# Patient Record
Sex: FEMALE | Race: WHITE | NOT HISPANIC OR LATINO | Employment: OTHER | URBAN - METROPOLITAN AREA
[De-identification: names, ages, dates, MRNs, and addresses within clinical notes are randomized per-mention and may not be internally consistent; named-entity substitution may affect disease eponyms.]

---

## 2017-03-31 ENCOUNTER — OFFICE VISIT (OUTPATIENT)
Dept: FAMILY MEDICINE | Facility: CLINIC | Age: 62
End: 2017-03-31
Payer: COMMERCIAL

## 2017-03-31 VITALS
SYSTOLIC BLOOD PRESSURE: 136 MMHG | BODY MASS INDEX: 16.91 KG/M2 | WEIGHT: 91.9 LBS | DIASTOLIC BLOOD PRESSURE: 72 MMHG | HEIGHT: 62 IN | HEART RATE: 73 BPM | OXYGEN SATURATION: 100 % | TEMPERATURE: 97.8 F

## 2017-03-31 DIAGNOSIS — B02.29 NEURALGIA, POST-HERPETIC: ICD-10-CM

## 2017-03-31 DIAGNOSIS — Z12.31 VISIT FOR SCREENING MAMMOGRAM: Primary | ICD-10-CM

## 2017-03-31 PROCEDURE — 99213 OFFICE O/P EST LOW 20 MIN: CPT | Performed by: INTERNAL MEDICINE

## 2017-03-31 RX ORDER — GABAPENTIN 300 MG/1
CAPSULE ORAL
Qty: 180 CAPSULE | Refills: 3 | Status: SHIPPED | OUTPATIENT
Start: 2017-03-31 | End: 2017-08-13

## 2017-03-31 RX ORDER — OMEPRAZOLE 40 MG/1
40 CAPSULE, DELAYED RELEASE ORAL DAILY
COMMUNITY
End: 2017-11-09

## 2017-03-31 NOTE — NURSING NOTE
"Chief Complaint   Patient presents with     Musculoskeletal Problem       Initial /72 (BP Location: Right arm, Patient Position: Chair, Cuff Size: Adult Regular)  Pulse 73  Temp 97.8  F (36.6  C) (Oral)  Ht 5' 1.5\" (1.562 m)  Wt 91 lb 14.4 oz (41.7 kg)  SpO2 100%  BMI 17.08 kg/m2 Estimated body mass index is 17.08 kg/(m^2) as calculated from the following:    Height as of this encounter: 5' 1.5\" (1.562 m).    Weight as of this encounter: 91 lb 14.4 oz (41.7 kg).  Medication Reconciliation: complete   Melissa Cook MA  "

## 2017-03-31 NOTE — MR AVS SNAPSHOT
After Visit Summary   3/31/2017    Kezia [place]    MRN: [id number]           Patient Information     Date Of Birth          1955        Visit Information        Provider Department      3/31/2017 4:00 PM Mustapha Velásquez MD [facility]        Today's Diagnoses     Visit for screening mammogram    -  1    Neuralgia, post-herpetic           Follow-ups after your visit        Follow-up notes from your care team     Return in about 4 weeks (around 4/28/2017) for Routine Visit.      Your next 10 appointments already scheduled     May 04, 2017 11:30 AM CDT   Office Visit with Mustapha Velásquez MD   [facility] ([facility])    [address] [facility] [address] [phone]           Bring a current list of meds and any records pertaining to this visit.  For Physicals, please bring immunization records and any forms needing to be filled out.  Please arrive 10 minutes early to complete paperwork.              Future tests that were ordered for you today     Open Future Orders        Priority Expected Expires Ordered    MA SCREENING DIGITAL BILAT - Future  (s+30) Routine  3/31/2018 3/31/2017            Who to contact     If you have questions or need follow up information about today's clinic visit or your schedule please contact [facility] directly at [phone].  Normal or non-critical lab and imaging results will be communicated to you by MyChart, letter or phone within 4 business days after the clinic has received the results. If you do not hear from us within 7 days, please contact the clinic through MyChart or phone. If you have a critical or abnormal lab result, we will notify you by phone as soon as possible.  Submit refill requests through [employer] or call your pharmacy and they will forward the refill request to us. Please allow 3 business days for your refill to be completed.          Additional Information About Your Visit       "  [employer] Information     [employer] lets you send messages to your doctor, view your test results, renew your prescriptions, schedule appointments and more. To sign up, go to [email or web address] . Click on \"Log in\" on the left side of the screen, which will take you to the Welcome page. Then click on \"Sign up Now\" on the right side of the page.     You will be asked to enter the access code listed below, as well as some personal information. Please follow the directions to create your username and password.     Your access code is: [email or web address]  Expires: 2017  6:54 PM     Your access code will  in 90 days. If you need help or a new code, please call your [place] clinic or [phone].        Care EveryWhere ID     This is your Care EveryWhere ID. This could be used by other organizations to access your [place] medical records  [id number]        Your Vitals Were     Pulse Temperature Height Pulse Oximetry BMI (Body Mass Index)       73 97.8  F (36.6  C) (Oral) 5' 1.5\" (1.562 m) 100% 17.08 kg/m2        Blood Pressure from Last 3 Encounters:   17 136/72   16 126/64   10/14/16 133/60    Weight from Last 3 Encounters:   17 91 lb 14.4 oz (41.7 kg)   10/14/16 92 lb 1.6 oz (41.8 kg)   16 95 lb (43.1 kg)                 Today's Medication Changes          These changes are accurate as of: 3/31/17  6:54 PM.  If you have any questions, ask your nurse or doctor.               These medicines have changed or have updated prescriptions.        Dose/Directions    gabapentin 300 MG capsule   Commonly known as:  NEURONTIN   This may have changed:  See the new instructions.   Used for:  Neuralgia, post-herpetic   Changed by:  Mustapha Velásquez MD        1-3 capsule(s) up to three times per day as needed for pain related to post herpetic neuralgia   Quantity:  180 capsule   Refills:  3            Where to get your medicines      These medications were sent to [place] [address] [place] [facility] " [place], [place] - [address] [address]  [address] [address]     Phone:  [phone]     gabapentin 300 MG capsule                Primary Care Provider Office Phone # Fax #    Mustapha Velásquez [phone]       [facility] [address] [facility] [address]        Thank you!     Thank you for choosing [facility]  for your care. Our goal is always to provide you with excellent care. Hearing back from our patients is one way we can continue to improve our services. Please take a few minutes to complete the written survey that you may receive in the mail after your visit with us. Thank you!             Your Updated Medication List - Protect others around you: Learn how to safely use, store and throw away your medicines at www.disposemymeds.org.          This list is accurate as of: 3/31/17  6:54 PM.  Always use your most recent med list.                   Brand Name Dispense Instructions for use    amitriptyline 25 MG tablet    ELAVIL    90 tablet    TAKE 1 TABLET (25 MG) BY MOUTH AT BEDTIME       fluticasone 50 MCG/ACT spray    FLONASE    1 Bottle    Spray 2 sprays into both nostrils daily       gabapentin 300 MG capsule    NEURONTIN    180 capsule    1-3 capsule(s) up to three times per day as needed for pain related to post herpetic neuralgia       metoprolol 25 MG tablet    LOPRESSOR    180 tablet    Take 1 tablet (25 mg) by mouth 2 times daily       omeprazole 40 MG capsule    priLOSEC     Take 40 mg by mouth daily

## 2017-05-04 ENCOUNTER — OFFICE VISIT (OUTPATIENT)
Dept: FAMILY MEDICINE | Facility: CLINIC | Age: 62
End: 2017-05-04
Payer: COMMERCIAL

## 2017-05-04 VITALS
HEIGHT: 62 IN | WEIGHT: 93 LBS | OXYGEN SATURATION: 100 % | BODY MASS INDEX: 17.11 KG/M2 | HEART RATE: 77 BPM | SYSTOLIC BLOOD PRESSURE: 146 MMHG | TEMPERATURE: 95.6 F | DIASTOLIC BLOOD PRESSURE: 63 MMHG

## 2017-05-04 DIAGNOSIS — B02.29 NEURALGIA, POST-HERPETIC: Primary | ICD-10-CM

## 2017-05-04 DIAGNOSIS — F10.20 ALCOHOLISM (H): ICD-10-CM

## 2017-05-04 DIAGNOSIS — C15.9 MALIGNANT NEOPLASM OF ESOPHAGUS, UNSPECIFIED LOCATION (H): ICD-10-CM

## 2017-05-04 PROCEDURE — 99213 OFFICE O/P EST LOW 20 MIN: CPT | Performed by: INTERNAL MEDICINE

## 2017-05-04 RX ORDER — AMITRIPTYLINE HYDROCHLORIDE 50 MG/1
50 TABLET ORAL AT BEDTIME
Qty: 90 TABLET | Refills: 3 | Status: SHIPPED | OUTPATIENT
Start: 2017-05-04 | End: 2017-11-09

## 2017-05-04 NOTE — MR AVS SNAPSHOT
After Visit Summary   5/4/2017    Kezia Dixon    MRN: [id number]           Patient Information     Date Of Birth          1955        Visit Information        Provider Department      5/4/2017 11:30 AM Mustapha Velásquez MD [facility]        Today's Diagnoses     Neuralgia, post-herpetic    -  1    Malignant neoplasm of esophagus, unspecified location (H)        Alcoholism (H)           Follow-ups after your visit        Your next 10 appointments already scheduled     May 08, 2017  1:30 PM CDT   MA SCREENING DIGITAL BILATERAL with [id number]   [facility]    [address] [place] [address], [address]  [facility] [address] [phone]           Do not use any powder, lotion or deodorant under your arms or on your breast. If you do, we will ask you to remove it before your exam.  Wear comfortable, two-piece clothing.  If you have any allergies, tell your care team.  Bring any previous mammograms from other facilities or have them mailed to the breast center. This mammogram location, [facility], now offers 3D mammography. It doesn't replace a screening mammogram and can be done with a regular screening mammogram. It is optional and not all insurances will pay for it. 3D mammography is a special kind of mammogram that produces a three-dimensional image of the breast by using low dose-xrays. 3D allows the radiologist to see the breast tissue differently from 2D, which reduces the chance of repeat testing due to overlapping breast tissue. If you are interested in have a 3D mammogram, please check with your insurance before you arrive for your exam. On the day of your exam you will be asked if you would like 3D imaging.              Who to contact     If you have questions or need follow up information about today's clinic visit or your schedule please contact [facility] directly at [phone].  Normal or  "non-critical lab and imaging results will be communicated to you by MyChart, letter or phone within 4 business days after the clinic has received the results. If you do not hear from us within 7 days, please contact the clinic through [employer] or phone. If you have a critical or abnormal lab result, we will notify you by phone as soon as possible.  Submit refill requests through [employer] or call your pharmacy and they will forward the refill request to us. Please allow 3 business days for your refill to be completed.          Additional Information About Your Visit        [employer] Information     [employer] lets you send messages to your doctor, view your test results, renew your prescriptions, schedule appointments and more. To sign up, go to [email or web address] . Click on \"Log in\" on the left side of the screen, which will take you to the Welcome page. Then click on \"Sign up Now\" on the right side of the page.     You will be asked to enter the access code listed below, as well as some personal information. Please follow the directions to create your username and password.     Your access code is: [email or web address]  Expires: 2017  6:54 PM     Your access code will  in 90 days. If you need help or a new code, please call your [place] clinic or [phone].        Care EveryWhere ID     This is your Care EveryWhere ID. This could be used by other organizations to access your [place] medical records  [id number]        Your Vitals Were     Pulse Temperature Height Pulse Oximetry Breastfeeding? BMI (Body Mass Index)    77 95.6  F (35.3  C) (Tympanic) 5' 1.5\" (1.562 m) 100% No 17.29 kg/m2       Blood Pressure from Last 3 Encounters:   17 146/63   17 136/72   16 126/64    Weight from Last 3 Encounters:   17 93 lb (42.2 kg)   17 91 lb 14.4 oz (41.7 kg)   10/14/16 92 lb 1.6 oz (41.8 kg)              Today, you had the following     No orders found for display         Today's " Medication Changes          These changes are accurate as of: 5/4/17 12:28 PM.  If you have any questions, ask your nurse or doctor.               These medicines have changed or have updated prescriptions.        Dose/Directions    amitriptyline 50 MG tablet   Commonly known as:  ELAVIL   This may have changed:  See the new instructions.   Used for:  Neuralgia, post-herpetic   Changed by:  Mustapha Velásquez MD        Dose:  50 mg   Take 1 tablet (50 mg) by mouth At Bedtime   Quantity:  90 tablet   Refills:  3            Where to get your medicines      These medications were sent to [place] [address] [place] [facility]  [address] [address]     Phone:  [phone]     amitriptyline 50 MG tablet                Primary Care Provider Office Phone # Fax #    Mustapha Velásquez [phone]       [facility] [address] [facility] [address]        Thank you!     Thank you for choosing [facility]  for your care. Our goal is always to provide you with excellent care. Hearing back from our patients is one way we can continue to improve our services. Please take a few minutes to complete the written survey that you may receive in the mail after your visit with us. Thank you!             Your Updated Medication List - Protect others around you: Learn how to safely use, store and throw away your medicines at www.disposemymeds.org.          This list is accurate as of: 5/4/17 12:28 PM.  Always use your most recent med list.                   Brand Name Dispense Instructions for use    amitriptyline 50 MG tablet    ELAVIL    90 tablet    Take 1 tablet (50 mg) by mouth At Bedtime       fluticasone 50 MCG/ACT spray    FLONASE    1 Bottle    Spray 2 sprays into both nostrils daily       gabapentin 300 MG capsule    NEURONTIN    180 capsule    1-3 capsule(s) up to three times per day as needed for pain related to post herpetic neuralgia       metoprolol 25  MG tablet    LOPRESSOR    180 tablet    Take 1 tablet (25 mg) by mouth 2 times daily       omeprazole 40 MG capsule    priLOSEC     Take 40 mg by mouth daily

## 2017-05-04 NOTE — NURSING NOTE
"Chief Complaint   Patient presents with     Hypertension     follow up     Also wants to discuss chronic back pain    Initial /63 (BP Location: Right arm, Cuff Size: Adult Regular)  Pulse 77  Temp 95.6  F (35.3  C) (Tympanic)  Ht 5' 1.5\" (1.562 m)  Wt 93 lb (42.2 kg)  SpO2 100%  Breastfeeding? No  BMI 17.29 kg/m2 Estimated body mass index is 17.29 kg/(m^2) as calculated from the following:    Height as of this encounter: 5' 1.5\" (1.562 m).    Weight as of this encounter: 93 lb (42.2 kg).  Medication Reconciliation: complete     Nickie Herring MA      "

## 2017-05-08 ENCOUNTER — HOSPITAL ENCOUNTER (OUTPATIENT)
Dept: MAMMOGRAPHY | Facility: CLINIC | Age: 62
Discharge: HOME | End: 2017-05-08
Attending: INTERNAL MEDICINE | Admitting: INTERNAL MEDICINE
Payer: COMMERCIAL

## 2017-05-08 DIAGNOSIS — Z12.31 VISIT FOR SCREENING MAMMOGRAM: ICD-10-CM

## 2017-05-08 PROCEDURE — G0202 SCR MAMMO BI INCL CAD: HCPCS

## 2017-05-08 PROCEDURE — 77063 BREAST TOMOSYNTHESIS BI: CPT

## 2017-07-20 DIAGNOSIS — J31.0 CHRONIC RHINITIS: ICD-10-CM

## 2017-07-20 RX ORDER — FLUTICASONE PROPIONATE 50 MCG
SPRAY, SUSPENSION (ML) NASAL
Start: 2017-07-20

## 2017-07-20 NOTE — TELEPHONE ENCOUNTER
fluticasone (FLONASE) 50 MCG/ACT spray        Last Written Prescription Date: 10/14/2016  Last Fill Quantity: 1,  # refills: 11   Last Office Visit with [facility], [facility] or [facility] prescribing provider: 5/4/2017

## 2017-08-13 DIAGNOSIS — B02.29 NEURALGIA, POST-HERPETIC: ICD-10-CM

## 2017-08-14 RX ORDER — GABAPENTIN 300 MG/1
CAPSULE ORAL
Qty: 180 CAPSULE | Refills: 3 | Status: SHIPPED | OUTPATIENT
Start: 2017-08-14 | End: 2017-11-09

## 2017-08-14 NOTE — TELEPHONE ENCOUNTER
gabapentin (NEURONTIN) 300 MG capsule        Last Written Prescription Date: 3/31/2017  Last Quantity: 180, # refills: 3  Last Office Visit with [facility], [facility] or [facility] prescribing provider: 5/4/2017       Creatinine   Date Value Ref Range Status   03/31/2016 0.47 (L) 0.52 - 1.04 mg/dL Final     Lab Results   Component Value Date     03/25/2016     Lab Results   Component Value Date     03/25/2016     BP Readings from Last 3 Encounters:   05/04/17 146/63   03/31/17 136/72   11/03/16 126/64

## 2017-08-21 ENCOUNTER — DOCUMENTATION ONLY (OUTPATIENT)
Dept: OTHER | Facility: CLINIC | Age: 62
End: 2017-08-21

## 2017-08-21 PROBLEM — Z71.89 ACP (ADVANCE CARE PLANNING): Chronic | Status: ACTIVE | Noted: 2017-08-21

## 2017-11-09 ENCOUNTER — OFFICE VISIT (OUTPATIENT)
Dept: FAMILY MEDICINE | Facility: CLINIC | Age: 62
End: 2017-11-09
Payer: COMMERCIAL

## 2017-11-09 VITALS
DIASTOLIC BLOOD PRESSURE: 60 MMHG | WEIGHT: 95.9 LBS | OXYGEN SATURATION: 100 % | HEART RATE: 75 BPM | BODY MASS INDEX: 17.65 KG/M2 | HEIGHT: 62 IN | TEMPERATURE: 97.6 F | SYSTOLIC BLOOD PRESSURE: 153 MMHG

## 2017-11-09 DIAGNOSIS — I10 BENIGN ESSENTIAL HYPERTENSION: ICD-10-CM

## 2017-11-09 DIAGNOSIS — B02.29 NEURALGIA, POST-HERPETIC: ICD-10-CM

## 2017-11-09 DIAGNOSIS — L98.9 SKIN LESION: ICD-10-CM

## 2017-11-09 DIAGNOSIS — E78.5 HYPERLIPIDEMIA LDL GOAL <130: ICD-10-CM

## 2017-11-09 DIAGNOSIS — L57.0 AK (ACTINIC KERATOSIS): ICD-10-CM

## 2017-11-09 DIAGNOSIS — Z12.4 SCREENING FOR MALIGNANT NEOPLASM OF CERVIX: ICD-10-CM

## 2017-11-09 DIAGNOSIS — Z00.00 ROUTINE GENERAL MEDICAL EXAMINATION AT A HEALTH CARE FACILITY: Primary | ICD-10-CM

## 2017-11-09 DIAGNOSIS — J31.0 CHRONIC RHINITIS, UNSPECIFIED TYPE: ICD-10-CM

## 2017-11-09 LAB
ALBUMIN SERPL-MCNC: 3.6 G/DL (ref 3.4–5)
ALP SERPL-CCNC: 73 U/L (ref 40–150)
ALT SERPL W P-5'-P-CCNC: 16 U/L (ref 0–50)
ANION GAP SERPL CALCULATED.3IONS-SCNC: 6 MMOL/L (ref 3–14)
AST SERPL W P-5'-P-CCNC: 16 U/L (ref 0–45)
BILIRUB SERPL-MCNC: 0.4 MG/DL (ref 0.2–1.3)
BUN SERPL-MCNC: 19 MG/DL (ref 7–30)
CALCIUM SERPL-MCNC: 9.4 MG/DL (ref 8.5–10.1)
CHLORIDE SERPL-SCNC: 107 MMOL/L (ref 94–109)
CHOLEST SERPL-MCNC: 148 MG/DL
CO2 SERPL-SCNC: 27 MMOL/L (ref 20–32)
CREAT SERPL-MCNC: 0.58 MG/DL (ref 0.52–1.04)
ERYTHROCYTE [DISTWIDTH] IN BLOOD BY AUTOMATED COUNT: 13.5 % (ref 10–15)
GFR SERPL CREATININE-BSD FRML MDRD: >90 ML/MIN/1.7M2
GLUCOSE SERPL-MCNC: 100 MG/DL (ref 70–99)
HCT VFR BLD AUTO: 40.6 % (ref 35–47)
HDLC SERPL-MCNC: 89 MG/DL
HGB BLD-MCNC: 13.2 G/DL (ref 11.7–15.7)
LDLC SERPL CALC-MCNC: 41 MG/DL
MCH RBC QN AUTO: 32.2 PG (ref 26.5–33)
MCHC RBC AUTO-ENTMCNC: 32.5 G/DL (ref 31.5–36.5)
MCV RBC AUTO: 99 FL (ref 78–100)
NONHDLC SERPL-MCNC: 59 MG/DL
PLATELET # BLD AUTO: 169 10E9/L (ref 150–450)
POTASSIUM SERPL-SCNC: 4.5 MMOL/L (ref 3.4–5.3)
PROT SERPL-MCNC: 6.9 G/DL (ref 6.8–8.8)
RBC # BLD AUTO: 4.1 10E12/L (ref 3.8–5.2)
SODIUM SERPL-SCNC: 140 MMOL/L (ref 133–144)
TRIGL SERPL-MCNC: 91 MG/DL
WBC # BLD AUTO: 6.1 10E9/L (ref 4–11)

## 2017-11-09 PROCEDURE — 80053 COMPREHEN METABOLIC PANEL: CPT | Performed by: INTERNAL MEDICINE

## 2017-11-09 PROCEDURE — 99213 OFFICE O/P EST LOW 20 MIN: CPT | Mod: 25 | Performed by: INTERNAL MEDICINE

## 2017-11-09 PROCEDURE — 80061 LIPID PANEL: CPT | Performed by: INTERNAL MEDICINE

## 2017-11-09 PROCEDURE — 85027 COMPLETE CBC AUTOMATED: CPT | Performed by: INTERNAL MEDICINE

## 2017-11-09 PROCEDURE — 36415 COLL VENOUS BLD VENIPUNCTURE: CPT | Performed by: INTERNAL MEDICINE

## 2017-11-09 PROCEDURE — 99396 PREV VISIT EST AGE 40-64: CPT | Performed by: INTERNAL MEDICINE

## 2017-11-09 PROCEDURE — 17000 DESTRUCT PREMALG LESION: CPT | Performed by: INTERNAL MEDICINE

## 2017-11-09 RX ORDER — GABAPENTIN 300 MG/1
CAPSULE ORAL
Qty: 810 CAPSULE | Refills: 3 | Status: SHIPPED | OUTPATIENT
Start: 2017-11-09 | End: 2018-07-06

## 2017-11-09 RX ORDER — AMITRIPTYLINE HYDROCHLORIDE 50 MG/1
50 TABLET ORAL AT BEDTIME
Qty: 90 TABLET | Refills: 3 | Status: SHIPPED | OUTPATIENT
Start: 2017-11-09 | End: 2018-12-24

## 2017-11-09 RX ORDER — OMEPRAZOLE 40 MG/1
40 CAPSULE, DELAYED RELEASE ORAL DAILY
Qty: 90 CAPSULE | Refills: 3 | Status: SHIPPED | OUTPATIENT
Start: 2017-11-09 | End: 2018-11-21

## 2017-11-09 RX ORDER — METOPROLOL TARTRATE 25 MG/1
25 TABLET, FILM COATED ORAL 2 TIMES DAILY
Qty: 180 TABLET | Refills: 3 | Status: ON HOLD | OUTPATIENT
Start: 2017-11-09 | End: 2018-10-25

## 2017-11-09 RX ORDER — FLUTICASONE PROPIONATE 50 MCG
2 SPRAY, SUSPENSION (ML) NASAL DAILY
Qty: 1 BOTTLE | Refills: 11 | Status: SHIPPED | OUTPATIENT
Start: 2017-11-09 | End: 2018-11-09

## 2017-11-09 RX ORDER — DULOXETIN HYDROCHLORIDE 30 MG/1
CAPSULE, DELAYED RELEASE ORAL
Qty: 60 CAPSULE | Refills: 1 | Status: SHIPPED | OUTPATIENT
Start: 2017-11-09 | End: 2017-12-07

## 2017-11-09 RX ORDER — HYDROCHLOROTHIAZIDE 12.5 MG/1
12.5 TABLET ORAL DAILY
Qty: 90 TABLET | Refills: 3 | Status: ON HOLD | OUTPATIENT
Start: 2017-11-09 | End: 2018-10-25

## 2017-11-09 NOTE — PROGRESS NOTES
SUBJECTIVE:   CC: Kezia Dixon is an 62 year old woman who presents for preventive health visit.     Physical   Annual:     Getting at least 3 servings of Calcium per day::  Yes    Bi-annual eye exam::  Yes    Dental care twice a year::  Yes    Sleep apnea or symptoms of sleep apnea::  None    Diet::  Regular (no restrictions)    Frequency of exercise::  6-7 days/week    Duration of exercise::  30-45 minutes    Taking medications regularly::  Yes    Medication side effects::  None    Additional concerns today::  YES      Continued pain in thoracic back that is severe, but improved with gabapentin and elavil  She is going to try acupuncture  Pain has been present for over one year onset after shingles eruption  She would like more help with pain control    She has a non-healing skin ulcer on her left lower leg present for almost one year  Scaly skin lesion on left face present for 8 months          Today's PHQ-2 Score:   PHQ-2 ( 1999 [employer]) 11/6/2017   Q1: Little interest or pleasure in doing things 0   Q2: Feeling down, depressed or hopeless 0   PHQ-2 Score 0   Q1: Little interest or pleasure in doing things Not at all   Q2: Feeling down, depressed or hopeless Not at all   PHQ-2 Score 0       Abuse: Current or Past(Physical, Sexual or Emotional)- No  Do you feel safe in your environment - Yes    Social History   Substance Use Topics     Smoking status: Former Smoker     Packs/day: 0.25     Quit date: 12/11/2015     Smokeless tobacco: Never Used     Alcohol use 0.0 oz/week      Comment: occas wine     The patient does not drink >3 drinks per day nor >7 drinks per week.    Reviewed orders with patient.  Reviewed health maintenance and updated orders accordingly - Yes  Patient Active Problem List   Diagnosis     Esophageal cancer (H)     Benign essential hypertension     Alcoholism (H)     Pancreatic pseudocyst     Impaired fasting glucose     Pap smear with atypical squamous cells, cannot exclude high grade  squamous intraepithelial lesion (ASC-H)     ACP (advance care planning)     Past Surgical History:   Procedure Laterality Date     AAA REPAIR      splenic artery aneurysm embolization     ABDOMEN SURGERY  2/2/2016     COLONOSCOPY  11/26/2013    Procedure: COMBINED COLONOSCOPY, SINGLE BIOPSY/POLYPECTOMY BY BIOPSY;  COLONOSCOPY (MAC);  Surgeon: Montana Rosa MD;  Location:  GI     COLONOSCOPY  11/26/2013     ENT SURGERY       ESOPHAGOGASTRECTOMY N/A 3/22/2016    Procedure: ESOPHAGOGASTRECTOMY;  Surgeon: Alvin Riojas MD;  Location:  OR     ESOPHAGOSCOPY, GASTROSCOPY, DUODENOSCOPY (EGD), COMBINED N/A 12/22/2015    Procedure: COMBINED ENDOSCOPIC ULTRASOUND, ESOPHAGOSCOPY, GASTROSCOPY, DUODENOSCOPY (EGD), FINE NEEDLE ASPIRATE/BIOPSY;  Surgeon: Danelle Michael MD;  Location:  GI     GASTROSTOMY, INSERT TUBE, COMBINED N/A 2/2/2016    Procedure: COMBINED GASTROSTOMY, INSERT TUBE (OPEN);  Surgeon: Alvin Riojas MD;  Location:  OR     GI SURGERY  12/22/2015     HAND SURGERY      right     INSERT PORT VASCULAR ACCESS N/A 12/28/2015    Procedure: INSERT PORT VASCULAR ACCESS;  Surgeon: Alvin Riojas MD;  Location:  OR     REMOVE PORT VASCULAR ACCESS Left 7/22/2016    Procedure: REMOVE PORT VASCULAR ACCESS;  Surgeon: Alvin Riojas MD;  Location:  OR     TONSILLECTOMY       VASCULAR SURGERY         Social History   Substance Use Topics     Smoking status: Former Smoker     Packs/day: 0.25     Quit date: 12/11/2015     Smokeless tobacco: Never Used     Alcohol use 0.0 oz/week      Comment: occas wine     Family History   Problem Relation Age of Onset     Other Cancer Father      melanoma     Prostate Cancer Father      DIABETES Father      Other Cancer Brother      melanoma     Other Cancer Brother      melanoma     Other Cancer Brother          Current Outpatient Prescriptions   Medication Sig Dispense Refill     gabapentin (NEURONTIN) 300 MG capsule TAKE  1-3 CAPS BY MOUTH UP TO THREE TIMES DAILY AS NEEDED FOR PAIN RELATED TO POST HERPETIC NEURALGIA 180 capsule 3     amitriptyline (ELAVIL) 50 MG tablet Take 1 tablet (50 mg) by mouth At Bedtime 90 tablet 3     omeprazole (PRILOSEC) 40 MG capsule Take 40 mg by mouth daily       metoprolol (LOPRESSOR) 25 MG tablet Take 1 tablet (25 mg) by mouth 2 times daily 180 tablet 3     fluticasone (FLONASE) 50 MCG/ACT nasal spray Spray 2 sprays into both nostrils daily 1 Bottle 11     Allergies   Allergen Reactions     Codeine Sulfate Nausea     Simvastatin Cramps     Leg cramps     Penicillins Rash             Pertinent mammograms are reviewed under the imaging tab.  History of abnormal Pap smear: Had abnormal Pap last year, needs to follow up with GYN re HPV testing and repeat PAP, she was reminded    Reviewed and updated as needed this visit by clinical staff  Tobacco  Allergies  Meds         Reviewed and updated as needed this visit by Provider        Past Medical History:   Diagnosis Date     Alcoholism (H) 10/14/2016     Benign essential hypertension 10/14/2016     Carotid artery disease (H)     mile plaque 5/7     Chemical dependency (H)     alcohol     Depression with anxiety      Esophageal cancer (H)      Esophageal cancer (H) 3/22/2016     Esophageal mass      GERD (gastroesophageal reflux disease)      Hx of pancreatitis 2003     Hypercholesteremia      Hyperglycemia      Hyperlipemia      Impaired fasting glucose 10/14/2016     Impaired fasting glucose 10/14/2016     Nocturnal leg cramps      Pancreatic pseudocyst 10/14/2016     Pancreatic pseudocyst 10/14/2016     Pancreatitis     with pseudocyst     Pap smear with atypical squamous cells, cannot exclude high grade squamous intraepithelial lesion (ASC-H) 10/14/16    Colpo impression benign, ECC benign. Cotestin in 1 yr.     Shingles      Vasomotor rhinitis       Past Surgical History:   Procedure Laterality Date     AAA REPAIR      splenic artery aneurysm  "embolization     ABDOMEN SURGERY  2/2/2016     COLONOSCOPY  11/26/2013    Procedure: COMBINED COLONOSCOPY, SINGLE BIOPSY/POLYPECTOMY BY BIOPSY;  COLONOSCOPY (MAC);  Surgeon: Montana Rosa MD;  Location:  GI     COLONOSCOPY  11/26/2013     ENT SURGERY       ESOPHAGOGASTRECTOMY N/A 3/22/2016    Procedure: ESOPHAGOGASTRECTOMY;  Surgeon: Alvin Riojas MD;  Location:  OR     ESOPHAGOSCOPY, GASTROSCOPY, DUODENOSCOPY (EGD), COMBINED N/A 12/22/2015    Procedure: COMBINED ENDOSCOPIC ULTRASOUND, ESOPHAGOSCOPY, GASTROSCOPY, DUODENOSCOPY (EGD), FINE NEEDLE ASPIRATE/BIOPSY;  Surgeon: Danelle Michael MD;  Location:  GI     GASTROSTOMY, INSERT TUBE, COMBINED N/A 2/2/2016    Procedure: COMBINED GASTROSTOMY, INSERT TUBE (OPEN);  Surgeon: Alvin Riojas MD;  Location:  OR     GI SURGERY  12/22/2015     HAND SURGERY      right     INSERT PORT VASCULAR ACCESS N/A 12/28/2015    Procedure: INSERT PORT VASCULAR ACCESS;  Surgeon: Alvin Riojas MD;  Location:  OR     REMOVE PORT VASCULAR ACCESS Left 7/22/2016    Procedure: REMOVE PORT VASCULAR ACCESS;  Surgeon: Alvin Riojas MD;  Location:  OR     TONSILLECTOMY       VASCULAR SURGERY       Review of Systems    A 12 organ systems ROS is negative     OBJECTIVE:   /60 (BP Location: Left arm, Patient Position: Chair, Cuff Size: Adult Regular)  Pulse 75  Temp 97.6  F (36.4  C) (Oral)  Ht 5' 1.5\" (1.562 m)  Wt 95 lb 14.4 oz (43.5 kg)  SpO2 100%  BMI 17.83 kg/m2  Physical Exam  GENERAL APPEARANCE: healthy, alert and no distress; thin   EYES: Eyes grossly normal to inspection, PERRL and conjunctivae and sclerae normal  HENT: ear canals and TM's normal, nose and mouth without ulcers or lesions, oropharynx clear and oral mucous membranes moist  NECK: no adenopathy, no asymmetry, masses, or scars and thyroid normal to palpation  RESP: lungs clear to auscultation - no rales, rhonchi or wheezes  BREAST: She prefers to " ask Dr. Capone to do this when she sees her  CV: regular rate and rhythm, normal S1 S2, no S3 or S4, no murmur, click or rub, no peripheral edema and peripheral pulses strong  ABDOMEN: soft, nontender, no hepatosplenomegaly, no masses and bowel sounds normal  MS: no musculoskeletal defects are noted and gait is age appropriate without ataxia  SKIN: scaly papule on left cheek suspicious for AK; left leg skin ulcerated lesion concerning for SCC, needs to see derm for this; referred to Dr. Ferguson  NEURO: Normal strength and tone, sensory exam grossly normal, mentation intact and speech normal  PSYCH: mentation appears normal and affect normal/bright    ASSESSMENT/PLAN:   1. Routine general medical examination at a health care facility      2. Benign essential hypertension  Not well controlled  Add HYDROCHLOROTHIAZIDE to metoprolol  Return in 3-4 weeks to recheck and check bpm  Side effects and risks of hydrochlorothiazide discussed  - omeprazole (PRILOSEC) 40 MG capsule; Take 1 capsule (40 mg) by mouth daily  Dispense: 90 capsule; Refill: 3  - metoprolol (LOPRESSOR) 25 MG tablet; Take 1 tablet (25 mg) by mouth 2 times daily  Dispense: 180 tablet; Refill: 3  - CBC with platelets  - hydrochlorothiazide 12.5 MG TABS tablet; Take 1 tablet (12.5 mg) by mouth daily  Dispense: 90 tablet; Refill: 3    3. Neuralgia, post-herpetic  Add duloxetine to existing drugs  Side effects and risks dicussed  Return in 3-4 weeks to assess therapy and titrate dose if needed   - amitriptyline (ELAVIL) 50 MG tablet; Take 1 tablet (50 mg) by mouth At Bedtime  Dispense: 90 tablet; Refill: 3  - gabapentin (NEURONTIN) 300 MG capsule; TAKE 1-3 CAPS BY MOUTH UP TO THREE TIMES DAILY AS NEEDED FOR PAIN RELATED TO POST HERPETIC NEURALGIA  Dispense: 810 capsule; Refill: 3  - DULoxetine (CYMBALTA) 30 MG EC capsule; One capsule once daily for one week, then increase to two capsules once daily  Dispense: 60 capsule; Refill: 1    4. AK (actinic  "keratosis)    DESTRUCTION OF SKIN LESION   Liquid nitrogen was applied to suspicious lesion on left cheek  for 10 seconds for 3 applications.  Return in 14 days if lesion(s) persist.  After care discussed.        5. Screening for malignant neoplasm of cervix  She was reminded to see Dr. Capone for follow up Pap and HPV testing; breast exam then too  - HPV High Risk Types DNA Cervical  - OB/GYN REFERRAL    6. Hyperlipidemia LDL goal <130    - Lipid panel reflex to direct LDL Fasting  - Comprehensive metabolic panel    7. Chronic rhinitis, unspecified type    - fluticasone (FLONASE) 50 MCG/ACT spray; Spray 2 sprays into both nostrils daily  Dispense: 1 Bottle; Refill: 11    COUNSELING:  Reviewed preventive health counseling, as reflected in patient instructions  Special attention given to:        Regular exercise       Healthy diet/nutrition      She had flu shot at [employer]  Suspicious skin lesion on left leg referred to Dr. Ferguson for further evaluation   reports that she quit smoking about 22 months ago. She smoked 0.25 packs per day. She has never used smokeless tobacco.    Estimated body mass index is 17.83 kg/(m^2) as calculated from the following:    Height as of this encounter: 5' 1.5\" (1.562 m).    Weight as of this encounter: 95 lb 14.4 oz (43.5 kg).         Counseling Resources:  ATP IV Guidelines  Pooled Cohorts Equation Calculator  Breast Cancer Risk Calculator  FRAX Risk Assessment  ICSI Preventive Guidelines  Dietary Guidelines for Americans, 2010  USDA's MyPlate  ASA Prophylaxis  Lung CA Screening    Mustapha Velásquez MD  [facility] for HPI/ROS submitted by the patient on 11/6/2017   PHQ-2 Score: 0    "

## 2017-11-09 NOTE — NURSING NOTE
"Chief Complaint   Patient presents with     Physical        Initial /60 (BP Location: Left arm, Patient Position: Chair, Cuff Size: Adult Regular)  Pulse 75  Temp 97.6  F (36.4  C) (Oral)  Ht 5' 1.5\" (1.562 m)  Wt 95 lb 14.4 oz (43.5 kg)  SpO2 100%  BMI 17.83 kg/m2 Estimated body mass index is 17.83 kg/(m^2) as calculated from the following:    Height as of this encounter: 5' 1.5\" (1.562 m).    Weight as of this encounter: 95 lb 14.4 oz (43.5 kg)..    BP completed using cuff size: regular  MEDICATIONS REVIEWED  SOCIAL AND FAMILY HX REVIEWED  Rosina Justin CMA  "

## 2017-11-09 NOTE — LETTER
[facility]  [address]  [address]  [place]  [address]  Tel: [phone]    November 13, 2017    Kezia Dixon  [address] [address]        Dear Ms. Dixon,    The following letter pertains to your most recent diagnostic tests:    -Your cholesterol panel looks healthy.     -Liver and gallbladder tests are normal for you. (ALT,AST, Alk phos, bilirubin), kidney function is normal for you (Creatinine, GFR), Sodium is normal, Potassium is normal for you, Calcium is normal for you, Glucose (blood sugar) is normal for you.      -Your complete blood counts including your hemoglobin returned normal for you.             Bottom line:  Remember to schedule an appointment with OB/GYN for your follow up Pap smear.  Your labs look healthy and at goal.        Sincerely,    Dr. Velásquez / seb      Enclosure: Lab Results      Resulted Orders   Lipid panel reflex to direct LDL Fasting   Result Value Ref Range    Cholesterol 148 <200 mg/dL    Triglycerides 91 <150 mg/dL    HDL Cholesterol 89 >49 mg/dL    LDL Cholesterol Calculated 41 <100 mg/dL      Comment:      Desirable:       <100 mg/dl    Non HDL Cholesterol 59 <130 mg/dL   Comprehensive metabolic panel   Result Value Ref Range    Sodium 140 133 - 144 mmol/L    Potassium 4.5 3.4 - 5.3 mmol/L    Chloride 107 94 - 109 mmol/L    Carbon Dioxide 27 20 - 32 mmol/L    Anion Gap 6 3 - 14 mmol/L    Glucose 100 (H) 70 - 99 mg/dL    Urea Nitrogen 19 7 - 30 mg/dL    Creatinine 0.58 0.52 - 1.04 mg/dL    GFR Estimate >90 >60 mL/min/1.7m2      Comment:      Non  GFR Calc    GFR Estimate If Black >90 >60 mL/min/1.7m2      Comment:       GFR Calc    Calcium 9.4 8.5 - 10.1 mg/dL    Bilirubin Total 0.4 0.2 - 1.3 mg/dL    Albumin 3.6 3.4 - 5.0 g/dL    Protein Total 6.9 6.8 - 8.8 g/dL    Alkaline Phosphatase 73 40 - 150 U/L    ALT 16 0 - 50 U/L    AST 16 0 - 45 U/L   CBC with platelets   Result Value Ref Range    WBC 6.1 4.0 -  11.0 10e9/L    RBC Count 4.10 3.8 - 5.2 10e12/L    Hemoglobin 13.2 11.7 - 15.7 g/dL    Hematocrit 40.6 35.0 - 47.0 %    MCV 99 78 - 100 fl    MCH 32.2 26.5 - 33.0 pg    MCHC 32.5 31.5 - 36.5 g/dL    RDW 13.5 10.0 - 15.0 %    Platelet Count 169 150 - 450 10e9/L

## 2017-11-09 NOTE — MR AVS SNAPSHOT
After Visit Summary   11/9/2017    Kezia Dixon    MRN: [id number]           Patient Information     Date Of Birth          1955        Visit Information        Provider Department      11/9/2017 9:30 AM Mustapha Velásquez MD [facility]        Today's Diagnoses     Routine general medical examination at a health care facility    -  1    Benign essential hypertension        Neuralgia, post-herpetic        AK (actinic keratosis)        Screening for malignant neoplasm of cervix        Hyperlipidemia LDL goal <130        Chronic rhinitis, unspecified type        Skin lesion          Care Instructions      Preventive Health Recommendations  Female Ages 50 - 64    Yearly exam: See your health care provider every year in order to  o Review health changes.   o Discuss preventive care.    o Review your medicines if your doctor has prescribed any.      Get a Pap test every three years (unless you have an abnormal result and your provider advises testing more often).    If you get Pap tests with HPV test, you only need to test every 5 years, unless you have an abnormal result.     You do not need a Pap test if your uterus was removed (hysterectomy) and you have not had cancer.    You should be tested each year for STDs (sexually transmitted diseases) if you're at risk.     Have a mammogram every 1 to 2 years.    Have a colonoscopy at age 50, or have a yearly FIT test (stool test). These exams screen for colon cancer.      Have a cholesterol test every 5 years, or more often if advised.    Have a diabetes test (fasting glucose) every three years. If you are at risk for diabetes, you should have this test more often.     If you are at risk for osteoporosis (brittle bone disease), think about having a bone density scan (DEXA).    Shots: Get a flu shot each year. Get a tetanus shot every 10 years.    Nutrition:     Eat at least 5 servings of fruits and vegetables each day.    Eat whole-grain  bread, whole-wheat pasta and brown rice instead of white grains and rice.    Talk to your provider about Calcium and Vitamin D.     Lifestyle    Exercise at least 150 minutes a week (30 minutes a day, 5 days a week). This will help you control your weight and prevent disease.    Limit alcohol to one drink per day.    No smoking.     Wear sunscreen to prevent skin cancer.     See your dentist every six months for an exam and cleaning.    See your eye doctor every 1 to 2 years.            Follow-ups after your visit        Additional Services     DERMATOLOGY REFERRAL       Your provider has referred you to: [facility] ER: [facility] Dermatology [facility] [phone]   [email or web address]    Tanya Ferguson MD  Dermatologist    Please be aware that coverage of these services is subject to the terms and limitations of your health insurance plan.  Call member services at your health plan with any benefit or coverage questions.      Please bring the following with you to your appointment:    (1) Any X-Rays, CTs or MRIs which have been performed.  Contact the facility where they were done to arrange for  prior to your scheduled appointment.    (2) List of current medications  (3) This referral request   (4) Any documents/labs given to you for this referral            OB/GYN REFERRAL       [place] provider [place] you [place]  [facility] [facility] [phone]  [email or web address]    Dr. Capone    Please be aware that coverage of these services is subject to the terms and limitations of your health insurance plan.  Call member services at your health plan with any benefit or coverage questions.      Please bring the following with you to your appointment:    (1) Any X-Rays, CTs or MRIs which have been performed.  Contact the facility where they were done to arrange for  prior to your scheduled appointment.   (2) List of current medications   (3) This referral  "request   (4) Any documents/labs given to you for this referral                  Follow-up notes from your care team     Return in about 4 weeks (around 12/7/2017) for Routine Visit.      Who to contact     If you have questions or need follow up information about today's clinic visit or your schedule please contact [facility] directly at [phone].  Normal or non-critical lab and imaging results will be communicated to you by MyChart, letter or phone within 4 business days after the clinic has received the results. If you do not hear from us within 7 days, please contact the clinic through [employer] or phone. If you have a critical or abnormal lab result, we will notify you by phone as soon as possible.  Submit refill requests through [email or web address] or call your pharmacy and they will forward the refill request to us. Please allow 3 business days for your refill to be completed.          Additional Information About Your Visit        [employer] Information     [email or web address] gives you secure access to your electronic health record. If you see a primary care provider, you can also send messages to your care team and make appointments. If you have questions, please call your primary care clinic.  If you do not have a primary care provider, please call [phone] and they will assist you.        Care EveryWhere ID     This is your Care EveryWhere ID. This could be used by other organizations to access your [place] medical records  [id number]        Your Vitals Were     Pulse Temperature Height Pulse Oximetry BMI (Body Mass Index)       75 97.6  F (36.4  C) (Oral) 5' 1.5\" (1.562 m) 100% 17.83 kg/m2        Blood Pressure from Last 3 Encounters:   11/09/17 153/60   05/04/17 146/63   03/31/17 136/72    Weight from Last 3 Encounters:   11/09/17 95 lb 14.4 oz (43.5 kg)   05/04/17 93 lb (42.2 kg)   03/31/17 91 lb 14.4 oz (41.7 kg)              We Performed the Following     CBC with platelets     Comprehensive metabolic " panel     DERMATOLOGY REFERRAL     HPV High Risk Types DNA Cervical     Lipid panel reflex to direct LDL Fasting     OB/GYN REFERRAL          Today's Medication Changes          These changes are accurate as of: 11/9/17 10:10 AM.  If you have any questions, ask your nurse or doctor.               Start taking these medicines.        Dose/Directions    DULoxetine 30 MG EC capsule   Commonly known as:  CYMBALTA   Used for:  Neuralgia, post-herpetic   Started by:  Mustapha Velásquez MD        One capsule once daily for one week, then increase to two capsules once daily   Quantity:  60 capsule   Refills:  1       hydrochlorothiazide 12.5 MG Tabs tablet   Used for:  Benign essential hypertension   Started by:  Mustapha Velásquez MD        Dose:  12.5 mg   Take 1 tablet (12.5 mg) by mouth daily   Quantity:  90 tablet   Refills:  3         These medicines have changed or have updated prescriptions.        Dose/Directions    gabapentin 300 MG capsule   Commonly known as:  NEURONTIN   This may have changed:  See the new instructions.   Used for:  Neuralgia, post-herpetic   Changed by:  Mustapha Velásquez MD        TAKE 1-3 CAPS BY MOUTH UP TO THREE TIMES DAILY AS NEEDED FOR PAIN RELATED TO POST HERPETIC NEURALGIA   Quantity:  810 capsule   Refills:  3            Where to get your medicines      These medications were sent to [place] [address] [place] [address] [address]     Phone:  [phone]     amitriptyline 50 MG tablet    DULoxetine 30 MG EC capsule    fluticasone 50 MCG/ACT spray    gabapentin 300 MG capsule    hydrochlorothiazide 12.5 MG Tabs tablet    metoprolol 25 MG tablet    omeprazole 40 MG capsule                Primary Care Provider Office Phone # Fax #    Mustapha Velásquez [phone]       [facility] [address] [facility] [address]  [facility] [address]        Equal Access to Services     TANO WILKINSON AH: hong Barrera  janeth goldsmithclement gallegos ah. So [facility] [phone].    ATENCIÓN: Si malissa cruz, tiene a thomas disposición servicios gratuitos de asistencia lingüística. Grisel al [phone].    We comply with applicable federal civil rights laws and [place] laws. We do not discriminate on the basis of race, color, national origin, age, disability, sex, sexual orientation, or gender identity.            Thank you!     Thank you for choosing [facility]  for your care. Our goal is always to provide you with excellent care. Hearing back from our patients is one way we can continue to improve our services. Please take a few minutes to complete the written survey that you may receive in the mail after your visit with us. Thank you!             Your Updated Medication List - Protect others around you: Learn how to safely use, store and throw away your medicines at www.disposemymeds.org.          This list is accurate as of: 11/9/17 10:10 AM.  Always use your most recent med list.                   Brand Name Dispense Instructions for use Diagnosis    amitriptyline 50 MG tablet    ELAVIL    90 tablet    Take 1 tablet (50 mg) by mouth At Bedtime    Neuralgia, post-herpetic       DULoxetine 30 MG EC capsule    CYMBALTA    60 capsule    One capsule once daily for one week, then increase to two capsules once daily    Neuralgia, post-herpetic       fluticasone 50 MCG/ACT spray    FLONASE    1 Bottle    Spray 2 sprays into both nostrils daily    Chronic rhinitis, unspecified type       gabapentin 300 MG capsule    NEURONTIN    810 capsule    TAKE 1-3 CAPS BY MOUTH UP TO THREE TIMES DAILY AS NEEDED FOR PAIN RELATED TO POST HERPETIC NEURALGIA    Neuralgia, post-herpetic       hydrochlorothiazide 12.5 MG Tabs tablet     90 tablet    Take 1 tablet (12.5 mg) by mouth daily    Benign essential hypertension       metoprolol 25 MG tablet    LOPRESSOR    180 tablet    Take 1 tablet (25 mg)  by mouth 2 times daily    Benign essential hypertension       omeprazole 40 MG capsule    priLOSEC    90 capsule    Take 1 capsule (40 mg) by mouth daily    Benign essential hypertension

## 2017-11-11 NOTE — PROGRESS NOTES
The following letter pertains to your most recent diagnostic tests:    -Your cholesterol panel looks healthy.     -Liver and gallbladder tests are normal for you. (ALT,AST, Alk phos, bilirubin), kidney function is normal for you (Creatinine, GFR), Sodium is normal, Potassium is normal for you, Calcium is normal for you, Glucose (blood sugar) is normal for you.      -Your complete blood counts including your hemoglobin returned normal for you.             Bottom line:  Remember to schedule an appointment with OB/GYN for your follow up Pap smear.  Your labs look healthy and at goal.        Sincerely,    Dr. Velásquez

## 2017-12-07 ENCOUNTER — OFFICE VISIT (OUTPATIENT)
Dept: FAMILY MEDICINE | Facility: CLINIC | Age: 62
End: 2017-12-07
Payer: COMMERCIAL

## 2017-12-07 ENCOUNTER — OFFICE VISIT (OUTPATIENT)
Dept: OBGYN | Facility: CLINIC | Age: 62
End: 2017-12-07
Payer: COMMERCIAL

## 2017-12-07 VITALS
BODY MASS INDEX: 17.48 KG/M2 | DIASTOLIC BLOOD PRESSURE: 61 MMHG | HEIGHT: 62 IN | OXYGEN SATURATION: 100 % | WEIGHT: 95 LBS | SYSTOLIC BLOOD PRESSURE: 133 MMHG | TEMPERATURE: 95.9 F | HEART RATE: 69 BPM

## 2017-12-07 VITALS
SYSTOLIC BLOOD PRESSURE: 118 MMHG | WEIGHT: 96.4 LBS | DIASTOLIC BLOOD PRESSURE: 62 MMHG | BODY MASS INDEX: 17.74 KG/M2 | HEIGHT: 62 IN

## 2017-12-07 DIAGNOSIS — R63.6 UNDERWEIGHT: ICD-10-CM

## 2017-12-07 DIAGNOSIS — B02.29 NEURALGIA, POST-HERPETIC: Primary | ICD-10-CM

## 2017-12-07 DIAGNOSIS — Z01.419 ENCOUNTER FOR GYNECOLOGICAL EXAMINATION WITHOUT ABNORMAL FINDING: Primary | ICD-10-CM

## 2017-12-07 DIAGNOSIS — Z12.4 CERVICAL CANCER SCREENING: ICD-10-CM

## 2017-12-07 DIAGNOSIS — M85.80 OSTEOPENIA, SENILE: ICD-10-CM

## 2017-12-07 DIAGNOSIS — I10 BENIGN ESSENTIAL HYPERTENSION: ICD-10-CM

## 2017-12-07 PROCEDURE — 36415 COLL VENOUS BLD VENIPUNCTURE: CPT | Performed by: INTERNAL MEDICINE

## 2017-12-07 PROCEDURE — G0145 SCR C/V CYTO,THINLAYER,RESCR: HCPCS | Performed by: OBSTETRICS & GYNECOLOGY

## 2017-12-07 PROCEDURE — 99396 PREV VISIT EST AGE 40-64: CPT | Performed by: OBSTETRICS & GYNECOLOGY

## 2017-12-07 PROCEDURE — 99213 OFFICE O/P EST LOW 20 MIN: CPT | Performed by: INTERNAL MEDICINE

## 2017-12-07 PROCEDURE — 87624 HPV HI-RISK TYP POOLED RSLT: CPT | Performed by: OBSTETRICS & GYNECOLOGY

## 2017-12-07 PROCEDURE — 80048 BASIC METABOLIC PNL TOTAL CA: CPT | Performed by: INTERNAL MEDICINE

## 2017-12-07 RX ORDER — DULOXETIN HYDROCHLORIDE 60 MG/1
60 CAPSULE, DELAYED RELEASE ORAL DAILY
Qty: 90 CAPSULE | Refills: 3 | Status: SHIPPED | OUTPATIENT
Start: 2017-12-07 | End: 2018-05-22

## 2017-12-07 NOTE — PROGRESS NOTES
Kezia is a 62 year old No obstetric history on file. female who presents for annual exam.     Besides routine health maintenance, she has no other health concerns today .    HPI:  The patient's PCP is Mustapha Velásquez MD.      No VB/Discharge.    Exercise 5-6x/wk. Curves. Ca++ once per day.     Dexa 2008 hip/sp -1.4 Hx smoking. Low wt.     GYNECOLOGIC HISTORY:    No LMP recorded. Patient is postmenopausal.  Her current contraception method is: menopause.  She  reports that she quit smoking about 2 years ago. She smoked 0.25 packs per day. She has never used smokeless tobacco.      Patient is sexually active.  STD testing offered?  Declined  Last PHQ-9 score on record =   PHQ-9 SCORE 11/3/2016   Total Score 0     Last GAD7 score on record = No flowsheet data found.  Alcohol Score = 2    HEALTH MAINTENANCE:  Cholesterol: 11/9/17   Total= 148, Triglycerides=91, HDL=89, LDL=41, XUP=829 -Patient had labs done with pcp  Last Mammo:5/8/17, Result: normal, Next Mammo: May 2018  Pap: 10/14/16 ASC-H  Colonoscopy:  11/26/13, Result: normal, Next Colonoscopy: due next year  Dexa: 9/22/08     Health maintenance updated:  yes    HISTORY:  Obstetric History     No data available          Patient Active Problem List   Diagnosis     Esophageal cancer (H)     Benign essential hypertension     Alcoholism (H)     Pancreatic pseudocyst     Impaired fasting glucose     Pap smear with atypical squamous cells, cannot exclude high grade squamous intraepithelial lesion (ASC-H)     ACP (advance care planning)     Past Surgical History:   Procedure Laterality Date     AAA REPAIR      splenic artery aneurysm embolization     ABDOMEN SURGERY  2/2/2016     COLONOSCOPY  11/26/2013    Procedure: COMBINED COLONOSCOPY, SINGLE BIOPSY/POLYPECTOMY BY BIOPSY;  COLONOSCOPY (MAC);  Surgeon: Montana Rosa MD;  Location:  GI     COLONOSCOPY  11/26/2013     ENT SURGERY       ESOPHAGOGASTRECTOMY N/A 3/22/2016    Procedure: ESOPHAGOGASTRECTOMY;   Surgeon: Alvin Riojas MD;  Location:  OR     ESOPHAGOSCOPY, GASTROSCOPY, DUODENOSCOPY (EGD), COMBINED N/A 12/22/2015    Procedure: COMBINED ENDOSCOPIC ULTRASOUND, ESOPHAGOSCOPY, GASTROSCOPY, DUODENOSCOPY (EGD), FINE NEEDLE ASPIRATE/BIOPSY;  Surgeon: Danelle Michael MD;  Location:  GI     GASTROSTOMY, INSERT TUBE, COMBINED N/A 2/2/2016    Procedure: COMBINED GASTROSTOMY, INSERT TUBE (OPEN);  Surgeon: Alvin Riojas MD;  Location:  OR     GI SURGERY  12/22/2015     HAND SURGERY      right     INSERT PORT VASCULAR ACCESS N/A 12/28/2015    Procedure: INSERT PORT VASCULAR ACCESS;  Surgeon: Alvin Riojas MD;  Location:  OR     REMOVE PORT VASCULAR ACCESS Left 7/22/2016    Procedure: REMOVE PORT VASCULAR ACCESS;  Surgeon: Alvin Riojas MD;  Location:  OR     TONSILLECTOMY       VASCULAR SURGERY        Social History   Substance Use Topics     Smoking status: Former Smoker     Packs/day: 0.25     Quit date: 12/11/2015     Smokeless tobacco: Never Used     Alcohol use 0.0 oz/week      Comment: occas wine      Problem (# of Occurrences) Relation (Name,Age of Onset)    DIABETES (1) Father (Jason Tobar)    Other Cancer (4) Father (Jason Tobar): melanoma, Brother: melanoma, Brother: melanoma, Brother (Lincoln Tobar)    Prostate Cancer (1) Father (Jason Tobar)            Current Outpatient Prescriptions   Medication Sig     amitriptyline (ELAVIL) 50 MG tablet Take 1 tablet (50 mg) by mouth At Bedtime     omeprazole (PRILOSEC) 40 MG capsule Take 1 capsule (40 mg) by mouth daily     gabapentin (NEURONTIN) 300 MG capsule TAKE 1-3 CAPS BY MOUTH UP TO THREE TIMES DAILY AS NEEDED FOR PAIN RELATED TO POST HERPETIC NEURALGIA     metoprolol (LOPRESSOR) 25 MG tablet Take 1 tablet (25 mg) by mouth 2 times daily     fluticasone (FLONASE) 50 MCG/ACT spray Spray 2 sprays into both nostrils daily     hydrochlorothiazide 12.5 MG TABS tablet Take 1 tablet (12.5 mg) by mouth daily  "    DULoxetine (CYMBALTA) 60 MG EC capsule Take 1 capsule (60 mg) by mouth daily One capsule once daily for one week, then increase to two capsules once daily     No current facility-administered medications for this visit.      Allergies   Allergen Reactions     Codeine Sulfate Nausea     Simvastatin Cramps     Leg cramps     Penicillins Rash       Past medical, surgical, social and family histories were reviewed and updated in EPIC.    ROS:   12 point review of systems negative other than symptoms noted below.    EXAM:  /62  Ht 5' 2\" (1.575 m)  Wt 96 lb 6.4 oz (43.7 kg)  BMI 17.63 kg/m2   BMI: Body mass index is 17.63 kg/(m^2).    PHYSICAL EXAM:  Constitutional:  Appearance: Well nourished, well developed, alert, in no acute distress  Neck:  Lymph Nodes:  No lymphadenopathy present    Thyroid:  Gland size normal, nontender, no nodules or masses present  on palpation  Chest:  Respiratory Effort:  Breathing unlabored  Cardiovascular:    Heart: Auscultation:  Regular rate, normal rhythm, no murmurs present  Breasts: Inspection of Breasts:  No lymphadenopathy present., Palpation of Breasts and Axillae:  No masses present on palpation, no breast tenderness., Axillary Lymph Nodes:  No lymphadenopathy present. and No nodularity, asymmetry or nipple discharge bilaterally.  Gastrointestinal:   Abdominal Examination:  Abdomen nontender to palpation, tone normal without rigidity or guarding, no masses present, umbilicus without lesions   Liver and Spleen:  No hepatomegaly present, liver nontender to palpation    Hernias:  No hernias present  Lymphatic: Lymph Nodes:  No other lymphadenopathy present  Skin:  General Inspection:  No rashes present, no lesions present, no areas of  discoloration    Genitalia and Groin:  No rashes present, no lesions present, no areas of  discoloration, no masses present  Neurologic/Psychiatric:    Mental Status:  Oriented X3     Pelvic Exam:  External Genitalia:     Normal appearance " for age, no discharge present, no tenderness present, no inflammatory lesions present, color normal  Vagina:     Normal vaginal vault without central or paravaginal defects, ATROPHIC  Bladder:     Nontender to palpation  Urethra:   Urethral Body:  Urethra palpation normal, urethra structural support normal   Urethral Meatus:  No erythema or lesions present  Cervix:     Appearance healthy, no lesions present, nontender to palpation, no bleeding present  Uterus:     Nontender to palpation, no masses present, position anteflexed, mobility: normal  Adnexa:     No adnexal tenderness present, no adnexal masses present  Perineum:     Perineum within normal limits, no evidence of trauma, no rashes or skin lesions present  Inguinal Lymph Nodes:     No lymphadenopathy present        COUNSELING:   Reviewed preventive health counseling, as reflected in patient instructions       Osteoporosis Prevention/Bone Health      BMI: Body mass index is 17.63 kg/(m^2). Underweight        ASSESSMENT:  62 year old female with satisfactory annual exam.  ICD-10-CM    1. Encounter for gynecological examination without abnormal finding Z01.419 Pap imaged thin layer screen with HPV - recommended age 30 - 65     HPV High Risk Types DNA Cervical   2. Osteopenia, senile M85.80 DX Hip/Pelvis/Spine   3. Underweight R63.6 DX Hip/Pelvis/Spine   4. Cervical cancer screening Z12.4 Pap imaged thin layer screen with HPV - recommended age 30 - 65     HPV High Risk Types DNA Cervical       PLAN:  -Pap/hpv obtained for cervical cancer screening. F/U from abnormal 2016.  -Breast self awareness discussed. UTD for mammogram.  -Colonoscopy due next year  -Osteoporosis prevention discussed. Due for dexa, risk factors present  -PCP manages labs  -PMB precuations  -Return one year for next annual exam      Payal Cooks, DO

## 2017-12-07 NOTE — NURSING NOTE
"Chief Complaint   Patient presents with     Hypertension     1 month recheck       Initial /61 (BP Location: Right arm, Cuff Size: Adult Regular)  Pulse 69  Temp 95.9  F (35.5  C) (Tympanic)  Ht 5' 2\" (1.575 m)  Wt 95 lb (43.1 kg)  SpO2 100%  Breastfeeding? No  BMI 17.38 kg/m2 Estimated body mass index is 17.38 kg/(m^2) as calculated from the following:    Height as of this encounter: 5' 2\" (1.575 m).    Weight as of this encounter: 95 lb (43.1 kg).  Medication Reconciliation: complete   Nickei Herring MA      "

## 2017-12-07 NOTE — MR AVS SNAPSHOT
After Visit Summary   12/7/2017    Kezia Dixon    MRN: [id number]           Patient Information     Date Of Birth          1955        Visit Information        Provider Department      12/7/2017 11:00 AM Mustapha Velásquez MD [facility]        Today's Diagnoses     Neuralgia, post-herpetic    -  1    Benign essential hypertension           Follow-ups after your visit        Future tests that were ordered for you today     Open Future Orders        Priority Expected Expires Ordered    DX Hip/Pelvis/Spine Routine  12/7/2018 12/7/2017            Who to contact     If you have questions or need follow up information about today's clinic visit or your schedule please contact [facility] directly at [phone].  Normal or non-critical lab and imaging results will be communicated to you by Epidemic Soundhart, letter or phone within 4 business days after the clinic has received the results. If you do not hear from us within 7 days, please contact the clinic through Epidemic Soundhart or phone. If you have a critical or abnormal lab result, we will notify you by phone as soon as possible.  Submit refill requests through HAUL or call your pharmacy and they will forward the refill request to us. Please allow 3 business days for your refill to be completed.          Additional Information About Your Visit        MyChart Information     HAUL gives you secure access to your electronic health record. If you see a primary care provider, you can also send messages to your care team and make appointments. If you have questions, please call your primary care clinic.  If you do not have a primary care provider, please call [phone] and they will assist you.        Care EveryWhere ID     This is your Care EveryWhere ID. This could be used by other organizations to access your [facility] medical records  [id number]        Your Vitals Were     Pulse Temperature Height Pulse Oximetry Breastfeeding? BMI  "(Body Mass Index)    69 95.9  F (35.5  C) (Tympanic) 5' 2\" (1.575 m) 100% No 17.38 kg/m2       Blood Pressure from Last 3 Encounters:   12/07/17 133/61   12/07/17 118/62   11/09/17 153/60    Weight from Last 3 Encounters:   12/07/17 95 lb (43.1 kg)   12/07/17 96 lb 6.4 oz (43.7 kg)   11/09/17 95 lb 14.4 oz (43.5 kg)              We Performed the Following     Basic metabolic panel          Today's Medication Changes          These changes are accurate as of: 12/7/17 11:44 AM.  If you have any questions, ask your nurse or doctor.               These medicines have changed or have updated prescriptions.        Dose/Directions    DULoxetine 60 MG EC capsule   Commonly known as:  CYMBALTA   This may have changed:    - medication strength  - how much to take  - how to take this  - when to take this   Used for:  Neuralgia, post-herpetic   Changed by:  Mustapha Velásquez MD        Dose:  60 mg   Take 1 capsule (60 mg) by mouth daily One capsule once daily for one week, then increase to two capsules once daily   Quantity:  90 capsule   Refills:  3            Where to get your medicines      These medications were sent to [place] [address] [place] [id number]     Phone:  [phone]     DULoxetine 60 MG EC capsule                Primary Care Provider Office Phone # Fax #    Mustapha Velásquez [phone]       [address] [address]        Equal Access to Services     [facility]: Hadii shala han Soroyer, waaxda luqadaha, qaybta kaalclement malone. So [facility] [phone].    ATENCIÓN: Si habla español, tiene a thomas disposición servicios gratuitos de asistencia lingüística. Llame al [phone].    We comply with applicable federal civil rights laws and [place] laws. We do not discriminate on the basis of race, color, national origin, age, disability, sex, sexual " orientation, or gender identity.            Thank you!     Thank you for choosing [facility]  for your care. Our goal is always to provide you with excellent care. Hearing back from our patients is one way we can continue to improve our services. Please take a few minutes to complete the written survey that you may receive in the mail after your visit with us. Thank you!             Your Updated Medication List - Protect others around you: Learn how to safely use, store and throw away your medicines at www.disposemymeds.org.          This list is accurate as of: 12/7/17 11:44 AM.  Always use your most recent med list.                   Brand Name Dispense Instructions for use Diagnosis    amitriptyline 50 MG tablet    ELAVIL    90 tablet    Take 1 tablet (50 mg) by mouth At Bedtime    Neuralgia, post-herpetic       DULoxetine 60 MG EC capsule    CYMBALTA    90 capsule    Take 1 capsule (60 mg) by mouth daily One capsule once daily for one week, then increase to two capsules once daily    Neuralgia, post-herpetic       fluticasone 50 MCG/ACT spray    FLONASE    1 Bottle    Spray 2 sprays into both nostrils daily    Chronic rhinitis, unspecified type       gabapentin 300 MG capsule    NEURONTIN    810 capsule    TAKE 1-3 CAPS BY MOUTH UP TO THREE TIMES DAILY AS NEEDED FOR PAIN RELATED TO POST HERPETIC NEURALGIA    Neuralgia, post-herpetic       hydrochlorothiazide 12.5 MG Tabs tablet     90 tablet    Take 1 tablet (12.5 mg) by mouth daily    Benign essential hypertension       metoprolol 25 MG tablet    LOPRESSOR    180 tablet    Take 1 tablet (25 mg) by mouth 2 times daily    Benign essential hypertension       omeprazole 40 MG capsule    priLOSEC    90 capsule    Take 1 capsule (40 mg) by mouth daily    Benign essential hypertension

## 2017-12-07 NOTE — PROGRESS NOTES
SUBJECTIVE:   Kezia Dixon is a 62 year old female who presents to clinic today for the following health issues:    Hypertension Follow-up      Outpatient blood pressures are not being checked.    Low Salt Diet: low salt        Amount of exercise or physical activity: 6-7 days/week for an average of 30-45 minutes    Problems taking medications regularly: No    Medication side effects: none    Diet: low salt      Pleasant 62-year-old female with a history of chronic pain related to postherpetic neuralgia was started on duloxetine about 1 month ago.  She reports improved symptoms related to her postherpetic neuralgia since starting that medication and no side effects.  She was also started on hydrochlorothiazide for uncontrolled hypertension.  She is tolerating that medication without side effects.  Finally, she received liquid nitrogen therapy to her left cheek for a scaly skin papule that was thought to represent an actinic keratosis.  Her skin lesion has resolved completely.  She feels well and is without other complaints.  She did see her gynecologist this morning for a Pap smear.  She also received a clinical breast examination.    Problem list and histories reviewed & adjusted, as indicated.  Additional history: as documented    Patient Active Problem List   Diagnosis     Esophageal cancer (H)     Benign essential hypertension     Alcoholism (H)     Pancreatic pseudocyst     Impaired fasting glucose     Pap smear with atypical squamous cells, cannot exclude high grade squamous intraepithelial lesion (ASC-H)     ACP (advance care planning)     Past Surgical History:   Procedure Laterality Date     AAA REPAIR      splenic artery aneurysm embolization     ABDOMEN SURGERY  2/2/2016     COLONOSCOPY  11/26/2013    Procedure: COMBINED COLONOSCOPY, SINGLE BIOPSY/POLYPECTOMY BY BIOPSY;  COLONOSCOPY (MAC);  Surgeon: Montana Rosa MD;  Location:  GI     COLONOSCOPY  11/26/2013     ENT SURGERY        ESOPHAGOGASTRECTOMY N/A 3/22/2016    Procedure: ESOPHAGOGASTRECTOMY;  Surgeon: Alvin Riojas MD;  Location:  OR     ESOPHAGOSCOPY, GASTROSCOPY, DUODENOSCOPY (EGD), COMBINED N/A 12/22/2015    Procedure: COMBINED ENDOSCOPIC ULTRASOUND, ESOPHAGOSCOPY, GASTROSCOPY, DUODENOSCOPY (EGD), FINE NEEDLE ASPIRATE/BIOPSY;  Surgeon: Danelle Michael MD;  Location:  GI     GASTROSTOMY, INSERT TUBE, COMBINED N/A 2/2/2016    Procedure: COMBINED GASTROSTOMY, INSERT TUBE (OPEN);  Surgeon: Alvin Riojas MD;  Location:  OR     GI SURGERY  12/22/2015     HAND SURGERY      right     INSERT PORT VASCULAR ACCESS N/A 12/28/2015    Procedure: INSERT PORT VASCULAR ACCESS;  Surgeon: Alvin Riojas MD;  Location:  OR     REMOVE PORT VASCULAR ACCESS Left 7/22/2016    Procedure: REMOVE PORT VASCULAR ACCESS;  Surgeon: Alvin Riojas MD;  Location:  OR     TONSILLECTOMY       VASCULAR SURGERY         Social History   Substance Use Topics     Smoking status: Former Smoker     Packs/day: 0.25     Quit date: 12/11/2015     Smokeless tobacco: Never Used     Alcohol use 0.0 oz/week      Comment: occas wine     Family History   Problem Relation Age of Onset     Other Cancer Father      melanoma     Prostate Cancer Father      DIABETES Father      Other Cancer Brother      melanoma     Other Cancer Brother      melanoma     Other Cancer Brother          Current Outpatient Prescriptions   Medication Sig Dispense Refill     DULoxetine (CYMBALTA) 60 MG EC capsule Take 1 capsule (60 mg) by mouth daily One capsule once daily for one week, then increase to two capsules once daily 90 capsule 3     amitriptyline (ELAVIL) 50 MG tablet Take 1 tablet (50 mg) by mouth At Bedtime 90 tablet 3     omeprazole (PRILOSEC) 40 MG capsule Take 1 capsule (40 mg) by mouth daily 90 capsule 3     gabapentin (NEURONTIN) 300 MG capsule TAKE 1-3 CAPS BY MOUTH UP TO THREE TIMES DAILY AS NEEDED FOR PAIN RELATED TO POST  "HERPETIC NEURALGIA 810 capsule 3     metoprolol (LOPRESSOR) 25 MG tablet Take 1 tablet (25 mg) by mouth 2 times daily 180 tablet 3     fluticasone (FLONASE) 50 MCG/ACT spray Spray 2 sprays into both nostrils daily 1 Bottle 11     hydrochlorothiazide 12.5 MG TABS tablet Take 1 tablet (12.5 mg) by mouth daily 90 tablet 3     [DISCONTINUED] DULoxetine (CYMBALTA) 30 MG EC capsule One capsule once daily for one week, then increase to two capsules once daily 60 capsule 1     Allergies   Allergen Reactions     Codeine Sulfate Nausea     Simvastatin Cramps     Leg cramps     Penicillins Rash         Reviewed and updated as needed this visit by clinical staff     Reviewed and updated as needed this visit by Provider         ROS:  Constitutional, HEENT, cardiovascular, pulmonary, gi and gu systems are negative, except as otherwise noted.      OBJECTIVE:   /61 (BP Location: Right arm, Cuff Size: Adult Regular)  Pulse 69  Temp 95.9  F (35.5  C) (Tympanic)  Ht 5' 2\" (1.575 m)  Wt 95 lb (43.1 kg)  SpO2 100%  Breastfeeding? No  BMI 17.38 kg/m2  Body mass index is 17.38 kg/(m^2).  GENERAL: healthy, alert and no distress  SKIN:  Scaly papule on left cheek has resolved       Diagnostic Test Results:  Labs pending    ASSESSMENT/PLAN:       1. Neuralgia, post-herpetic  Continue duloxetine at current dose in addition to gabapentin and amitriptyline to manage postherpetic neuralgia pain.  Return to clinic in 6 months to reevaluate pain and consider tapering 1 of the 3 medications.  - DULoxetine (CYMBALTA) 60 MG EC capsule; Take 1 capsule (60 mg) by mouth daily One capsule once daily for one week, then increase to two capsules once daily  Dispense: 90 capsule; Refill: 3    2. Benign essential hypertension  Her blood pressure is now under very good control, but she requires a basic metabolic panel to exclude electrolyte derangements from diuretic therapy  - Basic metabolic panel    FUTURE APPOINTMENTS:       -Pending labs and " symptoms, 6 months for reevaluation of therapy for postherpetic neuralgia pain    Mustapha Velásquez MD  [facility]

## 2017-12-07 NOTE — LETTER
[facility]  [address] [facility]  [address]  [place]  [address]  Tel: [phone]    December 11, 2017    Kezia Dixon  [address]  [place] [address]        Dear Ms. Dixon,    The following letter pertains to your most recent diagnostic tests:    Your potassium level is a little high which is curious because hydrochlorothiazide usually causes potassium to go down.  I am suspicious for your potassium being elevated due to a common lab error known as a hemolyzed sample.  I recommend that you return to the lab at your convenience to have your basic metabolic panel rechecked.  Apologies for the inconvenience.        Follow up:  Lab appointment as soon as convenient to recheck basic metabolic panel.      Sincerely,    Mustapha Velásquez MD/[place],[facility]    Enclosure: Lab Results    Results for orders placed or performed in visit on 12/07/17   Basic metabolic panel   Result Value Ref Range    Sodium 141 133 - 144 mmol/L    Potassium 5.4 (H) 3.4 - 5.3 mmol/L    Chloride 105 94 - 109 mmol/L    Carbon Dioxide 27 20 - 32 mmol/L    Anion Gap 9 3 - 14 mmol/L    Glucose 86 70 - 99 mg/dL    Urea Nitrogen 17 7 - 30 mg/dL    Creatinine 0.68 0.52 - 1.04 mg/dL    GFR Estimate 87 >60 mL/min/1.7m2    GFR Estimate If Black >90 >60 mL/min/1.7m2    Calcium 9.6 8.5 - 10.1 mg/dL

## 2017-12-07 NOTE — MR AVS SNAPSHOT
After Visit Summary   12/7/2017    Kezia Dixon    MRN: [id number]           Patient Information     Date Of Birth          1955        Visit Information        Provider Department      12/7/2017 9:40 AM Payal Capone,  [facility]        Today's Diagnoses     Encounter for gynecological examination without abnormal finding    -  1    Osteopenia, senile        Underweight        Cervical cancer screening           Follow-ups after your visit        Follow-up notes from your care team     Return in about 1 year (around 12/7/2018).      Who to contact     If you have questions or need follow up information about today's clinic visit or your schedule please contact [facility] directly at [phone].  Normal or non-critical lab and imaging results will be communicated to you by Farmanhart, letter or phone within 4 business days after the clinic has received the results. If you do not hear from us within 7 days, please contact the clinic through Farmanhart or phone. If you have a critical or abnormal lab result, we will notify you by phone as soon as possible.  Submit refill requests through Temnos or call your pharmacy and they will forward the refill request to us. Please allow 3 business days for your refill to be completed.          Additional Information About Your Visit        MyChart Information     Temnos gives you secure access to your electronic health record. If you see a primary care provider, you can also send messages to your care team and make appointments. If you have questions, please call your primary care clinic.  If you do not have a primary care provider, please call [phone] and they will assist you.        Care EveryWhere ID     This is your Care EveryWhere ID. This could be used by other organizations to access your [place] medical records  [id number]        Your Vitals Were     Height BMI (Body Mass Index)           "      5' 2\" (1.575 m) 17.63 kg/m2           Blood Pressure from Last 3 Encounters:   12/07/17 133/61   12/07/17 118/62   11/09/17 153/60    Weight from Last 3 Encounters:   12/07/17 95 lb (43.1 kg)   12/07/17 96 lb 6.4 oz (43.7 kg)   11/09/17 95 lb 14.4 oz (43.5 kg)              We Performed the Following     HPV High Risk Types DNA Cervical     Pap imaged thin layer screen with HPV - recommended age 30 - 65          Today's Medication Changes          These changes are accurate as of: 12/7/17 11:59 PM.  If you have any questions, ask your nurse or doctor.               These medicines have changed or have updated prescriptions.        Dose/Directions    DULoxetine 60 MG EC capsule   Commonly known as:  CYMBALTA   This may have changed:    - medication strength  - how much to take  - how to take this  - when to take this   Used for:  Neuralgia, post-herpetic   Changed by:  Mustapha Velásquez MD        Dose:  60 mg   Take 1 capsule (60 mg) by mouth daily One capsule once daily for one week, then increase to two capsules once daily   Quantity:  90 capsule   Refills:  3            Where to get your medicines      These medications were sent to [place] [address] [place] [address] [place] [id number]     Phone:  [phone]     DULoxetine 60 MG EC capsule                Primary Care Provider Office Phone # Fax #    Mustapha Velásquez [phone]       [place] [address] [address] [address]        Equal Access to Services     [facility] : Hadii aad ku hadashmars Soroyer, waaxda luqadaha, qaybta kaalmada david, clement josue. So [facility] [place] [facility] [phone].    ATENCIÓN: Si habla español, tiene a thomas disposición servicios gratuitos de asistencia lingüística. Llame al [phone].    We comply with applicable federal civil rights laws and [place] laws. We do not discriminate on the basis of race, color, national origin, " age, disability, sex, sexual orientation, or gender identity.            Thank you!     Thank you for choosing [facility]  for your care. Our goal is always to provide you with excellent care. Hearing back from our patients is one way we can continue to improve our services. Please take a few minutes to complete the written survey that you may receive in the mail after your visit with us. Thank you!             Your Updated Medication List - Protect others around you: Learn how to safely use, store and throw away your medicines at www.disposemymeds.org.          This list is accurate as of: 12/7/17 11:59 PM.  Always use your most recent med list.                   Brand Name Dispense Instructions for use Diagnosis    amitriptyline 50 MG tablet    ELAVIL    90 tablet    Take 1 tablet (50 mg) by mouth At Bedtime    Neuralgia, post-herpetic       DULoxetine 60 MG EC capsule    CYMBALTA    90 capsule    Take 1 capsule (60 mg) by mouth daily One capsule once daily for one week, then increase to two capsules once daily    Neuralgia, post-herpetic       fluticasone 50 MCG/ACT spray    FLONASE    1 Bottle    Spray 2 sprays into both nostrils daily    Chronic rhinitis, unspecified type       gabapentin 300 MG capsule    NEURONTIN    810 capsule    TAKE 1-3 CAPS BY MOUTH UP TO THREE TIMES DAILY AS NEEDED FOR PAIN RELATED TO POST HERPETIC NEURALGIA    Neuralgia, post-herpetic       hydrochlorothiazide 12.5 MG Tabs tablet     90 tablet    Take 1 tablet (12.5 mg) by mouth daily    Benign essential hypertension       metoprolol 25 MG tablet    LOPRESSOR    180 tablet    Take 1 tablet (25 mg) by mouth 2 times daily    Benign essential hypertension       omeprazole 40 MG capsule    priLOSEC    90 capsule    Take 1 capsule (40 mg) by mouth daily    Benign essential hypertension

## 2017-12-08 LAB
ANION GAP SERPL CALCULATED.3IONS-SCNC: 9 MMOL/L (ref 3–14)
BUN SERPL-MCNC: 17 MG/DL (ref 7–30)
CALCIUM SERPL-MCNC: 9.6 MG/DL (ref 8.5–10.1)
CHLORIDE SERPL-SCNC: 105 MMOL/L (ref 94–109)
CO2 SERPL-SCNC: 27 MMOL/L (ref 20–32)
CREAT SERPL-MCNC: 0.68 MG/DL (ref 0.52–1.04)
GFR SERPL CREATININE-BSD FRML MDRD: 87 ML/MIN/1.7M2
GLUCOSE SERPL-MCNC: 86 MG/DL (ref 70–99)
POTASSIUM SERPL-SCNC: 5.4 MMOL/L (ref 3.4–5.3)
SODIUM SERPL-SCNC: 141 MMOL/L (ref 133–144)

## 2017-12-09 NOTE — PROGRESS NOTES
The following letter pertains to your most recent diagnostic tests:    Your potassium level is a little high which is curious because hydrochlorothiazide usually causes potassium to go down.  I am suspicious for your potassium being elevated due to a common lab error known as a hemolyzed sample.  I recommend that you return to the lab at your convenience to have your basic metabolic panel rechecked.  Apologies for the inconvenience.            Follow up:  Lab appointment as soon as convenient to recheck basic metabolic panel.      Sincerely,    Dr. Velásquez

## 2017-12-11 LAB
COPATH REPORT: NORMAL
PAP: NORMAL

## 2017-12-13 LAB
FINAL DIAGNOSIS: ABNORMAL
HPV HR 12 DNA CVX QL NAA+PROBE: POSITIVE
HPV16 DNA SPEC QL NAA+PROBE: NEGATIVE
HPV18 DNA SPEC QL NAA+PROBE: NEGATIVE
SPECIMEN DESCRIPTION: ABNORMAL

## 2017-12-19 ENCOUNTER — OFFICE VISIT (OUTPATIENT)
Dept: OBGYN | Facility: CLINIC | Age: 62
End: 2017-12-19
Payer: COMMERCIAL

## 2017-12-19 VITALS
SYSTOLIC BLOOD PRESSURE: 142 MMHG | DIASTOLIC BLOOD PRESSURE: 60 MMHG | WEIGHT: 95 LBS | BODY MASS INDEX: 17.48 KG/M2 | HEIGHT: 62 IN

## 2017-12-19 DIAGNOSIS — R87.810 CERVICAL HIGH RISK HPV (HUMAN PAPILLOMAVIRUS) TEST POSITIVE: Primary | ICD-10-CM

## 2017-12-19 PROCEDURE — 88305 TISSUE EXAM BY PATHOLOGIST: CPT | Performed by: OBSTETRICS & GYNECOLOGY

## 2017-12-19 PROCEDURE — 57454 BX/CURETT OF CERVIX W/SCOPE: CPT | Performed by: OBSTETRICS & GYNECOLOGY

## 2017-12-19 NOTE — PROGRESS NOTES
INDICATIONS:                                                        Kezia Dixon, is a 62 year old female, who had a recent Negative pap.  HPV positive.  Yes to prior history of abnormal pap 10/14/16 ASC-H. Colposcopy 11/3/16 benign .     Here today for colposcopy. Discussed indication, risks of infection and bleeding.    Her last pap was   Lab Results   Component Value Date    PAP NIL, Positive Other HR-HPV 12/07/2017     Is a pregnancy test required: No.  Was a consent obtained?  Yes      PROCEDURE:                                                      Cervix is stained with acetic acid and viewed colposcopically. Squamocolumnar junction is not visualized in it's entirity. acetowhite lesion(s) noted at 12 o'clock . Biopsy done Yes. Endocervical curretage Done         POST PROCEDURE:                                                      IMPRESSION: HPV    PLAN : Await the results of the biopsies.  Repeat pap in 12 months.  She  tolerated the procedure well. There were no complications. Patient was discharged in stable condition.    Patient advised to call the clinic if excessive bleeding, pelvic pain, or fever.     Follow-up plan based on pathology results.    Katt Toussaint MD

## 2017-12-19 NOTE — MR AVS SNAPSHOT
"              After Visit Summary   12/19/2017    Kezia Dixon    MRN: [id number]           Patient Information     Date Of Birth          1955        Visit Information        Provider Department      12/19/2017 3:00 PM Katt Toussaint MD; [facility]        Today's Diagnoses     Cervical high risk HPV (human papillomavirus) test positive    -  1       Follow-ups after your visit        Who to contact     If you have questions or need follow up information about today's clinic visit or your schedule please contact [facility] directly at [phone].  Normal or non-critical lab and imaging results will be communicated to you by MyChart, letter or phone within 4 business days after the clinic has received the results. If you do not hear from us within 7 days, please contact the clinic through Motobuykershart or phone. If you have a critical or abnormal lab result, we will notify you by phone as soon as possible.  Submit refill requests through Hubbub or call your pharmacy and they will forward the refill request to us. Please allow 3 business days for your refill to be completed.          Additional Information About Your Visit        MyChart Information     Hubbub gives you secure access to your electronic health record. If you see a primary care provider, you can also send messages to your care team and make appointments. If you have questions, please call your primary care clinic.  If you do not have a primary care provider, please call [phone] and they will assist you.        Care EveryWhere ID     This is your Care EveryWhere ID. This could be used by other organizations to access your [place] medical records  [id number]        Your Vitals Were     Height BMI (Body Mass Index)                5' 2\" (1.575 m) 17.38 kg/m2           Blood Pressure from Last 3 Encounters:   12/19/17 142/60   12/07/17 133/61   12/07/17 118/62    Weight " from Last 3 Encounters:   12/19/17 95 lb (43.1 kg)   12/07/17 95 lb (43.1 kg)   12/07/17 96 lb 6.4 oz (43.7 kg)              We Performed the Following     COLPOSCOPY CERVIX/UPPER VAGINA W BX CERVIX     Surgical pathology exam        Primary Care Provider Office Phone # Fax #    Mustapha MIKHAIL Velásquez [phone]       [address]  [facility] [address]        Equal Access to Services     JUAN WILKINSON : Hadii aad ku hadasho Soomaali, waaxda luqadaha, qaybta kaalmada adeegyada, waxay idiin hayaan adeeg kharash lahari . So [facility] [phone].    ATENCIÓN: Si habla español, tiene a thomas disposición servicios gratuitos de asistencia lingüística. [facility] [phone].    We comply with applicable federal civil rights laws and [place] laws. We do not discriminate on the basis of race, color, national origin, age, disability, sex, sexual orientation, or gender identity.            Thank you!     Thank you for choosing [facility]  for your care. Our goal is always to provide you with excellent care. Hearing back from our patients is one way we can continue to improve our services. Please take a few minutes to complete the written survey that you may receive in the mail after your visit with us. Thank you!             Your Updated Medication List - Protect others around you: Learn how to safely use, store and throw away your medicines at www.disposemymeds.org.          This list is accurate as of: 12/19/17  4:58 PM.  Always use your most recent med list.                   Brand Name Dispense Instructions for use Diagnosis    amitriptyline 50 MG tablet    ELAVIL    90 tablet    Take 1 tablet (50 mg) by mouth At Bedtime    Neuralgia, post-herpetic       DULoxetine 60 MG EC capsule    CYMBALTA    90 capsule    Take 1 capsule (60 mg) by mouth daily One capsule once daily for one week, then increase to two capsules once daily    Neuralgia, post-herpetic        fluticasone 50 MCG/ACT spray    FLONASE    1 Bottle    Spray 2 sprays into both nostrils daily    Chronic rhinitis, unspecified type       gabapentin 300 MG capsule    NEURONTIN    810 capsule    TAKE 1-3 CAPS BY MOUTH UP TO THREE TIMES DAILY AS NEEDED FOR PAIN RELATED TO POST HERPETIC NEURALGIA    Neuralgia, post-herpetic       hydrochlorothiazide 12.5 MG Tabs tablet     90 tablet    Take 1 tablet (12.5 mg) by mouth daily    Benign essential hypertension       metoprolol 25 MG tablet    LOPRESSOR    180 tablet    Take 1 tablet (25 mg) by mouth 2 times daily    Benign essential hypertension       omeprazole 40 MG capsule    priLOSEC    90 capsule    Take 1 capsule (40 mg) by mouth daily    Benign essential hypertension

## 2017-12-21 LAB — COPATH REPORT: NORMAL

## 2018-05-22 DIAGNOSIS — B02.29 NEURALGIA, POST-HERPETIC: ICD-10-CM

## 2018-05-22 RX ORDER — DULOXETIN HYDROCHLORIDE 60 MG/1
CAPSULE, DELAYED RELEASE ORAL
Qty: 60 CAPSULE | Refills: 0 | Status: SHIPPED | OUTPATIENT
Start: 2018-05-22 | End: 2018-06-18

## 2018-05-22 NOTE — TELEPHONE ENCOUNTER
"DULoxetine (CYMBALTA) 60 MG EC capsule 90 capsule 3 12/7/2017     Last Written Prescription Date:  12/7/17  Last Fill Quantity: 90,  # refills: 3   Last office visit: 12/7/2017 with prescribing provider:  Christen   Future Office Visit:  none    Requested Prescriptions   Pending Prescriptions Disp Refills     DULoxetine (CYMBALTA) 60 MG EC capsule [Pharmacy Med Name: DULOXETINE HCL DR 60 MG CAP] 90 capsule 3     Sig: TAKE 1 CAP BY MOUTH FOR 1 WEEK, THEN INCREASE TO 2 CAPS DAILY AFTER    Serotonin-Norepinephrine Reuptake Inhibitors  Failed    5/22/2018  1:20 AM       Failed - Blood pressure under 140/90 in past 12 months    BP Readings from Last 3 Encounters:   12/19/17 142/60   12/07/17 133/61   12/07/17 118/62                Passed - Recent (12 mo) or future (30 days) visit within the authorizing provider's specialty    Patient had office visit in the last 12 months or has a visit in the next 30 days with authorizing provider or within the authorizing provider's specialty.  See \"Patient Info\" tab in inbasket, or \"Choose Columns\" in Meds & Orders section of the refill encounter.           Passed - Patient is age 18 or older       Passed - No active pregnancy on record       Passed - No positive pregnancy test in past 12 months        PHQ-9 SCORE 11/3/2016   Total Score 0     "

## 2018-05-22 NOTE — TELEPHONE ENCOUNTER
Medication is being filled for 1 time refill only due to:  Patient needs to be seen because due for 6 month follow up

## 2018-06-18 ENCOUNTER — TELEPHONE (OUTPATIENT)
Dept: FAMILY MEDICINE | Facility: CLINIC | Age: 63
End: 2018-06-18

## 2018-06-18 DIAGNOSIS — B02.29 NEURALGIA, POST-HERPETIC: ICD-10-CM

## 2018-06-18 NOTE — TELEPHONE ENCOUNTER
"DULoxetine (CYMBALTA) 60 MG EC capsule 60 capsule 0 5/22/2018     Last Written Prescription Date:  5/22/18  Last Fill Quantity: 60,  # refills: 0   Last office visit: 12/7/2017 with prescribing provider:  Christen   Future Office Visit:  none    Requested Prescriptions   Pending Prescriptions Disp Refills     DULoxetine (CYMBALTA) 60 MG EC capsule [Pharmacy Med Name: DULOXETINE HCL DR 60 MG CAP] 60 capsule 0     Sig: TAKE 1 CAP BY MOUTH FOR 1 WEEK, THEN INCREASE TO 2 CAPS DAILY AFTER    Serotonin-Norepinephrine Reuptake Inhibitors  Failed    6/18/2018  1:24 AM       Failed - Blood pressure under 140/90 in past 12 months    BP Readings from Last 3 Encounters:   12/19/17 142/60   12/07/17 133/61   12/07/17 118/62                Passed - Recent (12 mo) or future (30 days) visit within the authorizing provider's specialty    Patient had office visit in the last 12 months or has a visit in the next 30 days with authorizing provider or within the authorizing provider's specialty.  See \"Patient Info\" tab in inbasket, or \"Choose Columns\" in Meds & Orders section of the refill encounter.           Passed - Patient is age 18 or older       Passed - No active pregnancy on record       Passed - No positive pregnancy test in past 12 months        .  PHQ-9 SCORE 11/3/2016   Total Score 0     "

## 2018-06-20 NOTE — TELEPHONE ENCOUNTER
Routing to TCs to contact patient to inform due for appointment. Please route back to refill pool once scheduled.   Dawn BENITES RN

## 2018-06-21 RX ORDER — DULOXETIN HYDROCHLORIDE 60 MG/1
120 CAPSULE, DELAYED RELEASE ORAL DAILY
Qty: 60 CAPSULE | Refills: 0 | Status: SHIPPED | OUTPATIENT
Start: 2018-06-21 | End: 2018-07-18

## 2018-07-06 ENCOUNTER — OFFICE VISIT (OUTPATIENT)
Dept: FAMILY MEDICINE | Facility: CLINIC | Age: 63
End: 2018-07-06
Payer: COMMERCIAL

## 2018-07-06 VITALS
SYSTOLIC BLOOD PRESSURE: 127 MMHG | OXYGEN SATURATION: 97 % | HEART RATE: 80 BPM | TEMPERATURE: 98.2 F | HEIGHT: 62 IN | BODY MASS INDEX: 17.55 KG/M2 | WEIGHT: 95.4 LBS | DIASTOLIC BLOOD PRESSURE: 58 MMHG

## 2018-07-06 DIAGNOSIS — E44.1 MILD PROTEIN-CALORIE MALNUTRITION (H): ICD-10-CM

## 2018-07-06 DIAGNOSIS — Z23 NEED FOR PROPHYLACTIC VACCINATION WITH TETANUS-DIPHTHERIA (TD): ICD-10-CM

## 2018-07-06 DIAGNOSIS — C15.9 MALIGNANT NEOPLASM OF ESOPHAGUS, UNSPECIFIED LOCATION (H): ICD-10-CM

## 2018-07-06 DIAGNOSIS — F10.20 ALCOHOLISM (H): ICD-10-CM

## 2018-07-06 DIAGNOSIS — Z12.31 VISIT FOR SCREENING MAMMOGRAM: ICD-10-CM

## 2018-07-06 DIAGNOSIS — I10 BENIGN ESSENTIAL HYPERTENSION: ICD-10-CM

## 2018-07-06 DIAGNOSIS — B02.29 POST HERPETIC NEURALGIA: Primary | ICD-10-CM

## 2018-07-06 PROBLEM — E46 PROTEIN-CALORIE MALNUTRITION (H): Status: ACTIVE | Noted: 2018-07-06

## 2018-07-06 PROCEDURE — 99214 OFFICE O/P EST MOD 30 MIN: CPT | Mod: 25 | Performed by: INTERNAL MEDICINE

## 2018-07-06 PROCEDURE — 90471 IMMUNIZATION ADMIN: CPT | Performed by: INTERNAL MEDICINE

## 2018-07-06 PROCEDURE — 90750 HZV VACC RECOMBINANT IM: CPT | Performed by: INTERNAL MEDICINE

## 2018-07-06 NOTE — NURSING NOTE
Screening Questionnaire for Adult Immunization    Are you sick today?   No   Do you have allergies to medications, food, a vaccine component or latex?   Yes   Have you ever had a serious reaction after receiving a vaccination?   No   Do you have a long-term health problem with heart disease, lung disease, asthma, kidney disease, metabolic disease (e.g. diabetes), anemia, or other blood disorder?   No   Do you have cancer, leukemia, HIV/AIDS, or any other immune system problem?   No   In the past 3 months, have you taken medications that affect  your immune system, such as prednisone, other steroids, or anticancer drugs; drugs for the treatment of rheumatoid arthritis, Crohn s disease, or psoriasis; or have you had radiation treatments?   No   Have you had a seizure, or a brain or other nervous system problem?   No   During the past year, have you received a transfusion of blood or blood     products, or been given immune (gamma) globulin or antiviral drug?   No   For women: Are you pregnant or is there a chance you could become        pregnant during the next month?   No   Have you received any vaccinations in the past 4 weeks?   No     Immunization questionnaire was positive for at least one answer.  Notified Dr. Velásquez.        Per orders of Dr. Velásquez, injection of Shingrix #1 given by Melissa Cook. Patient instructed to remain in clinic for 15 minutes afterwards, and to report any adverse reaction to me immediately.  Prior to injection verified patient identity using patient's name and date of birth.  Due to injection administration, patient instructed to remain in clinic for 15 minutes  afterwards, and to report any adverse reaction to me immediately.       Screening performed by Melissa Cook on 7/6/2018 at 3:50 PM.

## 2018-07-06 NOTE — MR AVS SNAPSHOT
After Visit Summary   7/6/2018    Kezia Dixon    MRN: [id number]           Patient Information     Date Of Birth          1955        Visit Information        Provider Department      7/6/2018 3:00 PM Mustapha Velásquez MD [facility]        Today's Diagnoses     Post herpetic neuralgia    -  1    Mild protein-calorie malnutrition (H)        Malignant neoplasm of esophagus, unspecified location (H)        Alcoholism (H)        Need for prophylactic vaccination with tetanus-diphtheria (TD)        Visit for screening mammogram           Follow-ups after your visit        Future tests that were ordered for you today     Open Future Orders        Priority Expected Expires Ordered    MA SCREENING DIGITAL BILAT - Future  (s+30) Routine  7/6/2019 7/6/2018            Who to contact     If you have questions or need follow up information about today's clinic visit or your schedule please contact [facility] directly at [phone].  Normal or non-critical lab and imaging results will be communicated to you by Lenovohart, letter or phone within 4 business days after the clinic has received the results. If you do not hear from us within 7 days, please contact the clinic through Lenovohart or phone. If you have a critical or abnormal lab result, we will notify you by phone as soon as possible.  Submit refill requests through Adtrade or call your pharmacy and they will forward the refill request to us. Please allow 3 business days for your refill to be completed.          Additional Information About Your Visit        MyChart Information     Adtrade gives you secure access to your electronic health record. If you see a primary care provider, you can also send messages to your care team and make appointments. If you have questions, please call your primary care clinic.  If you do not have a primary care provider, please call [phone] and they will assist you.        Care EveryWhere ID   "   This is your Care EveryWhere ID. This could be used by other organizations to access your [place] medical records  [id number]        Your Vitals Were     Pulse Temperature Height Pulse Oximetry BMI (Body Mass Index)       80 98.2  F (36.8  C) (Oral) 5' 1.5\" (1.562 m) 97% 17.73 kg/m2        Blood Pressure from Last 3 Encounters:   07/06/18 127/58   12/19/17 142/60   12/07/17 133/61    Weight from Last 3 Encounters:   07/06/18 95 lb 6.4 oz (43.3 kg)   12/19/17 95 lb (43.1 kg)   12/07/17 95 lb (43.1 kg)              We Performed the Following     1st  Administration  [20140]     SHINGRIX [12697]          Today's Medication Changes          These changes are accurate as of 7/6/18  4:03 PM.  If you have any questions, ask your nurse or doctor.               These medicines have changed or have updated prescriptions.        Dose/Directions    DULoxetine 60 MG EC capsule   Commonly known as:  CYMBALTA   This may have changed:  additional instructions   Used for:  Neuralgia, post-herpetic        Dose:  120 mg   Take 2 capsules (120 mg) by mouth daily   Quantity:  60 capsule   Refills:  0         Stop taking these medicines if you haven't already. Please contact your care team if you have questions.     gabapentin 300 MG capsule   Commonly known as:  NEURONTIN   Stopped by:  Mustapha Velásquez MD                    Primary Care Provider Office Phone # Fax #    Mustapha Velásquez [phone] [address]  [facility] [address]        Equal Access to Services     [facility]: Hadii shala rider hadasho Soomaali, waaxda luqadaha, qaybta kaalmada adeegyada, waxlatrice idiin hayaan adeeg kharash la'aan . So [facility] [phone].    ATENCIÓN: Si habla español, tiene a tohmas disposición servicios gratuitos de asistencia lingüística. Llame al [phone].    We comply with applicable federal civil rights laws and [place] laws. We do not discriminate on the basis of race, color, national origin, age, disability, " sex, sexual orientation, or gender identity.            Thank you!     Thank you for choosing [facility]  for your care. Our goal is always to provide you with excellent care. Hearing back from our patients is one way we can continue to improve our services. Please take a few minutes to complete the written survey that you may receive in the mail after your visit with us. Thank you!             Your Updated Medication List - Protect others around you: Learn how to safely use, store and throw away your medicines at www.disposemymeds.org.          This list is accurate as of 7/6/18  4:03 PM.  Always use your most recent med list.                   Brand Name Dispense Instructions for use Diagnosis    amitriptyline 50 MG tablet    ELAVIL    90 tablet    Take 1 tablet (50 mg) by mouth At Bedtime    Neuralgia, post-herpetic       DULoxetine 60 MG EC capsule    CYMBALTA    60 capsule    Take 2 capsules (120 mg) by mouth daily    Neuralgia, post-herpetic       fluticasone 50 MCG/ACT spray    FLONASE    1 Bottle    Spray 2 sprays into both nostrils daily    Chronic rhinitis, unspecified type       hydrochlorothiazide 12.5 MG Tabs tablet     90 tablet    Take 1 tablet (12.5 mg) by mouth daily    Benign essential hypertension       metoprolol tartrate 25 MG tablet    LOPRESSOR    180 tablet    Take 1 tablet (25 mg) by mouth 2 times daily    Benign essential hypertension       omeprazole 40 MG capsule    priLOSEC    90 capsule    Take 1 capsule (40 mg) by mouth daily    Benign essential hypertension

## 2018-07-14 ENCOUNTER — HEALTH MAINTENANCE LETTER (OUTPATIENT)
Age: 63
End: 2018-07-14

## 2018-07-18 DIAGNOSIS — B02.29 NEURALGIA, POST-HERPETIC: ICD-10-CM

## 2018-07-18 NOTE — TELEPHONE ENCOUNTER
"Duloxetine 60 mg    Last Written Prescription Date:  06/21/18  Last Fill Quantity: 60 capsules,  # refills: 0   Last office visit: 7/6/2018 with prescribing provider:  Christen   Future Office Visit:      Requested Prescriptions   Pending Prescriptions Disp Refills     DULoxetine (CYMBALTA) 60 MG EC capsule 60 capsule 0     Sig: Take 2 capsules (120 mg) by mouth daily    Serotonin-Norepinephrine Reuptake Inhibitors  Passed    7/18/2018  3:12 PM       Passed - Blood pressure under 140/90 in past 12 months    BP Readings from Last 3 Encounters:   07/06/18 127/58   12/19/17 142/60   12/07/17 133/61                Passed - Recent (12 mo) or future (30 days) visit within the authorizing provider's specialty    Patient had office visit in the last 12 months or has a visit in the next 30 days with authorizing provider or within the authorizing provider's specialty.  See \"Patient Info\" tab in inbasket, or \"Choose Columns\" in Meds & Orders section of the refill encounter.           Passed - Patient is age 18 or older       Passed - No active pregnancy on record       Passed - No positive pregnancy test in past 12 months        "

## 2018-07-19 DIAGNOSIS — B02.29 NEURALGIA, POST-HERPETIC: ICD-10-CM

## 2018-07-19 RX ORDER — DULOXETIN HYDROCHLORIDE 60 MG/1
60 CAPSULE, DELAYED RELEASE ORAL DAILY
Qty: 90 CAPSULE | Refills: 0 | Status: ON HOLD | OUTPATIENT
Start: 2018-07-19 | End: 2018-10-15

## 2018-07-19 NOTE — TELEPHONE ENCOUNTER
Routing refill request to provider for review/approval because:  Conflicting directions on med list    Called patient - states she takes 1 capsule (60mg) daily and states the confusion started when she was taking 2 of the 30 mg capsules. Has never taken 120mg  Rx pended as requested by patient       Sig - Route: Take 2 capsules (120 mg) by mouth daily - Oral     Patient taking differently: Take 120 mg by mouth daily Taking 60 mg daily

## 2018-07-19 NOTE — TELEPHONE ENCOUNTER
"DULoxetine (CYMBALTA) 60 MG EC capsule 60 capsule 0 6/21/2018     Last Written Prescription Date:  6/21/18  Last Fill Quantity: 60,  # refills: 0   Last office visit: 7/6/2018 with prescribing provider:  Christen   Future Office Visit:  none    Requested Prescriptions   Pending Prescriptions Disp Refills     DULoxetine (CYMBALTA) 60 MG EC capsule [Pharmacy Med Name: DULOXETINE HCL DR 60 MG CAP] 60 capsule 0     Sig: TAKE 2 CAPSULES (120 MG) BY MOUTH DAILY    Serotonin-Norepinephrine Reuptake Inhibitors  Passed    7/19/2018 11:00 AM       Passed - Blood pressure under 140/90 in past 12 months    BP Readings from Last 3 Encounters:   07/06/18 127/58   12/19/17 142/60   12/07/17 133/61                Passed - Recent (12 mo) or future (30 days) visit within the authorizing provider's specialty    Patient had office visit in the last 12 months or has a visit in the next 30 days with authorizing provider or within the authorizing provider's specialty.  See \"Patient Info\" tab in inbasket, or \"Choose Columns\" in Meds & Orders section of the refill encounter.           Passed - Patient is age 18 or older       Passed - No active pregnancy on record       Passed - No positive pregnancy test in past 12 months        PHQ-9 SCORE 11/3/2016   Total Score 0     "

## 2018-07-20 RX ORDER — DULOXETIN HYDROCHLORIDE 60 MG/1
CAPSULE, DELAYED RELEASE ORAL
Qty: 60 CAPSULE | Refills: 0 | COMMUNITY
Start: 2018-07-20

## 2018-07-20 NOTE — TELEPHONE ENCOUNTER
This refill encounter was sent to me because a PA was indicated prior to signing this refill request. I see that this medication was discussed with the patient in the 7/18/18 refill encounter. Patient confirmed that she is only taking duloxetine 60 mg once daily. So, this request from the pharmacy for 60 mg x2/day can be disregarded.    No PA is needed for 60 mg once daily.    Evelio Canas, [facility]

## 2018-09-14 ENCOUNTER — OFFICE VISIT (OUTPATIENT)
Dept: FAMILY MEDICINE | Facility: CLINIC | Age: 63
End: 2018-09-14
Payer: COMMERCIAL

## 2018-09-14 VITALS
SYSTOLIC BLOOD PRESSURE: 128 MMHG | HEART RATE: 82 BPM | DIASTOLIC BLOOD PRESSURE: 64 MMHG | OXYGEN SATURATION: 99 % | TEMPERATURE: 97.6 F | WEIGHT: 94 LBS | HEIGHT: 62 IN | BODY MASS INDEX: 17.3 KG/M2

## 2018-09-14 DIAGNOSIS — Z01.818 PREOP GENERAL PHYSICAL EXAM: ICD-10-CM

## 2018-09-14 DIAGNOSIS — Z12.31 VISIT FOR SCREENING MAMMOGRAM: ICD-10-CM

## 2018-09-14 DIAGNOSIS — I10 BENIGN ESSENTIAL HYPERTENSION: ICD-10-CM

## 2018-09-14 DIAGNOSIS — Z12.11 COLON CANCER SCREENING: ICD-10-CM

## 2018-09-14 DIAGNOSIS — R91.8 PULMONARY NODULES: ICD-10-CM

## 2018-09-14 DIAGNOSIS — Z23 NEED FOR VACCINATION: Primary | ICD-10-CM

## 2018-09-14 PROCEDURE — 90750 HZV VACC RECOMBINANT IM: CPT | Performed by: INTERNAL MEDICINE

## 2018-09-14 PROCEDURE — 99214 OFFICE O/P EST MOD 30 MIN: CPT | Mod: 25 | Performed by: INTERNAL MEDICINE

## 2018-09-14 PROCEDURE — 90471 IMMUNIZATION ADMIN: CPT | Performed by: INTERNAL MEDICINE

## 2018-09-14 NOTE — PROGRESS NOTES
[place] [address] [address] [facility] [address]  [phone]  Dept: [phone]    PRE-OP EVALUATION:  Today's date: 2018    Kezia Dixon (: 1955) presents for pre-operative evaluation assessment as requested by Dr. Apodaca, Gio Ken MD.  She requires evaluation and anesthesia risk assessment prior to undergoing surgery/procedure for treatment of screening colonoscopy.    Proposed Surgery/ Procedure: COLONOSCOPY  Date of Surgery/ Procedure: 10/4/2018  Time of Surgery/ Procedure: 8:30  Hospital/Surgical Facility: AM  Fax number for surgical facility:  [phone]  Primary Physician: Mustapha Velásquez  Type of Anesthesia Anticipated: MAC    Patient has a Health Care Directive or Living Will:  YES Scanned on 2017    1. NO - Do you have a history of heart attack, stroke, stent, bypass or surgery on an artery in the head, neck, heart or legs?  2. NO - Do you ever have any pain or discomfort in your chest?  3. NO - Do you have a history of  Heart Failure?  4. NO - Are you troubled by shortness of breath when: walking on the level, up a slight hill or at night?  5. NO - Do you currently have a cold, bronchitis or other respiratory infection?  6. NO - Do you have a cough, shortness of breath or wheezing?  7. NO - Do you sometimes get pains in the calves of your legs when you walk?  8. NO - Do you or anyone in your family have previous history of blood clots?  9. NO - Do you or does anyone in your family have a serious bleeding problem such as prolonged bleeding following surgeries or cuts?  10. NO - Have you ever had problems with anemia or been told to take iron pills?  11. NO - Have you had any abnormal blood loss such as black, tarry or bloody stools, or abnormal vaginal bleeding?  12. NO - Have you ever had a blood transfusion?  13. NO - Have you or any of your relatives ever had problems with anesthesia?  14. NO - Do you have sleep apnea, excessive snoring or daytime  drowsiness?  15. NO - Do you have any prosthetic heart valves?  16. NO - Do you have prosthetic joints?  17. NO - Is there any chance that you may be pregnant?      HPI:     HPI related to upcoming procedure: 62-year-old female with a history of esophageal cancer status post esophagectomy along with chronic pain related to postherpetic neuralgia, alcoholism in remission, hypertension.  She is a former cigarette smoker.  She had complications with a previous colonoscopy and requires general anesthesia to tolerate colonoscopy for screening purposes.  She can perform 4 mets of physical activity without chest pain or shortness of breath.  She did have a new pulmonary nodule noted on a surveillance chest CT scan recently that has enlarged in size.  She is scheduled for PET scan and consultation with thoracic surgery to further evaluate.          MEDICAL HISTORY:     Patient Active Problem List    Diagnosis Date Noted     Protein-calorie malnutrition (H) 07/06/2018     Priority: Medium     Post herpetic neuralgia 07/06/2018     Priority: Medium     ACP (advance care planning) 08/21/2017     Priority: Medium     Benign essential hypertension 10/14/2016     Priority: Medium     Alcoholism (H) 10/14/2016     Priority: Medium     Pancreatic pseudocyst 10/14/2016     Priority: Medium     Impaired fasting glucose 10/14/2016     Priority: Medium     Pap smear with atypical squamous cells, cannot exclude high grade squamous intraepithelial lesion (ASC-H) 10/14/2016     Priority: Medium     10/14/16 ASC-H pap.   11/03/16 [place]= ECC, Benign. 1 yr co-test   12/7/17 NIL/+ HR HPV (not 16/18). Plan: colposcopy by 3/7/18  12/19/17 [place]- VIRY 1. Plan: Cotest in 1 yr due by 12/19/18         Esophageal cancer (H) 03/22/2016     Priority: Medium      Past Medical History:   Diagnosis Date     Alcoholism (H) 10/14/2016     Benign essential hypertension 10/14/2016     Carotid artery disease (H)     mile plaque 5/7     Chemical dependency (H)      alcohol     Depression with anxiety      Esophageal cancer (H)      Esophageal cancer (H) 3/22/2016     Esophageal mass      GERD (gastroesophageal reflux disease)      Hx of pancreatitis 2003     Hypercholesteremia      Hyperglycemia      Hyperlipemia      Impaired fasting glucose 10/14/2016     Impaired fasting glucose 10/14/2016     Nocturnal leg cramps      Pancreatic pseudocyst 10/14/2016     Pancreatic pseudocyst 10/14/2016     Pancreatitis     with pseudocyst     Pap smear with atypical squamous cells, cannot exclude high grade squamous intraepithelial lesion (ASC-H) 10/14/16    Colpo impression benign, ECC benign. Cotestin in 1 yr.     Shingles      Vasomotor rhinitis      Past Surgical History:   Procedure Laterality Date     AAA REPAIR      splenic artery aneurysm embolization     ABDOMEN SURGERY  2/2/2016     COLONOSCOPY  11/26/2013    Procedure: COMBINED COLONOSCOPY, SINGLE BIOPSY/POLYPECTOMY BY BIOPSY;  COLONOSCOPY (MAC);  Surgeon: Montana Rosa MD;  Location:  GI     COLONOSCOPY  11/26/2013     ENT SURGERY       ESOPHAGOGASTRECTOMY N/A 3/22/2016    Procedure: ESOPHAGOGASTRECTOMY;  Surgeon: Alvin Riojas MD;  Location:  OR     ESOPHAGOSCOPY, GASTROSCOPY, DUODENOSCOPY (EGD), COMBINED N/A 12/22/2015    Procedure: COMBINED ENDOSCOPIC ULTRASOUND, ESOPHAGOSCOPY, GASTROSCOPY, DUODENOSCOPY (EGD), FINE NEEDLE ASPIRATE/BIOPSY;  Surgeon: Danelle Michael MD;  Location:  GI     GASTROSTOMY, INSERT TUBE, COMBINED N/A 2/2/2016    Procedure: COMBINED GASTROSTOMY, INSERT TUBE (OPEN);  Surgeon: Alvin Riojas MD;  Location: [facility]     GI SURGERY  12/22/2015     HAND SURGERY      right     INSERT PORT VASCULAR ACCESS N/A 12/28/2015    Procedure: INSERT PORT VASCULAR ACCESS;  Surgeon: Alvin Riojas MD;  Location:  OR     REMOVE PORT VASCULAR ACCESS Left 7/22/2016    Procedure: REMOVE PORT VASCULAR ACCESS;  Surgeon: Alvin Riojas MD;  Location:  OR  "    TONSILLECTOMY       VASCULAR SURGERY       Current Outpatient Prescriptions   Medication Sig Dispense Refill     amitriptyline (ELAVIL) 50 MG tablet Take 1 tablet (50 mg) by mouth At Bedtime 90 tablet 3     DULoxetine (CYMBALTA) 60 MG EC capsule Take 1 capsule (60 mg) by mouth daily 90 capsule 0     fluticasone (FLONASE) 50 MCG/ACT spray Spray 2 sprays into both nostrils daily 1 Bottle 11     hydrochlorothiazide 12.5 MG TABS tablet Take 1 tablet (12.5 mg) by mouth daily 90 tablet 3     metoprolol (LOPRESSOR) 25 MG tablet Take 1 tablet (25 mg) by mouth 2 times daily 180 tablet 3     omeprazole (PRILOSEC) 40 MG capsule Take 1 capsule (40 mg) by mouth daily 90 capsule 3         Allergies   Allergen Reactions     Codeine Sulfate Nausea     Simvastatin Cramps     Leg cramps     Penicillins Rash          Social History   Substance Use Topics     Smoking status: Former Smoker     Packs/day: 0.25     Quit date: 12/11/2015     Smokeless tobacco: Never Used     Alcohol use 0.0 oz/week      Comment: occas wine     History   Drug Use No       REVIEW OF SYSTEMS:   Constitutional, neuro, ENT, endocrine, pulmonary, cardiac, gastrointestinal, genitourinary, musculoskeletal, integument and psychiatric systems are negative, except as otherwise noted.    EXAM:   /64  Pulse 82  Temp 97.6  F (36.4  C) (Tympanic)  Ht 5' 1.5\" (1.562 m)  Wt 94 lb (42.6 kg)  SpO2 99%  Breastfeeding? No  BMI 17.47 kg/m2  GENERAL APPEARANCE: healthy, alert and no distress  HENT: ear canals and TM's normal and nose and mouth without ulcers or lesions  RESP: lungs clear to auscultation - no rales, rhonchi or wheezes  CV: regular rate and rhythm, normal S1 S2, no S3 or S4 and no murmur, click or rub   ABDOMEN: soft, nontender, no HSM or masses and bowel sounds normal  NEURO: Normal strength and tone, sensory exam grossly normal, mentation intact and speech normal    DIAGNOSTICS:   No labs or EKG required for low risk surgery (cataract, skin " procedure, breast biopsy, etc)    Recent Labs   Lab Test  12/07/17   1148  11/09/17   1026  03/31/16   0530   03/25/16   0645   03/22/16   0727 11/04/15 11/20/13   HGB   --   13.2   --    --   9.1*   < >  11.7   --    --    PLT   --   169  289   < >  174   < >  224   --    --    INR   --    --    --    --   1.12   --   1.02   --    --    NA  141  140   --    < >  138   < >  138   --    --    POTASSIUM  5.4*  4.5   --    < >  3.5   < >  4.4   --    --    CR  0.68  0.58  0.47*   < >  0.39*   < >  0.54   --    --    A1C   --    --    --    --    --    --    --   5.9  6.0    < > = values in this interval not displayed.        IMPRESSION:   Reason for surgery/procedure: Colon cancer screening  Diagnosis/reason for consult: Preoperative evaluation    The proposed surgical procedure is considered LOW risk.    REVISED CARDIAC RISK INDEX  The patient has the following serious cardiovascular risks for perioperative complications such as (MI, PE, VFib and 3  AV Block):  No serious cardiac risks  INTERPRETATION: 0 risks: Class I (very low risk - 0.4% complication rate)    The patient has the following additional risks for perioperative complications:  No identified additional risks      ICD-10-CM    1. Preop general physical exam Z01.818    2. Colon cancer screening Z12.11    3. Pulmonary nodules R91.8    4. Benign essential hypertension I10    5. Visit for screening mammogram Z12.31 MA SCREENING DIGITAL BILAT - Future  (s+30)     Okay to proceed with screening colonoscopy with sedation.  Follow-up with thoracic surgery as directed following PET scan for enlarging pulmonary nodule.  On third check in clinic today, blood pressure well controlled.  RECOMMENDATIONS:         --Patient is to take all scheduled medications on the day of surgery    APPROVAL GIVEN to proceed with proposed procedure, without further diagnostic evaluation       Signed Electronically by: Mustapha Velásquez MD    Copy of this evaluation report is provided to  requesting physician.    [place] Preop Guidelines    Revised Cardiac Risk Index

## 2018-09-14 NOTE — MR AVS SNAPSHOT
After Visit Summary   9/14/2018    Kezia Dixon    MRN: [id number]           Patient Information     Date Of Birth          1955        Visit Information        Provider Department      9/14/2018 2:30 PM Mustapha Velásquez MD [facility]        Today's Diagnoses     Preop general physical exam    -  1    Colon cancer screening        Pulmonary nodules        Benign essential hypertension        Visit for screening mammogram          Care Instructions      Before Your Surgery      Call your surgeon if there is any change in your health. This includes signs of a cold or flu (such as a sore throat, runny nose, cough, rash or fever).    Do not smoke, drink alcohol or take over the counter medicine (unless your surgeon or primary care doctor tells you to) for the 24 hours before and after surgery.    If you take prescribed drugs: Follow your doctor s orders about which medicines to take and which to stop until after surgery.    Eating and drinking prior to surgery: follow the instructions from your surgeon    Take a shower or bath the night before surgery. Use the soap your surgeon gave you to gently clean your skin. If you do not have soap from your surgeon, use your regular soap. Do not shave or scrub the surgery site.  Wear clean pajamas and have clean sheets on your bed.           Follow-ups after your visit        Your next 10 appointments already scheduled     Oct 04, 2018   Procedure with Gio Apodaca MD   [facility] ([facility])    [address]  [place] [address]   [phone]           [facility] is located at [address] [facility] [place]              Future tests that were ordered for you today     Open Future Orders        Priority Expected Expires Ordered    MA SCREENING DIGITAL BILAT - Future  (s+30) Routine  9/14/2019 9/14/2018            Who to contact     If you have questions or need follow up information  "about today's clinic visit or your schedule please contact [facility] directly at [phone].  Normal or non-critical lab and imaging results will be communicated to you by MyChart, letter or phone within 4 business days after the clinic has received the results. If you do not hear from us within 7 days, please contact the clinic through [facility] or phone. If you have a critical or abnormal lab result, we will notify you by phone as soon as possible.  Submit refill requests through [employer] or call your pharmacy and they will forward the refill request to us. Please allow 3 business days for your refill to be completed.          Additional Information About Your Visit        [facility] Information     [employer] gives you secure access to your electronic health record. If you see a primary care provider, you can also send messages to your care team and make appointments. If you have questions, please call your primary care clinic.  If you do not have a primary care provider, please call [phone] and they will assist you.        Care EveryWhere ID     This is your Care EveryWhere ID. This could be used by other organizations to access your [facility] medical records  [id number]        Your Vitals Were     Pulse Temperature Height Pulse Oximetry Breastfeeding? BMI (Body Mass Index)    82 97.6  F (36.4  C) (Tympanic) 5' 1.5\" (1.562 m) 99% No 17.47 kg/m2       Blood Pressure from Last 3 Encounters:   09/14/18 128/64   07/06/18 127/58   12/19/17 142/60    Weight from Last 3 Encounters:   09/14/18 94 lb (42.6 kg)   07/06/18 95 lb 6.4 oz (43.3 kg)   12/19/17 95 lb (43.1 kg)               Primary Care Provider Office Phone # Fax #    Mustapha Velásquez [phone] [address]  [facility] [address]        Equal Access to Services     TANO WILKINSON AH: Hadii shala ku hadasho Soomaali, waaxda luqadaha, qaybta kaalmada adeegyada, clement cook . So [facility] " [phone].    ATENCIÓN: Si malissa cruz, tiene a thomas disposición servicios gratuitos de asistencia lingüística. Grisel graham [phone].    We comply with applicable federal civil rights laws and [place] laws. We do not discriminate on the basis of race, color, national origin, age, disability, sex, sexual orientation, or gender identity.            Thank you!     Thank you for choosing [facility]  for your care. Our goal is always to provide you with excellent care. Hearing back from our patients is one way we can continue to improve our services. Please take a few minutes to complete the written survey that you may receive in the mail after your visit with us. Thank you!             Your Updated Medication List - Protect others around you: Learn how to safely use, store and throw away your medicines at www.disposemymeds.org.          This list is accurate as of 9/14/18  3:21 PM.  Always use your most recent med list.                   Brand Name Dispense Instructions for use Diagnosis    amitriptyline 50 MG tablet    ELAVIL    90 tablet    Take 1 tablet (50 mg) by mouth At Bedtime    Neuralgia, post-herpetic       DULoxetine 60 MG EC capsule    CYMBALTA    90 capsule    Take 1 capsule (60 mg) by mouth daily    Neuralgia, post-herpetic       fluticasone 50 MCG/ACT spray    FLONASE    1 Bottle    Spray 2 sprays into both nostrils daily    Chronic rhinitis, unspecified type       hydrochlorothiazide 12.5 MG Tabs tablet     90 tablet    Take 1 tablet (12.5 mg) by mouth daily    Benign essential hypertension       metoprolol tartrate 25 MG tablet    LOPRESSOR    180 tablet    Take 1 tablet (25 mg) by mouth 2 times daily    Benign essential hypertension       omeprazole 40 MG capsule    priLOSEC    90 capsule    Take 1 capsule (40 mg) by mouth daily    Benign essential hypertension

## 2018-09-14 NOTE — PROGRESS NOTES
Screening Questionnaire for Adult Immunization    Are you sick today?   No   Do you have allergies to medications, food, a vaccine component or latex?   No   Have you ever had a serious reaction after receiving a vaccination?   No   Do you have a long-term health problem with heart disease, lung disease, asthma, kidney disease, metabolic disease (e.g. diabetes), anemia, or other blood disorder?   No   Do you have cancer, leukemia, HIV/AIDS, or any other immune system problem?   Yes   In the past 3 months, have you taken medications that affect  your immune system, such as prednisone, other steroids, or anticancer drugs; drugs for the treatment of rheumatoid arthritis, Crohn s disease, or psoriasis; or have you had radiation treatments?   No   Have you had a seizure, or a brain or other nervous system problem?   No   During the past year, have you received a transfusion of blood or blood     products, or been given immune (gamma) globulin or antiviral drug?   No   For women: Are you pregnant or is there a chance you could become        pregnant during the next month?   No   Have you received any vaccinations in the past 4 weeks?   No     Immunization questionnaire was positive for at least one answer.  Notified Dr. Velásquez.        Per orders of Dr. Velásquez, injection of Shingrix given by Nickie Herring. Patient instructed to remain in clinic for 15 minutes afterwards, and to report any adverse reaction to me immediately.       Screening performed by Nickie Herring on 9/14/2018 at 3:24 PM.

## 2018-09-24 DIAGNOSIS — R91.8 PULMONARY NODULES: Primary | ICD-10-CM

## 2018-09-25 ENCOUNTER — HOSPITAL ENCOUNTER (OUTPATIENT)
Dept: CARDIAC REHAB | Facility: CLINIC | Age: 63
End: 2018-09-25
Attending: THORACIC SURGERY (CARDIOTHORACIC VASCULAR SURGERY)
Payer: COMMERCIAL

## 2018-09-25 DIAGNOSIS — R91.8 PULMONARY NODULES: ICD-10-CM

## 2018-09-25 PROCEDURE — 94729 DIFFUSING CAPACITY: CPT

## 2018-09-25 PROCEDURE — 40001038 ZZH STATISTIC RESPIRATORY TESTING VISIT

## 2018-09-25 PROCEDURE — 94060 EVALUATION OF WHEEZING: CPT

## 2018-09-25 PROCEDURE — 94726 PLETHYSMOGRAPHY LUNG VOLUMES: CPT

## 2018-10-03 ENCOUNTER — ANESTHESIA EVENT (OUTPATIENT)
Dept: GASTROENTEROLOGY | Facility: CLINIC | Age: 63
End: 2018-10-03
Payer: COMMERCIAL

## 2018-10-04 ENCOUNTER — HOSPITAL ENCOUNTER (OUTPATIENT)
Facility: CLINIC | Age: 63
Discharge: HOME | End: 2018-10-04
Attending: INTERNAL MEDICINE | Admitting: INTERNAL MEDICINE
Payer: COMMERCIAL

## 2018-10-04 ENCOUNTER — ANESTHESIA (OUTPATIENT)
Dept: GASTROENTEROLOGY | Facility: CLINIC | Age: 63
End: 2018-10-04
Payer: COMMERCIAL

## 2018-10-04 ENCOUNTER — SURGERY (OUTPATIENT)
Age: 63
End: 2018-10-04

## 2018-10-04 VITALS
SYSTOLIC BLOOD PRESSURE: 140 MMHG | BODY MASS INDEX: 17.3 KG/M2 | OXYGEN SATURATION: 96 % | RESPIRATION RATE: 17 BRPM | WEIGHT: 94 LBS | HEIGHT: 62 IN | HEART RATE: 82 BPM | DIASTOLIC BLOOD PRESSURE: 89 MMHG

## 2018-10-04 LAB
COLONOSCOPY: NORMAL
DLCOUNC-%PRED-PRE: 32 %
DLCOUNC-PRE: 6.51 ML/MIN/MMHG
DLCOUNC-PRED: 19.86 ML/MIN/MMHG
ERV-%PRED-PRE: 90 %
ERV-PRE: 0.97 L
ERV-PRED: 1.07 L
EXPTIME-PRE: 7.02 SEC
FEF2575-%PRED-POST: 28 %
FEF2575-%PRED-PRE: 27 %
FEF2575-POST: 0.55 L/SEC
FEF2575-PRE: 0.52 L/SEC
FEF2575-PRED: 1.93 L/SEC
FEFMAX-%PRED-PRE: 54 %
FEFMAX-PRE: 3.02 L/SEC
FEFMAX-PRED: 5.55 L/SEC
FEV1-%PRED-PRE: 55 %
FEV1-PRE: 1.19 L
FEV1FEV6-PRE: 50 %
FEV1FEV6-PRED: 80 %
FEV1FVC-PRE: 49 %
FEV1FVC-PRED: 77 %
FEV1SVC-PRE: 49 %
FEV1SVC-PRED: 76 %
FIFMAX-PRE: 2.63 L/SEC
FRCPLETH-%PRED-PRE: 142 %
FRCPLETH-PRE: 3.61 L
FRCPLETH-PRED: 2.54 L
FVC-%PRED-PRE: 89 %
FVC-PRE: 2.4 L
FVC-PRED: 2.7 L
IC-%PRED-PRE: 82 %
IC-PRE: 1.41 L
IC-PRED: 1.72 L
RVPLETH-%PRED-PRE: 140 %
RVPLETH-PRE: 2.59 L
RVPLETH-PRED: 1.84 L
TLCPLETH-%PRED-PRE: 113 %
TLCPLETH-PRE: 5.02 L
TLCPLETH-PRED: 4.44 L
VA-%PRED-PRE: 86 %
VA-PRE: 3.94 L
VC-%PRED-PRE: 87 %
VC-PRE: 2.43 L
VC-PRED: 2.79 L

## 2018-10-04 PROCEDURE — 88305 TISSUE EXAM BY PATHOLOGIST: CPT | Mod: 26 | Performed by: INTERNAL MEDICINE

## 2018-10-04 PROCEDURE — 25000125 ZZHC RX 250: Performed by: NURSE ANESTHETIST, CERTIFIED REGISTERED

## 2018-10-04 PROCEDURE — 40000010 ZZH STATISTIC ANES STAT CODE-CRNA PER MINUTE: Performed by: INTERNAL MEDICINE

## 2018-10-04 PROCEDURE — 45380 COLONOSCOPY AND BIOPSY: CPT | Performed by: INTERNAL MEDICINE

## 2018-10-04 PROCEDURE — 88305 TISSUE EXAM BY PATHOLOGIST: CPT | Performed by: INTERNAL MEDICINE

## 2018-10-04 PROCEDURE — 25000128 H RX IP 250 OP 636: Performed by: NURSE ANESTHETIST, CERTIFIED REGISTERED

## 2018-10-04 PROCEDURE — 37000008 ZZH ANESTHESIA TECHNICAL FEE, 1ST 30 MIN: Performed by: INTERNAL MEDICINE

## 2018-10-04 RX ORDER — PROPOFOL 10 MG/ML
INJECTION, EMULSION INTRAVENOUS CONTINUOUS PRN
Status: DISCONTINUED | OUTPATIENT
Start: 2018-10-04 | End: 2018-10-04

## 2018-10-04 RX ORDER — ONDANSETRON 2 MG/ML
INJECTION INTRAMUSCULAR; INTRAVENOUS PRN
Status: DISCONTINUED | OUTPATIENT
Start: 2018-10-04 | End: 2018-10-04

## 2018-10-04 RX ORDER — PROPOFOL 10 MG/ML
INJECTION, EMULSION INTRAVENOUS PRN
Status: DISCONTINUED | OUTPATIENT
Start: 2018-10-04 | End: 2018-10-04

## 2018-10-04 RX ORDER — LIDOCAINE HYDROCHLORIDE 20 MG/ML
INJECTION, SOLUTION INFILTRATION; PERINEURAL PRN
Status: DISCONTINUED | OUTPATIENT
Start: 2018-10-04 | End: 2018-10-04

## 2018-10-04 RX ORDER — ONDANSETRON 2 MG/ML
4 INJECTION INTRAMUSCULAR; INTRAVENOUS
Status: DISCONTINUED | OUTPATIENT
Start: 2018-10-04 | End: 2018-10-04 | Stop reason: HOSPADM

## 2018-10-04 RX ORDER — LIDOCAINE 40 MG/G
CREAM TOPICAL
Status: DISCONTINUED | OUTPATIENT
Start: 2018-10-04 | End: 2018-10-04 | Stop reason: HOSPADM

## 2018-10-04 RX ORDER — SODIUM CHLORIDE, SODIUM LACTATE, POTASSIUM CHLORIDE, CALCIUM CHLORIDE 600; 310; 30; 20 MG/100ML; MG/100ML; MG/100ML; MG/100ML
INJECTION, SOLUTION INTRAVENOUS CONTINUOUS PRN
Status: DISCONTINUED | OUTPATIENT
Start: 2018-10-04 | End: 2018-10-04

## 2018-10-04 RX ADMIN — LIDOCAINE HYDROCHLORIDE 40 MG: 20 INJECTION, SOLUTION INFILTRATION; PERINEURAL at 08:14

## 2018-10-04 RX ADMIN — PROPOFOL 100 MCG/KG/MIN: 10 INJECTION, EMULSION INTRAVENOUS at 08:15

## 2018-10-04 RX ADMIN — PROPOFOL 20 MG: 10 INJECTION, EMULSION INTRAVENOUS at 08:20

## 2018-10-04 RX ADMIN — PROPOFOL 10 MG: 10 INJECTION, EMULSION INTRAVENOUS at 08:35

## 2018-10-04 RX ADMIN — SODIUM CHLORIDE, POTASSIUM CHLORIDE, SODIUM LACTATE AND CALCIUM CHLORIDE: 600; 310; 30; 20 INJECTION, SOLUTION INTRAVENOUS at 08:14

## 2018-10-04 RX ADMIN — ONDANSETRON 4 MG: 2 INJECTION INTRAMUSCULAR; INTRAVENOUS at 08:15

## 2018-10-04 ASSESSMENT — LIFESTYLE VARIABLES: TOBACCO_USE: 1

## 2018-10-04 NOTE — ANESTHESIA POSTPROCEDURE EVALUATION
Patient: Kezia Dixon    Procedure(s):  colonoscopy - Wound Class: II-Clean Contaminated    Diagnosis:screening   Diagnosis Additional Information: No value filed.    Anesthesia Type:  MAC    Note:  Anesthesia Post Evaluation    Patient location during evaluation: Endoscopy Recovery  Patient participation: Able to fully participate in evaluation  Level of consciousness: awake and alert  Pain management: adequate  Airway patency: patent  Cardiovascular status: hemodynamically stable  Respiratory status: acceptable and room air  Hydration status: euvolemic  PONV: none             Last vitals:  Vitals:    10/04/18 0850 10/04/18 0900 10/04/18 0910   BP: 138/60 130/53 140/89   Pulse:      Resp: 21 23 17   SpO2: 97% 95% 96%         Electronically Signed By: Tian To MD  October 4, 2018  9:32 AM

## 2018-10-04 NOTE — ANESTHESIA PREPROCEDURE EVALUATION
Procedure: Procedure(s):  REMOVE PORT VASCULAR ACCESS  Preop diagnosis: ESOPHOGEAL CANCER     Allergies   Allergen Reactions     Codeine Sulfate Nausea     Simvastatin Cramps     Leg cramps     Penicillins Rash     Past Medical History:   Diagnosis Date     Alcoholism (H) 10/14/2016     Benign essential hypertension 10/14/2016     Carotid artery disease (H)     mile plaque 5/7     Chemical dependency (H)     alcohol     Depression with anxiety      Esophageal cancer (H)      Esophageal cancer (H) 3/22/2016     Esophageal mass      GERD (gastroesophageal reflux disease)      Hx of pancreatitis 2003     Hypercholesteremia      Hyperglycemia      Hyperlipemia      Impaired fasting glucose 10/14/2016     Impaired fasting glucose 10/14/2016     Nocturnal leg cramps      Pancreatic pseudocyst 10/14/2016     Pancreatic pseudocyst 10/14/2016     Pancreatitis     with pseudocyst     Pap smear with atypical squamous cells, cannot exclude high grade squamous intraepithelial lesion (ASC-H) 10/14/16    Colpo impression benign, ECC benign. Cotestin in 1 yr.     Shingles      Vasomotor rhinitis      Past Surgical History:   Procedure Laterality Date     AAA REPAIR      splenic artery aneurysm embolization     ABDOMEN SURGERY  2/2/2016     COLONOSCOPY  11/26/2013    Procedure: COMBINED COLONOSCOPY, SINGLE BIOPSY/POLYPECTOMY BY BIOPSY;  COLONOSCOPY (MAC);  Surgeon: Montana Rosa MD;  Location:  GI     COLONOSCOPY  11/26/2013     ENT SURGERY       ESOPHAGOGASTRECTOMY N/A 3/22/2016    Procedure: ESOPHAGOGASTRECTOMY;  Surgeon: Alvin Riojas MD;  Location:  OR     ESOPHAGOSCOPY, GASTROSCOPY, DUODENOSCOPY (EGD), COMBINED N/A 12/22/2015    Procedure: COMBINED ENDOSCOPIC ULTRASOUND, ESOPHAGOSCOPY, GASTROSCOPY, DUODENOSCOPY (EGD), FINE NEEDLE ASPIRATE/BIOPSY;  Surgeon: Danelle Michael MD;  Location:  GI     GASTROSTOMY, INSERT TUBE, COMBINED N/A 2/2/2016    Procedure: COMBINED GASTROSTOMY, INSERT TUBE  (OPEN);  Surgeon: Alvin Riojas MD;  Location: [facility] OR     GI SURGERY  12/22/2015     HAND SURGERY      right     INSERT PORT VASCULAR ACCESS N/A 12/28/2015    Procedure: INSERT PORT VASCULAR ACCESS;  Surgeon: Alvin Riojas MD;  Location: [facility] OR     REMOVE PORT VASCULAR ACCESS Left 7/22/2016    Procedure: REMOVE PORT VASCULAR ACCESS;  Surgeon: Alvin Riojas MD;  Location: [facility] OR     TONSILLECTOMY       VASCULAR SURGERY       Prior to Admission medications    Medication Sig Start Date End Date Taking? Authorizing Provider   GABAPENTIN PO Take 300 mg by mouth 3 times daily   Yes Reported, Patient   Amitriptyline HCl (ELAVIL PO) Take 25 mg by mouth At Bedtime   Yes Reported, Patient   metoprolol (LOPRESSOR) 25 MG tablet Take 1 tablet (25 mg) by mouth 2 times daily 4/1/16  Yes Den Johnson MD   fluticasone (FLONASE) 50 MCG/ACT nasal spray Spray 2 sprays into both nostrils daily   Yes Reported, Patient   oxyCODONE (ROXICODONE) 5 MG immediate release tablet Take 1-2 tablets (5-10 mg) by mouth every 3 hours as needed for moderate to severe pain 4/1/16   Alvin Riojas MD     No current Epic-ordered facility-administered medications on file.      No current [facility]-ordered outpatient prescriptions on file.     Wt Readings from Last 1 Encounters:   09/14/18 42.6 kg (94 lb)     Temp Readings from Last 1 Encounters:   09/14/18 36.4  C (97.6  F) (Tympanic)     BP Readings from Last 6 Encounters:   09/14/18 128/64   07/06/18 127/58   12/19/17 142/60   12/07/17 133/61   12/07/17 118/62   11/09/17 153/60     Pulse Readings from Last 4 Encounters:   09/14/18 82   07/06/18 80   12/07/17 69   11/09/17 75     Resp Readings from Last 1 Encounters:   07/22/16 16     SpO2 Readings from Last 1 Encounters:   09/14/18 99%     Recent Labs   Lab Test  03/31/16   0530  03/28/16   0445  03/25/16   0645   NA   --   137  138   POTASSIUM   --   4.0  3.5   CHLORIDE   --   104  103   CO2   --   25  31   ANIONGAP    --   8  4   GLC   --   154*  153*   BUN   --   14  13   CR  0.47*  0.36*  0.39*   VICENTE   --   8.3*  7.6*     Recent Labs   Lab Test  03/31/16   0530  03/28/16   0445  03/25/16   0645  03/24/16   0639  03/22/16   0727   WBC   --    --    --   8.0  7.3   HGB   --    --   9.1*  9.4*  11.7   PLT  289  255  174  197  224     Recent Labs   Lab Test  03/25/16   0645  03/22/16   0727   INR  1.12  1.02        Anesthesia Evaluation     . Pt has had prior anesthetic.     No history of anesthetic complications          ROS/MED HX    ENT/Pulmonary:     (+)allergic rhinitis, tobacco use, Past use , . .   (-) sleep apnea   Neurologic:     (+)neuropathy     Cardiovascular:     (+) Dyslipidemia, hypertension----. : . . . :. .       METS/Exercise Tolerance:  >4 METS   Hematologic:  - neg hematologic  ROS       Musculoskeletal:  - neg musculoskeletal ROS       GI/Hepatic: Comment: H/o pancreatitis, SCCA eso., dysphagia     (+) GERD Asymptomatic on medication, bowel prep,       Renal/Genitourinary:  - ROS Renal section negative       Endo: Comment: Pre-DM - neg endo ROS       Psychiatric:     (+) psychiatric history anxiety and depression      Infectious Disease:         Malignancy:   (+) Malignancy History of GI  GI CA status post Surgery, Esophageal cancer, s/p surgery, lung cancer with plan for resection with Dr. Riojas, able to perform 4 METs        Other:                     Physical Exam      Airway   Mallampati: I  TM distance: >3 FB  Neck ROM: full    Dental   (+) caps    Cardiovascular   Rhythm and rate: regular      Pulmonary    breath sounds clear to auscultation                        Anesthesia Plan      History & Physical Review  History and physical reviewed and following examination; no interval change.    ASA Status:  3 .    NPO Status:  > 8 hours    Plan for MAC with Intravenous and Propofol induction. Maintenance will be TIVA.  Reason for MAC:  Procedure to face, neck, head or breast  PONV prophylaxis:  Ondansetron  and Ondansetron (or other 5HT-3)       Postoperative Care  Postoperative pain management:  IV analgesics and Oral pain medications.      Consents  Anesthetic plan, risks, benefits and alternatives discussed with:  Patient or representative and Patient..

## 2018-10-04 NOTE — ANESTHESIA CARE TRANSFER NOTE
Patient: Kezia Dixon    Procedure(s):  colonoscopy - Wound Class: II-Clean Contaminated    Diagnosis: screening   Diagnosis Additional Information: No value filed.    Anesthesia Type:   MAC     Note:  Airway :Room Air  Patient transferred to:PACU  Comments: Pt. Breathing spont. 100% Fi02 on Room air. Awake and following commands, denies pain. VSS, report to RNHandoff Report: Identifed the Patient, Identified the Reponsible Provider, Reviewed the pertinent medical history, Discussed the surgical course, Reviewed Intra-OP anesthesia mangement and issues during anesthesia, Set expectations for post-procedure period and Allowed opportunity for questions and acknowledgement of understanding      Vitals: (Last set prior to Anesthesia Care Transfer)    CRNA VITALS  10/4/2018 0808 - 10/4/2018 0842      10/4/2018             Resp Rate (set): 10                Electronically Signed By: Milly Zazueta  October 4, 2018  8:42 AM

## 2018-10-05 LAB — COPATH REPORT: NORMAL

## 2018-10-11 ENCOUNTER — OFFICE VISIT (OUTPATIENT)
Dept: FAMILY MEDICINE | Facility: CLINIC | Age: 63
End: 2018-10-11
Payer: COMMERCIAL

## 2018-10-11 VITALS
TEMPERATURE: 96.7 F | HEIGHT: 62 IN | DIASTOLIC BLOOD PRESSURE: 61 MMHG | SYSTOLIC BLOOD PRESSURE: 140 MMHG | WEIGHT: 93 LBS | BODY MASS INDEX: 17.11 KG/M2 | HEART RATE: 87 BPM | OXYGEN SATURATION: 99 %

## 2018-10-11 DIAGNOSIS — R91.8 MASS OF RIGHT LUNG: ICD-10-CM

## 2018-10-11 DIAGNOSIS — Z01.818 PREOP GENERAL PHYSICAL EXAM: Primary | ICD-10-CM

## 2018-10-11 LAB
BASOPHILS # BLD AUTO: 0 10E9/L (ref 0–0.2)
BASOPHILS NFR BLD AUTO: 0.6 %
DIFFERENTIAL METHOD BLD: NORMAL
EOSINOPHIL # BLD AUTO: 0.2 10E9/L (ref 0–0.7)
EOSINOPHIL NFR BLD AUTO: 3 %
ERYTHROCYTE [DISTWIDTH] IN BLOOD BY AUTOMATED COUNT: 12.2 % (ref 10–15)
HBA1C MFR BLD: 5.8 % (ref 0–5.6)
HCT VFR BLD AUTO: 40.1 % (ref 35–47)
HGB BLD-MCNC: 13.7 G/DL (ref 11.7–15.7)
LYMPHOCYTES # BLD AUTO: 1.5 10E9/L (ref 0.8–5.3)
LYMPHOCYTES NFR BLD AUTO: 22.6 %
MCH RBC QN AUTO: 32.7 PG (ref 26.5–33)
MCHC RBC AUTO-ENTMCNC: 34.2 G/DL (ref 31.5–36.5)
MCV RBC AUTO: 96 FL (ref 78–100)
MONOCYTES # BLD AUTO: 0.6 10E9/L (ref 0–1.3)
MONOCYTES NFR BLD AUTO: 8.9 %
NEUTROPHILS # BLD AUTO: 4.3 10E9/L (ref 1.6–8.3)
NEUTROPHILS NFR BLD AUTO: 64.9 %
PLATELET # BLD AUTO: 239 10E9/L (ref 150–450)
RBC # BLD AUTO: 4.19 10E12/L (ref 3.8–5.2)
WBC # BLD AUTO: 6.7 10E9/L (ref 4–11)

## 2018-10-11 PROCEDURE — 93000 ELECTROCARDIOGRAM COMPLETE: CPT | Performed by: NURSE PRACTITIONER

## 2018-10-11 PROCEDURE — 83036 HEMOGLOBIN GLYCOSYLATED A1C: CPT | Performed by: NURSE PRACTITIONER

## 2018-10-11 PROCEDURE — 85025 COMPLETE CBC W/AUTO DIFF WBC: CPT | Performed by: NURSE PRACTITIONER

## 2018-10-11 PROCEDURE — 99214 OFFICE O/P EST MOD 30 MIN: CPT | Performed by: NURSE PRACTITIONER

## 2018-10-11 PROCEDURE — 36415 COLL VENOUS BLD VENIPUNCTURE: CPT | Performed by: NURSE PRACTITIONER

## 2018-10-11 PROCEDURE — 80048 BASIC METABOLIC PNL TOTAL CA: CPT | Performed by: NURSE PRACTITIONER

## 2018-10-11 NOTE — NURSING NOTE
"Chief Complaint   Patient presents with     Pre-Op Exam        Initial /61 (BP Location: Left arm, Patient Position: Chair, Cuff Size: Adult Regular)  Pulse 87  Temp 96.7  F (35.9  C) (Oral)  Ht 5' 1.5\" (1.562 m)  Wt 93 lb (42.2 kg)  SpO2 99%  BMI 17.29 kg/m2 Estimated body mass index is 17.29 kg/(m^2) as calculated from the following:    Height as of this encounter: 5' 1.5\" (1.562 m).    Weight as of this encounter: 93 lb (42.2 kg)..    BP completed using cuff size: regular  MEDICATIONS REVIEWED  SOCIAL AND FAMILY HX REVIEWED  Rosina Justin CMA  "

## 2018-10-11 NOTE — MR AVS SNAPSHOT
After Visit Summary   10/11/2018    Kezia Dixon    MRN: [id number]           Patient Information     Date Of Birth          1955        Visit Information        Provider Department      10/11/2018 1:30 PM Maggy Mcwilliams APRN CNP [facility]        Today's Diagnoses     Preop general physical exam    -  1    Mass of right lung          Care Instructions      Before Your Surgery      Call your surgeon if there is any change in your health. This includes signs of a cold or flu (such as a sore throat, runny nose, cough, rash or fever).    Do not smoke, drink alcohol or take over the counter medicine (unless your surgeon or primary care doctor tells you to) for the 24 hours before and after surgery.    If you take prescribed drugs: Follow your doctor s orders about which medicines to take and which to stop until after surgery.    Eating and drinking prior to surgery: follow the instructions from your surgeon    Take a shower or bath the night before surgery. Use the soap your surgeon gave you to gently clean your skin. If you do not have soap from your surgeon, use your regular soap. Do not shave or scrub the surgery site.  Wear clean pajamas and have clean sheets on your bed.           Follow-ups after your visit        Follow-up notes from your care team     Return for follow up with PCP after surgery .      Your next 10 appointments already scheduled     Oct 16, 2018   Procedure with Alvin Riojas MD   [facility] (--)    [address]  [facility] [id number] [phone]              Who to contact     If you have questions or need follow up information about today's clinic visit or your schedule please contact [facility] directly at [phone].  Normal or non-critical lab and imaging results will be communicated to you by MyChart, letter or phone within 4 business days after the clinic has received the results. If you  "do not hear from us within 7 days, please contact the clinic through [employer] or phone. If you have a critical or abnormal lab result, we will notify you by phone as soon as possible.  Submit refill requests through [employer] or call your pharmacy and they will forward the refill request to us. Please allow 3 business days for your refill to be completed.          Additional Information About Your Visit        Tier 1 PerformanceharPostRank Information     [employer] gives you secure access to your electronic health record. If you see a primary care provider, you can also send messages to your care team and make appointments. If you have questions, please call your primary care clinic.  If you do not have a primary care provider, please call [phone] and they will assist you.        Care EveryWhere ID     This is your Care EveryWhere ID. This could be used by other organizations to access your [facility] medical records  [id number]        Your Vitals Were     Pulse Temperature Height Pulse Oximetry BMI (Body Mass Index)       87 96.7  F (35.9  C) (Oral) 5' 1.5\" (1.562 m) 99% 17.29 kg/m2        Blood Pressure from Last 3 Encounters:   10/11/18 140/61   10/04/18 140/89   09/14/18 128/64    Weight from Last 3 Encounters:   10/11/18 93 lb (42.2 kg)   10/04/18 94 lb (42.6 kg)   09/14/18 94 lb (42.6 kg)              We Performed the Following     Basic metabolic panel  (Ca, Cl, CO2, Creat, Gluc, K, Na, BUN)     CBC with platelets and differential     EKG 12-lead complete w/read - Clinics     Hemoglobin A1c        Primary Care Provider Office Phone # Fax #    Mustapha Velásquez [phone] [address]  [place] [address]        Equal Access to Services     TANO WILKINSON : [place] Boss, hong goldsmith, janeth hernandez, clement josue. So [facility] [phone].    ATENCIÓN: Si habla español, tiene a thomas disposición servicios gratuitos de asistencia lingüística. Llame al " [phone].    We comply with applicable federal civil rights laws and [place] laws. We do not discriminate on the basis of race, color, national origin, age, disability, sex, sexual orientation, or gender identity.            Thank you!     Thank you for choosing [facility]  for your care. Our goal is always to provide you with excellent care. Hearing back from our patients is one way we can continue to improve our services. Please take a few minutes to complete the written survey that you may receive in the mail after your visit with us. Thank you!             Your Updated Medication List - Protect others around you: Learn how to safely use, store and throw away your medicines at www.disposemymeds.org.          This list is accurate as of 10/11/18 11:59 PM.  Always use your most recent med list.                   Brand Name Dispense Instructions for use Diagnosis    amitriptyline 50 MG tablet    ELAVIL    90 tablet    Take 1 tablet (50 mg) by mouth At Bedtime    Neuralgia, post-herpetic       DULoxetine 60 MG EC capsule    CYMBALTA    90 capsule    Take 1 capsule (60 mg) by mouth daily    Neuralgia, post-herpetic       fluticasone 50 MCG/ACT spray    FLONASE    1 Bottle    Spray 2 sprays into both nostrils daily    Chronic rhinitis, unspecified type       hydrochlorothiazide 12.5 MG Tabs tablet     90 tablet    Take 1 tablet (12.5 mg) by mouth daily    Benign essential hypertension       metoprolol tartrate 25 MG tablet    LOPRESSOR    180 tablet    Take 1 tablet (25 mg) by mouth 2 times daily    Benign essential hypertension       omeprazole 40 MG capsule    priLOSEC    90 capsule    Take 1 capsule (40 mg) by mouth daily    Benign essential hypertension

## 2018-10-11 NOTE — PROGRESS NOTES
[facility]  [address] [facility] [address]  [phone]  Dept: [phone]    PRE-OP EVALUATION:  Today's date: 10/11/2018    Kezia Dixon (: 1955) presents for pre-operative evaluation assessment as requested by Dr. Riojas.  She requires evaluation and anesthesia risk assessment prior to undergoing surgery/procedure for treatment of Pulmonary Nodules .    Fax number for surgical facility: [facility]  Primary Physician: Mustapha Velásquez  Type of Anesthesia Anticipated: General    Patient has a Health Care Directive or Living Will:  YES     Preop Questions 10/11/2018   Who is doing your surgery? denise   What are you having done? removal of lung lobe   Date of Surgery/Procedure: 10/16/2018   Facility or Hospital where procedure/surgery will be performed: [facility]   1.  Do you have a history of Heart attack, stroke, stent, coronary bypass surgery, or other heart surgery? No   2.  Do you ever have any pain or discomfort in your chest? No   3.  Do you have a history of  Heart Failure? No   4.   Are you troubled by shortness of breath when:  walking on a level surface, or up a slight hill, or at night? No   5.  Do you currently have a cold, bronchitis or other respiratory infection? No   6.  Do you have a cough, shortness of breath, or wheezing? No   7.  Do you sometimes get pains in the calves of your legs when you walk? No   8. Do you or anyone in your family have previous history of blood clots? No   9.  Do you or does anyone in your family have a serious bleeding problem such as prolonged bleeding following surgeries or cuts? No   10. Have you ever had problems with anemia or been told to take iron pills? No   11. Have you had any abnormal blood loss such as black, tarry or bloody stools, or abnormal vaginal bleeding? No   12. Have you ever had a blood transfusion? No   13. Have you or any of your relatives ever had problems with anesthesia? No   14. Do you have sleep  apnea, excessive snoring or daytime drowsiness? No   15. Do you have any prosthetic heart valves? No   16. Do you have prosthetic joints? No   17. Is there any chance that you may be pregnant? No         HPI:     HPI related to upcoming procedure: esophageal cancer treated 12/2015 with regular surveillance ; pet scan 9/2018 showed lesion RLL     See problem list for active medical problems.  Problems all longstanding and stable, except as noted/documented.  See ROS for pertinent symptoms related to these conditions.                                                                                                                                                          .    MEDICAL HISTORY:     Patient Active Problem List    Diagnosis Date Noted     Protein-calorie malnutrition (H) 07/06/2018     Priority: Medium     Post herpetic neuralgia 07/06/2018     Priority: Medium     ACP (advance care planning) 08/21/2017     Priority: Medium     Benign essential hypertension 10/14/2016     Priority: Medium     Alcoholism (H) 10/14/2016     Priority: Medium  Sober since 2002     Pancreatic pseudocyst 10/14/2016     Priority: Medium     Impaired fasting glucose 10/14/2016     Priority: Medium     Pap smear with atypical squamous cells, cannot exclude high grade squamous intraepithelial lesion (ASC-H) 10/14/2016     Priority: Medium     10/14/16 ASC-H pap.   11/03/16 [place]= ECC, Benign. 1 yr co-test   12/7/17 NIL/+ HR HPV (not 16/18). Plan: colposcopy by 3/7/18  12/19/17 [place]- VIRY 1. Plan: Cotest in 1 yr due by 12/19/18         Esophageal cancer (H) 03/22/2016     Priority: Medium      Past Medical History:   Diagnosis Date     Alcoholism (H) 10/14/2016     Benign essential hypertension 10/14/2016     Carotid artery disease (H)     mile plaque 5/7     Chemical dependency (H)     alcohol     Depression with anxiety      Esophageal cancer (H)      Esophageal cancer (H) 3/22/2016     Esophageal mass      GERD (gastroesophageal  reflux disease)      Hx of pancreatitis 2003     Hypercholesteremia      Hyperglycemia      Hyperlipemia      Impaired fasting glucose 10/14/2016     Impaired fasting glucose 10/14/2016     Nocturnal leg cramps      Pancreatic pseudocyst 10/14/2016     Pancreatic pseudocyst 10/14/2016     Pancreatitis     with pseudocyst     Pap smear with atypical squamous cells, cannot exclude high grade squamous intraepithelial lesion (ASC-H) 10/14/16    Colpo impression benign, ECC benign. Cotestin in 1 yr.     Shingles      Vasomotor rhinitis      Past Surgical History:   Procedure Laterality Date     AAA REPAIR      splenic artery aneurysm embolization     ABDOMEN SURGERY  2/2/2016     COLONOSCOPY  11/26/2013    Procedure: COMBINED COLONOSCOPY, SINGLE BIOPSY/POLYPECTOMY BY BIOPSY;  COLONOSCOPY (MAC);  Surgeon: Montana Rosa MD;  Location:  GI     COLONOSCOPY  11/26/2013     COLONOSCOPY N/A 10/4/2018    Procedure: COMBINED COLONOSCOPY, SINGLE OR MULTIPLE BIOPSY/POLYPECTOMY BY BIOPSY;  colonoscopy;  Surgeon: Gio Apodaca MD;  Location:  GI     ENT SURGERY       ESOPHAGOGASTRECTOMY N/A 3/22/2016    Procedure: ESOPHAGOGASTRECTOMY;  Surgeon: Alvin Riojas MD;  Location: [facility]     ESOPHAGOSCOPY, GASTROSCOPY, DUODENOSCOPY (EGD), COMBINED N/A 12/22/2015    Procedure: COMBINED ENDOSCOPIC ULTRASOUND, ESOPHAGOSCOPY, GASTROSCOPY, DUODENOSCOPY (EGD), FINE NEEDLE ASPIRATE/BIOPSY;  Surgeon: Danelle Michael MD;  Location:  GI     GASTROSTOMY, INSERT TUBE, COMBINED N/A 2/2/2016    Procedure: COMBINED GASTROSTOMY, INSERT TUBE (OPEN);  Surgeon: Alvin Riojas MD;  Location: [facility]     GI SURGERY  12/22/2015     HAND SURGERY      right     INSERT PORT VASCULAR ACCESS N/A 12/28/2015    Procedure: INSERT PORT VASCULAR ACCESS;  Surgeon: Alvin Riojas MD;  Location:  OR     REMOVE PORT VASCULAR ACCESS Left 7/22/2016    Procedure: REMOVE PORT VASCULAR ACCESS;  Surgeon: Alvin Riojas  MD Angel;  Location:  OR     TONSILLECTOMY       VASCULAR SURGERY       Current Outpatient Prescriptions   Medication Sig Dispense Refill     amitriptyline (ELAVIL) 50 MG tablet Take 1 tablet (50 mg) by mouth At Bedtime 90 tablet 3     DULoxetine (CYMBALTA) 60 MG EC capsule Take 1 capsule (60 mg) by mouth daily 90 capsule 0     fluticasone (FLONASE) 50 MCG/ACT spray Spray 2 sprays into both nostrils daily 1 Bottle 11     hydrochlorothiazide 12.5 MG TABS tablet Take 1 tablet (12.5 mg) by mouth daily 90 tablet 3     metoprolol (LOPRESSOR) 25 MG tablet Take 1 tablet (25 mg) by mouth 2 times daily 180 tablet 3     omeprazole (PRILOSEC) 40 MG capsule Take 1 capsule (40 mg) by mouth daily 90 capsule 3     OTC products: None, except as noted above    Allergies   Allergen Reactions     Codeine Sulfate Nausea     Simvastatin Cramps     Leg cramps     Penicillins Rash      Latex Allergy: NO    Social History   Substance Use Topics     Smoking status: Former Smoker     Packs/day: 0.25     Quit date: 12/11/2015     Smokeless tobacco: Never Used     Alcohol use 0.0 oz/week      Comment: occas wine     History   Drug Use No       REVIEW OF SYSTEMS:   CONSTITUTIONAL: NEGATIVE for fever, chills, change in weight  INTEGUMENTARY/SKIN: NEGATIVE for worrisome rashes, moles or lesions  EYES: NEGATIVE for vision changes or irritation  ENT/MOUTH: NEGATIVE for ear, mouth and throat problems  RESP: NEGATIVE for significant cough or SOB  BREAST: NEGATIVE for masses, tenderness or discharge  CV: NEGATIVE for chest pain, palpitations or peripheral edema  GI: NEGATIVE for nausea, abdominal pain, heartburn, or change in bowel habits, no problems with regurgitation  : NEGATIVE for frequency, dysuria, or hematuria  MUSCULOSKELETAL: NEGATIVE for significant arthralgias or myalgia  NEURO: NEGATIVE for weakness, dizziness or paresthesias  ENDOCRINE: NEGATIVE for temperature intolerance, skin/hair changes  HEME: NEGATIVE for bleeding  "problems  PSYCHIATRIC: NEGATIVE for changes in mood or affect    EXAM:   /61 (BP Location: Left arm, Patient Position: Chair, Cuff Size: Adult Regular)  Pulse 87  Temp 96.7  F (35.9  C) (Oral)  Ht 5' 1.5\" (1.562 m)  Wt 93 lb (42.2 kg)  SpO2 99%  BMI 17.29 kg/m2      GENERAL APPEARANCE: frail, alert and no distress     EYES: EOMI, PERRL     HENT: ear canals and TM's normal and nose and mouth without ulcers or lesions     NECK: no adenopathy, no asymmetry, masses, or scars and thyroid normal to palpation     RESP: lungs clear to auscultation - no rales, rhonchi or wheezes     CV: regular rates and rhythm, normal S1 S2, no S3 or S4 and no murmur, click or rub     ABDOMEN:  soft, nontender, no HSM or masses and bowel sounds normal     MS: extremities normal- no gross deformities noted, no evidence of inflammation in joints, FROM in all extremities.     SKIN: no suspicious lesions or rashes     NEURO: Normal strength and tone, sensory exam grossly normal, mentation intact and speech normal     PSYCH: mentation appears normal. and affect normal/bright     LYMPHATICS: No cervical adenopathy    DIAGNOSTICS:   EKG: sinus rhythm, WNL    Recent Labs   Lab Test  12/07/17   1148  11/09/17   1026  03/31/16   0530   03/25/16   0645   03/22/16   0727 11/04/15 11/20/13   HGB   --   13.2   --    --   9.1*   < >  11.7   --    --    PLT   --   169  289   < >  174   < >  224   --    --    INR   --    --    --    --   1.12   --   1.02   --    --    NA  141  140   --    < >  138   < >  138   --    --    POTASSIUM  5.4*  4.5   --    < >  3.5   < >  4.4   --    --    CR  0.68  0.58  0.47*   < >  0.39*   < >  0.54   --    --    A1C   --    --    --    --    --    --    --   5.9  6.0    < > = values in this interval not displayed.      Results for orders placed or performed in visit on 10/11/18   Basic metabolic panel  (Ca, Cl, CO2, Creat, Gluc, K, Na, BUN)   Result Value Ref Range    Sodium 133 133 - 144 mmol/L    Potassium 4.8 " 3.4 - 5.3 mmol/L    Chloride 98 94 - 109 mmol/L    Carbon Dioxide 26 20 - 32 mmol/L    Anion Gap 9 3 - 14 mmol/L    Glucose 135 (H) 70 - 99 mg/dL    Urea Nitrogen 15 7 - 30 mg/dL    Creatinine 0.68 0.52 - 1.04 mg/dL    GFR Estimate 86 >60 mL/min/1.7m2    GFR Estimate If Black >90 >60 mL/min/1.7m2    Calcium 9.2 8.5 - 10.1 mg/dL   CBC with platelets and differential   Result Value Ref Range    WBC 6.7 4.0 - 11.0 10e9/L    RBC Count 4.19 3.8 - 5.2 10e12/L    Hemoglobin 13.7 11.7 - 15.7 g/dL    Hematocrit 40.1 35.0 - 47.0 %    MCV 96 78 - 100 fl    MCH 32.7 26.5 - 33.0 pg    MCHC 34.2 31.5 - 36.5 g/dL    RDW 12.2 10.0 - 15.0 %    Platelet Count 239 150 - 450 10e9/L    % Neutrophils 64.9 %    % Lymphocytes 22.6 %    % Monocytes 8.9 %    % Eosinophils 3.0 %    % Basophils 0.6 %    Absolute Neutrophil 4.3 1.6 - 8.3 10e9/L    Absolute Lymphocytes 1.5 0.8 - 5.3 10e9/L    Absolute Monocytes 0.6 0.0 - 1.3 10e9/L    Absolute Eosinophils 0.2 0.0 - 0.7 10e9/L    Absolute Basophils 0.0 0.0 - 0.2 10e9/L    Diff Method Automated Method    Hemoglobin A1c   Result Value Ref Range    Hemoglobin A1C 5.8 (H) 0 - 5.6 %       IMPRESSION:   Reason for surgery/procedure: thoracotomy with right lower lobe lobectomy  Diagnosis/reason for consult: pre op consult    The proposed surgical procedure is considered INTERMEDIATE risk.    REVISED CARDIAC RISK INDEX  The patient has the following serious cardiovascular risks for perioperative complications such as (MI, PE, VFib and 3  AV Block):  No serious cardiac risks  INTERPRETATION: 0 risks: Class I (very low risk - 0.4% complication rate)    The patient has the following additional risks for perioperative complications:  No identified additional risks        ICD-10-CM    1. Preop general physical exam Z01.818 EKG 12-lead complete w/read - Clinics     Basic metabolic panel  (Ca, Cl, CO2, Creat, Gluc, K, Na, BUN)     CBC with platelets and differential     Hemoglobin A1c   2. Mass of right lung  R91.8        RECOMMENDATIONS:       --Patient is to take all scheduled medications on the day of surgery    APPROVAL GIVEN to proceed with proposed procedure, without further diagnostic evaluation       Signed Electronically by: STEVEN Cabrera CNP    Copy of this evaluation report is provided to requesting physician.    [place] Preop Guidelines    Revised Cardiac Risk Index

## 2018-10-12 LAB
ANION GAP SERPL CALCULATED.3IONS-SCNC: 9 MMOL/L (ref 3–14)
BUN SERPL-MCNC: 15 MG/DL (ref 7–30)
CALCIUM SERPL-MCNC: 9.2 MG/DL (ref 8.5–10.1)
CHLORIDE SERPL-SCNC: 98 MMOL/L (ref 94–109)
CO2 SERPL-SCNC: 26 MMOL/L (ref 20–32)
CREAT SERPL-MCNC: 0.68 MG/DL (ref 0.52–1.04)
GFR SERPL CREATININE-BSD FRML MDRD: 86 ML/MIN/1.7M2
GLUCOSE SERPL-MCNC: 135 MG/DL (ref 70–99)
POTASSIUM SERPL-SCNC: 4.8 MMOL/L (ref 3.4–5.3)
SODIUM SERPL-SCNC: 133 MMOL/L (ref 133–144)

## 2018-10-15 DIAGNOSIS — B02.29 NEURALGIA, POST-HERPETIC: ICD-10-CM

## 2018-10-15 NOTE — H&P (VIEW-ONLY)
[facility]  [address] [facility] [address]  [phone]  Dept: [phone]    PRE-OP EVALUATION:  Today's date: 10/11/2018    Kezia Dixon (: 1955) presents for pre-operative evaluation assessment as requested by Dr. Riojas.  She requires evaluation and anesthesia risk assessment prior to undergoing surgery/procedure for treatment of Pulmonary Nodules .    Fax number for surgical facility: [facility]  Primary Physician: Mustapha Velásquez  Type of Anesthesia Anticipated: General    Patient has a Health Care Directive or Living Will:  YES     Preop Questions 10/11/2018   Who is doing your surgery? denise   What are you having done? removal of lung lobe   Date of Surgery/Procedure: 10/16/2018   Facility or Hospital where procedure/surgery will be performed: [facility]   1.  Do you have a history of Heart attack, stroke, stent, coronary bypass surgery, or other heart surgery? No   2.  Do you ever have any pain or discomfort in your chest? No   3.  Do you have a history of  Heart Failure? No   4.   Are you troubled by shortness of breath when:  walking on a level surface, or up a slight hill, or at night? No   5.  Do you currently have a cold, bronchitis or other respiratory infection? No   6.  Do you have a cough, shortness of breath, or wheezing? No   7.  Do you sometimes get pains in the calves of your legs when you walk? No   8. Do you or anyone in your family have previous history of blood clots? No   9.  Do you or does anyone in your family have a serious bleeding problem such as prolonged bleeding following surgeries or cuts? No   10. Have you ever had problems with anemia or been told to take iron pills? No   11. Have you had any abnormal blood loss such as black, tarry or bloody stools, or abnormal vaginal bleeding? No   12. Have you ever had a blood transfusion? No   13. Have you or any of your relatives ever had problems with anesthesia? No   14. Do you have sleep  apnea, excessive snoring or daytime drowsiness? No   15. Do you have any prosthetic heart valves? No   16. Do you have prosthetic joints? No   17. Is there any chance that you may be pregnant? No         HPI:     HPI related to upcoming procedure: esophageal cancer treated 12/2015 with regular surveillance ; pet scan 9/2018 showed lesion RLL     See problem list for active medical problems.  Problems all longstanding and stable, except as noted/documented.  See ROS for pertinent symptoms related to these conditions.                                                                                                                                                          .    MEDICAL HISTORY:     Patient Active Problem List    Diagnosis Date Noted     Protein-calorie malnutrition (H) 07/06/2018     Priority: Medium     Post herpetic neuralgia 07/06/2018     Priority: Medium     ACP (advance care planning) 08/21/2017     Priority: Medium     Benign essential hypertension 10/14/2016     Priority: Medium     Alcoholism (H) 10/14/2016     Priority: Medium  Sober since 2002     Pancreatic pseudocyst 10/14/2016     Priority: Medium     Impaired fasting glucose 10/14/2016     Priority: Medium     Pap smear with atypical squamous cells, cannot exclude high grade squamous intraepithelial lesion (ASC-H) 10/14/2016     Priority: Medium     10/14/16 ASC-H pap.   11/03/16 Bigelow= ECC, Benign. 1 yr co-test   12/7/17 NIL/+ HR HPV (not 16/18). Plan: colposcopy by 3/7/18  12/19/17 Bigelow- VIRY 1. Plan: Cotest in 1 yr due by 12/19/18         Esophageal cancer (H) 03/22/2016     Priority: Medium      Past Medical History:   Diagnosis Date     Alcoholism (H) 10/14/2016     Benign essential hypertension 10/14/2016     Carotid artery disease (H)     mile plaque 5/7     Chemical dependency (H)     alcohol     Depression with anxiety      Esophageal cancer (H)      Esophageal cancer (H) 3/22/2016     Esophageal mass      GERD (gastroesophageal  reflux disease)      Hx of pancreatitis 2003     Hypercholesteremia      Hyperglycemia      Hyperlipemia      Impaired fasting glucose 10/14/2016     Impaired fasting glucose 10/14/2016     Nocturnal leg cramps      Pancreatic pseudocyst 10/14/2016     Pancreatic pseudocyst 10/14/2016     Pancreatitis     with pseudocyst     Pap smear with atypical squamous cells, cannot exclude high grade squamous intraepithelial lesion (ASC-H) 10/14/16    Colpo impression benign, ECC benign. Cotestin in 1 yr.     Shingles      Vasomotor rhinitis      Past Surgical History:   Procedure Laterality Date     AAA REPAIR      splenic artery aneurysm embolization     ABDOMEN SURGERY  2/2/2016     COLONOSCOPY  11/26/2013    Procedure: COMBINED COLONOSCOPY, SINGLE BIOPSY/POLYPECTOMY BY BIOPSY;  COLONOSCOPY (MAC);  Surgeon: Montana Rosa MD;  Location:  GI     COLONOSCOPY  11/26/2013     COLONOSCOPY N/A 10/4/2018    Procedure: COMBINED COLONOSCOPY, SINGLE OR MULTIPLE BIOPSY/POLYPECTOMY BY BIOPSY;  colonoscopy;  Surgeon: Gio Apodaca MD;  Location:  GI     ENT SURGERY       ESOPHAGOGASTRECTOMY N/A 3/22/2016    Procedure: ESOPHAGOGASTRECTOMY;  Surgeon: Alvin Riojas MD;  Location: [facility]     ESOPHAGOSCOPY, GASTROSCOPY, DUODENOSCOPY (EGD), COMBINED N/A 12/22/2015    Procedure: COMBINED ENDOSCOPIC ULTRASOUND, ESOPHAGOSCOPY, GASTROSCOPY, DUODENOSCOPY (EGD), FINE NEEDLE ASPIRATE/BIOPSY;  Surgeon: Danelle Michael MD;  Location:  GI     GASTROSTOMY, INSERT TUBE, COMBINED N/A 2/2/2016    Procedure: COMBINED GASTROSTOMY, INSERT TUBE (OPEN);  Surgeon: Alvin Riojas MD;  Location: [facility]     GI SURGERY  12/22/2015     HAND SURGERY      right     INSERT PORT VASCULAR ACCESS N/A 12/28/2015    Procedure: INSERT PORT VASCULAR ACCESS;  Surgeon: Alvin Riojas MD;  Location:  OR     REMOVE PORT VASCULAR ACCESS Left 7/22/2016    Procedure: REMOVE PORT VASCULAR ACCESS;  Surgeon: Alvin Riojas  MD Angel;  Location:  OR     TONSILLECTOMY       VASCULAR SURGERY       Current Outpatient Prescriptions   Medication Sig Dispense Refill     amitriptyline (ELAVIL) 50 MG tablet Take 1 tablet (50 mg) by mouth At Bedtime 90 tablet 3     DULoxetine (CYMBALTA) 60 MG EC capsule Take 1 capsule (60 mg) by mouth daily 90 capsule 0     fluticasone (FLONASE) 50 MCG/ACT spray Spray 2 sprays into both nostrils daily 1 Bottle 11     hydrochlorothiazide 12.5 MG TABS tablet Take 1 tablet (12.5 mg) by mouth daily 90 tablet 3     metoprolol (LOPRESSOR) 25 MG tablet Take 1 tablet (25 mg) by mouth 2 times daily 180 tablet 3     omeprazole (PRILOSEC) 40 MG capsule Take 1 capsule (40 mg) by mouth daily 90 capsule 3     OTC products: None, except as noted above    Allergies   Allergen Reactions     Codeine Sulfate Nausea     Simvastatin Cramps     Leg cramps     Penicillins Rash      Latex Allergy: NO    Social History   Substance Use Topics     Smoking status: Former Smoker     Packs/day: 0.25     Quit date: 12/11/2015     Smokeless tobacco: Never Used     Alcohol use 0.0 oz/week      Comment: occas wine     History   Drug Use No       REVIEW OF SYSTEMS:   CONSTITUTIONAL: NEGATIVE for fever, chills, change in weight  INTEGUMENTARY/SKIN: NEGATIVE for worrisome rashes, moles or lesions  EYES: NEGATIVE for vision changes or irritation  ENT/MOUTH: NEGATIVE for ear, mouth and throat problems  RESP: NEGATIVE for significant cough or SOB  BREAST: NEGATIVE for masses, tenderness or discharge  CV: NEGATIVE for chest pain, palpitations or peripheral edema  GI: NEGATIVE for nausea, abdominal pain, heartburn, or change in bowel habits, no problems with regurgitation  : NEGATIVE for frequency, dysuria, or hematuria  MUSCULOSKELETAL: NEGATIVE for significant arthralgias or myalgia  NEURO: NEGATIVE for weakness, dizziness or paresthesias  ENDOCRINE: NEGATIVE for temperature intolerance, skin/hair changes  HEME: NEGATIVE for bleeding  "problems  PSYCHIATRIC: NEGATIVE for changes in mood or affect    EXAM:   /61 (BP Location: Left arm, Patient Position: Chair, Cuff Size: Adult Regular)  Pulse 87  Temp 96.7  F (35.9  C) (Oral)  Ht 5' 1.5\" (1.562 m)  Wt 93 lb (42.2 kg)  SpO2 99%  BMI 17.29 kg/m2      GENERAL APPEARANCE: frail, alert and no distress     EYES: EOMI, PERRL     HENT: ear canals and TM's normal and nose and mouth without ulcers or lesions     NECK: no adenopathy, no asymmetry, masses, or scars and thyroid normal to palpation     RESP: lungs clear to auscultation - no rales, rhonchi or wheezes     CV: regular rates and rhythm, normal S1 S2, no S3 or S4 and no murmur, click or rub     ABDOMEN:  soft, nontender, no HSM or masses and bowel sounds normal     MS: extremities normal- no gross deformities noted, no evidence of inflammation in joints, FROM in all extremities.     SKIN: no suspicious lesions or rashes     NEURO: Normal strength and tone, sensory exam grossly normal, mentation intact and speech normal     PSYCH: mentation appears normal. and affect normal/bright     LYMPHATICS: No cervical adenopathy    DIAGNOSTICS:   EKG: sinus rhythm, WNL    Recent Labs   Lab Test  12/07/17   1148  11/09/17   1026  03/31/16   0530   03/25/16   0645   03/22/16   0727 11/04/15 11/20/13   HGB   --   13.2   --    --   9.1*   < >  11.7   --    --    PLT   --   169  289   < >  174   < >  224   --    --    INR   --    --    --    --   1.12   --   1.02   --    --    NA  141  140   --    < >  138   < >  138   --    --    POTASSIUM  5.4*  4.5   --    < >  3.5   < >  4.4   --    --    CR  0.68  0.58  0.47*   < >  0.39*   < >  0.54   --    --    A1C   --    --    --    --    --    --    --   5.9  6.0    < > = values in this interval not displayed.      Results for orders placed or performed in visit on 10/11/18   Basic metabolic panel  (Ca, Cl, CO2, Creat, Gluc, K, Na, BUN)   Result Value Ref Range    Sodium 133 133 - 144 mmol/L    Potassium 4.8 " 3.4 - 5.3 mmol/L    Chloride 98 94 - 109 mmol/L    Carbon Dioxide 26 20 - 32 mmol/L    Anion Gap 9 3 - 14 mmol/L    Glucose 135 (H) 70 - 99 mg/dL    Urea Nitrogen 15 7 - 30 mg/dL    Creatinine 0.68 0.52 - 1.04 mg/dL    GFR Estimate 86 >60 mL/min/1.7m2    GFR Estimate If Black >90 >60 mL/min/1.7m2    Calcium 9.2 8.5 - 10.1 mg/dL   CBC with platelets and differential   Result Value Ref Range    WBC 6.7 4.0 - 11.0 10e9/L    RBC Count 4.19 3.8 - 5.2 10e12/L    Hemoglobin 13.7 11.7 - 15.7 g/dL    Hematocrit 40.1 35.0 - 47.0 %    MCV 96 78 - 100 fl    MCH 32.7 26.5 - 33.0 pg    MCHC 34.2 31.5 - 36.5 g/dL    RDW 12.2 10.0 - 15.0 %    Platelet Count 239 150 - 450 10e9/L    % Neutrophils 64.9 %    % Lymphocytes 22.6 %    % Monocytes 8.9 %    % Eosinophils 3.0 %    % Basophils 0.6 %    Absolute Neutrophil 4.3 1.6 - 8.3 10e9/L    Absolute Lymphocytes 1.5 0.8 - 5.3 10e9/L    Absolute Monocytes 0.6 0.0 - 1.3 10e9/L    Absolute Eosinophils 0.2 0.0 - 0.7 10e9/L    Absolute Basophils 0.0 0.0 - 0.2 10e9/L    Diff Method Automated Method    Hemoglobin A1c   Result Value Ref Range    Hemoglobin A1C 5.8 (H) 0 - 5.6 %       IMPRESSION:   Reason for surgery/procedure: thoracotomy with right lower lobe lobectomy  Diagnosis/reason for consult: pre op consult    The proposed surgical procedure is considered INTERMEDIATE risk.    REVISED CARDIAC RISK INDEX  The patient has the following serious cardiovascular risks for perioperative complications such as (MI, PE, VFib and 3  AV Block):  No serious cardiac risks  INTERPRETATION: 0 risks: Class I (very low risk - 0.4% complication rate)    The patient has the following additional risks for perioperative complications:  No identified additional risks        ICD-10-CM    1. Preop general physical exam Z01.818 EKG 12-lead complete w/read - Clinics     Basic metabolic panel  (Ca, Cl, CO2, Creat, Gluc, K, Na, BUN)     CBC with platelets and differential     Hemoglobin A1c   2. Mass of right lung  R91.8        RECOMMENDATIONS:       --Patient is to take all scheduled medications on the day of surgery    APPROVAL GIVEN to proceed with proposed procedure, without further diagnostic evaluation       Signed Electronically by: STEVEN Cabrera CNP    Copy of this evaluation report is provided to requesting physician.    [place] Preop Guidelines    Revised Cardiac Risk Index

## 2018-10-16 ENCOUNTER — APPOINTMENT (OUTPATIENT)
Dept: GENERAL RADIOLOGY | Facility: CLINIC | Age: 63
End: 2018-10-16
Attending: THORACIC SURGERY (CARDIOTHORACIC VASCULAR SURGERY)
Payer: COMMERCIAL

## 2018-10-16 ENCOUNTER — ANESTHESIA EVENT (OUTPATIENT)
Dept: SURGERY | Facility: CLINIC | Age: 63
End: 2018-10-16
Payer: COMMERCIAL

## 2018-10-16 ENCOUNTER — ANESTHESIA (OUTPATIENT)
Dept: SURGERY | Facility: CLINIC | Age: 63
End: 2018-10-16
Payer: COMMERCIAL

## 2018-10-16 ENCOUNTER — HOSPITAL ENCOUNTER (INPATIENT)
Facility: CLINIC | Age: 63
LOS: 10 days | Discharge: HOME | End: 2018-10-26
Attending: THORACIC SURGERY (CARDIOTHORACIC VASCULAR SURGERY) | Admitting: THORACIC SURGERY (CARDIOTHORACIC VASCULAR SURGERY)
Payer: COMMERCIAL

## 2018-10-16 DIAGNOSIS — R91.1 RIGHT LOWER LOBE PULMONARY NODULE: Primary | ICD-10-CM

## 2018-10-16 DIAGNOSIS — M62.838 MUSCLE SPASM: ICD-10-CM

## 2018-10-16 DIAGNOSIS — G89.18 ACUTE POST-OPERATIVE PAIN: ICD-10-CM

## 2018-10-16 LAB
ABO + RH BLD: NORMAL
ABO + RH BLD: NORMAL
ANION GAP SERPL CALCULATED.3IONS-SCNC: 6 MMOL/L (ref 3–14)
BLD GP AB SCN SERPL QL: NORMAL
BLOOD BANK CMNT PATIENT-IMP: NORMAL
BUN SERPL-MCNC: 20 MG/DL (ref 7–30)
CALCIUM SERPL-MCNC: 9.1 MG/DL (ref 8.5–10.1)
CHLORIDE SERPL-SCNC: 101 MMOL/L (ref 94–109)
CO2 SERPL-SCNC: 28 MMOL/L (ref 20–32)
CREAT SERPL-MCNC: 0.61 MG/DL (ref 0.52–1.04)
ERYTHROCYTE [DISTWIDTH] IN BLOOD BY AUTOMATED COUNT: 12.2 % (ref 10–15)
GFR SERPL CREATININE-BSD FRML MDRD: >90 ML/MIN/1.7M2
GLUCOSE SERPL-MCNC: 105 MG/DL (ref 70–99)
HCT VFR BLD AUTO: 37.2 % (ref 35–47)
HGB BLD-MCNC: 13.1 G/DL (ref 11.7–15.7)
INR PPP: 1.01 (ref 0.86–1.14)
MCH RBC QN AUTO: 32.7 PG (ref 26.5–33)
MCHC RBC AUTO-ENTMCNC: 35.2 G/DL (ref 31.5–36.5)
MCV RBC AUTO: 93 FL (ref 78–100)
PLATELET # BLD AUTO: 257 10E9/L (ref 150–450)
POTASSIUM SERPL-SCNC: 4.2 MMOL/L (ref 3.4–5.3)
RBC # BLD AUTO: 4.01 10E12/L (ref 3.8–5.2)
SODIUM SERPL-SCNC: 135 MMOL/L (ref 133–144)
SPECIMEN EXP DATE BLD: NORMAL
WBC # BLD AUTO: 6 10E9/L (ref 4–11)

## 2018-10-16 PROCEDURE — 25000128 H RX IP 250 OP 636: Performed by: PHYSICIAN ASSISTANT

## 2018-10-16 PROCEDURE — 25000128 H RX IP 250 OP 636: Performed by: NURSE ANESTHETIST, CERTIFIED REGISTERED

## 2018-10-16 PROCEDURE — 25000128 H RX IP 250 OP 636: Performed by: ANESTHESIOLOGY

## 2018-10-16 PROCEDURE — 71000012 ZZH RECOVERY PHASE 1 LEVEL 1 FIRST HR: Performed by: THORACIC SURGERY (CARDIOTHORACIC VASCULAR SURGERY)

## 2018-10-16 PROCEDURE — 88309 TISSUE EXAM BY PATHOLOGIST: CPT | Performed by: THORACIC SURGERY (CARDIOTHORACIC VASCULAR SURGERY)

## 2018-10-16 PROCEDURE — 25000125 ZZHC RX 250: Performed by: ANESTHESIOLOGY

## 2018-10-16 PROCEDURE — 99207 ZZC CONSULT E&M CHANGED TO INITIAL LEVEL: CPT | Performed by: NURSE PRACTITIONER

## 2018-10-16 PROCEDURE — 25000132 ZZH RX MED GY IP 250 OP 250 PS 637: Performed by: PHYSICIAN ASSISTANT

## 2018-10-16 PROCEDURE — 37000009 ZZH ANESTHESIA TECHNICAL FEE, EACH ADDTL 15 MIN: Performed by: THORACIC SURGERY (CARDIOTHORACIC VASCULAR SURGERY)

## 2018-10-16 PROCEDURE — 25000125 ZZHC RX 250: Performed by: THORACIC SURGERY (CARDIOTHORACIC VASCULAR SURGERY)

## 2018-10-16 PROCEDURE — 88309 TISSUE EXAM BY PATHOLOGIST: CPT | Mod: 26 | Performed by: THORACIC SURGERY (CARDIOTHORACIC VASCULAR SURGERY)

## 2018-10-16 PROCEDURE — 85610 PROTHROMBIN TIME: CPT | Performed by: THORACIC SURGERY (CARDIOTHORACIC VASCULAR SURGERY)

## 2018-10-16 PROCEDURE — 0BTF0ZZ RESECTION OF RIGHT LOWER LUNG LOBE, OPEN APPROACH: ICD-10-PCS | Performed by: THORACIC SURGERY (CARDIOTHORACIC VASCULAR SURGERY)

## 2018-10-16 PROCEDURE — 88331 PATH CONSLTJ SURG 1 BLK 1SPC: CPT | Performed by: THORACIC SURGERY (CARDIOTHORACIC VASCULAR SURGERY)

## 2018-10-16 PROCEDURE — 99221 1ST HOSP IP/OBS SF/LOW 40: CPT | Performed by: NURSE PRACTITIONER

## 2018-10-16 PROCEDURE — 27110038 ZZH RX 271: Performed by: THORACIC SURGERY (CARDIOTHORACIC VASCULAR SURGERY)

## 2018-10-16 PROCEDURE — 86850 RBC ANTIBODY SCREEN: CPT | Performed by: THORACIC SURGERY (CARDIOTHORACIC VASCULAR SURGERY)

## 2018-10-16 PROCEDURE — 88312 SPECIAL STAINS GROUP 1: CPT | Mod: 26 | Performed by: THORACIC SURGERY (CARDIOTHORACIC VASCULAR SURGERY)

## 2018-10-16 PROCEDURE — 40000170 ZZH STATISTIC PRE-PROCEDURE ASSESSMENT II: Performed by: THORACIC SURGERY (CARDIOTHORACIC VASCULAR SURGERY)

## 2018-10-16 PROCEDURE — 0BNF0ZZ RELEASE RIGHT LOWER LUNG LOBE, OPEN APPROACH: ICD-10-PCS | Performed by: THORACIC SURGERY (CARDIOTHORACIC VASCULAR SURGERY)

## 2018-10-16 PROCEDURE — 37000008 ZZH ANESTHESIA TECHNICAL FEE, 1ST 30 MIN: Performed by: THORACIC SURGERY (CARDIOTHORACIC VASCULAR SURGERY)

## 2018-10-16 PROCEDURE — 85027 COMPLETE CBC AUTOMATED: CPT | Performed by: THORACIC SURGERY (CARDIOTHORACIC VASCULAR SURGERY)

## 2018-10-16 PROCEDURE — 88312 SPECIAL STAINS GROUP 1: CPT | Performed by: THORACIC SURGERY (CARDIOTHORACIC VASCULAR SURGERY)

## 2018-10-16 PROCEDURE — 40000986 XR CHEST PORT 1 VW

## 2018-10-16 PROCEDURE — 88342 IMHCHEM/IMCYTCHM 1ST ANTB: CPT | Performed by: THORACIC SURGERY (CARDIOTHORACIC VASCULAR SURGERY)

## 2018-10-16 PROCEDURE — 27210794 ZZH OR GENERAL SUPPLY STERILE: Performed by: THORACIC SURGERY (CARDIOTHORACIC VASCULAR SURGERY)

## 2018-10-16 PROCEDURE — 25000566 ZZH SEVOFLURANE, EA 15 MIN: Performed by: THORACIC SURGERY (CARDIOTHORACIC VASCULAR SURGERY)

## 2018-10-16 PROCEDURE — 86900 BLOOD TYPING SEROLOGIC ABO: CPT | Performed by: THORACIC SURGERY (CARDIOTHORACIC VASCULAR SURGERY)

## 2018-10-16 PROCEDURE — 88331 PATH CONSLTJ SURG 1 BLK 1SPC: CPT | Mod: 26 | Performed by: THORACIC SURGERY (CARDIOTHORACIC VASCULAR SURGERY)

## 2018-10-16 PROCEDURE — 88341 IMHCHEM/IMCYTCHM EA ADD ANTB: CPT | Mod: 26,59 | Performed by: THORACIC SURGERY (CARDIOTHORACIC VASCULAR SURGERY)

## 2018-10-16 PROCEDURE — 88341 IMHCHEM/IMCYTCHM EA ADD ANTB: CPT | Performed by: THORACIC SURGERY (CARDIOTHORACIC VASCULAR SURGERY)

## 2018-10-16 PROCEDURE — 88342 IMHCHEM/IMCYTCHM 1ST ANTB: CPT | Mod: 26,59 | Performed by: THORACIC SURGERY (CARDIOTHORACIC VASCULAR SURGERY)

## 2018-10-16 PROCEDURE — 36415 COLL VENOUS BLD VENIPUNCTURE: CPT | Performed by: THORACIC SURGERY (CARDIOTHORACIC VASCULAR SURGERY)

## 2018-10-16 PROCEDURE — 36000093 ZZH SURGERY LEVEL 4 1ST 30 MIN: Performed by: THORACIC SURGERY (CARDIOTHORACIC VASCULAR SURGERY)

## 2018-10-16 PROCEDURE — 25000128 H RX IP 250 OP 636: Performed by: THORACIC SURGERY (CARDIOTHORACIC VASCULAR SURGERY)

## 2018-10-16 PROCEDURE — 25000125 ZZHC RX 250: Performed by: NURSE ANESTHETIST, CERTIFIED REGISTERED

## 2018-10-16 PROCEDURE — 12000007 ZZH R&B INTERMEDIATE

## 2018-10-16 PROCEDURE — 40000886 ZZH STATISTIC STEP DOWN HRS DAY

## 2018-10-16 PROCEDURE — 86901 BLOOD TYPING SEROLOGIC RH(D): CPT | Performed by: THORACIC SURGERY (CARDIOTHORACIC VASCULAR SURGERY)

## 2018-10-16 PROCEDURE — 80048 BASIC METABOLIC PNL TOTAL CA: CPT | Performed by: THORACIC SURGERY (CARDIOTHORACIC VASCULAR SURGERY)

## 2018-10-16 PROCEDURE — 25000128 H RX IP 250 OP 636: Performed by: NURSE PRACTITIONER

## 2018-10-16 PROCEDURE — 36000063 ZZH SURGERY LEVEL 4 EA 15 ADDTL MIN: Performed by: THORACIC SURGERY (CARDIOTHORACIC VASCULAR SURGERY)

## 2018-10-16 RX ORDER — METOPROLOL TARTRATE 25 MG/1
25 TABLET, FILM COATED ORAL 2 TIMES DAILY
Status: DISCONTINUED | OUTPATIENT
Start: 2018-10-16 | End: 2018-10-26 | Stop reason: HOSPADM

## 2018-10-16 RX ORDER — BUPIVACAINE HYDROCHLORIDE 5 MG/ML
INJECTION, SOLUTION PERINEURAL PRN
Status: DISCONTINUED | OUTPATIENT
Start: 2018-10-16 | End: 2018-10-16 | Stop reason: HOSPADM

## 2018-10-16 RX ORDER — SODIUM CHLORIDE, SODIUM LACTATE, POTASSIUM CHLORIDE, CALCIUM CHLORIDE 600; 310; 30; 20 MG/100ML; MG/100ML; MG/100ML; MG/100ML
INJECTION, SOLUTION INTRAVENOUS CONTINUOUS
Status: DISCONTINUED | OUTPATIENT
Start: 2018-10-16 | End: 2018-10-16 | Stop reason: HOSPADM

## 2018-10-16 RX ORDER — ONDANSETRON 2 MG/ML
4 INJECTION INTRAMUSCULAR; INTRAVENOUS EVERY 6 HOURS PRN
Status: DISCONTINUED | OUTPATIENT
Start: 2018-10-16 | End: 2018-10-26 | Stop reason: HOSPADM

## 2018-10-16 RX ORDER — AMOXICILLIN 250 MG
1 CAPSULE ORAL 2 TIMES DAILY PRN
Status: DISCONTINUED | OUTPATIENT
Start: 2018-10-16 | End: 2018-10-26 | Stop reason: HOSPADM

## 2018-10-16 RX ORDER — MAGNESIUM HYDROXIDE 1200 MG/15ML
LIQUID ORAL PRN
Status: DISCONTINUED | OUTPATIENT
Start: 2018-10-16 | End: 2018-10-16 | Stop reason: HOSPADM

## 2018-10-16 RX ORDER — ACETAMINOPHEN 325 MG/1
650 TABLET ORAL EVERY 4 HOURS PRN
Status: DISCONTINUED | OUTPATIENT
Start: 2018-10-19 | End: 2018-10-26 | Stop reason: HOSPADM

## 2018-10-16 RX ORDER — HYDROMORPHONE HYDROCHLORIDE 1 MG/ML
.1-.2 INJECTION, SOLUTION INTRAMUSCULAR; INTRAVENOUS; SUBCUTANEOUS
Status: DISCONTINUED | OUTPATIENT
Start: 2018-10-16 | End: 2018-10-26 | Stop reason: HOSPADM

## 2018-10-16 RX ORDER — PROCHLORPERAZINE MALEATE 10 MG
10 TABLET ORAL EVERY 6 HOURS PRN
Status: DISCONTINUED | OUTPATIENT
Start: 2018-10-16 | End: 2018-10-26 | Stop reason: HOSPADM

## 2018-10-16 RX ORDER — PROPOFOL 10 MG/ML
INJECTION, EMULSION INTRAVENOUS PRN
Status: DISCONTINUED | OUTPATIENT
Start: 2018-10-16 | End: 2018-10-16

## 2018-10-16 RX ORDER — BISACODYL 10 MG
10 SUPPOSITORY, RECTAL RECTAL DAILY PRN
Status: DISCONTINUED | OUTPATIENT
Start: 2018-10-16 | End: 2018-10-26 | Stop reason: HOSPADM

## 2018-10-16 RX ORDER — CLINDAMYCIN PHOSPHATE 900 MG/50ML
900 INJECTION, SOLUTION INTRAVENOUS
Status: DISCONTINUED | OUTPATIENT
Start: 2018-10-16 | End: 2018-10-16 | Stop reason: HOSPADM

## 2018-10-16 RX ORDER — NALOXONE HYDROCHLORIDE 0.4 MG/ML
.1-.4 INJECTION, SOLUTION INTRAMUSCULAR; INTRAVENOUS; SUBCUTANEOUS
Status: ACTIVE | OUTPATIENT
Start: 2018-10-16 | End: 2018-10-17

## 2018-10-16 RX ORDER — FLUTICASONE PROPIONATE 50 MCG
2 SPRAY, SUSPENSION (ML) NASAL DAILY
Status: DISCONTINUED | OUTPATIENT
Start: 2018-10-16 | End: 2018-10-16

## 2018-10-16 RX ORDER — FLUTICASONE PROPIONATE 50 MCG
2 SPRAY, SUSPENSION (ML) NASAL DAILY
Status: DISCONTINUED | OUTPATIENT
Start: 2018-10-16 | End: 2018-10-26 | Stop reason: HOSPADM

## 2018-10-16 RX ORDER — LABETALOL HYDROCHLORIDE 5 MG/ML
10 INJECTION, SOLUTION INTRAVENOUS
Status: DISCONTINUED | OUTPATIENT
Start: 2018-10-16 | End: 2018-10-16 | Stop reason: HOSPADM

## 2018-10-16 RX ORDER — LIDOCAINE 40 MG/G
CREAM TOPICAL
Status: DISCONTINUED | OUTPATIENT
Start: 2018-10-16 | End: 2018-10-17

## 2018-10-16 RX ORDER — CALCIUM CARBONATE 500 MG/1
1000 TABLET, CHEWABLE ORAL 4 TIMES DAILY PRN
Status: DISCONTINUED | OUTPATIENT
Start: 2018-10-16 | End: 2018-10-26 | Stop reason: HOSPADM

## 2018-10-16 RX ORDER — HYDROCHLOROTHIAZIDE 12.5 MG/1
12.5 TABLET ORAL DAILY
Status: DISCONTINUED | OUTPATIENT
Start: 2018-10-17 | End: 2018-10-16

## 2018-10-16 RX ORDER — SODIUM CHLORIDE 9 MG/ML
INJECTION, SOLUTION INTRAVENOUS CONTINUOUS
Status: DISCONTINUED | OUTPATIENT
Start: 2018-10-16 | End: 2018-10-17

## 2018-10-16 RX ORDER — HYDROMORPHONE HYDROCHLORIDE 1 MG/ML
.3-.5 INJECTION, SOLUTION INTRAMUSCULAR; INTRAVENOUS; SUBCUTANEOUS EVERY 5 MIN PRN
Status: DISCONTINUED | OUTPATIENT
Start: 2018-10-16 | End: 2018-10-16 | Stop reason: HOSPADM

## 2018-10-16 RX ORDER — FENTANYL CITRATE 50 UG/ML
INJECTION, SOLUTION INTRAMUSCULAR; INTRAVENOUS PRN
Status: DISCONTINUED | OUTPATIENT
Start: 2018-10-16 | End: 2018-10-16

## 2018-10-16 RX ORDER — AMOXICILLIN 250 MG
2 CAPSULE ORAL 2 TIMES DAILY PRN
Status: DISCONTINUED | OUTPATIENT
Start: 2018-10-16 | End: 2018-10-26 | Stop reason: HOSPADM

## 2018-10-16 RX ORDER — LIDOCAINE HYDROCHLORIDE 20 MG/ML
INJECTION, SOLUTION INFILTRATION; PERINEURAL PRN
Status: DISCONTINUED | OUTPATIENT
Start: 2018-10-16 | End: 2018-10-16

## 2018-10-16 RX ORDER — ONDANSETRON 4 MG/1
4 TABLET, ORALLY DISINTEGRATING ORAL EVERY 6 HOURS PRN
Status: DISCONTINUED | OUTPATIENT
Start: 2018-10-16 | End: 2018-10-26 | Stop reason: HOSPADM

## 2018-10-16 RX ORDER — DULOXETIN HYDROCHLORIDE 30 MG/1
60 CAPSULE, DELAYED RELEASE ORAL DAILY
Status: DISCONTINUED | OUTPATIENT
Start: 2018-10-16 | End: 2018-10-26 | Stop reason: HOSPADM

## 2018-10-16 RX ORDER — DIPHENHYDRAMINE HYDROCHLORIDE 50 MG/ML
25 INJECTION INTRAMUSCULAR; INTRAVENOUS EVERY 6 HOURS PRN
Status: DISCONTINUED | OUTPATIENT
Start: 2018-10-16 | End: 2018-10-26 | Stop reason: HOSPADM

## 2018-10-16 RX ORDER — NEOSTIGMINE METHYLSULFATE 1 MG/ML
VIAL (ML) INJECTION PRN
Status: DISCONTINUED | OUTPATIENT
Start: 2018-10-16 | End: 2018-10-16

## 2018-10-16 RX ORDER — FENTANYL CITRATE 50 UG/ML
25-50 INJECTION, SOLUTION INTRAMUSCULAR; INTRAVENOUS EVERY 5 MIN PRN
Status: DISCONTINUED | OUTPATIENT
Start: 2018-10-16 | End: 2018-10-16 | Stop reason: HOSPADM

## 2018-10-16 RX ORDER — CLINDAMYCIN PHOSPHATE 900 MG/50ML
900 INJECTION, SOLUTION INTRAVENOUS SEE ADMIN INSTRUCTIONS
Status: DISCONTINUED | OUTPATIENT
Start: 2018-10-16 | End: 2018-10-16 | Stop reason: HOSPADM

## 2018-10-16 RX ORDER — AMOXICILLIN 250 MG
2 CAPSULE ORAL 2 TIMES DAILY
Status: DISCONTINUED | OUTPATIENT
Start: 2018-10-16 | End: 2018-10-26 | Stop reason: HOSPADM

## 2018-10-16 RX ORDER — ONDANSETRON 2 MG/ML
4 INJECTION INTRAMUSCULAR; INTRAVENOUS EVERY 30 MIN PRN
Status: DISCONTINUED | OUTPATIENT
Start: 2018-10-16 | End: 2018-10-16 | Stop reason: HOSPADM

## 2018-10-16 RX ORDER — GINSENG 100 MG
CAPSULE ORAL 3 TIMES DAILY
Status: DISCONTINUED | OUTPATIENT
Start: 2018-10-16 | End: 2018-10-26 | Stop reason: HOSPADM

## 2018-10-16 RX ORDER — ACETAMINOPHEN 325 MG/1
975 TABLET ORAL EVERY 8 HOURS
Status: COMPLETED | OUTPATIENT
Start: 2018-10-16 | End: 2018-10-19

## 2018-10-16 RX ORDER — ONDANSETRON 4 MG/1
4 TABLET, ORALLY DISINTEGRATING ORAL EVERY 30 MIN PRN
Status: DISCONTINUED | OUTPATIENT
Start: 2018-10-16 | End: 2018-10-16 | Stop reason: HOSPADM

## 2018-10-16 RX ORDER — LIDOCAINE 40 MG/G
CREAM TOPICAL
Status: DISCONTINUED | OUTPATIENT
Start: 2018-10-16 | End: 2018-10-16

## 2018-10-16 RX ORDER — DULOXETIN HYDROCHLORIDE 60 MG/1
CAPSULE, DELAYED RELEASE ORAL
Qty: 90 CAPSULE | Refills: 0 | Status: SHIPPED | OUTPATIENT
Start: 2018-10-16 | End: 2018-12-24

## 2018-10-16 RX ORDER — POLYETHYLENE GLYCOL 3350 17 G/17G
17 POWDER, FOR SOLUTION ORAL DAILY PRN
Status: DISCONTINUED | OUTPATIENT
Start: 2018-10-16 | End: 2018-10-19

## 2018-10-16 RX ORDER — KETOROLAC TROMETHAMINE 15 MG/ML
15 INJECTION, SOLUTION INTRAMUSCULAR; INTRAVENOUS EVERY 6 HOURS
Status: DISCONTINUED | OUTPATIENT
Start: 2018-10-16 | End: 2018-10-17

## 2018-10-16 RX ORDER — DEXAMETHASONE SODIUM PHOSPHATE 4 MG/ML
INJECTION, SOLUTION INTRA-ARTICULAR; INTRALESIONAL; INTRAMUSCULAR; INTRAVENOUS; SOFT TISSUE PRN
Status: DISCONTINUED | OUTPATIENT
Start: 2018-10-16 | End: 2018-10-16

## 2018-10-16 RX ORDER — DIPHENHYDRAMINE HCL 25 MG
25 CAPSULE ORAL EVERY 6 HOURS PRN
Status: DISCONTINUED | OUTPATIENT
Start: 2018-10-16 | End: 2018-10-26 | Stop reason: HOSPADM

## 2018-10-16 RX ORDER — AMOXICILLIN 250 MG
1 CAPSULE ORAL 2 TIMES DAILY
Status: DISCONTINUED | OUTPATIENT
Start: 2018-10-16 | End: 2018-10-26 | Stop reason: HOSPADM

## 2018-10-16 RX ORDER — ONDANSETRON 2 MG/ML
INJECTION INTRAMUSCULAR; INTRAVENOUS PRN
Status: DISCONTINUED | OUTPATIENT
Start: 2018-10-16 | End: 2018-10-16

## 2018-10-16 RX ORDER — NITROGLYCERIN 0.4 MG/1
0.4 TABLET SUBLINGUAL EVERY 5 MIN PRN
Status: DISCONTINUED | OUTPATIENT
Start: 2018-10-16 | End: 2018-10-17

## 2018-10-16 RX ORDER — NALOXONE HYDROCHLORIDE 0.4 MG/ML
.1-.4 INJECTION, SOLUTION INTRAMUSCULAR; INTRAVENOUS; SUBCUTANEOUS
Status: DISCONTINUED | OUTPATIENT
Start: 2018-10-16 | End: 2018-10-26 | Stop reason: HOSPADM

## 2018-10-16 RX ORDER — GLYCOPYRROLATE 0.2 MG/ML
INJECTION, SOLUTION INTRAMUSCULAR; INTRAVENOUS PRN
Status: DISCONTINUED | OUTPATIENT
Start: 2018-10-16 | End: 2018-10-16

## 2018-10-16 RX ORDER — HYDROCHLOROTHIAZIDE 12.5 MG/1
12.5 CAPSULE ORAL DAILY
Status: DISCONTINUED | OUTPATIENT
Start: 2018-10-17 | End: 2018-10-16

## 2018-10-16 RX ADMIN — Medication 1 MG: at 14:26

## 2018-10-16 RX ADMIN — GLYCOPYRROLATE 0.5 MG: 0.2 INJECTION, SOLUTION INTRAMUSCULAR; INTRAVENOUS at 10:04

## 2018-10-16 RX ADMIN — PHENYLEPHRINE HYDROCHLORIDE 100 MCG: 10 INJECTION, SOLUTION INTRAMUSCULAR; INTRAVENOUS; SUBCUTANEOUS at 08:17

## 2018-10-16 RX ADMIN — LIDOCAINE HYDROCHLORIDE 40 MG: 20 INJECTION, SOLUTION INFILTRATION; PERINEURAL at 07:33

## 2018-10-16 RX ADMIN — Medication 2 MG: at 17:56

## 2018-10-16 RX ADMIN — PHENYLEPHRINE HYDROCHLORIDE 100 MCG: 10 INJECTION, SOLUTION INTRAMUSCULAR; INTRAVENOUS; SUBCUTANEOUS at 09:55

## 2018-10-16 RX ADMIN — FENTANYL CITRATE 25 MCG: 50 INJECTION, SOLUTION INTRAMUSCULAR; INTRAVENOUS at 08:53

## 2018-10-16 RX ADMIN — ROCURONIUM BROMIDE 25 MG: 10 INJECTION INTRAVENOUS at 07:33

## 2018-10-16 RX ADMIN — PHENYLEPHRINE HYDROCHLORIDE 100 MCG: 10 INJECTION, SOLUTION INTRAMUSCULAR; INTRAVENOUS; SUBCUTANEOUS at 09:11

## 2018-10-16 RX ADMIN — SODIUM CHLORIDE 500 ML: 9 INJECTION, SOLUTION INTRAVENOUS at 14:26

## 2018-10-16 RX ADMIN — DULOXETINE HYDROCHLORIDE 60 MG: 30 CAPSULE, DELAYED RELEASE ORAL at 20:55

## 2018-10-16 RX ADMIN — DEXAMETHASONE SODIUM PHOSPHATE 4 MG: 4 INJECTION, SOLUTION INTRA-ARTICULAR; INTRALESIONAL; INTRAMUSCULAR; INTRAVENOUS; SOFT TISSUE at 07:43

## 2018-10-16 RX ADMIN — FENTANYL CITRATE 50 MCG: 50 INJECTION, SOLUTION INTRAMUSCULAR; INTRAVENOUS at 07:43

## 2018-10-16 RX ADMIN — PHENYLEPHRINE HYDROCHLORIDE 100 MCG: 10 INJECTION, SOLUTION INTRAMUSCULAR; INTRAVENOUS; SUBCUTANEOUS at 09:35

## 2018-10-16 RX ADMIN — NEOSTIGMINE METHYLSULFATE 2.5 MG: 1 INJECTION, SOLUTION INTRAVENOUS at 10:04

## 2018-10-16 RX ADMIN — Medication 0.3 MG: at 11:05

## 2018-10-16 RX ADMIN — ROCURONIUM BROMIDE 10 MG: 10 INJECTION INTRAVENOUS at 08:52

## 2018-10-16 RX ADMIN — PHENYLEPHRINE HYDROCHLORIDE 100 MCG: 10 INJECTION, SOLUTION INTRAMUSCULAR; INTRAVENOUS; SUBCUTANEOUS at 09:18

## 2018-10-16 RX ADMIN — ROCURONIUM BROMIDE 10 MG: 10 INJECTION INTRAVENOUS at 07:43

## 2018-10-16 RX ADMIN — PHENYLEPHRINE HYDROCHLORIDE 100 MCG: 10 INJECTION, SOLUTION INTRAMUSCULAR; INTRAVENOUS; SUBCUTANEOUS at 07:35

## 2018-10-16 RX ADMIN — ACETAMINOPHEN 975 MG: 325 TABLET, FILM COATED ORAL at 20:47

## 2018-10-16 RX ADMIN — PHENYLEPHRINE HYDROCHLORIDE 100 MCG: 10 INJECTION, SOLUTION INTRAMUSCULAR; INTRAVENOUS; SUBCUTANEOUS at 10:04

## 2018-10-16 RX ADMIN — PROPOFOL 120 MG: 10 INJECTION, EMULSION INTRAVENOUS at 07:33

## 2018-10-16 RX ADMIN — Medication 1 MG: at 15:07

## 2018-10-16 RX ADMIN — PHENYLEPHRINE HYDROCHLORIDE 100 MCG: 10 INJECTION, SOLUTION INTRAMUSCULAR; INTRAVENOUS; SUBCUTANEOUS at 07:34

## 2018-10-16 RX ADMIN — HYDROMORPHONE HYDROCHLORIDE 0.25 MG: 1 INJECTION, SOLUTION INTRAMUSCULAR; INTRAVENOUS; SUBCUTANEOUS at 09:45

## 2018-10-16 RX ADMIN — PHENYLEPHRINE HYDROCHLORIDE 100 MCG: 10 INJECTION, SOLUTION INTRAMUSCULAR; INTRAVENOUS; SUBCUTANEOUS at 09:59

## 2018-10-16 RX ADMIN — FENTANYL CITRATE 50 MCG: 50 INJECTION, SOLUTION INTRAMUSCULAR; INTRAVENOUS at 07:33

## 2018-10-16 RX ADMIN — MIDAZOLAM 2 MG: 1 INJECTION INTRAMUSCULAR; INTRAVENOUS at 07:26

## 2018-10-16 RX ADMIN — ONDANSETRON 4 MG: 2 INJECTION INTRAMUSCULAR; INTRAVENOUS at 09:53

## 2018-10-16 RX ADMIN — SODIUM CHLORIDE, POTASSIUM CHLORIDE, SODIUM LACTATE AND CALCIUM CHLORIDE: 600; 310; 30; 20 INJECTION, SOLUTION INTRAVENOUS at 07:12

## 2018-10-16 RX ADMIN — PHENYLEPHRINE HYDROCHLORIDE 100 MCG: 10 INJECTION, SOLUTION INTRAMUSCULAR; INTRAVENOUS; SUBCUTANEOUS at 09:01

## 2018-10-16 RX ADMIN — SODIUM CHLORIDE: 9 INJECTION, SOLUTION INTRAVENOUS at 14:26

## 2018-10-16 RX ADMIN — ROCURONIUM BROMIDE 15 MG: 10 INJECTION INTRAVENOUS at 08:13

## 2018-10-16 RX ADMIN — PHENYLEPHRINE HYDROCHLORIDE 100 MCG: 10 INJECTION, SOLUTION INTRAMUSCULAR; INTRAVENOUS; SUBCUTANEOUS at 08:38

## 2018-10-16 RX ADMIN — SENNOSIDES AND DOCUSATE SODIUM 2 TABLET: 8.6; 5 TABLET ORAL at 20:47

## 2018-10-16 RX ADMIN — PHENYLEPHRINE HYDROCHLORIDE 100 MCG: 10 INJECTION, SOLUTION INTRAMUSCULAR; INTRAVENOUS; SUBCUTANEOUS at 08:19

## 2018-10-16 RX ADMIN — ACETAMINOPHEN 975 MG: 325 TABLET, FILM COATED ORAL at 13:31

## 2018-10-16 RX ADMIN — KETOROLAC TROMETHAMINE 15 MG: 15 INJECTION, SOLUTION INTRAMUSCULAR; INTRAVENOUS at 20:47

## 2018-10-16 RX ADMIN — PHENYLEPHRINE HYDROCHLORIDE 100 MCG: 10 INJECTION, SOLUTION INTRAMUSCULAR; INTRAVENOUS; SUBCUTANEOUS at 08:59

## 2018-10-16 RX ADMIN — LIDOCAINE HYDROCHLORIDE 1 ML: 10 INJECTION, SOLUTION EPIDURAL; INFILTRATION; INTRACAUDAL; PERINEURAL at 07:12

## 2018-10-16 RX ADMIN — PHENYLEPHRINE HYDROCHLORIDE 100 MCG: 10 INJECTION, SOLUTION INTRAMUSCULAR; INTRAVENOUS; SUBCUTANEOUS at 08:46

## 2018-10-16 RX ADMIN — ROCURONIUM BROMIDE 5 MG: 10 INJECTION INTRAVENOUS at 09:22

## 2018-10-16 RX ADMIN — PHENYLEPHRINE HYDROCHLORIDE 100 MCG: 10 INJECTION, SOLUTION INTRAMUSCULAR; INTRAVENOUS; SUBCUTANEOUS at 09:53

## 2018-10-16 RX ADMIN — PHENYLEPHRINE HYDROCHLORIDE 100 MCG: 10 INJECTION, SOLUTION INTRAMUSCULAR; INTRAVENOUS; SUBCUTANEOUS at 08:11

## 2018-10-16 RX ADMIN — SODIUM CHLORIDE, POTASSIUM CHLORIDE, SODIUM LACTATE AND CALCIUM CHLORIDE: 600; 310; 30; 20 INJECTION, SOLUTION INTRAVENOUS at 10:23

## 2018-10-16 RX ADMIN — CLINDAMYCIN PHOSPHATE 900 MG: 18 INJECTION, SOLUTION INTRAVENOUS at 07:28

## 2018-10-16 RX ADMIN — KETOROLAC TROMETHAMINE 15 MG: 15 INJECTION, SOLUTION INTRAMUSCULAR; INTRAVENOUS at 13:31

## 2018-10-16 RX ADMIN — FENTANYL CITRATE 25 MCG: 50 INJECTION, SOLUTION INTRAMUSCULAR; INTRAVENOUS at 09:40

## 2018-10-16 RX ADMIN — FENTANYL CITRATE 50 MCG: 50 INJECTION, SOLUTION INTRAMUSCULAR; INTRAVENOUS at 10:30

## 2018-10-16 RX ADMIN — PHENYLEPHRINE HYDROCHLORIDE 100 MCG: 10 INJECTION, SOLUTION INTRAMUSCULAR; INTRAVENOUS; SUBCUTANEOUS at 08:25

## 2018-10-16 RX ADMIN — Medication 2 MG: at 21:25

## 2018-10-16 RX ADMIN — SODIUM CHLORIDE, POTASSIUM CHLORIDE, SODIUM LACTATE AND CALCIUM CHLORIDE: 600; 310; 30; 20 INJECTION, SOLUTION INTRAVENOUS at 10:49

## 2018-10-16 ASSESSMENT — ACTIVITIES OF DAILY LIVING (ADL)
ADLS_ACUITY_SCORE: 11
ADLS_ACUITY_SCORE: 9
ADLS_ACUITY_SCORE: 11

## 2018-10-16 NOTE — ANESTHESIA POSTPROCEDURE EVALUATION
Patient: Kezia Dixon    Procedure(s):  REDO RIGHT THORACTOMY AND RIGHT LOWER LOBECTOMY, PLEURAL LYSIS - Wound Class: II-Clean Contaminated   - Wound Class: II-Clean Contaminated    Diagnosis:RIGHT LOWER LOBE LUNG NODULE   Diagnosis Additional Information: No value filed.    Anesthesia Type:  General, ETT    Note:  Anesthesia Post Evaluation    Patient location during evaluation: PACU  Patient participation: Able to fully participate in evaluation  Level of consciousness: awake  Pain management: adequate  Airway patency: patent  Cardiovascular status: acceptable  Respiratory status: acceptable  Hydration status: acceptable  PONV: none     Anesthetic complications: None          Last vitals:  Vitals:    10/16/18 1120 10/16/18 1130 10/16/18 1137   BP: 94/42 94/44    Resp: 14 20    Temp:      SpO2: 95% 94% 93%         Electronically Signed By: JOE ABDUL MD  October 16, 2018  2:43 PM

## 2018-10-16 NOTE — OR NURSING
Patient Kezia Dixon is in stable condition and has met criteria for PACU discharge.  MAT Kemp at bedside and a sign out was given for Unit/Station transfer.

## 2018-10-16 NOTE — IP AVS SNAPSHOT
[facility]    [address] [place] [address] [place] [address]    Phone:  [phone]                                       After Visit Summary   10/16/2018    Kezia Dixon    MRN: [id number]           After Visit Summary Signature Page     I have received my discharge instructions, and my questions have been answered. I have discussed any challenges I see with this plan with the nurse or doctor.    ..........................................................................................................................................  Patient/Patient Representative Signature      ..........................................................................................................................................  Patient Representative Print Name and Relationship to Patient    ..................................................               ................................................  Date                                   Time    ..........................................................................................................................................  Reviewed by Signature/Title    ...................................................              ..............................................  Date                                               Time          22EPIC Rev 08/18

## 2018-10-16 NOTE — OP NOTE
Procedure Date: 10/16/2018      SURGEON:  Alvin Riojas MD      FIRST ASSISTANT:  Destiny BANERJEE      PREOPERATIVE DIAGNOSES:  Right lower lobe lung nodule and history of esophagectomy for esophageal cancer.      POSTOPERATIVE DIAGNOSES:  Right lower lobe lung nodule and history of esophagectomy for esophageal cancer.      PROCEDURE:  Redo right thoracotomy, extensive intrapleural pneumolysis and right lower lobectomy.      ANESTHESIA:  General with double endotracheal tube.      INDICATIONS:  A 62-year-old woman with a history of esophageal cancer, treated with chemotherapy and radiation therapy followed by esophagectomy.  She has been showing a small nodule on the CT scan of the chest deep in the right lower lobe lung.  This nodule is hypermetabolic and suspicious for metastatic disease or primary lung cancer, but unfortunately is too deep and too small for CT-guided biopsy and not amenable to wedge resection.  The pulmonary function tests are adequate.  Based on the findings, a redo right thoracotomy, right lobectomy is indicated for diagnosis and treatment.      DESCRIPTION OF PROCEDURE:  The patient was brought and placed in supine position.  Under general anesthesia with double endotracheal tube, the patient was placed in a left lateral decubitus position.  Her right chest was prepared and draped in sterile fashion using ChloraPrep.  The ventilation of the right lung was discontinued.  The prior thoracotomy site was opened.  The pleural space was entered through the 5th intercostal space.  This was divided posteriorly.  There were extensive adhesions to the chest wall and surface of diaphragm as well as the gastric tube.  An extensive pneumolysis was done freeing up the lung.  Some of these were left at the apex and the superior aspect of the gastric tube.  This part of the procedure took an hour and 15 minutes.      Then, the fissure was entered, exposing the pulmonary artery.  Branches of the  pulmonary artery of the lower lobe were dissected circumferentially and divided with applications of Yeguada 35 mm vascular stapling device.  Then, the right lobe bronchus was dissected at its origin and saponification of Yeguada 45 mm gold stapling device.  Then the inferior pulmonary vein was dissected circumferentially and divided with an application of echelon 35 mm vascular stapling device.  Then the lung was dissected away from the gastric tube which was intact.  This completed the right lower lobectomy.  The bronchial stump was airtight, burst of 20 cm of water.  There was an excellent expansion of the upper and middle lobe.  Two 24 chest tubes were placed and sutured to the skin with #2 silk suture.  On-Q catheter was placed and 3 mL of Marcaine 0.5% without epinephrine was injected as costal blocks.  The incision was closed with pericostal suture of Vicryl #1, running #1 Vicryl to the muscular layer, one 2-0 Vicryl subcutaneous tissue and skin closed with Insorb staples.  Estimated blood loss was 50 mL.  Needle and sponge counts correct.      Destiny BANERJEE was the first assistant during the procedure.  Her role as first assistant was essential and necessary to accomplish the steps of the procedure as covered above, providing exposure and retraction.         KATIE SHELTON MD             D: 10/16/2018   T: 10/16/2018   MT: ALLYSON      Name:     MATTEO CASTAÑEDA   MRN:      [id number]        Account:        [id number]   :      1955           Procedure Date: 10/16/2018      Document: [id number]

## 2018-10-16 NOTE — CONSULTS
Consult Date:  10/16/2018      REQUESTING PHYSICIAN:  Destiny Kendall PA-C      REASON FOR CONSULTATION:  Co-medical management.      HISTORY OF PRESENT ILLNESS:  Ms. Dixon is a 62-year-old woman with past medical history of esophageal cancer diagnosed in 2015, status post esophagectomy in 03/2016 with a G-tube placement that was ultimately removed in 07/2017, a history of alcohol abuse, sober for the last 16 years as well as pancreatic pseudocyst, hyperlipidemia, but not on any medications, protein calorie malnutrition, hypertension, postherpetic neuralgia following her esophagectomy, abnormal Pap smear, carotid disease, depression, anxiety and GERD.  The patient was having a routine followup after her esophageal cancer when a PET scan earlier this year showed a right lower lobe lesion.  The patient takes small amounts of foods after esophagectomy, but generally has had adequate appetite.  She denies any night sweats or significant weight loss.   Today, 10/16/2018 with Dr. Alvin Riojas she underwent a right lower lobe lobectomy via redo thoracotomy with extensive pneumolysis.  Duration of surgery was 3 hours.  She received general endotracheal anesthesia and with systolic blood pressures intraoperatively ranging from  mmHg.  She received perioperative clindamycin because of PENICILLIN ALLERGY, Decadron, phenylephrine, hydromorphone and 1 liter of LR.  EBL was 50 mL.  She was extubated at the end of her surgery and admitted to station 33 for further postoperative management.   Postoperative blood pressures ranged from /40s-50s.  She did take her hydrochlorothiazide and metoprolol this morning.  She is saturating 93-98% on currently 2 liters.  Preoperative labs were reviewed and all were within normal limits.  The hospitalist service was asked to see her in consultation to assist with comanagement of her medical conditions in the postoperative period.      PAST MEDICAL HISTORY:   1.  Esophageal cancer in  2015.   2.  Protein calorie malnutrition.   3.  Postherpetic neuralgia.   4.  Hypertension.   5.  Abnormal Pap smear.   6.  Carotid artery disease.   7.  Depression and anxiety.   8.  GERD.   9.  History of alcohol abuse, sober for 16 years.   10.  Pancreatic pseudocyst, not active.   11.  Hyperlipidemia without medications.      PAST SURGICAL HISTORY:   1.  Endovascular AAA repair.   2.  G-tube.   3.  Tonsillectomy.   4.  Esophagectomy.      ALLERGIES:       Allergies   Allergen Reactions     Codeine Sulfate Nausea     Simvastatin Cramps     Leg cramps     Penicillins Rash        SOCIAL HISTORY:  She is a former smoker as above, sober from alcohol use for the last 16 years.  She is  with 1 biologic child, 1 stepchild.  A nondrug user.  Works seasonally at [employer].      FAMILY MEDICAL HISTORY:    Family History   Problem Relation Age of Onset     Other Cancer Father      melanoma     Prostate Cancer Father      Diabetes Father      Other Cancer Brother      melanoma     Other Cancer Brother      melanoma     Other Cancer Brother         OUTPATIENT MEDICATIONS:   Prior to Admission Medications   Prescriptions Last Dose Informant Patient Reported? Taking?   Acetaminophen (TYLENOL PO) 10/14/2018 Self Yes Yes   Sig: Take 325-650 mg by mouth daily as needed for mild pain or fever   DULoxetine (CYMBALTA) 60 MG EC capsule   No No   Sig: TAKE 1 CAPSULE BY MOUTH EVERY DAY   amitriptyline (ELAVIL) 50 MG tablet 10/15/2018 at pm Self No Yes   Sig: Take 1 tablet (50 mg) by mouth At Bedtime   fluticasone (FLONASE) 50 MCG/ACT spray 10/15/2018 at am Self No Yes   Sig: Spray 2 sprays into both nostrils daily   hydrochlorothiazide 12.5 MG TABS tablet 10/16/2018 at 0430 Self No Yes   Sig: Take 1 tablet (12.5 mg) by mouth daily   metoprolol (LOPRESSOR) 25 MG tablet 10/16/2018 at 0430 Self No Yes   Sig: Take 1 tablet (25 mg) by mouth 2 times daily   omeprazole (PRILOSEC) 40 MG capsule 10/16/2018 at 0430 Self No Yes   Sig:  Take 1 capsule (40 mg) by mouth daily      Facility-Administered Medications: None        REVIEW OF SYSTEMS:  A 10-point review of systems negative aside from what is described in the HPI.        PHYSICAL EXAMINATION:     GENERAL:  Petite woman lying in bed without acute distress.   HEENT:  Head is normocephalic, atraumatic.  Pupils are 3 mm, briskly reactive bilaterally.  Sclerae nonicteric, noninjected.  Conjunctivae are pink.  Mouth has dry oral mucosa.   NECK:  Supple.  There is no anterior cervical or supraclavicular lymphadenopathy.   CARDIOVASCULAR:  Heart S1, S2, no murmurs.   LUNGS:  Artifact noise from 2 chest tubes on the right.  Tube #1 has a continuous air leak on 20 cm of wall suction.  There is no appreciable subcutaneous emphysema.  Lung sounds on the left are clear.   ABDOMEN:  Hypoactive to absent bowel sounds.  Soft, nontender, nondistended.  Supraumbilical incision is well-healed.     EXTREMITIES:  Without edema.  She has PCDs on.   CENTRAL NERVOUS SYSTEM:  Face symmetric.  Tongue is midline.  Speech is fluent, although she is soft spoken.  Follows commands to show 2 fingers and wiggle feet bilaterally.      IMPRESSION:  Ms. Dixon is a 62-year-old woman with past medical history esophageal cancer status post esophagectomy in 03/2016 with a G-tube placement that was ultimately removed, history of alcohol abuse, sober for the last 16 years, pancreatic pseudocyst, hyperlipidemia, but not on any medications, protein calorie malnutrition, hypertension, postherpetic neuralgia following her esophagectomy, abnormal Pap smear, carotid disease, depression, anxiety and GERD who is status post a redo right thoracotomy, extensive pneumolysis and a right lower lobectomy for a right lower lobe lesion found on surveillance PET scan for history of esophageal cancer.  The hospitalist service was asked to see her in consultation to assist with management of her comorbid conditions in the postoperative phase.       PROBLEM LIST AND PLAN:      History of hypertension, on 2 agents, both of which she took preoperatively.   acute hypotension with blood pressures of 93/45 with MAP of 63.  She is asymptomatic at present.  Preoperative blood pressure is 140/61 which patient says is more like her usual.   -  We will hold her hydrochlorothiazide today and reevaluate the appropriateness of resuming in the morning.   -  We have added hold parameters to her metoprolol.   -  We will give a small fluid bolus should her blood pressures persist in this range.       Pancreatic pseudocyst, remote/resolved.   Esophageal cancer, status post esophagectomy in 2016.   Protein calorie malnutrition.   GERD.   -  Continue PTA PPI.     status post right redo thoracotomy, extensive pneumolysis and right lower lobe lobectomy for right lower lobe lesion found on a surveillance PET scan with history of esophageal cancer.   -  Defer to the primary surgical service analgesia, DVT prophylaxis, chest tube management, antimicrobials, mobility and pulm hygiene maneuvers.   -  Also deferred to that service any further post-surgical management of the lesion, particularly if there is any malignancy     History of alcohol abuse, now sober for the last 16 years.   Anxiety, depression.   Postherpetic neuralgia.   -  The patient was congratulated on her sobriety and encouraged her to continue.   -  Continue PTA Cymbalta.       prophylaxis,   --continue PCDs.    --Enoxaparin is ordered due to start 10/17/2018.    --Also, already on her PTA PPI.   - -agree with a scheduled bowel regimen.       Code status:   We recommend addressing code status with the patient as none is on file.        We thank you for this interesting consult.  The hospitalist service will round on the patient again on 10/17/2018.      The patient will be independently evaluated by Dr. Cornelio Hurd.        Total time is 25 minutes of which 15 minutes was face-to-face time remainder spent in  counseling correction of care     As dictated by RADHA HUDSON, APRN, CNP      DIONE CARUSO MD                D: 10/16/2018   T: 10/16/2018   MT: FLEX      Name:     MATTEO CASTAÑEDA   MRN:      [id number]        Account:       [id number]   :      1955           Consult Date:  10/16/2018      Document: [id number]       cc: KENDRICK BANERJEE

## 2018-10-16 NOTE — ANESTHESIA CARE TRANSFER NOTE
Patient: Kezia Dixon    Procedure(s):  REDO RIGHT THORACTOMY AND RIGHT LOWER LOBECTOMY, PLEURAL LYSIS - Wound Class: II-Clean Contaminated   - Wound Class: II-Clean Contaminated    Diagnosis: RIGHT LOWER LOBE LUNG NODULE   Diagnosis Additional Information: No value filed.    Anesthesia Type:   General, ETT     Note:  Airway :Face Mask  Patient transferred to:PACU  Comments: Neuromuscular blockade reversed after TOF 4/4, spontaneous respirations, adequate tidal volumes, followed commands to voice, oropharynx suctioned with soft flexible catheter, extubated atraumatically, extubated with suction, airway patent after extubation.  Oxygen via facemask at 10 liters per minute to PACU. Oxygen tubing connected to wall O2 in PACU, SpO2, NiBP, and EKG monitors and alarms on and functioning, Ginna Hugger warmer connected to patient gown, report on patient's clinical status given to PACU RN, RN questions answered. Handoff Report: Identifed the Patient, Identified the Reponsible Provider, Reviewed the pertinent medical history, Discussed the surgical course, Reviewed Intra-OP anesthesia mangement and issues during anesthesia, Set expectations for post-procedure period and Allowed opportunity for questions and acknowledgement of understanding      Vitals: (Last set prior to Anesthesia Care Transfer)    CRNA VITALS  10/16/2018 0953 - 10/16/2018 1031      10/16/2018             Resp Rate (observed): 12                Electronically Signed By: STEVEN Torres CRNA  October 16, 2018  10:31 AM

## 2018-10-16 NOTE — IP AVS SNAPSHOT
MRN:[id number]                      After Visit Summary   10/16/2018    Kezia Dixon    MRN: [id number]           Thank you!     Thank you for choosing [facility] for your care. Our goal is always to provide you with excellent care. Hearing back from our patients is one way we can continue to improve our services. Please take a few minutes to complete the written survey that you may receive in the mail after you visit with us. Thank you!        Patient Information     Date Of Birth          1955        Designated Caregiver       Most Recent Value    Caregiver    Will someone help with your care after discharge? yes    Name of designated caregiver anjum-spouse    Phone number of caregiver [phone]    Caregiver address [address]      About your hospital stay     You were admitted on:  October 16, 2018 You last received care in the:  [facility] [address] [facility]    You were discharged on:  October 26, 2018        Reason for your hospital stay       Lung surgery with Dr. Riojas                  Who to Call     For medical emergencies, please call 911.  For non-urgent questions about your medical care, please call your primary care provider or clinic, [phone]  For questions related to your surgery, please call your surgery clinic        Attending Provider     Provider Specialty    Alvin Riojas MD Thoracic Diseases       Primary Care Provider Office Phone # Fax #    Mustapha Velásquez [phone]      After Care Instructions     Activity       Your activity upon discharge: activity as tolerated            Diet       Follow this diet upon discharge: Orders Placed This Encounter      Snacks/Supplements Adult: Gelatein Plus; Between Meals      Advance Diet as Tolerated: Regular Diet Adult                  Follow-up Appointments     Follow-up and recommended labs and tests        Follow up with primary care provider, Mustapha Velásquez,  "within 7 days for hospital follow- up.  No follow up labs or test are needed.    Follow up Chest Xray on November 5th with cardiothoracic surgery                  Further instructions from your care team       [facility]  Discharge Orders & Follow-up Care  Video-Assisted Thoracoscopy or Thoracotomy    Call Sabrina at Dr. Riojas  office at [phone] to make a return appointment with a chest x-ray on_Monday, November 5_.  Your appointment will be with MIKE Lockett Patient Care:  Call Dr Riojas  office @ [phone] if you experience:  *Severe chills or a fever or 101 F or higher on two occasions  *Increased incisional pain that cannot be relieved with rest or pain medications  *Presence of unusual incisional or chest tube site drainage that is odorous, green or yellow in color, or if your incision is warm, red or swollen  *Coughing up bright red blood or greenish-yellow secretions  *Chest pain that gets worse with deep breathing or a significant increase in shortness of breath  *Inability to urinate or have a bowel movement  *New pain or swelling in your legs    In an emergency, call [phone] or have someone drive you to the nearest Emergency Department    Pain Relief:  You may have been given a prescription for narcotic pain medicine.  You may also take ibuprofen and acetaminophen either as a new prescription  or over the counter.  Recommended dosages are:  600 mg Ibuprofen every 6 hours as needed and 650 mg Acetaminophen every 4 hours as needed.  Many patients get good pain relief by \"staggering\" these medications.     Constipation:  Narcotic pain medication, general anesthesia, and time in the hospital with less activity than normal can all cause constipation. Please take a stool softener (what you have at home or one that was prescribed during hospital discharge, such as Senokot-S, docusate sodium, Miralax, Milk of Magnesia) while you are taking narcotics to prevent constipation. " Stop taking the stool softener once you are done taking narcotics or if you begin having loose stools/diarrhea. Please call our clinic nurse, Dawn, at [phone] if you are not having success (not having BMs) with your current stool softener.     No driving while on narcotics.     Activity:  _XXX__ No heavy lifting greater than 10 pounds on the operative side for 4-6 weeks for a thoracotomy    Wound Care:  *You should look at your incision each day and keep it clean while it heals  *Do not apply any creams, salves such as Bacitracin, or ointments on the incision while it is healing  * Steri strips (thin white paper strips) will be present on the incision(s) and they will peel off as your incision heals-- otherwise, they will be removed at your post-op appointment.    *Remove the dressings covering your chest tube site 48 hours after your discharge from the hospital. You may then shower.  Wash the incision and chest tube site(s) daily with soap and water. No bathing or immersing incision underwater for approximately 2 weeks or until the chest tube sites are completely healed.     Place a dry gauze dressing (and tape) over the chest tube site because it is normal to have some drainage for a few days after the chest tube is removed.  Do not be alarmed if a large amount of fluid drains (should be pink or yellow) either spontaneously or with coughing or exertion. If this happens, just place a larger dry gauze dressing over the chest tube site-- it will stop and scab over in about a week or so. Once the drainage stops, you can stop covering the chest tube site with a dressing. Call our office if the drainage is milky or green in color or foul-smelling.    B. Respiratory:  ___ Utilize Incentive spirometer and flutter valve/acapella (if you received one) 10 times in a row every few hours while awake for a few weeks after discharging home from the hospital    C. Activity:  It may take a few months to regain your normal  "energy level/stamina. It is important during your recovery to get regular physical activity:  *walk each day at a comfortable pace  *climb stairs as tolerated  *take some rest periods each day but try not to take too many long naps, as this can affect your sleep at night    D. Returning to Work:  Time away from work will depend on your situation. In general, you will need between 1-6 weeks to recover from surgery. Specific dates for returning to work can be discussed at your post-op appointments.       Revised May 2018\      Pending Results     No orders found from 10/14/2018 to 10/17/2018.            Statement of Approval     Ordered          10/26/18 1054  I have reviewed and agree with all the recommendations and orders detailed in this document.  EFFECTIVE NOW     Approved and electronically signed by:  Destiny Kendall PA-C             Admission Information     Date & Time Provider Department Dept. Phone    10/16/2018 Alvin Riojas MD [place] [address] [facility] [phone]      Your Vitals Were     Blood Pressure Pulse Temperature Respirations Height Pulse Oximetry    136/63 (BP Location: Right arm) 61 97.6  F (36.4  C) (Oral) 16 1.575 m (5' 2\") 100%    BMI (Body Mass Index)                   17.01 kg/m2           MyChart Information     [employer] gives you secure access to your electronic health record. If you see a primary care provider, you can also send messages to your care team and make appointments. If you have questions, please call your primary care clinic.  If you do not have a primary care provider, please call [phone] and they will assist you.        Care EveryWhere ID     This is your Care EveryWhere ID. This could be used by other organizations to access your [facility] medical records  [id number]        Equal Access to Services     TANO WILKINSON AH: Brown Boss, hong goldsmith, clement locktet. " So [facility] [phone].    ATENCIÓN: Si malissa cruz, tiene a thomas disposición servicios gratuitos de asistencia lingüística. Grisel al [phone].    We comply with applicable federal civil rights laws and [place] laws. We do not discriminate on the basis of race, color, national origin, age, disability, sex, sexual orientation, or gender identity.               Review of your medicines      START taking        Dose / Directions    cyclobenzaprine 5 MG tablet   Commonly known as:  FLEXERIL   Used for:  Muscle spasm        Dose:  5 mg   Take 1 tablet (5 mg) by mouth 3 times daily as needed for muscle spasms   Quantity:  21 tablet   Refills:  0       HYDROmorphone 2 MG tablet   Commonly known as:  DILAUDID   Used for:  Acute post-operative pain        Dose:  2-4 mg   Take 1-2 tablets (2-4 mg) by mouth every 3 hours as needed for moderate to severe pain   Quantity:  80 tablet   Refills:  0       ibuprofen 600 MG tablet   Commonly known as:  ADVIL/MOTRIN   Used for:  Acute post-operative pain        Dose:  600 mg   Take 1 tablet (600 mg) by mouth every 6 hours   Quantity:  120 tablet   Refills:  0         CONTINUE these medicines which may have CHANGED, or have new prescriptions. If we are uncertain of the size of tablets/capsules you have at home, strength may be listed as something that might have changed.        Dose / Directions    DULoxetine 60 MG EC capsule   Commonly known as:  CYMBALTA   This may have changed:  See the new instructions.   Used for:  Neuralgia, post-herpetic        TAKE 1 CAPSULE BY MOUTH EVERY DAY   Quantity:  90 capsule   Refills:  0       TYLENOL 500 MG tablet   This may have changed:    - medication strength  - how much to take  - when to take this  - reasons to take this   Generic drug:  acetaminophen        Dose:  1000 mg   Take 2 tablets (1,000 mg) by mouth 3 times daily   Refills:  0         CONTINUE these medicines which have NOT CHANGED        Dose / Directions    amitriptyline 50 MG  tablet   Commonly known as:  ELAVIL   Used for:  Neuralgia, post-herpetic        Dose:  50 mg   Take 1 tablet (50 mg) by mouth At Bedtime   Quantity:  90 tablet   Refills:  3       fluticasone 50 MCG/ACT spray   Commonly known as:  FLONASE   Used for:  Chronic rhinitis, unspecified type        Dose:  2 spray   Spray 2 sprays into both nostrils daily   Quantity:  1 Bottle   Refills:  11       hydrochlorothiazide 12.5 MG Tabs tablet   Used for:  Benign essential hypertension        TAKE 1 TABLET (12.5 MG) BY MOUTH DAILY   Quantity:  90 tablet   Refills:  1       metoprolol tartrate 25 MG tablet   Commonly known as:  LOPRESSOR   Used for:  Benign essential hypertension        TAKE 1 TABLET (25 MG) BY MOUTH 2 TIMES DAILY   Quantity:  180 tablet   Refills:  1       omeprazole 40 MG capsule   Commonly known as:  priLOSEC   Used for:  Benign essential hypertension        Dose:  40 mg   Take 1 capsule (40 mg) by mouth daily   Quantity:  90 capsule   Refills:  3            Where to get your medicines      These medications were sent to [address] [address]     Phone:  [phone]     DULoxetine 60 MG EC capsule    hydrochlorothiazide 12.5 MG Tabs tablet    metoprolol tartrate 25 MG tablet         These medications were sent to [facility] [address]  [address] [address]     Phone:  [phone]     cyclobenzaprine 5 MG tablet         Some of these will need a paper prescription and others can be bought over the counter. Ask your nurse if you have questions.     Bring a paper prescription for each of these medications     HYDROmorphone 2 MG tablet       You don't need a prescription for these medications     ibuprofen 600 MG tablet                Protect others around you: Learn how to safely use, store and throw away your medicines at www.disposemymeds.org.        Information about OPIOIDS      PRESCRIPTION OPIOIDS: WHAT YOU NEED TO KNOW   We gave you an opioid (narcotic) pain medicine. It is important to manage your pain, but opioids are not always the best choice. You should first try all the other options your care team gave you. Take this medicine for as short a time (and as few doses) as possible.    Some activities can increase your pain, such as bandage changes or therapy sessions. It may help to take your pain medicine 30 to 60 minutes before these activities. Reduce your stress by getting enough sleep, working on hobbies you enjoy and practicing relaxation or meditation. Talk to your care team about ways to manage your pain beyond prescription opioids.    These medicines have risks:    DO NOT drive when on new or higher doses of pain medicine. These medicines can affect your alertness and reaction times, and you could be arrested for driving under the influence (DUI). If you need to use opioids long-term, talk to your care team about driving.    DO NOT operate heavy machinery    DO NOT do any other dangerous activities while taking these medicines.    DO NOT drink any alcohol while taking these medicines.     If the opioid prescribed includes acetaminophen, DO NOT take with any other medicines that contain acetaminophen. Read all labels carefully. Look for the word  acetaminophen  or  Tylenol.  Ask your pharmacist if you have questions or are unsure.    You can get addicted to pain medicines, especially if you have a history of addiction (chemical, alcohol or substance dependence). Talk to your care team about ways to reduce this risk.    All opioids tend to cause constipation. Drink plenty of water and eat foods that have a lot of fiber, such as fruits, vegetables, prune juice, apple juice and high-fiber cereal. Take a laxative (Miralax, milk of magnesia, Colace, Senna) if you don t move your bowels at least every other day. Other side effects include upset stomach, sleepiness, dizziness,  throwing up, tolerance (needing more of the medicine to have the same effect), physical dependence and slowed breathing.    Store your pills in a secure place, locked if possible. We will not replace any lost or stolen medicine. If you don t finish your medicine, please throw away (dispose) as directed by your pharmacist. The [place] Pollution Control Agency has more information about safe disposal: https://www.pca.[facility].mn.us/living-green/managing-unwanted-medications             Medication List: This is a list of all your medications and when to take them. Check marks below indicate your daily home schedule. Keep this list as a reference.      Medications           Morning Afternoon Evening Bedtime As Needed    amitriptyline 50 MG tablet   Commonly known as:  ELAVIL   Take 1 tablet (50 mg) by mouth At Bedtime                                   cyclobenzaprine 5 MG tablet   Commonly known as:  FLEXERIL   Take 1 tablet (5 mg) by mouth 3 times daily as needed for muscle spasms   Last time this was given:  5 mg on 10/26/2018  6:32 AM                                   DULoxetine 60 MG EC capsule   Commonly known as:  CYMBALTA   TAKE 1 CAPSULE BY MOUTH EVERY DAY   Last time this was given:  60 mg on 10/26/2018  9:25 AM                                   fluticasone 50 MCG/ACT spray   Commonly known as:  FLONASE   Spray 2 sprays into both nostrils daily   Last time this was given:  2 sprays on 10/26/2018  9:26 AM                                   hydrochlorothiazide 12.5 MG Tabs tablet   TAKE 1 TABLET (12.5 MG) BY MOUTH DAILY                                   HYDROmorphone 2 MG tablet   Commonly known as:  DILAUDID   Take 1-2 tablets (2-4 mg) by mouth every 3 hours as needed for moderate to severe pain   Last time this was given:  4 mg on 10/26/2018  9:26 AM                                   ibuprofen 600 MG tablet   Commonly known as:  ADVIL/MOTRIN   Take 1 tablet (600 mg) by mouth every 6 hours   Last time this was  given:  600 mg on 10/26/2018  9:25 AM                                            metoprolol tartrate 25 MG tablet   Commonly known as:  LOPRESSOR   TAKE 1 TABLET (25 MG) BY MOUTH 2 TIMES DAILY   Last time this was given:  25 mg on 10/26/2018  9:25 AM                                      omeprazole 40 MG capsule   Commonly known as:  priLOSEC   Take 1 capsule (40 mg) by mouth daily   Last time this was given:  40 mg on 10/26/2018  9:25 AM                                   TYLENOL 500 MG tablet   Take 2 tablets (1,000 mg) by mouth 3 times daily   Last time this was given:  650 mg on 10/26/2018  9:25 AM   Generic drug:  acetaminophen

## 2018-10-16 NOTE — BRIEF OP NOTE
[facility] Brief Operative Note    Pre-operative diagnosis: RIGHT LOWER LOBE LUNG NODULE    Post-operative diagnosis right lower lobe lung nodule     Procedure: Redo right thoracotomy, extensive pneumolysis, right lower loobectomy   Surgeon(s): Surgeon(s) and Role:     * Alvin Riojas MD - Primary     * Destiny Kendall PA-C - Assisting   Estimated blood loss: 50 mL    Specimens:   ID Type Source Tests Collected by Time Destination   A : right lower lobe Tissue Lung, Right Lower Lobe SURGICAL PATHOLOGY EXAM Alvin Riojas MD 10/16/2018  9:36 AM       Findings: Lung intimately attached to chest wall, gastric tube and diaphragm requiring extensive pneumolysis, no pleural fluid nor pleural nodules  CTs: one 24 straight to apex, one 24 angled to  ase

## 2018-10-16 NOTE — TELEPHONE ENCOUNTER
"DULoxetine (CYMBALTA) 60 MG EC capsule  Last Written Prescription Date:  7/19/2018  Last Fill Quantity: 90,  # refills: 0   Last office visit: 7/19/2018 with prescribing provider:  Christen   Future Office Visit:  None     Requested Prescriptions   Pending Prescriptions Disp Refills     DULoxetine (CYMBALTA) 60 MG EC capsule [Pharmacy Med Name: DULOXETINE HCL DR 60 MG CAP] 90 capsule 0     Sig: TAKE 1 CAPSULE BY MOUTH EVERY DAY    Serotonin-Norepinephrine Reuptake Inhibitors  Failed    10/15/2018  1:46 AM       Failed - Blood pressure under 140/90 in past 12 months    BP Readings from Last 3 Encounters:   10/16/18 94/44   10/11/18 140/61   10/04/18 140/89                Passed - Recent (12 mo) or future (30 days) visit within the authorizing provider's specialty    Patient had office visit in the last 12 months or has a visit in the next 30 days with authorizing provider or within the authorizing provider's specialty.  See \"Patient Info\" tab in inbasket, or \"Choose Columns\" in Meds & Orders section of the refill encounter.           Passed - Patient is age 18 or older       Passed - No active pregnancy on record       Passed - No positive pregnancy test in past 12 months            "

## 2018-10-16 NOTE — ANESTHESIA PREPROCEDURE EVALUATION
Anesthesia Evaluation     .             ROS/MED HX    ENT/Pulmonary:     (+), . Other pulmonary disease Right lung mass;.   (-) sleep apnea   Neurologic:     (+)other neuro Post-herpetic neuralgia;    Cardiovascular:     (+) Dyslipidemia, hypertension--CAD, --. : . . . :. .       METS/Exercise Tolerance:     Hematologic:         Musculoskeletal:         GI/Hepatic:     (+) GERD Other GI/Hepatic Pancreatic pseudocyst;      Renal/Genitourinary:         Endo:         Psychiatric:     (+) psychiatric history anxiety and depression      Infectious Disease:         Malignancy:   (+) Malignancy History of GI  GI CA status post Surgery,         Other: Comment: Hx EtOH abuse;                    Physical Exam  Normal systems: cardiovascular, pulmonary and dental    Airway   Mallampati: II  TM distance: >3 FB  Neck ROM: full    Dental     Cardiovascular   Rhythm and rate: regular      Pulmonary    breath sounds clear to auscultation                    Anesthesia Plan      History & Physical Review  History and physical reviewed and following examination; no interval change.    ASA Status:  3 .    NPO Status:  > 8 hours    Plan for General and ETT with Intravenous induction. Maintenance will be Balanced.    PONV prophylaxis:  Ondansetron (or other 5HT-3) and Dexamethasone or Solumedrol  Additional equipment: Double Lumen ETT      Postoperative Care  Postoperative pain management:  IV analgesics.      Consents  Anesthetic plan, risks, benefits and alternatives discussed with:  Patient..                          .

## 2018-10-17 ENCOUNTER — APPOINTMENT (OUTPATIENT)
Dept: GENERAL RADIOLOGY | Facility: CLINIC | Age: 63
End: 2018-10-17
Attending: PHYSICIAN ASSISTANT
Payer: COMMERCIAL

## 2018-10-17 LAB
ANION GAP SERPL CALCULATED.3IONS-SCNC: 8 MMOL/L (ref 3–14)
BUN SERPL-MCNC: 19 MG/DL (ref 7–30)
CALCIUM SERPL-MCNC: 8 MG/DL (ref 8.5–10.1)
CHLORIDE SERPL-SCNC: 101 MMOL/L (ref 94–109)
CO2 SERPL-SCNC: 25 MMOL/L (ref 20–32)
CREAT SERPL-MCNC: 0.61 MG/DL (ref 0.52–1.04)
GFR SERPL CREATININE-BSD FRML MDRD: >90 ML/MIN/1.7M2
GLUCOSE SERPL-MCNC: 96 MG/DL (ref 70–99)
HGB BLD-MCNC: 10.4 G/DL (ref 11.7–15.7)
NT-PROBNP SERPL-MCNC: 616 PG/ML (ref 0–900)
POTASSIUM SERPL-SCNC: 3.8 MMOL/L (ref 3.4–5.3)
SODIUM SERPL-SCNC: 134 MMOL/L (ref 133–144)

## 2018-10-17 PROCEDURE — 36415 COLL VENOUS BLD VENIPUNCTURE: CPT | Performed by: NURSE PRACTITIONER

## 2018-10-17 PROCEDURE — 25000132 ZZH RX MED GY IP 250 OP 250 PS 637: Performed by: PHYSICIAN ASSISTANT

## 2018-10-17 PROCEDURE — 83880 ASSAY OF NATRIURETIC PEPTIDE: CPT | Performed by: NURSE PRACTITIONER

## 2018-10-17 PROCEDURE — 80048 BASIC METABOLIC PNL TOTAL CA: CPT | Performed by: NURSE PRACTITIONER

## 2018-10-17 PROCEDURE — 25000132 ZZH RX MED GY IP 250 OP 250 PS 637: Performed by: THORACIC SURGERY (CARDIOTHORACIC VASCULAR SURGERY)

## 2018-10-17 PROCEDURE — 71045 X-RAY EXAM CHEST 1 VIEW: CPT

## 2018-10-17 PROCEDURE — 83880 ASSAY OF NATRIURETIC PEPTIDE: CPT | Performed by: HOSPITALIST

## 2018-10-17 PROCEDURE — 25000132 ZZH RX MED GY IP 250 OP 250 PS 637: Performed by: NURSE PRACTITIONER

## 2018-10-17 PROCEDURE — 85018 HEMOGLOBIN: CPT | Performed by: NURSE PRACTITIONER

## 2018-10-17 PROCEDURE — 99232 SBSQ HOSP IP/OBS MODERATE 35: CPT | Performed by: HOSPITALIST

## 2018-10-17 PROCEDURE — 25000128 H RX IP 250 OP 636: Performed by: PHYSICIAN ASSISTANT

## 2018-10-17 PROCEDURE — 12000007 ZZH R&B INTERMEDIATE

## 2018-10-17 RX ORDER — DULOXETIN HYDROCHLORIDE 30 MG/1
60 CAPSULE, DELAYED RELEASE ORAL DAILY
Status: DISCONTINUED | OUTPATIENT
Start: 2018-10-17 | End: 2018-10-17

## 2018-10-17 RX ORDER — IBUPROFEN 600 MG/1
600 TABLET, FILM COATED ORAL EVERY 6 HOURS SCHEDULED
Status: DISCONTINUED | OUTPATIENT
Start: 2018-10-17 | End: 2018-10-26 | Stop reason: HOSPADM

## 2018-10-17 RX ORDER — HYDROMORPHONE HYDROCHLORIDE 2 MG/1
2-4 TABLET ORAL
Status: DISCONTINUED | OUTPATIENT
Start: 2018-10-17 | End: 2018-10-26 | Stop reason: HOSPADM

## 2018-10-17 RX ADMIN — Medication 2 MG: at 00:17

## 2018-10-17 RX ADMIN — DULOXETINE HYDROCHLORIDE 60 MG: 30 CAPSULE, DELAYED RELEASE ORAL at 08:14

## 2018-10-17 RX ADMIN — Medication 2 MG: at 20:19

## 2018-10-17 RX ADMIN — Medication 2 MG: at 13:06

## 2018-10-17 RX ADMIN — METOPROLOL TARTRATE 25 MG: 25 TABLET ORAL at 08:15

## 2018-10-17 RX ADMIN — IBUPROFEN 600 MG: 600 TABLET, FILM COATED ORAL at 23:12

## 2018-10-17 RX ADMIN — ENOXAPARIN SODIUM 40 MG: 40 INJECTION SUBCUTANEOUS at 08:26

## 2018-10-17 RX ADMIN — Medication 2 MG: at 16:30

## 2018-10-17 RX ADMIN — SODIUM CHLORIDE: 9 INJECTION, SOLUTION INTRAVENOUS at 00:18

## 2018-10-17 RX ADMIN — Medication 2 MG: at 09:56

## 2018-10-17 RX ADMIN — ACETAMINOPHEN 975 MG: 325 TABLET, FILM COATED ORAL at 04:39

## 2018-10-17 RX ADMIN — HYDROMORPHONE HYDROCHLORIDE 4 MG: 2 TABLET ORAL at 23:12

## 2018-10-17 RX ADMIN — OMEPRAZOLE 40 MG: 20 CAPSULE, DELAYED RELEASE ORAL at 08:15

## 2018-10-17 RX ADMIN — METOPROLOL TARTRATE 25 MG: 25 TABLET ORAL at 20:47

## 2018-10-17 RX ADMIN — Medication 2 SPRAY: at 08:23

## 2018-10-17 RX ADMIN — SENNOSIDES AND DOCUSATE SODIUM 2 TABLET: 8.6; 5 TABLET ORAL at 08:13

## 2018-10-17 RX ADMIN — IBUPROFEN 600 MG: 600 TABLET, FILM COATED ORAL at 18:20

## 2018-10-17 RX ADMIN — ACETAMINOPHEN 975 MG: 325 TABLET, FILM COATED ORAL at 12:38

## 2018-10-17 RX ADMIN — Medication 2 MG: at 06:46

## 2018-10-17 RX ADMIN — ACETAMINOPHEN 975 MG: 325 TABLET, FILM COATED ORAL at 20:12

## 2018-10-17 RX ADMIN — SENNOSIDES AND DOCUSATE SODIUM 2 TABLET: 8.6; 5 TABLET ORAL at 20:48

## 2018-10-17 RX ADMIN — KETOROLAC TROMETHAMINE 15 MG: 15 INJECTION, SOLUTION INTRAMUSCULAR; INTRAVENOUS at 09:07

## 2018-10-17 RX ADMIN — Medication 2 MG: at 03:54

## 2018-10-17 ASSESSMENT — ACTIVITIES OF DAILY LIVING (ADL)
ADLS_ACUITY_SCORE: 11

## 2018-10-17 NOTE — PLAN OF CARE
Problem: Patient Care Overview  Goal: Plan of Care/Patient Progress Review  Outcome: No Change  Vital signs stable on RA, BPs improving with fluids. Pain managed with dilaudid and tylenol, holding toradol due to voiding issues. LS diminished. CTs intact with sliding scale output, air leaks present (MD Aware). On-Q pump intact. Incisions intact. IS to 500-750. Ambulated halls x2, moves with SBA. BS hypoactive, denies passing flatus. Due to void, was able to void 15cc x2. Pt feels like she has to void but not uncomfortable. Bladder scanned for 250cc. Continued IV Fluids due to BPs and unable to void.

## 2018-10-17 NOTE — PROGRESS NOTES
THORACIC SURGERY POD # 1    Doing well  AVSS on RA  Air leak +  No bleeding  CXR r lung expanded    Discussed findings and procedure    CT to suction for now  Ambulate on water seal  resp care ++    Path pending    KATIE SHELTON MD [facility] ONCOLOGY THORACIC SURGERY  CELL:  [phone]  OFFICE: [phone]

## 2018-10-17 NOTE — PLAN OF CARE
Problem: Patient Care Overview  Goal: Plan of Care/Patient Progress Review  Outcome: Improving  A&O.Hypotensive on arrival from PACU 90s/40s- 1/2 L NS bolus given with improvement 115/55, asymptomatic, other VSS. Plan to hold PM metoprolol and hydrochlorothiazide (both of which pt took this AM) Pain initially difficult to get under control, now resting comfortably with tylenol, toradol, hot/cold packs and PO dilaudid 2mg. CT 1 to suction with continuous AL, CT 2 intermittent AL, MD aware-ok for pt to ambulate on water seal. CT dressings CDI.  OnQue patent, sensors taped, unclamped. Thoracotomy dressing with small amount serosanguinous shadowing. Tolerated up in chair, amb with assist 1. LS dim, IS . Plan for IMC until 0600 tomorrow AM.

## 2018-10-17 NOTE — PROGRESS NOTES
[facility]  Hospitalist Progress Note   10/17/2018          Assessment and Plan:       Ms. Dixon is a 62-year-old woman with medical history esophageal cancer status post esophagectomy in 03/2016 with a G-tube placement that was ultimately removed, history of alcohol abuse, sober for the last 16 years, pancreatic pseudocyst, protein calorie malnutrition, hypertension, postherpetic neuralgia following her esophagectomy, abnormal Pap smear, carotid disease, depression, anxiety and GERD admitted on 10/16 after  redo right thoracotomy, extensive pneumolysis and a right lower lobectomy for a right lower lobe lesion found on surveillance PET scan for history of esophageal cancer.      Status post right redo thoracotomy, extensive pneumolysis and right lower lobe lobectomy for right lower lobe lesion found on a surveillance PET scan with history of esophageal cancer.   On10/16/2018 with Dr. Alvin Riojas she underwent a right lower lobe lobectomy via redo thoracotomy with extensive pneumolysis.  Duration of surgery was 3 hours, under general anesthesia.  Perioperatively successfully extubated.  Defer to the primary surgical service analgesia, DVT prophylaxis, chest tube management, antimicrobials, mobility and pulm hygiene maneuvers.   Also deferred to that service any further post-surgical management of the lesion, particularly if there is any malignancy  Wean supplemental nasal oxygen as able to.  Aggressive incentive spirometry.    Acute anemia postoperatively-likely dilutional/surgical blood loss.  Hemoglobin dropped from 13.1-10.4.  Baseline hemoglobin around 13.  Recheck hemoglobin in a.m., iron studies.     Hypertension  Hold PTA hydrochlorothiazide given borderline blood pressures.  Continue PTA metoprolol with hold parameters.  Discontinue IV fluids.    Hyperlipidemia.  PTA not on medications.      Pancreatic pseudocyst, remote/resolved.   Esophageal cancer, status post esophagectomy  GERD.    Oesophageal cancer diagnosed in 2015, status post esophagectomy in 03/2016 with a G-tube placement that was ultimately removed in 07/2017  Continue PTA omeprazole 40 mg orally daily.    Protein calorie malnutrition with BMI 17.29.-From malignancy/poor oral intake.  Dietary supplements with Ensure as tolerated.      History of alcohol abuse, now sober for the last 16 years.   Anxiety, depression.   Postherpetic neuralgia.   Patient encouraged to continue her sobriety  Continue PTA Cymbalta.       Pre diabetes.  Hemoglobin A1c 5.8.    Monitor blood sugars periodically.  Dietary modification as able to.    Active Diet Order      Advance Diet as Tolerated: Regular Diet Adult    DVT Prophylaxis:  Sequential compression device.  Pharmacological prophylaxis as per surgical team.  Code Status: Full Code  Disposition: Expected discharge pending clinical improvement/per primary team.    Patient,  interdisciplinary team involved in care and agrees with plan.  Total time - Greater than 25 min. More than 50% of time spent in direct patient care, care coordination and formalizing plan of care.     Cornelio Hurd MD        Interval History:      Vision lying in bed.  Denies any chest pain.  Complains of pain on deep breaths at the chest tube placement site.  Afebrile in the last 24 hours.  No nausea vomiting.  Tolerating oral diet.         Physical Exam:        Physical Exam   Temp:  [98.2  F (36.8  C)-98.5  F (36.9  C)] 98.2  F (36.8  C)  Pulse:  [71-89] 71  Heart Rate:  [73-80] 80  Resp:  [11-21] 20  BP: ()/(42-57) 108/48  SpO2:  [92 %-99 %] 97 %    Intake/Output Summary (Last 24 hours) at 10/17/18 1442  Last data filed at 10/17/18 1407   Gross per 24 hour   Intake             2136 ml   Output             1040 ml   Net             1096 ml       PHYSICAL EXAM  GENERAL: Patient is in no distress. Alert and oriented.  HEART: Regular rate and rhythm. S1S2.  Systolic murmur right sternal area.  LUNGS: Bilateral decreased  breath sounds.  No wheezing no crackles.   Artifact noise from 2 chest tubes on the right.  No subcutaneous emphysema.  ABDOMEN: Soft, no abdominal tenderness, bowel sounds heard   NEURO: Moving all extremities.  EXTREMITIES: No pedal edema. 2+ peripheral pulses.  SKIN: Warm, dry.          Medications:          acetaminophen  975 mg Oral Q8H     bacitracin   Topical TID     DULoxetine  60 mg Oral Daily     enoxaparin  40 mg Subcutaneous Q24H     fluticasone  2 spray Both Nostrils Daily     ibuprofen  600 mg Oral Q6H BHAVESH     metoprolol tartrate  25 mg Oral BID     omeprazole  40 mg Oral Daily     senna-docusate  1 tablet Oral BID    Or     senna-docusate  2 tablet Oral BID     [START ON 10/19/2018] acetaminophen, bisacodyl, calcium carbonate, diphenhydrAMINE **OR** diphenhydrAMINE, HYDROmorphone, HYDROmorphone, - MEDICATION INSTRUCTIONS -, melatonin, naloxone, ondansetron **OR** ondansetron, polyethylene glycol, prochlorperazine **OR** prochlorperazine, senna-docusate **OR** senna-docusate         Data:      All new lab and imaging data was reviewed.

## 2018-10-18 ENCOUNTER — APPOINTMENT (OUTPATIENT)
Dept: GENERAL RADIOLOGY | Facility: CLINIC | Age: 63
End: 2018-10-18
Attending: PHYSICIAN ASSISTANT
Payer: COMMERCIAL

## 2018-10-18 LAB
ALBUMIN SERPL-MCNC: 2.9 G/DL (ref 3.4–5)
ALP SERPL-CCNC: 47 U/L (ref 40–150)
ALT SERPL W P-5'-P-CCNC: 20 U/L (ref 0–50)
ANION GAP SERPL CALCULATED.3IONS-SCNC: 8 MMOL/L (ref 3–14)
AST SERPL W P-5'-P-CCNC: 27 U/L (ref 0–45)
BILIRUB SERPL-MCNC: 0.3 MG/DL (ref 0.2–1.3)
BUN SERPL-MCNC: 13 MG/DL (ref 7–30)
CALCIUM SERPL-MCNC: 7.9 MG/DL (ref 8.5–10.1)
CHLORIDE SERPL-SCNC: 96 MMOL/L (ref 94–109)
CK SERPL-CCNC: 266 U/L (ref 30–225)
CO2 SERPL-SCNC: 24 MMOL/L (ref 20–32)
CREAT SERPL-MCNC: 0.49 MG/DL (ref 0.52–1.04)
ERYTHROCYTE [DISTWIDTH] IN BLOOD BY AUTOMATED COUNT: 12.2 % (ref 10–15)
GFR SERPL CREATININE-BSD FRML MDRD: >90 ML/MIN/1.7M2
GLUCOSE SERPL-MCNC: 192 MG/DL (ref 70–99)
HCT VFR BLD AUTO: 30.9 % (ref 35–47)
HGB BLD-MCNC: 10.5 G/DL (ref 11.7–15.7)
IRON SATN MFR SERPL: 16 % (ref 15–46)
IRON SERPL-MCNC: 39 UG/DL (ref 35–180)
MCH RBC QN AUTO: 32.4 PG (ref 26.5–33)
MCHC RBC AUTO-ENTMCNC: 34 G/DL (ref 31.5–36.5)
MCV RBC AUTO: 95 FL (ref 78–100)
PLATELET # BLD AUTO: 222 10E9/L (ref 150–450)
POTASSIUM SERPL-SCNC: 3.9 MMOL/L (ref 3.4–5.3)
PROT SERPL-MCNC: 5.8 G/DL (ref 6.8–8.8)
RBC # BLD AUTO: 3.24 10E12/L (ref 3.8–5.2)
SODIUM SERPL-SCNC: 128 MMOL/L (ref 133–144)
TIBC SERPL-MCNC: 243 UG/DL (ref 240–430)
WBC # BLD AUTO: 7.5 10E9/L (ref 4–11)

## 2018-10-18 PROCEDURE — 85027 COMPLETE CBC AUTOMATED: CPT | Performed by: HOSPITALIST

## 2018-10-18 PROCEDURE — 25000128 H RX IP 250 OP 636: Performed by: PHYSICIAN ASSISTANT

## 2018-10-18 PROCEDURE — 25000132 ZZH RX MED GY IP 250 OP 250 PS 637: Performed by: NURSE PRACTITIONER

## 2018-10-18 PROCEDURE — 25000132 ZZH RX MED GY IP 250 OP 250 PS 637: Performed by: THORACIC SURGERY (CARDIOTHORACIC VASCULAR SURGERY)

## 2018-10-18 PROCEDURE — 83550 IRON BINDING TEST: CPT | Performed by: HOSPITALIST

## 2018-10-18 PROCEDURE — 99232 SBSQ HOSP IP/OBS MODERATE 35: CPT | Performed by: HOSPITALIST

## 2018-10-18 PROCEDURE — 12000007 ZZH R&B INTERMEDIATE

## 2018-10-18 PROCEDURE — 83540 ASSAY OF IRON: CPT | Performed by: HOSPITALIST

## 2018-10-18 PROCEDURE — 25000132 ZZH RX MED GY IP 250 OP 250 PS 637: Performed by: PHYSICIAN ASSISTANT

## 2018-10-18 PROCEDURE — 71045 X-RAY EXAM CHEST 1 VIEW: CPT

## 2018-10-18 PROCEDURE — 25000125 ZZHC RX 250: Performed by: PHYSICIAN ASSISTANT

## 2018-10-18 PROCEDURE — 36415 COLL VENOUS BLD VENIPUNCTURE: CPT | Performed by: HOSPITALIST

## 2018-10-18 PROCEDURE — 80053 COMPREHEN METABOLIC PANEL: CPT | Performed by: HOSPITALIST

## 2018-10-18 PROCEDURE — 82550 ASSAY OF CK (CPK): CPT | Performed by: HOSPITALIST

## 2018-10-18 RX ADMIN — SENNOSIDES AND DOCUSATE SODIUM 2 TABLET: 8.6; 5 TABLET ORAL at 08:31

## 2018-10-18 RX ADMIN — ACETAMINOPHEN 975 MG: 325 TABLET, FILM COATED ORAL at 12:00

## 2018-10-18 RX ADMIN — IBUPROFEN 600 MG: 600 TABLET, FILM COATED ORAL at 13:40

## 2018-10-18 RX ADMIN — ACETAMINOPHEN 975 MG: 325 TABLET, FILM COATED ORAL at 21:48

## 2018-10-18 RX ADMIN — HYDROMORPHONE HYDROCHLORIDE 4 MG: 2 TABLET ORAL at 16:02

## 2018-10-18 RX ADMIN — HYDROMORPHONE HYDROCHLORIDE 4 MG: 2 TABLET ORAL at 08:53

## 2018-10-18 RX ADMIN — HYDROMORPHONE HYDROCHLORIDE 4 MG: 2 TABLET ORAL at 21:47

## 2018-10-18 RX ADMIN — IBUPROFEN 600 MG: 600 TABLET, FILM COATED ORAL at 07:38

## 2018-10-18 RX ADMIN — HYDROMORPHONE HYDROCHLORIDE 4 MG: 2 TABLET ORAL at 04:57

## 2018-10-18 RX ADMIN — METOPROLOL TARTRATE 25 MG: 25 TABLET ORAL at 08:32

## 2018-10-18 RX ADMIN — HYDROMORPHONE HYDROCHLORIDE 4 MG: 2 TABLET ORAL at 12:00

## 2018-10-18 RX ADMIN — ACETAMINOPHEN 975 MG: 325 TABLET, FILM COATED ORAL at 04:57

## 2018-10-18 RX ADMIN — HYDROMORPHONE HYDROCHLORIDE 4 MG: 2 TABLET ORAL at 01:51

## 2018-10-18 RX ADMIN — BACITRACIN: 500 OINTMENT TOPICAL at 08:35

## 2018-10-18 RX ADMIN — Medication 2 SPRAY: at 08:46

## 2018-10-18 RX ADMIN — OMEPRAZOLE 40 MG: 20 CAPSULE, DELAYED RELEASE ORAL at 08:30

## 2018-10-18 RX ADMIN — DULOXETINE HYDROCHLORIDE 60 MG: 30 CAPSULE, DELAYED RELEASE ORAL at 08:30

## 2018-10-18 RX ADMIN — ENOXAPARIN SODIUM 40 MG: 40 INJECTION SUBCUTANEOUS at 08:29

## 2018-10-18 RX ADMIN — IBUPROFEN 600 MG: 600 TABLET, FILM COATED ORAL at 21:47

## 2018-10-18 RX ADMIN — SENNOSIDES AND DOCUSATE SODIUM 2 TABLET: 8.6; 5 TABLET ORAL at 21:48

## 2018-10-18 RX ADMIN — HYDROMORPHONE HYDROCHLORIDE 4 MG: 2 TABLET ORAL at 18:49

## 2018-10-18 ASSESSMENT — ACTIVITIES OF DAILY LIVING (ADL)
ADLS_ACUITY_SCORE: 11

## 2018-10-18 NOTE — PROGRESS NOTES
[facility]  Hospitalist Progress Note   10/18/2018          Assessment and Plan:       Ms. Dixon is a 62-year-old woman with medical history esophageal cancer status post esophagectomy in 03/2016 with a G-tube placement that was ultimately removed, history of alcohol abuse, sober for the last 16 years, pancreatic pseudocyst, protein calorie malnutrition, hypertension, postherpetic neuralgia following her esophagectomy, abnormal Pap smear, carotid disease, depression, anxiety and GERD admitted on 10/16 for redo right thoracotomy, extensive pneumolysis and a right lower lobectomy for a right lower lobe lesion found on surveillance PET scan for history of esophageal cancer.      Status post right redo thoracotomy, extensive pneumolysis and right lower lobe lobectomy for right lower lobe lesion found on a surveillance PET scan with history of esophageal cancer.   On10/16/2018 with Dr. Alvin Riojas she underwent a right lower lobe lobectomy via redo thoracotomy with extensive pneumolysis.  Perioperatively successfully extubated.  CXR 10/18 -Increase in subcutaneous emphysema over the right lateral chest wall and in the right supraclavicular fossa. Mild mediastinal emphysema. The two chest tubes on the right are unchanged.  Defer to the primary surgical service analgesia, DVT prophylaxis, chest tube management, antimicrobials, mobility and pulm hygiene maneuvers.   Also deferred to that service any further post-surgical management of the lesion, particularly if there is any malignancy  Has been weaned off nasal oxygen.  Aggressive incentive spirometry.  Ambulate as tolerated    Hyponatremia likely dilutional/SIADH.  Sodium dropped from 134 >128.  ProBNP 616.  Albumin 2.9  Strict input-output monitoring.  Would defer diuretic therapy to surgical team.  Monitor sodium levels a.m.    Acute anemia postoperatively-likely dilutional/surgical blood loss.  Hemoglobin dropped from 13.1 > 10.5.  Baseline  hemoglobin around 13.  Serum iron 39, iron saturation index 16.  .  ProBNP 616.  Monitor hemoglobin levels periodically.      Hypertension  Hold PTA hydrochlorothiazide given borderline blood pressures.  Continue PTA metoprolol with hold parameters.  Discontinue IV fluids.    Hyperlipidemia.  PTA not on medications.      Pancreatic pseudocyst, remote/resolved.   Esophageal cancer, status post esophagectomy  GERD.   Oesophageal cancer diagnosed in 2015, status post esophagectomy in 03/2016 with a G-tube placement that was ultimately removed in 07/2017  Continue PTA omeprazole 40 mg orally daily.    Protein calorie malnutrition with BMI 17.29.-From malignancy/poor oral intake.  Dietary supplements with Ensure as tolerated.      History of alcohol abuse, now sober for the last 16 years.   Anxiety, depression.   Postherpetic neuralgia.   Patient encouraged to continue her sobriety  Continue PTA Cymbalta.       Pre diabetes.  Hemoglobin A1c 5.8.    Monitor blood sugars periodically.  Dietary modification as able to.    Active Diet Order      Advance Diet as Tolerated: Regular Diet Adult    DVT Prophylaxis:  Sequential compression device.  Pharmacological prophylaxis as per surgical team.  Code Status: Full Code  Disposition: Expected discharge pending clinical improvement/per primary team.    Patient,  interdisciplinary team involved in care and agrees with plan.  Total time -  25 min. More than 50% of time spent in direct patient care, care coordination and formalizing plan of care.     Cornelio Hurd MD        Interval History:      Patient lying in bed.  Denies any chest pain.  Complains of minimal pain on deep breaths at the chest tube placement site.  Afebrile in the last 24 hours.  Compliant with incentive spirometery, ambulation  No nausea vomiting.  Tolerating oral diet.         Physical Exam:        Physical Exam   Temp:  [97.4  F (36.3  C)-98.5  F (36.9  C)] 97.4  F (36.3  C)  Pulse:  [71-89]  75  Heart Rate:  [74-89] 74  Resp:  [16-20] 16  BP: (107-125)/(48-59) 125/59  SpO2:  [94 %-99 %] 99 %    Intake/Output Summary (Last 24 hours) at 10/17/18 1442  Last data filed at 10/17/18 1407   Gross per 24 hour   Intake             2136 ml   Output             1040 ml   Net             1096 ml       PHYSICAL EXAM  GENERAL: Patient is in no distress. Alert and oriented.  HEART: Regular rate and rhythm. S1S2.  Systolic murmur right sternal area.  LUNGS: Bilateral decreased breath sounds. R> L No wheezing no crackles.  Artifact noise from 2 chest tubes on the right.  ABDOMEN: Soft, no abdominal tenderness, bowel sounds heard   NEURO: Moving all extremities.  EXTREMITIES: No pedal edema. 2+ peripheral pulses.  SKIN: Warm, dry.          Medications:          acetaminophen  975 mg Oral Q8H     bacitracin   Topical TID     DULoxetine  60 mg Oral Daily     enoxaparin  40 mg Subcutaneous Q24H     fluticasone  2 spray Both Nostrils Daily     ibuprofen  600 mg Oral Q6H BHAVESH     metoprolol tartrate  25 mg Oral BID     omeprazole  40 mg Oral Daily     senna-docusate  1 tablet Oral BID    Or     senna-docusate  2 tablet Oral BID     [START ON 10/19/2018] acetaminophen, bisacodyl, calcium carbonate, diphenhydrAMINE **OR** diphenhydrAMINE, HYDROmorphone, HYDROmorphone, - MEDICATION INSTRUCTIONS -, melatonin, naloxone, ondansetron **OR** ondansetron, polyethylene glycol, prochlorperazine **OR** prochlorperazine, senna-docusate **OR** senna-docusate         Data:      All new lab and imaging data was reviewed.

## 2018-10-18 NOTE — PLAN OF CARE
Problem: Patient Care Overview  Goal: Plan of Care/Patient Progress Review  Outcome: Improving  Patient alert and oriented X4. Vital signs within defined limits, 92-94% on room air. Pain rated as tolerable with oral dilaudid, ibuprofen, and tylenol. Incision to right back with steri strips CDI/STELLA. Chest tubes patent, -20 suction. Air leak present in chest tube #1. MD aware. Per MD ok to walk on water seal. LS diminished, IS to 750, flutter valve in place. Bowel sounds hypoactive, passing flatus. Adequate urine output. Ambulated X3 today. Tolerating regular diet.

## 2018-10-18 NOTE — CONSULTS
"BRIEF NUTRITION ASSESSMENT      REASON FOR ASSESSMENT:  Admission screen - Unintentional weight loss of 10# or more in past 2 months    NUTRITION HISTORY:  Pt states she eats small, frequent meals. Hx esophagectomy in 2016 for esophageal CA.  She states she eats a regular diet, no issues.    CURRENT DIET AND INTAKE:  Diet:  Regular  Good appetite, eating 100%.              ANTHROPOMETRICS:  Height: 5' 2\"  Weight: 42.2 kg -10/11) - pt has not been weighed here  BMI: 17.01 kg/m2  IBW:  50 kg +/- 10%  Weight Status: Underweight BMI <18.5  %IBW: 84%  Weight History:  Pt's weight is low but has been stable > 2 years.  Wt Readings from Last 18 Encounters:   10/11/18 42.2 kg (93 lb)   10/04/18 42.6 kg (94 lb)   09/14/18 42.6 kg (94 lb)   07/06/18 43.3 kg (95 lb 6.4 oz)   12/19/17 43.1 kg (95 lb)   12/07/17 43.1 kg (95 lb)   12/07/17 43.7 kg (96 lb 6.4 oz)   11/09/17 43.5 kg (95 lb 14.4 oz)   05/04/17 42.2 kg (93 lb)   03/31/17 41.7 kg (91 lb 14.4 oz)   10/14/16 41.8 kg (92 lb 1.6 oz)   09/16/16 43.1 kg (95 lb)   07/22/16 43 kg (94 lb 11.2 oz)   04/01/16 46.2 kg (101 lb 13.6 oz)   02/02/16 44.1 kg (97 lb 4.8 oz)   12/28/15 47.2 kg (104 lb)   12/22/15 47.6 kg (105 lb)     LABS:  Labs noted    MALNUTRITION:  Patient does not meet two of the following criteria necessary for diagnosing malnutrition: significant weight loss, reduced intake, subcutaneous fat loss, muscle loss or fluid retention    NUTRITION INTERVENTION:  Nutrition Diagnosis:  No nutrition diagnosis at this time.    Implementation:  Nutrition Education:  Per Provider order if indicated  Will send Gelatein Plus between meals, pt usually snacks    FOLLOW UP/MONITORING:   Will re-evaluate in 7 - 10 days, or sooner, if re-consulted.    Shavonne Pierre SHARON  Pager [phone] (M-F)            [phone] (W/[facility] & Hol)            "

## 2018-10-18 NOTE — PROGRESS NOTES
THORACIC SURGERY POD # 2    AVSS on RA  Stable  Good pain control  Air leak smaller  CXR ok  Path pending      satisfacotry    Same Rx for now  CT suction  Ambulate on water seal    KATIE SHELTON MD [facility] ONCOLOGY THORACIC SURGERY  CELL:  [phone]  OFFICE: [phone]

## 2018-10-18 NOTE — PLAN OF CARE
Problem: Patient Care Overview  Goal: Plan of Care/Patient Progress Review  AVSS. Pain controlled with PO dilaudid. Incisions CDI with unchanged marker. LS dim ex tubular in right base. Tolerating Regular diet. Up with assist of 1. BS active and audible. Voiding wdl.

## 2018-10-19 ENCOUNTER — APPOINTMENT (OUTPATIENT)
Dept: GENERAL RADIOLOGY | Facility: CLINIC | Age: 63
End: 2018-10-19
Attending: PHYSICIAN ASSISTANT
Payer: COMMERCIAL

## 2018-10-19 LAB
COPATH REPORT: NORMAL
CREAT SERPL-MCNC: 0.49 MG/DL (ref 0.52–1.04)
ERYTHROCYTE [DISTWIDTH] IN BLOOD BY AUTOMATED COUNT: 12.1 % (ref 10–15)
GFR SERPL CREATININE-BSD FRML MDRD: >90 ML/MIN/1.7M2
GLUCOSE BLDC GLUCOMTR-MCNC: 98 MG/DL (ref 70–99)
HCT VFR BLD AUTO: 30.5 % (ref 35–47)
HGB BLD-MCNC: 10.5 G/DL (ref 11.7–15.7)
MCH RBC QN AUTO: 32.5 PG (ref 26.5–33)
MCHC RBC AUTO-ENTMCNC: 34.4 G/DL (ref 31.5–36.5)
MCV RBC AUTO: 94 FL (ref 78–100)
PLATELET # BLD AUTO: 216 10E9/L (ref 150–450)
RBC # BLD AUTO: 3.23 10E12/L (ref 3.8–5.2)
SODIUM SERPL-SCNC: 133 MMOL/L (ref 133–144)
WBC # BLD AUTO: 7 10E9/L (ref 4–11)

## 2018-10-19 PROCEDURE — 25000132 ZZH RX MED GY IP 250 OP 250 PS 637: Performed by: NURSE PRACTITIONER

## 2018-10-19 PROCEDURE — 25000132 ZZH RX MED GY IP 250 OP 250 PS 637: Performed by: THORACIC SURGERY (CARDIOTHORACIC VASCULAR SURGERY)

## 2018-10-19 PROCEDURE — 71045 X-RAY EXAM CHEST 1 VIEW: CPT

## 2018-10-19 PROCEDURE — 12000007 ZZH R&B INTERMEDIATE

## 2018-10-19 PROCEDURE — 25000132 ZZH RX MED GY IP 250 OP 250 PS 637: Performed by: HOSPITALIST

## 2018-10-19 PROCEDURE — 85027 COMPLETE CBC AUTOMATED: CPT | Performed by: HOSPITALIST

## 2018-10-19 PROCEDURE — 82565 ASSAY OF CREATININE: CPT | Performed by: HOSPITALIST

## 2018-10-19 PROCEDURE — 25000128 H RX IP 250 OP 636: Performed by: PHYSICIAN ASSISTANT

## 2018-10-19 PROCEDURE — 25000125 ZZHC RX 250: Performed by: PHYSICIAN ASSISTANT

## 2018-10-19 PROCEDURE — 00000146 ZZHCL STATISTIC GLUCOSE BY METER IP

## 2018-10-19 PROCEDURE — 25000132 ZZH RX MED GY IP 250 OP 250 PS 637: Performed by: PHYSICIAN ASSISTANT

## 2018-10-19 PROCEDURE — 36415 COLL VENOUS BLD VENIPUNCTURE: CPT | Performed by: HOSPITALIST

## 2018-10-19 PROCEDURE — 84295 ASSAY OF SERUM SODIUM: CPT | Performed by: HOSPITALIST

## 2018-10-19 PROCEDURE — 99231 SBSQ HOSP IP/OBS SF/LOW 25: CPT | Performed by: HOSPITALIST

## 2018-10-19 RX ORDER — HYDROXYZINE HYDROCHLORIDE 25 MG/1
50 TABLET, FILM COATED ORAL EVERY 6 HOURS PRN
Status: DISCONTINUED | OUTPATIENT
Start: 2018-10-19 | End: 2018-10-26 | Stop reason: HOSPADM

## 2018-10-19 RX ORDER — BACLOFEN 10 MG/1
2.5 TABLET ORAL
Status: COMPLETED | OUTPATIENT
Start: 2018-10-19 | End: 2018-10-19

## 2018-10-19 RX ORDER — HYDROXYZINE HYDROCHLORIDE 25 MG/1
25 TABLET, FILM COATED ORAL EVERY 6 HOURS PRN
Status: DISCONTINUED | OUTPATIENT
Start: 2018-10-19 | End: 2018-10-26 | Stop reason: HOSPADM

## 2018-10-19 RX ORDER — POLYETHYLENE GLYCOL 3350 17 G/17G
17 POWDER, FOR SOLUTION ORAL DAILY PRN
Status: DISCONTINUED | OUTPATIENT
Start: 2018-10-19 | End: 2018-10-22

## 2018-10-19 RX ADMIN — ACETAMINOPHEN 975 MG: 325 TABLET, FILM COATED ORAL at 05:10

## 2018-10-19 RX ADMIN — HYDROMORPHONE HYDROCHLORIDE 4 MG: 2 TABLET ORAL at 16:33

## 2018-10-19 RX ADMIN — Medication 2.5 MG: at 21:48

## 2018-10-19 RX ADMIN — BACITRACIN: 500 OINTMENT TOPICAL at 21:48

## 2018-10-19 RX ADMIN — HYDROMORPHONE HYDROCHLORIDE 4 MG: 2 TABLET ORAL at 03:42

## 2018-10-19 RX ADMIN — BACITRACIN: 500 OINTMENT TOPICAL at 11:22

## 2018-10-19 RX ADMIN — DULOXETINE HYDROCHLORIDE 60 MG: 30 CAPSULE, DELAYED RELEASE ORAL at 08:13

## 2018-10-19 RX ADMIN — IBUPROFEN 600 MG: 600 TABLET, FILM COATED ORAL at 14:30

## 2018-10-19 RX ADMIN — POLYETHYLENE GLYCOL 3350 17 G: 17 POWDER, FOR SOLUTION ORAL at 11:44

## 2018-10-19 RX ADMIN — Medication 2 SPRAY: at 08:17

## 2018-10-19 RX ADMIN — HYDROMORPHONE HYDROCHLORIDE 4 MG: 2 TABLET ORAL at 10:12

## 2018-10-19 RX ADMIN — METOPROLOL TARTRATE 25 MG: 25 TABLET ORAL at 20:02

## 2018-10-19 RX ADMIN — IBUPROFEN 600 MG: 600 TABLET, FILM COATED ORAL at 20:02

## 2018-10-19 RX ADMIN — HYDROMORPHONE HYDROCHLORIDE 4 MG: 2 TABLET ORAL at 13:14

## 2018-10-19 RX ADMIN — ENOXAPARIN SODIUM 40 MG: 40 INJECTION SUBCUTANEOUS at 08:12

## 2018-10-19 RX ADMIN — HYDROMORPHONE HYDROCHLORIDE 4 MG: 2 TABLET ORAL at 00:42

## 2018-10-19 RX ADMIN — METOPROLOL TARTRATE 25 MG: 25 TABLET ORAL at 08:13

## 2018-10-19 RX ADMIN — OMEPRAZOLE 40 MG: 20 CAPSULE, DELAYED RELEASE ORAL at 08:12

## 2018-10-19 RX ADMIN — IBUPROFEN 600 MG: 600 TABLET, FILM COATED ORAL at 08:13

## 2018-10-19 RX ADMIN — SENNOSIDES AND DOCUSATE SODIUM 2 TABLET: 8.6; 5 TABLET ORAL at 08:17

## 2018-10-19 RX ADMIN — SENNOSIDES AND DOCUSATE SODIUM 1 TABLET: 8.6; 5 TABLET ORAL at 20:02

## 2018-10-19 RX ADMIN — HYDROMORPHONE HYDROCHLORIDE 4 MG: 2 TABLET ORAL at 07:04

## 2018-10-19 RX ADMIN — IBUPROFEN 600 MG: 600 TABLET, FILM COATED ORAL at 03:42

## 2018-10-19 RX ADMIN — HYDROMORPHONE HYDROCHLORIDE 4 MG: 2 TABLET ORAL at 20:02

## 2018-10-19 RX ADMIN — HYDROMORPHONE HYDROCHLORIDE 4 MG: 2 TABLET ORAL at 23:48

## 2018-10-19 ASSESSMENT — ACTIVITIES OF DAILY LIVING (ADL)
ADLS_ACUITY_SCORE: 11

## 2018-10-19 NOTE — PLAN OF CARE
Problem: Patient Care Overview  Goal: Plan of Care/Patient Progress Review  Outcome: No Change  &O. VSS on room air. Lung sounds diminished. CTx2 to suction, CT1 w/intermittent airleak and CT2 w/continuous airleak. creptus present. IS to 750. OnQ pump infusing along with ice and PO dilaudid for pain control.

## 2018-10-19 NOTE — PROGRESS NOTES
"Thoracic Surgery Progress Note:    Kezia Dixon   1955  [id number]    POD: 3    Procedure: Redo right thoracotomy, extensive intrapleural pneumolysis and right lower lobectomy.     Surgeon: Surgeon(s):  Alvin Riojas MD Rudi, Suzanne B, JANEY    S: Pt sitting up in bed. Feeling better today. C/o itching.  Having a lot of flatus but no BM yet.  Resp status stable. Airleak persists in apical chest tube atrium today, minimal to no airleak in base chest tube.  Moving well.      O: /59  Pulse 75  Temp 99.7  F (37.6  C) (Oral)  Resp 16  Ht 1.575 m (5' 2\")  SpO2 95%  BMI 17.01 kg/m2 on room air.    Physical Exam:  General: Awake, alert, asking good questions.  Pulmonary: Moving air well. Audible airleak.  IS 1000 max.  Abdomen: BS present. Soft, non-tender.  + flatus, no BM.  Thoracotomy Incision: Healing well, non-erythematous.  Steri-strips in place.   Chest tube site: Covered, dressing with some drainage, can be changed daily.  Extremities: Normal, no edema.    CXR 10/19/18: Portable view of the chest is performed. There are two  right chest tubes in place. No obvious residual pneumothorax. Surgical  staples are noted along the right mediastinal border. Left lung is  well expanded and clear. Heart is normal in size. No pleural  effusions. Subcutaneous emphysema along the right chest wall is  unchanged.    Chest tube to [id number] suction, 50 cc serosang drainage overnight, 20 cc today, + intermittent airleak seen with cough, respirations, phonation in the apical chest tube, minimal airleak seen from the basilar chest tube.    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 The two tumors are   morphologically dissimilar with no clear basaloid differentiation in this   lesion. Although a metastasis cannot   be entirely excluded, a new primary squamous cell carcinoma is favored. If    this lesion is deemed a new primary   lung lesion, the pathologic stage would be pT1a/NX/MX. Clinical and   radiographic correlation is suggested.     A: Right Lower Lobe Lung Nodule-->Final pathology as above. Favoring Stage IA Squamous Cell Lung Cancer.  Hx of Esophageal Squamous Cell Carcinoma.    P:   Chest Tubes: Both remain to suction. Airleak seen largely in the apical chest tube today.  Improving. Continue to suction for now.  Follow CXR.  Dx Lung Nodule s/p LLL Wedge Resection: Final pathology as above. Favor primary Stage IA Lung Cancer rather than metastatic esophageal cancer. Informed pt of results.    Activity: Ambulate as tolerated. Ok to ambulate on H2O seal.  Pain: PO Dilaudid and Ibuprofen PRN for pain. Good control reported.  Bowels: Senna-S BID.  PRN meds available. Added Miralax at pt request.  Respiratory: Encourage increased IS use.  Pulmonary toilet as able.     Disposition: To remain inpt while awaiting resolution of airleak. Ct mgmt as above. Encourage activity, ambulation and time OOB as tolerated.    Johana Jeff PA-C with Dr. Varinder Coleman MD and Dr. Alvin Riojas MD  [facility] - Thoracic Surgery  Cell/Pager: [phone]

## 2018-10-19 NOTE — PROGRESS NOTES
[facility]  Hospitalist Progress Note   10/19/2018          Assessment and Plan:       Ms. Dixon is a 62-year-old woman with medical history esophageal cancer status post esophagectomy in 03/2016 with a G-tube placement that was ultimately removed, history of alcohol abuse, sober for the last 16 years, pancreatic pseudocyst, protein calorie malnutrition, hypertension, postherpetic neuralgia following her esophagectomy, abnormal Pap smear, carotid disease, depression, anxiety and GERD admitted on 10/16 for redo right thoracotomy, extensive pneumolysis and a right lower lobectomy for a right lower lobe lesion found on surveillance PET scan for history of esophageal cancer.      Status post right redo thoracotomy, extensive pneumolysis and right lower lobe lobectomy for right lower lobe lesion found on a surveillance PET scan with history of esophageal cancer.   On10/16/2018 with Dr. Alvin Riojas she underwent a right lower lobe lobectomy via redo thoracotomy with extensive pneumolysis.  Perioperatively successfully extubated.  CXR 10/18 -Increase in subcutaneous emphysema over the right lateral chest wall and in the right supraclavicular fossa. Mild mediastinal emphysema. The two chest tubes on the right are unchanged.  Defer to the primary surgical service analgesia, DVT prophylaxis, chest tube management, rehabilitation and pulm hygiene maneuvers.    Has been weaned off nasal oxygen.  Aggressive incentive spirometry.  Ambulate as tolerated    Hyponatremia likely dilutional/SIADH.  Sodium dropped from 134 >128 > 133  ProBNP 616.  Albumin 2.9  Strict input-output monitoring.    Acute anemia postoperatively-likely dilutional/surgical blood loss.  Hemoglobin dropped from 13.1 > 10.5.  Baseline hemoglobin around 13.  Serum iron 39, iron saturation index 16.  .  ProBNP 616.  Monitor hemoglobin levels periodically.    Hypertension  Hold PTA hydrochlorothiazide given borderline blood  pressures.  Continue PTA metoprolol with hold parameters.    Hyperlipidemia.  PTA not on medications.      Pancreatic pseudocyst, remote/resolved.   Esophageal cancer, status post esophagectomy  GERD.   Oesophageal cancer diagnosed in 2015, status post esophagectomy in 03/2016 with a G-tube placement that was ultimately removed in 07/2017  Continue PTA omeprazole 40 mg orally daily.    Protein calorie malnutrition with BMI 17.29.-From malignancy/poor oral intake.  Dietary supplements with Ensure as tolerated.      History of alcohol abuse, now sober for the last 16 years.   Anxiety, depression.   Postherpetic neuralgia.   Patient encouraged to continue her sobriety  Continue PTA Cymbalta.       Pre diabetes.  Hemoglobin A1c 5.8.    Monitor blood sugars periodically.  Dietary modification as able to.    Active Diet Order      Advance Diet as Tolerated: Regular Diet Adult    DVT Prophylaxis:  Sequential compression device.  Pharmacological prophylaxis as per surgical team.  Code Status: Full Code  Disposition: Expected discharge pending clinical improvement/per primary team.    Patient,  interdisciplinary team involved in care and agrees with plan.  Total time > 15 min. More than 50% of time spent in direct patient care, care coordination and formalizing plan of care.     Cornelio Hurd MD        Interval History:      Patient lying in bed.  Denies any chest pain.  Complains of minimal pain on deep breaths at the chest tube placement site.  Afebrile in the last 24 hours.  Compliant with incentive spirometery, ambulation  No nausea vomiting.  Tolerating oral diet.         Physical Exam:        Physical Exam   Temp:  [94  F (34.4  C)-99.7  F (37.6  C)] 99.7  F (37.6  C)  Pulse:  [62-75] 75  Heart Rate:  [70-89] 89  Resp:  [16-18] 16  BP: ()/(36-60) 128/59  SpO2:  [95 %-98 %] 95 %    Intake/Output Summary (Last 24 hours) at 10/17/18 1442  Last data filed at 10/17/18 1407   Gross per 24 hour   Intake              2136 ml   Output             1040 ml   Net             1096 ml       PHYSICAL EXAM  GENERAL: Patient is in no distress. Alert and oriented.  HEART: Regular rate and rhythm. S1S2.  Systolic murmur right sternal area.  LUNGS: Bilateral decreased breath sounds. R> L No wheezing no crackles.  Artifact noise from 2 chest tubes on the right.  ABDOMEN: Soft, no abdominal tenderness, bowel sounds heard   NEURO: Moving all extremities.  EXTREMITIES: No pedal edema. 2+ peripheral pulses.  SKIN: Warm, dry.          Medications:          bacitracin   Topical TID     DULoxetine  60 mg Oral Daily     enoxaparin  40 mg Subcutaneous Q24H     fluticasone  2 spray Both Nostrils Daily     ibuprofen  600 mg Oral Q6H BHAVESH     metoprolol tartrate  25 mg Oral BID     omeprazole  40 mg Oral Daily     senna-docusate  1 tablet Oral BID    Or     senna-docusate  2 tablet Oral BID     acetaminophen, bisacodyl, calcium carbonate, diphenhydrAMINE **OR** diphenhydrAMINE, HYDROmorphone, HYDROmorphone, - MEDICATION INSTRUCTIONS -, melatonin, naloxone, ondansetron **OR** ondansetron, polyethylene glycol, prochlorperazine **OR** prochlorperazine, senna-docusate **OR** senna-docusate         Data:      All new lab and imaging data was reviewed.

## 2018-10-19 NOTE — PLAN OF CARE
Problem: Patient Care Overview  Goal: Plan of Care/Patient Progress Review  VSS, Breathing unlabored. L/S diminished. Pulmonary toilet encouraged. CT1 intermittent, CT 2 continuous air leak,MD aware. Crepitus present to right lateral chest and neck. No change from previous shift. CT site dressing CDI. Pain well managed with dilaudid 4mg, ambulating in halls with one assist. CMS intact. Will continue to monitor.

## 2018-10-19 NOTE — PLAN OF CARE
Problem: Patient Care Overview  Goal: Plan of Care/Patient Progress Review  Outcome: No Change  A&Ox4. VSS. Chest tube dressings CDI, changed per protocol. Air leak present in both CT. LS diminished and crackles. Crepitus present, unchanged. Pain controlled with PO dilaudid 4mg with relief. Regular diet. Standby assist. PRN miralax given for constipation. OnQ pump infusing, sensors taped clamps open.

## 2018-10-20 ENCOUNTER — APPOINTMENT (OUTPATIENT)
Dept: GENERAL RADIOLOGY | Facility: CLINIC | Age: 63
End: 2018-10-20
Attending: PHYSICIAN ASSISTANT
Payer: COMMERCIAL

## 2018-10-20 PROCEDURE — 99232 SBSQ HOSP IP/OBS MODERATE 35: CPT | Performed by: HOSPITALIST

## 2018-10-20 PROCEDURE — 25000132 ZZH RX MED GY IP 250 OP 250 PS 637: Performed by: NURSE PRACTITIONER

## 2018-10-20 PROCEDURE — 25000128 H RX IP 250 OP 636: Performed by: PHYSICIAN ASSISTANT

## 2018-10-20 PROCEDURE — 71045 X-RAY EXAM CHEST 1 VIEW: CPT

## 2018-10-20 PROCEDURE — 25000132 ZZH RX MED GY IP 250 OP 250 PS 637: Performed by: PHYSICIAN ASSISTANT

## 2018-10-20 PROCEDURE — 12000007 ZZH R&B INTERMEDIATE

## 2018-10-20 PROCEDURE — 25000132 ZZH RX MED GY IP 250 OP 250 PS 637: Performed by: THORACIC SURGERY (CARDIOTHORACIC VASCULAR SURGERY)

## 2018-10-20 RX ADMIN — ACETAMINOPHEN 650 MG: 325 TABLET, FILM COATED ORAL at 22:13

## 2018-10-20 RX ADMIN — SENNOSIDES AND DOCUSATE SODIUM 2 TABLET: 8.6; 5 TABLET ORAL at 20:40

## 2018-10-20 RX ADMIN — HYDROMORPHONE HYDROCHLORIDE 4 MG: 2 TABLET ORAL at 16:10

## 2018-10-20 RX ADMIN — BACITRACIN: 500 OINTMENT TOPICAL at 10:15

## 2018-10-20 RX ADMIN — HYDROMORPHONE HYDROCHLORIDE 4 MG: 2 TABLET ORAL at 19:16

## 2018-10-20 RX ADMIN — DULOXETINE HYDROCHLORIDE 60 MG: 30 CAPSULE, DELAYED RELEASE ORAL at 08:23

## 2018-10-20 RX ADMIN — Medication 2 SPRAY: at 08:27

## 2018-10-20 RX ADMIN — HYDROMORPHONE HYDROCHLORIDE 4 MG: 2 TABLET ORAL at 06:55

## 2018-10-20 RX ADMIN — IBUPROFEN 600 MG: 600 TABLET, FILM COATED ORAL at 03:23

## 2018-10-20 RX ADMIN — POLYETHYLENE GLYCOL 3350 17 G: 17 POWDER, FOR SOLUTION ORAL at 14:50

## 2018-10-20 RX ADMIN — HYDROMORPHONE HYDROCHLORIDE 4 MG: 2 TABLET ORAL at 22:13

## 2018-10-20 RX ADMIN — IBUPROFEN 600 MG: 600 TABLET, FILM COATED ORAL at 20:41

## 2018-10-20 RX ADMIN — HYDROMORPHONE HYDROCHLORIDE 4 MG: 2 TABLET ORAL at 03:23

## 2018-10-20 RX ADMIN — METOPROLOL TARTRATE 25 MG: 25 TABLET ORAL at 08:24

## 2018-10-20 RX ADMIN — SENNOSIDES AND DOCUSATE SODIUM 2 TABLET: 8.6; 5 TABLET ORAL at 08:24

## 2018-10-20 RX ADMIN — ENOXAPARIN SODIUM 40 MG: 40 INJECTION SUBCUTANEOUS at 08:22

## 2018-10-20 RX ADMIN — OMEPRAZOLE 40 MG: 20 CAPSULE, DELAYED RELEASE ORAL at 08:24

## 2018-10-20 RX ADMIN — HYDROMORPHONE HYDROCHLORIDE 4 MG: 2 TABLET ORAL at 10:09

## 2018-10-20 RX ADMIN — ACETAMINOPHEN 650 MG: 325 TABLET, FILM COATED ORAL at 17:43

## 2018-10-20 RX ADMIN — IBUPROFEN 600 MG: 600 TABLET, FILM COATED ORAL at 14:49

## 2018-10-20 RX ADMIN — HYDROMORPHONE HYDROCHLORIDE 4 MG: 2 TABLET ORAL at 13:08

## 2018-10-20 RX ADMIN — IBUPROFEN 600 MG: 600 TABLET, FILM COATED ORAL at 08:24

## 2018-10-20 RX ADMIN — ACETAMINOPHEN 650 MG: 325 TABLET, FILM COATED ORAL at 13:15

## 2018-10-20 ASSESSMENT — ACTIVITIES OF DAILY LIVING (ADL)
ADLS_ACUITY_SCORE: 11

## 2018-10-20 ASSESSMENT — PAIN DESCRIPTION - DESCRIPTORS: DESCRIPTORS: ACHING

## 2018-10-20 NOTE — PROGRESS NOTES
[facility]    Hospitalist Progress Note      Assessment & Plan    Ms. Dixon is a 62-year-old woman with medical history esophageal cancer status post esophagectomy in 03/2016 with a G-tube placement that was ultimately removed, history of alcohol abuse, sober for the last 16 years, pancreatic pseudocyst, protein calorie malnutrition, hypertension, postherpetic neuralgia following her esophagectomy, abnormal Pap smear, carotid disease, depression, anxiety and GERD admitted on 10/16 for redo right thoracotomy, extensive pneumolysis and a right lower lobectomy for a right lower lobe lesion found on surveillance PET scan for history of esophageal cancer.       Status post right redo thoracotomy, extensive pneumolysis and right lower lobe lobectomy for right lower lobe lesion found on a surveillance PET scan with history of esophageal cancer  On10/16/2018 with Dr. Alvin Riojas she underwent a right lower lobe lobectomy via redo thoracotomy with extensive pneumolysis.  Perioperatively successfully extubated.  CXR 10/18 -Increase in subcutaneous emphysema over the right lateral chest wall and in the right supraclavicular fossa. Mild mediastinal emphysema. The two chest tubes on the right are unchanged.  10/20- respiratory status is stable. Chest tube on suction- management per surgery. Pathology showed squamous carcinoma so she likely has stage IA NSCLC- surgery discussed with patient      Hyponatremia likely dilutional/SIADH  Sodium dropped from 134 >128 > 133  ProBNP 616.  Albumin 2.9  Strict input-output monitoring.  10/20- Na up from 128 to 133     Acute anemia postoperatively-likely dilutional/surgical blood loss  Hemoglobin dropped from 13.1 > 10.5.  Baseline hemoglobin around 13.  Serum iron 39, iron saturation index 16.  .  ProBNP 616.  10/20- hemoglobin was 10.5g yesterday     Hypertension  Hold PTA hydrochlorothiazide given borderline blood pressures.  Continue PTA metoprolol with hold  parameters.  10/20- blood pressure good today- no changes to medications      Hyperlipidemia.  PTA not on medications.       Pancreatic pseudocyst, remote/resolved.   Esophageal cancer, status post esophagectomy  GERD.   Oesophageal cancer diagnosed in 2015, status post esophagectomy in 03/2016 with a G-tube placement that was ultimately removed in 07/2017  Continue PTA omeprazole 40 mg orally daily.     Protein calorie malnutrition with BMI 17.29.-From malignancy/poor oral intake.  Dietary supplements with Ensure as tolerated.      History of alcohol abuse, now sober for the last 16 years.   Anxiety, depression.   Postherpetic neuralgia.   Continue PTA Cymbalta.        Pre diabetes.  Hemoglobin A1c 5.8.    Monitor blood sugars periodically.  Dietary modification as able    DVT Prophylaxis  Pneumatic Compression Devices    Code Status   Full Code    Disposition  Expected discharge home per primary service when chest tube has been removed    Tomasz Elizabeth MD  Text Page (7am - 6pm)    Interval History   Having expected pain from the chest tube bu otherwise no complaints.    -Data reviewed today: I reviewed all new labs and imaging results over the last 24 hours. I personally reviewed no images or EKG's today.    Physical Exam   Temp: 98.3  F (36.8  C) Temp src: Oral BP: 112/53 Pulse: 86 Heart Rate: 72 Resp: 16 SpO2: 98 % O2 Device: None (Room air)    There were no vitals filed for this visit.  Vital Signs with Ranges  Temp:  [97.5  F (36.4  C)-98.3  F (36.8  C)] 98.3  F (36.8  C)  Pulse:  [86] 86  Heart Rate:  [67-80] 72  Resp:  [16] 16  BP: (105-115)/(46-53) 112/53  SpO2:  [94 %-100 %] 98 %  I/O last 3 completed shifts:  In: 1090 [P.O.:1090]  Out: 2705 [Urine:2450; Chest Tube:255]    Constitutional: Awake, alert, cooperative, no apparent distress  Respiratory: Clear to auscultation bilaterally, no crackles or wheezing  Cardiovascular: Regular rate and rhythm, normal S1 and S2, and no murmur noted  GI: Normal  bowel sounds, soft, non-distended, non-tender  Skin/Integumen: No rashes, no cyanosis, no edema  Other: + right chest tube     Medications     - MEDICATION INSTRUCTIONS -         bacitracin   Topical TID     DULoxetine  60 mg Oral Daily     enoxaparin  40 mg Subcutaneous Q24H     fluticasone  2 spray Both Nostrils Daily     ibuprofen  600 mg Oral Q6H BHAVESH     metoprolol tartrate  25 mg Oral BID     omeprazole  40 mg Oral Daily     senna-docusate  1 tablet Oral BID    Or     senna-docusate  2 tablet Oral BID       Data     Recent Labs  Lab 10/19/18  0811 10/18/18  0811 10/17/18  0645 10/16/18  0701   WBC 7.0 7.5  --  6.0   HGB 10.5* 10.5* 10.4* 13.1   MCV 94 95  --  93    222  --  257   INR  --   --   --  1.01    128* 134 135   POTASSIUM  --  3.9 3.8 4.2   CHLORIDE  --  96 101 101   CO2  --  24 25 28   BUN  --  13 19 20   CR 0.49* 0.49* 0.61 0.61   ANIONGAP  --  8 8 6   VICENTE  --  7.9* 8.0* 9.1   GLC  --  192* 96 105*   ALBUMIN  --  2.9*  --   --    PROTTOTAL  --  5.8*  --   --    BILITOTAL  --  0.3  --   --    ALKPHOS  --  47  --   --    ALT  --  20  --   --    AST  --  27  --   --        Recent Results (from the past 24 hour(s))   XR Chest Port 1 View    Narrative    CHEST PORTABLE ONE VIEW   10/20/2018 5:37 AM     HISTORY: Status post right lower lobectomy.     COMPARISON: Chest x-ray 10/19/2018.      Impression    IMPRESSION: Portable view of the chest is performed. Right chest tubes  are again noted. No definite evidence of right pneumothorax. Lungs are  otherwise clear. No left pneumothorax. Postoperative changes are noted  in the medial right upper lung. Subcutaneous emphysema along the right  chest wall is unchanged. Heart is normal in size.    JAYLIN VILLEGAS MD

## 2018-10-20 NOTE — PROGRESS NOTES
THORACIC SURGERY POD # 4    Doing well  Discussed pathology report 7 mm squamous cell ca unrelated to esophageal ca  AVSS on RA  Air leak about the same    Satisfactory    Awaiting resolution of air leak    Discussed    KATIE SHELTON MD [facility] ONCOLOGY THORACIC SURGERY  CELL:  [phone]  OFFICE: [phone]

## 2018-10-20 NOTE — PLAN OF CARE
Problem: Patient Care Overview  Goal: Plan of Care/Patient Progress Review  Outcome: No Change  A&O. VSS on RA. C/o 4-6/10 pain, managed with dilaudid, tylenol and ibuprofen. Incision C/D/I. CTs with crepitus and air leaks, MD aware. Up SBA. IS to 500 and acapella done. +flatus, miralax PRN and senna given for constipation.

## 2018-10-20 NOTE — PLAN OF CARE
Problem: Patient Care Overview  Goal: Plan of Care/Patient Progress Review  Outcome: No Change  Pt A&Ox4. VSS on RA. L/s clear to left, crackles on right side. Denies SOB. Chest tube side dressing saturated with serosan drainage, changed, currently CDI.  CT1 Intermittent, CT2 continuous air leak, provider aware. Crepitus to right lateral chest and neck, no change this shift. On-Q pump infusing, sensors taped, clamps open. Incisional pain managed with prn Dilaudid 4 mg x3 and scheduled Ibuprofen. Up SBA. +BS. +flatus. Voiding adequately. Tolerating reg diet. Discharge pending progress.

## 2018-10-20 NOTE — PLAN OF CARE
Problem: Patient Care Overview  Goal: Plan of Care/Patient Progress Review  Outcome: Improving  Alert and oriented x4. Vital signs stable on room air. Standby assist. Tolerating regular diet. Lung sounds clear on left, crackles on right. Bowel sounds active, + flatus. Voiding adequately. 2 chest tubes on right to suction, dressings changed this shift due to drainage, currently CDI. Crepitus and air leaks present, provider aware. On-Q pump infusing with sensors taped and clamps open. Pain (spasmps) managed with PRN dilaudid, aqua-K pad, and PRN baclofen.

## 2018-10-21 ENCOUNTER — APPOINTMENT (OUTPATIENT)
Dept: GENERAL RADIOLOGY | Facility: CLINIC | Age: 63
End: 2018-10-21
Attending: PHYSICIAN ASSISTANT
Payer: COMMERCIAL

## 2018-10-21 PROCEDURE — 99232 SBSQ HOSP IP/OBS MODERATE 35: CPT | Performed by: HOSPITALIST

## 2018-10-21 PROCEDURE — 25000132 ZZH RX MED GY IP 250 OP 250 PS 637: Performed by: THORACIC SURGERY (CARDIOTHORACIC VASCULAR SURGERY)

## 2018-10-21 PROCEDURE — 25000132 ZZH RX MED GY IP 250 OP 250 PS 637: Performed by: PHYSICIAN ASSISTANT

## 2018-10-21 PROCEDURE — 25000132 ZZH RX MED GY IP 250 OP 250 PS 637: Performed by: NURSE PRACTITIONER

## 2018-10-21 PROCEDURE — 71045 X-RAY EXAM CHEST 1 VIEW: CPT

## 2018-10-21 PROCEDURE — 12000007 ZZH R&B INTERMEDIATE

## 2018-10-21 PROCEDURE — 25000125 ZZHC RX 250: Performed by: PHYSICIAN ASSISTANT

## 2018-10-21 PROCEDURE — 25000128 H RX IP 250 OP 636: Performed by: PHYSICIAN ASSISTANT

## 2018-10-21 RX ADMIN — Medication 2 SPRAY: at 11:02

## 2018-10-21 RX ADMIN — IBUPROFEN 600 MG: 600 TABLET, FILM COATED ORAL at 02:17

## 2018-10-21 RX ADMIN — METOPROLOL TARTRATE 25 MG: 25 TABLET ORAL at 20:05

## 2018-10-21 RX ADMIN — HYDROMORPHONE HYDROCHLORIDE 4 MG: 2 TABLET ORAL at 02:17

## 2018-10-21 RX ADMIN — HYDROMORPHONE HYDROCHLORIDE 4 MG: 2 TABLET ORAL at 22:29

## 2018-10-21 RX ADMIN — SENNOSIDES AND DOCUSATE SODIUM 1 TABLET: 8.6; 5 TABLET ORAL at 08:30

## 2018-10-21 RX ADMIN — ENOXAPARIN SODIUM 40 MG: 40 INJECTION SUBCUTANEOUS at 08:31

## 2018-10-21 RX ADMIN — POLYETHYLENE GLYCOL 3350 17 G: 17 POWDER, FOR SOLUTION ORAL at 11:02

## 2018-10-21 RX ADMIN — IBUPROFEN 600 MG: 600 TABLET, FILM COATED ORAL at 08:31

## 2018-10-21 RX ADMIN — HYDROMORPHONE HYDROCHLORIDE 4 MG: 2 TABLET ORAL at 18:55

## 2018-10-21 RX ADMIN — HYDROMORPHONE HYDROCHLORIDE 4 MG: 2 TABLET ORAL at 12:48

## 2018-10-21 RX ADMIN — ACETAMINOPHEN 650 MG: 325 TABLET, FILM COATED ORAL at 15:45

## 2018-10-21 RX ADMIN — SENNOSIDES AND DOCUSATE SODIUM 2 TABLET: 8.6; 5 TABLET ORAL at 20:05

## 2018-10-21 RX ADMIN — BACITRACIN: 500 OINTMENT TOPICAL at 12:50

## 2018-10-21 RX ADMIN — ACETAMINOPHEN 650 MG: 325 TABLET, FILM COATED ORAL at 11:02

## 2018-10-21 RX ADMIN — HYDROMORPHONE HYDROCHLORIDE 4 MG: 2 TABLET ORAL at 05:53

## 2018-10-21 RX ADMIN — HYDROMORPHONE HYDROCHLORIDE 4 MG: 2 TABLET ORAL at 09:30

## 2018-10-21 RX ADMIN — BACITRACIN: 500 OINTMENT TOPICAL at 22:30

## 2018-10-21 RX ADMIN — IBUPROFEN 600 MG: 600 TABLET, FILM COATED ORAL at 14:51

## 2018-10-21 RX ADMIN — DULOXETINE HYDROCHLORIDE 60 MG: 30 CAPSULE, DELAYED RELEASE ORAL at 08:29

## 2018-10-21 RX ADMIN — ACETAMINOPHEN 650 MG: 325 TABLET, FILM COATED ORAL at 06:48

## 2018-10-21 RX ADMIN — METOPROLOL TARTRATE 25 MG: 25 TABLET ORAL at 08:31

## 2018-10-21 RX ADMIN — IBUPROFEN 600 MG: 600 TABLET, FILM COATED ORAL at 20:05

## 2018-10-21 RX ADMIN — HYDROMORPHONE HYDROCHLORIDE 4 MG: 2 TABLET ORAL at 15:45

## 2018-10-21 RX ADMIN — OMEPRAZOLE 40 MG: 20 CAPSULE, DELAYED RELEASE ORAL at 08:30

## 2018-10-21 ASSESSMENT — ACTIVITIES OF DAILY LIVING (ADL)
ADLS_ACUITY_SCORE: 11

## 2018-10-21 NOTE — PLAN OF CARE
Problem: Patient Care Overview  Goal: Plan of Care/Patient Progress Review  Outcome: Improving  Pt. A & O x 4.  VSS; RA.  Left LS clear; Right LS crackles.  Changed chest tube dressing; saturated after walk.  Chest tube 1 has intermittent air leak; Chest tube 2 has continuous air leak (provider is aware of air leaks) and pt. Is not symptomatic. On-Q pump infusing.  Pain managed with PRN dilaudid & tylenol.  Ambulated in room & sharma with stand by assist.  BS+; Flatus+.  Voiding adequately.  Pt is tolerating regular diet.

## 2018-10-21 NOTE — PLAN OF CARE
Problem: Patient Care Overview  Goal: Plan of Care/Patient Progress Review  Outcome: Improving  VSS, A/O x4, JULIEN/LLL clear, RUL/RML crackles, Chest tube #1, output of 10, interittent air leak. Chest tube #2, output of 30, continuous air leak, provider aware of air leaks. Ch-Q-pump infusing. Pain managed with PRN Dilaudid 4mg PO. BS present, passing flatus. Up with one assist. Regular diet.

## 2018-10-21 NOTE — PROGRESS NOTES
THORACIC SURGERY POD # 5    Doing well  AVSS on RA  Air leak less  CXR unchanged    CT suction down to -10    KATIE SHELTON MD [facility] ONCOLOGY THORACIC SURGERY  CELL:  [phone]  OFFICE: [phone]

## 2018-10-21 NOTE — PROGRESS NOTES
[facility]    Hospitalist Progress Note      Assessment & Plan    Ms. Dixon is a 62-year-old woman with medical history esophageal cancer status post esophagectomy in 03/2016 with a G-tube placement that was ultimately removed, history of alcohol abuse, sober for the last 16 years, pancreatic pseudocyst, protein calorie malnutrition, hypertension, postherpetic neuralgia following her esophagectomy, abnormal Pap smear, carotid disease, depression, anxiety and GERD admitted on 10/16 for redo right thoracotomy, extensive pneumolysis and a right lower lobectomy for a right lower lobe lesion found on surveillance PET scan for history of esophageal cancer.    Pathology showed squamous carcinoma.  The patient is having an uneventful postoperative course.  Her chest tube is still in place and on suction.     Status post right redo-thoracotomy, extensive pneumolysis and right lower lobe lobectomy for right lower lobe lesion found on a surveillance PET scan with history of esophageal cancer  On10/16/2018 with Dr. Alvin Riojas she underwent a right lower lobe lobectomy via redo thoracotomy with extensive pneumolysis.  Perioperatively successfully extubated.  CXR 10/18 -Increase in subcutaneous emphysema over the right lateral chest wall and in the right supraclavicular fossa. Mild mediastinal emphysema. The two chest tubes on the right are unchanged.  10/20- respiratory status is stable. Chest tube on suction- management per surgery. Pathology showed squamous carcinoma so she likely has stage IA NSCLC- surgeon discussed results with thepatient   10/21- chest tube still in place     Hyponatremia likely dilutional/SIADH                              Sodium dropped from 134 >128 > 133  ProBNP 616.  Albumin 2.9  Strict input-output monitoring.  10/20- Na up from 128 to 133  10/21- repeat tomorrow     Acute anemia postoperatively-likely dilutional/surgical blood loss  Hemoglobin dropped from 13.1 > 10.5.  Baseline  hemoglobin around 13.  Serum iron 39, iron saturation index 16.  .  ProBNP 616.  10/20- hemoglobin was 10.5g yesterday  10/21- repeat tomorrow    Hypertension  Continue to hold prior to admission hydrochlorothiazide given borderline blood pressures.  Continue PTA metoprolol with hold parameters.  10/20- blood pressure good today- no changes to medications   10/21- stable, no changes     Hyperlipidemia  PTA not on medications.       Pancreatic pseudocyst, remote/resolved  Esophageal cancer, status post esophagectomy  GERD  Oesophageal cancer diagnosed in 2015, status post esophagectomy in 03/2016 with a G-tube placement that was ultimately removed in 07/2017  Continue PTA omeprazole 40 mg orally daily.     Protein calorie malnutrition with BMI 17.29-From malignancy/poor oral intake  Dietary supplements with Ensure as tolerated.      History of alcohol abuse, now sober for the last 16 years  Anxiety, depression  Postherpetic neuralgia  Continue PTA Cymbalta.        Pre diabetes.  Hemoglobin A1c 5.8  Monitor blood sugars periodically.  Dietary modification as able    DVT Prophylaxis  Pneumatic Compression Devices    Code Status   Full Code    Disposition  Expected discharge home per primary service when chest tube has been removed    Tomasz Elizabeth MD  Text Page (7am - 6pm)    Interval History   No new complaints.    -Data reviewed today: I reviewed all new labs and imaging results over the last 24 hours. I personally reviewed no images or EKG's today.    Physical Exam   Temp: 97.7  F (36.5  C) Temp src: Oral BP: 137/54 Pulse: 82 Heart Rate: 83 Resp: 16 SpO2: 95 % O2 Device: None (Room air)    There were no vitals filed for this visit.  Vital Signs with Ranges  Temp:  [97.6  F (36.4  C)-98.5  F (36.9  C)] 97.7  F (36.5  C)  Pulse:  [80-96] 82  Heart Rate:  [74-83] 83  Resp:  [16-18] 16  BP: ()/(50-56) 137/54  SpO2:  [93 %-100 %] 95 %  I/O last 3 completed shifts:  In: 640 [P.O.:640]  Out: 1650  [Urine:1500; Chest Tube:150]    Constitutional: Awake, alert, cooperative, no apparent distress  Respiratory: Clear to auscultation bilaterally, no crackles or wheezing  Cardiovascular: Regular rate and rhythm, normal S1 and S2, and no murmur noted  GI: Normal bowel sounds, soft, non-distended, non-tender  Skin/Integumen: No rashes, no cyanosis, no edema  Other: + right chest tube     Medications     - MEDICATION INSTRUCTIONS -         bacitracin   Topical TID     DULoxetine  60 mg Oral Daily     enoxaparin  40 mg Subcutaneous Q24H     fluticasone  2 spray Both Nostrils Daily     ibuprofen  600 mg Oral Q6H BHAVESH     metoprolol tartrate  25 mg Oral BID     omeprazole  40 mg Oral Daily     senna-docusate  1 tablet Oral BID    Or     senna-docusate  2 tablet Oral BID       Data     Recent Labs  Lab 10/19/18  0811 10/18/18  0811 10/17/18  0645 10/16/18  0701   WBC 7.0 7.5  --  6.0   HGB 10.5* 10.5* 10.4* 13.1   MCV 94 95  --  93    222  --  257   INR  --   --   --  1.01    128* 134 135   POTASSIUM  --  3.9 3.8 4.2   CHLORIDE  --  96 101 101   CO2  --  24 25 28   BUN  --  13 19 20   CR 0.49* 0.49* 0.61 0.61   ANIONGAP  --  8 8 6   VICENTE  --  7.9* 8.0* 9.1   GLC  --  192* 96 105*   ALBUMIN  --  2.9*  --   --    PROTTOTAL  --  5.8*  --   --    BILITOTAL  --  0.3  --   --    ALKPHOS  --  47  --   --    ALT  --  20  --   --    AST  --  27  --   --        Recent Results (from the past 24 hour(s))   XR Chest Port 1 View    Narrative    CHEST PORTABLE ONE VIEW   10/21/2018 5:24 AM     HISTORY: Status post right lower lobectomy.     COMPARISON: Chest x-ray 10/20/2018.      Impression    IMPRESSION: Portable view of the chest is performed. Postoperative  changes from partial right lung resection are again noted. Both chest  tubes remain in position. No evidence of pneumothorax. Left lung is  well expanded and clear. Heart is normal in size. Subcutaneous  emphysema is unchanged.    JAYLIN VILLEGAS MD

## 2018-10-21 NOTE — PLAN OF CARE
Problem: Patient Care Overview  Goal: Plan of Care/Patient Progress Review  Outcome: No Change  A&Ox4. CMS intact. Numbness in abdomen, baseline. Bowel sounds active in all quadrants, c/o constipation Miralax given. VSS. Sterile dressing change x2 w/ bacitracin, small serosanguinous drainage. Chest tube 1 to -10 mmhg H20, intermittent air leak. Chest tube 2 to -10 mmhg H20, continuous air leak. Clamps at bedside. Continuous pump sensors taped to skin, both unclamped. Saline locked. Up with SBA, ambulated in sharma x1. Voiding adequately. Reg diet, good appetite. C/o moderate incisional pain, decreased with dilaudid q3h and tylenol q4h. Plan pending progress.

## 2018-10-22 ENCOUNTER — APPOINTMENT (OUTPATIENT)
Dept: GENERAL RADIOLOGY | Facility: CLINIC | Age: 63
End: 2018-10-22
Attending: PHYSICIAN ASSISTANT
Payer: COMMERCIAL

## 2018-10-22 LAB
ANION GAP SERPL CALCULATED.3IONS-SCNC: 7 MMOL/L (ref 3–14)
BUN SERPL-MCNC: 15 MG/DL (ref 7–30)
CALCIUM SERPL-MCNC: 8.5 MG/DL (ref 8.5–10.1)
CHLORIDE SERPL-SCNC: 97 MMOL/L (ref 94–109)
CO2 SERPL-SCNC: 29 MMOL/L (ref 20–32)
CREAT SERPL-MCNC: 0.51 MG/DL (ref 0.52–1.04)
ERYTHROCYTE [DISTWIDTH] IN BLOOD BY AUTOMATED COUNT: 12.2 % (ref 10–15)
GFR SERPL CREATININE-BSD FRML MDRD: >90 ML/MIN/1.7M2
GLUCOSE SERPL-MCNC: 105 MG/DL (ref 70–99)
HCT VFR BLD AUTO: 31.5 % (ref 35–47)
HGB BLD-MCNC: 10.9 G/DL (ref 11.7–15.7)
MCH RBC QN AUTO: 32.6 PG (ref 26.5–33)
MCHC RBC AUTO-ENTMCNC: 34.6 G/DL (ref 31.5–36.5)
MCV RBC AUTO: 94 FL (ref 78–100)
PLATELET # BLD AUTO: 296 10E9/L (ref 150–450)
POTASSIUM SERPL-SCNC: 3.8 MMOL/L (ref 3.4–5.3)
RBC # BLD AUTO: 3.34 10E12/L (ref 3.8–5.2)
SODIUM SERPL-SCNC: 133 MMOL/L (ref 133–144)
WBC # BLD AUTO: 8.4 10E9/L (ref 4–11)

## 2018-10-22 PROCEDURE — 12000007 ZZH R&B INTERMEDIATE

## 2018-10-22 PROCEDURE — 25000132 ZZH RX MED GY IP 250 OP 250 PS 637: Performed by: HOSPITALIST

## 2018-10-22 PROCEDURE — 71045 X-RAY EXAM CHEST 1 VIEW: CPT

## 2018-10-22 PROCEDURE — 25000132 ZZH RX MED GY IP 250 OP 250 PS 637: Performed by: THORACIC SURGERY (CARDIOTHORACIC VASCULAR SURGERY)

## 2018-10-22 PROCEDURE — 36415 COLL VENOUS BLD VENIPUNCTURE: CPT | Performed by: HOSPITALIST

## 2018-10-22 PROCEDURE — 25000132 ZZH RX MED GY IP 250 OP 250 PS 637: Performed by: PHYSICIAN ASSISTANT

## 2018-10-22 PROCEDURE — 25000125 ZZHC RX 250: Performed by: PHYSICIAN ASSISTANT

## 2018-10-22 PROCEDURE — 25000128 H RX IP 250 OP 636: Performed by: PHYSICIAN ASSISTANT

## 2018-10-22 PROCEDURE — 99232 SBSQ HOSP IP/OBS MODERATE 35: CPT | Performed by: HOSPITALIST

## 2018-10-22 PROCEDURE — 85027 COMPLETE CBC AUTOMATED: CPT | Performed by: HOSPITALIST

## 2018-10-22 PROCEDURE — 80048 BASIC METABOLIC PNL TOTAL CA: CPT | Performed by: HOSPITALIST

## 2018-10-22 PROCEDURE — 25000132 ZZH RX MED GY IP 250 OP 250 PS 637: Performed by: NURSE PRACTITIONER

## 2018-10-22 RX ORDER — CYCLOBENZAPRINE HCL 5 MG
5 TABLET ORAL 3 TIMES DAILY PRN
Status: DISCONTINUED | OUTPATIENT
Start: 2018-10-22 | End: 2018-10-26 | Stop reason: HOSPADM

## 2018-10-22 RX ORDER — POLYETHYLENE GLYCOL 3350 17 G/17G
17 POWDER, FOR SOLUTION ORAL ONCE
Status: COMPLETED | OUTPATIENT
Start: 2018-10-22 | End: 2018-10-22

## 2018-10-22 RX ORDER — POLYETHYLENE GLYCOL 3350 17 G/17G
17 POWDER, FOR SOLUTION ORAL 2 TIMES DAILY
Status: DISCONTINUED | OUTPATIENT
Start: 2018-10-22 | End: 2018-10-23

## 2018-10-22 RX ADMIN — IBUPROFEN 600 MG: 600 TABLET, FILM COATED ORAL at 03:31

## 2018-10-22 RX ADMIN — DULOXETINE HYDROCHLORIDE 60 MG: 30 CAPSULE, DELAYED RELEASE ORAL at 08:16

## 2018-10-22 RX ADMIN — HYDROMORPHONE HYDROCHLORIDE 4 MG: 2 TABLET ORAL at 03:32

## 2018-10-22 RX ADMIN — OMEPRAZOLE 40 MG: 20 CAPSULE, DELAYED RELEASE ORAL at 08:16

## 2018-10-22 RX ADMIN — BACITRACIN: 500 OINTMENT TOPICAL at 10:30

## 2018-10-22 RX ADMIN — ACETAMINOPHEN 650 MG: 325 TABLET, FILM COATED ORAL at 06:54

## 2018-10-22 RX ADMIN — HYDROMORPHONE HYDROCHLORIDE 4 MG: 2 TABLET ORAL at 06:54

## 2018-10-22 RX ADMIN — BACITRACIN: 500 OINTMENT TOPICAL at 21:19

## 2018-10-22 RX ADMIN — ENOXAPARIN SODIUM 40 MG: 40 INJECTION SUBCUTANEOUS at 08:17

## 2018-10-22 RX ADMIN — HYDROMORPHONE HYDROCHLORIDE 4 MG: 2 TABLET ORAL at 10:08

## 2018-10-22 RX ADMIN — IBUPROFEN 600 MG: 600 TABLET, FILM COATED ORAL at 08:15

## 2018-10-22 RX ADMIN — Medication 2 SPRAY: at 08:23

## 2018-10-22 RX ADMIN — HYDROMORPHONE HYDROCHLORIDE 4 MG: 2 TABLET ORAL at 21:10

## 2018-10-22 RX ADMIN — IBUPROFEN 600 MG: 600 TABLET, FILM COATED ORAL at 15:41

## 2018-10-22 RX ADMIN — CYCLOBENZAPRINE HYDROCHLORIDE 5 MG: 5 TABLET, FILM COATED ORAL at 10:18

## 2018-10-22 RX ADMIN — ACETAMINOPHEN 650 MG: 325 TABLET, FILM COATED ORAL at 15:41

## 2018-10-22 RX ADMIN — METOPROLOL TARTRATE 25 MG: 25 TABLET ORAL at 21:09

## 2018-10-22 RX ADMIN — HYDROMORPHONE HYDROCHLORIDE 4 MG: 2 TABLET ORAL at 17:57

## 2018-10-22 RX ADMIN — HYDROMORPHONE HYDROCHLORIDE 4 MG: 2 TABLET ORAL at 13:02

## 2018-10-22 RX ADMIN — SENNOSIDES AND DOCUSATE SODIUM 2 TABLET: 8.6; 5 TABLET ORAL at 08:15

## 2018-10-22 RX ADMIN — IBUPROFEN 600 MG: 600 TABLET, FILM COATED ORAL at 21:09

## 2018-10-22 RX ADMIN — ACETAMINOPHEN 650 MG: 325 TABLET, FILM COATED ORAL at 19:38

## 2018-10-22 RX ADMIN — ACETAMINOPHEN 650 MG: 325 TABLET, FILM COATED ORAL at 11:07

## 2018-10-22 RX ADMIN — POLYETHYLENE GLYCOL 3350 17 G: 17 POWDER, FOR SOLUTION ORAL at 12:18

## 2018-10-22 ASSESSMENT — ACTIVITIES OF DAILY LIVING (ADL)
ADLS_ACUITY_SCORE: 11

## 2018-10-22 NOTE — PLAN OF CARE
Problem: Patient Care Overview  Goal: Plan of Care/Patient Progress Review  Outcome: No Change  Pt is A&O x4, VSS, CT x2, continuous air  leak present in CT 2 and intermittent air leak in CT 1, MD aware. Small amount of crepitus around incision and up to neck, improving per pt report, CT sites CDI, dressing changed this AM, CT's to -10 suction. LS clear and diminished, pt uses IS with encouragement and blue acapella device, BS+ active and audible in all quadrants, passing gas, no BM yet-refused suppository this AM, PP+. Pt has scheduled ibuprofen and PRN dilaudid and Tylenol for pain control. Pt is SBA, regular diet, able to feed self, fair appetite.

## 2018-10-22 NOTE — PLAN OF CARE
Problem: Patient Care Overview  Goal: Plan of Care/Patient Progress Review  Outcome: No Change  Patient alert and oriented, VSS, SBA, Regular diet. Air leak in chest tube 1 and 2, intermittent bubbling in chest tube 1, continuous bubbling in chest tube 2, dressing changed. Incision WDL steri stripes. ON-Q pump infusing, sensors taped, clamps opened. Pain managed with PRN PO dilaudid and Tylenol. Continue to Monitor.

## 2018-10-22 NOTE — PLAN OF CARE
Problem: Patient Care Overview  Goal: Plan of Care/Patient Progress Review  Outcome: Improving  Pt A & O x 4.  BS+; Flatus+; no BM; was given 2nd step of constipation protocol (tomorrow suppository if no results).  Tolerating regular diet; fair appetite.  Ambulates w/ stand by assist.  Pain managed with dilaudid q 3 hours & tylenol q 4 hours.  Sterile dressing with bacitracin applied to chest tube(s) site x 1.  VSS.  Chest tube 1: intermittent air leak; Chest tube 2:  Constant air leak; provider aware of status.  Minimal output of 10ml's per chest tube.  Numbness in abdomen; baseline since surgery.  CMS intact.  Voiding adequately.

## 2018-10-22 NOTE — PROGRESS NOTES
[facility]    Hospitalist Progress Note      Assessment & Plan    Ms. Dixon is a 62-year-old woman with medical history esophageal cancer status post esophagectomy in 03/2016 with a G-tube placement that was ultimately removed, history of alcohol abuse, sober for the last 16 years, pancreatic pseudocyst, protein calorie malnutrition, hypertension, postherpetic neuralgia following her esophagectomy, abnormal Pap smear, carotid disease, depression, anxiety and GERD admitted on 10/16 for redo right thoracotomy, extensive pneumolysis and a right lower lobectomy for a right lower lobe lesion found on surveillance PET scan for history of esophageal cancer.    Pathology showed squamous carcinoma.  The patient is having an uneventful postoperative course.  Her chest tube is still in place and on suction.     Status post right redo-thoracotomy, extensive pneumolysis and right lower lobe lobectomy for right lower lobe lesion found on a surveillance PET scan with history of esophageal cancer  On 10/16/2018 with Dr. Alvin Riojas she underwent a right lower lobe lobectomy via redo thoracotomy with extensive pneumolysis.  Perioperatively successfully extubated.  CXR 10/18 -Increase in subcutaneous emphysema over the right lateral chest wall and in the right supraclavicular fossa. Mild mediastinal emphysema. The two chest tubes on the right are unchanged.  10/20- respiratory status is stable. Chest tube on suction- management per surgery. Pathology showed squamous carcinoma so she likely has stage IA NSCLC- surgeon discussed results with thepatient   10/22- chest tubes still in place  - ON-Q removed, encourage IS and flutter valve  - Added cyclobenzaprine 5mg TID PRN for back spasms.     Hyponatremia likely dilutional/SIADH                              Sodium dropped from 134 >128 > 133  ProBNP 616.  Albumin 2.9  Strict input-output monitoring.  10/20- Na up from 128 to 133  10/22- 133     Acute anemia  postoperatively-likely dilutional/surgical blood loss  Hemoglobin dropped from 13.1 > 10.5.  Baseline hemoglobin around 13.  Serum iron 39, iron saturation index 16.  .  ProBNP 616.  10/19- hemoglobin was 10.5  10/22- Hgb 10.9, appears stable for now    Hypertension  - hydrochlorothiazide still held due to blood pressuresContinue PTA metoprolol with hold parameters.  10/20- blood pressure good today- no changes to medications   10/22- stable, no changes     Hyperlipidemia  PTA not on medications.       Pancreatic pseudocyst, remote/resolved  Esophageal cancer, status post esophagectomy  GERD  Esophageal cancer diagnosed in 2015, status post esophagectomy in 03/2016 with a G-tube placement that was ultimately removed in 07/2017  Continue PTA omeprazole 40 mg orally daily.     Protein calorie malnutrition with BMI 17.29-From malignancy/poor oral intake  Dietary supplements with Ensure as tolerated.      History of alcohol abuse, now sober for the last 16 years  Anxiety, depression  Postherpetic neuralgia  Continue PTA Cymbalta.        Pre diabetes.  Hemoglobin A1c 5.8  Monitor blood sugars periodically.  Dietary modification as able    DVT Prophylaxis  Pneumatic Compression Devices    Code Status   Full Code    Disposition  Expected discharge home per primary service when chest tube has been removed    Kirsten Woodard MD  Text Page (7am - 6pm)    Interval History   Difficulty with bowel movements, wants to avoid suppository. Wants to try miralax BID for today. Has been walking around. Reports some back spasms that improve with muscle relaxers, requesting this medication for help at night. On Q removed by CT surgery group.     -Data reviewed today: I reviewed all new labs and imaging results over the last 24 hours. I personally reviewed no images or EKG's today.    Physical Exam   Temp: 98.5  F (36.9  C) Temp src: Oral BP: 135/55 Pulse: 82 Heart Rate: 75 Resp: 16 SpO2: 98 % O2 Device: None (Room air)    There  were no vitals filed for this visit.  Vital Signs with Ranges  Temp:  [97.6  F (36.4  C)-98.7  F (37.1  C)] 98.5  F (36.9  C)  Pulse:  [81-82] 82  Heart Rate:  [75-93] 75  Resp:  [16] 16  BP: (109-161)/(47-66) 135/55  SpO2:  [96 %-98 %] 98 %  I/O last 3 completed shifts:  In: 300 [P.O.:300]  Out: 1810 [Urine:1700; Chest Tube:110]    Constitutional: Awake, alert, cooperative, no apparent distress  Respiratory: Clear to auscultation on left. On right has some crackles, slightly decreased breath sounds compared to left.   Cardiovascular: Regular rate and rhythm, normal S1 and S2, and no murmur noted  GI: Normal bowel sounds, soft, non-distended, non-tender  Skin/Integumen: No rashes, no cyanosis, no edema  Other: + right chest tube x2, first pleuravac with some air leak     Medications     - MEDICATION INSTRUCTIONS -         bacitracin   Topical TID     DULoxetine  60 mg Oral Daily     enoxaparin  40 mg Subcutaneous Q24H     fluticasone  2 spray Both Nostrils Daily     ibuprofen  600 mg Oral Q6H BHAVESH     metoprolol tartrate  25 mg Oral BID     omeprazole  40 mg Oral Daily     polyethylene glycol  17 g Oral BID     senna-docusate  1 tablet Oral BID    Or     senna-docusate  2 tablet Oral BID       Data     Recent Labs  Lab 10/22/18  0813 10/19/18  0811 10/18/18  0811 10/17/18  0645 10/16/18  0701   WBC 8.4 7.0 7.5  --  6.0   HGB 10.9* 10.5* 10.5* 10.4* 13.1   MCV 94 94 95  --  93    216 222  --  257   INR  --   --   --   --  1.01    133 128* 134 135   POTASSIUM 3.8  --  3.9 3.8 4.2   CHLORIDE 97  --  96 101 101   CO2 29  --  24 25 28   BUN 15  --  13 19 20   CR 0.51* 0.49* 0.49* 0.61 0.61   ANIONGAP 7  --  8 8 6   VICENTE 8.5  --  7.9* 8.0* 9.1   *  --  192* 96 105*   ALBUMIN  --   --  2.9*  --   --    PROTTOTAL  --   --  5.8*  --   --    BILITOTAL  --   --  0.3  --   --    ALKPHOS  --   --  47  --   --    ALT  --   --  20  --   --    AST  --   --  27  --   --        Recent Results (from the past 24  hour(s))   XR Chest Port 1 View    Narrative    XR CHEST PORT 1 VW  10/22/2018 5:50 AM     HISTORY:  s/p right lower lobectomy;     COMPARISON: Film performed on 10/21/2018    FINDINGS:  Right chest tubes are in place. No definite pneumothorax is  identified. Subcutaneous gas is seen along the right chest wall. Left  lung remains clear. The gas collection in the mediastinum is  consistent with the patient's history of esophagectomy and gastric  pull-through procedure.      Impression    IMPRESSION:  1. Stable, no change since 10/21/2018.  2. Mediastinal gas collection consistent with the patient's history of  a gastric pull-through procedure.    COURTNEY CHOUDHARY MD

## 2018-10-22 NOTE — PROGRESS NOTES
SPIRITUAL HEALTH SERVICES  Spiritual Assessment Progress Note  [facility] 31   attempted visit with pt due to LOS.  Pt declined visit stating that she had the support she needed.      No follow up planned.                                                                                                                                            Yari Baca M.Div., [facility]  Staff    Pager [phone]

## 2018-10-22 NOTE — PROGRESS NOTES
"Thoracic Surgery POD #6:  /60 (BP Location: Left arm)  Pulse 81  Temp 98.7  F (37.1  C) (Oral)  Resp 16  Ht 1.575 m (5' 2\")  SpO2 98%  BMI 17.01 kg/m2  CXR: lungs expanded  CT: serous output, intermittent air leak in #1, small initial bubbling in CT #2 with cough only  S: Doing well- walking, using IS and flutter valve, pain managed, On-Q empty. Discussed bowels and CTs.  O: OnQ: DCed without complication  Inc.:  Benign, no erythema  P: COnt CT to -10 suction  Aggressive bowel regimen    Destiny Kendall PA-C with Dr. Alvin Riojas  [facility]  Cell [phone]        "

## 2018-10-23 ENCOUNTER — APPOINTMENT (OUTPATIENT)
Dept: GENERAL RADIOLOGY | Facility: CLINIC | Age: 63
End: 2018-10-23
Attending: PHYSICIAN ASSISTANT
Payer: COMMERCIAL

## 2018-10-23 PROCEDURE — 25000132 ZZH RX MED GY IP 250 OP 250 PS 637: Performed by: NURSE PRACTITIONER

## 2018-10-23 PROCEDURE — 25000132 ZZH RX MED GY IP 250 OP 250 PS 637: Performed by: HOSPITALIST

## 2018-10-23 PROCEDURE — 25000128 H RX IP 250 OP 636: Performed by: PHYSICIAN ASSISTANT

## 2018-10-23 PROCEDURE — 25000132 ZZH RX MED GY IP 250 OP 250 PS 637: Performed by: PHYSICIAN ASSISTANT

## 2018-10-23 PROCEDURE — 71045 X-RAY EXAM CHEST 1 VIEW: CPT

## 2018-10-23 PROCEDURE — 99232 SBSQ HOSP IP/OBS MODERATE 35: CPT | Performed by: HOSPITALIST

## 2018-10-23 PROCEDURE — 12000007 ZZH R&B INTERMEDIATE

## 2018-10-23 PROCEDURE — 25000125 ZZHC RX 250: Performed by: PHYSICIAN ASSISTANT

## 2018-10-23 PROCEDURE — 25000132 ZZH RX MED GY IP 250 OP 250 PS 637: Performed by: THORACIC SURGERY (CARDIOTHORACIC VASCULAR SURGERY)

## 2018-10-23 RX ORDER — POLYETHYLENE GLYCOL 3350 17 G/17G
17 POWDER, FOR SOLUTION ORAL DAILY PRN
Status: DISCONTINUED | OUTPATIENT
Start: 2018-10-23 | End: 2018-10-26 | Stop reason: HOSPADM

## 2018-10-23 RX ADMIN — HYDROMORPHONE HYDROCHLORIDE 4 MG: 2 TABLET ORAL at 00:22

## 2018-10-23 RX ADMIN — HYDROMORPHONE HYDROCHLORIDE 4 MG: 2 TABLET ORAL at 18:17

## 2018-10-23 RX ADMIN — ACETAMINOPHEN 650 MG: 325 TABLET, FILM COATED ORAL at 11:50

## 2018-10-23 RX ADMIN — IBUPROFEN 600 MG: 600 TABLET, FILM COATED ORAL at 09:26

## 2018-10-23 RX ADMIN — HYDROMORPHONE HYDROCHLORIDE 4 MG: 2 TABLET ORAL at 07:43

## 2018-10-23 RX ADMIN — DULOXETINE HYDROCHLORIDE 60 MG: 30 CAPSULE, DELAYED RELEASE ORAL at 09:27

## 2018-10-23 RX ADMIN — CYCLOBENZAPRINE HYDROCHLORIDE 5 MG: 5 TABLET, FILM COATED ORAL at 22:44

## 2018-10-23 RX ADMIN — BACITRACIN: 500 OINTMENT TOPICAL at 13:35

## 2018-10-23 RX ADMIN — IBUPROFEN 600 MG: 600 TABLET, FILM COATED ORAL at 20:21

## 2018-10-23 RX ADMIN — IBUPROFEN 600 MG: 600 TABLET, FILM COATED ORAL at 03:04

## 2018-10-23 RX ADMIN — Medication 2 SPRAY: at 09:29

## 2018-10-23 RX ADMIN — HYDROMORPHONE HYDROCHLORIDE 4 MG: 2 TABLET ORAL at 04:15

## 2018-10-23 RX ADMIN — CYCLOBENZAPRINE HYDROCHLORIDE 5 MG: 5 TABLET, FILM COATED ORAL at 03:07

## 2018-10-23 RX ADMIN — OMEPRAZOLE 40 MG: 20 CAPSULE, DELAYED RELEASE ORAL at 09:26

## 2018-10-23 RX ADMIN — ENOXAPARIN SODIUM 40 MG: 40 INJECTION SUBCUTANEOUS at 09:26

## 2018-10-23 RX ADMIN — ACETAMINOPHEN 650 MG: 325 TABLET, FILM COATED ORAL at 04:18

## 2018-10-23 RX ADMIN — HYDROMORPHONE HYDROCHLORIDE 4 MG: 2 TABLET ORAL at 21:29

## 2018-10-23 RX ADMIN — HYDROMORPHONE HYDROCHLORIDE 4 MG: 2 TABLET ORAL at 11:50

## 2018-10-23 RX ADMIN — HYDROMORPHONE HYDROCHLORIDE 4 MG: 2 TABLET ORAL at 15:10

## 2018-10-23 RX ADMIN — METOPROLOL TARTRATE 25 MG: 25 TABLET ORAL at 09:26

## 2018-10-23 RX ADMIN — BACITRACIN: 500 OINTMENT TOPICAL at 20:22

## 2018-10-23 RX ADMIN — IBUPROFEN 600 MG: 600 TABLET, FILM COATED ORAL at 15:10

## 2018-10-23 RX ADMIN — METOPROLOL TARTRATE 25 MG: 25 TABLET ORAL at 20:20

## 2018-10-23 RX ADMIN — ACETAMINOPHEN 650 MG: 325 TABLET, FILM COATED ORAL at 18:18

## 2018-10-23 ASSESSMENT — ACTIVITIES OF DAILY LIVING (ADL)
ADLS_ACUITY_SCORE: 11

## 2018-10-23 ASSESSMENT — PAIN DESCRIPTION - DESCRIPTORS
DESCRIPTORS: ACHING;SHARP
DESCRIPTORS: ACHING;SHARP

## 2018-10-23 NOTE — PLAN OF CARE
Problem: Patient Care Overview  Goal: Plan of Care/Patient Progress Review  Outcome: No Change  Pt is A&O x4, AVSS, LS diminished, fine crackles lower lobes. CT x2, continuous air leak present in CT 2 and intermittent air leak in CT 1, MD aware. Small amount of crepitus around incision and up to neck. CT sites CDI, dressing changed, CT's to -10 suction. BS+ active and audible in all quadrants, passing gas, + BM. Pt has scheduled ibuprofen and PRN dilaudid and Tylenol for pain control. SBA. Tolerating regular diet.

## 2018-10-23 NOTE — PROGRESS NOTES
THORACIC ABBY BASSAM POD # 7    Doing well  AVSS on RA  Air leak with cough only    CXR ok    CT to water seal today    Discussed    KATIE SHELTON MD [facility] ONCOLOGY THORACIC SURGERY  CELL:  [phone]  OFFICE: [phone]

## 2018-10-23 NOTE — PROGRESS NOTES
[facility]    Hospitalist Progress Note      Assessment & Plan    Ms. Dixon is a 62-year-old woman with medical history esophageal cancer status post esophagectomy in 03/2016 with a G-tube placement that was ultimately removed, history of alcohol abuse, sober for the last 16 years, pancreatic pseudocyst, protein calorie malnutrition, hypertension, postherpetic neuralgia following her esophagectomy, abnormal Pap smear, carotid disease, depression, anxiety and GERD admitted on 10/16 for redo right thoracotomy, extensive pneumolysis and a right lower lobectomy for a right lower lobe lesion found on surveillance PET scan for history of esophageal cancer.    Pathology showed squamous carcinoma.  The patient is having an uneventful postoperative course.     Status post right redo-thoracotomy, extensive pneumolysis and right lower lobe lobectomy for right lower lobe lesion found on a surveillance PET scan with history of esophageal cancer  On 10/16/2018 with Dr. Alvin Riojas she underwent a right lower lobe lobectomy via redo thoracotomy with extensive pneumolysis.  Perioperatively successfully extubated.  CXR 10/18 -Increase in subcutaneous emphysema over the right lateral chest wall and in the right supraclavicular fossa. Mild mediastinal emphysema. The two chest tubes on the right are unchanged.  10/20- respiratory status is stable. Chest tube on suction- management per surgery. Pathology showed squamous carcinoma so she likely has stage IA NSCLC- surgeon discussed results with thepatient   10/23- chest tubes still in place, now on water seal per thoracic surgery  - ON-Q removed on 10/22, encourage IS and flutter valve  - Continue cyclobenzaprine 5mg TID PRN for back spasms.     Hyponatremia likely dilutional/SIADH                              Sodium dropped from 134 >128 > 133  ProBNP 616.  Albumin 2.9  Strict input-output monitoring.  10/20- Na up from 128 to 133  10/22- 133  - Repeat BMP in  AM     Acute anemia postoperatively-likely dilutional/surgical blood loss  Hemoglobin dropped from 13.1 > 10.5.  Baseline hemoglobin around 13.  Serum iron 39, iron saturation index 16.  .  ProBNP 616.  10/19- hemoglobin was 10.5  10/22- Hgb 10.9, appears stable for now  - Repeat CBC in AM    Hypertension  - hydrochlorothiazide still held due to blood pressures  - Continue PTA metoprolol with hold parameters.  10/20- blood pressure good today- no changes to medications   10/23- stable, slightly elevated overnight, but this morning was okay. Trend BPs     Hyperlipidemia  PTA not on medications.       Pancreatic pseudocyst, remote/resolved  Esophageal cancer, status post esophagectomy  GERD  Esophageal cancer diagnosed in 2015, status post esophagectomy in 03/2016 with a G-tube placement that was ultimately removed in 07/2017  Continue PTA omeprazole 40 mg orally daily.     Protein calorie malnutrition with BMI 17.29-From malignancy/poor oral intake  Dietary supplements with Ensure as tolerated.    Constipation  - increased dosage of miralax yesterday and patient was able to have a bowel movement today (her first since the procedure)  - Decrease regimen to miralax prn, keep senokot scheduled      History of alcohol abuse, now sober for the last 16 years  Anxiety, depression  Postherpetic neuralgia  Continue PTA Cymbalta.        Pre diabetes.  Hemoglobin A1c 5.8  Monitor blood sugars periodically.  Dietary modification as able    DVT Prophylaxis  Pneumatic Compression Devices    Code Status   Full Code    Disposition  Expected discharge home per primary service when chest tube has been removed    Kirsten Woodard MD  Text Page (7am - 6pm)    Interval History   Had bowel movement this morning. Chest tubes now to water seal. Back spasms improved with prn flexeril.    -Data reviewed today: I reviewed all new labs and imaging results over the last 24 hours. I personally reviewed CXR from 10/23, appears unchanged from  10/22, still with subcutaneous air, no pneumothorax and chest tubes present    Physical Exam   Temp: 97.7  F (36.5  C) Temp src: Oral BP: 121/45 Pulse: 81 Heart Rate: 77 Resp: 18 SpO2: 97 % O2 Device: None (Room air)    There were no vitals filed for this visit.  Vital Signs with Ranges  Temp:  [97.5  F (36.4  C)-98.8  F (37.1  C)] 97.7  F (36.5  C)  Pulse:  [81-85] 81  Heart Rate:  [77-85] 77  Resp:  [16-18] 18  BP: (121-149)/(45-57) 121/45  SpO2:  [96 %-99 %] 97 %  I/O last 3 completed shifts:  In: 790 [P.O.:790]  Out: 1350 [Urine:1300; Chest Tube:50]    Constitutional: Awake, alert, cooperative, no apparent distress  Respiratory: Clear to auscultation on left. On right has some crackles, slightly decreased breath sounds compared to left.   Cardiovascular: Regular rate and rhythm, normal S1 and S2, and no murmur noted  Chest:  Right side chest wall insertion site with continued serous drainage soaking dressing  GI: Normal bowel sounds, soft, non-distended, non-tender  Skin/Integumen: No rashes, no cyanosis, no edema  Other: + right chest tube x2, first pleuravac with some air leak noted with cough    Medications     - MEDICATION INSTRUCTIONS -         bacitracin   Topical TID     DULoxetine  60 mg Oral Daily     enoxaparin  40 mg Subcutaneous Q24H     fluticasone  2 spray Both Nostrils Daily     ibuprofen  600 mg Oral Q6H BHAVESH     metoprolol tartrate  25 mg Oral BID     omeprazole  40 mg Oral Daily     polyethylene glycol  17 g Oral BID     senna-docusate  1 tablet Oral BID    Or     senna-docusate  2 tablet Oral BID       Data     Recent Labs  Lab 10/22/18  0813 10/19/18  0811 10/18/18  0811 10/17/18  0645   WBC 8.4 7.0 7.5  --    HGB 10.9* 10.5* 10.5* 10.4*   MCV 94 94 95  --     216 222  --     133 128* 134   POTASSIUM 3.8  --  3.9 3.8   CHLORIDE 97  --  96 101   CO2 29  --  24 25   BUN 15  --  13 19   CR 0.51* 0.49* 0.49* 0.61   ANIONGAP 7  --  8 8   VICENTE 8.5  --  7.9* 8.0*   *  --  192* 96    ALBUMIN  --   --  2.9*  --    PROTTOTAL  --   --  5.8*  --    BILITOTAL  --   --  0.3  --    ALKPHOS  --   --  47  --    ALT  --   --  20  --    AST  --   --  27  --        Recent Results (from the past 24 hour(s))   XR Chest Port 1 View    Narrative    CHEST ONE VIEW UPRIGHT  10/23/2018 4:50 AM     HISTORY: Status post right lower lobectomy.     COMPARISON: October 22, 2018      Impression    IMPRESSION: Chest tubes and subcutaneous emphysema noted on the right.  No definite pneumothorax. Left lung clear. No definite change from  comparison.    ANGIE ROSE MD

## 2018-10-23 NOTE — PLAN OF CARE
Problem: Patient Care Overview  Goal: Plan of Care/Patient Progress Review  Outcome: Improving  A+O AVSS on room air. Fine crackles in R lobe. CTx2, CT2 w/ continuous airleak, CT 1 w/ intermittent air leak. Crepitus around incision and up to neck. CT dressing CDI. Thoracotomy incision w/ steri strips. BS active, flatus+. Up SBA. Pain controlled w/ prn dilaudid, flexeril, tylenol and scheduled ibuprofen.

## 2018-10-24 ENCOUNTER — APPOINTMENT (OUTPATIENT)
Dept: GENERAL RADIOLOGY | Facility: CLINIC | Age: 63
End: 2018-10-24
Attending: THORACIC SURGERY (CARDIOTHORACIC VASCULAR SURGERY)
Payer: COMMERCIAL

## 2018-10-24 LAB
ANION GAP SERPL CALCULATED.3IONS-SCNC: 7 MMOL/L (ref 3–14)
BUN SERPL-MCNC: 15 MG/DL (ref 7–30)
CALCIUM SERPL-MCNC: 8.3 MG/DL (ref 8.5–10.1)
CHLORIDE SERPL-SCNC: 96 MMOL/L (ref 94–109)
CO2 SERPL-SCNC: 28 MMOL/L (ref 20–32)
CREAT SERPL-MCNC: 0.53 MG/DL (ref 0.52–1.04)
ERYTHROCYTE [DISTWIDTH] IN BLOOD BY AUTOMATED COUNT: 12.4 % (ref 10–15)
GFR SERPL CREATININE-BSD FRML MDRD: >90 ML/MIN/1.7M2
GLUCOSE SERPL-MCNC: 189 MG/DL (ref 70–99)
HCT VFR BLD AUTO: 32.1 % (ref 35–47)
HGB BLD-MCNC: 10.9 G/DL (ref 11.7–15.7)
MCH RBC QN AUTO: 32.3 PG (ref 26.5–33)
MCHC RBC AUTO-ENTMCNC: 34 G/DL (ref 31.5–36.5)
MCV RBC AUTO: 95 FL (ref 78–100)
PLATELET # BLD AUTO: 348 10E9/L (ref 150–450)
POTASSIUM SERPL-SCNC: 3.8 MMOL/L (ref 3.4–5.3)
RBC # BLD AUTO: 3.37 10E12/L (ref 3.8–5.2)
SODIUM SERPL-SCNC: 131 MMOL/L (ref 133–144)
WBC # BLD AUTO: 8.3 10E9/L (ref 4–11)

## 2018-10-24 PROCEDURE — 25000132 ZZH RX MED GY IP 250 OP 250 PS 637: Performed by: HOSPITALIST

## 2018-10-24 PROCEDURE — 85027 COMPLETE CBC AUTOMATED: CPT | Performed by: HOSPITALIST

## 2018-10-24 PROCEDURE — 25000132 ZZH RX MED GY IP 250 OP 250 PS 637: Performed by: PHYSICIAN ASSISTANT

## 2018-10-24 PROCEDURE — 36415 COLL VENOUS BLD VENIPUNCTURE: CPT | Performed by: HOSPITALIST

## 2018-10-24 PROCEDURE — 71045 X-RAY EXAM CHEST 1 VIEW: CPT

## 2018-10-24 PROCEDURE — 12000007 ZZH R&B INTERMEDIATE

## 2018-10-24 PROCEDURE — 80048 BASIC METABOLIC PNL TOTAL CA: CPT | Performed by: HOSPITALIST

## 2018-10-24 PROCEDURE — 25000132 ZZH RX MED GY IP 250 OP 250 PS 637: Performed by: NURSE PRACTITIONER

## 2018-10-24 PROCEDURE — 25000132 ZZH RX MED GY IP 250 OP 250 PS 637: Performed by: THORACIC SURGERY (CARDIOTHORACIC VASCULAR SURGERY)

## 2018-10-24 PROCEDURE — 25000128 H RX IP 250 OP 636: Performed by: PHYSICIAN ASSISTANT

## 2018-10-24 PROCEDURE — 99232 SBSQ HOSP IP/OBS MODERATE 35: CPT | Performed by: HOSPITALIST

## 2018-10-24 RX ADMIN — METOPROLOL TARTRATE 25 MG: 25 TABLET ORAL at 21:20

## 2018-10-24 RX ADMIN — IBUPROFEN 600 MG: 600 TABLET, FILM COATED ORAL at 21:20

## 2018-10-24 RX ADMIN — IBUPROFEN 600 MG: 600 TABLET, FILM COATED ORAL at 09:17

## 2018-10-24 RX ADMIN — CYCLOBENZAPRINE HYDROCHLORIDE 5 MG: 5 TABLET, FILM COATED ORAL at 16:00

## 2018-10-24 RX ADMIN — HYDROMORPHONE HYDROCHLORIDE 4 MG: 2 TABLET ORAL at 18:00

## 2018-10-24 RX ADMIN — METOPROLOL TARTRATE 25 MG: 25 TABLET ORAL at 09:17

## 2018-10-24 RX ADMIN — HYDROMORPHONE HYDROCHLORIDE 4 MG: 2 TABLET ORAL at 09:17

## 2018-10-24 RX ADMIN — HYDROMORPHONE HYDROCHLORIDE 4 MG: 2 TABLET ORAL at 12:11

## 2018-10-24 RX ADMIN — ENOXAPARIN SODIUM 40 MG: 40 INJECTION SUBCUTANEOUS at 07:55

## 2018-10-24 RX ADMIN — HYDROMORPHONE HYDROCHLORIDE 4 MG: 2 TABLET ORAL at 00:50

## 2018-10-24 RX ADMIN — SENNOSIDES AND DOCUSATE SODIUM 2 TABLET: 8.6; 5 TABLET ORAL at 09:17

## 2018-10-24 RX ADMIN — DULOXETINE HYDROCHLORIDE 60 MG: 30 CAPSULE, DELAYED RELEASE ORAL at 09:16

## 2018-10-24 RX ADMIN — HYDROMORPHONE HYDROCHLORIDE 4 MG: 2 TABLET ORAL at 06:16

## 2018-10-24 RX ADMIN — HYDROMORPHONE HYDROCHLORIDE 4 MG: 2 TABLET ORAL at 14:45

## 2018-10-24 RX ADMIN — CYCLOBENZAPRINE HYDROCHLORIDE 5 MG: 5 TABLET, FILM COATED ORAL at 07:54

## 2018-10-24 RX ADMIN — SENNOSIDES AND DOCUSATE SODIUM 2 TABLET: 8.6; 5 TABLET ORAL at 21:19

## 2018-10-24 RX ADMIN — IBUPROFEN 600 MG: 600 TABLET, FILM COATED ORAL at 14:45

## 2018-10-24 RX ADMIN — HYDROMORPHONE HYDROCHLORIDE 4 MG: 2 TABLET ORAL at 03:33

## 2018-10-24 RX ADMIN — HYDROMORPHONE HYDROCHLORIDE 4 MG: 2 TABLET ORAL at 21:20

## 2018-10-24 RX ADMIN — OMEPRAZOLE 40 MG: 20 CAPSULE, DELAYED RELEASE ORAL at 09:16

## 2018-10-24 RX ADMIN — IBUPROFEN 600 MG: 600 TABLET, FILM COATED ORAL at 03:33

## 2018-10-24 ASSESSMENT — ACTIVITIES OF DAILY LIVING (ADL)
ADLS_ACUITY_SCORE: 11
ADLS_ACUITY_SCORE: 12

## 2018-10-24 NOTE — PROGRESS NOTES
[facility]    Hospitalist Progress Note      Assessment & Plan    Ms. Dixon is a 62-year-old woman with medical history esophageal cancer status post esophagectomy in 03/2016 with a G-tube placement that was ultimately removed, history of alcohol abuse, sober for the last 16 years, pancreatic pseudocyst, protein calorie malnutrition, hypertension, postherpetic neuralgia following her esophagectomy, abnormal Pap smear, carotid disease, depression, anxiety and GERD admitted on 10/16 for redo right thoracotomy, extensive pneumolysis and a right lower lobectomy for a right lower lobe lesion found on surveillance PET scan for history of esophageal cancer.    Pathology showed squamous carcinoma.     Status post right redo-thoracotomy, extensive pneumolysis and right lower lobe lobectomy for right lower lobe lesion found on a surveillance PET scan with history of esophageal cancer  On 10/16/2018 with Dr. Alvin Riojas she underwent a right lower lobe lobectomy via redo thoracotomy with extensive pneumolysis.  Perioperatively successfully extubated.  CXR 10/18 -Increase in subcutaneous emphysema over the right lateral chest wall and in the right supraclavicular fossa. Mild mediastinal emphysema. The two chest tubes on the right are unchanged.  10/20- respiratory status is stable. Chest tube on suction- management per surgery. Pathology showed squamous carcinoma so she likely has stage IA NSCLC- surgeon discussed results with thepatient   10/23- chest tubes still in place, now on water seal per thoracic surgery  10/24 small pneumothorax, chest tube still in place, remains on water seal.  - Chest tube management per primary  - ON-Q removed on 10/22, encourage IS and flutter valve  - Continue cyclobenzaprine 5mg TID PRN for back spasms.     Hyponatremia likely dilutional/SIADH                              Sodium dropped from 134 >128 > 133  ProBNP 616.  Albumin 2.9  Strict input-output monitoring.  10/22-  133  10/24- 131  - Repeat BMP in AM, hold HCTZ     Acute anemia postoperatively-likely dilutional/surgical blood loss  Hemoglobin dropped from 13.1 > 10.5.  Baseline hemoglobin around 13.  Serum iron 39, iron saturation index 16.  .  ProBNP 616.  10/19- hemoglobin was 10.5  10/24- Hgb 10.9 (same as 10/22), appears stable for now    Hypertension  - hydrochlorothiazide still held due to blood pressures  - Continue PTA metoprolol with hold parameters.  10/20- blood pressure good today- no changes to medications   10/23- stable, slightly elevated overnight, but this morning was okay. Trend BPs  10/24 BPs stable     Hyperlipidemia  PTA not on medications.       Pancreatic pseudocyst, remote/resolved  Esophageal cancer, status post esophagectomy  GERD  Esophageal cancer diagnosed in 2015, status post esophagectomy in 03/2016 with a G-tube placement that was ultimately removed in 07/2017  Continue PTA omeprazole 40 mg orally daily.     Protein calorie malnutrition with BMI 17.29-From malignancy/poor oral intake  Dietary supplements with Ensure as tolerated.    Constipation  - increased dosage of miralax 10/22 and patient was able to have a bowel movement 10/23  - Continue miralax prn and senokot scheduled      History of alcohol abuse, now sober for the last 16 years  Anxiety, depression  Postherpetic neuralgia  Continue PTA Cymbalta.        Pre diabetes.  Hemoglobin A1c 5.8  Monitor blood sugars periodically.  Dietary modification as able    DVT Prophylaxis  Pneumatic Compression Devices    Code Status   Full Code    Disposition  Expected discharge home per primary service when chest tube has been removed    Kirsten Woodard MD  Text Page (7am - 6pm)    Interval History   Disappointed today that the chest tube is still in place and with small pneumothorax. Back spasms stable with prn flexeril. Has right sided chest discomfort that is present since the OnQ was removed. Denies shortness of breath, cough,  diarrhea.    -Data reviewed today: I reviewed all new labs and imaging results over the last 24 hours. I personally reviewed CXR from 10/24, still with subcutaneous air, chest tubes present, small pneumothorax present.    Physical Exam   Temp: 98.5  F (36.9  C) Temp src: Oral BP: 118/57 Pulse: 74 Heart Rate: 80 Resp: 18 SpO2: 97 % O2 Device: None (Room air)    There were no vitals filed for this visit.  Vital Signs with Ranges  Temp:  [97.5  F (36.4  C)-98.5  F (36.9  C)] 98.5  F (36.9  C)  Pulse:  [67-90] 74  Heart Rate:  [80-89] 80  Resp:  [16-18] 18  BP: ()/(45-61) 118/57  SpO2:  [95 %-99 %] 97 %  I/O last 3 completed shifts:  In: 1260 [P.O.:1260]  Out: 1167 [Urine:1150; Chest Tube:17]    Constitutional: Awake, alert, cooperative, no apparent distress  Respiratory: Clear to auscultation bilaterally, no crackles  Cardiovascular: Regular rate and rhythm, normal S1 and S2, and no murmur noted  Chest:  Right side chest wall insertion site with continued serous drainage soaking dressing  GI: Normal bowel sounds, soft, non-distended, non-tender  Skin/Integumen: No rashes, no cyanosis, no edema  Other: + right chest tube x2, less air leak than 10/23 but still present    Medications     - MEDICATION INSTRUCTIONS -         bacitracin   Topical TID     DULoxetine  60 mg Oral Daily     enoxaparin  40 mg Subcutaneous Q24H     fluticasone  2 spray Both Nostrils Daily     ibuprofen  600 mg Oral Q6H [facility]     metoprolol tartrate  25 mg Oral BID     omeprazole  40 mg Oral Daily     senna-docusate  1 tablet Oral BID    Or     senna-docusate  2 tablet Oral BID       Data     Recent Labs  Lab 10/24/18  0815 10/22/18  0813 10/19/18  0811 10/18/18  0811   WBC 8.3 8.4 7.0 7.5   HGB 10.9* 10.9* 10.5* 10.5*   MCV 95 94 94 95    296 216 222   * 133 133 128*   POTASSIUM 3.8 3.8  --  3.9   CHLORIDE 96 97  --  96   CO2 28 29  --  24   BUN 15 15  --  13   CR 0.53 0.51* 0.49* 0.49*   ANIONGAP 7 7  --  8   VICENTE 8.3* 8.5  --   7.9*   * 105*  --  192*   ALBUMIN  --   --   --  2.9*   PROTTOTAL  --   --   --  5.8*   BILITOTAL  --   --   --  0.3   ALKPHOS  --   --   --  47   ALT  --   --   --  20   AST  --   --   --  27       Recent Results (from the past 24 hour(s))   XR Chest Port 1 View    Narrative    XR CHEST PORT 1 VW  10/24/2018 6:13 AM     HISTORY:  Chest Tube;     COMPARISON: Film performed on 10/23/2018    FINDINGS:  Right chest tubes are in place. Small right pneumothorax is  seen. Subcutaneous emphysema seen along the right chest wall. Patient  is status post a gastric pull-through procedure. Left lung is clear.      Impression    IMPRESSION: Small right pneumothorax. No other change.    COURTNEY CHOUDHARY MD

## 2018-10-24 NOTE — PLAN OF CARE
Problem: Patient Care Overview  Goal: Plan of Care/Patient Progress Review  Outcome: Improving  Pt A&Ox4; VSS; on RA. Chest tube x2 on water-seal. Minimal output. Intermittent air leak as well as small crepitus (R side) noted. Pt denies any sob. Dressing changed/ CDI. Using IS and Acapella independently.  Incisional pain managed with PO analgesics. Up with SBA; ambulated in the sharma. Up into chair for all meals. Voiding without difficulties; had BM. Will continue to monitor.

## 2018-10-24 NOTE — PLAN OF CARE
Problem: Patient Care Overview  Goal: Plan of Care/Patient Progress Review  Outcome: Improving  Pt. A & O x 4.  Independent.  VSS.  Chest tubes to water seal; both have intermittent air leaks (with ambulating & coughing); dressing CDI.  Crepitus at insertion site for chest tubes.  Thoracotomy incision CDI.  Lungs diminished on right side; clear on left.  BS+; Flatus+; BM+.  Tolerating regular diet.  Pain managed with dilaudid & flexeril.

## 2018-10-24 NOTE — PROGRESS NOTES
THORACIC SURGERY POD # 8    Doing well  AVSS on RA  No air leak with deep breathing  Tiny air leak with initial cough    CXR small residual space    Leave CT on water seal for now    KATIE SHELTON MD [facility] ONCOLOGY THORACIC SURGERY  CELL:  [phone]  OFFICE: [phone]

## 2018-10-24 NOTE — PLAN OF CARE
Problem: Patient Care Overview  Goal: Plan of Care/Patient Progress Review  Outcome: No Change  Vital Signs stable, pt on room air. Chest tube to water seal,chest tube has intermittent air leak (#2 more than #1), with coughing and ambulating. Crepitus present by chest tube site and around right side of neck. Alert and Oriented. Lung sounds diminished on the left side and fine crackles throughout the lobes on the right. Bowel sounds active and audible. Passing flatus. Regular diet. Denies nausea. Adequate urinary output. CMS intact. Thoracotomy incision clean dry and intact and open to air with steri strip closures.Chest tube dressing changed twice throughout shift, Atrium also changed. Up with stand by assist. Pain controlled with PO Dilaudid, Flexeril. Slept well in between cares.

## 2018-10-25 ENCOUNTER — APPOINTMENT (OUTPATIENT)
Dept: GENERAL RADIOLOGY | Facility: CLINIC | Age: 63
End: 2018-10-25
Attending: THORACIC SURGERY (CARDIOTHORACIC VASCULAR SURGERY)
Payer: COMMERCIAL

## 2018-10-25 DIAGNOSIS — B02.29 NEURALGIA, POST-HERPETIC: ICD-10-CM

## 2018-10-25 DIAGNOSIS — I10 BENIGN ESSENTIAL HYPERTENSION: ICD-10-CM

## 2018-10-25 LAB
CREAT SERPL-MCNC: 0.56 MG/DL (ref 0.52–1.04)
GFR SERPL CREATININE-BSD FRML MDRD: >90 ML/MIN/1.7M2
PLATELET # BLD AUTO: 356 10E9/L (ref 150–450)
SODIUM SERPL-SCNC: 136 MMOL/L (ref 133–144)

## 2018-10-25 PROCEDURE — 82565 ASSAY OF CREATININE: CPT | Performed by: PHYSICIAN ASSISTANT

## 2018-10-25 PROCEDURE — 12000007 ZZH R&B INTERMEDIATE

## 2018-10-25 PROCEDURE — 84295 ASSAY OF SERUM SODIUM: CPT | Performed by: PHYSICIAN ASSISTANT

## 2018-10-25 PROCEDURE — 25000132 ZZH RX MED GY IP 250 OP 250 PS 637: Performed by: HOSPITALIST

## 2018-10-25 PROCEDURE — 99232 SBSQ HOSP IP/OBS MODERATE 35: CPT | Performed by: HOSPITALIST

## 2018-10-25 PROCEDURE — 25000125 ZZHC RX 250: Performed by: PHYSICIAN ASSISTANT

## 2018-10-25 PROCEDURE — 25000132 ZZH RX MED GY IP 250 OP 250 PS 637: Performed by: THORACIC SURGERY (CARDIOTHORACIC VASCULAR SURGERY)

## 2018-10-25 PROCEDURE — 25000132 ZZH RX MED GY IP 250 OP 250 PS 637: Performed by: NURSE PRACTITIONER

## 2018-10-25 PROCEDURE — 25000128 H RX IP 250 OP 636: Performed by: PHYSICIAN ASSISTANT

## 2018-10-25 PROCEDURE — 71045 X-RAY EXAM CHEST 1 VIEW: CPT

## 2018-10-25 PROCEDURE — 36415 COLL VENOUS BLD VENIPUNCTURE: CPT | Performed by: PHYSICIAN ASSISTANT

## 2018-10-25 PROCEDURE — 25000132 ZZH RX MED GY IP 250 OP 250 PS 637: Performed by: PHYSICIAN ASSISTANT

## 2018-10-25 PROCEDURE — 85049 AUTOMATED PLATELET COUNT: CPT | Performed by: PHYSICIAN ASSISTANT

## 2018-10-25 RX ORDER — GABAPENTIN 300 MG/1
CAPSULE ORAL
Start: 2018-10-25

## 2018-10-25 RX ORDER — METOPROLOL TARTRATE 25 MG/1
TABLET, FILM COATED ORAL
Qty: 180 TABLET | Refills: 1 | Status: SHIPPED | OUTPATIENT
Start: 2018-10-25 | End: 2019-04-12

## 2018-10-25 RX ORDER — HYDROCHLOROTHIAZIDE 12.5 MG/1
TABLET ORAL
Qty: 90 TABLET | Refills: 1 | Status: SHIPPED | OUTPATIENT
Start: 2018-10-25 | End: 2019-04-12

## 2018-10-25 RX ADMIN — HYDROMORPHONE HYDROCHLORIDE 4 MG: 2 TABLET ORAL at 12:09

## 2018-10-25 RX ADMIN — HYDROMORPHONE HYDROCHLORIDE 4 MG: 2 TABLET ORAL at 15:16

## 2018-10-25 RX ADMIN — BACITRACIN: 500 OINTMENT TOPICAL at 15:18

## 2018-10-25 RX ADMIN — METOPROLOL TARTRATE 25 MG: 25 TABLET ORAL at 09:04

## 2018-10-25 RX ADMIN — HYDROMORPHONE HYDROCHLORIDE 4 MG: 2 TABLET ORAL at 21:24

## 2018-10-25 RX ADMIN — IBUPROFEN 600 MG: 600 TABLET, FILM COATED ORAL at 20:27

## 2018-10-25 RX ADMIN — ACETAMINOPHEN 650 MG: 325 TABLET, FILM COATED ORAL at 09:03

## 2018-10-25 RX ADMIN — METOPROLOL TARTRATE 25 MG: 25 TABLET ORAL at 20:27

## 2018-10-25 RX ADMIN — HYDROMORPHONE HYDROCHLORIDE 4 MG: 2 TABLET ORAL at 09:03

## 2018-10-25 RX ADMIN — IBUPROFEN 600 MG: 600 TABLET, FILM COATED ORAL at 09:04

## 2018-10-25 RX ADMIN — ENOXAPARIN SODIUM 40 MG: 40 INJECTION SUBCUTANEOUS at 09:05

## 2018-10-25 RX ADMIN — SENNOSIDES AND DOCUSATE SODIUM 2 TABLET: 8.6; 5 TABLET ORAL at 20:27

## 2018-10-25 RX ADMIN — SENNOSIDES AND DOCUSATE SODIUM 2 TABLET: 8.6; 5 TABLET ORAL at 09:04

## 2018-10-25 RX ADMIN — ACETAMINOPHEN 650 MG: 325 TABLET, FILM COATED ORAL at 15:16

## 2018-10-25 RX ADMIN — CYCLOBENZAPRINE HYDROCHLORIDE 5 MG: 5 TABLET, FILM COATED ORAL at 12:08

## 2018-10-25 RX ADMIN — HYDROMORPHONE HYDROCHLORIDE 4 MG: 2 TABLET ORAL at 18:17

## 2018-10-25 RX ADMIN — BACITRACIN: 500 OINTMENT TOPICAL at 20:37

## 2018-10-25 RX ADMIN — BACITRACIN: 500 OINTMENT TOPICAL at 09:05

## 2018-10-25 RX ADMIN — IBUPROFEN 600 MG: 600 TABLET, FILM COATED ORAL at 03:31

## 2018-10-25 RX ADMIN — CYCLOBENZAPRINE HYDROCHLORIDE 5 MG: 5 TABLET, FILM COATED ORAL at 06:36

## 2018-10-25 RX ADMIN — ACETAMINOPHEN 650 MG: 325 TABLET, FILM COATED ORAL at 20:27

## 2018-10-25 RX ADMIN — HYDROMORPHONE HYDROCHLORIDE 4 MG: 2 TABLET ORAL at 00:29

## 2018-10-25 RX ADMIN — DULOXETINE HYDROCHLORIDE 60 MG: 30 CAPSULE, DELAYED RELEASE ORAL at 09:04

## 2018-10-25 RX ADMIN — IBUPROFEN 600 MG: 600 TABLET, FILM COATED ORAL at 15:16

## 2018-10-25 RX ADMIN — HYDROMORPHONE HYDROCHLORIDE 4 MG: 2 TABLET ORAL at 06:34

## 2018-10-25 RX ADMIN — Medication 2 SPRAY: at 09:18

## 2018-10-25 RX ADMIN — HYDROMORPHONE HYDROCHLORIDE 4 MG: 2 TABLET ORAL at 03:31

## 2018-10-25 RX ADMIN — CYCLOBENZAPRINE HYDROCHLORIDE 5 MG: 5 TABLET, FILM COATED ORAL at 20:27

## 2018-10-25 RX ADMIN — OMEPRAZOLE 40 MG: 20 CAPSULE, DELAYED RELEASE ORAL at 09:04

## 2018-10-25 ASSESSMENT — ACTIVITIES OF DAILY LIVING (ADL)
ADLS_ACUITY_SCORE: 11

## 2018-10-25 NOTE — PROGRESS NOTES
BRIEF NUTRITION REASSESSMENT      CURRENT DIET AND INTAKE:  Diet:  Regular. Sending Gelatein supplements between meals.  Pt likes the Gelatein. She says she's eating per her usual amount, which is small meals.                ANTHROPOMETRICS:  Pt has not been weighed    LABS:  Labs noted      NUTRITION INTERVENTION:  Nutrition Diagnosis:  No nutrition diagnosis at this time.    Implementation:  Encouraged intake    FOLLOW UP/MONITORING:   Will re-evaluate in 7 - 10 days, or sooner, if re-consulted.      Shavonne Pierre RD  Pager [phone] (M-F)            [phone] (W/E & Hol)

## 2018-10-25 NOTE — PROGRESS NOTES
[facility]    Hospitalist Progress Note      Assessment & Plan    Ms. Dixon is a 62-year-old woman with medical history esophageal cancer status post esophagectomy in 03/2016 with a G-tube placement that was ultimately removed, history of alcohol abuse, sober for the last 16 years, pancreatic pseudocyst, protein calorie malnutrition, hypertension, postherpetic neuralgia following her esophagectomy, abnormal Pap smear, carotid disease, depression, anxiety and GERD admitted on 10/16 for redo right thoracotomy, extensive pneumolysis and a right lower lobectomy for a right lower lobe lesion found on surveillance PET scan for history of esophageal cancer.    Pathology showed squamous carcinoma.     Status post right redo-thoracotomy, extensive pneumolysis and right lower lobe lobectomy for right lower lobe lesion found on a surveillance PET scan with history of esophageal cancer  On 10/16/2018 with Dr. Alvin Riojas she underwent a right lower lobe lobectomy via redo thoracotomy with extensive pneumolysis.  Perioperatively successfully extubated.  CXR 10/18 -Increase in subcutaneous emphysema over the right lateral chest wall and in the right supraclavicular fossa. Mild mediastinal emphysema. The two chest tubes on the right are unchanged.  10/20- respiratory status is stable. Chest tube on suction- management per surgery. Pathology showed squamous carcinoma so she likely has stage IA NSCLC- surgeon discussed results with thepatient   10/23- chest tubes still in place, now on water seal per thoracic surgery  10/24 small pneumothorax, chest tube still in place, remains on water seal.  10/25 same as 10/25  - Plans for clamping of tube overnight, possible removal tomorrow, management per primary  - ON-Q removed on 10/22, encourage IS and flutter valve  - Continue cyclobenzaprine 5mg TID PRN for back spasms.  - Requiring PO dilaudid and scheduled ibuprofen and prn tylenol for pain     Hyponatremia likely  dilutional/SIADH                              Sodium dropped from 134 >128 > 133  ProBNP 616.  Albumin 2.9  Strict input-output monitoring.  10/22- 133  10/24- 131   10/25 136 (Without intervention  - Continue to hold hydrochlorothiazide, BP not requiring it     Acute anemia postoperatively-likely dilutional/surgical blood loss  Hemoglobin dropped from 13.1 > 10.5.  Baseline hemoglobin around 13.  Serum iron 39, iron saturation index 16.  .  ProBNP 616.  10/19- hemoglobin was 10.5  10/24- Hgb 10.9 (same as 10/22), stable    Hypertension  - hydrochlorothiazide still held due to blood pressures and hyponatremia  - Continue PTA metoprolol with hold parameters.  10/20- blood pressure good today- no changes to medications   10/23- stable, slightly elevated overnight, but this morning was okay. Trend BPs  10/25 BPs stable     Hyperlipidemia  PTA not on medications.       Pancreatic pseudocyst, remote/resolved  Esophageal cancer, status post esophagectomy  GERD  Esophageal cancer diagnosed in 2015, status post esophagectomy in 03/2016 with a G-tube placement that was ultimately removed in 07/2017  Continue PTA omeprazole 40 mg orally daily.     Protein calorie malnutrition with BMI 17.29-From malignancy/poor oral intake  Dietary supplements with Ensure as tolerated.    Constipation  - increased dosage of miralax 10/22 and patient was able to have a bowel movement 10/23  - Continue miralax prn and senokot scheduled      History of alcohol abuse, now sober for the last 16 years  Anxiety, depression  Postherpetic neuralgia  Continue PTA Cymbalta.        Pre diabetes.  Hemoglobin A1c 5.8  Monitor blood sugars periodically.  Dietary modification as able    DVT Prophylaxis  Pneumatic Compression Devices    Code Status   Full Code    Disposition  Expected discharge home per primary service when chest tube has been removed, hopeful for 10/26    Kirsten Woodard MD  Text Page (7am - 6pm)    Interval History   No shortness of  breath. Worried about pain control when she goes home as she is still requiring flexeril and PO dilaudid. Thinking positive about possible chest tube removal tomorrow. Still with some chest pain, but tolerable with medications. BM now regular.    -Data reviewed today: I reviewed all new labs and imaging results over the last 24 hours. I personally reviewed CXR from 10/25, still with subcutaneous air, chest tubes present, small pneumothorax present similar to 10/24.    Physical Exam   Temp: 97.5  F (36.4  C) Temp src: Oral BP: 112/51 Pulse: 68 Heart Rate: 74 Resp: 16 SpO2: 98 % O2 Device: None (Room air)    There were no vitals filed for this visit.  Vital Signs with Ranges  Temp:  [97.5  F (36.4  C)-98  F (36.7  C)] 97.5  F (36.4  C)  Pulse:  [68-81] 68  Heart Rate:  [74-86] 74  Resp:  [16-18] 16  BP: (112-126)/(51-60) 112/51  SpO2:  [96 %-99 %] 98 %  I/O last 3 completed shifts:  In: 1610 [P.O.:1610]  Out: 620 [Urine:600; Chest Tube:20]    Constitutional: Awake, alert, cooperative, no apparent distress  Respiratory: Clear to auscultation bilaterally, no crackles  Cardiovascular: Regular rate and rhythm, normal S1 and S2, and no murmur noted  Chest:  Right side chest wall insertion site with continued serous drainage soaking dressing  GI: Normal bowel sounds, soft, non-distended, non-tender  Skin/Integumen: No rashes, no cyanosis, no edema  Other: + right chest tube x2, small air leak with cough    Medications     - MEDICATION INSTRUCTIONS -         bacitracin   Topical TID     DULoxetine  60 mg Oral Daily     enoxaparin  40 mg Subcutaneous Q24H     fluticasone  2 spray Both Nostrils Daily     ibuprofen  600 mg Oral Q6H BHAVESH     metoprolol tartrate  25 mg Oral BID     omeprazole  40 mg Oral Daily     senna-docusate  1 tablet Oral BID    Or     senna-docusate  2 tablet Oral BID       Data     Recent Labs  Lab 10/25/18  0735 10/24/18  0815 10/22/18  0813 10/19/18  0811   WBC  --  8.3 8.4 7.0   HGB  --  10.9* 10.9*  10.5*   MCV  --  95 94 94    348 296 216    131* 133 133   POTASSIUM  --  3.8 3.8  --    CHLORIDE  --  96 97  --    CO2  --  28 29  --    BUN  --  15 15  --    CR 0.56 0.53 0.51* 0.49*   ANIONGAP  --  7 7  --    VICENTE  --  8.3* 8.5  --    GLC  --  189* 105*  --        Recent Results (from the past 24 hour(s))   XR Chest Port 1 View    Narrative    XR CHEST PORT 1 VW  10/25/2018 5:20 AM     HISTORY:  Chest Tube;     COMPARISON: Film performed on 10/24/2018    FINDINGS:  Right chest tubes are again noted. Small right pneumothorax  is again seen. This has not changed significantly. Subcutaneous gas is  seen over the right neck and along the right chest wall. The left lung  is clear. Mediastinal gas consistent with patient's history of a  gastric pull-through procedure.      Impression    IMPRESSION: No significant change. Small right pneumothorax is still  present.    COURTNEY CHOUDHARY MD

## 2018-10-25 NOTE — PROGRESS NOTES
"Thoracic Surgery POD #9:  /51 (BP Location: Left arm)  Pulse 68  Temp 97.5  F (36.4  C) (Oral)  Resp 16  Ht 1.575 m (5' 2\")  SpO2 98%  BMI 17.01 kg/m2  CXR: same small residual space on right  CT: air leak with initial cough only, serous output. On water seal  S: No complaints- not SOB- discussed chest tubes and clamping trial  O: Inc.: no erythema, no swelling, dry  P: Clamping trial tonight  Hopeful discontinue tomorrow     Destiny Kendall PA-C with Dr. Alvin Riojas  [facility]  Cell [phone]      "

## 2018-10-25 NOTE — PROGRESS NOTES
Pt. A & O x 4.  Independent.  VSS.  Chest tubes to water seal; both have intermittent air leaks (with ambulating & coughing); dressing CDI, no change needed this ministerio.  Crepitus at insertion site for chest tubes.  Thoracotomy incision CDI.  Lungs diminished all fields.  BS+; Flatus+; BM+.  Tolerating regular diet.  Pain managed with dilaudid & flexeril

## 2018-10-25 NOTE — PLAN OF CARE
Problem: Patient Care Overview  Goal: Plan of Care/Patient Progress Review  Outcome: Improving  VSS. A/O x4, lungs diminished, fine crackles @ RUL, RML, RLL. IS: 500, tolerating fair. Pulses +2 throughout bilaterally. BS present, passing flatus. Regular diet, independent. Pain managed with Dilaudid PO, pt requests every 3 hrs. Chest tubes x 2, both water seal, airleaks, crepitus around chest tube. Per hand off - new dressings applied and later reinforced at insertion site. No further drainage for this shift. Chest tube #1 output: 10, Chest tube # 2 output: 0.

## 2018-10-25 NOTE — TELEPHONE ENCOUNTER
HCTZ:   Prescription approved per [facility] Refill Protocol.    Metoprolol:  Prescription approved per [facility] Refill Protocol.    Gabapentin:  Denied  Discontinued 7/6/2018    Whitney GODOY RN

## 2018-10-25 NOTE — TELEPHONE ENCOUNTER
"Hydrochlorothiazide 12.5 mg    Last Written Prescription Date:  11/09/17  Last Fill Quantity: 90 tablets,  # refills: 3   Last office visit: 10/11/2018 with prescribing provider:  Maggy Escobar Office Visit:      Metoprolol tartrate 25 mg    Last Written Prescription Date:  11/09/17  Last Fill Quantity: 180 tablets,  # refills: 3   Last office visit: 10/11/2018 with prescribing provider:  Maggy Escobar Office Visit:      Gabapentin 300 mg    Last Written Prescription Date:  11/09/17  Last Fill Quantity: 810 capsules,  # refills: 3   Last office visit: 10/11/2018 with prescribing provider:  Maggy Escobar Office Visit:      Requested Prescriptions   Pending Prescriptions Disp Refills     gabapentin (NEURONTIN) 300 MG capsule [Pharmacy Med Name: GABAPENTIN 300 MG CAPSULE] 810 capsule 3     Sig: TAKE 1-3 CAPS BY MOUTH UP TO THREE TIMES DAILY AS NEEDED FOR PAIN RELATED TO POST HERPETIC NEURALGIA    There is no refill protocol information for this order        hydrochlorothiazide 12.5 MG TABS tablet [Pharmacy Med Name: HYDROCHLOROTHIAZIDE 12.5 MG TB] 90 tablet 3     Sig: TAKE 1 TABLET (12.5 MG) BY MOUTH DAILY    Diuretics (Including Combos) Protocol Failed    10/25/2018  2:13 AM       Failed - Normal serum sodium on file in past 12 months    Recent Labs   Lab Test  10/25/18   0735   NA  136             Passed - Blood pressure under 140/90 in past 12 months    BP Readings from Last 3 Encounters:   10/25/18 125/60   10/11/18 140/61   10/04/18 140/89                Passed - Recent (12 mo) or future (30 days) visit within the authorizing provider's specialty    Patient had office visit in the last 12 months or has a visit in the next 30 days with authorizing provider or within the authorizing provider's specialty.  See \"Patient Info\" tab in inbasket, or \"Choose Columns\" in Meds & Orders section of the refill encounter.             Passed - Patient is age 18 or older       Passed - No active pregancy on " "record       Passed - Normal serum creatinine on file in past 12 months    Recent Labs   Lab Test  10/25/18   0735   CR  0.56             Passed - Normal serum potassium on file in past 12 months    Recent Labs   Lab Test  10/24/18   0815   POTASSIUM  3.8                   Passed - No positive pregnancy test in past 12 months        metoprolol tartrate (LOPRESSOR) 25 MG tablet [Pharmacy Med Name: METOPROLOL TARTRATE 25 MG TAB] 180 tablet 3     Sig: TAKE 1 TABLET (25 MG) BY MOUTH 2 TIMES DAILY    Beta-Blockers Protocol Passed    10/25/2018  2:13 AM       Passed - Blood pressure under 140/90 in past 12 months    BP Readings from Last 3 Encounters:   10/25/18 125/60   10/11/18 140/61   10/04/18 140/89                Passed - Patient is age 6 or older       Passed - Recent (12 mo) or future (30 days) visit within the authorizing provider's specialty    Patient had office visit in the last 12 months or has a visit in the next 30 days with authorizing provider or within the authorizing provider's specialty.  See \"Patient Info\" tab in inbasket, or \"Choose Columns\" in Meds & Orders section of the refill encounter.                "

## 2018-10-26 ENCOUNTER — APPOINTMENT (OUTPATIENT)
Dept: GENERAL RADIOLOGY | Facility: CLINIC | Age: 63
End: 2018-10-26
Attending: THORACIC SURGERY (CARDIOTHORACIC VASCULAR SURGERY)
Payer: COMMERCIAL

## 2018-10-26 VITALS
HEIGHT: 62 IN | TEMPERATURE: 97.6 F | RESPIRATION RATE: 16 BRPM | HEART RATE: 61 BPM | DIASTOLIC BLOOD PRESSURE: 63 MMHG | OXYGEN SATURATION: 100 % | SYSTOLIC BLOOD PRESSURE: 136 MMHG | BODY MASS INDEX: 17.01 KG/M2

## 2018-10-26 PROCEDURE — 99232 SBSQ HOSP IP/OBS MODERATE 35: CPT | Performed by: HOSPITALIST

## 2018-10-26 PROCEDURE — 25000128 H RX IP 250 OP 636: Performed by: PHYSICIAN ASSISTANT

## 2018-10-26 PROCEDURE — 71045 X-RAY EXAM CHEST 1 VIEW: CPT

## 2018-10-26 PROCEDURE — 25000132 ZZH RX MED GY IP 250 OP 250 PS 637: Performed by: THORACIC SURGERY (CARDIOTHORACIC VASCULAR SURGERY)

## 2018-10-26 PROCEDURE — 25000132 ZZH RX MED GY IP 250 OP 250 PS 637: Performed by: PHYSICIAN ASSISTANT

## 2018-10-26 PROCEDURE — 25000132 ZZH RX MED GY IP 250 OP 250 PS 637: Performed by: NURSE PRACTITIONER

## 2018-10-26 PROCEDURE — 25000132 ZZH RX MED GY IP 250 OP 250 PS 637: Performed by: HOSPITALIST

## 2018-10-26 RX ORDER — HYDROMORPHONE HYDROCHLORIDE 2 MG/1
2-4 TABLET ORAL
Qty: 80 TABLET | Refills: 0 | Status: SHIPPED | OUTPATIENT
Start: 2018-10-26 | End: 2018-11-05

## 2018-10-26 RX ORDER — ACETAMINOPHEN 500 MG
1000 TABLET ORAL EVERY 8 HOURS PRN
COMMUNITY
Start: 2018-10-26 | End: 2019-03-11

## 2018-10-26 RX ORDER — CYCLOBENZAPRINE HCL 5 MG
5 TABLET ORAL 3 TIMES DAILY PRN
Qty: 21 TABLET | Refills: 0 | Status: SHIPPED | OUTPATIENT
Start: 2018-10-26 | End: 2018-11-23

## 2018-10-26 RX ORDER — IBUPROFEN 600 MG/1
600 TABLET, FILM COATED ORAL EVERY 6 HOURS
Qty: 120 TABLET
Start: 2018-10-26 | End: 2019-03-11

## 2018-10-26 RX ADMIN — ACETAMINOPHEN 650 MG: 325 TABLET, FILM COATED ORAL at 00:31

## 2018-10-26 RX ADMIN — Medication 2 SPRAY: at 09:26

## 2018-10-26 RX ADMIN — METOPROLOL TARTRATE 25 MG: 25 TABLET ORAL at 09:25

## 2018-10-26 RX ADMIN — HYDROMORPHONE HYDROCHLORIDE 4 MG: 2 TABLET ORAL at 09:26

## 2018-10-26 RX ADMIN — OMEPRAZOLE 40 MG: 20 CAPSULE, DELAYED RELEASE ORAL at 09:25

## 2018-10-26 RX ADMIN — BACITRACIN: 500 OINTMENT TOPICAL at 10:54

## 2018-10-26 RX ADMIN — IBUPROFEN 600 MG: 600 TABLET, FILM COATED ORAL at 09:25

## 2018-10-26 RX ADMIN — IBUPROFEN 600 MG: 600 TABLET, FILM COATED ORAL at 03:01

## 2018-10-26 RX ADMIN — ACETAMINOPHEN 650 MG: 325 TABLET, FILM COATED ORAL at 04:42

## 2018-10-26 RX ADMIN — DULOXETINE HYDROCHLORIDE 60 MG: 30 CAPSULE, DELAYED RELEASE ORAL at 09:25

## 2018-10-26 RX ADMIN — HYDROMORPHONE HYDROCHLORIDE 4 MG: 2 TABLET ORAL at 12:39

## 2018-10-26 RX ADMIN — HYDROMORPHONE HYDROCHLORIDE 4 MG: 2 TABLET ORAL at 04:42

## 2018-10-26 RX ADMIN — ACETAMINOPHEN 650 MG: 325 TABLET, FILM COATED ORAL at 09:25

## 2018-10-26 RX ADMIN — ENOXAPARIN SODIUM 40 MG: 40 INJECTION SUBCUTANEOUS at 09:26

## 2018-10-26 RX ADMIN — HYDROMORPHONE HYDROCHLORIDE 4 MG: 2 TABLET ORAL at 00:31

## 2018-10-26 RX ADMIN — CYCLOBENZAPRINE HYDROCHLORIDE 5 MG: 5 TABLET, FILM COATED ORAL at 06:32

## 2018-10-26 ASSESSMENT — ACTIVITIES OF DAILY LIVING (ADL)
ADLS_ACUITY_SCORE: 11

## 2018-10-26 NOTE — PLAN OF CARE
Problem: Patient Care Overview  Goal: Plan of Care/Patient Progress Review  Outcome: Improving  Patient is A&O, VSS. On room air. Performing IS and flutter valve independently and appropriately. Ambulating in room. Chest tubes x2 intact to waterseal; dressing changed. No output this shift. Intermittent air leaks continue with coughing. Chest tubes clamped at 2350 per order, patient tolerating well. Thoracotomy incision, WNL. Small amount of crepitus, unchanged. Taking ibuprofen, tylenol, flexeril, and oral dilaudid for pain; stating adequate pain relief. Up independently. Voiding adequately. Xray this AM for discharge planning.

## 2018-10-26 NOTE — PROGRESS NOTES
[facility]    Hospitalist Progress Note      Assessment & Plan    Ms. Dixon is a 62-year-old woman with medical history esophageal cancer status post esophagectomy in 03/2016 with a G-tube placement that was ultimately removed, history of alcohol abuse, sober for the last 16 years, pancreatic pseudocyst, protein calorie malnutrition, hypertension, postherpetic neuralgia following her esophagectomy, abnormal Pap smear, carotid disease, depression, anxiety and GERD admitted on 10/16 for redo right thoracotomy, extensive pneumolysis and a right lower lobectomy for a right lower lobe lesion found on surveillance PET scan for history of esophageal cancer.    Pathology showed squamous carcinoma.     Status post right redo-thoracotomy, extensive pneumolysis and right lower lobe lobectomy for right lower lobe lesion found on a surveillance PET scan with history of esophageal cancer  On 10/16/2018 with Dr. Alvin Riojas she underwent a right lower lobe lobectomy via redo thoracotomy with extensive pneumolysis.  Perioperatively successfully extubated.  CXR 10/18 -Increase in subcutaneous emphysema over the right lateral chest wall and in the right supraclavicular fossa. Mild mediastinal emphysema. The two chest tubes on the right are unchanged.  10/20- respiratory status is stable. Chest tube on suction- management per surgery. Pathology showed squamous carcinoma so she likely has stage IA NSCLC- surgeon discussed results with thepatient   10/23- chest tubes still in place, now on water seal per thoracic surgery  10/24 small pneumothorax, chest tube still in place, remains on water seal.  10/25 same as 10/25  - Tube clamped overnight into 10/26 still with small PTX but plans for chest tube removal today by PA  - ON-Q removed on 10/22, encouraged IS and flutter valve  - Continue cyclobenzaprine 5mg TID PRN for back spasms.  - Requiring PO dilaudid and scheduled ibuprofen and prn tylenol for pain  - Plan for  discharge today, will verify that dilaudid and flexeril are included in dischage medications     Hyponatremia likely dilutional/SIADH                              Sodium dropped from 134 >128 > 133  ProBNP 616.  Albumin 2.9  Strict input-output monitoring.  10/22- 133  10/24- 131   10/25 136 (Without intervention  - Defer further monitoring to PCP     Acute anemia postoperatively-likely dilutional/surgical blood loss  Hemoglobin dropped from 13.1 > 10.5.  Baseline hemoglobin around 13.  Serum iron 39, iron saturation index 16.  .  ProBNP 616.  10/19- hemoglobin was 10.5  10/24- Hgb 10.9 (same as 10/22), stable    Hypertension  - hydrochlorothiazide held due to blood pressures and hyponatremia  - Continue PTA metoprolol with hold parameters.  10/20- blood pressure good today- no changes to medications   10/23- stable, slightly elevated overnight, but this morning was okay. Trend BPs  10/26- slightly elevated this AM (142/66)-- resume hydrochlorothiazide on discharge     Hyperlipidemia  PTA not on medications.       Pancreatic pseudocyst, remote/resolved  Esophageal cancer, status post esophagectomy  GERD  Esophageal cancer diagnosed in 2015, status post esophagectomy in 03/2016 with a G-tube placement that was ultimately removed in 07/2017  Continue PTA omeprazole 40 mg orally daily.     Protein calorie malnutrition with BMI 17.29-From malignancy/poor oral intake  Dietary supplements with Ensure as tolerated.    Constipation  - increased dosage of miralax 10/22 and patient was able to have a bowel movement 10/23  - Continue miralax prn and senokot scheduled while on opiods-- recommend continued senokot on discharge with dilaudid      History of alcohol abuse, now sober for the last 16 years  Anxiety, depression  Postherpetic neuralgia  Continue PTA Cymbalta.        Pre diabetes.  Hemoglobin A1c 5.8  Monitor blood sugars periodically.  Dietary modification as able    DVT Prophylaxis  Pneumatic Compression  Devices    Code Status   Full Code    Disposition  Discharge to home today after chest tube removal    Kirsten Woodard MD  Text Page (7am - 6pm)    Interval History   Denies shortness of breath. Eager to go home, actively arranging her belongings in preparation. Pain controlled with current regimen, would like flexeril and dilaudid on discharge in case of continued pain after chest tube removal.    -Data reviewed today: I reviewed all new labs and imaging results over the last 24 hours. I personally reviewed CXR from 10/26, still with subcutaneous air, chest tubes present, small pneumothorax present similar to 10/24 and 10/25.    Physical Exam   Temp: 98.1  F (36.7  C) Temp src: Oral BP: 142/66 Pulse: 79 Heart Rate: 74 Resp: 16 SpO2: 94 % O2 Device: None (Room air)    There were no vitals filed for this visit.  Vital Signs with Ranges  Temp:  [97.4  F (36.3  C)-98.2  F (36.8  C)] 98.1  F (36.7  C)  Pulse:  [68-79] 79  Heart Rate:  [67-86] 74  Resp:  [16] 16  BP: (105-142)/(48-66) 142/66  SpO2:  [94 %-98 %] 94 %  I/O last 3 completed shifts:  In: 1760 [P.O.:1760]  Out: 1600 [Urine:1600]    Constitutional: Awake, alert, cooperative, no apparent distress  Respiratory: Clear to auscultation bilaterally, no crackles  Cardiovascular: Regular rate and rhythm, normal S1 and S2, and no murmur noted  Chest:  Right side chest wall insertion site with dressing, no significant drainage this AM. Subcutaneous air palpated along neck, upper chest wall on right  GI: Normal bowel sounds, soft, non-distended, non-tender  Skin/Integumen: No rashes, no cyanosis, no edema  Other: + right chest tube x2    Medications     - MEDICATION INSTRUCTIONS -         bacitracin   Topical TID     DULoxetine  60 mg Oral Daily     enoxaparin  40 mg Subcutaneous Q24H     fluticasone  2 spray Both Nostrils Daily     ibuprofen  600 mg Oral Q6H BHAVESH     metoprolol tartrate  25 mg Oral BID     omeprazole  40 mg Oral Daily     senna-docusate  1 tablet Oral  BID    Or     senna-docusate  2 tablet Oral BID       Data     Recent Labs  Lab 10/25/18  0735 10/24/18  0815 10/22/18  0813   WBC  --  8.3 8.4   HGB  --  10.9* 10.9*   MCV  --  95 94    348 296    131* 133   POTASSIUM  --  3.8 3.8   CHLORIDE  --  96 97   CO2  --  28 29   BUN  --  15 15   CR 0.56 0.53 0.51*   ANIONGAP  --  7 7   VICENTE  --  8.3* 8.5   GLC  --  189* 105*       Recent Results (from the past 24 hour(s))   XR Chest Port 1 View    Narrative    XR CHEST PORT 1 VW  10/26/2018 5:20 AM     HISTORY:  Chest Tube;     COMPARISON: Film performed on 10/25/2018.    FINDINGS:  Right chest tubes are again noted. Small right pneumothorax  is still present. Subcutaneous air seen along the right chest wall and  in the right lower neck. Mediastinal gas compatible with the patient's  history of a gastric pull-through procedure.      Impression    IMPRESSION: Small right pneumothorax. No significant change.    COURTNEY CHOUDHARY MD

## 2018-10-26 NOTE — PLAN OF CARE
Problem: Lung Surgery (via Thoracotomy) (Adult)  Goal: Signs and Symptoms of Listed Potential Problems Will be Absent, Minimized or Managed (Lung Surgery)  Signs and symptoms of listed potential problems will be absent, minimized or managed by discharge/transition of care (reference Lung Surgery (via Thoracotomy) (Adult) C[facility]).   Outcome: Improving  VSS on room air. A/Ox4. CT's x2 to water seal, dsg CDI, plan to clamp at 0000. Pain controlled with PRN dilaudid, flexeril, and tylenol. Up ind, ambulated in halls x3 on shift.  Regular diet, up to chair for meals. BS active, Flatus +. Voiding adequately to bathroom. LS clear. IS to 500, blue acapella done.

## 2018-10-26 NOTE — DISCHARGE INSTRUCTIONS
"[facility]  Discharge Orders & Follow-up Care  Video-Assisted Thoracoscopy or Thoracotomy    Call Sabrina at Dr. Riojas  office at [phone] to make a return appointment with a chest x-ray on_Monday, November 5_.  Your appointment will be with MIKE Lockett Patient Care:  Call Dr Riojas  office @ [phone] if you experience:  *Severe chills or a fever or 101 F or higher on two occasions  *Increased incisional pain that cannot be relieved with rest or pain medications  *Presence of unusual incisional or chest tube site drainage that is odorous, green or yellow in color, or if your incision is warm, red or swollen  *Coughing up bright red blood or greenish-yellow secretions  *Chest pain that gets worse with deep breathing or a significant increase in shortness of breath  *Inability to urinate or have a bowel movement  *New pain or swelling in your legs    In an emergency, call 195 or have someone drive you to the nearest Emergency Department    Pain Relief:  You may have been given a prescription for narcotic pain medicine.  You may also take ibuprofen and acetaminophen either as a new prescription  or over the counter.  Recommended dosages are:  600 mg Ibuprofen every 6 hours as needed and 650 mg Acetaminophen every 4 hours as needed.  Many patients get good pain relief by \"staggering\" these medications.     Constipation:  Narcotic pain medication, general anesthesia, and time in the hospital with less activity than normal can all cause constipation. Please take a stool softener (what you have at home or one that was prescribed during hospital discharge, such as Senokot-S, docusate sodium, Miralax, Milk of Magnesia) while you are taking narcotics to prevent constipation. Stop taking the stool softener once you are done taking narcotics or if you begin having loose stools/diarrhea. Please call our clinic nurse, Dawn, at [phone] if you are not having success (not having " BMs) with your current stool softener.     No driving while on narcotics.     Activity:  _XXX__ No heavy lifting greater than 10 pounds on the operative side for 4-6 weeks for a thoracotomy    Wound Care:  *You should look at your incision each day and keep it clean while it heals  *Do not apply any creams, salves such as Bacitracin, or ointments on the incision while it is healing  * Steri strips (thin white paper strips) will be present on the incision(s) and they will peel off as your incision heals-- otherwise, they will be removed at your post-op appointment.    *Remove the dressings covering your chest tube site 48 hours after your discharge from the hospital. You may then shower.  Wash the incision and chest tube site(s) daily with soap and water. No bathing or immersing incision underwater for approximately 2 weeks or until the chest tube sites are completely healed.     Place a dry gauze dressing (and tape) over the chest tube site because it is normal to have some drainage for a few days after the chest tube is removed.  Do not be alarmed if a large amount of fluid drains (should be pink or yellow) either spontaneously or with coughing or exertion. If this happens, just place a larger dry gauze dressing over the chest tube site-- it will stop and scab over in about a week or so. Once the drainage stops, you can stop covering the chest tube site with a dressing. Call our office if the drainage is milky or green in color or foul-smelling.    B. Respiratory:  ___ Utilize Incentive spirometer and flutter valve/acapella (if you received one) 10 times in a row every few hours while awake for a few weeks after discharging home from the hospital    C. Activity:  It may take a few months to regain your normal energy level/stamina. It is important during your recovery to get regular physical activity:  *walk each day at a comfortable pace  *climb stairs as tolerated  *take some rest periods each day but try not to  take too many long naps, as this can affect your sleep at night    D. Returning to Work:  Time away from work will depend on your situation. In general, you will need between 1-6 weeks to recover from surgery. Specific dates for returning to work can be discussed at your post-op appointments.       Revised May 2018\

## 2018-10-26 NOTE — PLAN OF CARE
Problem: Patient Care Overview  Goal: Plan of Care/Patient Progress Review  Outcome: Completed Date Met: 10/26/18  A&O, VSS, Lung sounds clear, crepitus present on right neck, chest tubes removed by Jacquelin instructed to remove dressing on 10/28/18 and shower, reapply as needed.  Bowel sounds active with bowel movement this am. Adequate urine output, Ambulates independently,tolerating regualr diet, Pain controlled by Dilaudid, tylenol, flexeril, and ibuprofen.  Discharge instructions discussed with pt. And  and questions answered. Pt. Discharged with all narcotics.

## 2018-10-26 NOTE — PROGRESS NOTES
"Thoracic Surgery POD #10:  /66 (BP Location: Left arm)  Pulse 79  Temp 98.1  F (36.7  C) (Oral)  Resp 16  Ht 1.575 m (5' 2\")  SpO2 94%  BMI 17.01 kg/m2  CXR: same small stable preserved right pleural space with CT clamping trial  S: Doing well- anxious to go home. discontinue instructions and wound care, pain management, and follow up all thoroughly discussed.  O: Inc.:  Benign, dry  CTs: DCed without complication.  Occlusive dressing applied.  P: OK to discontinue home today- see me with CXR Nov. 5    Destiny Kendall PA-C with Dr. [facility]  Cell [phone]      "

## 2018-10-29 ENCOUNTER — TELEPHONE (OUTPATIENT)
Dept: FAMILY MEDICINE | Facility: CLINIC | Age: 63
End: 2018-10-29

## 2018-10-29 NOTE — TELEPHONE ENCOUNTER
"Hospital/TCU/ED for chronic condition Discharge Protocol    \"Hi, my name is Margarita Morton, a registered nurse, and I am calling from [facility].  I am calling to follow up and see how things are going for you after your recent emergency visit/hospital/TCU stay.\"    Tell me how you are doing now that you are home?\" Patient is doing well and still has a lot of pain, taking the dilaudid with relief      Discharge Instructions    \"Let's review your discharge instructions.  What is/are the follow-up recommendations?  Pt. Response: patient to     \"Has an appointment with your primary care provider been scheduled?\"   Yes. (confirm)    \"When you see the provider, I would recommend that you bring your medications with you.\"    Medications    \"Tell me what changed about your medicines when you discharged?\"    Changes to chronic meds?    0-1    \"What questions do you have about your medications?\"    None     New diagnoses of heart failure, COPD, diabetes, or MI?    No              Medication reconciliation completed? Yes  Was MTM referral placed (*Make sure to put transitions as reason for referral)?   No    Call Summary    \"What questions or concerns do you have about your recent visit and your follow-up care?\"     none, patient is using the IS    \"If you have questions or things don't continue to improve, we encourage you contact us through the main clinic number (give number).  Even if the clinic is not open, triage nurses are available 24/7 to help you.     We would like you to know that our clinic has extended hours (provide information).  We also have urgent care (provide details on closest location and hours/contact info)\"      \"Thank you for your time and take care!\"  DORIAN Reis, RN  [employer] [facility]             "

## 2018-10-29 NOTE — TELEPHONE ENCOUNTER
Chief Complaint: Right Lower Lobe Lung Nodule , Right Lower Lobe Pulmonary Nodule,FRI 26-OCT-2018, ED/IP 0 / 1    [phone] (home)

## 2018-11-01 DIAGNOSIS — J31.0 CHRONIC RHINITIS: ICD-10-CM

## 2018-11-01 NOTE — TELEPHONE ENCOUNTER
"Last Written Prescription Date:  11/09/17  Last Fill Quantity: 1 bottle,  # refills: 11   Last office visit: 9/14/2018 with prescribing provider:  Christen   Future Office Visit:   Next 5 appointments (look out 90 days)     Nov 02, 2018  2:00 PM CDT   [facility]    [address] [facility] [address]   [phone]                 Requested Prescriptions   Pending Prescriptions Disp Refills     fluticasone (FLONASE) 50 MCG/ACT spray [Pharmacy Med Name: FLUTICASONE PROP 50 MCG SPRAY] 16 mL 11     Sig: SPRAY 2 SPRAYS INTO BOTH NOSTRILS DAILY    Inhaled Steroids Protocol Passed    11/1/2018  2:04 AM       Passed - Patient is age 12 or older       Passed - Recent (12 mo) or future (30 days) visit within the authorizing provider's specialty    Patient had office visit in the last 12 months or has a visit in the next 30 days with authorizing provider or within the authorizing provider's specialty.  See \"Patient Info\" tab in inbasket, or \"Choose Columns\" in Meds & Orders section of the refill encounter.                "

## 2018-11-02 ENCOUNTER — OFFICE VISIT (OUTPATIENT)
Dept: FAMILY MEDICINE | Facility: CLINIC | Age: 63
End: 2018-11-02
Payer: COMMERCIAL

## 2018-11-02 VITALS
DIASTOLIC BLOOD PRESSURE: 56 MMHG | WEIGHT: 88.5 LBS | OXYGEN SATURATION: 96 % | SYSTOLIC BLOOD PRESSURE: 125 MMHG | TEMPERATURE: 97.1 F | BODY MASS INDEX: 16.28 KG/M2 | HEIGHT: 62 IN | HEART RATE: 75 BPM

## 2018-11-02 DIAGNOSIS — C34.91 MALIGNANT NEOPLASM OF RIGHT LUNG, UNSPECIFIED PART OF LUNG (H): Primary | ICD-10-CM

## 2018-11-02 DIAGNOSIS — G89.18 ACUTE POST-OPERATIVE PAIN: ICD-10-CM

## 2018-11-02 PROCEDURE — 99495 TRANSJ CARE MGMT MOD F2F 14D: CPT | Performed by: INTERNAL MEDICINE

## 2018-11-02 RX ORDER — FLUTICASONE PROPIONATE 50 MCG
SPRAY, SUSPENSION (ML) NASAL
Start: 2018-11-02

## 2018-11-02 RX ORDER — OXYCODONE HYDROCHLORIDE 5 MG/1
5 TABLET ORAL EVERY 6 HOURS PRN
Qty: 80 TABLET | Refills: 0 | Status: SHIPPED | OUTPATIENT
Start: 2018-11-02 | End: 2018-11-05

## 2018-11-02 NOTE — MR AVS SNAPSHOT
"              After Visit Summary   11/2/2018    Kezia Dixon    MRN: [id number]           Patient Information     Date Of Birth          1955        Visit Information        Provider Department      11/2/2018 2:00 PM Mustapha Velásquez MD [facility]        Today's Diagnoses     Malignant neoplasm of right lung, unspecified part of lung (H)    -  1    Acute post-operative pain           Follow-ups after your visit        Who to contact     If you have questions or need follow up information about today's clinic visit or your schedule please contact [facility] directly at [phone].  Normal or non-critical lab and imaging results will be communicated to you by MyChart, letter or phone within 4 business days after the clinic has received the results. If you do not hear from us within 7 days, please contact the clinic through [employer] or phone. If you have a critical or abnormal lab result, we will notify you by phone as soon as possible.  Submit refill requests through [employer] or call your pharmacy and they will forward the refill request to us. Please allow 3 business days for your refill to be completed.          Additional Information About Your Visit        MyChart Information     [employer] gives you secure access to your electronic health record. If you see a primary care provider, you can also send messages to your care team and make appointments. If you have questions, please call your primary care clinic.  If you do not have a primary care provider, please call [phone] and they will assist you.        Care EveryWhere ID     This is your Care EveryWhere ID. This could be used by other organizations to access your [facility] medical records  [id number]        Your Vitals Were     Pulse Temperature Height Pulse Oximetry BMI (Body Mass Index)       75 97.1  F (36.2  C) (Oral) 5' 1.5\" (1.562 m) 96% 16.45 kg/m2        Blood Pressure from Last 3 Encounters:   11/02/18 125/56 "   10/26/18 136/63   10/11/18 140/61    Weight from Last 3 Encounters:   11/02/18 88 lb 8 oz (40.1 kg)   10/11/18 93 lb (42.2 kg)   10/04/18 94 lb (42.6 kg)              Today, you had the following     No orders found for display         Today's Medication Changes          These changes are accurate as of 11/2/18  2:49 PM.  If you have any questions, ask your nurse or doctor.               Start taking these medicines.        Dose/Directions    oxyCODONE IR 5 MG tablet   Commonly known as:  ROXICODONE   Used for:  Acute post-operative pain   Started by:  Mustapha Velásquez MD        Dose:  5 mg   Take 1 tablet (5 mg) by mouth every 6 hours as needed for pain   Quantity:  80 tablet   Refills:  0            Where to get your medicines      Some of these will need a paper prescription and others can be bought over the counter.  Ask your nurse if you have questions.     Bring a paper prescription for each of these medications     oxyCODONE IR 5 MG tablet               Information about OPIOIDS     PRESCRIPTION OPIOIDS: WHAT YOU NEED TO KNOW   We gave you an opioid (narcotic) pain medicine. It is important to manage your pain, but opioids are not always the best choice. You should first try all the other options your care team gave you. Take this medicine for as short a time (and as few doses) as possible.    Some activities can increase your pain, such as bandage changes or therapy sessions. It may help to take your pain medicine 30 to 60 minutes before these activities. Reduce your stress by getting enough sleep, working on hobbies you enjoy and practicing relaxation or meditation. Talk to your care team about ways to manage your pain beyond prescription opioids.    These medicines have risks:    DO NOT drive when on new or higher doses of pain medicine. These medicines can affect your alertness and reaction times, and you could be arrested for driving under the influence (DUI). If you need to use opioids long-term, talk  to your care team about driving.    DO NOT operate heavy machinery    DO NOT do any other dangerous activities while taking these medicines.    DO NOT drink any alcohol while taking these medicines.     If the opioid prescribed includes acetaminophen, DO NOT take with any other medicines that contain acetaminophen. Read all labels carefully. Look for the word  acetaminophen  or  Tylenol.  Ask your pharmacist if you have questions or are unsure.    You can get addicted to pain medicines, especially if you have a history of addiction (chemical, alcohol or substance dependence). Talk to your care team about ways to reduce this risk.    All opioids tend to cause constipation. Drink plenty of water and eat foods that have a lot of fiber, such as fruits, vegetables, prune juice, apple juice and high-fiber cereal. Take a laxative (Miralax, milk of magnesia, Colace, Senna) if you don t move your bowels at least every other day. Other side effects include upset stomach, sleepiness, dizziness, throwing up, tolerance (needing more of the medicine to have the same effect), physical dependence and slowed breathing.    Store your pills in a secure place, locked if possible. We will not replace any lost or stolen medicine. If you don t finish your medicine, please throw away (dispose) as directed by your pharmacist. The [place] [employer]ution [employer] has more information about safe disposal: [email or web address]://[email or web address]-medications         Primary Care Provider Office Phone # Fax #    Mustapha Velásquez [phone] [address] [address]        Equal Access to Services     TANO WILKINSON : Hadii aad ku hadasho Soomaali, waaxda luqadaha, qaybta kaalmada adeegyada, clement cook . So [facility] [phone].    ATENCIÓN: Si habla español, tiene a thomas disposición servicios gratuitos de asistencia lingüística. Llame al [phone].    We comply  with applicable federal civil rights laws and [place] laws. We do not discriminate on the basis of race, color, national origin, age, disability, sex, sexual orientation, or gender identity.            Thank you!     Thank you for choosing [facility]  for your care. Our goal is always to provide you with excellent care. Hearing back from our patients is one way we can continue to improve our services. Please take a few minutes to complete the written survey that you may receive in the mail after your visit with us. Thank you!             Your Updated Medication List - Protect others around you: Learn how to safely use, store and throw away your medicines at www.disposemymeds.org.          This list is accurate as of 11/2/18  2:49 PM.  Always use your most recent med list.                   Brand Name Dispense Instructions for use Diagnosis    amitriptyline 50 MG tablet    ELAVIL    90 tablet    Take 1 tablet (50 mg) by mouth At Bedtime    Neuralgia, post-herpetic       cyclobenzaprine 5 MG tablet    FLEXERIL    21 tablet    Take 1 tablet (5 mg) by mouth 3 times daily as needed for muscle spasms    Right lower lobe pulmonary nodule, Muscle spasm       DULoxetine 60 MG EC capsule    CYMBALTA    90 capsule    TAKE 1 CAPSULE BY MOUTH EVERY DAY    Neuralgia, post-herpetic       fluticasone 50 MCG/ACT spray    FLONASE    1 Bottle    Spray 2 sprays into both nostrils daily    Chronic rhinitis, unspecified type       hydrochlorothiazide 12.5 MG Tabs tablet     90 tablet    TAKE 1 TABLET (12.5 MG) BY MOUTH DAILY    Benign essential hypertension       HYDROmorphone 2 MG tablet    DILAUDID    80 tablet    Take 1-2 tablets (2-4 mg) by mouth every 3 hours as needed for moderate to severe pain    Right lower lobe pulmonary nodule, Acute post-operative pain       ibuprofen 600 MG tablet    ADVIL/MOTRIN    120 tablet    Take 1 tablet (600 mg) by mouth every 6 hours    Right lower lobe pulmonary nodule, Acute  post-operative pain       metoprolol tartrate 25 MG tablet    LOPRESSOR    180 tablet    TAKE 1 TABLET (25 MG) BY MOUTH 2 TIMES DAILY    Benign essential hypertension       omeprazole 40 MG capsule    priLOSEC    90 capsule    Take 1 capsule (40 mg) by mouth daily    Benign essential hypertension       oxyCODONE IR 5 MG tablet    ROXICODONE    80 tablet    Take 1 tablet (5 mg) by mouth every 6 hours as needed for pain    Acute post-operative pain       TYLENOL 500 MG tablet   Generic drug:  acetaminophen      Take 1,000 mg by mouth every 8 hours as needed

## 2018-11-02 NOTE — PROGRESS NOTES
SUBJECTIVE:   Kezia Dixon is a 62 year old female who presents to clinic today for the following health issues:          Hospital Follow-up Visit:    Hospital/Nursing Home/IP Rehab Facility: [facility]  Date of Admission: 10-  Date of Discharge: 10-  Reason(s) for Admission: post op f/up            Problems taking medications regularly:  None       Medication changes since discharge: None       Problems adhering to non-medication therapy:  None    Summary of hospitalization:  [facility] discharge summary reviewed  Diagnostic Tests/Treatments reviewed.  Follow up needed: CT surgery   Other Healthcare Providers Involved in Patient s Care:         None  Update since discharge: improved.     Post Discharge Medication Reconciliation: discharge medications reconciled and changed, per note/orders (see AVS).  Plan of care communicated with patient     Coding guidelines for this visit:  Type of Medical   Decision Making Face-to-Face Visit       within 7 Days of discharge Face-to-Face Visit        within 14 days of discharge   Moderate Complexity [id number]   High Complexity [id number]              New lung lesion noted on surveillance PET  Thoracotomy performed  Path showed new SCC probably different from previous esophageal cancer  No further cancer therapy planned per patient  She continues to have incisional pain and is running out of hydromorphone.  She received 80 hydromorphone tablets upon discharge a little over a week ago.  She requests a refill.  She states that Tylenol and over-the-counter NSAIDs are not adequate to manage her pain.    Problem list and histories reviewed & adjusted, as indicated.  Additional history: as documented    Patient Active Problem List   Diagnosis     Esophageal cancer (H)     Benign essential hypertension     Alcoholism (H)     Pancreatic pseudocyst     Impaired fasting glucose     Pap smear with atypical squamous cells, cannot exclude high  grade squamous intraepithelial lesion (ASC-H)     ACP (advance care planning)     Protein-calorie malnutrition (H)     Post herpetic neuralgia     Right lower lobe pulmonary nodule     Past Surgical History:   Procedure Laterality Date     AAA REPAIR      splenic artery aneurysm embolization     ABDOMEN SURGERY  2/2/2016     COLONOSCOPY  11/26/2013    Procedure: COMBINED COLONOSCOPY, SINGLE BIOPSY/POLYPECTOMY BY BIOPSY;  COLONOSCOPY (MAC);  Surgeon: Montana Rosa MD;  Location:  GI     COLONOSCOPY  11/26/2013     COLONOSCOPY N/A 10/4/2018    Procedure: COMBINED COLONOSCOPY, SINGLE OR MULTIPLE BIOPSY/POLYPECTOMY BY BIOPSY;  colonoscopy;  Surgeon: Gio Apodaca MD;  Location:  GI     ENT SURGERY       ESOPHAGOGASTRECTOMY N/A 3/22/2016    Procedure: ESOPHAGOGASTRECTOMY;  Surgeon: Alvin Riojas MD;  Location:  OR     ESOPHAGOSCOPY, GASTROSCOPY, DUODENOSCOPY (EGD), COMBINED N/A 12/22/2015    Procedure: COMBINED ENDOSCOPIC ULTRASOUND, ESOPHAGOSCOPY, GASTROSCOPY, DUODENOSCOPY (EGD), FINE NEEDLE ASPIRATE/BIOPSY;  Surgeon: Danelle Michael MD;  Location:  GI     GASTROSTOMY, INSERT TUBE, COMBINED N/A 2/2/2016    Procedure: COMBINED GASTROSTOMY, INSERT TUBE (OPEN);  Surgeon: Alvin Riojas MD;  Location:  OR     GI SURGERY  12/22/2015     HAND SURGERY      right     INSERT PORT VASCULAR ACCESS N/A 12/28/2015    Procedure: INSERT PORT VASCULAR ACCESS;  Surgeon: Alvin Riojas MD;  Location:  OR     LOBECTOMY LUNG Right 10/16/2018    Procedure: LOBECTOMY LUNG;  Surgeon: Alvin Riojas MD;  Location:  OR     REMOVE PORT VASCULAR ACCESS Left 7/22/2016    Procedure: REMOVE PORT VASCULAR ACCESS;  Surgeon: Alvin Riojas MD;  Location:  OR     THORACOTOMY Right 10/16/2018    Procedure: REDO RIGHT THORACTOMY AND RIGHT LOWER LOBECTOMY, PLEURAL LYSIS;  Surgeon: Alvin Riojas MD;  Location:  OR     TONSILLECTOMY       VASCULAR  SURGERY         Social History   Substance Use Topics     Smoking status: Former Smoker     Packs/day: 0.25     Quit date: 12/11/2015     Smokeless tobacco: Never Used     Alcohol use No      Comment: none     Family History   Problem Relation Age of Onset     Other Cancer Father      melanoma     Prostate Cancer Father      Diabetes Father      Other Cancer Brother      melanoma     Other Cancer Brother      melanoma     Other Cancer Brother          Current Outpatient Prescriptions   Medication Sig Dispense Refill     acetaminophen (TYLENOL) 500 MG tablet Take 1,000 mg by mouth every 8 hours as needed        amitriptyline (ELAVIL) 50 MG tablet Take 1 tablet (50 mg) by mouth At Bedtime 90 tablet 3     cyclobenzaprine (FLEXERIL) 5 MG tablet Take 1 tablet (5 mg) by mouth 3 times daily as needed for muscle spasms 21 tablet 0     DULoxetine (CYMBALTA) 60 MG EC capsule TAKE 1 CAPSULE BY MOUTH EVERY DAY 90 capsule 0     fluticasone (FLONASE) 50 MCG/ACT spray Spray 2 sprays into both nostrils daily 1 Bottle 11     hydrochlorothiazide 12.5 MG TABS tablet TAKE 1 TABLET (12.5 MG) BY MOUTH DAILY 90 tablet 1     HYDROmorphone (DILAUDID) 2 MG tablet Take 1-2 tablets (2-4 mg) by mouth every 3 hours as needed for moderate to severe pain 80 tablet 0     ibuprofen (ADVIL/MOTRIN) 600 MG tablet Take 1 tablet (600 mg) by mouth every 6 hours 120 tablet      metoprolol tartrate (LOPRESSOR) 25 MG tablet TAKE 1 TABLET (25 MG) BY MOUTH 2 TIMES DAILY 180 tablet 1     omeprazole (PRILOSEC) 40 MG capsule Take 1 capsule (40 mg) by mouth daily 90 capsule 3     Allergies   Allergen Reactions     Codeine Sulfate Nausea     Simvastatin Cramps     Leg cramps     Penicillins Rash       Reviewed and updated as needed this visit by clinical staff       Reviewed and updated as needed this visit by Provider         ROS:  Constitutional, HEENT, cardiovascular, pulmonary, gi and gu systems are negative, except as otherwise noted.    OBJECTIVE:     BP  "125/56 (BP Location: Right arm, Cuff Size: Adult Regular)  Pulse 75  Temp 97.1  F (36.2  C) (Oral)  Ht 5' 1.5\" (1.562 m)  Wt 88 lb 8 oz (40.1 kg)  SpO2 96%  BMI 16.45 kg/m2  Body mass index is 16.45 kg/(m^2).  GENERAL: healthy, alert and no distress  EYES: Eyes grossly normal to inspection, PERRL and conjunctivae and sclerae normal  HENT: ear canals and TM's normal, nose and mouth without ulcers or lesions  NECK: no adenopathy, no asymmetry, masses, or scars and thyroid normal to palpation  RESP: lungs clear to auscultation - no rales, rhonchi or wheezes  CV: regular rate and rhythm, normal S1 S2, no S3 or S4, no murmur, click or rub, no peripheral edema and peripheral pulses strong  ABDOMEN: soft, nontender, no hepatosplenomegaly, no masses and bowel sounds normal  MS: no gross musculoskeletal defects noted, no edema  SKIN: no suspicious lesions or rashes  NEURO: Normal strength and tone, mentation intact and speech normal  PSYCH: mentation appears normal, affect normal/bright    Diagnostic Test Results:  Results for orders placed or performed during the hospital encounter of 10/16/18   XR Chest Port 1 View    Narrative    CHEST ONE VIEW UPRIGHT 10/16/2018 10:54 AM     HISTORY: Chest tube.     COMPARISON: March 30, 2016      Impression    IMPRESSION: Multiple chest tubes on the right, no pneumothorax. Left  lung clear.    ANGIE ROSE MD   XR Chest Port 1 View    Narrative    CHEST ONE VIEW PORTABLE  10/17/2018 4:55 AM     HISTORY: Status post right lower lobectomy.     COMPARISON: 10/16/2018      Impression    IMPRESSION: Lung volumes are unchanged. Postoperative changes of right  lower lobectomy are noted. Chest tubes are in stable position. Trace  right-sided pleural gas is unchanged. Right-sided chest wall and lower  neck gas is slightly increased. Left lung is clear. Heart size is  unchanged.    MED MONTIEL MD   XR Chest Port 1 View    Narrative    CHEST ONE VIEW PORTABLE October 18, 2018 5:56 AM "     HISTORY: Status post right lower lobectomy.    COMPARISON: 10/17/2018.      Impression    IMPRESSION: Increase in subcutaneous emphysema over the right lateral  chest wall and in the right supraclavicular fossa. Mild mediastinal  emphysema. The two chest tubes on the right are unchanged. Left lung  is clear. Normal heart size and pulmonary vascularity.    MED AMOR MD   XR Chest Port 1 View    Narrative    CHEST ONE VIEW PORTABLE  10/19/2018 5:19 AM     HISTORY:  Status post right lower lobectomy.     COMPARISON: Chest x-ray 10/18/2018.      Impression    IMPRESSION: Portable view of the chest is performed. There are two  right chest tubes in place. No obvious residual pneumothorax. Surgical  staples are noted along the right mediastinal border. Left lung is  well expanded and clear. Heart is normal in size. No pleural  effusions. Subcutaneous emphysema along the right chest wall is  unchanged.    JAYLIN VILLEGAS MD   XR Chest Port 1 View    Narrative    CHEST PORTABLE ONE VIEW   10/20/2018 5:37 AM     HISTORY: Status post right lower lobectomy.     COMPARISON: Chest x-ray 10/19/2018.      Impression    IMPRESSION: Portable view of the chest is performed. Right chest tubes  are again noted. No definite evidence of right pneumothorax. Lungs are  otherwise clear. No left pneumothorax. Postoperative changes are noted  in the medial right upper lung. Subcutaneous emphysema along the right  chest wall is unchanged. Heart is normal in size.    JAYLIN VILLEGAS MD   XR Chest Port 1 View    Narrative    CHEST PORTABLE ONE VIEW   10/21/2018 5:24 AM     HISTORY: Status post right lower lobectomy.     COMPARISON: Chest x-ray 10/20/2018.      Impression    IMPRESSION: Portable view of the chest is performed. Postoperative  changes from partial right lung resection are again noted. Both chest  tubes remain in position. No evidence of pneumothorax. Left lung is  well expanded and clear. Heart is normal in size.  Subcutaneous  emphysema is unchanged.    JAYLIN VILLEGAS MD   XR Chest Port 1 View    Narrative    XR CHEST PORT 1 VW  10/22/2018 5:50 AM     HISTORY:  s/p right lower lobectomy;     COMPARISON: Film performed on 10/21/2018    FINDINGS:  Right chest tubes are in place. No definite pneumothorax is  identified. Subcutaneous gas is seen along the right chest wall. Left  lung remains clear. The gas collection in the mediastinum is  consistent with the patient's history of esophagectomy and gastric  pull-through procedure.      Impression    IMPRESSION:  1. Stable, no change since 10/21/2018.  2. Mediastinal gas collection consistent with the patient's history of  a gastric pull-through procedure.    COURTNEY CHOUDHARY MD   XR Chest Port 1 View    Narrative    CHEST ONE VIEW UPRIGHT  10/23/2018 4:50 AM     HISTORY: Status post right lower lobectomy.     COMPARISON: October 22, 2018      Impression    IMPRESSION: Chest tubes and subcutaneous emphysema noted on the right.  No definite pneumothorax. Left lung clear. No definite change from  comparison.    ANGIE ROSE MD   XR Chest Port 1 View    Narrative    XR CHEST PORT 1 VW  10/24/2018 6:13 AM     HISTORY:  Chest Tube;     COMPARISON: Film performed on 10/23/2018    FINDINGS:  Right chest tubes are in place. Small right pneumothorax is  seen. Subcutaneous emphysema seen along the right chest wall. Patient  is status post a gastric pull-through procedure. Left lung is clear.      Impression    IMPRESSION: Small right pneumothorax. No other change.    COURTNEY CHOUDHARY MD   XR Chest Port 1 View    Narrative    XR CHEST PORT 1 VW  10/25/2018 5:20 AM     HISTORY:  Chest Tube;     COMPARISON: Film performed on 10/24/2018    FINDINGS:  Right chest tubes are again noted. Small right pneumothorax  is again seen. This has not changed significantly. Subcutaneous gas is  seen over the right neck and along the right chest wall. The left lung  is clear. Mediastinal gas consistent with patient's  history of a  gastric pull-through procedure.      Impression    IMPRESSION: No significant change. Small right pneumothorax is still  present.    COURTNEY CHOUDHARY MD   XR Chest Port 1 View    Narrative    XR CHEST PORT 1 VW  10/26/2018 5:20 AM     HISTORY:  Chest Tube;     COMPARISON: Film performed on 10/25/2018.    FINDINGS:  Right chest tubes are again noted. Small right pneumothorax  is still present. Subcutaneous air seen along the right chest wall and  in the right lower neck. Mediastinal gas compatible with the patient's  history of a gastric pull-through procedure.      Impression    IMPRESSION: Small right pneumothorax. No significant change.    COURTNEY CHOUDHARY MD   CBC with platelets   Result Value Ref Range    WBC 6.0 4.0 - 11.0 10e9/L    RBC Count 4.01 3.8 - 5.2 10e12/L    Hemoglobin 13.1 11.7 - 15.7 g/dL    Hematocrit 37.2 35.0 - 47.0 %    MCV 93 78 - 100 fl    MCH 32.7 26.5 - 33.0 pg    MCHC 35.2 31.5 - 36.5 g/dL    RDW 12.2 10.0 - 15.0 %    Platelet Count 257 150 - 450 10e9/L   Basic metabolic panel   Result Value Ref Range    Sodium 135 133 - 144 mmol/L    Potassium 4.2 3.4 - 5.3 mmol/L    Chloride 101 94 - 109 mmol/L    Carbon Dioxide 28 20 - 32 mmol/L    Anion Gap 6 3 - 14 mmol/L    Glucose 105 (H) 70 - 99 mg/dL    Urea Nitrogen 20 7 - 30 mg/dL    Creatinine 0.61 0.52 - 1.04 mg/dL    GFR Estimate >90 >60 mL/min/1.7m2    GFR Estimate If Black >90 >60 mL/min/1.7m2    Calcium 9.1 8.5 - 10.1 mg/dL   INR   Result Value Ref Range    INR 1.01 0.86 - 1.14   Surgical pathology exam   Result Value Ref Range    Copath Report       Patient Name: MATTEO CASTAÑEDA  MR#: [id number]  Specimen #: [id number]  Collected: 10/16/2018  Received: 10/16/2018  Reported: 10/19/2018 09:58  Ordering Phy(s): KATIE SHELTON    For improved result formatting, select 'View Enhanced Report Format' under   Linked Documents section.    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 The two tumors are  morphologically dissimilar with no clear basaloid differentiation in this   lesion. Although a metastasis cannot  be entirely excluded, a new primary squamous cell carcinoma is favored. If   this lesion is deemed a new primary  lung lesion, the pathologic stage would  be pT1a/NX/MX. Clinical and   radiographic correlation is suggested.    This case was shared in intradepartmental consultation with agreement to   the above diagnosis.    Electronically signed out by:    Shailesh Mayfield M.D.    CLINICAL HISTORY:  62 year old woman with right lower lobe lung nodule.    GROSS:  The specimen is received fresh with the patient's name and proper   identification a labeled \"right lower lobe\".   The specimen consists of 140 g red-purple spongy lung lobectomy (14.3 x   9.1 x 2.1 cm) with stapled bronchus  (0.5 cm in length x 2.5 cm in diameter).  The pleura has attached   hemorrhagic adhesions.  There is a pink  fleshy nodule (0.7 x 0.6 x 0.6 cm) deep into the parenchyma and 4.5 cm   from the stapled surgical resection  margin.  A representative section of the mass is submitted on one kaushal   for frozen section..  There are  multiple subserosal calcified granulomas ranging from 0.2 cm up to 0.8 cm.    The specimen is dissected for  parabronchial lymph node s.  Two minute possible parabronchial lymph nodes   are identified.  No additional  lesions are identified grossly.  Representative sections are submitted in   four cassettes.    Summary of Sections:  A1 - Frozen section  A2 - entire nodule  A3 - parabronchial lymph nodes and bronchial margin  A4 - lung parenchyma with calcified nodule " and pleural adhesions (Dictated   by: Brent Denton 10/16/2018 04:41  PM)    INTRAOPERATIVE CONSULTATION:   Frozen section diagnosis: Deferred-Dr. Patterson    MICROSCOPIC:  Sections from the lung mass show a moderately differentiated squamous cell   carcinoma with prominent  intercellular bridges, areas of necrosis, and foci suggestive of   keratinization. No overt keratinization is  noted. The tumor is diffusely positive for p40 and negative for SHANE-3 and   uroplakin. No lymphvascular  invasion is seen. The uninvolved lung shows pleural adhesions and   calcified granulomas. AFB and GMS stains  performed on these granulomas show no acid fast bacteria o r fungal   organisms.    Dr. Mayfield evaluated the SHANE-3 and uroplakin stains and signed the   report in the absence of Dr. Cowan.  The remainder of the case was evaluated and written by Dr. Cowan.    CPT Codes:  A: 10726-KY2, 87439-EV, 75606-SKG, 22613-IWE, 33252-KQF, 97841-NPU,   57237-WXU    TESTING LAB LOCATION:  [address]  [id number] [phone]    COLLECTION SITE:  Client: [facility]  Location: [facility] (S)     Hemoglobin   Result Value Ref Range    Hemoglobin 10.4 (L) 11.7 - 15.7 g/dL   Basic metabolic panel   Result Value Ref Range    Sodium 134 133 - 144 mmol/L    Potassium 3.8 3.4 - 5.3 mmol/L    Chloride 101 94 - 109 mmol/L    Carbon Dioxide 25 20 - 32 mmol/L    Anion Gap 8 3 - 14 mmol/L    Glucose 96 70 - 99 mg/dL    Urea Nitrogen 19 7 - 30 mg/dL    Creatinine 0.61 0.52 - 1.04 mg/dL    GFR Estimate >90 >60 mL/min/1.7m2    GFR Estimate If Black >90 >60 mL/min/1.7m2    Calcium 8.0 (L) 8.5 - 10.1 mg/dL   Nt probnp inpatient   Result Value Ref Range    N-Terminal Pro BNP Inpatient 616 0 - 900 pg/mL   Comprehensive metabolic panel   Result Value Ref Range    Sodium 128 (L) 133 - 144 mmol/L    Potassium 3.9 3.4 - 5.3 mmol/L    Chloride 96 94 - 109 mmol/L    Carbon Dioxide 24 20 - 32  mmol/L    Anion Gap 8 3 - 14 mmol/L    Glucose 192 (H) 70 - 99 mg/dL    Urea Nitrogen 13 7 - 30 mg/dL    Creatinine 0.49 (L) 0.52 - 1.04 mg/dL    GFR Estimate >90 >60 mL/min/1.7m2    GFR Estimate If Black >90 >60 mL/min/1.7m2    Calcium 7.9 (L) 8.5 - 10.1 mg/dL    Bilirubin Total 0.3 0.2 - 1.3 mg/dL    Albumin 2.9 (L) 3.4 - 5.0 g/dL    Protein Total 5.8 (L) 6.8 - 8.8 g/dL    Alkaline Phosphatase 47 40 - 150 U/L    ALT 20 0 - 50 U/L    AST 27 0 - 45 U/L   CBC with platelets   Result Value Ref Range    WBC 7.5 4.0 - 11.0 10e9/L    RBC Count 3.24 (L) 3.8 - 5.2 10e12/L    Hemoglobin 10.5 (L) 11.7 - 15.7 g/dL    Hematocrit 30.9 (L) 35.0 - 47.0 %    MCV 95 78 - 100 fl    MCH 32.4 26.5 - 33.0 pg    MCHC 34.0 31.5 - 36.5 g/dL    RDW 12.2 10.0 - 15.0 %    Platelet Count 222 150 - 450 10e9/L   CK total   Result Value Ref Range    CK Total 266 (H) 30 - 225 U/L   Iron and iron binding capacity   Result Value Ref Range    Iron 39 35 - 180 ug/dL    Iron Binding Cap 243 240 - 430 ug/dL    Iron Saturation Index 16 15 - 46 %   Creatinine   Result Value Ref Range    Creatinine 0.49 (L) 0.52 - 1.04 mg/dL    GFR Estimate >90 >60 mL/min/1.7m2    GFR Estimate If Black >90 >60 mL/min/1.7m2   Sodium   Result Value Ref Range    Sodium 133 133 - 144 mmol/L   CBC with platelets   Result Value Ref Range    WBC 7.0 4.0 - 11.0 10e9/L    RBC Count 3.23 (L) 3.8 - 5.2 10e12/L    Hemoglobin 10.5 (L) 11.7 - 15.7 g/dL    Hematocrit 30.5 (L) 35.0 - 47.0 %    MCV 94 78 - 100 fl    MCH 32.5 26.5 - 33.0 pg    MCHC 34.4 31.5 - 36.5 g/dL    RDW 12.1 10.0 - 15.0 %    Platelet Count 216 150 - 450 10e9/L   Glucose by meter   Result Value Ref Range    Glucose 98 70 - 99 mg/dL   Basic metabolic panel   Result Value Ref Range    Sodium 133 133 - 144 mmol/L    Potassium 3.8 3.4 - 5.3 mmol/L    Chloride 97 94 - 109 mmol/L    Carbon Dioxide 29 20 - 32 mmol/L    Anion Gap 7 3 - 14 mmol/L    Glucose 105 (H) 70 - 99 mg/dL    Urea Nitrogen 15 7 - 30 mg/dL    Creatinine  0.51 (L) 0.52 - 1.04 mg/dL    GFR Estimate >90 >60 mL/min/1.7m2    GFR Estimate If Black >90 >60 mL/min/1.7m2    Calcium 8.5 8.5 - 10.1 mg/dL   CBC with platelets   Result Value Ref Range    WBC 8.4 4.0 - 11.0 10e9/L    RBC Count 3.34 (L) 3.8 - 5.2 10e12/L    Hemoglobin 10.9 (L) 11.7 - 15.7 g/dL    Hematocrit 31.5 (L) 35.0 - 47.0 %    MCV 94 78 - 100 fl    MCH 32.6 26.5 - 33.0 pg    MCHC 34.6 31.5 - 36.5 g/dL    RDW 12.2 10.0 - 15.0 %    Platelet Count 296 150 - 450 10e9/L   Basic metabolic panel   Result Value Ref Range    Sodium 131 (L) 133 - 144 mmol/L    Potassium 3.8 3.4 - 5.3 mmol/L    Chloride 96 94 - 109 mmol/L    Carbon Dioxide 28 20 - 32 mmol/L    Anion Gap 7 3 - 14 mmol/L    Glucose 189 (H) 70 - 99 mg/dL    Urea Nitrogen 15 7 - 30 mg/dL    Creatinine 0.53 0.52 - 1.04 mg/dL    GFR Estimate >90 >60 mL/min/1.7m2    GFR Estimate If Black >90 >60 mL/min/1.7m2    Calcium 8.3 (L) 8.5 - 10.1 mg/dL   CBC with platelets   Result Value Ref Range    WBC 8.3 4.0 - 11.0 10e9/L    RBC Count 3.37 (L) 3.8 - 5.2 10e12/L    Hemoglobin 10.9 (L) 11.7 - 15.7 g/dL    Hematocrit 32.1 (L) 35.0 - 47.0 %    MCV 95 78 - 100 fl    MCH 32.3 26.5 - 33.0 pg    MCHC 34.0 31.5 - 36.5 g/dL    RDW 12.4 10.0 - 15.0 %    Platelet Count 348 150 - 450 10e9/L   Platelet count   Result Value Ref Range    Platelet Count 356 150 - 450 10e9/L   Creatinine   Result Value Ref Range    Creatinine 0.56 0.52 - 1.04 mg/dL    GFR Estimate >90 >60 mL/min/1.7m2    GFR Estimate If Black >90 >60 mL/min/1.7m2   Sodium   Result Value Ref Range    Sodium 136 133 - 144 mmol/L   ABO/Rh type and screen   Result Value Ref Range    ABO AB     RH(D) Pos     Antibody Screen Neg     Test Valid Only At [facility]        Specimen Expires 10/19/2018        ASSESSMENT/PLAN:         ICD-10-CM    1. Malignant neoplasm of right lung, unspecified part of lung (H) C34.91    2. Acute post-operative pain G89.18 oxyCODONE IR (ROXICODONE) 5 MG tablet     It seems  that this patient had a new limited stage lung cancer that was incidentally discovered on surveillance PET scanning for her original esophageal cancer.   She will follow-up with Dr. Da Silva from thoracic surgery as directed.  She was not referred to an oncologist.  She does need help with postoperative pain.  We decided to change her hydromorphone to oxycodone.  We discussed the risks of opioid medications.  Hopefully, additional 80 tablets of oxycodone can help her control pain until her postoperative pain subsides.  She will continue to use Tylenol as well.  She will be in touch with me via my chart if the oxycodone is not helpful enough for her pain or if there are side effects.      Mustapha Velásquez MD  [facility]

## 2018-11-04 ENCOUNTER — MYC MEDICAL ADVICE (OUTPATIENT)
Dept: FAMILY MEDICINE | Facility: CLINIC | Age: 63
End: 2018-11-04

## 2018-11-04 DIAGNOSIS — R91.1 RIGHT LOWER LOBE PULMONARY NODULE: ICD-10-CM

## 2018-11-04 DIAGNOSIS — G89.18 ACUTE POST-OPERATIVE PAIN: ICD-10-CM

## 2018-11-05 ENCOUNTER — TRANSFERRED RECORDS (OUTPATIENT)
Dept: HEALTH INFORMATION MANAGEMENT | Facility: CLINIC | Age: 63
End: 2018-11-05

## 2018-11-05 RX ORDER — HYDROMORPHONE HYDROCHLORIDE 2 MG/1
2-4 TABLET ORAL
Qty: 80 TABLET | Refills: 0 | Status: SHIPPED | OUTPATIENT
Start: 2018-11-05 | End: 2018-11-23

## 2018-11-05 NOTE — TELEPHONE ENCOUNTER
Placed call.  Information given to patient from PCP.  States understood and agreed with advise.  Appointment scheduled x 2 wks.   RX placed at .   Deepthi Kinney RN

## 2018-11-05 NOTE — TELEPHONE ENCOUNTER
To PCP:     Pt is updating on Pain via [employer] message as advised at OV on 11/2    Please see message.     Thank you,   Kelly CHANCE RN

## 2018-11-05 NOTE — TELEPHONE ENCOUNTER
She can  an prescription for dilaudid that I put in the Rx out box  Do not take dilaudid and oxycodone  Schedule an office visit appointment in 2-3 weeks to recheck in on pain and see if we can switch back to oxycodone or find other ways to manage pain

## 2018-11-06 NOTE — DISCHARGE SUMMARY
THORACIC SURGERY HOSPITAL DISCHARGE SUMMARY  [facility] ONCOLOGY - THORACIC SURGERY  [address], [address]  [place] [address]  Phone [phone]  [email or web address]    11/6/2018     Mustapha Velásquez   [address] / [place] [address]  Phone: [phone]   Fax: [phone]        Re: Kezia Dixon             1955             [id number]              Dates of Hospitalization: 10/16/2018 - 10/26/2018   Date of Service (when I saw the patient):10/26/18    Dear Dr. Velásquez:     As you are aware, we had the pleasure of caring for your patient,  Kezia Dixon here at [facility].  She is a 62-year-old woman with a history of esophageal cancer, treated with chemotherapy and radiation therapy followed by esophagectomy.  She has been showing a small nodule on the CT scan of the chest deep in the right lower lobe lung.  This nodule is hypermetabolic and suspicious for metastatic disease or primary lung cancer, but unfortunately is too deep and too small for CT-guided biopsy and not amenable to wedge resection.  The pulmonary function tests are adequate.  Based on the findings, a redo right thoracotomy, right lobectomy is indicated for diagnosis and treatment.     On 10/16/2018, Dr. Alvin Riojas performed the following:    Procedure/Surgery Information   Procedure: Redo right thoracotomy, extensive intrapleural pneumolysis and right lower lobectomy.   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 Resection margins  negative for carcinoma. Calcified granulomas in uninvolved lung. 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 This current lung tumor is viewed as a new primary lung cancer). Final pathologic stage [id number] Final Stage IA1    Her post-operative course was remarkable for a prolonged air leak which eventually resolved with time.      Consultations This Hospital Stay   HOSPITALIST IP CONSULT    Kezia Dixon has otherwise recovered sufficiently to be discharged to home today, 10/26/2018, on post-operative day number ten for further convalescence.  Her incisions are healing well with no signs or symptoms of infection.  Her bowels have moved sufficiently and she is tolerating diet and activity, ambulating and transferring independently.  She is currently afebrile with stable vital signs.     Below, you will find a full discharge medication list and instructions.  We have arranged for Kezia Dixon to follow-up with us in our [facility] in 7-10 days with a Chest X-ray prior to that appointment.  We thank you for allowing us to participate in the care of Kezia Dixon here at [facility].  Please feel free to contact our office at [phone] with any questions or concerns or if we can be of any further assistance in the care of this patient.    Sincerely,    Dr. Alvin Riojas MD    D/C Summary Prepared by: Destiny Kendall PA-C    Discharge Medications:  Discharge Medication List as of 10/26/2018 11:44 AM      START taking these medications    Details   cyclobenzaprine (FLEXERIL) 5 MG tablet Take 1 tablet (5 mg) by mouth 3 times daily as needed for muscle spasms, Disp-21 tablet, R-0, E-Prescribe      ibuprofen (ADVIL/MOTRIN) 600 MG tablet Take 1 tablet (600 mg) by mouth every 6 hours, Disp-120 tablet, No Print Out      HYDROmorphone (DILAUDID) 2 MG tablet Take 1-2 tablets (2-4 mg) by mouth every 3 hours as  needed for moderate to severe pain, Disp-80 tablet, R-0, Local Print         CONTINUE these medications which have CHANGED    Details   acetaminophen (TYLENOL) 500 MG tablet Take 2 tablets (1,000 mg) by mouth 3 times daily, Historical         CONTINUE these medications which have NOT CHANGED    Details   amitriptyline (ELAVIL) 50 MG tablet Take 1 tablet (50 mg) by mouth At Bedtime, Disp-90 tablet, R-3, E-Prescribe      DULoxetine (CYMBALTA) 60 MG EC capsule TAKE 1 CAPSULE BY MOUTH EVERY DAY, Disp-90 capsule, R-0, E-Prescribe      fluticasone (FLONASE) 50 MCG/ACT spray Spray 2 sprays into both nostrils daily, Disp-1 Bottle, R-11, E-Prescribe      hydrochlorothiazide 12.5 MG TABS tablet TAKE 1 TABLET (12.5 MG) BY MOUTH DAILY, Disp-90 tablet, R-1, E-Prescribe      metoprolol tartrate (LOPRESSOR) 25 MG tablet TAKE 1 TABLET (25 MG) BY MOUTH 2 TIMES DAILY, Disp-180 tablet, R-1, E-Prescribe      omeprazole (PRILOSEC) 40 MG capsule Take 1 capsule (40 mg) by mouth daily, Disp-90 capsule, R-3, E-Prescribe                 Discharge Instructions:  1) Remove chest tube dressing on 10/28/18 and then it is Ok to shower.  Please wash both incision and chest tube site daily with soap and water.  You may cover the chest tube site daily with a clean band-aid or dry gauze if it continues to drain.  2) Steri-strips can be removed in 1 week or they will fall off when they are ready.  3) Continue daily use of your Incentive Spirometer, set of 10x in a row, every 1-2 hours while you are awake during the day.  4) No lifting, pushing or pulling >8-10 lbs for 4-6 weeks from the day of your surgery.  No driving while on narcotic pain medications.    Follow-Up Care:  1) Follow up with Destiny Kendall PA-C/Dr. Riojas at the [place] [facility] in [place] ([address] [place] [address], [address], [place] [address]).  Call Sabrina at [phone] to schedule the appointment.  2) Follow up with Primary Care Provider, Mustapha Velásquez within 1 month of  discharge for routine post-surgical care, wound check and follow up.  Please call [phone] to arrange this appointment.       CC  Patient Care Team:  Mustapha Velásquez MD as PCP - General (Internal Medicine)

## 2018-11-09 DIAGNOSIS — J31.0 CHRONIC RHINITIS: Primary | ICD-10-CM

## 2018-11-09 RX ORDER — FLUTICASONE PROPIONATE 50 MCG
2 SPRAY, SUSPENSION (ML) NASAL DAILY
Qty: 1 BOTTLE | Refills: 0 | Status: SHIPPED | OUTPATIENT
Start: 2018-11-09 | End: 2018-12-08

## 2018-11-09 NOTE — TELEPHONE ENCOUNTER
"fluticasone (FLONASE) 50 MCG/ACT spray  Last Written Prescription Date:  11/9/2017  Last Fill Quantity: 1,  # refills: 11   Last office visit: 11/2/2018 with prescribing provider:  Mustapha Velásquez MD   Future Office Visit: 11/23/2018  Next 5 appointments (look out 90 days)     Nov 23, 2018 11:00 AM CST   Office Visit with Mustapha Velásquez MD   [facility] ([facility]    [address] [address] [phone]                 Requested Prescriptions   Pending Prescriptions Disp Refills     fluticasone (FLONASE) 50 MCG/ACT spray 1 Bottle 11     Sig: Spray 2 sprays into both nostrils daily    Inhaled Steroids Protocol Passed    11/9/2018 11:13 AM       Passed - Patient is age 12 or older       Passed - Recent (12 mo) or future (30 days) visit within the authorizing provider's specialty    Patient had office visit in the last 12 months or has a visit in the next 30 days with authorizing provider or within the authorizing provider's specialty.  See \"Patient Info\" tab in inbasket, or \"Choose Columns\" in Meds & Orders section of the refill encounter.                "

## 2018-11-09 NOTE — TELEPHONE ENCOUNTER
Medication is being filled for 1 time refill only due to:  coming up on 1 yr.  will review at appt scheduled for later this month.    Deepthi WEBER RN,BSN

## 2018-11-21 DIAGNOSIS — I10 BENIGN ESSENTIAL HYPERTENSION: ICD-10-CM

## 2018-11-21 RX ORDER — OMEPRAZOLE 40 MG/1
CAPSULE, DELAYED RELEASE ORAL
Qty: 90 CAPSULE | Refills: 3 | Status: SHIPPED | OUTPATIENT
Start: 2018-11-21 | End: 2019-11-11

## 2018-11-21 NOTE — TELEPHONE ENCOUNTER
"Last Written Prescription Date:  11/9/17  Last Fill Quantity: 90,  # refills: 3   Last office visit: 11/2/2018 with prescribing provider:     Future Office Visit:   Next 5 appointments (look out 90 days)     Nov 23, 2018 11:00 AM CST   Office Visit with Mustapha Velásquez MD   [facility]    [address] [facility] [address]   [phone]                 Requested Prescriptions   Pending Prescriptions Disp Refills     omeprazole (PRILOSEC) 40 MG capsule [Pharmacy Med Name: OMEPRAZOLE DR 40 MG CAPSULE] 90 capsule 3     Sig: TAKE 1 CAPSULE (40 MG) BY MOUTH DAILY    PPI Protocol Passed    11/21/2018  1:48 AM       Passed - Not on Clopidogrel (unless Pantoprazole ordered)       Passed - No diagnosis of osteoporosis on record       Passed - Recent (12 mo) or future (30 days) visit within the authorizing provider's specialty    Patient had office visit in the last 12 months or has a visit in the next 30 days with authorizing provider or within the authorizing provider's specialty.  See \"Patient Info\" tab in inbasket, or \"Choose Columns\" in Meds & Orders section of the refill encounter.             Passed - Patient is age 18 or older       Passed - No active pregnacy on record       Passed - No positive pregnancy test in past 12 months          "

## 2018-11-21 NOTE — TELEPHONE ENCOUNTER
Prescription approved per [facility] Refill Protocol.  Helen Suarez RN- Triage [employer]

## 2018-11-23 ENCOUNTER — OFFICE VISIT (OUTPATIENT)
Dept: FAMILY MEDICINE | Facility: CLINIC | Age: 63
End: 2018-11-23
Payer: COMMERCIAL

## 2018-11-23 VITALS
DIASTOLIC BLOOD PRESSURE: 57 MMHG | HEART RATE: 77 BPM | WEIGHT: 87 LBS | TEMPERATURE: 97.6 F | BODY MASS INDEX: 16.01 KG/M2 | OXYGEN SATURATION: 100 % | SYSTOLIC BLOOD PRESSURE: 136 MMHG | HEIGHT: 62 IN

## 2018-11-23 DIAGNOSIS — C34.91 MALIGNANT NEOPLASM OF RIGHT LUNG, UNSPECIFIED PART OF LUNG (H): Primary | ICD-10-CM

## 2018-11-23 DIAGNOSIS — G89.18 ACUTE POST-OPERATIVE PAIN: ICD-10-CM

## 2018-11-23 DIAGNOSIS — Z23 NEED FOR TDAP VACCINATION: ICD-10-CM

## 2018-11-23 DIAGNOSIS — Z23 NEED FOR VACCINATION WITH 13-POLYVALENT PNEUMOCOCCAL CONJUGATE VACCINE: ICD-10-CM

## 2018-11-23 PROCEDURE — 90715 TDAP VACCINE 7 YRS/> IM: CPT | Performed by: INTERNAL MEDICINE

## 2018-11-23 PROCEDURE — 90471 IMMUNIZATION ADMIN: CPT | Performed by: INTERNAL MEDICINE

## 2018-11-23 PROCEDURE — 90472 IMMUNIZATION ADMIN EACH ADD: CPT | Performed by: INTERNAL MEDICINE

## 2018-11-23 PROCEDURE — 99213 OFFICE O/P EST LOW 20 MIN: CPT | Performed by: INTERNAL MEDICINE

## 2018-11-23 PROCEDURE — 90670 PCV13 VACCINE IM: CPT | Performed by: INTERNAL MEDICINE

## 2018-11-23 RX ORDER — HYDROMORPHONE HYDROCHLORIDE 2 MG/1
1-2 TABLET ORAL 2 TIMES DAILY PRN
Qty: 60 TABLET | Refills: 0 | Status: SHIPPED | OUTPATIENT
Start: 2018-11-23 | End: 2018-12-19

## 2018-11-23 NOTE — MR AVS SNAPSHOT
After Visit Summary   11/23/2018    Kezia Dixon    MRN: [id number]           Patient Information     Date Of Birth          1955        Visit Information        Provider Department      11/23/2018 11:00 AM Mustapha Velásquez MD [facility]        Today's Diagnoses     Malignant neoplasm of right lung, unspecified part of lung (H)    -  1    Acute post-operative pain           Follow-ups after your visit        Your next 10 appointments already scheduled     Dec 21, 2018 11:00 AM CST   Office Visit with Mustapha Velásquez MD   [facility] ([facility])    [address] [facility] [id number] [phone]           Bring a current list of meds and any records pertaining to this visit. For Physicals, please bring immunization records and any forms needing to be filled out. Please arrive 10 minutes early to complete paperwork.              Who to contact     If you have questions or need follow up information about today's clinic visit or your schedule please contact [facility] directly at [phone].  Normal or non-critical lab and imaging results will be communicated to you by MyChart, letter or phone within 4 business days after the clinic has received the results. If you do not hear from us within 7 days, please contact the clinic through [employer] or phone. If you have a critical or abnormal lab result, we will notify you by phone as soon as possible.  Submit refill requests through [employer] or call your pharmacy and they will forward the refill request to us. Please allow 3 business days for your refill to be completed.          Additional Information About Your Visit        MyChart Information     [employer] gives you secure access to your electronic health record. If you see a primary care provider, you can also send messages to your care team and make appointments. If you have questions, please call your primary care clinic.  If you do not  "have a primary care provider, please call [phone] and they will assist you.        Care EveryWhere ID     This is your Care EveryWhere ID. This could be used by other organizations to access your [facility] medical records  [id number]        Your Vitals Were     Pulse Temperature Height Pulse Oximetry Breastfeeding? BMI (Body Mass Index)    77 97.6  F (36.4  C) (Oral) 5' 1.5\" (1.562 m) 100% No 16.17 kg/m2       Blood Pressure from Last 3 Encounters:   11/23/18 136/57   11/02/18 125/56   10/26/18 136/63    Weight from Last 3 Encounters:   11/23/18 87 lb (39.5 kg)   11/02/18 88 lb 8 oz (40.1 kg)   10/11/18 93 lb (42.2 kg)              Today, you had the following     No orders found for display         Today's Medication Changes          These changes are accurate as of 11/23/18 11:30 AM.  If you have any questions, ask your nurse or doctor.               These medicines have changed or have updated prescriptions.        Dose/Directions    HYDROmorphone 2 MG tablet   Commonly known as:  DILAUDID   This may have changed:    - how much to take  - when to take this   Used for:  Acute post-operative pain   Changed by:  Mustapha Velásquez MD        Dose:  1-2 mg   Take 0.5-1 tablets (1-2 mg) by mouth 2 times daily as needed for moderate to severe pain   Quantity:  60 tablet   Refills:  0            Where to get your medicines      Some of these will need a paper prescription and others can be bought over the counter.  Ask your nurse if you have questions.     Bring a paper prescription for each of these medications     HYDROmorphone 2 MG tablet               Information about OPIOIDS     PRESCRIPTION OPIOIDS: WHAT YOU NEED TO KNOW   We gave you an opioid (narcotic) pain medicine. It is important to manage your pain, but opioids are not always the best choice. You should first try all the other options your care team gave you. Take this medicine for as short a time (and as few doses) as possible.    Some activities can " increase your pain, such as bandage changes or therapy sessions. It may help to take your pain medicine 30 to 60 minutes before these activities. Reduce your stress by getting enough sleep, working on hobbies you enjoy and practicing relaxation or meditation. Talk to your care team about ways to manage your pain beyond prescription opioids.    These medicines have risks:    DO NOT drive when on new or higher doses of pain medicine. These medicines can affect your alertness and reaction times, and you could be arrested for driving under the influence (DUI). If you need to use opioids long-term, talk to your care team about driving.    DO NOT operate heavy machinery    DO NOT do any other dangerous activities while taking these medicines.    DO NOT drink any alcohol while taking these medicines.     If the opioid prescribed includes acetaminophen, DO NOT take with any other medicines that contain acetaminophen. Read all labels carefully. Look for the word  acetaminophen  or  Tylenol.  Ask your pharmacist if you have questions or are unsure.    You can get addicted to pain medicines, especially if you have a history of addiction (chemical, alcohol or substance dependence). Talk to your care team about ways to reduce this risk.    All opioids tend to cause constipation. Drink plenty of water and eat foods that have a lot of fiber, such as fruits, vegetables, prune juice, apple juice and high-fiber cereal. Take a laxative (Miralax, milk of magnesia, Colace, Senna) if you don t move your bowels at least every other day. Other side effects include upset stomach, sleepiness, dizziness, throwing up, tolerance (needing more of the medicine to have the same effect), physical dependence and slowed breathing.    Store your pills in a secure place, locked if possible. We will not replace any lost or stolen medicine. If you don t finish your medicine, please throw away (dispose) as directed by your pharmacist. The [place]  Pollution Control Agency has more information about safe disposal: [email or web address]/living-[email or web address]/[email or web address]-unwanted-medications         Primary Care Provider Office Phone # Fax #    Mustapha Velásquez [phone] [address] [facility] [address]  [facility] [address]        Equal Access to Services     [facility] HospitalJORDAN : Hadii aad ku hadasho Soomaali, waaxda luqadaha, qaybta kaalmada adeegyada, waxay idiin hayaan adeeg khkenyattastepan lahari . So [facility] [phone].    ATENCIÓN: Si habla español, tiene a thomas disposición servicios gratuitos de asistencia lingüística. Shikhaame al [phone].    We comply with applicable federal civil rights laws and [place] laws. We do not discriminate on the basis of race, color, national origin, age, disability, sex, sexual orientation, or gender identity.            Thank you!     Thank you for choosing [facility]  for your care. Our goal is always to provide you with excellent care. Hearing back from our patients is one way we can continue to improve our services. Please take a few minutes to complete the written survey that you may receive in the mail after your visit with us. Thank you!             Your Updated Medication List - Protect others around you: Learn how to safely use, store and throw away your medicines at www.disposemymeds.org.          This list is accurate as of 11/23/18 11:30 AM.  Always use your most recent med list.                   Brand Name Dispense Instructions for use Diagnosis    amitriptyline 50 MG tablet    ELAVIL    90 tablet    Take 1 tablet (50 mg) by mouth At Bedtime    Neuralgia, post-herpetic       DULoxetine 60 MG capsule    CYMBALTA    90 capsule    TAKE 1 CAPSULE BY MOUTH EVERY DAY    Neuralgia, post-herpetic       fluticasone 50 MCG/ACT spray    FLONASE    1 Bottle    Spray 2 sprays into both nostrils daily    Chronic rhinitis       hydrochlorothiazide 12.5 MG Tabs tablet     90 tablet    TAKE 1 TABLET (12.5 MG) BY MOUTH  DAILY    Benign essential hypertension       HYDROmorphone 2 MG tablet    DILAUDID    60 tablet    Take 0.5-1 tablets (1-2 mg) by mouth 2 times daily as needed for moderate to severe pain    Acute post-operative pain       ibuprofen 600 MG tablet    ADVIL/MOTRIN    120 tablet    Take 1 tablet (600 mg) by mouth every 6 hours    Right lower lobe pulmonary nodule, Acute post-operative pain       metoprolol tartrate 25 MG tablet    LOPRESSOR    180 tablet    TAKE 1 TABLET (25 MG) BY MOUTH 2 TIMES DAILY    Benign essential hypertension       omeprazole 40 MG capsule    priLOSEC    90 capsule    TAKE 1 CAPSULE (40 MG) BY MOUTH DAILY    Benign essential hypertension       TYLENOL 500 MG tablet   Generic drug:  acetaminophen      Take 1,000 mg by mouth every 8 hours as needed

## 2018-11-23 NOTE — PROGRESS NOTES
SUBJECTIVE:   Kezia Dixon is a 63 year old female who presents to clinic today for the following health issues:      Medication Followup of Dilaudid    Taking Medication as prescribed: Yes    Side Effects:  None    Medication Helping Symptoms:  yes       Medication Followup of Oxycodone    Taking Medication as prescribed: yes- once started Dilaudid     Side Effects:  Upset stomach     Medication Helping Symptoms:  NO     Pleasant 63-year-old female who had a thoracotomy about 5 weeks ago for lung cancer that was incidentally discovered upon surveillance for her esophageal cancer that has been resected.  She has been struggling with severe postoperative pain.  She attempted to decrease the intensity of her opioid analgesic last visit from Dilaudid to oxycodone, but did not tolerate the oxycodone due to nausea and stomach upset side effects.  She returned to taking hydromorphone.  She ran out of hydromorphone on Sunday.  She describes severe pain in the absence of hydromorphone.  The pain seems to be worse late in the afternoon, but it is okay when she is able to lay down to sleep and not move.  To manage her pain in addition to hydromorphone, she has been using scheduled Tylenol and ibuprofen.  Unfortunately, her  has recently diagnosed with a liver cancer.    Problem list and histories reviewed & adjusted, as indicated.  Additional history: as documented    Patient Active Problem List   Diagnosis     Esophageal cancer (H)     Benign essential hypertension     Alcoholism (H)     Pancreatic pseudocyst     Impaired fasting glucose     Pap smear with atypical squamous cells, cannot exclude high grade squamous intraepithelial lesion (ASC-H)     ACP (advance care planning)     Protein-calorie malnutrition (H)     Post herpetic neuralgia     Right lower lobe pulmonary nodule     Past Surgical History:   Procedure Laterality Date     AAA REPAIR      splenic artery aneurysm embolization     ABDOMEN SURGERY   2/2/2016     COLONOSCOPY  11/26/2013    Procedure: COMBINED COLONOSCOPY, SINGLE BIOPSY/POLYPECTOMY BY BIOPSY;  COLONOSCOPY (MAC);  Surgeon: Montana Rosa MD;  Location:  GI     COLONOSCOPY  11/26/2013     COLONOSCOPY N/A 10/4/2018    Procedure: COMBINED COLONOSCOPY, SINGLE OR MULTIPLE BIOPSY/POLYPECTOMY BY BIOPSY;  colonoscopy;  Surgeon: Gio Apodaca MD;  Location:  GI     ENT SURGERY       ESOPHAGOGASTRECTOMY N/A 3/22/2016    Procedure: ESOPHAGOGASTRECTOMY;  Surgeon: Alvin Riojas MD;  Location:  OR     ESOPHAGOSCOPY, GASTROSCOPY, DUODENOSCOPY (EGD), COMBINED N/A 12/22/2015    Procedure: COMBINED ENDOSCOPIC ULTRASOUND, ESOPHAGOSCOPY, GASTROSCOPY, DUODENOSCOPY (EGD), FINE NEEDLE ASPIRATE/BIOPSY;  Surgeon: Danelle Michael MD;  Location:  GI     GASTROSTOMY, INSERT TUBE, COMBINED N/A 2/2/2016    Procedure: COMBINED GASTROSTOMY, INSERT TUBE (OPEN);  Surgeon: Alvin Riojas MD;  Location:  OR     GI SURGERY  12/22/2015     HAND SURGERY      right     INSERT PORT VASCULAR ACCESS N/A 12/28/2015    Procedure: INSERT PORT VASCULAR ACCESS;  Surgeon: Alvin Riojas MD;  Location:  OR     LOBECTOMY LUNG Right 10/16/2018    Procedure: LOBECTOMY LUNG;  Surgeon: Alvin Riojas MD;  Location:  OR     REMOVE PORT VASCULAR ACCESS Left 7/22/2016    Procedure: REMOVE PORT VASCULAR ACCESS;  Surgeon: Alvin Riojas MD;  Location:  OR     THORACOTOMY Right 10/16/2018    Procedure: REDO RIGHT THORACTOMY AND RIGHT LOWER LOBECTOMY, PLEURAL LYSIS;  Surgeon: Alvin Riojas MD;  Location:  OR     TONSILLECTOMY       VASCULAR SURGERY         Social History   Substance Use Topics     Smoking status: Former Smoker     Packs/day: 0.25     Quit date: 12/11/2015     Smokeless tobacco: Never Used     Alcohol use No      Comment: none     Family History   Problem Relation Age of Onset     Other Cancer Father      melanoma     Prostate Cancer  "Father      Diabetes Father      Other Cancer Brother      melanoma     Other Cancer Brother      melanoma     Other Cancer Brother          Current Outpatient Prescriptions   Medication Sig Dispense Refill     acetaminophen (TYLENOL) 500 MG tablet Take 1,000 mg by mouth every 8 hours as needed        amitriptyline (ELAVIL) 50 MG tablet Take 1 tablet (50 mg) by mouth At Bedtime 90 tablet 3     DULoxetine (CYMBALTA) 60 MG EC capsule TAKE 1 CAPSULE BY MOUTH EVERY DAY 90 capsule 0     fluticasone (FLONASE) 50 MCG/ACT spray Spray 2 sprays into both nostrils daily 1 Bottle 0     hydrochlorothiazide 12.5 MG TABS tablet TAKE 1 TABLET (12.5 MG) BY MOUTH DAILY 90 tablet 1     HYDROmorphone (DILAUDID) 2 MG tablet Take 0.5-1 tablets (1-2 mg) by mouth 2 times daily as needed for moderate to severe pain 60 tablet 0     ibuprofen (ADVIL/MOTRIN) 600 MG tablet Take 1 tablet (600 mg) by mouth every 6 hours 120 tablet      metoprolol tartrate (LOPRESSOR) 25 MG tablet TAKE 1 TABLET (25 MG) BY MOUTH 2 TIMES DAILY 180 tablet 1     omeprazole (PRILOSEC) 40 MG capsule TAKE 1 CAPSULE (40 MG) BY MOUTH DAILY 90 capsule 3     Allergies   Allergen Reactions     Codeine Sulfate Nausea     Simvastatin Cramps     Leg cramps     Penicillins Rash       Reviewed and updated as needed this visit by clinical staff  Tobacco  Allergies  Soc Hx      Reviewed and updated as needed this visit by Provider         ROS:      OBJECTIVE:     /57 (BP Location: Right arm, Patient Position: Sitting, Cuff Size: Adult Regular)  Pulse 77  Temp 97.6  F (36.4  C) (Oral)  Ht 5' 1.5\" (1.562 m)  Wt 87 lb (39.5 kg)  SpO2 100%  Breastfeeding? No  BMI 16.17 kg/m2  Body mass index is 16.17 kg/(m^2).  General: This is a comfortable at rest appearing middle-aged female in no acute distress.  She speaks in full sentences.  Pulmonary: The lungs are clear to auscultation bilaterally, the surgical scar on the right flank is well-healed without evidence of infection.  " Breathing does not appear to be labored.    Diagnostic Test Results:  none     ASSESSMENT/PLAN:         ICD-10-CM    1. Malignant neoplasm of right lung, unspecified part of lung (H) C34.91    2. Acute post-operative pain G89.18 HYDROmorphone (DILAUDID) 2 MG tablet     We again discussed the risks of hydromorphone including the risk of it being habit forming and addictive.  We discussed that she should work on tapering her dose slowly.  Since she has the most pain in the afternoon, we decided that she try 1/2-1 2 mg Dilaudid tablet in the afternoon and have an additional Dilaudid tablet available for breakthrough pain later in the day.  Other than that, she should try to use the Tylenol and ibuprofen to manage her pain.  Because of the risk of the medication, short-term follow-up is indicated.  She will return to see me in 3-4 weeks to assess therapy.  Hopefully by then, she will have been successful at reducing her hydromorphone dose significantly.    Total face to face contact time was greater than 20 minutes of which more than 50% of this time was spent counseling and coordinating care regarding the above topics.        Mustapha Velásquez MD  [place] [address]

## 2018-11-23 NOTE — NURSING NOTE
Screening Questionnaire for Adult Immunization    Are you sick today?   No   Do you have allergies to medications, food, a vaccine component or latex?   No   Have you ever had a serious reaction after receiving a vaccination?   No   Do you have a long-term health problem with heart disease, lung disease, asthma, kidney disease, metabolic disease (e.g. diabetes), anemia, or other blood disorder?   Yes- MD ok'd   Do you have cancer, leukemia, HIV/AIDS, or any other immune system problem?   No   In the past 3 months, have you taken medications that affect  your immune system, such as prednisone, other steroids, or anticancer drugs; drugs for the treatment of rheumatoid arthritis, Crohn s disease, or psoriasis; or have you had radiation treatments?   No   Have you had a seizure, or a brain or other nervous system problem?   No   During the past year, have you received a transfusion of blood or blood     products, or been given immune (gamma) globulin or antiviral drug?   No   For women: Are you pregnant or is there a chance you could become        pregnant during the next month?   No   Have you received any vaccinations in the past 4 weeks?   No     Immunization questionnaire answers were all negative.        Per orders of Dr. Velásquez, injection of Tdap and PCV 13 given by Velma Godwin. Patient instructed to remain in clinic for 15 minutes afterwards, and to report any adverse reaction to me immediately.  Prior to injection verified patient identity using patient's name and date of birth.  Due to injection administration, patient instructed to remain in clinic for 15 minutes  afterwards, and to report any adverse reaction to me immediately.         Screening performed by Velma Godwin on 11/23/2018 at 1:09 PM.

## 2018-11-30 ENCOUNTER — E-VISIT (OUTPATIENT)
Dept: FAMILY MEDICINE | Facility: CLINIC | Age: 63
End: 2018-11-30
Payer: COMMERCIAL

## 2018-11-30 DIAGNOSIS — N39.0 URINARY TRACT INFECTION WITHOUT HEMATURIA, SITE UNSPECIFIED: Primary | ICD-10-CM

## 2018-11-30 DIAGNOSIS — N39.0 ACUTE UTI (URINARY TRACT INFECTION): ICD-10-CM

## 2018-11-30 PROCEDURE — 99444 ZZC PHYSICIAN ONLINE EVALUATION & MANAGEMENT SERVICE: CPT | Performed by: INTERNAL MEDICINE

## 2018-11-30 RX ORDER — SULFAMETHOXAZOLE/TRIMETHOPRIM 800-160 MG
1 TABLET ORAL 2 TIMES DAILY
Qty: 6 TABLET | Refills: 0 | Status: SHIPPED | OUTPATIENT
Start: 2018-11-30 | End: 2019-02-20

## 2018-11-30 NOTE — MR AVS SNAPSHOT
After Visit Summary   11/30/2018    Kezia Dixon    MRN: [id number]           Patient Information     Date Of Birth          1955        Visit Information        Provider Department      11/30/2018 9:04 AM Mustapha Velásquez MD [facility]        Today's Diagnoses     Urinary tract infection without hematuria, site unspecified    -  1    Acute UTI (urinary tract infection)          Care Instructions      Thank you for choosing us for your care. I think an in-clinic visit would be best next steps based on your symptoms. Please schedule a clinic appointment; you won t be charged for this e-visit.      You can schedule an appointment right here in [employer], or call [phone] if your symptoms do not improve with antibiotic course     Urinary Tract Infections in Women    Urinary tract infections (UTIs) are most often caused by bacteria. These bacteria enter the urinary tract. The bacteria may come from outside the body. Or they may travel from the skin outside the rectum or vagina into the urethra. Female anatomy makes it easy for bacteria from the bowel to enter a woman s urinary tract, which is the most common source of UTI. This means women develop UTIs more often than men. Pain in or around the urinary tract is a common UTI symptom. But the only way to know for sure if you have a UTI for the healthcare provider to test your urine. The two tests that may be done are the urinalysis and urine culture.  Types of UTIs    Cystitis. A bladder infection (cystitis) is the most common UTI in women. You may have urgent or frequent urination. You may also have pain, burning when you urinate, and bloody urine.    Urethritis. This is an inflamed urethra, which is the tube that carries urine from the bladder to outside the body. You may have lower stomach or back pain. You may also have urgent or frequent urination.    Pyelonephritis. This is a kidney infection. If not treated, it can be serious  and damage your kidneys. In severe cases, you may need to stay in the hospital. You may have a fever and lower back pain.  Medicines to treat a UTI  Most UTIs are treated with antibiotics. These kill the bacteria. The length of time you need to take them depends on the type of infection. It may be as short as 3 days. If you have repeated UTIs, you may need a low-dose antibiotic for several months. Take antibiotics exactly as directed. Don t stop taking them until all of the medicine is gone. If you stop taking the antibiotic too soon, the infection may not go away. You may also develop a resistance to the antibiotic. This can make it much harder to treat.  Lifestyle changes to treat and prevent UTIs  The lifestyle changes below will help get rid of your UTI. They may also help prevent future UTIs.    Drink plenty of fluids. This includes water, juice, or other caffeine-free drinks. Fluids help flush bacteria out of your body.    Empty your bladder. Always empty your bladder when you feel the urge to urinate. And always urinate before going to sleep. Urine that stays in your bladder can lead to infection. Try to urinate before and after sex as well.    Practice good personal hygiene. Wipe yourself from front to back after using the toilet. This helps keep bacteria from getting into the urethra.    Use condoms during sex. These help prevent UTIs caused by sexually transmitted bacteria. Also don't use spermicides during sex. These can increase the risk for UTIs. Choose other forms of birth control instead. For women who tend to get UTIs after sex, a low-dose of a preventive antibiotic may be used. Be sure to discuss this option with your healthcare provider.    Follow up with your healthcare provider as directed. He or she may test to make sure the infection has cleared. If needed, more treatment may be started.  Date Last Reviewed: 1/1/2017 2000-2018 [employer]. [address] [facility], [place]  [address]. All rights reserved. This information is not intended as a substitute for professional medical care. Always follow your healthcare professional's instructions.                Follow-ups after your visit        Your next 10 appointments already scheduled     Dec 21, 2018 11:00 AM CST   Office Visit with Mustapha Velásquez MD   [facility] ([facility])    [address]  [facility] [id number] [phone]           Bring a current list of meds and any records pertaining to this visit. For Physicals, please bring immunization records and any forms needing to be filled out. Please arrive 10 minutes early to complete paperwork.              Who to contact     If you have questions or need follow up information about today's clinic visit or your schedule please contact [facility] directly at [phone].  Normal or non-critical lab and imaging results will be communicated to you by [employer], letter or phone within 4 business days after the clinic has received the results. If you do not hear from us within 7 days, please contact the clinic through [employer] or phone. If you have a critical or abnormal lab result, we will notify you by phone as soon as possible.  Submit refill requests through [employer] or call your pharmacy and they will forward the refill request to us. Please allow 3 business days for your refill to be completed.          Additional Information About Your Visit        [employer] Information     [employer] gives you secure access to your electronic health record. If you see a primary care provider, you can also send messages to your care team and make appointments. If you have questions, please call your primary care clinic.  If you do not have a primary care provider, please call [phone] and they will assist you.        Care EveryWhere ID     This is your Care EveryWhere ID. This could be used by other organizations to access your [facility]  records  [id number]         Blood Pressure from Last 3 Encounters:   11/23/18 136/57   11/02/18 125/56   10/26/18 136/63    Weight from Last 3 Encounters:   11/23/18 87 lb (39.5 kg)   11/02/18 88 lb 8 oz (40.1 kg)   10/11/18 93 lb (42.2 kg)              Today, you had the following     No orders found for display         Today's Medication Changes          These changes are accurate as of 11/30/18  9:11 AM.  If you have any questions, ask your nurse or doctor.               Start taking these medicines.        Dose/Directions    sulfamethoxazole-trimethoprim 800-160 MG tablet   Commonly known as:  BACTRIM DS/SEPTRA DS   Used for:  Urinary tract infection without hematuria, site unspecified        Dose:  1 tablet   Take 1 tablet by mouth 2 times daily for 3 days   Quantity:  6 tablet   Refills:  0            Where to get your medicines      These medications were sent to [place] [address] [place] [address] [address]     Phone:  [phone]     sulfamethoxazole-trimethoprim 800-160 MG tablet                Primary Care Provider Office Phone # Fax #    Mustapha Velásquez [phone] [address] [address]        Equal Access to Services     TANO WILKINSON AH: Hadii shala ku hadasho Soomaali, waaxda luqadaha, qaybta kaalmada adeegyada, clement lawton hayleisa cook . So [facility] [phone].    ATENCIÓN: Si habla español, tiene a thomas disposición servicios gratuitos de asistencia lingüística. Llame al [phone].    We comply with applicable federal civil rights laws and [place] laws. We do not discriminate on the basis of race, color, national origin, age, disability, sex, sexual orientation, or gender identity.            Thank you!     Thank you for choosing [facility]  for your care. Our goal is always to provide you with excellent care. Hearing back from our patients is one way we can continue to improve our  services. Please take a few minutes to complete the written survey that you may receive in the mail after your visit with us. Thank you!             Your Updated Medication List - Protect others around you: Learn how to safely use, store and throw away your medicines at www.disposemymeds.org.          This list is accurate as of 11/30/18  9:11 AM.  Always use your most recent med list.                   Brand Name Dispense Instructions for use Diagnosis    amitriptyline 50 MG tablet    ELAVIL    90 tablet    Take 1 tablet (50 mg) by mouth At Bedtime    Neuralgia, post-herpetic       DULoxetine 60 MG capsule    CYMBALTA    90 capsule    TAKE 1 CAPSULE BY MOUTH EVERY DAY    Neuralgia, post-herpetic       fluticasone 50 MCG/ACT nasal spray    FLONASE    1 Bottle    Spray 2 sprays into both nostrils daily    Chronic rhinitis       hydrochlorothiazide 12.5 MG tablet    HYDRODIURIL    90 tablet    TAKE 1 TABLET (12.5 MG) BY MOUTH DAILY    Benign essential hypertension       HYDROmorphone 2 MG tablet    DILAUDID    60 tablet    Take 0.5-1 tablets (1-2 mg) by mouth 2 times daily as needed for moderate to severe pain    Acute post-operative pain       ibuprofen 600 MG tablet    ADVIL/MOTRIN    120 tablet    Take 1 tablet (600 mg) by mouth every 6 hours    Right lower lobe pulmonary nodule, Acute post-operative pain       metoprolol tartrate 25 MG tablet    LOPRESSOR    180 tablet    TAKE 1 TABLET (25 MG) BY MOUTH 2 TIMES DAILY    Benign essential hypertension       omeprazole 40 MG DR capsule    priLOSEC    90 capsule    TAKE 1 CAPSULE (40 MG) BY MOUTH DAILY    Benign essential hypertension       sulfamethoxazole-trimethoprim 800-160 MG tablet    BACTRIM DS/SEPTRA DS    6 tablet    Take 1 tablet by mouth 2 times daily for 3 days    Urinary tract infection without hematuria, site unspecified       TYLENOL 500 MG tablet   Generic drug:  acetaminophen      Take 1,000 mg by mouth every 8 hours as needed

## 2018-11-30 NOTE — PATIENT INSTRUCTIONS
Thank you for choosing us for your care. I think an in-clinic visit would be best next steps based on your symptoms. Please schedule a clinic appointment; you won t be charged for this e-visit.      You can schedule an appointment right here in [employer], or call [phone] if your symptoms do not improve with antibiotic course     Urinary Tract Infections in Women    Urinary tract infections (UTIs) are most often caused by bacteria. These bacteria enter the urinary tract. The bacteria may come from outside the body. Or they may travel from the skin outside the rectum or vagina into the urethra. Female anatomy makes it easy for bacteria from the bowel to enter a woman s urinary tract, which is the most common source of UTI. This means women develop UTIs more often than men. Pain in or around the urinary tract is a common UTI symptom. But the only way to know for sure if you have a UTI for the healthcare provider to test your urine. The two tests that may be done are the urinalysis and urine culture.  Types of UTIs    Cystitis. A bladder infection (cystitis) is the most common UTI in women. You may have urgent or frequent urination. You may also have pain, burning when you urinate, and bloody urine.    Urethritis. This is an inflamed urethra, which is the tube that carries urine from the bladder to outside the body. You may have lower stomach or back pain. You may also have urgent or frequent urination.    Pyelonephritis. This is a kidney infection. If not treated, it can be serious and damage your kidneys. In severe cases, you may need to stay in the hospital. You may have a fever and lower back pain.  Medicines to treat a UTI  Most UTIs are treated with antibiotics. These kill the bacteria. The length of time you need to take them depends on the type of infection. It may be as short as 3 days. If you have repeated UTIs, you may need a low-dose antibiotic for several months. Take antibiotics exactly as directed.  Don t stop taking them until all of the medicine is gone. If you stop taking the antibiotic too soon, the infection may not go away. You may also develop a resistance to the antibiotic. This can make it much harder to treat.  Lifestyle changes to treat and prevent UTIs  The lifestyle changes below will help get rid of your UTI. They may also help prevent future UTIs.    Drink plenty of fluids. This includes water, juice, or other caffeine-free drinks. Fluids help flush bacteria out of your body.    Empty your bladder. Always empty your bladder when you feel the urge to urinate. And always urinate before going to sleep. Urine that stays in your bladder can lead to infection. Try to urinate before and after sex as well.    Practice good personal hygiene. Wipe yourself from front to back after using the toilet. This helps keep bacteria from getting into the urethra.    Use condoms during sex. These help prevent UTIs caused by sexually transmitted bacteria. Also don't use spermicides during sex. These can increase the risk for UTIs. Choose other forms of birth control instead. For women who tend to get UTIs after sex, a low-dose of a preventive antibiotic may be used. Be sure to discuss this option with your healthcare provider.    Follow up with your healthcare provider as directed. He or she may test to make sure the infection has cleared. If needed, more treatment may be started.  Date Last Reviewed: 1/1/2017 2000-2018 [employer]. [address] [place] [address], [place] [address]. All rights reserved. This information is not intended as a substitute for professional medical care. Always follow your healthcare professional's instructions.

## 2018-12-08 DIAGNOSIS — J31.0 CHRONIC RHINITIS: ICD-10-CM

## 2018-12-08 NOTE — TELEPHONE ENCOUNTER
"Last Written Prescription Date:  11/09/18  Last Fill Quantity: 1 bottle,  # refills: 0   Last office visit: 11/23/2018 with prescribing provider:  Christen   Future Office Visit:   Next 5 appointments (look out 90 days)     Dec 21, 2018 11:00 AM CST   [facility] with [facility]   [facility]    [address] [facility] [address]   [phone]                 Requested Prescriptions   Pending Prescriptions Disp Refills     fluticasone (FLONASE) 50 MCG/ACT nasal spray [Pharmacy Med Name: FLUTICASONE PROP 50 MCG SPRAY] 16 mL 0     Sig: SPRAY 2 SPRAYS INTO BOTH NOSTRILS DAILY    Inhaled Steroids Protocol Passed    12/8/2018 12:28 AM       Passed - Patient is age 12 or older       Passed - Recent (12 mo) or future (30 days) visit within the authorizing provider's specialty    Patient had office visit in the last 12 months or has a visit in the next 30 days with authorizing provider or within the authorizing provider's specialty.  See \"Patient Info\" tab in inbasket, or \"Choose Columns\" in Meds & Orders section of the refill encounter.                "

## 2018-12-11 RX ORDER — FLUTICASONE PROPIONATE 50 MCG
SPRAY, SUSPENSION (ML) NASAL
Qty: 1 BOTTLE | Refills: 0 | Status: SHIPPED | OUTPATIENT
Start: 2018-12-11 | End: 2019-01-08

## 2018-12-19 ENCOUNTER — MYC REFILL (OUTPATIENT)
Dept: FAMILY MEDICINE | Facility: CLINIC | Age: 63
End: 2018-12-19

## 2018-12-19 ENCOUNTER — MYC MEDICAL ADVICE (OUTPATIENT)
Dept: FAMILY MEDICINE | Facility: CLINIC | Age: 63
End: 2018-12-19

## 2018-12-19 DIAGNOSIS — G89.18 ACUTE POST-OPERATIVE PAIN: ICD-10-CM

## 2018-12-19 DIAGNOSIS — G89.4 CHRONIC PAIN SYNDROME: ICD-10-CM

## 2018-12-19 RX ORDER — HYDROMORPHONE HYDROCHLORIDE 2 MG/1
1-2 TABLET ORAL 2 TIMES DAILY PRN
Qty: 60 TABLET | Refills: 0 | Status: SHIPPED | OUTPATIENT
Start: 2018-12-19 | End: 2019-02-20

## 2018-12-19 NOTE — TELEPHONE ENCOUNTER
Hydromorphone 2mg: Last Written Prescription Date:  11/23/18  Last Fill Quantity: 60,  # refills: 0   Last office visit: 11/23/2018 with prescribing provider:    Future Office Visit:   Next 5 appointments (look out 90 days)    Dec 24, 2018 11:00 AM CST  Office Visit with Davina Silverman PA-C  [facility] [place] ([facility] [place]) [address] [facility] [id number] [phone]         Demyst[employer] message from patient (separate encounter):     I have requested a refill of hydromorphone 2 mg. I had to cancel my appointment for 12/21 as that is the day my  will have a mapping session to prepare for radiation treatment of cancer of the liver. Some days I can go without the medication, other days not. I will be spending several 6 hour waiting days for my 's treatment and I will need the meds then and for the holidays with a full house. I couldn't reschedule until Jan 17. I do have an appointment with Maggy on Dec 24 as it appears I am developing a hernia on my stomach (I just can't have too much fun). I could  a prescription on Thurs or Monday when I see Maggy. I would be happy to make another appointment with you.     Patient is followed by Mustapha Velásquez MD for ongoing prescription of pain medication.  All refills should only be approved by this provider, or covering partner.    Medication(s): Hydromorphone 2mg.   Maximum quantity per month: #60  Clinic visit frequency required: Q 3 months     Controlled substance agreement:  Encounter-Level CSA:    There are no encounter-level csa.     Patient-Level CSA:    There are no patient-level csa.       Pain Clinic evaluation in the past: No    DIRE Total Score(s):  No flowsheet data found.    Last [facility] website verification: Unable to check  (12/19/18 - web site down [place])    [email or web address]    Dawn BENITES RN

## 2018-12-24 ENCOUNTER — OFFICE VISIT (OUTPATIENT)
Dept: FAMILY MEDICINE | Facility: CLINIC | Age: 63
End: 2018-12-24
Payer: COMMERCIAL

## 2018-12-24 VITALS
HEIGHT: 62 IN | WEIGHT: 91 LBS | BODY MASS INDEX: 16.75 KG/M2 | DIASTOLIC BLOOD PRESSURE: 63 MMHG | TEMPERATURE: 97.2 F | HEART RATE: 81 BPM | OXYGEN SATURATION: 100 % | SYSTOLIC BLOOD PRESSURE: 136 MMHG

## 2018-12-24 DIAGNOSIS — B02.29 NEURALGIA, POST-HERPETIC: ICD-10-CM

## 2018-12-24 DIAGNOSIS — K43.2 INCISIONAL HERNIA, WITHOUT OBSTRUCTION OR GANGRENE: Primary | ICD-10-CM

## 2018-12-24 PROCEDURE — 99213 OFFICE O/P EST LOW 20 MIN: CPT | Performed by: PHYSICIAN ASSISTANT

## 2018-12-24 RX ORDER — DULOXETIN HYDROCHLORIDE 60 MG/1
CAPSULE, DELAYED RELEASE ORAL
Qty: 90 CAPSULE | Refills: 3 | Status: SHIPPED | OUTPATIENT
Start: 2018-12-24 | End: 2020-01-07

## 2018-12-24 RX ORDER — AMITRIPTYLINE HYDROCHLORIDE 50 MG/1
50 TABLET ORAL AT BEDTIME
Qty: 90 TABLET | Refills: 3 | Status: SHIPPED | OUTPATIENT
Start: 2018-12-24 | End: 2019-10-07

## 2018-12-24 ASSESSMENT — ANXIETY QUESTIONNAIRES
3. WORRYING TOO MUCH ABOUT DIFFERENT THINGS: NOT AT ALL
6. BECOMING EASILY ANNOYED OR IRRITABLE: NOT AT ALL
5. BEING SO RESTLESS THAT IT IS HARD TO SIT STILL: NOT AT ALL
GAD7 TOTAL SCORE: 0
2. NOT BEING ABLE TO STOP OR CONTROL WORRYING: NOT AT ALL
7. FEELING AFRAID AS IF SOMETHING AWFUL MIGHT HAPPEN: NOT AT ALL
1. FEELING NERVOUS, ANXIOUS, OR ON EDGE: NOT AT ALL

## 2018-12-24 ASSESSMENT — PATIENT HEALTH QUESTIONNAIRE - PHQ9
SUM OF ALL RESPONSES TO PHQ QUESTIONS 1-9: 0
5. POOR APPETITE OR OVEREATING: NOT AT ALL

## 2018-12-24 ASSESSMENT — MIFFLIN-ST. JEOR: SCORE: 903.08

## 2018-12-24 NOTE — PROGRESS NOTES
HPI:  Kezia is a 64 yo female here with a lump in her epigastric area x 2-3 weeks.  This is painful at times  Pain is sharp, momentary and intermittent.  No particular triggers, can happen at rest as well  She is s/p esophageal ca resection in 2016   She is s/p thoracotomy in 10/2018 for lung ca  Followed by Dr. Da Silva at [facility]    She has been able to cut down on dilaudid to 0-2 pills per day.    Past Medical History:   Diagnosis Date     Alcoholism (H) 10/14/2016     Benign essential hypertension 10/14/2016     Carotid artery disease (H)     mile plaque 5/7     Chemical dependency (H)     alcohol     Depression with anxiety      Esophageal cancer (H)      Esophageal cancer (H) 3/22/2016     Esophageal mass      GERD (gastroesophageal reflux disease)      Hx of pancreatitis 2003     Hypercholesteremia      Hyperglycemia      Hyperlipemia      Impaired fasting glucose 10/14/2016     Impaired fasting glucose 10/14/2016     Nocturnal leg cramps      Pancreatic pseudocyst 10/14/2016     Pancreatic pseudocyst 10/14/2016     Pancreatitis     with pseudocyst     Pap smear with atypical squamous cells, cannot exclude high grade squamous intraepithelial lesion (ASC-H) 10/14/16    Colpo impression benign, ECC benign. Cotestin in 1 yr.     Shingles      Vasomotor rhinitis      Past Surgical History:   Procedure Laterality Date     AAA REPAIR      splenic artery aneurysm embolization     ABDOMEN SURGERY  2/2/2016     COLONOSCOPY  11/26/2013    Procedure: COMBINED COLONOSCOPY, SINGLE BIOPSY/POLYPECTOMY BY BIOPSY;  COLONOSCOPY (MAC);  Surgeon: Montana Rosa MD;  Location:  GI     COLONOSCOPY  11/26/2013     COLONOSCOPY N/A 10/4/2018    Procedure: COMBINED COLONOSCOPY, SINGLE OR MULTIPLE BIOPSY/POLYPECTOMY BY BIOPSY;  colonoscopy;  Surgeon: Gio Apodaca MD;  Location:  GI     ENT SURGERY       ESOPHAGOGASTRECTOMY N/A 3/22/2016    Procedure: ESOPHAGOGASTRECTOMY;  Surgeon: Alvin Riojas MD;  Location:   OR     ESOPHAGOSCOPY, GASTROSCOPY, DUODENOSCOPY (EGD), COMBINED N/A 12/22/2015    Procedure: COMBINED ENDOSCOPIC ULTRASOUND, ESOPHAGOSCOPY, GASTROSCOPY, DUODENOSCOPY (EGD), FINE NEEDLE ASPIRATE/BIOPSY;  Surgeon: Danelle Michael MD;  Location:  GI     GASTROSTOMY, INSERT TUBE, COMBINED N/A 2/2/2016    Procedure: COMBINED GASTROSTOMY, INSERT TUBE (OPEN);  Surgeon: Alvin Riojas MD;  Location:  OR     GI SURGERY  12/22/2015     HAND SURGERY      right     INSERT PORT VASCULAR ACCESS N/A 12/28/2015    Procedure: INSERT PORT VASCULAR ACCESS;  Surgeon: Alvin Riojas MD;  Location:  OR     LOBECTOMY LUNG Right 10/16/2018    Procedure: LOBECTOMY LUNG;  Surgeon: Alvin Riojas MD;  Location:  OR     REMOVE PORT VASCULAR ACCESS Left 7/22/2016    Procedure: REMOVE PORT VASCULAR ACCESS;  Surgeon: Alvin Riojas MD;  Location:  OR     THORACOTOMY Right 10/16/2018    Procedure: REDO RIGHT THORACTOMY AND RIGHT LOWER LOBECTOMY, PLEURAL LYSIS;  Surgeon: Alvin Riojas MD;  Location:  OR     TONSILLECTOMY       VASCULAR SURGERY       Social History     Tobacco Use     Smoking status: Former Smoker     Packs/day: 0.25     Last attempt to quit: 12/11/2015     Years since quitting: 3.0     Smokeless tobacco: Never Used   Substance Use Topics     Alcohol use: No     Alcohol/week: 0.0 oz     Comment: none     Current Outpatient Medications   Medication Sig Dispense Refill     acetaminophen (TYLENOL) 500 MG tablet Take 1,000 mg by mouth every 8 hours as needed        amitriptyline (ELAVIL) 50 MG tablet Take 1 tablet (50 mg) by mouth At Bedtime 90 tablet 3     DULoxetine (CYMBALTA) 60 MG capsule TAKE 1 CAPSULE BY MOUTH EVERY DAY 90 capsule 3     fluticasone (FLONASE) 50 MCG/ACT nasal spray SPRAY 2 SPRAYS INTO BOTH NOSTRILS DAILY 1 Bottle 0     hydrochlorothiazide 12.5 MG TABS tablet TAKE 1 TABLET (12.5 MG) BY MOUTH DAILY 90 tablet 1     HYDROmorphone (DILAUDID) 2  "MG tablet Take 0.5-1 tablets (1-2 mg) by mouth 2 times daily as needed for moderate to severe pain 60 tablet 0     ibuprofen (ADVIL/MOTRIN) 600 MG tablet Take 1 tablet (600 mg) by mouth every 6 hours 120 tablet      metoprolol tartrate (LOPRESSOR) 25 MG tablet TAKE 1 TABLET (25 MG) BY MOUTH 2 TIMES DAILY 180 tablet 1     omeprazole (PRILOSEC) 40 MG capsule TAKE 1 CAPSULE (40 MG) BY MOUTH DAILY 90 capsule 3     Allergies   Allergen Reactions     Codeine Sulfate Nausea     Simvastatin Cramps     Leg cramps     Penicillins Rash     FAMILY HISTORY NOTED AND REVIEWED    PHYSICAL EXAM:    /63 (BP Location: Right arm, Patient Position: Chair, Cuff Size: Adult Small)   Pulse 81   Temp 97.2  F (36.2  C) (Tympanic)   Ht 1.562 m (5' 1.5\")   Wt 41.3 kg (91 lb)   SpO2 100%   BMI 16.92 kg/m      Patient appears non toxic  Abd: thin and soft. There is a reducible walnut sized mass in epigastrium over her previous incision  Bowel sounds normal    Assessment and Plan:     (K43.2) Incisional hernia, without obstruction or gangrene  (primary encounter diagnosis)  Comment: pt declines pap and mammogram for now. Has friends in town for the holidays.  starting radiation next month for liver CA so lots of stress.  Plan: GENERAL SURG ADULT REFERRAL        Discussed what to watch for that would be concerning for strangulation.    (B02.29) Neuralgia, post-herpetic  Comment:   Plan: DULoxetine (CYMBALTA) 60 MG capsule,         amitriptyline (ELAVIL) 50 MG tablet        refilled      Davina Silverman PA-C          "

## 2018-12-25 ASSESSMENT — ANXIETY QUESTIONNAIRES: GAD7 TOTAL SCORE: 0

## 2019-01-08 DIAGNOSIS — J31.0 CHRONIC RHINITIS: ICD-10-CM

## 2019-01-08 RX ORDER — FLUTICASONE PROPIONATE 50 MCG
SPRAY, SUSPENSION (ML) NASAL
Qty: 16 ML | Refills: 3 | Status: SHIPPED | OUTPATIENT
Start: 2019-01-08 | End: 2019-04-02

## 2019-01-08 NOTE — TELEPHONE ENCOUNTER
"Last Written Prescription Date:  12/11/18  Last Fill Quantity: 1 bottle,  # refills: 0   Last office visit: 11/23/2018 with prescribing provider:  Christen   Future Office Visit:      Requested Prescriptions   Pending Prescriptions Disp Refills     fluticasone (FLONASE) 50 MCG/ACT nasal spray [Pharmacy Med Name: FLUTICASONE PROP 50 MCG SPRAY] 16 mL 0     Sig: SPRAY 2 SPRAYS INTO BOTH NOSTRILS DAILY    Inhaled Steroids Protocol Passed - 1/8/2019  1:51 AM       Passed - Patient is age 12 or older       Passed - Recent (12 mo) or future (30 days) visit within the authorizing provider's specialty    Patient had office visit in the last 12 months or has a visit in the next 30 days with authorizing provider or within the authorizing provider's specialty.  See \"Patient Info\" tab in inbasket, or \"Choose Columns\" in Meds & Orders section of the refill encounter.             Passed - Medication is active on med list          "

## 2019-01-22 ENCOUNTER — TELEPHONE (OUTPATIENT)
Dept: FAMILY MEDICINE | Facility: CLINIC | Age: 64
End: 2019-01-22

## 2019-01-22 NOTE — TELEPHONE ENCOUNTER
Pt is past due for f/u pap smear.  Spoke with pt, states she is aware that it is something she needs to do.  Quyen Croft,    Pap Tracking

## 2019-01-30 ENCOUNTER — TRANSFERRED RECORDS (OUTPATIENT)
Dept: HEALTH INFORMATION MANAGEMENT | Facility: CLINIC | Age: 64
End: 2019-01-30

## 2019-02-15 ENCOUNTER — HEALTH MAINTENANCE LETTER (OUTPATIENT)
Age: 64
End: 2019-02-15

## 2019-02-20 ENCOUNTER — OFFICE VISIT (OUTPATIENT)
Dept: SURGERY | Facility: CLINIC | Age: 64
End: 2019-02-20
Payer: MEDICARE

## 2019-02-20 VITALS
WEIGHT: 88 LBS | HEART RATE: 77 BPM | SYSTOLIC BLOOD PRESSURE: 90 MMHG | HEIGHT: 62 IN | BODY MASS INDEX: 16.2 KG/M2 | DIASTOLIC BLOOD PRESSURE: 50 MMHG

## 2019-02-20 DIAGNOSIS — K43.2 INCISIONAL HERNIA, WITHOUT OBSTRUCTION OR GANGRENE: ICD-10-CM

## 2019-02-20 DIAGNOSIS — R10.11 RUQ ABDOMINAL PAIN: Primary | ICD-10-CM

## 2019-02-20 PROCEDURE — 99214 OFFICE O/P EST MOD 30 MIN: CPT | Performed by: SURGERY

## 2019-02-20 ASSESSMENT — MIFFLIN-ST. JEOR: SCORE: 889.48

## 2019-02-21 ENCOUNTER — HOSPITAL ENCOUNTER (OUTPATIENT)
Dept: ULTRASOUND IMAGING | Facility: CLINIC | Age: 64
Discharge: HOME | End: 2019-02-21
Attending: SURGERY | Admitting: SURGERY
Payer: MEDICARE

## 2019-02-21 DIAGNOSIS — R10.11 RUQ ABDOMINAL PAIN: ICD-10-CM

## 2019-02-21 PROCEDURE — 76705 ECHO EXAM OF ABDOMEN: CPT

## 2019-02-21 NOTE — PROGRESS NOTES
[facility]  Surgery Consultation    CONSULTATION REQUESTED BY:  Mustapha Velásquez [phone]    HPI: Patient is a 63-year-old female with a relatively complex past surgical history including Iver Francesco esophagogastrectomy as well as lung resection who presents for evaluation of incisional hernia at the request of primary care.  She reports that she noted a hernia in her midline abdominal incision approximately 6 weeks ago.  She is also had some intermittent sharp stabbing right upper quadrant abdominal pains.  She said no nausea or vomiting.  She said no signs or symptoms to suggest incarceration or strangulation.  No bowel obstruction symptoms.    PMH:   has a past medical history of Alcoholism (H) (10/14/2016), Benign essential hypertension (10/14/2016), Carotid artery disease (H), Chemical dependency (H), Depression with anxiety, Esophageal cancer (H), Esophageal cancer (H) (3/22/2016), Esophageal mass, GERD (gastroesophageal reflux disease), pancreatitis (2003), Hypercholesteremia, Hyperglycemia, Hyperlipemia, Impaired fasting glucose (10/14/2016), Impaired fasting glucose (10/14/2016), Nocturnal leg cramps, Pancreatic pseudocyst (10/14/2016), Pancreatic pseudocyst (10/14/2016), Pancreatitis, Pap smear with atypical squamous cells, cannot exclude high grade squamous intraepithelial lesion (ASC-H) (10/14/16), Shingles, and Vasomotor rhinitis.  PSH:    has a past surgical history that includes Hand surgery; tonsillectomy; Colonoscopy (11/26/2013); Gastrostomy, insert tube, combined (N/A, 2/2/2016); aaa repair; Insert port vascular access (N/A, 12/28/2015); Remove port vascular access (Left, 7/22/2016); Abdomen surgery (2/2/2016); colonoscopy (11/26/2013); ENT surgery; vascular surgery; Esophagoscopy, gastroscopy, duodenoscopy (EGD), combined (N/A, 12/22/2015); Esophagogastrectomy (N/A, 3/22/2016); GI surgery (12/22/2015); Colonoscopy (N/A, 10/4/2018); Thoracotomy (Right, 10/16/2018); and  "Lobectomy lung (Right, 10/16/2018).  Social History:   reports that she quit smoking about 3 years ago. She smoked 0.25 packs per day. she has never used smokeless tobacco. She reports that she does not drink alcohol or use drugs.  Family History:  family history includes Diabetes in her father; Other Cancer in her brother, brother, brother, and father; Prostate Cancer in her father.  Medications/Allergies: Home medications and allergies reviewed.    ROS:  The 10 point Review of Systems is negative other than noted in the HPI.    Physical Exam:  BP 90/50   Pulse 77   Ht 1.562 m (5' 1.5\")   Wt 39.9 kg (88 lb)   BMI 16.36 kg/m    GENERAL: Generally appears well.  Psych: Alert and Oriented.  Normal affect  Eyes: Sclera clear  Respiratory:  Lungs clear to ausculation bilaterally with good air excursion  Cardiovascular:  Regular Rate and Rhythm with no murmurs gallops or rubs, normal peripheral pulses  GI: Abdomen Non Distended Non-Tender  Incisional hernia palpated.  Hernia easily reduciable..  Lymphatic/Hematologic/Immune:  No femoral or cervical lymphadenopathy.  Integumentary:  No rashes  Neurological: grossly intact     All new lab and imaging data was reviewed.     Impression and Plan:  Patient is a 63 year old female with incisional hernia as well as intermittent right upper quadrant abdominal pain.    PLAN: I discussed with her her management options.  Believe that she is an appropriate candidate for simple outpatient open incisional hernia repair.  I am concerned about the right upper quadrant pains.  Not certain that these are related to the small hernia that she has.  She does have a past abdominal ultrasound approximately 2-3 years ago which suggested possible gallstones.  Going to send her for a repeat ultrasound and further plans will be made thereafter.  I discussed the pathophysiology of hernias and options for repair including laparoscopic VS open.  The risks associated with the procedure including, " but not limited to, recurrence, nerve entrapment or injury, persistence of pain, injury to the bowel/bladder, infertility, hematoma, mesh migration, mesh infection, MI, and PE were discussed with the patient. She indicated understanding of the discussion, asked appropriate questions, and provided consent. Signs and symptoms of incarceration were discussed. If these develop in the interim, she promises to call or go straight to the ER. I have provided the patient with an information pamphlet.    Thank you very much for this consult.    Lamberto Magaña M.D.  [facility]  [phone]    Please route or send letter to:  Primary Care Provider (PCP) and Referring Provider

## 2019-02-27 ENCOUNTER — TELEPHONE (OUTPATIENT)
Dept: SURGERY | Facility: CLINIC | Age: 64
End: 2019-02-27

## 2019-02-27 NOTE — TELEPHONE ENCOUNTER
Patient had an US on 02/21/19 and would just like results.  Would like call before 3:00pm if possible      Phone:  [phone]    Message ok

## 2019-02-27 NOTE — TELEPHONE ENCOUNTER
Called patient and informed her that US is showing gallstones.  Informed her that it will be advised to have her gallbladder removed in addition to the hernia repair.    Spoke with Dr. Magaña who reports patient can come see him if she would like to discuss, or if she doesn't have anything she wishes to discuss it is fine for Kandis to call and schedule surgery.    Antonia Rubi RN

## 2019-02-28 ENCOUNTER — TELEPHONE (OUTPATIENT)
Dept: SURGERY | Facility: CLINIC | Age: 64
End: 2019-02-28

## 2019-02-28 NOTE — TELEPHONE ENCOUNTER
Type of surgery: Lap demetrio & open incisional hernia repair  Location of surgery: [facility]  Date and time of surgery: 3/19/19 at 1pm  Surgeon: Dr. Lamberto Magaña  Pre-Op Appt Date: Patient to schedule  Post-Op Appt Date: Patient to schedule   Packet sent out: Yes  Pre-cert/Authorization completed:  Not Applicable  Date: 2/28/19

## 2019-03-11 ENCOUNTER — OFFICE VISIT (OUTPATIENT)
Dept: FAMILY MEDICINE | Facility: CLINIC | Age: 64
End: 2019-03-11
Payer: MEDICARE

## 2019-03-11 VITALS
SYSTOLIC BLOOD PRESSURE: 128 MMHG | TEMPERATURE: 53.6 F | OXYGEN SATURATION: 100 % | WEIGHT: 89 LBS | HEIGHT: 62 IN | DIASTOLIC BLOOD PRESSURE: 64 MMHG | BODY MASS INDEX: 16.38 KG/M2 | HEART RATE: 75 BPM

## 2019-03-11 DIAGNOSIS — C15.9 MALIGNANT NEOPLASM OF ESOPHAGUS, UNSPECIFIED LOCATION (H): ICD-10-CM

## 2019-03-11 DIAGNOSIS — R87.619 ABNORMAL CERVICAL PAPANICOLAOU SMEAR, UNSPECIFIED ABNORMAL PAP FINDING: ICD-10-CM

## 2019-03-11 DIAGNOSIS — Z11.4 ENCOUNTER FOR SCREENING FOR HIV: ICD-10-CM

## 2019-03-11 DIAGNOSIS — K80.80 BILIARY CALCULUS OF OTHER SITE WITHOUT OBSTRUCTION: ICD-10-CM

## 2019-03-11 DIAGNOSIS — C34.91 MALIGNANT NEOPLASM OF RIGHT LUNG, UNSPECIFIED PART OF LUNG (H): ICD-10-CM

## 2019-03-11 DIAGNOSIS — E44.1 MILD PROTEIN-CALORIE MALNUTRITION (H): ICD-10-CM

## 2019-03-11 DIAGNOSIS — I10 BENIGN ESSENTIAL HYPERTENSION: ICD-10-CM

## 2019-03-11 DIAGNOSIS — Z01.818 PREOP GENERAL PHYSICAL EXAM: ICD-10-CM

## 2019-03-11 DIAGNOSIS — K43.9 VENTRAL HERNIA WITHOUT OBSTRUCTION OR GANGRENE: Primary | ICD-10-CM

## 2019-03-11 DIAGNOSIS — Z12.31 VISIT FOR SCREENING MAMMOGRAM: ICD-10-CM

## 2019-03-11 PROBLEM — R91.1 RIGHT LOWER LOBE PULMONARY NODULE: Status: RESOLVED | Noted: 2018-10-16 | Resolved: 2019-03-11

## 2019-03-11 LAB
ERYTHROCYTE [DISTWIDTH] IN BLOOD BY AUTOMATED COUNT: 13.5 % (ref 10–15)
HCT VFR BLD AUTO: 36.2 % (ref 35–47)
HGB BLD-MCNC: 11.9 G/DL (ref 11.7–15.7)
MCH RBC QN AUTO: 30.6 PG (ref 26.5–33)
MCHC RBC AUTO-ENTMCNC: 32.9 G/DL (ref 31.5–36.5)
MCV RBC AUTO: 93 FL (ref 78–100)
PLATELET # BLD AUTO: 289 10E9/L (ref 150–450)
RBC # BLD AUTO: 3.89 10E12/L (ref 3.8–5.2)
WBC # BLD AUTO: 6.5 10E9/L (ref 4–11)

## 2019-03-11 PROCEDURE — 80053 COMPREHEN METABOLIC PANEL: CPT | Performed by: INTERNAL MEDICINE

## 2019-03-11 PROCEDURE — 85027 COMPLETE CBC AUTOMATED: CPT | Performed by: INTERNAL MEDICINE

## 2019-03-11 PROCEDURE — 99214 OFFICE O/P EST MOD 30 MIN: CPT | Performed by: INTERNAL MEDICINE

## 2019-03-11 PROCEDURE — 36415 COLL VENOUS BLD VENIPUNCTURE: CPT | Performed by: INTERNAL MEDICINE

## 2019-03-11 PROCEDURE — 87389 HIV-1 AG W/HIV-1&-2 AB AG IA: CPT | Performed by: INTERNAL MEDICINE

## 2019-03-11 ASSESSMENT — MIFFLIN-ST. JEOR: SCORE: 894.01

## 2019-03-11 NOTE — H&P (VIEW-ONLY)
[facility]  [address] [facility] [address]  [phone]  Dept: [phone]    PRE-OP EVALUATION:  Today's date: 3/11/2019    Kezia Dixon (: 1955) presents for pre-operative evaluation assessment as requested by Lamberto Dykes MD.  She requires evaluation and anesthesia risk assessment prior to undergoing surgery/procedure for treatment of Gallbladder and Hernia.    Fax number for surgical facility: [phone]  Primary Physician: Mustapha Velásquez  Type of Anesthesia Anticipated: General    Patient has a Health Care Directive or Living Will:  YES 2016    Preop Questions 3/8/2019   Who is doing your surgery? Lamberto Magaña   What are you having done? removal of gall bladder, repair hernia   Date of Surgery/Procedure: 2019   Facility or Hospital where procedure/surgery will be performed: [facility]   1.  Do you have a history of Heart attack, stroke, stent, coronary bypass surgery, or other heart surgery? No   2.  Do you ever have any pain or discomfort in your chest? YES - related to thoracotomy    3.  Do you have a history of  Heart Failure? No   4.   Are you troubled by shortness of breath when:  walking on a level surface, or up a slight hill, or at night? YES - Not a new symptoms since thoracotomy    5.  Do you currently have a cold, bronchitis or other respiratory infection? No   6.  Do you have a cough, shortness of breath, or wheezing? No   7.  Do you sometimes get pains in the calves of your legs when you walk? No   8. Do you or anyone in your family have previous history of blood clots? No   9.  Do you or does anyone in your family have a serious bleeding problem such as prolonged bleeding following surgeries or cuts? No   10. Have you ever had problems with anemia or been told to take iron pills? No   11. Have you had any abnormal blood loss such as black, tarry or bloody stools, or abnormal vaginal bleeding? No   12. Have you ever had  a blood transfusion? UNKNOWN -    13. Have you or any of your relatives ever had problems with anesthesia? No   14. Do you have sleep apnea, excessive snoring or daytime drowsiness? No   15. Do you have any prosthetic heart valves? No   16. Do you have prosthetic joints? No   17. Is there any chance that you may be pregnant? No         HPI:     HPI related to upcoming procedure:  Noticed a bulging associated with discomfort on midline surgical scar in December and was referred to hernia surgeon.  When there, she noted post prandial upper quadrant abdominal pain that was intermittent arising concerns for symptomatic gallbladder disease.  These symptoms have been worsening slowly.  No vomiting, fevers, chills or jaundice.            MEDICAL HISTORY:     Patient Active Problem List    Diagnosis Date Noted     Chronic pain syndrome 12/19/2018     Priority: Medium     Patient is followed by Mustapha Velásquez MD for ongoing prescription of pain medication.  All refills should only be approved by this provider, or covering partner.    Medication(s): Hydromorphone 2mg.   Maximum quantity per month: #60  Clinic visit frequency required: Q 3 months     Controlled substance agreement:  Encounter-Level CSA:    There are no encounter-level csa.     Patient-Level CSA:    There are no patient-level csa.       Pain Clinic evaluation in the past: No    DIRE Total Score(s):  No flowsheet data found.    Last [facility] website verification: Unable to check  (12/19/18 - web site down [place])    [email or web address]         Protein-calorie malnutrition (H) 07/06/2018     Priority: Medium     Post herpetic neuralgia 07/06/2018     Priority: Medium     ACP (advance care planning) 08/21/2017     Priority: Medium     Benign essential hypertension 10/14/2016     Priority: Medium     Alcoholism (H) 10/14/2016     Priority: Medium     Pancreatic pseudocyst 10/14/2016     Priority: Medium     Impaired fasting glucose 10/14/2016     Priority:  Medium     Pap smear with atypical squamous cells, cannot exclude high grade squamous intraepithelial lesion (ASC-H) 10/14/2016     Priority: Medium     10/14/16 ASC-H pap.   11/03/16 [facility]= ECC, Benign. 1 yr co-test   12/7/17 NIL/+ HR HPV (not 16/18). Plan: colposcopy by 3/7/18  12/19/17 [facility]- VIRY 1. Plan: Cotest in 1 yr due by 12/19/18 02/05/19 Patient is lost to pap tracking follow-up.          Esophageal cancer (H) 03/22/2016     Priority: Medium      Past Medical History:   Diagnosis Date     Alcoholism (H) 10/14/2016     Benign essential hypertension 10/14/2016     Carotid artery disease (H)     mile plaque 5/7     Chemical dependency (H)     alcohol     Depression with anxiety      Esophageal cancer (H)      Esophageal cancer (H) 3/22/2016     Esophageal mass      GERD (gastroesophageal reflux disease)      Hx of pancreatitis 2003     Hypercholesteremia      Hyperglycemia      Hyperlipemia      Impaired fasting glucose 10/14/2016     Impaired fasting glucose 10/14/2016     Nocturnal leg cramps      Pancreatic pseudocyst 10/14/2016     Pancreatic pseudocyst 10/14/2016     Pancreatitis     with pseudocyst     Pap smear with atypical squamous cells, cannot exclude high grade squamous intraepithelial lesion (ASC-H) 10/14/16    Colpo impression benign, ECC benign. Cotestin in 1 yr.     Shingles      Vasomotor rhinitis      Past Surgical History:   Procedure Laterality Date     AAA REPAIR      splenic artery aneurysm embolization     ABDOMEN SURGERY  2/2/2016     COLONOSCOPY  11/26/2013    Procedure: COMBINED COLONOSCOPY, SINGLE BIOPSY/POLYPECTOMY BY BIOPSY;  COLONOSCOPY (MAC);  Surgeon: Montana Rosa MD;  Location:  GI     COLONOSCOPY  11/26/2013     COLONOSCOPY N/A 10/4/2018    Procedure: COMBINED COLONOSCOPY, SINGLE OR MULTIPLE BIOPSY/POLYPECTOMY BY BIOPSY;  colonoscopy;  Surgeon: Gio Apodaca MD;  Location:  GI     ENT SURGERY       ESOPHAGOGASTRECTOMY N/A 3/22/2016    Procedure:  ESOPHAGOGASTRECTOMY;  Surgeon: Alvin Riojas MD;  Location:  OR     ESOPHAGOSCOPY, GASTROSCOPY, DUODENOSCOPY (EGD), COMBINED N/A 12/22/2015    Procedure: COMBINED ENDOSCOPIC ULTRASOUND, ESOPHAGOSCOPY, GASTROSCOPY, DUODENOSCOPY (EGD), FINE NEEDLE ASPIRATE/BIOPSY;  Surgeon: Danelle Michael MD;  Location:  GI     GASTROSTOMY, INSERT TUBE, COMBINED N/A 2/2/2016    Procedure: COMBINED GASTROSTOMY, INSERT TUBE (OPEN);  Surgeon: Alvin Riojas MD;  Location:  OR     GI SURGERY  12/22/2015     HAND SURGERY      right     INSERT PORT VASCULAR ACCESS N/A 12/28/2015    Procedure: INSERT PORT VASCULAR ACCESS;  Surgeon: Alvin Riojas MD;  Location:  OR     LOBECTOMY LUNG Right 10/16/2018    Procedure: LOBECTOMY LUNG;  Surgeon: Alvin Riojas MD;  Location:  OR     REMOVE PORT VASCULAR ACCESS Left 7/22/2016    Procedure: REMOVE PORT VASCULAR ACCESS;  Surgeon: Alvin Riojas MD;  Location:  OR     THORACOTOMY Right 10/16/2018    Procedure: REDO RIGHT THORACTOMY AND RIGHT LOWER LOBECTOMY, PLEURAL LYSIS;  Surgeon: Alvin Riojas MD;  Location:  OR     TONSILLECTOMY       VASCULAR SURGERY       Current Outpatient Medications   Medication Sig Dispense Refill     amitriptyline (ELAVIL) 50 MG tablet Take 1 tablet (50 mg) by mouth At Bedtime 90 tablet 3     DULoxetine (CYMBALTA) 60 MG capsule TAKE 1 CAPSULE BY MOUTH EVERY DAY 90 capsule 3     fluticasone (FLONASE) 50 MCG/ACT nasal spray SPRAY 2 SPRAYS INTO BOTH NOSTRILS DAILY 16 mL 3     hydrochlorothiazide 12.5 MG TABS tablet TAKE 1 TABLET (12.5 MG) BY MOUTH DAILY 90 tablet 1     metoprolol tartrate (LOPRESSOR) 25 MG tablet TAKE 1 TABLET (25 MG) BY MOUTH 2 TIMES DAILY 180 tablet 1     omeprazole (PRILOSEC) 40 MG capsule TAKE 1 CAPSULE (40 MG) BY MOUTH DAILY 90 capsule 3       Allergies   Allergen Reactions     Codeine Sulfate Nausea     Simvastatin Cramps     Leg cramps     Penicillins Rash     "    Social History     Tobacco Use     Smoking status: Former Smoker     Packs/day: 0.25     Last attempt to quit: 12/11/2015     Years since quitting: 3.2     Smokeless tobacco: Never Used   Substance Use Topics     Alcohol use: No     Alcohol/week: 0.0 oz     Comment: none     History   Drug Use No       REVIEW OF SYSTEMS:   Constitutional, neuro, ENT, endocrine, pulmonary, cardiac, gastrointestinal, genitourinary, musculoskeletal, integument and psychiatric systems are negative, except as otherwise noted.    EXAM:   /64   Pulse 75   Temp (!) 53.6  F (12  C)   Ht 1.562 m (5' 1.5\")   Wt 40.4 kg (89 lb)   SpO2 100%   Breastfeeding? No   BMI 16.54 kg/m      GENERAL APPEARANCE: healthy, alert and no distress; thin but unchanged from previous exams      EYES: EOMI, PERRL     HENT: ear canals and TM's normal and nose and mouth without ulcers or lesions     NECK: no adenopathy, no asymmetry, masses, or scars and thyroid normal to palpation     RESP: lungs clear to auscultation - no rales, rhonchi or wheezes; with exception of pneumonectomy findings on right side      CV: regular rates and rhythm, normal S1 S2, no S3 or S4 and no murmur, click or rub     ABDOMEN:  soft, nontender, no HSM or masses and bowel sounds normal; small right sides bulging adjacent to mid abdominal surgical scar     MS: extremities normal- no gross deformities noted, no evidence of inflammation in joints, FROM in all extremities.     SKIN: no suspicious lesions or rashes; thoracotomy scare is healing well     NEURO: Normal strength and tone, sensory exam grossly normal, mentation intact and speech normal     PSYCH: mentation appears normal. and affect normal/bright     LYMPHATICS: No cervical adenopathy    DIAGNOSTICS:   EKG: 10/2018 showed sinus rhythm without ST changes     Recent Labs   Lab Test 10/25/18  0735 10/24/18  0815 10/22/18  0813  10/16/18  0701 10/11/18  1418  03/25/16  0645  11/04/15   HGB  --  10.9* 10.9*   < > 13.1 " 13.7   < > 9.1*   < >  --     348 296   < > 257 239   < > 174   < >  --    INR  --   --   --   --  1.01  --   --  1.12   < >  --     131* 133   < > 135 133   < > 138   < >  --    POTASSIUM  --  3.8 3.8   < > 4.2 4.8   < > 3.5   < >  --    CR 0.56 0.53 0.51*   < > 0.61 0.68   < > 0.39*   < >  --    A1C  --   --   --   --   --  5.8*  --   --   --  5.9    < > = values in this interval not displayed.        IMPRESSION:   Reason for surgery/procedure: Symptomatic gallbladder disease and incisional hernia   Diagnosis/reason for consult: Preoperative evaluation     The proposed surgical procedure is considered INTERMEDIATE risk.    REVISED CARDIAC RISK INDEX  The patient has the following serious cardiovascular risks for perioperative complications such as (MI, PE, VFib and 3  AV Block):  No serious cardiac risks  INTERPRETATION: 1 risks: Class II (low risk - 0.9% complication rate)    The patient has the following additional risks for perioperative complications:  No identified additional risks      ICD-10-CM    1. Ventral hernia without obstruction or gangrene K43.9    2. Biliary calculus of other site without obstruction K80.80    3. Preop general physical exam Z01.818 Comprehensive metabolic panel     CBC with platelets   4. Mild protein-calorie malnutrition (H) E44.1    5. Malignant neoplasm of right lung, unspecified part of lung (H) C34.91    6. Malignant neoplasm of esophagus, unspecified location (H) C15.9    7. Benign essential hypertension I10    8. Encounter for screening for HIV Z11.4 HIV Antigen Antibody Combo   9. Abnormal cervical Papanicolaou smear, unspecified abnormal pap finding R87.619 OB/GYN REFERRAL   10. Visit for screening mammogram Z12.31 *MA Screening Digital Bilateral     Recheck albumin and LFTs  Continue follow up with [facility] regarding lung and esophagus cancers  Blood pressure well controlled on second check in clinic   Reminded to schedule Pap smear with Dr. Capone and  schedule mammogram     RECOMMENDATIONS:     --Consult hospital rounder / IM to assist post-op medical management    --Patient is to take all scheduled medications on the day of surgery EXCEPT for modifications listed below.    APPROVAL GIVEN to proceed with proposed procedure, without further diagnostic evaluation       Signed Electronically by: Mustapha Velásquez MD    Copy of this evaluation report is provided to requesting physician.    [facility] Preop Guidelines    Revised Cardiac Risk Index

## 2019-03-11 NOTE — PROGRESS NOTES
[facility]  [address] [facility] [address]  [phone]  Dept: [phone]    PRE-OP EVALUATION:  Today's date: 3/11/2019    Kezia Dixon (: 1955) presents for pre-operative evaluation assessment as requested by Lamberto Dykes MD.  She requires evaluation and anesthesia risk assessment prior to undergoing surgery/procedure for treatment of Gallbladder and Hernia.    Fax number for surgical facility: [phone]  Primary Physician: Mustapha Velásquez  Type of Anesthesia Anticipated: General    Patient has a Health Care Directive or Living Will:  YES 2016    Preop Questions 3/8/2019   Who is doing your surgery? Lamberto Magaña   What are you having done? removal of gall bladder, repair hernia   Date of Surgery/Procedure: 2019   Facility or Hospital where procedure/surgery will be performed: [facility]   1.  Do you have a history of Heart attack, stroke, stent, coronary bypass surgery, or other heart surgery? No   2.  Do you ever have any pain or discomfort in your chest? YES - related to thoracotomy    3.  Do you have a history of  Heart Failure? No   4.   Are you troubled by shortness of breath when:  walking on a level surface, or up a slight hill, or at night? YES - Not a new symptoms since thoracotomy    5.  Do you currently have a cold, bronchitis or other respiratory infection? No   6.  Do you have a cough, shortness of breath, or wheezing? No   7.  Do you sometimes get pains in the calves of your legs when you walk? No   8. Do you or anyone in your family have previous history of blood clots? No   9.  Do you or does anyone in your family have a serious bleeding problem such as prolonged bleeding following surgeries or cuts? No   10. Have you ever had problems with anemia or been told to take iron pills? No   11. Have you had any abnormal blood loss such as black, tarry or bloody stools, or abnormal vaginal bleeding? No   12. Have you ever had  a blood transfusion? UNKNOWN -    13. Have you or any of your relatives ever had problems with anesthesia? No   14. Do you have sleep apnea, excessive snoring or daytime drowsiness? No   15. Do you have any prosthetic heart valves? No   16. Do you have prosthetic joints? No   17. Is there any chance that you may be pregnant? No         HPI:     HPI related to upcoming procedure:  Noticed a bulging associated with discomfort on midline surgical scar in December and was referred to hernia surgeon.  When there, she noted post prandial upper quadrant abdominal pain that was intermittent arising concerns for symptomatic gallbladder disease.  These symptoms have been worsening slowly.  No vomiting, fevers, chills or jaundice.            MEDICAL HISTORY:     Patient Active Problem List    Diagnosis Date Noted     Chronic pain syndrome 12/19/2018     Priority: Medium     Patient is followed by Mustapha Velásquez MD for ongoing prescription of pain medication.  All refills should only be approved by this provider, or covering partner.    Medication(s): Hydromorphone 2mg.   Maximum quantity per month: #60  Clinic visit frequency required: Q 3 months     Controlled substance agreement:  Encounter-Level CSA:    There are no encounter-level csa.     Patient-Level CSA:    There are no patient-level csa.       Pain Clinic evaluation in the past: No    DIRE Total Score(s):  No flowsheet data found.    Last [facility] website verification: Unable to check  (12/19/18 - web site down [place])    [email or web address]         Protein-calorie malnutrition (H) 07/06/2018     Priority: Medium     Post herpetic neuralgia 07/06/2018     Priority: Medium     ACP (advance care planning) 08/21/2017     Priority: Medium     Benign essential hypertension 10/14/2016     Priority: Medium     Alcoholism (H) 10/14/2016     Priority: Medium     Pancreatic pseudocyst 10/14/2016     Priority: Medium     Impaired fasting glucose 10/14/2016     Priority:  Medium     Pap smear with atypical squamous cells, cannot exclude high grade squamous intraepithelial lesion (ASC-H) 10/14/2016     Priority: Medium     10/14/16 ASC-H pap.   11/03/16 [place]= ECC, Benign. 1 yr co-test   12/7/17 NIL/+ HR HPV (not 16/18). Plan: colposcopy by 3/7/18  12/19/17 [place]- VIRY 1. Plan: Cotest in 1 yr due by 12/19/18 02/05/19 Patient is lost to pap tracking follow-up.          Esophageal cancer (H) 03/22/2016     Priority: Medium      Past Medical History:   Diagnosis Date     Alcoholism (H) 10/14/2016     Benign essential hypertension 10/14/2016     Carotid artery disease (H)     mile plaque 5/7     Chemical dependency (H)     alcohol     Depression with anxiety      Esophageal cancer (H)      Esophageal cancer (H) 3/22/2016     Esophageal mass      GERD (gastroesophageal reflux disease)      Hx of pancreatitis 2003     Hypercholesteremia      Hyperglycemia      Hyperlipemia      Impaired fasting glucose 10/14/2016     Impaired fasting glucose 10/14/2016     Nocturnal leg cramps      Pancreatic pseudocyst 10/14/2016     Pancreatic pseudocyst 10/14/2016     Pancreatitis     with pseudocyst     Pap smear with atypical squamous cells, cannot exclude high grade squamous intraepithelial lesion (ASC-H) 10/14/16    Colpo impression benign, ECC benign. Cotestin in 1 yr.     Shingles      Vasomotor rhinitis      Past Surgical History:   Procedure Laterality Date     AAA REPAIR      splenic artery aneurysm embolization     ABDOMEN SURGERY  2/2/2016     COLONOSCOPY  11/26/2013    Procedure: COMBINED COLONOSCOPY, SINGLE BIOPSY/POLYPECTOMY BY BIOPSY;  COLONOSCOPY (MAC);  Surgeon: Montana Rosa MD;  Location:  GI     COLONOSCOPY  11/26/2013     COLONOSCOPY N/A 10/4/2018    Procedure: COMBINED COLONOSCOPY, SINGLE OR MULTIPLE BIOPSY/POLYPECTOMY BY BIOPSY;  colonoscopy;  Surgeon: Gio Apodaca MD;  Location:  GI     ENT SURGERY       ESOPHAGOGASTRECTOMY N/A 3/22/2016    Procedure:  ESOPHAGOGASTRECTOMY;  Surgeon: Alvin Riojas MD;  Location:  OR     ESOPHAGOSCOPY, GASTROSCOPY, DUODENOSCOPY (EGD), COMBINED N/A 12/22/2015    Procedure: COMBINED ENDOSCOPIC ULTRASOUND, ESOPHAGOSCOPY, GASTROSCOPY, DUODENOSCOPY (EGD), FINE NEEDLE ASPIRATE/BIOPSY;  Surgeon: Danelle Michael MD;  Location:  GI     GASTROSTOMY, INSERT TUBE, COMBINED N/A 2/2/2016    Procedure: COMBINED GASTROSTOMY, INSERT TUBE (OPEN);  Surgeon: Alvin Riojas MD;  Location:  OR     GI SURGERY  12/22/2015     HAND SURGERY      right     INSERT PORT VASCULAR ACCESS N/A 12/28/2015    Procedure: INSERT PORT VASCULAR ACCESS;  Surgeon: Alvin Riojas MD;  Location:  OR     LOBECTOMY LUNG Right 10/16/2018    Procedure: LOBECTOMY LUNG;  Surgeon: Alvin Riojas MD;  Location:  OR     REMOVE PORT VASCULAR ACCESS Left 7/22/2016    Procedure: REMOVE PORT VASCULAR ACCESS;  Surgeon: Alvin Riojas MD;  Location:  OR     THORACOTOMY Right 10/16/2018    Procedure: REDO RIGHT THORACTOMY AND RIGHT LOWER LOBECTOMY, PLEURAL LYSIS;  Surgeon: Alvin Riojas MD;  Location:  OR     TONSILLECTOMY       VASCULAR SURGERY       Current Outpatient Medications   Medication Sig Dispense Refill     amitriptyline (ELAVIL) 50 MG tablet Take 1 tablet (50 mg) by mouth At Bedtime 90 tablet 3     DULoxetine (CYMBALTA) 60 MG capsule TAKE 1 CAPSULE BY MOUTH EVERY DAY 90 capsule 3     fluticasone (FLONASE) 50 MCG/ACT nasal spray SPRAY 2 SPRAYS INTO BOTH NOSTRILS DAILY 16 mL 3     hydrochlorothiazide 12.5 MG TABS tablet TAKE 1 TABLET (12.5 MG) BY MOUTH DAILY 90 tablet 1     metoprolol tartrate (LOPRESSOR) 25 MG tablet TAKE 1 TABLET (25 MG) BY MOUTH 2 TIMES DAILY 180 tablet 1     omeprazole (PRILOSEC) 40 MG capsule TAKE 1 CAPSULE (40 MG) BY MOUTH DAILY 90 capsule 3       Allergies   Allergen Reactions     Codeine Sulfate Nausea     Simvastatin Cramps     Leg cramps     Penicillins Rash     "    Social History     Tobacco Use     Smoking status: Former Smoker     Packs/day: 0.25     Last attempt to quit: 12/11/2015     Years since quitting: 3.2     Smokeless tobacco: Never Used   Substance Use Topics     Alcohol use: No     Alcohol/week: 0.0 oz     Comment: none     History   Drug Use No       REVIEW OF SYSTEMS:   Constitutional, neuro, ENT, endocrine, pulmonary, cardiac, gastrointestinal, genitourinary, musculoskeletal, integument and psychiatric systems are negative, except as otherwise noted.    EXAM:   /64   Pulse 75   Temp (!) 53.6  F (12  C)   Ht 1.562 m (5' 1.5\")   Wt 40.4 kg (89 lb)   SpO2 100%   Breastfeeding? No   BMI 16.54 kg/m      GENERAL APPEARANCE: healthy, alert and no distress; thin but unchanged from previous exams      EYES: EOMI, PERRL     HENT: ear canals and TM's normal and nose and mouth without ulcers or lesions     NECK: no adenopathy, no asymmetry, masses, or scars and thyroid normal to palpation     RESP: lungs clear to auscultation - no rales, rhonchi or wheezes; with exception of pneumonectomy findings on right side      CV: regular rates and rhythm, normal S1 S2, no S3 or S4 and no murmur, click or rub     ABDOMEN:  soft, nontender, no HSM or masses and bowel sounds normal; small right sides bulging adjacent to mid abdominal surgical scar     MS: extremities normal- no gross deformities noted, no evidence of inflammation in joints, FROM in all extremities.     SKIN: no suspicious lesions or rashes; thoracotomy scare is healing well     NEURO: Normal strength and tone, sensory exam grossly normal, mentation intact and speech normal     PSYCH: mentation appears normal. and affect normal/bright     LYMPHATICS: No cervical adenopathy    DIAGNOSTICS:   EKG: 10/2018 showed sinus rhythm without ST changes     Recent Labs   Lab Test 10/25/18  0735 10/24/18  0815 10/22/18  0813  10/16/18  0701 10/11/18  1418  03/25/16  0645  11/04/15   HGB  --  10.9* 10.9*   < > 13.1 " 13.7   < > 9.1*   < >  --     348 296   < > 257 239   < > 174   < >  --    INR  --   --   --   --  1.01  --   --  1.12   < >  --     131* 133   < > 135 133   < > 138   < >  --    POTASSIUM  --  3.8 3.8   < > 4.2 4.8   < > 3.5   < >  --    CR 0.56 0.53 0.51*   < > 0.61 0.68   < > 0.39*   < >  --    A1C  --   --   --   --   --  5.8*  --   --   --  5.9    < > = values in this interval not displayed.        IMPRESSION:   Reason for surgery/procedure: Symptomatic gallbladder disease and incisional hernia   Diagnosis/reason for consult: Preoperative evaluation     The proposed surgical procedure is considered INTERMEDIATE risk.    REVISED CARDIAC RISK INDEX  The patient has the following serious cardiovascular risks for perioperative complications such as (MI, PE, VFib and 3  AV Block):  No serious cardiac risks  INTERPRETATION: 1 risks: Class II (low risk - 0.9% complication rate)    The patient has the following additional risks for perioperative complications:  No identified additional risks      ICD-10-CM    1. Ventral hernia without obstruction or gangrene K43.9    2. Biliary calculus of other site without obstruction K80.80    3. Preop general physical exam Z01.818 Comprehensive metabolic panel     CBC with platelets   4. Mild protein-calorie malnutrition (H) E44.1    5. Malignant neoplasm of right lung, unspecified part of lung (H) C34.91    6. Malignant neoplasm of esophagus, unspecified location (H) C15.9    7. Benign essential hypertension I10    8. Encounter for screening for HIV Z11.4 HIV Antigen Antibody Combo   9. Abnormal cervical Papanicolaou smear, unspecified abnormal pap finding R87.619 OB/GYN REFERRAL   10. Visit for screening mammogram Z12.31 *MA Screening Digital Bilateral     Recheck albumin and LFTs  Continue follow up with [place] oncology regarding lung and esophagus cancers  Blood pressure well controlled on second check in clinic   Reminded to schedule Pap smear with Dr. Capone and  schedule mammogram     RECOMMENDATIONS:     --Consult hospital rounder / IM to assist post-op medical management    --Patient is to take all scheduled medications on the day of surgery EXCEPT for modifications listed below.    APPROVAL GIVEN to proceed with proposed procedure, without further diagnostic evaluation       Signed Electronically by: Mustapha Velásquez MD    Copy of this evaluation report is provided to requesting physician.    [facility] Preop Guidelines    Revised Cardiac Risk Index

## 2019-03-11 NOTE — LETTER
[facility]  [address] [facility]  [address]  [place]  [address]  Tel: [phone]    March 12, 2019    Kezia Dixon  [address] [facility] [address]        Dear Ms. Dixon,    The following letter pertains to your most recent diagnostic tests:    -Your HIV test is negative.     -Liver and gallbladder tests are normal for you. (ALT,AST, Alk phos, bilirubin), kidney function is normal for you (Creatinine, GFR), Sodium is normal, Potassium is normal for you, Calcium is normal for you, Glucose (blood sugar) is normal for you.      -Your complete blood counts including your hemoglobin returned normal for you.           Bottom line:  Labs look OK for surgery.      Sincerely,    Mustapha Velásquez MD/VIVEK          Enclosure: Lab Results  Results for orders placed or performed in visit on 03/11/19   Comprehensive metabolic panel   Result Value Ref Range    Sodium 138 133 - 144 mmol/L    Potassium 4.2 3.4 - 5.3 mmol/L    Chloride 104 94 - 109 mmol/L    Carbon Dioxide 28 20 - 32 mmol/L    Anion Gap 6 3 - 14 mmol/L    Glucose 99 70 - 99 mg/dL    Urea Nitrogen 18 7 - 30 mg/dL    Creatinine 0.52 0.52 - 1.04 mg/dL    GFR Estimate >90 >60 mL/min/[1.73_m2]    GFR Estimate If Black >90 >60 mL/min/[1.73_m2]    Calcium 9.1 8.5 - 10.1 mg/dL    Bilirubin Total 0.3 0.2 - 1.3 mg/dL    Albumin 3.8 3.4 - 5.0 g/dL    Protein Total 7.2 6.8 - 8.8 g/dL    Alkaline Phosphatase 71 40 - 150 U/L    ALT 14 0 - 50 U/L    AST 15 0 - 45 U/L   CBC with platelets   Result Value Ref Range    WBC 6.5 4.0 - 11.0 10e9/L    RBC Count 3.89 3.8 - 5.2 10e12/L    Hemoglobin 11.9 11.7 - 15.7 g/dL    Hematocrit 36.2 35.0 - 47.0 %    MCV 93 78 - 100 fl    MCH 30.6 26.5 - 33.0 pg    MCHC 32.9 31.5 - 36.5 g/dL    RDW 13.5 10.0 - 15.0 %    Platelet Count 289 150 - 450 10e9/L   HIV Antigen Antibody Combo   Result Value Ref Range    HIV Antigen Antibody Combo Nonreactive NR^Nonreactive

## 2019-03-12 LAB
ALBUMIN SERPL-MCNC: 3.8 G/DL (ref 3.4–5)
ALP SERPL-CCNC: 71 U/L (ref 40–150)
ALT SERPL W P-5'-P-CCNC: 14 U/L (ref 0–50)
ANION GAP SERPL CALCULATED.3IONS-SCNC: 6 MMOL/L (ref 3–14)
AST SERPL W P-5'-P-CCNC: 15 U/L (ref 0–45)
BILIRUB SERPL-MCNC: 0.3 MG/DL (ref 0.2–1.3)
BUN SERPL-MCNC: 18 MG/DL (ref 7–30)
CALCIUM SERPL-MCNC: 9.1 MG/DL (ref 8.5–10.1)
CHLORIDE SERPL-SCNC: 104 MMOL/L (ref 94–109)
CO2 SERPL-SCNC: 28 MMOL/L (ref 20–32)
CREAT SERPL-MCNC: 0.52 MG/DL (ref 0.52–1.04)
GFR SERPL CREATININE-BSD FRML MDRD: >90 ML/MIN/{1.73_M2}
GLUCOSE SERPL-MCNC: 99 MG/DL (ref 70–99)
HIV 1+2 AB+HIV1 P24 AG SERPL QL IA: NONREACTIVE
POTASSIUM SERPL-SCNC: 4.2 MMOL/L (ref 3.4–5.3)
PROT SERPL-MCNC: 7.2 G/DL (ref 6.8–8.8)
SODIUM SERPL-SCNC: 138 MMOL/L (ref 133–144)

## 2019-03-12 NOTE — RESULT ENCOUNTER NOTE
The following letter pertains to your most recent diagnostic tests:    -Your HIV test is negative.     -Liver and gallbladder tests are normal for you. (ALT,AST, Alk phos, bilirubin), kidney function is normal for you (Creatinine, GFR), Sodium is normal, Potassium is normal for you, Calcium is normal for you, Glucose (blood sugar) is normal for you.      -Your complete blood counts including your hemoglobin returned normal for you.           Bottom line:  Labs look OK for surgery.        Sincerely,    Dr. Velásquez

## 2019-03-19 ENCOUNTER — HOSPITAL ENCOUNTER (OUTPATIENT)
Facility: CLINIC | Age: 64
Discharge: HOME | End: 2019-03-20
Attending: SURGERY | Admitting: SURGERY
Payer: MEDICARE

## 2019-03-19 ENCOUNTER — ANESTHESIA EVENT (OUTPATIENT)
Dept: SURGERY | Facility: CLINIC | Age: 64
End: 2019-03-19
Payer: MEDICARE

## 2019-03-19 ENCOUNTER — ANESTHESIA (OUTPATIENT)
Dept: SURGERY | Facility: CLINIC | Age: 64
End: 2019-03-19
Payer: MEDICARE

## 2019-03-19 ENCOUNTER — OFFICE VISIT (OUTPATIENT)
Dept: SURGERY | Facility: PHYSICIAN GROUP | Age: 64
End: 2019-03-19
Payer: MEDICARE

## 2019-03-19 DIAGNOSIS — K43.2 INCISIONAL HERNIA, WITHOUT OBSTRUCTION OR GANGRENE: Primary | ICD-10-CM

## 2019-03-19 DIAGNOSIS — K43.2 INCISIONAL HERNIA WITHOUT OBSTRUCTION OR GANGRENE: ICD-10-CM

## 2019-03-19 DIAGNOSIS — K80.20 CALCULUS OF GALLBLADDER WITHOUT CHOLECYSTITIS WITHOUT OBSTRUCTION: ICD-10-CM

## 2019-03-19 PROCEDURE — 49560 ZZHC REPAIR INCISIONAL HERNIA,REDUCIBLE: CPT | Mod: 59 | Performed by: SURGERY

## 2019-03-19 PROCEDURE — 25000128 H RX IP 250 OP 636: Performed by: NURSE ANESTHETIST, CERTIFIED REGISTERED

## 2019-03-19 PROCEDURE — 27210794 ZZH OR GENERAL SUPPLY STERILE: Performed by: SURGERY

## 2019-03-19 PROCEDURE — 88304 TISSUE EXAM BY PATHOLOGIST: CPT | Mod: 26 | Performed by: SURGERY

## 2019-03-19 PROCEDURE — 71000017 ZZH RECOVERY PHASE 1 LEVEL 3 EA ADDTL HR: Performed by: SURGERY

## 2019-03-19 PROCEDURE — 36000056 ZZH SURGERY LEVEL 3 1ST 30 MIN: Performed by: SURGERY

## 2019-03-19 PROCEDURE — 25000125 ZZHC RX 250: Performed by: NURSE ANESTHETIST, CERTIFIED REGISTERED

## 2019-03-19 PROCEDURE — 25800030 ZZH RX IP 258 OP 636: Performed by: PHYSICIAN ASSISTANT

## 2019-03-19 PROCEDURE — 25000128 H RX IP 250 OP 636: Performed by: ANESTHESIOLOGY

## 2019-03-19 PROCEDURE — 49568 ZZHC REPAIR HERNIA WITH MESH: CPT | Performed by: SURGERY

## 2019-03-19 PROCEDURE — 49568 ZZHC REPAIR HERNIA WITH MESH: CPT | Mod: AS | Performed by: PHYSICIAN ASSISTANT

## 2019-03-19 PROCEDURE — 37000008 ZZH ANESTHESIA TECHNICAL FEE, 1ST 30 MIN: Performed by: SURGERY

## 2019-03-19 PROCEDURE — 25000128 H RX IP 250 OP 636: Performed by: PHYSICIAN ASSISTANT

## 2019-03-19 PROCEDURE — 40000170 ZZH STATISTIC PRE-PROCEDURE ASSESSMENT II: Performed by: SURGERY

## 2019-03-19 PROCEDURE — 25000566 ZZH SEVOFLURANE, EA 15 MIN: Performed by: SURGERY

## 2019-03-19 PROCEDURE — 71000016 ZZH RECOVERY PHASE 1 LEVEL 3 FIRST HR: Performed by: SURGERY

## 2019-03-19 PROCEDURE — 49560 ZZHC REPAIR INCISIONAL HERNIA,REDUCIBLE: CPT | Mod: AS | Performed by: PHYSICIAN ASSISTANT

## 2019-03-19 PROCEDURE — 25800030 ZZH RX IP 258 OP 636: Performed by: NURSE ANESTHETIST, CERTIFIED REGISTERED

## 2019-03-19 PROCEDURE — 37000009 ZZH ANESTHESIA TECHNICAL FEE, EACH ADDTL 15 MIN: Performed by: SURGERY

## 2019-03-19 PROCEDURE — 47562 LAPAROSCOPIC CHOLECYSTECTOMY: CPT | Performed by: SURGERY

## 2019-03-19 PROCEDURE — 25000132 ZZH RX MED GY IP 250 OP 250 PS 637: Mod: GY | Performed by: PHYSICIAN ASSISTANT

## 2019-03-19 PROCEDURE — 25000125 ZZHC RX 250: Performed by: SURGERY

## 2019-03-19 PROCEDURE — 47562 LAPAROSCOPIC CHOLECYSTECTOMY: CPT | Mod: AS | Performed by: PHYSICIAN ASSISTANT

## 2019-03-19 PROCEDURE — 36000058 ZZH SURGERY LEVEL 3 EA 15 ADDTL MIN: Performed by: SURGERY

## 2019-03-19 PROCEDURE — 88304 TISSUE EXAM BY PATHOLOGIST: CPT | Performed by: SURGERY

## 2019-03-19 PROCEDURE — C1781 MESH (IMPLANTABLE): HCPCS | Performed by: SURGERY

## 2019-03-19 DEVICE — MESH SYMBOTEX COMPOSITE STEX 15CM X 10CM SYM1510: Type: IMPLANTABLE DEVICE | Site: ABDOMEN | Status: FUNCTIONAL

## 2019-03-19 RX ORDER — ONDANSETRON 4 MG/1
4 TABLET, ORALLY DISINTEGRATING ORAL EVERY 30 MIN PRN
Status: DISCONTINUED | OUTPATIENT
Start: 2019-03-19 | End: 2019-03-19 | Stop reason: HOSPADM

## 2019-03-19 RX ORDER — SODIUM CHLORIDE, SODIUM LACTATE, POTASSIUM CHLORIDE, CALCIUM CHLORIDE 600; 310; 30; 20 MG/100ML; MG/100ML; MG/100ML; MG/100ML
INJECTION, SOLUTION INTRAVENOUS CONTINUOUS PRN
Status: DISCONTINUED | OUTPATIENT
Start: 2019-03-19 | End: 2019-03-19

## 2019-03-19 RX ORDER — DEXAMETHASONE SODIUM PHOSPHATE 4 MG/ML
INJECTION, SOLUTION INTRA-ARTICULAR; INTRALESIONAL; INTRAMUSCULAR; INTRAVENOUS; SOFT TISSUE PRN
Status: DISCONTINUED | OUTPATIENT
Start: 2019-03-19 | End: 2019-03-19

## 2019-03-19 RX ORDER — ONDANSETRON 2 MG/ML
4 INJECTION INTRAMUSCULAR; INTRAVENOUS EVERY 30 MIN PRN
Status: DISCONTINUED | OUTPATIENT
Start: 2019-03-19 | End: 2019-03-19 | Stop reason: HOSPADM

## 2019-03-19 RX ORDER — LIDOCAINE HYDROCHLORIDE 20 MG/ML
INJECTION, SOLUTION INFILTRATION; PERINEURAL PRN
Status: DISCONTINUED | OUTPATIENT
Start: 2019-03-19 | End: 2019-03-19

## 2019-03-19 RX ORDER — CLINDAMYCIN PHOSPHATE 900 MG/50ML
900 INJECTION, SOLUTION INTRAVENOUS SEE ADMIN INSTRUCTIONS
Status: DISCONTINUED | OUTPATIENT
Start: 2019-03-19 | End: 2019-03-19 | Stop reason: HOSPADM

## 2019-03-19 RX ORDER — EPHEDRINE SULFATE 50 MG/ML
INJECTION, SOLUTION INTRAMUSCULAR; INTRAVENOUS; SUBCUTANEOUS PRN
Status: DISCONTINUED | OUTPATIENT
Start: 2019-03-19 | End: 2019-03-19

## 2019-03-19 RX ORDER — FENTANYL CITRATE 50 UG/ML
25-50 INJECTION, SOLUTION INTRAMUSCULAR; INTRAVENOUS
Status: DISCONTINUED | OUTPATIENT
Start: 2019-03-19 | End: 2019-03-19 | Stop reason: HOSPADM

## 2019-03-19 RX ORDER — HYDROCHLOROTHIAZIDE 12.5 MG/1
12.5 CAPSULE ORAL DAILY
Status: DISCONTINUED | OUTPATIENT
Start: 2019-03-20 | End: 2019-03-20 | Stop reason: HOSPADM

## 2019-03-19 RX ORDER — ONDANSETRON 2 MG/ML
INJECTION INTRAMUSCULAR; INTRAVENOUS PRN
Status: DISCONTINUED | OUTPATIENT
Start: 2019-03-19 | End: 2019-03-19

## 2019-03-19 RX ORDER — DULOXETIN HYDROCHLORIDE 30 MG/1
60 CAPSULE, DELAYED RELEASE ORAL DAILY
Status: DISCONTINUED | OUTPATIENT
Start: 2019-03-19 | End: 2019-03-20 | Stop reason: HOSPADM

## 2019-03-19 RX ORDER — CLINDAMYCIN PHOSPHATE 900 MG/50ML
900 INJECTION, SOLUTION INTRAVENOUS
Status: COMPLETED | OUTPATIENT
Start: 2019-03-19 | End: 2019-03-19

## 2019-03-19 RX ORDER — FLUTICASONE PROPIONATE 50 MCG
2 SPRAY, SUSPENSION (ML) NASAL DAILY
Status: DISCONTINUED | OUTPATIENT
Start: 2019-03-20 | End: 2019-03-20 | Stop reason: HOSPADM

## 2019-03-19 RX ORDER — KETOROLAC TROMETHAMINE 15 MG/ML
15 INJECTION, SOLUTION INTRAMUSCULAR; INTRAVENOUS ONCE
Status: COMPLETED | OUTPATIENT
Start: 2019-03-19 | End: 2019-03-19

## 2019-03-19 RX ORDER — ONDANSETRON 4 MG/1
4 TABLET, ORALLY DISINTEGRATING ORAL EVERY 6 HOURS PRN
Status: DISCONTINUED | OUTPATIENT
Start: 2019-03-19 | End: 2019-03-20 | Stop reason: HOSPADM

## 2019-03-19 RX ORDER — FENTANYL CITRATE 50 UG/ML
INJECTION, SOLUTION INTRAMUSCULAR; INTRAVENOUS PRN
Status: DISCONTINUED | OUTPATIENT
Start: 2019-03-19 | End: 2019-03-19

## 2019-03-19 RX ORDER — HYDROMORPHONE HYDROCHLORIDE 1 MG/ML
.3-.5 INJECTION, SOLUTION INTRAMUSCULAR; INTRAVENOUS; SUBCUTANEOUS EVERY 5 MIN PRN
Status: DISCONTINUED | OUTPATIENT
Start: 2019-03-19 | End: 2019-03-19 | Stop reason: HOSPADM

## 2019-03-19 RX ORDER — MAGNESIUM HYDROXIDE 1200 MG/15ML
LIQUID ORAL PRN
Status: DISCONTINUED | OUTPATIENT
Start: 2019-03-19 | End: 2019-03-19 | Stop reason: HOSPADM

## 2019-03-19 RX ORDER — SODIUM CHLORIDE, SODIUM LACTATE, POTASSIUM CHLORIDE, CALCIUM CHLORIDE 600; 310; 30; 20 MG/100ML; MG/100ML; MG/100ML; MG/100ML
INJECTION, SOLUTION INTRAVENOUS CONTINUOUS
Status: DISCONTINUED | OUTPATIENT
Start: 2019-03-19 | End: 2019-03-20 | Stop reason: HOSPADM

## 2019-03-19 RX ORDER — OXYCODONE HYDROCHLORIDE 5 MG/1
5-10 TABLET ORAL
Status: DISCONTINUED | OUTPATIENT
Start: 2019-03-19 | End: 2019-03-20 | Stop reason: HOSPADM

## 2019-03-19 RX ORDER — METOPROLOL TARTRATE 25 MG/1
25 TABLET, FILM COATED ORAL 2 TIMES DAILY
Status: DISCONTINUED | OUTPATIENT
Start: 2019-03-19 | End: 2019-03-20 | Stop reason: HOSPADM

## 2019-03-19 RX ORDER — NALOXONE HYDROCHLORIDE 0.4 MG/ML
.1-.4 INJECTION, SOLUTION INTRAMUSCULAR; INTRAVENOUS; SUBCUTANEOUS
Status: DISCONTINUED | OUTPATIENT
Start: 2019-03-19 | End: 2019-03-19

## 2019-03-19 RX ORDER — SODIUM CHLORIDE, SODIUM LACTATE, POTASSIUM CHLORIDE, CALCIUM CHLORIDE 600; 310; 30; 20 MG/100ML; MG/100ML; MG/100ML; MG/100ML
INJECTION, SOLUTION INTRAVENOUS CONTINUOUS
Status: DISCONTINUED | OUTPATIENT
Start: 2019-03-19 | End: 2019-03-19 | Stop reason: HOSPADM

## 2019-03-19 RX ORDER — ONDANSETRON 2 MG/ML
4 INJECTION INTRAMUSCULAR; INTRAVENOUS EVERY 6 HOURS PRN
Status: DISCONTINUED | OUTPATIENT
Start: 2019-03-19 | End: 2019-03-20 | Stop reason: HOSPADM

## 2019-03-19 RX ORDER — HYDROMORPHONE HYDROCHLORIDE 1 MG/ML
0.2 INJECTION, SOLUTION INTRAMUSCULAR; INTRAVENOUS; SUBCUTANEOUS
Status: DISCONTINUED | OUTPATIENT
Start: 2019-03-19 | End: 2019-03-20 | Stop reason: HOSPADM

## 2019-03-19 RX ORDER — OXYCODONE HYDROCHLORIDE 5 MG/1
5-10 TABLET ORAL
Qty: 12 TABLET | Refills: 0 | Status: SHIPPED | OUTPATIENT
Start: 2019-03-19 | End: 2019-03-20

## 2019-03-19 RX ORDER — NALOXONE HYDROCHLORIDE 0.4 MG/ML
.1-.4 INJECTION, SOLUTION INTRAMUSCULAR; INTRAVENOUS; SUBCUTANEOUS
Status: DISCONTINUED | OUTPATIENT
Start: 2019-03-19 | End: 2019-03-20 | Stop reason: HOSPADM

## 2019-03-19 RX ORDER — PROPOFOL 10 MG/ML
INJECTION, EMULSION INTRAVENOUS PRN
Status: DISCONTINUED | OUTPATIENT
Start: 2019-03-19 | End: 2019-03-19

## 2019-03-19 RX ORDER — PROCHLORPERAZINE MALEATE 5 MG
5 TABLET ORAL EVERY 6 HOURS PRN
Status: DISCONTINUED | OUTPATIENT
Start: 2019-03-19 | End: 2019-03-20 | Stop reason: HOSPADM

## 2019-03-19 RX ORDER — LIDOCAINE 40 MG/G
CREAM TOPICAL
Status: DISCONTINUED | OUTPATIENT
Start: 2019-03-19 | End: 2019-03-20 | Stop reason: HOSPADM

## 2019-03-19 RX ORDER — BUPIVACAINE HYDROCHLORIDE AND EPINEPHRINE 5; 5 MG/ML; UG/ML
INJECTION, SOLUTION PERINEURAL PRN
Status: DISCONTINUED | OUTPATIENT
Start: 2019-03-19 | End: 2019-03-19 | Stop reason: HOSPADM

## 2019-03-19 RX ADMIN — PHENYLEPHRINE HYDROCHLORIDE 100 MCG: 10 INJECTION, SOLUTION INTRAMUSCULAR; INTRAVENOUS; SUBCUTANEOUS at 10:59

## 2019-03-19 RX ADMIN — DULOXETINE HYDROCHLORIDE 60 MG: 30 CAPSULE, DELAYED RELEASE ORAL at 21:36

## 2019-03-19 RX ADMIN — Medication 5 MG: at 10:59

## 2019-03-19 RX ADMIN — Medication 10 MG: at 10:56

## 2019-03-19 RX ADMIN — KETOROLAC TROMETHAMINE 15 MG: 15 INJECTION, SOLUTION INTRAMUSCULAR; INTRAVENOUS at 12:10

## 2019-03-19 RX ADMIN — METOPROLOL TARTRATE 25 MG: 25 TABLET ORAL at 19:28

## 2019-03-19 RX ADMIN — ROCURONIUM BROMIDE 10 MG: 10 INJECTION INTRAVENOUS at 10:37

## 2019-03-19 RX ADMIN — HYDROMORPHONE HYDROCHLORIDE 0.25 MG: 1 INJECTION, SOLUTION INTRAMUSCULAR; INTRAVENOUS; SUBCUTANEOUS at 11:30

## 2019-03-19 RX ADMIN — OXYCODONE HYDROCHLORIDE 10 MG: 5 TABLET ORAL at 17:23

## 2019-03-19 RX ADMIN — ONDANSETRON 4 MG: 2 INJECTION INTRAMUSCULAR; INTRAVENOUS at 10:57

## 2019-03-19 RX ADMIN — HYDROMORPHONE HYDROCHLORIDE 0.2 MG: 1 INJECTION, SOLUTION INTRAMUSCULAR; INTRAVENOUS; SUBCUTANEOUS at 16:08

## 2019-03-19 RX ADMIN — PHENYLEPHRINE HYDROCHLORIDE 100 MCG: 10 INJECTION, SOLUTION INTRAMUSCULAR; INTRAVENOUS; SUBCUTANEOUS at 11:23

## 2019-03-19 RX ADMIN — DEXMEDETOMIDINE HYDROCHLORIDE 8 MCG: 100 INJECTION, SOLUTION INTRAVENOUS at 10:38

## 2019-03-19 RX ADMIN — AMITRIPTYLINE HYDROCHLORIDE 50 MG: 25 TABLET, FILM COATED ORAL at 21:36

## 2019-03-19 RX ADMIN — SODIUM CHLORIDE, POTASSIUM CHLORIDE, SODIUM LACTATE AND CALCIUM CHLORIDE: 600; 310; 30; 20 INJECTION, SOLUTION INTRAVENOUS at 10:17

## 2019-03-19 RX ADMIN — HYDROMORPHONE HYDROCHLORIDE 0.25 MG: 1 INJECTION, SOLUTION INTRAMUSCULAR; INTRAVENOUS; SUBCUTANEOUS at 12:12

## 2019-03-19 RX ADMIN — PROPOFOL 20 MG: 10 INJECTION, EMULSION INTRAVENOUS at 10:51

## 2019-03-19 RX ADMIN — MIDAZOLAM 1 MG: 1 INJECTION INTRAMUSCULAR; INTRAVENOUS at 10:17

## 2019-03-19 RX ADMIN — FENTANYL CITRATE 50 MCG: 50 INJECTION, SOLUTION INTRAMUSCULAR; INTRAVENOUS at 10:22

## 2019-03-19 RX ADMIN — LIDOCAINE HYDROCHLORIDE 60 MG: 20 INJECTION, SOLUTION INFILTRATION; PERINEURAL at 10:22

## 2019-03-19 RX ADMIN — HYDROMORPHONE HYDROCHLORIDE 0.25 MG: 1 INJECTION, SOLUTION INTRAMUSCULAR; INTRAVENOUS; SUBCUTANEOUS at 11:38

## 2019-03-19 RX ADMIN — HYDROMORPHONE HYDROCHLORIDE 0.25 MG: 1 INJECTION, SOLUTION INTRAMUSCULAR; INTRAVENOUS; SUBCUTANEOUS at 12:07

## 2019-03-19 RX ADMIN — FENTANYL CITRATE 25 MCG: 50 INJECTION, SOLUTION INTRAMUSCULAR; INTRAVENOUS at 10:51

## 2019-03-19 RX ADMIN — CLINDAMYCIN PHOSPHATE 900 MG: 18 INJECTION, SOLUTION INTRAVENOUS at 10:26

## 2019-03-19 RX ADMIN — DEXMEDETOMIDINE HYDROCHLORIDE 8 MCG: 100 INJECTION, SOLUTION INTRAVENOUS at 11:34

## 2019-03-19 RX ADMIN — DEXAMETHASONE SODIUM PHOSPHATE 4 MG: 4 INJECTION, SOLUTION INTRA-ARTICULAR; INTRALESIONAL; INTRAMUSCULAR; INTRAVENOUS; SOFT TISSUE at 10:40

## 2019-03-19 RX ADMIN — PROPOFOL 80 MG: 10 INJECTION, EMULSION INTRAVENOUS at 10:22

## 2019-03-19 RX ADMIN — HYDROMORPHONE HYDROCHLORIDE 0.2 MG: 1 INJECTION, SOLUTION INTRAMUSCULAR; INTRAVENOUS; SUBCUTANEOUS at 19:29

## 2019-03-19 RX ADMIN — ROCURONIUM BROMIDE 30 MG: 10 INJECTION INTRAVENOUS at 10:22

## 2019-03-19 RX ADMIN — FENTANYL CITRATE 25 MCG: 50 INJECTION, SOLUTION INTRAMUSCULAR; INTRAVENOUS at 11:11

## 2019-03-19 RX ADMIN — OXYCODONE HYDROCHLORIDE 10 MG: 5 TABLET ORAL at 21:20

## 2019-03-19 RX ADMIN — PHENYLEPHRINE HYDROCHLORIDE 150 MCG: 10 INJECTION, SOLUTION INTRAMUSCULAR; INTRAVENOUS; SUBCUTANEOUS at 11:03

## 2019-03-19 RX ADMIN — HYDROMORPHONE HYDROCHLORIDE 0.5 MG: 1 INJECTION, SOLUTION INTRAMUSCULAR; INTRAVENOUS; SUBCUTANEOUS at 12:47

## 2019-03-19 RX ADMIN — PHENYLEPHRINE HYDROCHLORIDE 100 MCG: 10 INJECTION, SOLUTION INTRAMUSCULAR; INTRAVENOUS; SUBCUTANEOUS at 11:15

## 2019-03-19 RX ADMIN — Medication 10 MG: at 11:03

## 2019-03-19 ASSESSMENT — COPD QUESTIONNAIRES: COPD: 0

## 2019-03-19 ASSESSMENT — MIFFLIN-ST. JEOR: SCORE: 890.62

## 2019-03-19 NOTE — OP NOTE
General Surgery Operative Note    PREOPERATIVE DIAGNOSIS:  Incisional Ventral Hernia and Cholelithiasis    POSTOPERATIVE DIAGNOSIS:  Incisional Ventral Hernia and Cholelithiasis, intra-abdominal adhesions    PROCEDURE: Laparoscopic cholecystectomy, laparoscopic lysis of adhesions, laparoscopic incisional hernia repair with mesh    ANESTHESIA:  General.    PREOPERATIVE MEDICATIONS: Cleocin IV    SURGEON:  Lamberto Magaña MD    ASSISTANT:  Angelo Nelson PA-C, Physician assistant first assistant was necessary during the performance of this procedure for expertise in patient positioning, prepping, draping, trocar placement, camera management, retraction and exposure, and suctioning.    INDICATIONS: This patient presented to my office with a symptomatic incisional hernia.  She was also complaining of intermittent right upper quadrant abdominal pain and an abdominal ultrasound revealed cholelithiasis.  Extensive discussion was undertaken regarding the procedure of cholecystectomy and incisional hernia repair.  The potential risks of bleeding, infection, bile duct or visceral injury were thoroughly reviewed.  All of the patient's questions were answered.  The patient wishes to proceed with the intended procedure.    PROCEDURE:  After informed consent was obtained the patient was taken to the operating suite and uneventfully endotracheally intubated.  The abdomen was prepped and draped in a sterile fashion.  Surgeon initiated timeout was acknowledged.   After infiltration with local anesthestic a curvilinear incision was made in the infraumbilical position.  Cutdown was taken to the abdominal fascia.  The fascia was divided with Metzenbaum scissors exposing the peritoneum.  Peritoneum was sharply opened and an 11 mm trocar was inserted.   A CO2 pneumoperitoneum was then created.  Two other trocars were placed under laparoscopic visualization following the infiltration of local anesthetic.  We elevated the liver and were  able to identify the gallbladder.  The gallbladder was grasped and used to elevate the liver further.  I began dissecting out some fatty adhesions down near the neck of the gallbladder until a cystic duct was encountered.  I continued the dissection using combination of sharp and blunt dissection until the cystic duct was largely dissected out.  I continued the dissection up along the sides of the gallbladder, both medially and laterally, until I had created a space between the gallbladder and the liver.  At this point, I encountered the cystic artery, just posterior and lateral to the cystic duct.  This again was dissected out.  Once I had created a window where only the cystic artery and duct were noted to be entering the gallbladder, I felt that this represented our critical view.  The cystic artery and duct were then doubly clipped and divided.  I continued the dissection up along the body of the gallbladder, freeing all attachments and adhesions of the gallbladder to the liver.  Gallbladder was removed from the liver in an atraumatic fashion.  The gallbladder was then placed in a retrieval pouch and removed from the abdomen.  The gallbladder fossa was reinspected, and all areas of bleeding were managed with electrocautery.  I irrigated the area with normal saline and aspirated it out.  I then reinspected the abdomen, and everything appeared to be in pristine condition.  The subxiphoid trocar had been placed through the incisional hernia.  This was closed primarily with a fascial closure device and a 0 Vicryl tie.  I examined the remainder of the abdomen.  The midline fascia had several areas of separation concerning for developing hernia.  I elected to reinforce the entire underside of this old incision with mesh.  In order to do this I selected a 10 x 15 cm Symbotex mesh. there were adhesions in the upper abdomen which were taken down with electrocautery.  There were also small bowel adhesions in the left  lower abdomen that were taken down with sharp scissors.  I placed 2 additional 5 mm trochars in the left lateral abdomen under direct vision.  The mesh was then inserted into the abdomen through the 11 mm port.  The 11 mm port was removed from the infraumbilical position and the fascia there was closed with 0 Vicryl suture.  The trocars were removed and carbon dioxide was massaged from the abdomen. Local anesthetic was injected. Fascia at the 11mm port site was closed with 0 vicryl suture.  The mesh was then suspended to the anterior abdominal wall thoroughly covering the old incision as well as the infraumbilical port site and the incisional hernia.  This was tacked into position with an absorbable tacking instrument.  With the mesh in good position it was elected to conclude the procedure.  Remaining trochars were removed and the carbon dioxide was massaged from the abdomen.  Incision sites were all then closed with 4-0 Vicryl suture.  Steri-Strips and dressings were applied.  Sponge needle instrument counts are correct.  Patient tolerated this well.  She was extubated in the operating room and taken to recovery in a stable condition.        ESTIMATED BLOOD LOSS: 10 cc      Lamberto Magaña MD

## 2019-03-19 NOTE — OR NURSING
Report called to Obs Care, Amy MALDONADO.  Pt to room via cart with NA in escort.  All belongings sent with pt.  Family sejale notified of transfer.

## 2019-03-19 NOTE — ANESTHESIA CARE TRANSFER NOTE
Patient: Kezia Dixon    Procedure(s):  LAPAROSCOPIC CHOLECYSTECTOMY  LAPARSCOPIC  INCISIONAL HERNIA REPAIR WITH MESH, LAPARSCOPIC LYSIS OF ADHENSIONS    Diagnosis: gallstones ; incisional hernia  Diagnosis Additional Information: No value filed.    Anesthesia Type:   General     Note:  Airway :Face Mask  Patient transferred to:PACU  Comments: Transferred to PACU, spontaneous respirations, 10L oxygen via facemask.  All monitors and alarms on and functioning, VSS.  Patient awake, comfortable.  Report to PACU RN.Handoff Report: Identifed the Patient, Identified the Reponsible Provider, Reviewed the pertinent medical history, Discussed the surgical course, Reviewed Intra-OP anesthesia mangement and issues during anesthesia, Set expectations for post-procedure period and Allowed opportunity for questions and acknowledgement of understanding      Vitals: (Last set prior to Anesthesia Care Transfer)    CRNA VITALS  3/19/2019 1116 - 3/19/2019 1155      3/19/2019             Pulse:  88    SpO2:  99 %    Resp Rate (set):  10                Electronically Signed By: STEVEN Monzon CRNA  March 19, 2019  11:55 AM

## 2019-03-19 NOTE — DISCHARGE INSTRUCTIONS
[facility]  Discharge Instructions: Post-Operative Laparoscopic Ventral Hernia    ACTIVITY    Take frequent, short walks and increase your activity gradually.      Avoid strenuous physical activity or heavy lifting greater than 15 lbs. for 4 weeks.  You may climb stairs.    You may drive without restrictions when you are not using any prescription pain medication and feel comfortable in a car.    You may return to work/school when you are comfortable without any prescription pain medication.     You may wear an abdominal binder for comfort for 2-3 weeks from your surgery.  You can wash the abdominal binder and dry it on low heat in the dryer.    WOUND CARE    You may remove your outer dressing or Band-Aids and shower 48 hours after the surgery.  Pat your incisions dry and leave them open to air.  Re-apply dressing (Band-Aids or gauze/tape) as needed for comfort or drainage.    You may have steri-strips (looks like white tape) on your incision.  You may peel off the steri-strips 2 weeks after your surgery if they have not peeled off on their own.     Do not soak your incisions in a tub or pool for 2 weeks.     Do not apply any lotions, creams, or ointments to your incisions.    A ridge under your incisions is normal and will gradually resolve.    DIET    Start with liquids, then gradually resume your regular diet as tolerated.     Drink plenty of fluids to stay hydrated.    PAIN    Expect some tenderness and discomfort at the incision site(s).  Use the prescribed pain medication/muscle relaxant at your discretion.  Expect gradual resolution of your pain over several days.    You may take ibuprofen with food (unless you have been told not to) instead of or in addition to your prescribed pain medication.  If you are taking Norco or Percocet, do not take any additional acetaminophen/APAP/Tylenol.    Do not drink alcohol or drive while you are taking pain medications.    You may  apply ice to your incisions in 20 minute intervals as needed for the next 48 hours.  After that time, consider switching to heat if you prefer.    EXPECTATIONS    Pain medications can cause constipation.  Limit use when possible.  Take over the counter stool softener/stimulant, such as Colace or Senna, 1-2 times a day with plenty of water.  You may take a mild over the counter laxative, such as Miralax or a suppository, as needed.  You may take 1 oz. (2 tablespoons) Milk of Magnesia the evening following surgery to encourage bowel movement.  You make discontinue these medications once you are having regular bowel movements and/or are no longer taking your narcotic pain medication.    You may have shoulder or upper back discomfort due to the gas used in surgery.  This is temporary and should resolve in 48-72 hours.  Short, frequent walks may help with this.    FOLLOW UP    Follow up with your surgeon in 2 weeks.  Please call our office at [phone] to schedule your appointment.  We are located at [address] [place] [address].    CALL OUR OFFICE [phone] IF YOU HAVE:     Chills or fever above 101 F.    Increased redness or drainage at your incisions.    Significant bleeding.    Pain not relieved by your pain medication or rest.    Increasing pain after the first 48 hours.    Any other concerns or questions.    Revised January 2018

## 2019-03-19 NOTE — BRIEF OP NOTE
[facility]    Brief Operative Note    Pre-operative diagnosis: gallstones ; incisional hernia  Post-operative diagnosis Incisional Ventral Hernia and Cholelithiasis, + Intraabdominal Adhesions  Procedure: Procedure(s):  LAPAROSCOPIC CHOLECYSTECTOMY  LAPARSCOPIC  INCISIONAL HERNIA REPAIR WITH MESH, LAPARSCOPIC LYSIS OF ADHENSIONS  Surgeon: Surgeon(s) and Role:     * Lamberto Magaña MD - Primary     * Angelo Nelson PA-C - Assisting  Anesthesia: General   Estimated blood loss: 10 mL  Drains: None  Specimens:   ID Type Source Tests Collected by Time Destination   A : Gallbladder and contents  Tissue Gallbladder and Contents SURGICAL PATHOLOGY EXAM Lamberto Magaña MD 3/19/2019 10:57 AM      Findings:   None.  Complications: None.  Implants: Symbotex [id number] mesh.  See Operative Report for full details.  No complications noted.

## 2019-03-19 NOTE — ANESTHESIA PREPROCEDURE EVALUATION
Anesthesia Pre-Procedure Evaluation    Patient: Kezai Dixon   MRN: [id number] : 1955          Preoperative Diagnosis: gallstones ; incisional hernia    Procedure(s):  LAPAROSCOPIC CHOLECYSTECTOMY  OPEN INCISIONAL HERNIA REPAIR WITH MESH    Past Medical History:   Diagnosis Date     Alcoholism (H) 10/14/2016     Benign essential hypertension 10/14/2016     Carotid artery disease (H)     mile plaque      Chemical dependency (H)     alcohol     Depression with anxiety      Esophageal cancer (H)      Esophageal cancer (H) 3/22/2016     Esophageal mass      GERD (gastroesophageal reflux disease)      Hx of pancreatitis      Hypercholesteremia      Hyperglycemia      Hyperlipemia      Impaired fasting glucose 10/14/2016     Impaired fasting glucose 10/14/2016     Nocturnal leg cramps      Pancreatic pseudocyst 10/14/2016     Pancreatic pseudocyst 10/14/2016     Pancreatitis     with pseudocyst     Pap smear with atypical squamous cells, cannot exclude high grade squamous intraepithelial lesion (ASC-H) 10/14/16    Colpo impression benign, ECC benign. Cotestin in 1 yr.     Shingles      Vasomotor rhinitis      Past Surgical History:   Procedure Laterality Date     AAA REPAIR      splenic artery aneurysm embolization     ABDOMEN SURGERY  2016     COLONOSCOPY  2013    Procedure: COMBINED COLONOSCOPY, SINGLE BIOPSY/POLYPECTOMY BY BIOPSY;  COLONOSCOPY (MAC);  Surgeon: Montana Rosa MD;  Location:  GI     COLONOSCOPY  2013     COLONOSCOPY N/A 10/4/2018    Procedure: COMBINED COLONOSCOPY, SINGLE OR MULTIPLE BIOPSY/POLYPECTOMY BY BIOPSY;  colonoscopy;  Surgeon: Gio Apodaca MD;  Location:  GI     ENT SURGERY       ESOPHAGOGASTRECTOMY N/A 3/22/2016    Procedure: ESOPHAGOGASTRECTOMY;  Surgeon: Alvin Riojas MD;  Location:  OR     ESOPHAGOSCOPY, GASTROSCOPY, DUODENOSCOPY (EGD), COMBINED N/A 2015    Procedure: COMBINED ENDOSCOPIC ULTRASOUND, ESOPHAGOSCOPY,  GASTROSCOPY, DUODENOSCOPY (EGD), FINE NEEDLE ASPIRATE/BIOPSY;  Surgeon: Danelle Michael MD;  Location:  GI     GASTROSTOMY, INSERT TUBE, COMBINED N/A 2/2/2016    Procedure: COMBINED GASTROSTOMY, INSERT TUBE (OPEN);  Surgeon: Alvin Riojas MD;  Location:  OR     GI SURGERY  12/22/2015     HAND SURGERY      right     INSERT PORT VASCULAR ACCESS N/A 12/28/2015    Procedure: INSERT PORT VASCULAR ACCESS;  Surgeon: Alvin Riojas MD;  Location:  OR     LOBECTOMY LUNG Right 10/16/2018    Procedure: LOBECTOMY LUNG;  Surgeon: Alvin Riojas MD;  Location:  OR     REMOVE PORT VASCULAR ACCESS Left 7/22/2016    Procedure: REMOVE PORT VASCULAR ACCESS;  Surgeon: Alvin Riojas MD;  Location:  OR     THORACOTOMY Right 10/16/2018    Procedure: REDO RIGHT THORACTOMY AND RIGHT LOWER LOBECTOMY, PLEURAL LYSIS;  Surgeon: Alvin Riojas MD;  Location:  OR     TONSILLECTOMY       VASCULAR SURGERY         Anesthesia Evaluation     . Pt has had prior anesthetic.     No history of anesthetic complications          ROS/MED HX    ENT/Pulmonary:      (-) asthma and COPD   Neurologic:       Cardiovascular:     (+) Dyslipidemia, hypertension----. : . . . :. .       METS/Exercise Tolerance:     Hematologic:         Musculoskeletal:         GI/Hepatic: Comment: esphageal cancer s/p resection    (+) GERD       Renal/Genitourinary:         Endo: Comment: Impaired fasting glucose        Psychiatric: Comment: etoh abuse        Infectious Disease:         Malignancy:         Other:    (+) H/O Chronic Pain,                        Physical Exam  Normal systems: dental    Airway   Mallampati: II  TM distance: >3 FB  Neck ROM: full    Dental     Cardiovascular   Rhythm and rate: regular      Pulmonary    breath sounds clear to auscultation            Lab Results   Component Value Date    WBC 6.5 03/11/2019    HGB 11.9 03/11/2019    HCT 36.2 03/11/2019     03/11/2019      "03/11/2019    POTASSIUM 4.2 03/11/2019    CHLORIDE 104 03/11/2019    CO2 28 03/11/2019    BUN 18 03/11/2019    CR 0.52 03/11/2019    GLC 99 03/11/2019    VICENTE 9.1 03/11/2019    PHOS 3.5 03/28/2016    MAG 2.1 03/28/2016    ALBUMIN 3.8 03/11/2019    PROTTOTAL 7.2 03/11/2019    ALT 14 03/11/2019    AST 15 03/11/2019    ALKPHOS 71 03/11/2019    BILITOTAL 0.3 03/11/2019    INR 1.01 10/16/2018       Preop Vitals  BP Readings from Last 3 Encounters:   03/19/19 139/54   03/11/19 128/64   02/20/19 90/50    Pulse Readings from Last 3 Encounters:   03/19/19 74   03/11/19 75   02/20/19 77      Resp Readings from Last 3 Encounters:   03/19/19 18   10/26/18 16   10/04/18 17    SpO2 Readings from Last 3 Encounters:   03/19/19 100%   03/11/19 100%   12/24/18 100%      Temp Readings from Last 1 Encounters:   03/19/19 36.1  C (96.9  F) (Oral)    Ht Readings from Last 1 Encounters:   03/19/19 1.549 m (5' 1\")      Wt Readings from Last 1 Encounters:   03/19/19 40.8 kg (90 lb)    Estimated body mass index is 17.01 kg/m  as calculated from the following:    Height as of this encounter: 1.549 m (5' 1\").    Weight as of this encounter: 40.8 kg (90 lb).       Anesthesia Plan      History & Physical Review  History and physical reviewed and following examination; no interval change.    ASA Status:  2 .    NPO Status:  > 8 hours    Plan for General and ETT   PONV prophylaxis:  Ondansetron (or other 5HT-3) and Dexamethasone or Solumedrol       Postoperative Care      Consents  Anesthetic plan, risks, benefits and alternatives discussed with:  Patient..                 Ankita Gonzalez  "

## 2019-03-19 NOTE — ANESTHESIA POSTPROCEDURE EVALUATION
Patient: Kezia Dixon    Procedure(s):  LAPAROSCOPIC CHOLECYSTECTOMY  LAPARSCOPIC  INCISIONAL HERNIA REPAIR WITH MESH, LAPARSCOPIC LYSIS OF ADHENSIONS    Diagnosis:gallstones ; incisional hernia  Diagnosis Additional Information: No value filed.    Anesthesia Type:  General    Note:  Anesthesia Post Evaluation    Patient location during evaluation: PACU  Patient participation: Able to fully participate in evaluation  Level of consciousness: awake, awake and alert and responsive to verbal stimuli  Pain management: adequate  Airway patency: patent  Cardiovascular status: acceptable  Respiratory status: acceptable  Hydration status: acceptable  PONV: none     Anesthetic complications: None          Last vitals:  Vitals:    03/19/19 1315 03/19/19 1348 03/19/19 1410   BP: 112/49 122/48 134/57   Pulse: 86     Resp: 12 14    Temp: 37.4  C (99.3  F) 35.8  C (96.4  F)    SpO2: 95% 96% 96%         Electronically Signed By: Ankita Gonzalez  March 19, 2019  2:30 PM

## 2019-03-19 NOTE — PLAN OF CARE
Pt arrived from PACU today. A/o VSS. Lap site CDI.  Abd binder on. Ice pack on. capno on. PCD on. Pain controlled. Tolerated some full liquid diet. IV infusing. Ambulated in the hallway. Tolerated well. Passing gas. Voided very small amount. Bladder scan for 153 mls. Pt will reattempt. Will continue to monitor.

## 2019-03-20 VITALS
HEART RATE: 78 BPM | WEIGHT: 90 LBS | HEIGHT: 61 IN | OXYGEN SATURATION: 96 % | BODY MASS INDEX: 16.99 KG/M2 | SYSTOLIC BLOOD PRESSURE: 138 MMHG | DIASTOLIC BLOOD PRESSURE: 57 MMHG | TEMPERATURE: 96.4 F | RESPIRATION RATE: 16 BRPM

## 2019-03-20 LAB — COPATH REPORT: NORMAL

## 2019-03-20 PROCEDURE — 25000132 ZZH RX MED GY IP 250 OP 250 PS 637: Performed by: PHYSICIAN ASSISTANT

## 2019-03-20 RX ORDER — OXYCODONE HYDROCHLORIDE 5 MG/1
5-10 TABLET ORAL
Qty: 30 TABLET | Refills: 0 | Status: SHIPPED | OUTPATIENT
Start: 2019-03-20 | End: 2019-03-25

## 2019-03-20 RX ADMIN — OMEPRAZOLE 40 MG: 20 CAPSULE, DELAYED RELEASE ORAL at 07:28

## 2019-03-20 RX ADMIN — OXYCODONE HYDROCHLORIDE 10 MG: 5 TABLET ORAL at 07:28

## 2019-03-20 RX ADMIN — OXYCODONE HYDROCHLORIDE 10 MG: 5 TABLET ORAL at 04:32

## 2019-03-20 RX ADMIN — OXYCODONE HYDROCHLORIDE 10 MG: 5 TABLET ORAL at 00:57

## 2019-03-20 RX ADMIN — HYDROCHLOROTHIAZIDE 12.5 MG: 12.5 CAPSULE ORAL at 07:28

## 2019-03-20 RX ADMIN — OXYCODONE HYDROCHLORIDE 10 MG: 5 TABLET ORAL at 10:24

## 2019-03-20 RX ADMIN — METOPROLOL TARTRATE 25 MG: 25 TABLET ORAL at 07:28

## 2019-03-20 NOTE — PLAN OF CARE
Pt a/o. VSS. No pain. Site CDI. Tolerating reg diet. Voiding. Ambulating. Ab paper work reviewed with pt. Verbalizes understanding. PIV removed. Take home meds given. Verified 5 rights of med. All questions answered. Follow up aware. Dc home with family

## 2019-03-20 NOTE — PLAN OF CARE
A&Ox4. VSS.  Room air.  Reports of upper abd pain, utilizing 10mg of PRN oxy q3h.  Lap sitesx5, c/d/I.  Voiding small amounts, baseline per patient.  Bladder scans done.  Good oral hydration.  Snack during the night.  PIV SL.  Up with SBA.  Plan to discharge today.

## 2019-03-20 NOTE — PROGRESS NOTES
Doing well  Pain control adequate  Tolerating diet, no nausea  Up ambulating  Af vss  abd soft    A/p  discharge home

## 2019-03-25 DIAGNOSIS — K43.2 INCISIONAL HERNIA WITHOUT OBSTRUCTION OR GANGRENE: ICD-10-CM

## 2019-03-25 DIAGNOSIS — K80.20 CALCULUS OF GALLBLADDER WITHOUT CHOLECYSTITIS WITHOUT OBSTRUCTION: ICD-10-CM

## 2019-03-25 RX ORDER — OXYCODONE HYDROCHLORIDE 5 MG/1
5-10 TABLET ORAL EVERY 6 HOURS PRN
Qty: 20 TABLET | Refills: 0 | Status: SHIPPED | OUTPATIENT
Start: 2019-03-25 | End: 2020-02-05

## 2019-03-25 NOTE — TELEPHONE ENCOUNTER
Patient had laparoscopic cholecystectomy, incisional hernia repair and laparoscopic lysis of adhesions on 3/19/19 with Dr. Magaña.    She is calling to request additional narcotic pain medications.    She was given #30 oxycodone.      She has been utilizing ibuprofen every 4 hours and she has tried extra strength tylenol.    She has been having bowel movements.    #20 additional will be provided via Verbal Order from Che Scott PA-C.    Patient or her , Fuentes, will .    If patient needs any additional narcotics after this fill, she should schedule a post operative visit with Dr. Magaña to be evaluated.    Antonia Rubi RN

## 2019-03-25 NOTE — TELEPHONE ENCOUNTER
Name of caller: Patient    Reason for Call:  questions    Surgeon:  Dr. Magaña     Recent Surgery:  Yes.    If yes, when & what type:  Lap demetrio  03/19/19      Best phone number to reach pt at is: [phone]  Ok to leave a message with medical info? Yes.    Pharmacy preferred (if calling for a refill): [facility]

## 2019-04-01 ENCOUNTER — OFFICE VISIT (OUTPATIENT)
Dept: SURGERY | Facility: CLINIC | Age: 64
End: 2019-04-01
Payer: COMMERCIAL

## 2019-04-01 DIAGNOSIS — Z09 SURGERY FOLLOW-UP EXAMINATION: Primary | ICD-10-CM

## 2019-04-01 PROCEDURE — 99024 POSTOP FOLLOW-UP VISIT: CPT | Performed by: SURGERY

## 2019-04-01 NOTE — PROGRESS NOTES
Patient returns in follow-up after recent laparoscopic cholecystectomy incisional hernia repair with mesh.  Reports that overall she is feeling quite well.  Her mobility is improving on a daily basis.  She has been having some mild loose stools.  She denies any nausea or vomiting.  She denies any fevers or chills.  She does report some mild discomfort in the right upper quadrant.  She is no longer having the stabbing pains that were likely related to biliary colic.    On examination: Her incisions are abdomen overall is benign.  No evidence of hernia recurrence.    Overall doing well.  We discussed advancement of activity.  Questions were all answered.  She may follow-up on an as-needed basis.    Please route or send letter to:  Primary Care Provider (PCP) and Referring Provider

## 2019-04-02 DIAGNOSIS — J31.0 CHRONIC RHINITIS: ICD-10-CM

## 2019-04-02 NOTE — TELEPHONE ENCOUNTER
"Last Written Prescription Date:  1/08/19  Last Fill Quantity: 16 mL,  # refills: 3   Last office visit: 3/11/2019 with prescribing provider:  Christen   Future Office Visit:      Requested Prescriptions   Pending Prescriptions Disp Refills     fluticasone (FLONASE) 50 MCG/ACT nasal spray [Pharmacy Med Name: FLUTICASONE PROP 50 MCG SPRAY] 16 mL 0     Sig: SPRAY 2 SPRAYS INTO BOTH NOSTRILS DAILY    Inhaled Steroids Protocol Passed - 4/2/2019  8:07 AM       Passed - Patient is age 12 or older       Passed - Recent (12 mo) or future (30 days) visit within the authorizing provider's specialty    Patient had office visit in the last 12 months or has a visit in the next 30 days with authorizing provider or within the authorizing provider's specialty.  See \"Patient Info\" tab in inbasket, or \"Choose Columns\" in Meds & Orders section of the refill encounter.             Passed - Medication is active on med list          "

## 2019-04-04 RX ORDER — FLUTICASONE PROPIONATE 50 MCG
SPRAY, SUSPENSION (ML) NASAL
Qty: 16 ML | Refills: 3 | Status: SHIPPED | OUTPATIENT
Start: 2019-04-04 | End: 2019-06-26

## 2019-04-04 NOTE — TELEPHONE ENCOUNTER
Prescription approved per [facility] Refill Protocol.    Jim DELEON RN

## 2019-04-05 ENCOUNTER — TELEPHONE (OUTPATIENT)
Dept: SURGERY | Facility: CLINIC | Age: 64
End: 2019-04-05

## 2019-04-05 DIAGNOSIS — K91.89 POSTCHOLECYSTECTOMY DIARRHEA: Primary | ICD-10-CM

## 2019-04-05 DIAGNOSIS — R19.7 POSTCHOLECYSTECTOMY DIARRHEA: Primary | ICD-10-CM

## 2019-04-05 RX ORDER — CHOLESTYRAMINE 4 G/9G
1 POWDER, FOR SUSPENSION ORAL
Qty: 90 PACKET | Refills: 1 | Status: SHIPPED | OUTPATIENT
Start: 2019-04-05 | End: 2020-02-05

## 2019-04-05 NOTE — TELEPHONE ENCOUNTER
"Patient s/p laparoscopic cholecystectomy and laparoscopic incisional hernia repair with mesh, laparoscopic lysis of adhesions on 3/19/19 with Dr. Magaña.    She is still experiencing diarrhea daily if she does not use imodium.  When she uses imodium she get terrible gas pains.    She is leaving for vacation in a few days and is really wanting to get the situation \"under control\" before then.    Spoke with Cheryl Halsted, PA-C and cholestyramine will be sent to her pharmacy of choice.    Encouraged her to call with any further questions or concerns or if the cholestyramine is ineffective.    She agreed.    Antonia Rubi RN  "

## 2019-04-12 DIAGNOSIS — I10 BENIGN ESSENTIAL HYPERTENSION: ICD-10-CM

## 2019-04-12 RX ORDER — HYDROCHLOROTHIAZIDE 12.5 MG/1
TABLET ORAL
Qty: 90 TABLET | Refills: 1 | Status: SHIPPED | OUTPATIENT
Start: 2019-04-12 | End: 2019-10-11

## 2019-04-12 RX ORDER — METOPROLOL TARTRATE 25 MG/1
TABLET, FILM COATED ORAL
Qty: 180 TABLET | Refills: 1 | Status: SHIPPED | OUTPATIENT
Start: 2019-04-12 | End: 2019-10-07

## 2019-04-12 NOTE — TELEPHONE ENCOUNTER
"Metoprolol tartrate   Last Written Prescription Date:  10/25/2018  Last Fill Quantity: 180,  # refills: 1   Last office visit: 3/11/2019 with prescribing provider:  Christen Escobar Office Visit:      Hydrochlorothiazide  Last Written Prescription Date:  10/25/2018  Last Fill Quantity: 90,  # refills: 1   Last office visit: 3/11/2019 with prescribing provider:  Christen Escobar Office Visit:      Requested Prescriptions   Pending Prescriptions Disp Refills     hydrochlorothiazide (HYDRODIURIL) 12.5 MG tablet [Pharmacy Med Name: HYDROCHLOROTHIAZIDE 12.5 MG TB] 90 tablet 1     Sig: TAKE 1 TABLET (12.5 MG) BY MOUTH DAILY       Diuretics (Including Combos) Protocol Passed - 4/12/2019  1:05 AM        Passed - Blood pressure under 140/90 in past 12 months     BP Readings from Last 3 Encounters:   03/20/19 138/57   03/11/19 128/64   02/20/19 90/50                 Passed - Recent (12 mo) or future (30 days) visit within the authorizing provider's specialty     Patient had office visit in the last 12 months or has a visit in the next 30 days with authorizing provider or within the authorizing provider's specialty.  See \"Patient Info\" tab in inbasket, or \"Choose Columns\" in Meds & Orders section of the refill encounter.              Passed - Medication is active on med list        Passed - Patient is age 18 or older        Passed - No active pregancy on record        Passed - Normal serum creatinine on file in past 12 months     Recent Labs   Lab Test 03/11/19  1141   CR 0.52              Passed - Normal serum potassium on file in past 12 months     Recent Labs   Lab Test 03/11/19  1141   POTASSIUM 4.2                    Passed - Normal serum sodium on file in past 12 months     Recent Labs   Lab Test 03/11/19  1141                 Passed - No positive pregnancy test in past 12 months        metoprolol tartrate (LOPRESSOR) 25 MG tablet [Pharmacy Med Name: METOPROLOL TARTRATE 25 MG TAB] 180 tablet 1     Sig: TAKE 1 TABLET " "(25 MG) BY MOUTH 2 TIMES DAILY       Beta-Blockers Protocol Passed - 4/12/2019  1:05 AM        Passed - Blood pressure under 140/90 in past 12 months     BP Readings from Last 3 Encounters:   03/20/19 138/57   03/11/19 128/64   02/20/19 90/50                 Passed - Patient is age 6 or older        Passed - Recent (12 mo) or future (30 days) visit within the authorizing provider's specialty     Patient had office visit in the last 12 months or has a visit in the next 30 days with authorizing provider or within the authorizing provider's specialty.  See \"Patient Info\" tab in inbasket, or \"Choose Columns\" in Meds & Orders section of the refill encounter.              Passed - Medication is active on med list            "

## 2019-06-26 DIAGNOSIS — J31.0 CHRONIC RHINITIS: ICD-10-CM

## 2019-06-27 NOTE — TELEPHONE ENCOUNTER
"Last Written Prescription Date:  4/04/19  Last Fill Quantity: 16 mL,  # refills: 3   Last office visit: 3/11/2019 with prescribing provider:  Christen   Future Office Visit:      Requested Prescriptions   Pending Prescriptions Disp Refills     fluticasone (FLONASE) 50 MCG/ACT nasal spray [Pharmacy Med Name: FLUTICASONE PROP 50 MCG SPRAY] 48 mL 1     Sig: SPRAY 2 SPRAYS INTO BOTH NOSTRILS DAILY       Inhaled Steroids Protocol Passed - 6/26/2019 10:00 AM        Passed - Patient is age 12 or older        Passed - Recent (12 mo) or future (30 days) visit within the authorizing provider's specialty     Patient had office visit in the last 12 months or has a visit in the next 30 days with authorizing provider or within the authorizing provider's specialty.  See \"Patient Info\" tab in inbasket, or \"Choose Columns\" in Meds & Orders section of the refill encounter.              Passed - Medication is active on med list          "

## 2019-06-28 RX ORDER — FLUTICASONE PROPIONATE 50 MCG
SPRAY, SUSPENSION (ML) NASAL
Qty: 48 ML | Refills: 1 | Status: SHIPPED | OUTPATIENT
Start: 2019-06-28 | End: 2019-12-23

## 2019-06-28 NOTE — TELEPHONE ENCOUNTER
Prescription approved per [facility] Refill Protocol.  Dawn BENITES RN

## 2019-07-22 ENCOUNTER — TRANSFERRED RECORDS (OUTPATIENT)
Dept: HEALTH INFORMATION MANAGEMENT | Facility: CLINIC | Age: 64
End: 2019-07-22

## 2019-09-30 ENCOUNTER — HEALTH MAINTENANCE LETTER (OUTPATIENT)
Age: 64
End: 2019-09-30

## 2019-10-07 DIAGNOSIS — I10 BENIGN ESSENTIAL HYPERTENSION: ICD-10-CM

## 2019-10-07 DIAGNOSIS — B02.29 NEURALGIA, POST-HERPETIC: ICD-10-CM

## 2019-10-07 NOTE — TELEPHONE ENCOUNTER
"metoprolol tartrate (LOPRESSOR) 25 MG tablet 180 tablet 1 4/12/2019     Last Written Prescription Date:  4/12/2019  Last Fill Quantity: 180,  # refills: 1   Last office visit: 3/11/2019 with prescribing provider: HARSHA Velásquez   Future Office Visit: Unknown    Requested Prescriptions   Pending Prescriptions Disp Refills     metoprolol tartrate (LOPRESSOR) 25 MG tablet [Pharmacy Med Name: METOPROLOL TARTRATE 25 MG TAB] 180 tablet 1     Sig: TAKE 1 TABLET (25 MG) BY MOUTH 2 TIMES DAILY       Beta-Blockers Protocol Passed - 10/7/2019  1:35 AM        Passed - Blood pressure under 140/90 in past 12 months     BP Readings from Last 3 Encounters:   03/20/19 138/57   03/11/19 128/64   02/20/19 90/50                 Passed - Patient is age 6 or older        Passed - Recent (12 mo) or future (30 days) visit within the authorizing provider's specialty     Patient has had an office visit with the authorizing provider or a provider within the authorizing providers department within the previous 12 mos or has a future within next 30 days. See \"Patient Info\" tab in inbasket, or \"Choose Columns\" in Meds & Orders section of the refill encounter.              Passed - Medication is active on med list          "

## 2019-10-07 NOTE — TELEPHONE ENCOUNTER
"amitriptyline (ELAVIL) 50 MG tablet 90 tablet 3 12/24/2018     Last Written Prescription Date:  12/24/2018  Last Fill Quantity: 90,  # refills: 3   Last office visit: 3/11/2019 with prescribing provider: HARSHA Velásquez    Future Office Visit: Unknown    Requested Prescriptions   Pending Prescriptions Disp Refills     amitriptyline (ELAVIL) 50 MG tablet [Pharmacy Med Name: AMITRIPTYLINE HCL 50 MG TAB] 90 tablet 3     Sig: TAKE 1 TABLET (50 MG) BY MOUTH AT BEDTIME       Tricyclic Agents ( Annual appt and no PHQ9) Passed - 10/7/2019  1:35 AM        Passed - Blood Pressure under 140/90 in past 12 mos     BP Readings from Last 3 Encounters:   03/20/19 138/57   03/11/19 128/64   02/20/19 90/50                 Passed - Recent (12 mo) or future (30 days) visit within authorizing provider's specialty     Patient has had an office visit with the authorizing provider or a provider within the authorizing providers department within the previous 12 mos or has a future within next 30 days. See \"Patient Info\" tab in inbasket, or \"Choose Columns\" in Meds & Orders section of the refill encounter.              Passed - Medication is active on med list        Passed - Patient is age 18 or older        Passed - Patient is not pregnant        Passed - No positive pregnancy test on record in past 12 mos          "

## 2019-10-08 RX ORDER — AMITRIPTYLINE HYDROCHLORIDE 50 MG/1
50 TABLET ORAL AT BEDTIME
Qty: 90 TABLET | Refills: 1 | Status: SHIPPED | OUTPATIENT
Start: 2019-10-08 | End: 2020-05-18

## 2019-10-08 RX ORDER — METOPROLOL TARTRATE 25 MG/1
TABLET, FILM COATED ORAL
Qty: 180 TABLET | Refills: 1 | Status: SHIPPED | OUTPATIENT
Start: 2019-10-08 | End: 2020-04-06

## 2019-10-08 NOTE — TELEPHONE ENCOUNTER
Prescription approved per [facility] Refill Protocol.    Jim DELEON RN

## 2019-10-11 DIAGNOSIS — I10 BENIGN ESSENTIAL HYPERTENSION: ICD-10-CM

## 2019-10-11 RX ORDER — HYDROCHLOROTHIAZIDE 12.5 MG/1
TABLET ORAL
Qty: 30 TABLET | Refills: 0 | Status: SHIPPED | OUTPATIENT
Start: 2019-10-11 | End: 2019-11-03

## 2019-10-11 NOTE — TELEPHONE ENCOUNTER
Medication is being filled for 1 time refill only due to:  due for an apt.     Albina Guzman RN      Return in about 6 months (around 9/11/2019) for Preventive Visit

## 2019-10-11 NOTE — TELEPHONE ENCOUNTER
"  hydrochlorothiazide (HYDRODIURIL) 12.5 MG tablet 90 tablet 1 4/12/2019         Last Written Prescription Date:  04/12/2019  Last Fill Quantity: 90,  # refills: 1   Last office visit: 3/11/2019 with prescribing provider:     Future Office Visit:  Unknown    Requested Prescriptions   Pending Prescriptions Disp Refills     hydrochlorothiazide (HYDRODIURIL) 12.5 MG tablet [Pharmacy Med Name: HYDROCHLOROTHIAZIDE 12.5 MG TB] 90 tablet 1     Sig: TAKE 1 TABLET BY MOUTH EVERY DAY       Diuretics (Including Combos) Protocol Passed - 10/11/2019  1:32 AM        Passed - Blood pressure under 140/90 in past 12 months     BP Readings from Last 3 Encounters:   03/20/19 138/57   03/11/19 128/64   02/20/19 90/50                 Passed - Recent (12 mo) or future (30 days) visit within the authorizing provider's specialty     Patient has had an office visit with the authorizing provider or a provider within the authorizing providers department within the previous 12 mos or has a future within next 30 days. See \"Patient Info\" tab in inbasket, or \"Choose Columns\" in Meds & Orders section of the refill encounter.              Passed - Medication is active on med list        Passed - Patient is age 18 or older        Passed - No active pregancy on record        Passed - Normal serum creatinine on file in past 12 months     Recent Labs   Lab Test 03/11/19  1141   CR 0.52              Passed - Normal serum potassium on file in past 12 months     Recent Labs   Lab Test 03/11/19  1141   POTASSIUM 4.2                    Passed - Normal serum sodium on file in past 12 months     Recent Labs   Lab Test 03/11/19  1141                 Passed - No positive pregnancy test in past 12 months          "

## 2019-11-03 DIAGNOSIS — I10 BENIGN ESSENTIAL HYPERTENSION: ICD-10-CM

## 2019-11-05 RX ORDER — HYDROCHLOROTHIAZIDE 12.5 MG/1
TABLET ORAL
Qty: 90 TABLET | Refills: 0 | Status: SHIPPED | OUTPATIENT
Start: 2019-11-05 | End: 2020-02-12

## 2019-11-05 NOTE — TELEPHONE ENCOUNTER
"hydrochlorothiazide (HYDRODIURIL) 12.5 MG tablet 30 tablet 0 10/11/2019     Last Written Prescription Date:  10/11/2019  Last Fill Quantity: 30,  # refills: 0   Last office visit: 3/11/2019 with prescribing provider: HARSHA Velásquez    Future Office Visit: Unknown     Requested Prescriptions   Pending Prescriptions Disp Refills     hydrochlorothiazide (HYDRODIURIL) 12.5 MG tablet [Pharmacy Med Name: HYDROCHLOROTHIAZIDE 12.5 MG TB] 30 tablet 0     Sig: TAKE 1 TABLET BY MOUTH EVERY DAY       Diuretics (Including Combos) Protocol Passed - 11/3/2019  7:32 AM        Passed - Blood pressure under 140/90 in past 12 months     BP Readings from Last 3 Encounters:   03/20/19 138/57   03/11/19 128/64   02/20/19 90/50                 Passed - Recent (12 mo) or future (30 days) visit within the authorizing provider's specialty     Patient has had an office visit with the authorizing provider or a provider within the authorizing providers department within the previous 12 mos or has a future within next 30 days. See \"Patient Info\" tab in inbasket, or \"Choose Columns\" in Meds & Orders section of the refill encounter.              Passed - Medication is active on med list        Passed - Patient is age 18 or older        Passed - No active pregancy on record        Passed - Normal serum creatinine on file in past 12 months     Recent Labs   Lab Test 03/11/19  1141   CR 0.52              Passed - Normal serum potassium on file in past 12 months     Recent Labs   Lab Test 03/11/19  1141   POTASSIUM 4.2                    Passed - Normal serum sodium on file in past 12 months     Recent Labs   Lab Test 03/11/19  1141                 Passed - No positive pregnancy test in past 12 months          "

## 2019-11-05 NOTE — TELEPHONE ENCOUNTER
One month supply sent 10/11/19  Due for OV.  SNADEChart message sent asking patient to schedule an appointment.    Cinthia Aranda RN

## 2019-11-11 DIAGNOSIS — I10 BENIGN ESSENTIAL HYPERTENSION: ICD-10-CM

## 2019-11-11 NOTE — TELEPHONE ENCOUNTER
"omeprazole (PRILOSEC) 40 MG DR capsule    Last Written Prescription Date:  11/21/2018  Last Fill Quantity: 90,  # refills: 3   Last office visit: 3/11/2019 with prescribing provider:  Christen   Future Office Visit:  Unknown     Requested Prescriptions   Pending Prescriptions Disp Refills     omeprazole (PRILOSEC) 40 MG DR capsule [Pharmacy Med Name: OMEPRAZOLE DR 40 MG CAPSULE] 90 capsule 3     Sig: TAKE 1 CAPSULE (40 MG) BY MOUTH DAILY       PPI Protocol Passed - 11/11/2019  1:40 AM        Passed - Not on Clopidogrel (unless Pantoprazole ordered)        Passed - No diagnosis of osteoporosis on record        Passed - Recent (12 mo) or future (30 days) visit within the authorizing provider's specialty     Patient has had an office visit with the authorizing provider or a provider within the authorizing providers department within the previous 12 mos or has a future within next 30 days. See \"Patient Info\" tab in inbasket, or \"Choose Columns\" in Meds & Orders section of the refill encounter.              Passed - Medication is active on med list        Passed - Patient is age 18 or older        Passed - No active pregnacy on record        Passed - No positive pregnancy test in past 12 months          "

## 2019-11-12 RX ORDER — OMEPRAZOLE 40 MG/1
CAPSULE, DELAYED RELEASE ORAL
Qty: 90 CAPSULE | Refills: 1 | Status: SHIPPED | OUTPATIENT
Start: 2019-11-12 | End: 2020-05-07

## 2019-12-22 DIAGNOSIS — J31.0 CHRONIC RHINITIS: ICD-10-CM

## 2019-12-23 RX ORDER — FLUTICASONE PROPIONATE 50 MCG
SPRAY, SUSPENSION (ML) NASAL
Qty: 48 ML | Refills: 0 | Status: SHIPPED | OUTPATIENT
Start: 2019-12-23 | End: 2020-03-19

## 2019-12-23 NOTE — TELEPHONE ENCOUNTER
"fluticasone (FLONASE) 50 MCG/ACT nasal spray    Last Written Prescription Date:  06/28/2019  Last Fill Quantity: 48 mL,  # refills: 1   Last office visit: 3/11/2019 with prescribing provider:  Christen   Future Office Visit:   Next 5 appointments (look out 90 days)    Feb 05, 2020  8:00 AM CST  PHYSICAL with Mustapha Velásquez MD  [facility] ([facility] [address] [facility] [id number] [phone]           Requested Prescriptions   Pending Prescriptions Disp Refills     fluticasone (FLONASE) 50 MCG/ACT nasal spray [Pharmacy Med Name: FLUTICASONE PROP 50 MCG SPRAY] 48 mL 1     Sig: SPRAY 2 SPRAYS INTO BOTH NOSTRILS DAILY       Inhaled Steroids Protocol Passed - 12/22/2019 11:29 AM        Passed - Patient is age 12 or older        Passed - Recent (12 mo) or future (30 days) visit within the authorizing provider's specialty     Patient has had an office visit with the authorizing provider or a provider within the authorizing providers department within the previous 12 mos or has a future within next 30 days. See \"Patient Info\" tab in inbasket, or \"Choose Columns\" in Meds & Orders section of the refill encounter.              Passed - Medication is active on med list          "

## 2019-12-23 NOTE — TELEPHONE ENCOUNTER
[place] [facility] [place].  Dawn BENITES RN

## 2020-01-06 DIAGNOSIS — B02.29 NEURALGIA, POST-HERPETIC: ICD-10-CM

## 2020-01-06 NOTE — TELEPHONE ENCOUNTER
"Last Written Prescription Date:  12/24/18  Last Fill Quantity: 90,  # refills: 3   Last office visit: 3/11/2019 with prescribing provider:     Future Office Visit:   Next 5 appointments (look out 90 days)    Feb 05, 2020  8:00 AM CST  [facility]YSICAL with Mustapha [facility]  [facility] [address] [facility] [id number] [phone]         Requested Prescriptions   Pending Prescriptions Disp Refills     DULoxetine (CYMBALTA) 60 MG capsule [Pharmacy Med Name: DULOXETINE HCL DR 60 MG CAP] 90 capsule 3     Sig: TAKE 1 CAPSULE BY MOUTH EVERY DAY       Serotonin-Norepinephrine Reuptake Inhibitors  Passed - 1/6/2020  1:37 AM        Passed - Blood pressure under 140/90 in past 12 months     BP Readings from Last 3 Encounters:   03/20/19 138/57   03/11/19 128/64   02/20/19 90/50                 Passed - Recent (12 mo) or future (30 days) visit within the authorizing provider's specialty     Patient has had an office visit with the authorizing provider or a provider within the authorizing providers department within the previous 12 mos or has a future within next 30 days. See \"Patient Info\" tab in inbasket, or \"Choose Columns\" in Meds & Orders section of the refill encounter.              Passed - Medication is active on med list        Passed - Patient is age 18 or older        Passed - No active pregnancy on record        Passed - No positive pregnancy test in past 12 months          "

## 2020-01-07 RX ORDER — DULOXETIN HYDROCHLORIDE 60 MG/1
CAPSULE, DELAYED RELEASE ORAL
Qty: 90 CAPSULE | Refills: 0 | Status: SHIPPED | OUTPATIENT
Start: 2020-01-07 | End: 2020-04-03

## 2020-01-07 NOTE — TELEPHONE ENCOUNTER
Prescription approved per [facility] Refill Protocol.  Dawn BENITES RN

## 2020-02-02 DIAGNOSIS — I10 BENIGN ESSENTIAL HYPERTENSION: ICD-10-CM

## 2020-02-03 NOTE — TELEPHONE ENCOUNTER
"hydrochlorothiazide (HYDRODIURIL) 12.5 MG tablet    Last Written Prescription Date:  11/5/2019  Last Fill Quantity: 90,  # refills: 0   Last office visit: 3/11/2019 with prescribing provider:  Dr. Velásquez    Future Office Visit:   Next 5 appointments (look out 90 days)    Feb 05, 2020  8:00 AM CST  PHYSICAL with Mustapha Velásquez MD  [facility] ([facility] [address] [facility] [address] [phone]        Requested Prescriptions   Pending Prescriptions Disp Refills     hydrochlorothiazide (HYDRODIURIL) 12.5 MG tablet [Pharmacy Med Name: HYDROCHLOROTHIAZIDE 12.5 MG TB] 90 tablet 0     Sig: TAKE 1 TABLET BY MOUTH EVERY DAY       Diuretics (Including Combos) Protocol Passed - 2/2/2020  1:21 PM        Passed - Blood pressure under 140/90 in past 12 months     BP Readings from Last 3 Encounters:   03/20/19 138/57   03/11/19 128/64   02/20/19 90/50                 Passed - Recent (12 mo) or future (30 days) visit within the authorizing provider's specialty     Patient has had an office visit with the authorizing provider or a provider within the authorizing providers department within the previous 12 mos or has a future within next 30 days. See \"Patient Info\" tab in inbasket, or \"Choose Columns\" in Meds & Orders section of the refill encounter.              Passed - Medication is active on med list        Passed - Patient is age 18 or older        Passed - No active pregancy on record        Passed - Normal serum creatinine on file in past 12 months     Recent Labs   Lab Test 03/11/19  1141   CR 0.52              Passed - Normal serum potassium on file in past 12 months     Recent Labs   Lab Test 03/11/19  1141   POTASSIUM 4.2                    Passed - Normal serum sodium on file in past 12 months     Recent Labs   Lab Test 03/11/19  1141                 Passed - No positive pregnancy test in past 12 months          "

## 2020-02-04 RX ORDER — HYDROCHLOROTHIAZIDE 12.5 MG/1
TABLET ORAL
Start: 2020-02-04

## 2020-02-04 NOTE — TELEPHONE ENCOUNTER
"Will be addressed at  Tomorrow's OV.  Cinthia Aranda RN    Requested Prescriptions   Pending Prescriptions Disp Refills     hydrochlorothiazide (HYDRODIURIL) 12.5 MG tablet [Pharmacy Med Name: HYDROCHLOROTHIAZIDE 12.5 MG TB] 90 tablet 0     Sig: TAKE 1 TABLET BY MOUTH EVERY DAY       Diuretics (Including Combos) Protocol Passed - 2/3/2020 11:18 AM        Passed - Blood pressure under 140/90 in past 12 months     BP Readings from Last 3 Encounters:   03/20/19 138/57   03/11/19 128/64   02/20/19 90/50                 Passed - Recent (12 mo) or future (30 days) visit within the authorizing provider's specialty     Patient has had an office visit with the authorizing provider or a provider within the authorizing providers department within the previous 12 mos or has a future within next 30 days. See \"Patient Info\" tab in inbasket, or \"Choose Columns\" in Meds & Orders section of the refill encounter.              Passed - Medication is active on med list        Passed - Patient is age 18 or older        Passed - No active pregancy on record        Passed - Normal serum creatinine on file in past 12 months     Recent Labs   Lab Test 03/11/19  1141   CR 0.52              Passed - Normal serum potassium on file in past 12 months     Recent Labs   Lab Test 03/11/19  1141   POTASSIUM 4.2                    Passed - Normal serum sodium on file in past 12 months     Recent Labs   Lab Test 03/11/19  1141                 Passed - No positive pregnancy test in past 12 months        "

## 2020-02-05 ENCOUNTER — OFFICE VISIT (OUTPATIENT)
Dept: FAMILY MEDICINE | Facility: CLINIC | Age: 65
End: 2020-02-05
Payer: MEDICARE

## 2020-02-05 VITALS
HEART RATE: 72 BPM | SYSTOLIC BLOOD PRESSURE: 126 MMHG | DIASTOLIC BLOOD PRESSURE: 72 MMHG | HEIGHT: 62 IN | TEMPERATURE: 97.6 F | WEIGHT: 88.9 LBS | BODY MASS INDEX: 16.36 KG/M2 | OXYGEN SATURATION: 100 %

## 2020-02-05 DIAGNOSIS — Z23 NEED FOR VACCINATION: ICD-10-CM

## 2020-02-05 DIAGNOSIS — Z12.31 VISIT FOR SCREENING MAMMOGRAM: ICD-10-CM

## 2020-02-05 DIAGNOSIS — E44.1 MILD PROTEIN-CALORIE MALNUTRITION (H): ICD-10-CM

## 2020-02-05 DIAGNOSIS — Z00.00 ROUTINE GENERAL MEDICAL EXAMINATION AT A HEALTH CARE FACILITY: Primary | ICD-10-CM

## 2020-02-05 DIAGNOSIS — C15.9 MALIGNANT NEOPLASM OF ESOPHAGUS, UNSPECIFIED LOCATION (H): ICD-10-CM

## 2020-02-05 DIAGNOSIS — I10 BENIGN ESSENTIAL HYPERTENSION: ICD-10-CM

## 2020-02-05 DIAGNOSIS — C34.91 MALIGNANT NEOPLASM OF RIGHT LUNG, UNSPECIFIED PART OF LUNG (H): ICD-10-CM

## 2020-02-05 DIAGNOSIS — R73.01 IMPAIRED FASTING GLUCOSE: ICD-10-CM

## 2020-02-05 LAB
ALBUMIN SERPL-MCNC: 3.6 G/DL (ref 3.4–5)
ALP SERPL-CCNC: 57 U/L (ref 40–150)
ALT SERPL W P-5'-P-CCNC: 22 U/L (ref 0–50)
ANION GAP SERPL CALCULATED.3IONS-SCNC: 7 MMOL/L (ref 3–14)
AST SERPL W P-5'-P-CCNC: 19 U/L (ref 0–45)
BILIRUB SERPL-MCNC: 0.3 MG/DL (ref 0.2–1.3)
BUN SERPL-MCNC: 13 MG/DL (ref 7–30)
CALCIUM SERPL-MCNC: 8.9 MG/DL (ref 8.5–10.1)
CHLORIDE SERPL-SCNC: 99 MMOL/L (ref 94–109)
CHOLEST SERPL-MCNC: 155 MG/DL
CO2 SERPL-SCNC: 28 MMOL/L (ref 20–32)
CREAT SERPL-MCNC: 0.51 MG/DL (ref 0.52–1.04)
ERYTHROCYTE [DISTWIDTH] IN BLOOD BY AUTOMATED COUNT: 12.7 % (ref 10–15)
GFR SERPL CREATININE-BSD FRML MDRD: >90 ML/MIN/{1.73_M2}
GLUCOSE SERPL-MCNC: 95 MG/DL (ref 70–99)
HBA1C MFR BLD: 5.4 % (ref 0–5.6)
HCT VFR BLD AUTO: 35.7 % (ref 35–47)
HDLC SERPL-MCNC: 93 MG/DL
HGB BLD-MCNC: 12.3 G/DL (ref 11.7–15.7)
LDLC SERPL CALC-MCNC: 42 MG/DL
MCH RBC QN AUTO: 32.6 PG (ref 26.5–33)
MCHC RBC AUTO-ENTMCNC: 34.5 G/DL (ref 31.5–36.5)
MCV RBC AUTO: 95 FL (ref 78–100)
NONHDLC SERPL-MCNC: 62 MG/DL
PLATELET # BLD AUTO: 242 10E9/L (ref 150–450)
POTASSIUM SERPL-SCNC: 4.4 MMOL/L (ref 3.4–5.3)
PROT SERPL-MCNC: 7.1 G/DL (ref 6.8–8.8)
RBC # BLD AUTO: 3.77 10E12/L (ref 3.8–5.2)
SODIUM SERPL-SCNC: 134 MMOL/L (ref 133–144)
TRIGL SERPL-MCNC: 98 MG/DL
WBC # BLD AUTO: 7.4 10E9/L (ref 4–11)

## 2020-02-05 PROCEDURE — 90732 PPSV23 VACC 2 YRS+ SUBQ/IM: CPT | Performed by: INTERNAL MEDICINE

## 2020-02-05 PROCEDURE — 80061 LIPID PANEL: CPT | Performed by: INTERNAL MEDICINE

## 2020-02-05 PROCEDURE — 80053 COMPREHEN METABOLIC PANEL: CPT | Performed by: INTERNAL MEDICINE

## 2020-02-05 PROCEDURE — G0009 ADMIN PNEUMOCOCCAL VACCINE: HCPCS | Performed by: INTERNAL MEDICINE

## 2020-02-05 PROCEDURE — G0438 PPPS, INITIAL VISIT: HCPCS | Performed by: INTERNAL MEDICINE

## 2020-02-05 PROCEDURE — 83036 HEMOGLOBIN GLYCOSYLATED A1C: CPT | Performed by: INTERNAL MEDICINE

## 2020-02-05 PROCEDURE — 85027 COMPLETE CBC AUTOMATED: CPT | Performed by: INTERNAL MEDICINE

## 2020-02-05 PROCEDURE — 36415 COLL VENOUS BLD VENIPUNCTURE: CPT | Performed by: INTERNAL MEDICINE

## 2020-02-05 ASSESSMENT — MIFFLIN-ST. JEOR: SCORE: 888.56

## 2020-02-05 ASSESSMENT — ACTIVITIES OF DAILY LIVING (ADL): CURRENT_FUNCTION: NO ASSISTANCE NEEDED

## 2020-02-05 NOTE — LETTER
[facility]  [address] [facility]  [address]  [place]  [address]  Tel: [phone]    February 6, 2020    Kezia Dixon  [address] [facility] [address]        Dear Ms. Dixon,    The following letter pertains to your most recent diagnostic tests:     -Your cholesterol panel looks healthy.     -Liver and gallbladder tests are normal for you. (ALT,AST, Alk phos, bilirubin), kidney function is normal for you (Creatinine, GFR), Sodium is normal, Potassium is normal for you, Calcium is normal for you, Glucose (blood sugar) is normal for you.       -Your hemoglobin A1c test which is a diabetes blood test that represents and average of your blood sugars over the last 3 months returned normal.     -Your complete blood counts including your hemoglobin returned normal for you.         Bottom line:  Lab results look healthy       Follow up:  Schedule an appointment for a physical examination with fasting blood tests in one year's time, or return sooner if new questions, symptoms or problems arise.       Sincerely,     Dr. Velásquez / seb    Gavino Linn   Comprehensive metabolic panel   Result Value Ref Range    Sodium 134 133 - 144 mmol/L    Potassium 4.4 3.4 - 5.3 mmol/L    Chloride 99 94 - 109 mmol/L    Carbon Dioxide 28 20 - 32 mmol/L    Anion Gap 7 3 - 14 mmol/L    Glucose 95 70 - 99 mg/dL    Urea Nitrogen 13 7 - 30 mg/dL    Creatinine 0.51 (L) 0.52 - 1.04 mg/dL    GFR Estimate >90 >60 mL/min/[1.73_m2]      Comment:      Non  GFR Calc  Starting 12/18/2018, serum creatinine based estimated GFR (eGFR) will be   calculated using the Chronic Kidney Disease Epidemiology Collaboration   (CKD-EPI) equation.      GFR Estimate If Black >90 >60 mL/min/[1.73_m2]      Comment:       GFR Calc  Starting 12/18/2018, serum creatinine based estimated GFR (eGFR) will be   calculated using the Chronic Kidney Disease Epidemiology Collaboration   (CKD-EPI) equation.      Calcium 8.9 8.5 -  10.1 mg/dL    Bilirubin Total 0.3 0.2 - 1.3 mg/dL    Albumin 3.6 3.4 - 5.0 g/dL    Protein Total 7.1 6.8 - 8.8 g/dL    Alkaline Phosphatase 57 40 - 150 U/L    ALT 22 0 - 50 U/L    AST 19 0 - 45 U/L   CBC with platelets   Result Value Ref Range    WBC 7.4 4.0 - 11.0 10e9/L    RBC Count 3.77 (L) 3.8 - 5.2 10e12/L    Hemoglobin 12.3 11.7 - 15.7 g/dL    Hematocrit 35.7 35.0 - 47.0 %    MCV 95 78 - 100 fl    MCH 32.6 26.5 - 33.0 pg    MCHC 34.5 31.5 - 36.5 g/dL    RDW 12.7 10.0 - 15.0 %    Platelet Count 242 150 - 450 10e9/L   Lipid panel reflex to direct LDL Fasting   Result Value Ref Range    Cholesterol 155 <200 mg/dL    Triglycerides 98 <150 mg/dL    HDL Cholesterol 93 >49 mg/dL    LDL Cholesterol Calculated 42 <100 mg/dL      Comment:      Desirable:       <100 mg/dl    Non HDL Cholesterol 62 <130 mg/dL   Hemoglobin A1c   Result Value Ref Range    Hemoglobin A1C 5.4 0 - 5.6 %      Comment:      Normal <5.7% Prediabetes 5.7-6.4%  Diabetes 6.5% or higher - adopted from ADA   consensus guidelines.

## 2020-02-05 NOTE — NURSING NOTE
Prior to immunization administration, verified patients identity using patient s name and date of birth. Please see Immunization Activity for additional information.     Screening Questionnaire for Adult Immunization    Are you sick today?   No   Do you have allergies to medications, food, a vaccine component or latex?   Yes   Have you ever had a serious reaction after receiving a vaccination?   No   Do you have a long-term health problem with heart, lung, kidney, or metabolic disease (e.g., diabetes), asthma, a blood disorder, no spleen, complement component deficiency, a cochlear implant, or a spinal fluid leak?  Are you on long-term aspirin therapy?   No   Do you have cancer, leukemia, HIV/AIDS, or any other immune system problem?   Yes   Do you have a parent, brother, or sister with an immune system problem?   No   In the past 3 months, have you taken medications that affect  your immune system, such as prednisone, other steroids, or anticancer drugs; drugs for the treatment of rheumatoid arthritis, Crohn s disease, or psoriasis; or have you had radiation treatments?   No   Have you had a seizure, or a brain or other nervous system problem?   No   During the past year, have you received a transfusion of blood or blood    products, or been given immune (gamma) globulin or antiviral drug?   No   For women: Are you pregnant or is there a chance you could become       pregnant during the next month?   No   Have you received any vaccinations in the past 4 weeks?   No     Immunization questionnaire was positive for at least one answer.  Notified Dr. Velásquez.        Per orders of Dr. Velásquez, injection of Pneumovax 23 given by Melissa Cook MA. Patient instructed to remain in clinic for 15 minutes afterwards, and to report any adverse reaction to me immediately.  Patient verified     Screening performed by Melissa Cook MA on 2/5/2020 at 8:52 AM.

## 2020-02-05 NOTE — PROGRESS NOTES
"SUBJECTIVE:   Kezia Dixon is a 64 year old female who presents for Preventive Visit.    Are you in the first 12 months of your Medicare coverage?  No    Healthy Habits:     In general, how would you rate your overall health?  Good    Frequency of exercise:  6-7 days/week    Duration of exercise:  30-45 minutes    Do you usually eat at least 4 servings of fruit and vegetables a day, include whole grains    & fiber and avoid regularly eating high fat or \"junk\" foods?  Yes    Taking medications regularly:  Yes    Barriers to taking medications:  None    Medication side effects:  None    Ability to successfully perform activities of daily living:  No assistance needed    Home Safety:  No safety concerns identified    Hearing Impairment:  No hearing concerns    In the past 6 months, have you been bothered by leaking of urine?  No    In general, how would you rate your overall mental or emotional health?  Good      PHQ-2 Total Score: 0    Additional concerns today:  No    Do you feel safe in your environment? Yes    Have you ever done Advance Care Planning? (For example, a Health Directive, POLST, or a discussion with a medical provider or your loved ones about your wishes): Yes, advance care planning is on file.      Fall risk  Fallen 2 or more times in the past year?: No  Any fall with injury in the past year?: No    Cognitive Screening   1) Repeat 3 items (Leader, Season, Table)    2) Clock draw: NORMAL  3) 3 item recall: Recalls 3 objects  Results: 3 items recalled: COGNITIVE IMPAIRMENT LESS LIKELY    Mini-CogTM Copyright S Daphney. Licensed by the author for use in [facility]; reprinted with permission (nneka@.[facility]). All rights reserved.      Do you have sleep apnea, excessive snoring or daytime drowsiness?: no    Reviewed and updated as needed this visit by clinical staff  Tobacco  Allergies  Meds         Reviewed and updated as needed this visit by Provider        Social History     Tobacco " Use     Smoking status: Former Smoker     Packs/day: 0.25     Last attempt to quit: 2015     Years since quittin.1     Smokeless tobacco: Never Used   Substance Use Topics     Alcohol use: No     Alcohol/week: 0.0 standard drinks     Comment: none     If you drink alcohol do you typically have >3 drinks per day or >7 drinks per week? No    Alcohol Use 2020   Prescreen: >3 drinks/day or >7 drinks/week? No     Current providers sharing in care for this patient include:   Patient Care Team:  Mustapha Velásquez MD as PCP - General (Internal Medicine)  Mustapha Velásquez MD as Assigned PCP    The following health maintenance items are reviewed in Epic and correct as of today:  Health Maintenance   Topic Date Due     URINE DRUG SCREEN  1955     MAMMO SCREENING  2018     MEDICARE ANNUAL WELLNESS VISIT  2018     PHQ-2  2020     PNEUMOCOCCAL IMMUNIZATION 65+ LOW/MEDIUM RISK (2 of 2 - PPSV23) 2019     FALL RISK ASSESSMENT  2020     LIPID  2022     COLONOSCOPY  10/04/2023     ADVANCE CARE PLANNING  10/17/2023     DTAP/TDAP/TD IMMUNIZATION (3 - Td) 2028     DEXA  Completed     HEPATITIS C SCREENING  Completed     INFLUENZA VACCINE  Completed     ZOSTER IMMUNIZATION  Completed     IPV IMMUNIZATION  Aged Out     MENINGITIS IMMUNIZATION  Aged Out     Patient Active Problem List   Diagnosis     Esophageal cancer (H)     Benign essential hypertension     Pancreatic pseudocyst     Impaired fasting glucose     Pap smear with atypical squamous cells, cannot exclude high grade squamous intraepithelial lesion (ASC-H)     ACP (advance care planning)     Protein-calorie malnutrition (H)     Post herpetic neuralgia     Chronic pain syndrome     Cholelithiasis     Incisional hernia without obstruction or gangrene     Past Surgical History:   Procedure Laterality Date     AAA REPAIR      splenic artery aneurysm embolization     ABDOMEN SURGERY  2016     COLONOSCOPY  2013     Procedure: COMBINED COLONOSCOPY, SINGLE BIOPSY/POLYPECTOMY BY BIOPSY;  COLONOSCOPY (MAC);  Surgeon: Montana Rosa MD;  Location:  GI     COLONOSCOPY  11/26/2013     COLONOSCOPY N/A 10/4/2018    Procedure: COMBINED COLONOSCOPY, SINGLE OR MULTIPLE BIOPSY/POLYPECTOMY BY BIOPSY;  colonoscopy;  Surgeon: Gio Apodaca MD;  Location:  GI     ENT SURGERY       ESOPHAGOGASTRECTOMY N/A 3/22/2016    Procedure: ESOPHAGOGASTRECTOMY;  Surgeon: Alvin Riojas MD;  Location:  OR     ESOPHAGOSCOPY, GASTROSCOPY, DUODENOSCOPY (EGD), COMBINED N/A 12/22/2015    Procedure: COMBINED ENDOSCOPIC ULTRASOUND, ESOPHAGOSCOPY, GASTROSCOPY, DUODENOSCOPY (EGD), FINE NEEDLE ASPIRATE/BIOPSY;  Surgeon: Danelle Michael MD;  Location:  GI     GASTROSTOMY, INSERT TUBE, COMBINED N/A 2/2/2016    Procedure: COMBINED GASTROSTOMY, INSERT TUBE (OPEN);  Surgeon: Alvin Riojas MD;  Location:  OR     GI SURGERY  12/22/2015     HAND SURGERY      right     HERNIORRHAPHY INCISIONAL (LOCATION) N/A 3/19/2019    Procedure: LAPARSCOPIC  INCISIONAL HERNIA REPAIR WITH MESH, LAPARSCOPIC LYSIS OF ADHENSIONS;  Surgeon: Lamberto Magaña MD;  Location:  OR     INSERT PORT VASCULAR ACCESS N/A 12/28/2015    Procedure: INSERT PORT VASCULAR ACCESS;  Surgeon: Alvin Riojas MD;  Location:  OR     LAPAROSCOPIC CHOLECYSTECTOMY N/A 3/19/2019    Procedure: LAPAROSCOPIC CHOLECYSTECTOMY;  Surgeon: Lamberto Magaña MD;  Location:  OR     LOBECTOMY LUNG Right 10/16/2018    Procedure: LOBECTOMY LUNG;  Surgeon: Alvin Riojas MD;  Location:  OR     REMOVE PORT VASCULAR ACCESS Left 7/22/2016    Procedure: REMOVE PORT VASCULAR ACCESS;  Surgeon: Alvin Riojas MD;  Location:  OR     THORACOTOMY Right 10/16/2018    Procedure: REDO RIGHT THORACTOMY AND RIGHT LOWER LOBECTOMY, PLEURAL LYSIS;  Surgeon: Alvin Riojas MD;  Location:  OR     TONSILLECTOMY       VASCULAR SURGERY   "       Social History     Tobacco Use     Smoking status: Former Smoker     Packs/day: 0.25     Last attempt to quit: 2015     Years since quittin.1     Smokeless tobacco: Never Used   Substance Use Topics     Alcohol use: No     Alcohol/week: 0.0 standard drinks     Comment: none     Family History   Problem Relation Age of Onset     Other Cancer Father         melanoma     Prostate Cancer Father      Diabetes Father      Other Cancer Brother         melanoma     Other Cancer Brother         melanoma     Other Cancer Brother          Current Outpatient Medications   Medication Sig Dispense Refill     amitriptyline (ELAVIL) 50 MG tablet TAKE 1 TABLET (50 MG) BY MOUTH AT BEDTIME 90 tablet 1     DULoxetine (CYMBALTA) 60 MG capsule TAKE 1 CAPSULE BY MOUTH EVERY DAY 90 capsule 0     fluticasone (FLONASE) 50 MCG/ACT nasal spray SPRAY 2 SPRAYS INTO BOTH NOSTRILS DAILY 48 mL 0     hydrochlorothiazide (HYDRODIURIL) 12.5 MG tablet TAKE 1 TABLET BY MOUTH EVERY DAY 90 tablet 0     metoprolol tartrate (LOPRESSOR) 25 MG tablet TAKE 1 TABLET (25 MG) BY MOUTH 2 TIMES DAILY 180 tablet 1     omeprazole (PRILOSEC) 40 MG DR capsule TAKE 1 CAPSULE (40 MG) BY MOUTH DAILY 90 capsule 1     Allergies   Allergen Reactions     Codeine Sulfate Nausea     Simvastatin Cramps     Leg cramps     Penicillins Rash         Review of Systems  Constitutional, HEENT, cardiovascular, pulmonary, gi and gu systems are negative, except as otherwise noted.    OBJECTIVE:   /72 (BP Location: Right arm, Patient Position: Sitting, Cuff Size: Adult Regular)   Pulse 72   Temp 97.6  F (36.4  C) (Oral)   Ht 1.562 m (5' 1.5\")   Wt 40.3 kg (88 lb 14.4 oz)   SpO2 100%   BMI 16.53 kg/m   Estimated body mass index is 16.53 kg/m  as calculated from the following:    Height as of this encounter: 1.562 m (5' 1.5\").    Weight as of this encounter: 40.3 kg (88 lb 14.4 oz).  Physical Exam  GENERAL APPEARANCE: thin, but healthy, alert and no " distress  EYES: Eyes grossly normal to inspection, PERRL and conjunctivae and sclerae normal  HENT: ear canals and TM's normal, nose and mouth without ulcers or lesions, oropharynx clear and oral mucous membranes moist  NECK: no adenopathy, no asymmetry, masses, or scars and thyroid normal to palpation  RESP: lungs clear to auscultation - no rales, rhonchi or wheezes ;with exception of post thoracotomy changes   BREAST: Declined exam as she plans to see Dr. Capone this year for women's health check   CV: regular rate and rhythm, normal S1 S2, no S3 or S4, no murmur, click or rub, no peripheral edema and peripheral pulses strong  ABDOMEN: soft, nontender, no hepatosplenomegaly, no masses and bowel sounds normal  MS: no musculoskeletal defects are noted and gait is age appropriate without ataxia  SKIN: no suspicious lesions or rashes  NEURO: Normal strength and tone, sensory exam grossly normal, mentation intact and speech normal  PSYCH: mentation appears normal and affect normal/bright    Labs pending    ASSESSMENT / PLAN:   1. Routine general medical examination at a health care facility    - Comprehensive metabolic panel  - CBC with platelets  - Lipid panel reflex to direct LDL Fasting    2. Malignant neoplasm of right lung, unspecified part of lung (H)  Continue follow up with Dr. Riojas     3. Mild protein-calorie malnutrition (H)  Discussed nutrition referral; she has seen nutritionists at [facility]    4. Malignant neoplasm of esophagus, unspecified location (H)  Continue follow up with Dr. Riojas     5. Benign essential hypertension  OK control; check labs, refill medications     6. Visit for screening mammogram    - *MA Screening Digital Bilateral; Future    7. Impaired fasting glucose    - Hemoglobin A1c    COUNSELING:  Reviewed preventive health counseling, as reflected in patient instructions  Special attention given to:       Regular exercise       Vision screening       Immunizations    P23 today    "           Consider lung cancer screening for ages 55-80 years and 30 pack-year smoking history ; personal history of lung cancer, has surveillance CT scans with Dr. Riojas        Colon cancer screening; repeat in 2023       Hepatitis C screening; done   Referred for mammogram; recommended follow up with GYN re past history of abnormal Pap    Estimated body mass index is 16.53 kg/m  as calculated from the following:    Height as of this encounter: 1.562 m (5' 1.5\").    Weight as of this encounter: 40.3 kg (88 lb 14.4 oz).         reports that she quit smoking about 4 years ago. She smoked 0.25 packs per day. She has never used smokeless tobacco.      Appropriate preventive services were discussed with this patient, including applicable screening as appropriate for cardiovascular disease, diabetes, osteopenia/osteoporosis, and glaucoma.  As appropriate for age/gender, discussed screening for colorectal cancer, prostate cancer, breast cancer, and cervical cancer. Checklist reviewing preventive services available has been given to the patient.    Reviewed patients plan of care and provided an AVS. The Basic Care Plan (routine screening as documented in Health Maintenance) for Kezia meets the Care Plan requirement. This Care Plan has been established and reviewed with the Patient.    Counseling Resources:  ATP IV Guidelines  Pooled Cohorts Equation Calculator  Breast Cancer Risk Calculator  FRAX Risk Assessment  ICSI Preventive Guidelines  Dietary Guidelines for Americans, 2010  USDA's MyPlate  ASA Prophylaxis  Lung CA Screening    Mustapha Velásquez MD  [facility]    Identified Health Risks:  "

## 2020-02-05 NOTE — RESULT ENCOUNTER NOTE
The following letter pertains to your most recent diagnostic tests:    -Your cholesterol panel looks healthy.     -Liver and gallbladder tests are normal for you. (ALT,AST, Alk phos, bilirubin), kidney function is normal for you (Creatinine, GFR), Sodium is normal, Potassium is normal for you, Calcium is normal for you, Glucose (blood sugar) is normal for you.      -Your hemoglobin A1c test which is a diabetes blood test that represents and average of your blood sugars over the last 3 months returned normal.    -Your complete blood counts including your hemoglobin returned normal for you.         Bottom line:  Lab results look healthy      Follow up:  Schedule an appointment for a physical examination with fasting blood tests in one year's time, or return sooner if new questions, symptoms or problems arise.       Sincerely,    Dr. Velásquez

## 2020-02-11 DIAGNOSIS — I10 BENIGN ESSENTIAL HYPERTENSION: ICD-10-CM

## 2020-02-11 NOTE — TELEPHONE ENCOUNTER
"hydrochlorothiazide (HYDRODIURIL) 12.5 MG tablet 90 tablet 0 11/5/2019     Last Written Prescription Date:  11/5/2019  Last Fill Quantity: 90,  # refills: 0   Last office visit: 2/5/2020 with prescribing provider: HARSHA Velásquez    Future Office Visit: Unknown     Requested Prescriptions   Pending Prescriptions Disp Refills     hydrochlorothiazide (HYDRODIURIL) 12.5 MG tablet [Pharmacy Med Name: HYDROCHLOROTHIAZIDE 12.5 MG TB] 90 tablet 0     Sig: TAKE 1 TABLET BY MOUTH EVERY DAY       Diuretics (Including Combos) Protocol Failed - 2/11/2020  2:34 PM        Failed - Normal serum creatinine on file in past 12 months     Recent Labs   Lab Test 02/05/20  0849   CR 0.51*              Passed - Blood pressure under 140/90 in past 12 months     BP Readings from Last 3 Encounters:   02/05/20 126/72   03/20/19 138/57   03/11/19 128/64                 Passed - Recent (12 mo) or future (30 days) visit within the authorizing provider's specialty     Patient has had an office visit with the authorizing provider or a provider within the authorizing providers department within the previous 12 mos or has a future within next 30 days. See \"Patient Info\" tab in inbasket, or \"Choose Columns\" in Meds & Orders section of the refill encounter.              Passed - Medication is active on med list        Passed - Patient is age 18 or older        Passed - No active pregancy on record        Passed - Normal serum potassium on file in past 12 months     Recent Labs   Lab Test 02/05/20  0849   POTASSIUM 4.4                    Passed - Normal serum sodium on file in past 12 months     Recent Labs   Lab Test 02/05/20  0849                 Passed - No positive pregnancy test in past 12 months          "

## 2020-02-12 RX ORDER — HYDROCHLOROTHIAZIDE 12.5 MG/1
TABLET ORAL
Qty: 90 TABLET | Refills: 1 | Status: SHIPPED | OUTPATIENT
Start: 2020-02-12 | End: 2020-08-10

## 2020-02-12 NOTE — TELEPHONE ENCOUNTER
"Prescription approved per [facility] Refill Protocol.  Per lab note 2/5/20 \"kidney function is normal for you\"    Jim DELEON RN  "

## 2020-02-13 ENCOUNTER — TELEPHONE (OUTPATIENT)
Dept: FAMILY MEDICINE | Facility: CLINIC | Age: 65
End: 2020-02-13

## 2020-02-13 NOTE — TELEPHONE ENCOUNTER
Panel Management Review      Patient has the following on her problem list: None      Composite cancer screening  Chart review shows that this patient is due/due soon for the following Mammogram  Summary:    Patient is due/failing the following:   MAMMOGRAM    Action needed:   Patient needs referral/order: Mammogram    Type of outreach:    Sent Grid2020 message.    Questions for provider review:    None                                                                                                                                    Rosina Justin [facility]       Chart routed to  .

## 2020-03-19 DIAGNOSIS — J31.0 CHRONIC RHINITIS: ICD-10-CM

## 2020-03-19 RX ORDER — FLUTICASONE PROPIONATE 50 MCG
SPRAY, SUSPENSION (ML) NASAL
Qty: 48 ML | Refills: 1 | Status: SHIPPED | OUTPATIENT
Start: 2020-03-19 | End: 2020-09-14

## 2020-03-19 NOTE — TELEPHONE ENCOUNTER
Last Written Prescription Date:  12/23/19  Last Fill Quantity: 48 mL,  # refills: 0   Last office visit: 2/5/2020 with prescribing provider:  Christen   Future Office Visit:      Requested Prescriptions   Pending Prescriptions Disp Refills     fluticasone (FLONASE) 50 MCG/ACT nasal spray [Pharmacy Med Name: FLUTICASONE PROP 50 MCG SPRAY] 48 mL 0     Sig: INSTILL 2 SPRAYS INTO BOTH NOSTRILS DAILY       There is no refill protocol information for this order

## 2020-03-19 NOTE — TELEPHONE ENCOUNTER
Routing refill request to provider for review/approval because:  not on protocol.  Please authorize if appropriate.  Thanks,  Albina Guzman RN

## 2020-04-03 DIAGNOSIS — B02.29 NEURALGIA, POST-HERPETIC: ICD-10-CM

## 2020-04-03 RX ORDER — DULOXETIN HYDROCHLORIDE 60 MG/1
CAPSULE, DELAYED RELEASE ORAL
Qty: 90 CAPSULE | Refills: 1 | Status: SHIPPED | OUTPATIENT
Start: 2020-04-03 | End: 2020-09-23

## 2020-04-03 NOTE — TELEPHONE ENCOUNTER
"Last Written Prescription Date:  1/07/20  Last Fill Quantity: 90 capsule,  # refills: 0   Last office visit: 2/5/2020 with prescribing provider:  Christen   Future Office Visit:        Requested Prescriptions   Pending Prescriptions Disp Refills     DULoxetine (CYMBALTA) 60 MG capsule [Pharmacy Med Name: DULOXETINE HCL DR 60 MG CAP] 90 capsule 0     Sig: TAKE 1 CAPSULE BY MOUTH EVERY DAY       Serotonin-Norepinephrine Reuptake Inhibitors  Passed - 4/3/2020  9:47 AM        Passed - Blood pressure under 140/90 in past 12 months     BP Readings from Last 3 Encounters:   02/05/20 126/72   03/20/19 138/57   03/11/19 128/64                 Passed - Recent (12 mo) or future (30 days) visit within the authorizing provider's specialty     Patient has had an office visit with the authorizing provider or a provider within the authorizing providers department within the previous 12 mos or has a future within next 30 days. See \"Patient Info\" tab in inbasket, or \"Choose Columns\" in Meds & Orders section of the refill encounter.              Passed - Medication is active on med list        Passed - Patient is age 18 or older        Passed - No active pregnancy on record        Passed - No positive pregnancy test in past 12 months             "

## 2020-04-03 NOTE — TELEPHONE ENCOUNTER
Routing refill request to provider for review/approval because:  Drug interaction warning      BIBIANA GrewalN, RN  [facility]

## 2020-04-04 DIAGNOSIS — I10 BENIGN ESSENTIAL HYPERTENSION: ICD-10-CM

## 2020-04-06 RX ORDER — METOPROLOL TARTRATE 25 MG/1
TABLET, FILM COATED ORAL
Qty: 180 TABLET | Refills: 1 | Status: SHIPPED | OUTPATIENT
Start: 2020-04-06 | End: 2020-09-25

## 2020-04-06 NOTE — TELEPHONE ENCOUNTER
"Last Written Prescription Date:  10/8/19  Last Fill Quantity: 180,  # refills: 1   Last office visit: 2/5/2020 with prescribing provider:     Future Office Visit:    Requested Prescriptions   Pending Prescriptions Disp Refills     metoprolol tartrate (LOPRESSOR) 25 MG tablet [Pharmacy Med Name: METOPROLOL TARTRATE 25 MG TAB] 180 tablet 1     Sig: TAKE 1 TABLET BY MOUTH TWICE A DAY       Beta-Blockers Protocol Passed - 4/4/2020  9:37 AM        Passed - Blood pressure under 140/90 in past 12 months     BP Readings from Last 3 Encounters:   02/05/20 126/72   03/20/19 138/57   03/11/19 128/64                 Passed - Patient is age 6 or older        Passed - Recent (12 mo) or future (30 days) visit within the authorizing provider's specialty     Patient has had an office visit with the authorizing provider or a provider within the authorizing providers department within the previous 12 mos or has a future within next 30 days. See \"Patient Info\" tab in inbasket, or \"Choose Columns\" in Meds & Orders section of the refill encounter.              Passed - Medication is active on med list             "

## 2020-04-06 NOTE — TELEPHONE ENCOUNTER
Prescription approved per [facility] Refill Protocol.  Albina Guzman RN

## 2020-05-07 DIAGNOSIS — I10 BENIGN ESSENTIAL HYPERTENSION: ICD-10-CM

## 2020-05-07 RX ORDER — OMEPRAZOLE 40 MG/1
CAPSULE, DELAYED RELEASE ORAL
Qty: 90 CAPSULE | Refills: 3 | Status: SHIPPED | OUTPATIENT
Start: 2020-05-07 | End: 2021-04-29

## 2020-05-17 DIAGNOSIS — B02.29 NEURALGIA, POST-HERPETIC: ICD-10-CM

## 2020-05-18 RX ORDER — AMITRIPTYLINE HYDROCHLORIDE 50 MG/1
TABLET ORAL
Qty: 90 TABLET | Refills: 0 | Status: SHIPPED | OUTPATIENT
Start: 2020-05-18 | End: 2020-08-14

## 2020-08-07 DIAGNOSIS — I10 BENIGN ESSENTIAL HYPERTENSION: ICD-10-CM

## 2020-08-10 RX ORDER — HYDROCHLOROTHIAZIDE 12.5 MG/1
TABLET ORAL
Qty: 90 TABLET | Refills: 1 | Status: SHIPPED | OUTPATIENT
Start: 2020-08-10 | End: 2021-02-05

## 2020-08-10 NOTE — TELEPHONE ENCOUNTER
Routing refill request to provider for review/approval because:  Labs out of range:  Creatinine    Please review and authorize if appropriate.    Thank you,   Jim DELEON RN

## 2020-08-14 DIAGNOSIS — B02.29 NEURALGIA, POST-HERPETIC: ICD-10-CM

## 2020-08-14 RX ORDER — AMITRIPTYLINE HYDROCHLORIDE 50 MG/1
TABLET ORAL
Qty: 90 TABLET | Refills: 1 | Status: SHIPPED | OUTPATIENT
Start: 2020-08-14 | End: 2021-09-09

## 2020-08-14 NOTE — TELEPHONE ENCOUNTER
Routing refill request to provider for review/approval because:  Drug interaction warning    Please review and authorize if appropriate.    Thank you,   Jim DELEON RN

## 2020-08-20 ENCOUNTER — MYC MEDICAL ADVICE (OUTPATIENT)
Dept: FAMILY MEDICINE | Facility: CLINIC | Age: 65
End: 2020-08-20

## 2020-08-20 NOTE — TELEPHONE ENCOUNTER
Called patient to assess symptoms.     Symptoms:  Complains of severe pain in left shoulder x 2 mths.   No known injury.   RATES pain 8/10  Denies numbness/tingling  Denies swelling  Reports able to raise and use left arm---however painful.     Has used ice/heat/Ibuprophen 400 mg alternating with Tylenol for pain---without relief.     Advised patient begin: Ibuprophen 600 mg every 6 hours for pain.   Increase water intake.   Ice/heat---whichever feels best.     Scheduled Video Visit with PCP tomorrow morning.     Discussed [facility] if symptoms worsen, or if patient had preferred over the Virtual Visit tomorrow with PCP.   Prefers PCP appointment first.        Deepthi WEBER RN,BSN

## 2020-08-21 ENCOUNTER — VIRTUAL VISIT (OUTPATIENT)
Dept: FAMILY MEDICINE | Facility: CLINIC | Age: 65
End: 2020-08-21
Payer: MEDICARE

## 2020-08-21 DIAGNOSIS — M25.512 ACUTE PAIN OF LEFT SHOULDER: Primary | ICD-10-CM

## 2020-08-21 DIAGNOSIS — C15.9 MALIGNANT NEOPLASM OF ESOPHAGUS, UNSPECIFIED LOCATION (H): ICD-10-CM

## 2020-08-21 DIAGNOSIS — C34.90 MALIGNANT NEOPLASM OF LUNG, UNSPECIFIED LATERALITY, UNSPECIFIED PART OF LUNG (H): ICD-10-CM

## 2020-08-21 PROCEDURE — 99214 OFFICE O/P EST MOD 30 MIN: CPT | Mod: 95 | Performed by: INTERNAL MEDICINE

## 2020-08-21 RX ORDER — TRAMADOL HYDROCHLORIDE 50 MG/1
50 TABLET ORAL EVERY 6 HOURS PRN
Qty: 10 TABLET | Refills: 0 | Status: SHIPPED | OUTPATIENT
Start: 2020-08-21 | End: 2020-08-24

## 2020-08-21 NOTE — PROGRESS NOTES
"Kezia Dixon is a 64 year old female who is being evaluated via a billable video visit.      The patient has been notified of following:     \"This video visit will be conducted via a call between you and your physician/provider. We have found that certain health care needs can be provided without the need for an in-person physical exam.  This service lets us provide the care you need with a video conversation.  If a prescription is necessary we can send it directly to your pharmacy.  If lab work is needed we can place an order for that and you can then stop by our lab to have the test done at a later time.    Video visits are billed at different rates depending on your insurance coverage.  Please reach out to your insurance provider with any questions.    If during the course of the call the physician/provider feels a video visit is not appropriate, you will not be charged for this service.\"    Patient has given verbal consent for Video visit? Yes  How would you like to obtain your AVS? MyChart  If you are dropped from the video visit, the video invite should be resent to: Text to cell phone: [phone]  Will anyone else be joining your video visit? No    Subjective     Kezia Dixon is a 64 year old female who presents today via video visit for the following health issues:    HPI    Left shoulder pain for 2 months, pain was coming and going a lot but has recently been more sharp and constant. No known injury that patient is aware of.     Video Start Time: 0810    2 months progressive left shoulder pain now severe not improved adequately with APAP or NSAIDs  Fall 2 months ago tripped on golf course  Pain radiates down left arm and into neck  History of lung cancer SCC and esophageal cancer  No new cough, dyspnea, dysphagia    Review of Systems   Pertinent +/-'s in HPI       Objective           Vitals:  No vitals were obtained today due to virtual visit.    Physical Exam     GENERAL: Healthy, alert and no " distress  EYES: Eyes grossly normal to inspection.  No discharge or erythema, or obvious scleral/conjunctival abnormalities.  RESP: No audible wheeze, cough, or visible cyanosis.  No visible retractions or increased work of breathing.    SKIN: Visible skin clear. No significant rash, abnormal pigmentation or lesions.  NEURO: Cranial nerves grossly intact.  Mentation and speech appropriate for age.  PSYCH: Mentation appears normal, affect normal/bright, judgement and insight intact, normal speech and appearance well-groomed.              Assessment & Plan     Acute pain of left shoulder    - traMADol (ULTRAM) 50 MG tablet; Take 1 tablet (50 mg) by mouth every 6 hours as needed for severe pain  - CT Chest w Contrast; Future  - XR Shoulder Left 2 Views; Future    Malignant neoplasm of esophagus, unspecified location (H)      Malignant neoplasm of lung, unspecified laterality, unspecified part of lung (H)        Given hx check CT chest and shoulder x-ray  Consider face to face visit to further evaluate pending results if no diagnosis identified by scans; she requests stronger pain medication; try tramadol; side effects and risk discussed              No follow-ups on file.    Mustapha Velásquez MD  [facility]      Video-Visit Details    Type of service:  Video Visit    Video End Time:8:34 AM    Originating Location (pt. Location): Home    Distant Location (provider location):  [facility]     Platform used for Video Visit: ALLGOOB

## 2020-09-23 DIAGNOSIS — B02.29 NEURALGIA, POST-HERPETIC: ICD-10-CM

## 2020-09-23 RX ORDER — DULOXETIN HYDROCHLORIDE 60 MG/1
CAPSULE, DELAYED RELEASE ORAL
Qty: 90 CAPSULE | Refills: 1 | Status: SHIPPED | OUTPATIENT
Start: 2020-09-23 | End: 2021-09-09

## 2020-09-25 DIAGNOSIS — I10 BENIGN ESSENTIAL HYPERTENSION: ICD-10-CM

## 2020-09-25 RX ORDER — METOPROLOL TARTRATE 25 MG/1
TABLET, FILM COATED ORAL
Qty: 180 TABLET | Refills: 1 | Status: SHIPPED | OUTPATIENT
Start: 2020-09-25 | End: 2021-03-19

## 2021-01-15 ENCOUNTER — HEALTH MAINTENANCE LETTER (OUTPATIENT)
Age: 66
End: 2021-01-15

## 2021-02-04 DIAGNOSIS — I10 BENIGN ESSENTIAL HYPERTENSION: ICD-10-CM

## 2021-02-05 RX ORDER — HYDROCHLOROTHIAZIDE 12.5 MG/1
TABLET ORAL
Qty: 90 TABLET | Refills: 1 | Status: SHIPPED | OUTPATIENT
Start: 2021-02-05 | End: 2021-08-11

## 2021-02-23 NOTE — RESULT ENCOUNTER NOTE
Result reviewed and patient called   Quality 110: Preventive Care And Screening: Influenza Immunization: Influenza Immunization Administered during Influenza season Detail Level: Detailed Quality 111:Pneumonia Vaccination Status For Older Adults: Pneumococcal Vaccination Previously Received Quality 130: Documentation Of Current Medications In The Medical Record: Current Medications Documented

## 2021-03-09 ENCOUNTER — IMMUNIZATION (OUTPATIENT)
Dept: NURSING | Facility: CLINIC | Age: 66
End: 2021-03-09
Payer: MEDICARE

## 2021-03-09 PROCEDURE — 0001A PR COVID VAC PFIZER DIL RECON 30 MCG/0.3 ML IM: CPT

## 2021-03-09 PROCEDURE — 91300 PR COVID VAC PFIZER DIL RECON 30 MCG/0.3 ML IM: CPT

## 2021-03-14 ENCOUNTER — HEALTH MAINTENANCE LETTER (OUTPATIENT)
Age: 66
End: 2021-03-14

## 2021-03-19 DIAGNOSIS — I10 BENIGN ESSENTIAL HYPERTENSION: ICD-10-CM

## 2021-03-19 RX ORDER — METOPROLOL TARTRATE 25 MG/1
TABLET, FILM COATED ORAL
Qty: 180 TABLET | Refills: 0 | Status: SHIPPED | OUTPATIENT
Start: 2021-03-19 | End: 2021-06-21

## 2021-03-30 ENCOUNTER — IMMUNIZATION (OUTPATIENT)
Dept: NURSING | Facility: CLINIC | Age: 66
End: 2021-03-30
Attending: INTERNAL MEDICINE
Payer: MEDICARE

## 2021-03-30 PROCEDURE — 0002A PR COVID VAC PFIZER DIL RECON 30 MCG/0.3 ML IM: CPT

## 2021-03-30 PROCEDURE — 91300 PR COVID VAC PFIZER DIL RECON 30 MCG/0.3 ML IM: CPT

## 2021-04-29 DIAGNOSIS — I10 BENIGN ESSENTIAL HYPERTENSION: ICD-10-CM

## 2021-04-29 RX ORDER — OMEPRAZOLE 40 MG/1
CAPSULE, DELAYED RELEASE ORAL
Qty: 90 CAPSULE | Refills: 0 | Status: SHIPPED | OUTPATIENT
Start: 2021-04-29 | End: 2021-07-28

## 2021-06-19 DIAGNOSIS — I10 BENIGN ESSENTIAL HYPERTENSION: ICD-10-CM

## 2021-06-21 RX ORDER — METOPROLOL TARTRATE 25 MG/1
TABLET, FILM COATED ORAL
Qty: 180 TABLET | Refills: 0 | Status: SHIPPED | OUTPATIENT
Start: 2021-06-21 | End: 2021-09-09

## 2021-06-21 NOTE — TELEPHONE ENCOUNTER
Routing refill request to provider for review/approval because:      Last B/P was 2-5-2020   126/72.     Zita Ho RN  [facility]

## 2021-08-11 DIAGNOSIS — I10 BENIGN ESSENTIAL HYPERTENSION: ICD-10-CM

## 2021-08-11 RX ORDER — HYDROCHLOROTHIAZIDE 12.5 MG/1
TABLET ORAL
Qty: 90 TABLET | Refills: 1 | Status: SHIPPED | OUTPATIENT
Start: 2021-08-11 | End: 2021-09-09

## 2021-08-11 NOTE — TELEPHONE ENCOUNTER
Routing refill request to provider for review/approval because:  Labs not current:    Creatinine   Date Value Ref Range Status   02/05/2020 0.51 (L) 0.52 - 1.04 mg/dL Final      Potassium   Date Value Ref Range Status   02/05/2020 4.4 3.4 - 5.3 mmol/L Final     Sodium   Date Value Ref Range Status   02/05/2020 134 133 - 144 mmol/L Final      BP Readings from Last 3 Encounters:   02/05/20 126/72   03/20/19 138/57   03/11/19 128/64    Chiquita Bowman RN

## 2021-09-09 ENCOUNTER — TELEPHONE (OUTPATIENT)
Dept: FAMILY MEDICINE | Facility: CLINIC | Age: 66
End: 2021-09-09

## 2021-09-09 ENCOUNTER — OFFICE VISIT (OUTPATIENT)
Dept: FAMILY MEDICINE | Facility: CLINIC | Age: 66
End: 2021-09-09
Payer: MEDICARE

## 2021-09-09 VITALS
SYSTOLIC BLOOD PRESSURE: 136 MMHG | HEART RATE: 75 BPM | RESPIRATION RATE: 16 BRPM | TEMPERATURE: 96.8 F | HEIGHT: 62 IN | BODY MASS INDEX: 15.7 KG/M2 | DIASTOLIC BLOOD PRESSURE: 72 MMHG | OXYGEN SATURATION: 96 % | WEIGHT: 85.3 LBS

## 2021-09-09 DIAGNOSIS — I50.22 CHRONIC SYSTOLIC CONGESTIVE HEART FAILURE (H): ICD-10-CM

## 2021-09-09 DIAGNOSIS — E78.5 HYPERLIPIDEMIA LDL GOAL <70: ICD-10-CM

## 2021-09-09 DIAGNOSIS — F10.20 ALCOHOLISM (H): Primary | ICD-10-CM

## 2021-09-09 DIAGNOSIS — Z23 NEED FOR PROPHYLACTIC VACCINATION AND INOCULATION AGAINST INFLUENZA: ICD-10-CM

## 2021-09-09 DIAGNOSIS — I42.9 CARDIOMYOPATHY, UNSPECIFIED TYPE (H): ICD-10-CM

## 2021-09-09 DIAGNOSIS — J18.9 PNEUMONIA DUE TO INFECTIOUS ORGANISM, UNSPECIFIED LATERALITY, UNSPECIFIED PART OF LUNG: ICD-10-CM

## 2021-09-09 DIAGNOSIS — E44.1 MILD PROTEIN-CALORIE MALNUTRITION (H): ICD-10-CM

## 2021-09-09 PROCEDURE — G0008 ADMIN INFLUENZA VIRUS VAC: HCPCS | Performed by: INTERNAL MEDICINE

## 2021-09-09 PROCEDURE — 90662 IIV NO PRSV INCREASED AG IM: CPT | Performed by: INTERNAL MEDICINE

## 2021-09-09 PROCEDURE — 99214 OFFICE O/P EST MOD 30 MIN: CPT | Mod: 25 | Performed by: INTERNAL MEDICINE

## 2021-09-09 RX ORDER — FOLIC ACID 1 MG/1
1 TABLET ORAL DAILY
Qty: 90 TABLET | Refills: 3 | Status: SHIPPED | OUTPATIENT
Start: 2021-09-09 | End: 2024-01-04

## 2021-09-09 RX ORDER — LANOLIN ALCOHOL/MO/W.PET/CERES
100 CREAM (GRAM) TOPICAL DAILY
Qty: 90 TABLET | Refills: 3 | Status: SHIPPED | OUTPATIENT
Start: 2021-09-09 | End: 2024-01-04

## 2021-09-09 RX ORDER — SPIRONOLACTONE 25 MG/1
12.5 TABLET ORAL EVERY MORNING
Qty: 45 TABLET | Refills: 3 | Status: SHIPPED | OUTPATIENT
Start: 2021-09-09 | End: 2022-08-09

## 2021-09-09 RX ORDER — LISINOPRIL 2.5 MG/1
2.5 TABLET ORAL DAILY
Qty: 90 TABLET | Refills: 3 | Status: SHIPPED | OUTPATIENT
Start: 2021-09-09 | End: 2021-09-13

## 2021-09-09 RX ORDER — FUROSEMIDE 40 MG
40 TABLET ORAL EVERY MORNING
Qty: 90 TABLET | Refills: 3 | Status: ON HOLD | OUTPATIENT
Start: 2021-09-09 | End: 2021-10-23

## 2021-09-09 RX ORDER — ATORVASTATIN CALCIUM 40 MG/1
40 TABLET, FILM COATED ORAL DAILY
Qty: 90 TABLET | Refills: 3 | Status: SHIPPED | OUTPATIENT
Start: 2021-09-09 | End: 2021-10-18

## 2021-09-09 RX ORDER — METOPROLOL SUCCINATE 25 MG/1
25 TABLET, EXTENDED RELEASE ORAL 2 TIMES DAILY
Qty: 180 TABLET | Refills: 3 | Status: SHIPPED | OUTPATIENT
Start: 2021-09-09 | End: 2022-01-27

## 2021-09-09 ASSESSMENT — MIFFLIN-ST. JEOR: SCORE: 867.23

## 2021-09-09 NOTE — PROGRESS NOTES
Assessment & Plan     Alcoholism (H)    - MENTAL HEALTH REFERRAL  - Adult; Outpatient Treatment; MH / CD Assessment Center - Assess Level of Care Needed & Treat; Chemical Health Evaluation - determine appropriate level of care and admit to program; [facility]: [facility] [phone]; We ...; Future  - folic acid (FOLVITE) 1 MG tablet; Take 1 tablet (1 mg) by mouth daily  - thiamine (B-1) 100 MG tablet; Take 1 tablet (100 mg) by mouth daily    Chronic systolic congestive heart failure (H)  Restart medications, arrange follow up with cardiology   - Adult Cardiology Eval Referral; Future  - furosemide (LASIX) 40 MG tablet; Take 1 tablet (40 mg) by mouth every morning  - lisinopril (ZESTRIL) 2.5 MG tablet; Take 1 tablet (2.5 mg) by mouth daily  - metoprolol succinate ER (TOPROL-XL) 25 MG 24 hr tablet; Take 1 tablet (25 mg) by mouth 2 times daily  - spironolactone (ALDACTONE) 25 MG tablet; Take 0.5 tablets (12.5 mg) by mouth every morning    Cardiomyopathy, unspecified type (H)    - aspirin (ASA) 81 MG EC tablet; Take 1 tablet (81 mg) by mouth daily    Pneumonia due to infectious organism, unspecified laterality, unspecified part of lung  Treated    Mild protein-calorie malnutrition (H)  Discussed protein supplements     Hyperlipidemia LDL goal <70  Due to suspicion for ischemic CM, on statin therapy; recheck lipids when she returns in one month   - atorvastatin (LIPITOR) 40 MG tablet; Take 1 tablet (40 mg) by mouth daily      24 minutes spent on the date of the encounter doing chart review, history and exam, documentation and further activities per the note           Return in about 2 months (around 11/8/2021) for Preventive Visit.  Patient instructed to return to clinic or contact us sooner if symptoms worsen or new symptoms develop.     Mustapha Velásquez MD  [facility] RAPHAEL Mast is a 65 year old who presents for the following health issues     HPI       Hospital Follow-up  "Visit:    Hospital/Nursing Home/IP Rehab Facility: [facility]  Date of Admission: 8-  Date of Discharge: 8-  Reason(s) for Admission: Generalized weakness        Was your hospitalization related to COVID-19? No   Problems taking medications regularly:  None  Medication changes since discharge: None  Problems adhering to non-medication therapy:  None    Summary of hospitalization:  [facility]   Diagnostic Tests/Treatments reviewed.  Follow up needed: cardiology ; chemical health  Other Healthcare Providers Involved in Patient s Care:         Homecare  Update since discharge: improved. Post Discharge Medication Reconciliation: discharge medications reconciled and changed, per note/orders.  Plan of care communicated with patient          Hospitalized due to problem drinking  Found to have low EF   Treated for pneumonia   Cardiology suspected ischemic cardiomyopathy, but recommended medical management for now   She left TCU before discharge  Did not get discharge cardiac medications  Needs prescriptions  No alcohol since discharge     Review of Systems   Constitutional, HEENT, cardiovascular, pulmonary, gi and gu systems are negative, except as otherwise noted.      Objective    /72 (BP Location: Left arm, Cuff Size: Adult Regular)   Pulse 75   Temp 96.8  F (36  C) (Tympanic)   Resp 16   Ht 1.562 m (5' 1.5\")   Wt 38.7 kg (85 lb 4.8 oz)   SpO2 96%   BMI 15.86 kg/m    Body mass index is 15.86 kg/m .  Physical Exam   GENERAL: very thin, alert, no distress  NECK: no adenopathy, no asymmetry, masses, or scars and thyroid normal to palpation  RESP: unchanged post surgical changes  CV: Heart with regular rate and rhythm.   ABDOMEN: soft, nontender, no hepatosplenomegaly, no masses and bowel sounds normal  MS: Trace bilateral lower extremity edema noted   NEURO: Normal strength and tone, mentation intact and speech normal  PSYCH: mentation appears normal, affect normal/bright                "

## 2021-09-09 NOTE — TELEPHONE ENCOUNTER
Dina with [facility] Called     Requesting HC PT orders - ok to give verbal on orders?     Orders: 2x/4 weeks, 1x/3 weeks, start date 9/12, home exercise program for lower extremity strengthening, improved activity tolerance, gait/balance training     Last visit was over a year ago here but has one scheduled for today     Next 5 appointments (look out 90 days)    Sep 09, 2021  2:00 PM  Office Visit with Mustapha Velásquez MD  [facility] ([facility] ) [address], [address]  [place] [facility] [address]  [phone]   Nov 08, 2021  9:30 AM  PHYSICAL with Mustapha Velásquez MD  [facility] ([facility] ) [address], [address]  [place] [address]  [phone]        Okay to give verbal on requested orders?     Dawn BENITES RN

## 2021-09-13 ENCOUNTER — MEDICAL CORRESPONDENCE (OUTPATIENT)
Dept: HEALTH INFORMATION MANAGEMENT | Facility: CLINIC | Age: 66
End: 2021-09-13

## 2021-09-13 ENCOUNTER — MYC MEDICAL ADVICE (OUTPATIENT)
Dept: FAMILY MEDICINE | Facility: CLINIC | Age: 66
End: 2021-09-13

## 2021-09-13 DIAGNOSIS — E78.5 HYPERLIPIDEMIA LDL GOAL <70: Primary | ICD-10-CM

## 2021-09-13 DIAGNOSIS — I10 BENIGN ESSENTIAL HYPERTENSION: ICD-10-CM

## 2021-09-13 RX ORDER — LOSARTAN POTASSIUM 25 MG/1
12.5 TABLET ORAL DAILY
Qty: 45 TABLET | Refills: 3 | Status: SHIPPED | OUTPATIENT
Start: 2021-09-13 | End: 2022-08-09

## 2021-09-13 RX ORDER — ATORVASTATIN CALCIUM 40 MG/1
40 TABLET, FILM COATED ORAL DAILY
Qty: 90 TABLET | Refills: 3 | Status: SHIPPED | OUTPATIENT
Start: 2021-09-13 | End: 2022-08-09

## 2021-09-13 RX ORDER — ATORVASTATIN CALCIUM 40 MG/1
40 TABLET, FILM COATED ORAL DAILY
COMMUNITY
Start: 2021-09-13 | End: 2021-09-13

## 2021-09-13 NOTE — TELEPHONE ENCOUNTER
Please see patient's mychart.    Added lipitor 40mg as historical medication.     Please reply back to patient, route to triage follow up, or route to team coordinators to have patient schedule an appointment.     Ciera Walls RN  [facility]

## 2021-10-12 ENCOUNTER — TELEPHONE (OUTPATIENT)
Dept: FAMILY MEDICINE | Facility: CLINIC | Age: 66
End: 2021-10-12

## 2021-10-12 NOTE — TELEPHONE ENCOUNTER
[facility] [phone].  PT calling to request A few extra visits, since she missed her PT from last week because she was not feeling well.     Verbal OK given, per standing orders.  Form will be faxed to PCP to review, sign, and complete.

## 2021-10-18 ENCOUNTER — APPOINTMENT (OUTPATIENT)
Dept: CT IMAGING | Facility: CLINIC | Age: 66
DRG: 947 | End: 2021-10-18
Attending: NURSE PRACTITIONER
Payer: MEDICARE

## 2021-10-18 ENCOUNTER — HOSPITAL ENCOUNTER (INPATIENT)
Facility: CLINIC | Age: 66
LOS: 5 days | Discharge: NURSING, REHAB OR LONG-TERM CARE | DRG: 947 | End: 2021-10-23
Attending: NURSE PRACTITIONER | Admitting: STUDENT IN AN ORGANIZED HEALTH CARE EDUCATION/TRAINING PROGRAM
Payer: MEDICARE

## 2021-10-18 DIAGNOSIS — E87.6 HYPOKALEMIA: ICD-10-CM

## 2021-10-18 DIAGNOSIS — M62.81 GENERALIZED MUSCLE WEAKNESS: ICD-10-CM

## 2021-10-18 DIAGNOSIS — J18.9 PNEUMONIA DUE TO INFECTIOUS ORGANISM, UNSPECIFIED LATERALITY, UNSPECIFIED PART OF LUNG: ICD-10-CM

## 2021-10-18 DIAGNOSIS — S09.90XA INJURY OF HEAD, INITIAL ENCOUNTER: ICD-10-CM

## 2021-10-18 DIAGNOSIS — C34.90 MALIGNANT NEOPLASM OF LUNG, UNSPECIFIED LATERALITY, UNSPECIFIED PART OF LUNG (H): Primary | ICD-10-CM

## 2021-10-18 DIAGNOSIS — R19.7 DIARRHEA: ICD-10-CM

## 2021-10-18 DIAGNOSIS — I50.22 CHRONIC SYSTOLIC CONGESTIVE HEART FAILURE (H): ICD-10-CM

## 2021-10-18 DIAGNOSIS — R11.0 NAUSEA: ICD-10-CM

## 2021-10-18 LAB
ALBUMIN SERPL-MCNC: 4 G/DL (ref 3.4–5)
ALBUMIN UR-MCNC: 20 MG/DL
ALP SERPL-CCNC: 132 U/L (ref 40–150)
ALT SERPL W P-5'-P-CCNC: 55 U/L (ref 0–50)
ANION GAP SERPL CALCULATED.3IONS-SCNC: 14 MMOL/L (ref 3–14)
APPEARANCE UR: CLEAR
AST SERPL W P-5'-P-CCNC: 90 U/L (ref 0–45)
ATRIAL RATE - MUSE: 94 BPM
BASOPHILS # BLD AUTO: 0 10E3/UL (ref 0–0.2)
BASOPHILS NFR BLD AUTO: 1 %
BILIRUB SERPL-MCNC: 1.6 MG/DL (ref 0.2–1.3)
BILIRUB UR QL STRIP: NEGATIVE
BUN SERPL-MCNC: 9 MG/DL (ref 7–30)
CALCIUM SERPL-MCNC: 8.9 MG/DL (ref 8.5–10.1)
CHLORIDE BLD-SCNC: 91 MMOL/L (ref 94–109)
CO2 SERPL-SCNC: 31 MMOL/L (ref 20–32)
COLOR UR AUTO: YELLOW
CREAT SERPL-MCNC: 0.45 MG/DL (ref 0.52–1.04)
DIASTOLIC BLOOD PRESSURE - MUSE: NORMAL MMHG
EOSINOPHIL # BLD AUTO: 0.1 10E3/UL (ref 0–0.7)
EOSINOPHIL NFR BLD AUTO: 1 %
ERYTHROCYTE [DISTWIDTH] IN BLOOD BY AUTOMATED COUNT: 16.1 % (ref 10–15)
FLUAV RNA SPEC QL NAA+PROBE: NEGATIVE
FLUBV RNA RESP QL NAA+PROBE: NEGATIVE
GFR SERPL CREATININE-BSD FRML MDRD: >90 ML/MIN/1.73M2
GLUCOSE BLD-MCNC: 94 MG/DL (ref 70–99)
GLUCOSE UR STRIP-MCNC: 50 MG/DL
HCT VFR BLD AUTO: 36.5 % (ref 35–47)
HGB BLD-MCNC: 12.5 G/DL (ref 11.7–15.7)
HGB UR QL STRIP: NEGATIVE
HYALINE CASTS: 7 /LPF
IMM GRANULOCYTES # BLD: 0 10E3/UL
IMM GRANULOCYTES NFR BLD: 0 %
INTERPRETATION ECG - MUSE: NORMAL
KETONES UR STRIP-MCNC: 10 MG/DL
LEUKOCYTE ESTERASE UR QL STRIP: ABNORMAL
LIPASE SERPL-CCNC: 70 U/L (ref 73–393)
LYMPHOCYTES # BLD AUTO: 0.6 10E3/UL (ref 0.8–5.3)
LYMPHOCYTES NFR BLD AUTO: 12 %
MAGNESIUM SERPL-MCNC: 1.8 MG/DL (ref 1.6–2.3)
MCH RBC QN AUTO: 31.5 PG (ref 26.5–33)
MCHC RBC AUTO-ENTMCNC: 34.2 G/DL (ref 31.5–36.5)
MCV RBC AUTO: 92 FL (ref 78–100)
MONOCYTES # BLD AUTO: 0.4 10E3/UL (ref 0–1.3)
MONOCYTES NFR BLD AUTO: 7 %
MUCOUS THREADS #/AREA URNS LPF: PRESENT /LPF
NEUTROPHILS # BLD AUTO: 4.3 10E3/UL (ref 1.6–8.3)
NEUTROPHILS NFR BLD AUTO: 79 %
NITRATE UR QL: NEGATIVE
NRBC # BLD AUTO: 0 10E3/UL
NRBC BLD AUTO-RTO: 0 /100
P AXIS - MUSE: 58 DEGREES
PH UR STRIP: 6.5 [PH] (ref 5–7)
PHOSPHATE SERPL-MCNC: 3.1 MG/DL (ref 2.5–4.5)
PLATELET # BLD AUTO: 189 10E3/UL (ref 150–450)
POTASSIUM BLD-SCNC: 2.5 MMOL/L (ref 3.4–5.3)
POTASSIUM BLD-SCNC: 3.1 MMOL/L (ref 3.4–5.3)
PR INTERVAL - MUSE: 90 MS
PROT SERPL-MCNC: 7.9 G/DL (ref 6.8–8.8)
QRS DURATION - MUSE: 70 MS
QT - MUSE: 378 MS
QTC - MUSE: 472 MS
R AXIS - MUSE: 80 DEGREES
RBC # BLD AUTO: 3.97 10E6/UL (ref 3.8–5.2)
RBC URINE: 0 /HPF
RSV RNA SPEC NAA+PROBE: NEGATIVE
SARS-COV-2 RNA RESP QL NAA+PROBE: NEGATIVE
SODIUM SERPL-SCNC: 136 MMOL/L (ref 133–144)
SP GR UR STRIP: 1.02 (ref 1–1.03)
SQUAMOUS EPITHELIAL: 2 /HPF
SYSTOLIC BLOOD PRESSURE - MUSE: NORMAL MMHG
T AXIS - MUSE: 66 DEGREES
UROBILINOGEN UR STRIP-MCNC: 4 MG/DL
VENTRICULAR RATE- MUSE: 94 BPM
WBC # BLD AUTO: 5.4 10E3/UL (ref 4–11)
WBC URINE: 7 /HPF

## 2021-10-18 PROCEDURE — 99223 1ST HOSP IP/OBS HIGH 75: CPT | Mod: AI | Performed by: STUDENT IN AN ORGANIZED HEALTH CARE EDUCATION/TRAINING PROGRAM

## 2021-10-18 PROCEDURE — 120N000001 HC R&B MED SURG/OB

## 2021-10-18 PROCEDURE — 258N000003 HC RX IP 258 OP 636: Performed by: NURSE PRACTITIONER

## 2021-10-18 PROCEDURE — 83690 ASSAY OF LIPASE: CPT | Performed by: NURSE PRACTITIONER

## 2021-10-18 PROCEDURE — 81001 URINALYSIS AUTO W/SCOPE: CPT | Performed by: NURSE PRACTITIONER

## 2021-10-18 PROCEDURE — 83735 ASSAY OF MAGNESIUM: CPT | Performed by: STUDENT IN AN ORGANIZED HEALTH CARE EDUCATION/TRAINING PROGRAM

## 2021-10-18 PROCEDURE — 99285 EMERGENCY DEPT VISIT HI MDM: CPT | Mod: 25

## 2021-10-18 PROCEDURE — 36415 COLL VENOUS BLD VENIPUNCTURE: CPT | Performed by: NURSE PRACTITIONER

## 2021-10-18 PROCEDURE — 93005 ELECTROCARDIOGRAM TRACING: CPT

## 2021-10-18 PROCEDURE — 82040 ASSAY OF SERUM ALBUMIN: CPT | Performed by: NURSE PRACTITIONER

## 2021-10-18 PROCEDURE — 96375 TX/PRO/DX INJ NEW DRUG ADDON: CPT

## 2021-10-18 PROCEDURE — 85025 COMPLETE CBC W/AUTO DIFF WBC: CPT | Performed by: NURSE PRACTITIONER

## 2021-10-18 PROCEDURE — 96376 TX/PRO/DX INJ SAME DRUG ADON: CPT

## 2021-10-18 PROCEDURE — 250N000011 HC RX IP 250 OP 636: Performed by: NURSE PRACTITIONER

## 2021-10-18 PROCEDURE — 96368 THER/DIAG CONCURRENT INF: CPT

## 2021-10-18 PROCEDURE — 70486 CT MAXILLOFACIAL W/O DYE: CPT | Mod: MG

## 2021-10-18 PROCEDURE — 250N000009 HC RX 250: Performed by: STUDENT IN AN ORGANIZED HEALTH CARE EDUCATION/TRAINING PROGRAM

## 2021-10-18 PROCEDURE — 82247 BILIRUBIN TOTAL: CPT | Performed by: NURSE PRACTITIONER

## 2021-10-18 PROCEDURE — 36415 COLL VENOUS BLD VENIPUNCTURE: CPT | Performed by: STUDENT IN AN ORGANIZED HEALTH CARE EDUCATION/TRAINING PROGRAM

## 2021-10-18 PROCEDURE — 70450 CT HEAD/BRAIN W/O DYE: CPT | Mod: ME

## 2021-10-18 PROCEDURE — 250N000013 HC RX MED GY IP 250 OP 250 PS 637: Performed by: STUDENT IN AN ORGANIZED HEALTH CARE EDUCATION/TRAINING PROGRAM

## 2021-10-18 PROCEDURE — 96367 TX/PROPH/DG ADDL SEQ IV INF: CPT

## 2021-10-18 PROCEDURE — 84132 ASSAY OF SERUM POTASSIUM: CPT | Performed by: STUDENT IN AN ORGANIZED HEALTH CARE EDUCATION/TRAINING PROGRAM

## 2021-10-18 PROCEDURE — 258N000003 HC RX IP 258 OP 636: Performed by: STUDENT IN AN ORGANIZED HEALTH CARE EDUCATION/TRAINING PROGRAM

## 2021-10-18 PROCEDURE — 84100 ASSAY OF PHOSPHORUS: CPT | Performed by: STUDENT IN AN ORGANIZED HEALTH CARE EDUCATION/TRAINING PROGRAM

## 2021-10-18 PROCEDURE — 250N000011 HC RX IP 250 OP 636: Performed by: STUDENT IN AN ORGANIZED HEALTH CARE EDUCATION/TRAINING PROGRAM

## 2021-10-18 PROCEDURE — 96365 THER/PROPH/DIAG IV INF INIT: CPT

## 2021-10-18 PROCEDURE — 87637 SARSCOV2&INF A&B&RSV AMP PRB: CPT | Performed by: EMERGENCY MEDICINE

## 2021-10-18 RX ORDER — POTASSIUM CHLORIDE 7.45 MG/ML
10 INJECTION INTRAVENOUS ONCE
Status: COMPLETED | OUTPATIENT
Start: 2021-10-18 | End: 2021-10-18

## 2021-10-18 RX ORDER — LORAZEPAM 2 MG/ML
1-2 INJECTION INTRAMUSCULAR EVERY 30 MIN PRN
Status: DISCONTINUED | OUTPATIENT
Start: 2021-10-18 | End: 2021-10-23 | Stop reason: HOSPADM

## 2021-10-18 RX ORDER — FLUMAZENIL 0.1 MG/ML
0.2 INJECTION, SOLUTION INTRAVENOUS
Status: DISCONTINUED | OUTPATIENT
Start: 2021-10-18 | End: 2021-10-23 | Stop reason: HOSPADM

## 2021-10-18 RX ORDER — POTASSIUM CHLORIDE 7.45 MG/ML
10 INJECTION INTRAVENOUS
Status: DISCONTINUED | OUTPATIENT
Start: 2021-10-18 | End: 2021-10-18

## 2021-10-18 RX ORDER — LORAZEPAM 2 MG/ML
0.5 INJECTION INTRAMUSCULAR ONCE
Status: COMPLETED | OUTPATIENT
Start: 2021-10-18 | End: 2021-10-18

## 2021-10-18 RX ORDER — LORAZEPAM 1 MG/1
1-2 TABLET ORAL EVERY 30 MIN PRN
Status: DISCONTINUED | OUTPATIENT
Start: 2021-10-18 | End: 2021-10-23 | Stop reason: HOSPADM

## 2021-10-18 RX ORDER — OLANZAPINE 5 MG/1
5-10 TABLET, ORALLY DISINTEGRATING ORAL EVERY 6 HOURS PRN
Status: DISCONTINUED | OUTPATIENT
Start: 2021-10-18 | End: 2021-10-23 | Stop reason: HOSPADM

## 2021-10-18 RX ORDER — SPIRONOLACTONE 25 MG
12.5 TABLET ORAL EVERY MORNING
Status: DISCONTINUED | OUTPATIENT
Start: 2021-10-19 | End: 2021-10-23 | Stop reason: HOSPADM

## 2021-10-18 RX ORDER — LANOLIN ALCOHOL/MO/W.PET/CERES
100 CREAM (GRAM) TOPICAL DAILY
Status: DISCONTINUED | OUTPATIENT
Start: 2021-10-26 | End: 2021-10-23 | Stop reason: HOSPADM

## 2021-10-18 RX ORDER — METOPROLOL SUCCINATE 25 MG/1
25 TABLET, EXTENDED RELEASE ORAL 2 TIMES DAILY
Status: DISCONTINUED | OUTPATIENT
Start: 2021-10-18 | End: 2021-10-23 | Stop reason: HOSPADM

## 2021-10-18 RX ORDER — FOLIC ACID 5 MG/ML
1 INJECTION, SOLUTION INTRAMUSCULAR; INTRAVENOUS; SUBCUTANEOUS DAILY
Status: DISCONTINUED | OUTPATIENT
Start: 2021-10-18 | End: 2021-10-23 | Stop reason: HOSPADM

## 2021-10-18 RX ORDER — HALOPERIDOL 5 MG/ML
2.5-5 INJECTION INTRAMUSCULAR EVERY 6 HOURS PRN
Status: DISCONTINUED | OUTPATIENT
Start: 2021-10-18 | End: 2021-10-23 | Stop reason: HOSPADM

## 2021-10-18 RX ORDER — POTASSIUM CHLORIDE 7.45 MG/ML
10 INJECTION INTRAVENOUS
Status: COMPLETED | OUTPATIENT
Start: 2021-10-18 | End: 2021-10-18

## 2021-10-18 RX ORDER — POTASSIUM CHLORIDE 7.45 MG/ML
10 INJECTION INTRAVENOUS
Status: COMPLETED | OUTPATIENT
Start: 2021-10-18 | End: 2021-10-19

## 2021-10-18 RX ORDER — ONDANSETRON 2 MG/ML
4 INJECTION INTRAMUSCULAR; INTRAVENOUS ONCE
Status: COMPLETED | OUTPATIENT
Start: 2021-10-18 | End: 2021-10-18

## 2021-10-18 RX ADMIN — LORAZEPAM 1 MG: 2 INJECTION INTRAMUSCULAR; INTRAVENOUS at 20:02

## 2021-10-18 RX ADMIN — LORAZEPAM 0.5 MG: 2 INJECTION INTRAMUSCULAR; INTRAVENOUS at 16:58

## 2021-10-18 RX ADMIN — POTASSIUM CHLORIDE 10 MEQ: 7.46 INJECTION, SOLUTION INTRAVENOUS at 16:10

## 2021-10-18 RX ADMIN — METOPROLOL SUCCINATE 25 MG: 25 TABLET, EXTENDED RELEASE ORAL at 22:52

## 2021-10-18 RX ADMIN — ONDANSETRON 4 MG: 2 INJECTION INTRAMUSCULAR; INTRAVENOUS at 15:08

## 2021-10-18 RX ADMIN — POTASSIUM CHLORIDE 10 MEQ: 7.46 INJECTION, SOLUTION INTRAVENOUS at 17:36

## 2021-10-18 RX ADMIN — POTASSIUM CHLORIDE 10 MEQ: 7.46 INJECTION, SOLUTION INTRAVENOUS at 23:54

## 2021-10-18 RX ADMIN — THIAMINE HYDROCHLORIDE 500 MG: 100 INJECTION, SOLUTION INTRAMUSCULAR; INTRAVENOUS at 19:01

## 2021-10-18 RX ADMIN — POTASSIUM CHLORIDE 10 MEQ: 7.46 INJECTION, SOLUTION INTRAVENOUS at 22:50

## 2021-10-18 RX ADMIN — POTASSIUM CHLORIDE 10 MEQ: 7.46 INJECTION, SOLUTION INTRAVENOUS at 18:32

## 2021-10-18 RX ADMIN — SODIUM CHLORIDE 500 ML: 9 INJECTION, SOLUTION INTRAVENOUS at 15:07

## 2021-10-18 RX ADMIN — POTASSIUM CHLORIDE 10 MEQ: 7.46 INJECTION, SOLUTION INTRAVENOUS at 19:35

## 2021-10-18 RX ADMIN — FOLIC ACID 1 MG: 5 INJECTION, SOLUTION INTRAMUSCULAR; INTRAVENOUS; SUBCUTANEOUS at 18:59

## 2021-10-18 ASSESSMENT — ENCOUNTER SYMPTOMS
CHILLS: 1
APPETITE CHANGE: 1
WEAKNESS: 1
FEVER: 1
COUGH: 0
NAUSEA: 1
VOMITING: 0
ABDOMINAL PAIN: 1
DIARRHEA: 1
SHORTNESS OF BREATH: 0

## 2021-10-18 ASSESSMENT — ACTIVITIES OF DAILY LIVING (ADL)
ADLS_ACUITY_SCORE: 5

## 2021-10-18 ASSESSMENT — MIFFLIN-ST. JEOR: SCORE: 835.26

## 2021-10-18 NOTE — ED PROVIDER NOTES
History   Chief Complaint:  Nausea, Vomiting, & Diarrhea     The history is provided by the patient.      Kezia Dixon is a 65 year old female with history of esophageal cancer, hypertension, hyperlipidemia, carotid artery disease and alcoholism who presents with nausea, vomiting and diarrhea. The patient tells us that since Friday, three days ago, she has been feeling nauseous, having diarrhea, chills, weakness, abdominal pain and the inability to eat anything. She says that the weakness is at a point where she is no longer able to walk comfortably and she is unable to take her daily medications. In addition, she tells us that she fell onto her face on Friday and is still having some facial pain. The patient denies vomiting, cough, chest pain or shortness of breath.     Review of Systems   Constitutional: Positive for appetite change, chills and fever.   Respiratory: Negative for cough and shortness of breath.    Gastrointestinal: Positive for abdominal pain, diarrhea and nausea. Negative for vomiting.   Neurological: Positive for weakness.   All other systems reviewed and are negative.    Allergies:  Codeine Sulfate  Simvastatin  Penicillin    Medications:  Aspirin 81 mg   Lipitor  Flonase  Folvite  Lasix  Cozaar  Metoprolol succinate  Prilosec  Aldactone  Thiamine B-1    Past Medical History:     Alcoholism   Hypertension   Carotid artery disease  Chemical dependency   Depression  Esophageal cancer  Pancreatitis  GERD  Hyperglycemia  Hyperlipidemia  Impaired fasting glucose  Pancreatic pseudocyst  Shingles   Vasomotor rhinitis  Chronic pain syndrome      Past Surgical History:    AAA  Colonoscopy x 3  Esophagogastrectomy  EGD  GI surgery  Right hand surgery  Herniorrhaphy incisional   Laparoscopic cholecystectomy  Lobectomy lung  Tonsillectomy  Vascular surgery     Family History:    Melanoma  Diabetes  Prostate cancer    Social History:  The patient presents to the ED with her son   The patient is an  "alcoholic, last use was in August after admission for alcohol withdrawal  The patient quit smoking cigarettes five years ago     Physical Exam     Patient Vitals for the past 24 hrs:   BP Temp Temp src Pulse Resp SpO2 Height Weight   10/18/21 1736 -- -- -- 101 16 98 % -- --   10/18/21 1733 (!) 169/71 -- -- 103 12 -- -- --   10/18/21 1645 -- -- -- 104 16 99 % -- --   10/18/21 1640 (!) 168/73 99.1  F (37.3  C) Oral 107 14 98 % -- --   10/18/21 1540 -- -- -- -- -- 96 % -- --   10/18/21 1539 (!) 187/74 -- -- 101 -- -- -- --   10/18/21 1138 (!) 176/74 99.9  F (37.7  C) Temporal 94 18 97 % 1.549 m (5' 1\") 36.3 kg (80 lb)     Physical Exam    Physical Exam   Constitutional: Pt appears well-developed and well-nourished.  Head: Bruising under the eyes and a superficial laceration to the bridge of the nose. Head moves freely with normal range of motion. No battles signs. No Racoons eyes.   ENT: Oropharynx is clear and moist. Nose with no deformity. No blood at the nares. No septal hematoma. No hemotympanum. Bruising at the upper lip, no intraoral lacerations. Teeth are not loose or malaligned and no evidence of dental fracture.   Eyes: Conjunctivae pink. EOMs intact. Pupils are equal, round, and reactive to light.  Neck: Normal range of motion. No midline C Spine tenderness, step-off or crepitus.   Cardiovascular: Regular rate and rhythm. Normal heart sounds. No concerning  murmur heard. Intact distal pulses: radial pulses 2+ on the right, 2+ on the left.   Pulmonary/Chest: No respiratory distress.  Breath sounds normal. No decreased breath sounds. No wheezes. No rhonchi. No rales. No chest wall tenderness or crepitus.    Abdominal: Soft. Non-tender. No rebound, no guarding.   Musculoskeletal: No edema. No tenderness. Distal capillary refill and sensation intact.  Neurological: Oriented to person, place, and time. No focal deficits.   Skin: Skin is warm.     Emergency Department Course   ECG  ECG obtained at 1602, ECG read at " 1606  Sinus rhythm with short ID. Otherwise normal ECG.  Rate 94 bpm. ID interval 90 ms. QRS duration 70 ms. QT/QTc 378/472 ms. P-R-T axes 58 80 66.     Imaging:    CT Facial Bones without Contrast   Preliminary Result   IMPRESSION: Negative facial bone CT examination.      Head CT w/o contrast   Preliminary Result   IMPRESSION: Chronic changes. No evidence for intracranial hemorrhage   or any acute process.        Report per radiology    Laboratory:    Labs Ordered and Resulted from Time of ED Arrival Up to the Time of Departure from the ED   COMPREHENSIVE METABOLIC PANEL - Abnormal; Notable for the following components:       Result Value    Potassium 2.5 (*)     Chloride 91 (*)     Creatinine 0.45 (*)     AST 90 (*)     ALT 55 (*)     Bilirubin Total 1.6 (*)     All other components within normal limits   LIPASE - Abnormal; Notable for the following components:    Lipase 70 (*)     All other components within normal limits   CBC WITH PLATELETS AND DIFFERENTIAL - Abnormal; Notable for the following components:    RDW 16.1 (*)     Absolute Lymphocytes 0.6 (*)     All other components within normal limits   ROUTINE UA WITH MICROSCOPIC REFLEX TO CULTURE - Abnormal; Notable for the following components:    Glucose Urine 50  (*)     Ketones Urine 10  (*)     Protein Albumin Urine 20  (*)     Urobilinogen Urine 4.0 (*)     Leukocyte Esterase Urine Small (*)     Mucus Urine Present (*)     WBC Urine 7 (*)     Squamous Epithelials Urine 2 (*)     Hyaline Casts Urine 7 (*)     All other components within normal limits    Narrative:     Urine Culture not indicated   INFLUENZA A/B & SARS-COV2 PCR MULTIPLEX - Normal    Narrative:     Testing was performed using the Xpert Xpress CoV2/Flu/RSV Assay on the Cepheid GeneXpert Instrument. This test should be ordered for the detection of SARS-CoV-2 and influenza viruses in individuals who meet clinical and/or epidemiological criteria. Test performance is unknown in asymptomatic  patients. This test is for in vitro diagnostic use under the FDA EUA for laboratories certified under CLIA to perform high or moderate complexity testing. This test has not been FDA cleared or approved. A negative result does not rule out the presence of PCR inhibitors in the specimen or target RNA in concentration below the limit of detection for the assay. If only one viral target is positive but coinfection with multiple targets is suspected, the sample should be re-tested with another FDA cleared, approved, or authorized test, if coinfection would change clinical management. This test was validated by the [facility] [employer]. These laboratories are certified under the Clinical  Laboratory Improvement Amendments of 1988 (CLIA-88) as qualified to perform high complexity laboratory testing.   PHOSPHORUS   MAGNESIUM   ENTERIC BACTERIA AND VIRUS PANEL BY SHAKEEL STOOL   CLOSTRIDIUM DIFFICILE TOXIN B   CBC WITH PLATELETS & DIFFERENTIAL    Narrative:     The following orders were created for panel order CBC with platelets + differential.  Procedure                               Abnormality         Status                     ---------                               -----------         ------                     CBC with platelets and d...[[id number]]  Abnormal            Final result                 Please view results for these tests on the individual orders.      Emergency Department Course:  Reviewed:  I reviewed nursing notes, vitals, past medical history, Care Everywhere and [facility]    Assessments:  1450 I obtained history and examined the patient as noted above.   1715 I rechecked the patient and explained findings. There is currently no change to the status of the patient.      Consults:  1730 I spoke to Dr. Zaman, hospitalist, regarding this patient     Interventions:  1508 Zofran 4 mg IV  1610 Potassium Chloride 10 mEq IV   1658 Ativan 0.5 mg IV    Disposition:  The patient was admitted to the hospital  under the care of Dr. Zaman.     Impression & Plan     Medical Decision Making:  Kezia Dixon is a 65 year old female with hx of alcoholism and esophageal cancer. She arrives with weakness, diarrhea and obvious injury to her head. Her vital signs reveal a mild elevation in temp 99.9 initially, 99.1 on recheck, tachycardia and hypertension which all seem consistent with alcohol withdrawal although she denies any alcohol use since her admission in August. She explained that weakness caused her fall, but again I wonder if this was alcohol related. Work-up with negative head CT and facial bones CT. Lab work with hypokalemia. Normal renal function. Pending labs are urine and stool studies. Potassium replacement IV is initiated. IV hydration, zofran and ativan given for nausea. I discussed admission with Dr Zaman. The patient is aware that she will likely be boarding here in the ER as we have no medical beds available. She is amenable to plan.        Diagnosis:    ICD-10-CM    1. Nausea  R11.0    2. Diarrhea  R19.7    3. Generalized muscle weakness  M62.81    4. Injury of head, initial encounter  S09.90XA    5. Hypokalemia  E87.6      Scribe Disclosure:  I, Augusto Vences, am serving as a scribe at 2:41 PM on 10/18/2021 to document services personally performed by Dawn Gaytan, BERNARD, based on my observations and the provider's statements to me.          Dawn Gaytan, STEVEN CNP  10/18/21 8585

## 2021-10-18 NOTE — ED NOTES
"[facility]  ED Nurse Handoff Report    ED Chief complaint: Nausea, Vomiting, & Diarrhea      ED Diagnosis:   Final diagnoses:   None       Code Status: Full Code    Allergies:   Allergies   Allergen Reactions     Codeine Sulfate Nausea     Simvastatin Cramps     Leg cramps     Pcn [Penicillins] Rash       Patient Story: Pt presents after a fall on Friday in which she fell onto her face. She also has had ongoing N/V/D.  Focused Assessment:  Pt with abrasion to bridge of her nose. CT shows no fracture. K2.5. Receiving 10mEq K. Reports that every time she eats she gets diarrhea. Pt has been up to commode multiple times in the ED without any diarrhea. Received zofran 4mg iv and 500cc NS in the ED. Zofran helped with nausea. Telemetry is NSR. Pt reports being too weak to walk at home.      Treatments and/or interventions provided: see above  Patient's response to treatments and/or interventions: see above    To be done/followed up on inpatient unit:  none pending    Does this patient have any cognitive concerns?: none    Activity level - Baseline/Home:  Independent  Activity Level - Current:   Stand with Assist    Patient's Preferred language: English   Needed?: No    Isolation: enteric  Infection: possible c-diff  Patient tested for COVID 19 prior to admission: YES  Bariatric?: No    Vital Signs:   Vitals:    10/18/21 1138 10/18/21 1539 10/18/21 1540   BP: (!) 176/74 (!) 187/74    Pulse: 94 101    Resp: 18     Temp: 99.9  F (37.7  C)     TempSrc: Temporal     SpO2: 97%  96%   Weight: 36.3 kg (80 lb)     Height: 1.549 m (5' 1\")         Cardiac Rhythm:     Was the PSS-3 completed:   Yes  What interventions are required if any?               Family Comments: Son present  OBS brochure/video discussed/provided to patient/family: N/A              Name of person given brochure if not patient: N/A              Relationship to patient: N/A    For the majority of the shift this patient's behavior was " Green.   Behavioral interventions performed were information.    ED NURSE PHONE NUMBER: [phone]

## 2021-10-18 NOTE — PHARMACY-ADMISSION MEDICATION HISTORY
Pharmacy Medication History  Admission medication history interview status for the 10/18/2021  admission is complete. See EPIC admission navigator for prior to admission medications     Location of Interview: Patient room  Medication history sources: Patient, Surescripts and Patient's home med list    Significant changes made to the medication list:  None     In the past week, patient estimated taking medication this percent of the time: greater than 90%    Additional medication history information:   Recently changed from Lisinopril to Losartan due to cough    Medication reconciliation completed by provider prior to medication history? No    Time spent in this activity: 15 minutes    Prior to Admission medications    Medication Sig Last Dose Taking? Auth Provider   aspirin (ASA) 81 MG EC tablet Take 1 tablet (81 mg) by mouth daily 10/17/2021 at am Yes Mustapha Velásquez MD   atorvastatin (LIPITOR) 40 MG tablet Take 1 tablet (40 mg) by mouth daily 10/17/2021 at am Yes Mustapha Velásquez MD   fluticasone (FLONASE) 50 MCG/ACT nasal spray INSTILL 2 SPRAYS INTO BOTH NOSTRILS DAILY. 10/17/2021 at am Yes Mustapha Velásquez MD   folic acid (FOLVITE) 1 MG tablet Take 1 tablet (1 mg) by mouth daily 10/17/2021 at pm Yes Mustapha Velásquez MD   furosemide (LASIX) 40 MG tablet Take 1 tablet (40 mg) by mouth every morning 10/17/2021 at am Yes Mustapha Velásquez MD   losartan (COZAAR) 25 MG tablet Take 0.5 tablets (12.5 mg) by mouth daily 10/17/2021 at am Yes Mustapha Velásquez MD   metoprolol succinate ER (TOPROL-XL) 25 MG 24 hr tablet Take 1 tablet (25 mg) by mouth 2 times daily 10/17/2021 at pm Yes Mustapha Velásquez MD   omeprazole (PRILOSEC) 40 MG DR capsule TAKE 1 CAPSULE BY MOUTH EVERY DAY 10/17/2021 at am Yes Mustapha Velásquez MD   spironolactone (ALDACTONE) 25 MG tablet Take 0.5 tablets (12.5 mg) by mouth every morning 10/17/2021 at am Yes uMstapha Velásquez MD   thiamine (B-1) 100 MG tablet Take 1 tablet (100 mg) by mouth daily  10/17/2021 at am Yes Mustapha Velásquez MD       The information provided in this note is only as accurate as the sources available at the time of update(s)

## 2021-10-19 ENCOUNTER — APPOINTMENT (OUTPATIENT)
Dept: PHYSICAL THERAPY | Facility: CLINIC | Age: 66
DRG: 947 | End: 2021-10-19
Attending: STUDENT IN AN ORGANIZED HEALTH CARE EDUCATION/TRAINING PROGRAM
Payer: MEDICARE

## 2021-10-19 ENCOUNTER — APPOINTMENT (OUTPATIENT)
Dept: GENERAL RADIOLOGY | Facility: CLINIC | Age: 66
DRG: 947 | End: 2021-10-19
Attending: STUDENT IN AN ORGANIZED HEALTH CARE EDUCATION/TRAINING PROGRAM
Payer: MEDICARE

## 2021-10-19 ENCOUNTER — APPOINTMENT (OUTPATIENT)
Dept: CARDIOLOGY | Facility: CLINIC | Age: 66
DRG: 947 | End: 2021-10-19
Attending: STUDENT IN AN ORGANIZED HEALTH CARE EDUCATION/TRAINING PROGRAM
Payer: MEDICARE

## 2021-10-19 ENCOUNTER — APPOINTMENT (OUTPATIENT)
Dept: OCCUPATIONAL THERAPY | Facility: CLINIC | Age: 66
DRG: 947 | End: 2021-10-19
Attending: STUDENT IN AN ORGANIZED HEALTH CARE EDUCATION/TRAINING PROGRAM
Payer: MEDICARE

## 2021-10-19 LAB
ALBUMIN SERPL-MCNC: 2.4 G/DL (ref 3.4–5)
ALP SERPL-CCNC: 86 U/L (ref 40–150)
ALT SERPL W P-5'-P-CCNC: 34 U/L (ref 0–50)
ANION GAP SERPL CALCULATED.3IONS-SCNC: 7 MMOL/L (ref 3–14)
AST SERPL W P-5'-P-CCNC: 50 U/L (ref 0–45)
BILIRUB SERPL-MCNC: 1 MG/DL (ref 0.2–1.3)
BUN SERPL-MCNC: 9 MG/DL (ref 7–30)
CALCIUM SERPL-MCNC: 7 MG/DL (ref 8.5–10.1)
CHLORIDE BLD-SCNC: 101 MMOL/L (ref 94–109)
CO2 SERPL-SCNC: 29 MMOL/L (ref 20–32)
CREAT SERPL-MCNC: 0.45 MG/DL (ref 0.52–1.04)
ERYTHROCYTE [DISTWIDTH] IN BLOOD BY AUTOMATED COUNT: 16.3 % (ref 10–15)
GFR SERPL CREATININE-BSD FRML MDRD: >90 ML/MIN/1.73M2
GLUCOSE BLD-MCNC: 91 MG/DL (ref 70–99)
HCT VFR BLD AUTO: 28.6 % (ref 35–47)
HGB BLD-MCNC: 9.4 G/DL (ref 11.7–15.7)
HOLD SPECIMEN: NORMAL
LVEF ECHO: NORMAL
MAGNESIUM SERPL-MCNC: 1.4 MG/DL (ref 1.6–2.3)
MAGNESIUM SERPL-MCNC: 2 MG/DL (ref 1.6–2.3)
MCH RBC QN AUTO: 31.4 PG (ref 26.5–33)
MCHC RBC AUTO-ENTMCNC: 32.9 G/DL (ref 31.5–36.5)
MCV RBC AUTO: 96 FL (ref 78–100)
NT-PROBNP SERPL-MCNC: 3757 PG/ML (ref 0–900)
PHOSPHATE SERPL-MCNC: 2.3 MG/DL (ref 2.5–4.5)
PLATELET # BLD AUTO: 143 10E3/UL (ref 150–450)
POTASSIUM BLD-SCNC: 3.3 MMOL/L (ref 3.4–5.3)
POTASSIUM BLD-SCNC: 3.6 MMOL/L (ref 3.4–5.3)
PROCALCITONIN SERPL-MCNC: <0.05 NG/ML
PROT SERPL-MCNC: 5.5 G/DL (ref 6.8–8.8)
RBC # BLD AUTO: 2.99 10E6/UL (ref 3.8–5.2)
SODIUM SERPL-SCNC: 137 MMOL/L (ref 133–144)
WBC # BLD AUTO: 6.5 10E3/UL (ref 4–11)

## 2021-10-19 PROCEDURE — 87040 BLOOD CULTURE FOR BACTERIA: CPT | Performed by: INTERNAL MEDICINE

## 2021-10-19 PROCEDURE — 36415 COLL VENOUS BLD VENIPUNCTURE: CPT | Performed by: STUDENT IN AN ORGANIZED HEALTH CARE EDUCATION/TRAINING PROGRAM

## 2021-10-19 PROCEDURE — 36415 COLL VENOUS BLD VENIPUNCTURE: CPT | Performed by: INTERNAL MEDICINE

## 2021-10-19 PROCEDURE — 250N000013 HC RX MED GY IP 250 OP 250 PS 637: Performed by: INTERNAL MEDICINE

## 2021-10-19 PROCEDURE — 97530 THERAPEUTIC ACTIVITIES: CPT | Mod: GO | Performed by: OCCUPATIONAL THERAPIST

## 2021-10-19 PROCEDURE — 83735 ASSAY OF MAGNESIUM: CPT | Performed by: INTERNAL MEDICINE

## 2021-10-19 PROCEDURE — 93306 TTE W/DOPPLER COMPLETE: CPT

## 2021-10-19 PROCEDURE — 258N000003 HC RX IP 258 OP 636: Performed by: STUDENT IN AN ORGANIZED HEALTH CARE EDUCATION/TRAINING PROGRAM

## 2021-10-19 PROCEDURE — 94640 AIRWAY INHALATION TREATMENT: CPT | Mod: 76

## 2021-10-19 PROCEDURE — 84132 ASSAY OF SERUM POTASSIUM: CPT | Performed by: STUDENT IN AN ORGANIZED HEALTH CARE EDUCATION/TRAINING PROGRAM

## 2021-10-19 PROCEDURE — 250N000013 HC RX MED GY IP 250 OP 250 PS 637: Performed by: STUDENT IN AN ORGANIZED HEALTH CARE EDUCATION/TRAINING PROGRAM

## 2021-10-19 PROCEDURE — 97165 OT EVAL LOW COMPLEX 30 MIN: CPT | Mod: GO | Performed by: OCCUPATIONAL THERAPIST

## 2021-10-19 PROCEDURE — 84145 PROCALCITONIN (PCT): CPT | Performed by: STUDENT IN AN ORGANIZED HEALTH CARE EDUCATION/TRAINING PROGRAM

## 2021-10-19 PROCEDURE — 71045 X-RAY EXAM CHEST 1 VIEW: CPT

## 2021-10-19 PROCEDURE — 83735 ASSAY OF MAGNESIUM: CPT | Performed by: STUDENT IN AN ORGANIZED HEALTH CARE EDUCATION/TRAINING PROGRAM

## 2021-10-19 PROCEDURE — 82040 ASSAY OF SERUM ALBUMIN: CPT | Performed by: STUDENT IN AN ORGANIZED HEALTH CARE EDUCATION/TRAINING PROGRAM

## 2021-10-19 PROCEDURE — 97535 SELF CARE MNGMENT TRAINING: CPT | Mod: GO | Performed by: OCCUPATIONAL THERAPIST

## 2021-10-19 PROCEDURE — 80053 COMPREHEN METABOLIC PANEL: CPT | Performed by: STUDENT IN AN ORGANIZED HEALTH CARE EDUCATION/TRAINING PROGRAM

## 2021-10-19 PROCEDURE — 99233 SBSQ HOSP IP/OBS HIGH 50: CPT | Performed by: INTERNAL MEDICINE

## 2021-10-19 PROCEDURE — 97530 THERAPEUTIC ACTIVITIES: CPT | Mod: GP | Performed by: PHYSICAL THERAPIST

## 2021-10-19 PROCEDURE — 250N000011 HC RX IP 250 OP 636: Performed by: STUDENT IN AN ORGANIZED HEALTH CARE EDUCATION/TRAINING PROGRAM

## 2021-10-19 PROCEDURE — 97161 PT EVAL LOW COMPLEX 20 MIN: CPT | Mod: GP | Performed by: PHYSICAL THERAPIST

## 2021-10-19 PROCEDURE — 250N000009 HC RX 250: Performed by: STUDENT IN AN ORGANIZED HEALTH CARE EDUCATION/TRAINING PROGRAM

## 2021-10-19 PROCEDURE — 84100 ASSAY OF PHOSPHORUS: CPT | Performed by: STUDENT IN AN ORGANIZED HEALTH CARE EDUCATION/TRAINING PROGRAM

## 2021-10-19 PROCEDURE — 120N000001 HC R&B MED SURG/OB

## 2021-10-19 PROCEDURE — 97116 GAIT TRAINING THERAPY: CPT | Mod: GP | Performed by: PHYSICAL THERAPIST

## 2021-10-19 PROCEDURE — 83880 ASSAY OF NATRIURETIC PEPTIDE: CPT | Performed by: STUDENT IN AN ORGANIZED HEALTH CARE EDUCATION/TRAINING PROGRAM

## 2021-10-19 PROCEDURE — 94640 AIRWAY INHALATION TREATMENT: CPT

## 2021-10-19 PROCEDURE — 85027 COMPLETE CBC AUTOMATED: CPT | Performed by: STUDENT IN AN ORGANIZED HEALTH CARE EDUCATION/TRAINING PROGRAM

## 2021-10-19 PROCEDURE — 93306 TTE W/DOPPLER COMPLETE: CPT | Mod: 26 | Performed by: INTERNAL MEDICINE

## 2021-10-19 RX ORDER — ACETAMINOPHEN 325 MG/1
650 TABLET ORAL EVERY 6 HOURS PRN
Status: DISCONTINUED | OUTPATIENT
Start: 2021-10-19 | End: 2021-10-23 | Stop reason: HOSPADM

## 2021-10-19 RX ORDER — PROCHLORPERAZINE MALEATE 5 MG
5 TABLET ORAL EVERY 6 HOURS PRN
Status: DISCONTINUED | OUTPATIENT
Start: 2021-10-19 | End: 2021-10-23 | Stop reason: HOSPADM

## 2021-10-19 RX ORDER — ATORVASTATIN CALCIUM 40 MG/1
40 TABLET, FILM COATED ORAL DAILY
Status: DISCONTINUED | OUTPATIENT
Start: 2021-10-19 | End: 2021-10-23 | Stop reason: HOSPADM

## 2021-10-19 RX ORDER — PROCHLORPERAZINE 25 MG
12.5 SUPPOSITORY, RECTAL RECTAL EVERY 12 HOURS PRN
Status: DISCONTINUED | OUTPATIENT
Start: 2021-10-19 | End: 2021-10-23 | Stop reason: HOSPADM

## 2021-10-19 RX ORDER — PANTOPRAZOLE SODIUM 40 MG/1
40 TABLET, DELAYED RELEASE ORAL 2 TIMES DAILY
Status: DISCONTINUED | OUTPATIENT
Start: 2021-10-19 | End: 2021-10-23 | Stop reason: HOSPADM

## 2021-10-19 RX ORDER — FLUTICASONE PROPIONATE 50 MCG
2 SPRAY, SUSPENSION (ML) NASAL DAILY
Status: DISCONTINUED | OUTPATIENT
Start: 2021-10-19 | End: 2021-10-23 | Stop reason: HOSPADM

## 2021-10-19 RX ORDER — POTASSIUM CHLORIDE 7.45 MG/ML
10 INJECTION INTRAVENOUS
Status: COMPLETED | OUTPATIENT
Start: 2021-10-19 | End: 2021-10-19

## 2021-10-19 RX ORDER — ASPIRIN 81 MG/1
81 TABLET ORAL DAILY
Status: DISCONTINUED | OUTPATIENT
Start: 2021-10-19 | End: 2021-10-23 | Stop reason: HOSPADM

## 2021-10-19 RX ORDER — LIDOCAINE 40 MG/G
CREAM TOPICAL
Status: DISCONTINUED | OUTPATIENT
Start: 2021-10-19 | End: 2021-10-23 | Stop reason: HOSPADM

## 2021-10-19 RX ORDER — IPRATROPIUM BROMIDE AND ALBUTEROL SULFATE 2.5; .5 MG/3ML; MG/3ML
3 SOLUTION RESPIRATORY (INHALATION) EVERY 4 HOURS PRN
Status: DISCONTINUED | OUTPATIENT
Start: 2021-10-19 | End: 2021-10-20 | Stop reason: CLARIF

## 2021-10-19 RX ORDER — CEFTRIAXONE 2 G/1
2 INJECTION, POWDER, FOR SOLUTION INTRAMUSCULAR; INTRAVENOUS EVERY 24 HOURS
Status: DISCONTINUED | OUTPATIENT
Start: 2021-10-19 | End: 2021-10-23 | Stop reason: HOSPADM

## 2021-10-19 RX ORDER — ACETYLCYSTEINE 200 MG/ML
2 SOLUTION ORAL; RESPIRATORY (INHALATION) ONCE
Status: COMPLETED | OUTPATIENT
Start: 2021-10-19 | End: 2021-10-19

## 2021-10-19 RX ORDER — ONDANSETRON 2 MG/ML
4 INJECTION INTRAMUSCULAR; INTRAVENOUS EVERY 6 HOURS PRN
Status: DISCONTINUED | OUTPATIENT
Start: 2021-10-19 | End: 2021-10-23 | Stop reason: HOSPADM

## 2021-10-19 RX ORDER — AZITHROMYCIN 500 MG/1
500 INJECTION, POWDER, LYOPHILIZED, FOR SOLUTION INTRAVENOUS EVERY 24 HOURS
Status: COMPLETED | OUTPATIENT
Start: 2021-10-19 | End: 2021-10-19

## 2021-10-19 RX ORDER — ONDANSETRON 4 MG/1
4 TABLET, ORALLY DISINTEGRATING ORAL EVERY 6 HOURS PRN
Status: DISCONTINUED | OUTPATIENT
Start: 2021-10-19 | End: 2021-10-23 | Stop reason: HOSPADM

## 2021-10-19 RX ORDER — MAGNESIUM SULFATE HEPTAHYDRATE 40 MG/ML
2 INJECTION, SOLUTION INTRAVENOUS ONCE
Status: COMPLETED | OUTPATIENT
Start: 2021-10-19 | End: 2021-10-19

## 2021-10-19 RX ADMIN — POTASSIUM CHLORIDE 10 MEQ: 7.46 INJECTION, SOLUTION INTRAVENOUS at 03:24

## 2021-10-19 RX ADMIN — PANTOPRAZOLE SODIUM 40 MG: 40 TABLET, DELAYED RELEASE ORAL at 09:56

## 2021-10-19 RX ADMIN — ACETAMINOPHEN 650 MG: 325 TABLET, FILM COATED ORAL at 16:54

## 2021-10-19 RX ADMIN — MAGNESIUM SULFATE HEPTAHYDRATE 2 G: 40 INJECTION, SOLUTION INTRAVENOUS at 06:35

## 2021-10-19 RX ADMIN — FLUTICASONE PROPIONATE 2 SPRAY: 50 SPRAY, METERED NASAL at 10:36

## 2021-10-19 RX ADMIN — POTASSIUM CHLORIDE 10 MEQ: 7.46 INJECTION, SOLUTION INTRAVENOUS at 02:32

## 2021-10-19 RX ADMIN — THIAMINE HYDROCHLORIDE 500 MG: 100 INJECTION, SOLUTION INTRAMUSCULAR; INTRAVENOUS at 23:05

## 2021-10-19 RX ADMIN — AZITHROMYCIN MONOHYDRATE 250 MG: 500 INJECTION, POWDER, LYOPHILIZED, FOR SOLUTION INTRAVENOUS at 23:38

## 2021-10-19 RX ADMIN — THIAMINE HYDROCHLORIDE 500 MG: 100 INJECTION, SOLUTION INTRAMUSCULAR; INTRAVENOUS at 14:24

## 2021-10-19 RX ADMIN — PANTOPRAZOLE SODIUM 40 MG: 40 TABLET, DELAYED RELEASE ORAL at 20:50

## 2021-10-19 RX ADMIN — AZITHROMYCIN MONOHYDRATE 500 MG: 500 INJECTION, POWDER, LYOPHILIZED, FOR SOLUTION INTRAVENOUS at 05:18

## 2021-10-19 RX ADMIN — SPIRONOLACTONE 12.5 MG: 25 TABLET ORAL at 09:56

## 2021-10-19 RX ADMIN — THIAMINE HYDROCHLORIDE 500 MG: 100 INJECTION, SOLUTION INTRAMUSCULAR; INTRAVENOUS at 09:55

## 2021-10-19 RX ADMIN — ACETYLCYSTEINE 2 ML: 200 SOLUTION ORAL; RESPIRATORY (INHALATION) at 03:03

## 2021-10-19 RX ADMIN — METOPROLOL SUCCINATE 25 MG: 25 TABLET, EXTENDED RELEASE ORAL at 20:49

## 2021-10-19 RX ADMIN — FOLIC ACID 1 MG: 5 INJECTION, SOLUTION INTRAMUSCULAR; INTRAVENOUS; SUBCUTANEOUS at 10:36

## 2021-10-19 RX ADMIN — LOSARTAN POTASSIUM 12.5 MG: 25 TABLET, FILM COATED ORAL at 09:56

## 2021-10-19 RX ADMIN — ATORVASTATIN CALCIUM 40 MG: 40 TABLET, FILM COATED ORAL at 09:57

## 2021-10-19 RX ADMIN — IPRATROPIUM BROMIDE AND ALBUTEROL SULFATE 3 ML: .5; 3 SOLUTION RESPIRATORY (INHALATION) at 02:46

## 2021-10-19 RX ADMIN — CEFTRIAXONE SODIUM 2 G: 2 INJECTION, POWDER, FOR SOLUTION INTRAMUSCULAR; INTRAVENOUS at 04:41

## 2021-10-19 RX ADMIN — LORAZEPAM 1 MG: 2 INJECTION INTRAMUSCULAR; INTRAVENOUS at 02:56

## 2021-10-19 RX ADMIN — METOPROLOL SUCCINATE 25 MG: 25 TABLET, EXTENDED RELEASE ORAL at 09:56

## 2021-10-19 RX ADMIN — ASPIRIN 81 MG: 81 TABLET, COATED ORAL at 09:57

## 2021-10-19 ASSESSMENT — MIFFLIN-ST. JEOR: SCORE: 898.76

## 2021-10-19 ASSESSMENT — ACTIVITIES OF DAILY LIVING (ADL)
ADLS_ACUITY_SCORE: 12
ADLS_ACUITY_SCORE: 5
ADLS_ACUITY_SCORE: 11
ADLS_ACUITY_SCORE: 12
ADLS_ACUITY_SCORE: 5
ADLS_ACUITY_SCORE: 11
ADLS_ACUITY_SCORE: 5
ADLS_ACUITY_SCORE: 11
PREVIOUS_RESPONSIBILITIES: MEAL PREP;HOUSEKEEPING;LAUNDRY;SHOPPING;MEDICATION MANAGEMENT;FINANCES
ADLS_ACUITY_SCORE: 5
TOILETING_ISSUES: YES
ADLS_ACUITY_SCORE: 7
ADLS_ACUITY_SCORE: 12
ADLS_ACUITY_SCORE: 5
ADLS_ACUITY_SCORE: 12
ADLS_ACUITY_SCORE: 5
WHICH_OF_THE_ABOVE_FUNCTIONAL_RISKS_HAD_A_RECENT_ONSET_OR_CHANGE?: AMBULATION;TRANSFERRING;TOILETING;FALL HISTORY
TOILETING_ASSISTANCE: TOILETING DIFFICULTY, ASSISTANCE 1 PERSON

## 2021-10-19 NOTE — PROGRESS NOTES
Weaned to 3L O2.  CT showed pulmonary edema/ pneumonia. On IV ABX. Nutrition consult today- gave suggestions and ordered daily supplement. On tele, echo today- moderate pulmonary HTN, EF 55-60%. PT/OT ordered. Testing for C.dif-Still need stool sample. Agreeable to TCU

## 2021-10-19 NOTE — ED NOTES
Pt coughing pt reports difficulty coughing up phlegm. SAO2 77% on O2 at 2liters per NC. Encouraged pt to cough up phlegm. Pt then placed on NRB mask at 15liters. MD made aware.

## 2021-10-19 NOTE — PROGRESS NOTES
10/19/21 1100   Quick Adds   Type of Visit Initial Occupational Therapy Evaluation   Living Environment   People in home alone   Current Living Arrangements house   Home Accessibility stairs to enter home   Number of Stairs, Main Entrance 2   Stair Railings, Main Entrance none   Living Environment Comments One level home, walk in shower with grab bars, standard height toilet with grab bars.    Self-Care   Usual Activity Tolerance good   Current Activity Tolerance fair   Regular Exercise No   Equipment Currently Used at Home none   Activity/Exercise/Self-Care Comment Independent with ADLS at baseline, no gait aid.   Instrumental Activities of Daily Living (IADL)   Previous Responsibilities meal prep;housekeeping;laundry;shopping;medication management;finances   IADL Comments Pt has been unable to care for herself due to fatigue   Disability/Function   Fall history within last six months yes   Number of times patient has fallen within last six months 2   General Information   Onset of Illness/Injury or Date of Surgery 10/18/21   Referring Physician Nate Zaman MD   Patient/Family Therapy Goal Statement (OT) Pt would like to have more energy/strength   Additional Occupational Profile Info/Pertinent History of Current Problem 65 year old female admitted on 10/18/2021. She presents with weakness, fall, and poor appetite   Existing Precautions/Restrictions fall   Cognitive Status Examination   Orientation Status orientation to person, place and time   Affect/Mental Status (Cognitive) WNL   Follows Commands WNL   Cognitive Status Comments Short Blessed -4 scoring within normal limits; further cognitive eval recommended as pt appears to have poor insight into safety awareness   Posture   Posture kyphosis   Range of Motion Comprehensive   Comment, General Range of Motion WFL   Strength Comprehensive (MMT)   Comment, General Manual Muscle Testing (MMT) Assessment Significant weakness observed globally   Clinical  Impression   Criteria for Skilled Therapeutic Interventions Met (OT) yes;meets criteria;skilled treatment is necessary   OT Diagnosis Decline in ADL independence and safety   OT Problem List-Impairments impacting ADL problems related to;activity tolerance impaired;balance;cognition;mobility;strength   Assessment of Occupational Performance 5 or more Performance Deficits   Identified Performance Deficits Impaired ADL tolerance, independence, and safety   Planned Therapy Interventions (OT) ADL retraining;cognition;strengthening;progressive activity/exercise   Clinical Decision Making Complexity (OT) low complexity   Therapy Frequency (OT) 5x/week   Predicted Duration of Therapy 4 days   Anticipated Equipment Needs Upon Discharge (OT)   (TBD)   Risk & Benefits of therapy have been explained evaluation/treatment results reviewed;care plan/treatment goals reviewed;risks/benefits reviewed;current/potential barriers reviewed;participants voiced agreement with care plan;participants included;patient;son   OT Discharge Planning    OT Discharge Recommendation (DC Rec) Transitional Care Facility;home with home care occupational therapy;Home with assist  (if DC home, requires assist with all mobility)   OT Rationale for DC Rec Pt is significantly below her baseline level of function presenting with multiple falls and significant global weakness. Pt would benefit from further therapy in TCU to improve to prior level of function and decrease risk of fall and rehospitalization   OT Brief overview of current status  Min assist to stand; 3 mintues of standing tolerance, loss of balance observed requiring mod assist to maintain upright posture   Total Evaluation Time (Minutes)   Total Evaluation Time (Minutes) 9

## 2021-10-19 NOTE — ED NOTES
Pt reports that her headache and nausea are getting worse. Very mild tremor. Denies auditory hallucinations or tactile disturbance.

## 2021-10-19 NOTE — UTILIZATION REVIEW
Admission Status; Secondary Review Determination       Under the authority of the Utilization Management Committee, the utilization review process indicated a secondary review on the above patient. The review outcome is based on review of the medical records, discussions with staff, and applying clinical experience noted on the date of the review.     (x) Inpatient Status Appropriate - This patient's medical care is consistent with medical management for inpatient care and reasonable inpatient medical practice.     RATIONALE FOR DETERMINATION   66 yo female with h/o alcohol abuse, heart failure, cardiomyopathy admitted for weakness and falls, found to have suspected community acquired pneumonia. Meets inpatient criteria for pneumonia. Pt is receiving Ceftriaxone and azithromycin UV q 24 hrs, Ativan 0.5-1 mg IV x 3  SAO2 81% RA, on supplemental O2 @ 6 lpm. CXR 10/19/21 with bilateral lower lobe infiltrates. Also being treated for severe hypokalemia with presenting potassium of 2.5.     At the time of admission with the information available to the attending physician more than 2 nights hospital complex care was anticipated, based on patient risk of adverse outcome if treated as outpatient and complex care required. Inpatient admission is appropriate based on the Medicare guidelines.     This document was produced using voice recognition software.    The information on this document is developed by the utilization review team in order for the business office to ensure compliance. This only denotes the appropriateness of proper admission status and does not reflect the quality of care rendered.   The definitions of Inpatient Status and Observation Status used in making the determination above are those provided in the CMS Coverage Manual, Chapter 1 and Chapter 6, section 70.4.     Sincerely,   Giana Brizuela MD  Utilization Review  Physician Advisor  [facility].

## 2021-10-19 NOTE — PROGRESS NOTES
[facility]    Medicine Progress Note - Hospitalist Service       Date of Admission:  10/18/2021    Assessment & Plan            Kezia Dixon is a 65 year old female admitted on 10/18/2021. She presents with weakness, fall, and poor appetite    Falls  Generalized weakness  Reports progressive weakness, some incoordination over past several days to weeks. This is in the setting of poor oral intake, hx of EtOH use although denies ongoing use. She was previously recommended to discharge to TCU after hospital stay in August but discharged home with home cares instead.   * differential of fall is broad. Likely due to progressive weakness in setting poor oral intake and possible ongoing EtOH use. Consider cardiac causes with cardiomyopathy hx. Possible vitamin deficiencies (pellagra, beriberi or wernickies) with loose stools, possible confusion, reported incoordination.   - PT/OT/SW  - IV vitamins  - nutrition consult  - TTE  - Tele    ?  Community-acquired pneumonia: Apparently patient has been started on azithromycin and ceftriaxone on admission.  Chest x-ray was reviewed.  -We will get sputum study  -Continue antibiotic for now.  Will reevaluate    Hx of EtOH dependence  Elevated LFTs  Reports she has been abstinent since August. LFTs seem consistent with EtOH use.  - monitor LFTs  - CIWA with lorazepam    Loose stools  Reports 2 loose stools per day after eating or drinking. No associated abdominal pain, fevers, chills, or other infectious symptoms.  There is some formed content to her stools.  -C. difficile and enteric panel ordered in the emergency department, continue precautions until it is resolved.    Heart failure with reduced ejection fraction, chronic  Cardiomyopathy of uncertain cause  HTN  Noted to have EF of 35% with hypokinesia of the septal, anterior, anterolateral, and apical segments.  *Does not appear fluid overloaded on admission  -Monitor volume status closely with IV  meds  -Continue PTA metoprolol, lisinopril, spironolactone, aspirin, atorvastatin   -Hold PTA Lasix ( Monitor volume status closely)   -Repeat TTE    Hypokalemia, severe  -Replace per protocol  -Check magnesium and phosphorus    Severe protein calorie malnutrition  Anorexia  Patient reports significantly decreased oral intake for several years that has dropped off even more precipitously in the last several months.  Currently taking 1 Ensure shake at most on a daily basis.  -Consult to nutrition  -Multivitamin  -Encourage oral intake    Status post esophagectomy with stomach pull-through  GERD  -Continue PTA PPI  -Encourage oral intake    Facial bruising  Noted after recent fall.  CT head and CT facial bones are negative for acute pathology.       Diet: Combination Diet Regular Diet Adult    DVT Prophylaxis: Pneumatic Compression Devices  Jaeger Catheter: Not present  Central Lines: None  Code Status: No CPR- Do NOT Intubate      Disposition Plan   Expected discharge: 10/22/2021   recommended to home vs rehab once safe disposition plan/ TCU bed available and electrolytes stable. breathing stable.     The patient's care was discussed with the Bedside Nurse, Care Coordinator/, Patient and Patient's Family.    Ahsan Patton MD, MD  Hospitalist Service  [facility]  Securely message with the Vocera Web Console (learn more here)  Text page via [employer] Paging/Directory      Clinically Significant Risk Factors Present on Admission           # Hypomagnesemia: Mg = 1.4 mg/dL (Ref range: 1.6 - 2.3 mg/dL) on admission, will replace as needed    # Platelet Defect: home medication list includes an antiplatelet medication  # Severe Malnutrition, POA: based on Weight loss;Reduced intake;Subcutaneous fat loss;Muscle loss (10/19/21 1100)   ______________________________________________________________________    Interval History   History reviewed.  Complains of severe nausea and vomiting and  "inability to keep oral intake for the last few days.  Had a mechanical fall which he did not lose consciousness.  Denies any specific chest pain/palpitations.   Generally weak but denies any focal weakness    4 point ROS done and negative unless mentioned     Data reviewed today: I reviewed all medications, new labs and imaging results over the last 24 hours. I personally reviewed the Head CT, CT facial bones image(s) showing As below.    Physical Exam   /53   Pulse 97   Temp 99.1  F (37.3  C) (Oral)   Resp 14   Ht 1.549 m (5' 1\")   Wt 42.6 kg (94 lb)   SpO2 92%   BMI 17.76 kg/m    Gen- pleasant  HEENT-bruising , EUSEBIA  Neck- supple  CVS- I+II+ no m/r/g  RS- CTAB  Abdo- soft, no tenderness . No g/r/r   Ext- no edema   CNS-no gross focal deficit.  Oriented to person/place/time.    Data .  BMPRecent Labs   Lab 10/19/21  0543 10/19/21  0133 10/18/21  2139 10/18/21  1506     --   --  136   POTASSIUM 3.6 3.3* 3.1* 2.5*   CHLORIDE 101  --   --  91*   VICENTE 7.0*  --   --  8.9   CO2 29  --   --  31   BUN 9  --   --  9   CR 0.45*  --   --  0.45*   GLC 91  --   --  94     CBC  Recent Labs   Lab 10/19/21  0543 10/18/21  1506 10/18/21  1506   WBC 6.5  --  5.4   RBC 2.99*  --  3.97   HGB 9.4*   < > 12.5   HCT 28.6*  --  36.5   MCV 96  --  92   MCH 31.4  --  31.5   MCHC 32.9  --  34.2   RDW 16.3*  --  16.1*   *  --  189    < > = values in this interval not displayed.     INRNo lab results found in last 7 days.  LFTs  Recent Labs   Lab 10/19/21  0543 10/18/21  1506   ALKPHOS 86 132   AST 50* 90*   ALT 34 55*   BILITOTAL 1.0 1.6*   PROTTOTAL 5.5* 7.9   ALBUMIN 2.4* 4.0      PANC  Recent Labs   Lab 10/18/21  1506   LIPASE 70*       Recent Results (from the past 24 hour(s))   Head CT w/o contrast    Narrative    CT SCAN OF THE HEAD WITHOUT CONTRAST   10/18/2021 3:37 PM     HISTORY: Head trauma, moderate-severe. Pain after fall.    TECHNIQUE:  Axial images of the head and coronal reformations without  IV " contrast material.  Radiation dose for this scan was reduced using  automated exposure control, adjustment of the mA and/or kV according  to patient size, or iterative reconstruction technique.    COMPARISON: None.    FINDINGS: There is some mild cerebral atrophy. There is some low  density in the mira which is probably just artifact. There is some  minimal nonspecific white matter changes without mass effect. There is  no evidence for intracranial hemorrhage, mass effect, acute infarct,  or skull fracture. Visualized paranasal sinuses and mastoid air cells  are clear.      Impression    IMPRESSION: Chronic changes. No evidence for intracranial hemorrhage  or any acute process.    ANT LAST MD         SYSTEM ID:  [id number]   CT Facial Bones without Contrast    Narrative    CT FACIAL BONES WITHOUT CONTRAST 10/18/2021 3:37 PM     HISTORY: Trauma, facial injury. Facial pain after fall. Fever and  chills.    TECHNIQUE: Axial images were obtained through the facial bones without  contrast. Coronal and sagittal reconstructions were also acquired.  Radiation dose for this scan was reduced using automated exposure  control, adjustment of the mA and/or kV according to patient size, or  iterative reconstruction technique..    FINDINGS: The facial bones are intact. Specifically, the bony  mandible, pterygoid plates, zygomatic arches, nasal bones, and bony  orbits are intact. Paranasal sinuses are clear. Both globes are  normal. Retro-orbital structures are unremarkable.      Impression    IMPRESSION: Negative facial bone CT examination.    ANT LAST MD         SYSTEM ID:  [id number]   XR Chest Port 1 View    Narrative    EXAM: CHEST SINGLE VIEW PORTABLE  LOCATION: [facility]  DATE/TIME: 10/19/2021 3:06 AM    INDICATION: Cough. Sputum. Shortness of breath.  COMPARISON: 10/26/2018.    FINDINGS: Mildly to moderately increased interstitial opacities in the lungs. Apparent pleural thickening at the  lateral and inferior aspects of the right hemithorax. A few hazy opacities in bilateral lung bases. Normal-sized cardiac silhouette.      Impression    IMPRESSION:   1. Mildly to moderately increased interstitial opacities in the lungs, possibly representing interstitial pulmonary edema.  2. A few hazy opacities in bilateral lung bases most likely represent atelectasis, but pneumonia cannot be excluded.  3. Apparent pleural thickening at the lateral and inferior aspects of the right hemithorax that could relate to scarring or a small loculated right pleural effusion.

## 2021-10-19 NOTE — CONSULTS
"CLINICAL NUTRITION SERVICES  -  ASSESSMENT NOTE    Recommendations Ordered by Registered Dietitian (RD):   - David DELEON daily   - Ensure clear @ 2 pm. Trial Gelatein today.   - Encourage protein/calorie-dense options  - May need to address short-term FT as a bridge to adequate PO.    Malnutrition:   % Weight Loss:  > 5% in 1 month (severe malnutrition)  % Intake:  </= 50% for >/= 5 days (severe malnutrition)  Subcutaneous Fat Loss:  Orbital region moderate depletion, Upper arm region moderate-severe depletion and Thoracic region moderate-severe depletion  Muscle Loss:  Temporal region moderate depletion, Clavicle bone region moderate-severe depletion and Acromion bone region moderate-severe depletion  Fluid Retention:  None noted    Malnutrition Diagnosis: Severe malnutrition  In Context of:  Acute illness or injury  Chronic illness or disease     REASON FOR ASSESSMENT  Kezia Dixon is a 65 year old female seen by Registered Dietitian for Nutrition Admission Risk Screen Received -   Have you recently lost weight without trying in the last 6 months ? - \"yes 2-13 pounds\"  Have you been eating poorly due to a decreased appetite ?- \"yes\"' and Provider Order - malnutrition assessment, order supplements    NUTRITION HISTORY  - Information obtained from chart and patient report.   - H/o esophageal cancer, HTN, HLD, CAD, alcoholism. Presented with nausea, vomiting, diarrhea.   - Loose stools after eating/drinking    Per MD -   \"Patient reports significantly decreased oral intake for several years that has dropped off even more precipitously in the last several months.  Currently taking 1 Ensure shake at most on a daily basis.\"    - Pt seen in room. She tells me that the past few days she has been taking a little as one Ensure clear each day (these provide 240 kcal and 8 g protein). The Ensure Plus/Enlive are too rich, cause diarrhea.   - Nausea, vomiting, diarrhea for the past 5 days (Since last Friday).   - She likes " "egg/chicken salad, eggs, some meats.   - Lactose intolerant - avoids yogurt, cottage cheese, milk.     - \"lost a lot of weight\" when her  , reports she was able to regain some of this but is now losing again. Suspects current wt is closer to 80-85#.  - NKFA    CURRENT NUTRITION ORDERS  Diet Order:     Regular     Current Intake/Tolerance:  Tolerated a fruit cup today so far. Stated it was a little difficult to get it all down. Agreed to an Ensure Clear supplement 1x daily. Asked \"what foods should I try to force down\". Circled / starred some good lactose-free items on her menu.     NUTRITION FOCUSED PHYSICAL ASSESSMENT FOR DIAGNOSING MALNUTRITION)  Yes         Observed:    No nutrition-related physical findings observed    Obtained from Chart/Interdisciplinary Team:  Last BM 10/18 (diarrhea)  [facility] - Nutrition 1; total 17    ANTHROPOMETRICS  Height: 5' 1\"  Weight: 36.3 kg (80 lb)  Body mass index is 15.1 kg/m .  Weight Status:  Underweight BMI <18.5  IBW: 47.7 kg   % IBW: 76%  Weight History:   Patient stated that she had lost \"a lot\" of weight when her  passed away. She was able to regain some, but is now losing again. Suspects weight of 94# is inaccurate. More likely 80-85# at most.     Up to 6% weight loss in the past ~1 month if admit wt is accurate.     Wt Readings from Last 10 Encounters:   10/19/21 42.6 kg (94 lb)   21 38.7 kg (85 lb 4.8 oz)   20 40.3 kg (88 lb 14.4 oz)   19 40.8 kg (90 lb)   19 40.4 kg (89 lb)   19 39.9 kg (88 lb)   18 41.3 kg (91 lb)   18 39.5 kg (87 lb)   18 40.1 kg (88 lb 8 oz)   10/11/18 42.2 kg (93 lb)       LABS  Labs reviewed    Electrolytes  Potassium (mmol/L)   Date Value   10/19/2021 3.6   10/19/2021 3.3 (L)   10/18/2021 3.1 (L)   2020 4.4   2019 4.2   10/24/2018 3.8     Phosphorus (mg/dL)   Date Value   10/19/2021 2.3 (L)   10/18/2021 3.1   2016 3.5    Blood Glucose  Glucose (mg/dL)   Date Value "   10/19/2021 91   10/18/2021 94   02/05/2020 95   03/11/2019 99   10/24/2018 189 (H)   10/22/2018 105 (H)   10/18/2018 192 (H)     Hemoglobin A1C (%)   Date Value   02/05/2020 5.4   10/11/2018 5.8 (H)   11/04/2015 5.9   11/20/2013 6.0   01/09/2013 6.1    Inflammatory Markers  WBC (10e9/L)   Date Value   02/05/2020 7.4   03/11/2019 6.5   10/24/2018 8.3     WBC Count (10e3/uL)   Date Value   10/19/2021 6.5   10/18/2021 5.4     Albumin (g/dL)   Date Value   10/19/2021 2.4 (L)   10/18/2021 4.0   02/05/2020 3.6   03/11/2019 3.8   10/18/2018 2.9 (L)      Magnesium (mg/dL)   Date Value   10/19/2021 1.4 (L)   10/18/2021 1.8   03/28/2016 2.1     Sodium (mmol/L)   Date Value   10/19/2021 137   10/18/2021 136   02/05/2020 134   03/11/2019 138   10/25/2018 136    Renal  Urea Nitrogen (mg/dL)   Date Value   10/19/2021 9   10/18/2021 9   02/05/2020 13   03/11/2019 18   10/24/2018 15     Creatinine (mg/dL)   Date Value   10/19/2021 0.45 (L)   10/18/2021 0.45 (L)   02/05/2020 0.51 (L)   03/11/2019 0.52   10/25/2018 0.56     Additional  Triglycerides (mg/dL)   Date Value   02/05/2020 98   11/09/2017 91   10/10/2016 112     Ketones Urine (mg/dL)   Date Value   10/18/2021 10  (A)           MEDICATIONS  Medications reviewed  Folic acid 1 mg daily, Thiamine (etoh protocol)    ASSESSED NUTRITION NEEDS PER APPROVED PRACTICE GUIDELINES:  Dosing Weight 36.3 kg - admit wt  Estimated Energy Needs: 1737-2317+ kcals (30-35+ Kcal/Kg)  Justification: repletion and underweight  Estimated Protein Needs: 55-73 grams protein (1.5-2 g pro/Kg)  Justification: Repletion and preservation of lean body mass  Estimated Fluid Needs: 1 mL/kcal  Justification: maintenance    MALNUTRITION:  % Weight Loss:  > 5% in 1 month (severe malnutrition)  % Intake:  </= 50% for >/= 5 days (severe malnutrition)  Subcutaneous Fat Loss:  Orbital region moderate depletion, Upper arm region moderate-severe depletion and Thoracic region moderate-severe depletion  Muscle Loss:   Temporal region moderate depletion, Clavicle bone region moderate-severe depletion and Acromion bone region moderate-severe depletion  Fluid Retention:  None noted    Malnutrition Diagnosis: Severe malnutrition  In Context of:  Acute illness or injury  Chronic illness or disease    NUTRITION DIAGNOSIS:  Inadequate oral intake related to poor appetite with nausea, vomiting, diarrhea as evidenced by negligible PO for the past 5 days, up to 6% weight loss in 1 month.       NUTRITION INTERVENTIONS  Recommendations / Nutrition Prescription  - Continue Thera vit M daily   - Ensure clear @ 2 pm  - Encourage protein/calorie-dense options      Implementation  Nutrition education: Provided education on calorie/protein dense options  Medical Food Supplement: ordered      Nutrition Goals  Intake of at least 50% meals BID-TID + 1 supplement daily       MONITORING AND EVALUATION:  Progress towards goals will be monitored and evaluated per protocol and Practice Guidelines    Corine Muñiz RD, LD  [facility] [place], 64, [facility], [facility]  Pager: [phone]  Weekend Pager: [phone]

## 2021-10-19 NOTE — PROVIDER NOTIFICATION
Paged regarding significant cough and phlegm production with transient drop in O2 saturations to 77%.     Ordered duoneb, mucomyst and CXR.     CXR showed:   1. Mildly to moderately increased interstitial opacities in the lungs, possibly representing interstitial pulmonary edema.  2. A few hazy opacities in bilateral lung bases most likely represent atelectasis, but pneumonia cannot be excluded.  3. Apparent pleural thickening at the lateral and inferior aspects of the right hemithorax that could relate to scarring or a small loculated right pleural effusion.     Will start Ceftriaxone and Azithromycin for possible pneumonia.     Will check BNP and procal as well.     Vilma Yancey

## 2021-10-19 NOTE — PROGRESS NOTES
Admission    Patient arrives to room  614  via cart from ED  Care plan note: Pt alert and oriented, admitted with weakness,falls and decreased appetite. Pt states she has been unable to eat, if she does would have diarrhea after eating. Last meal yesterday am.     Inpatient nursing criteria listed below were met:    Did you put disposition on whiteboard and in sticky note: Yes  Full skin assessment done (add LDA if skin issue present) :Yes  Isolation education started/completed NA  Patient allergies verified with patient: Yes  Fall Risk? (Care plan updated, Education given and documented) Yes  Primary Care Plan initiated: Yes  Home medications documented in belongings flowsheet: na  Patient belongings documented in belongings flowsheet: Yes  Reminder note (belongings/ medications) placed in discharge instructions:Yes  Admission profile/ required documentation complete: Yes  If patient is a 72 hour hold/Commitment are belongings removed from room and locked up? NA

## 2021-10-20 ENCOUNTER — APPOINTMENT (OUTPATIENT)
Dept: PHYSICAL THERAPY | Facility: CLINIC | Age: 66
DRG: 947 | End: 2021-10-20
Payer: MEDICARE

## 2021-10-20 ENCOUNTER — APPOINTMENT (OUTPATIENT)
Dept: OCCUPATIONAL THERAPY | Facility: CLINIC | Age: 66
DRG: 947 | End: 2021-10-20
Payer: MEDICARE

## 2021-10-20 ENCOUNTER — APPOINTMENT (OUTPATIENT)
Dept: GENERAL RADIOLOGY | Facility: CLINIC | Age: 66
DRG: 947 | End: 2021-10-20
Attending: NURSE PRACTITIONER
Payer: MEDICARE

## 2021-10-20 LAB
ANION GAP SERPL CALCULATED.3IONS-SCNC: 6 MMOL/L (ref 3–14)
ANION GAP SERPL CALCULATED.3IONS-SCNC: 7 MMOL/L (ref 3–14)
BUN SERPL-MCNC: 10 MG/DL (ref 7–30)
BUN SERPL-MCNC: 11 MG/DL (ref 7–30)
CALCIUM SERPL-MCNC: 7.5 MG/DL (ref 8.5–10.1)
CALCIUM SERPL-MCNC: 8.2 MG/DL (ref 8.5–10.1)
CHLORIDE BLD-SCNC: 94 MMOL/L (ref 94–109)
CHLORIDE BLD-SCNC: 98 MMOL/L (ref 94–109)
CO2 SERPL-SCNC: 26 MMOL/L (ref 20–32)
CO2 SERPL-SCNC: 31 MMOL/L (ref 20–32)
CREAT SERPL-MCNC: 0.38 MG/DL (ref 0.52–1.04)
CREAT SERPL-MCNC: 0.48 MG/DL (ref 0.52–1.04)
CRP SERPL-MCNC: 103 MG/L (ref 0–8)
ERYTHROCYTE [DISTWIDTH] IN BLOOD BY AUTOMATED COUNT: 15.9 % (ref 10–15)
GFR SERPL CREATININE-BSD FRML MDRD: >90 ML/MIN/1.73M2
GFR SERPL CREATININE-BSD FRML MDRD: >90 ML/MIN/1.73M2
GLUCOSE BLD-MCNC: 135 MG/DL (ref 70–99)
GLUCOSE BLD-MCNC: 148 MG/DL (ref 70–99)
HCT VFR BLD AUTO: 31.2 % (ref 35–47)
HGB BLD-MCNC: 10.1 G/DL (ref 11.7–15.7)
LACTATE SERPL-SCNC: 1.3 MMOL/L (ref 0.7–2)
MAGNESIUM SERPL-MCNC: 1.8 MG/DL (ref 1.6–2.3)
MCH RBC QN AUTO: 30.9 PG (ref 26.5–33)
MCHC RBC AUTO-ENTMCNC: 32.4 G/DL (ref 31.5–36.5)
MCV RBC AUTO: 95 FL (ref 78–100)
NT-PROBNP SERPL-MCNC: 9828 PG/ML (ref 0–900)
PLATELET # BLD AUTO: 131 10E3/UL (ref 150–450)
POTASSIUM BLD-SCNC: 2.8 MMOL/L (ref 3.4–5.3)
POTASSIUM BLD-SCNC: 3.2 MMOL/L (ref 3.4–5.3)
POTASSIUM BLD-SCNC: 3.4 MMOL/L (ref 3.4–5.3)
RBC # BLD AUTO: 3.27 10E6/UL (ref 3.8–5.2)
SODIUM SERPL-SCNC: 131 MMOL/L (ref 133–144)
SODIUM SERPL-SCNC: 131 MMOL/L (ref 133–144)
WBC # BLD AUTO: 9.3 10E3/UL (ref 4–11)

## 2021-10-20 PROCEDURE — 97116 GAIT TRAINING THERAPY: CPT | Mod: GP | Performed by: PHYSICAL THERAPIST

## 2021-10-20 PROCEDURE — 99291 CRITICAL CARE FIRST HOUR: CPT | Performed by: NURSE PRACTITIONER

## 2021-10-20 PROCEDURE — 83880 ASSAY OF NATRIURETIC PEPTIDE: CPT | Performed by: NURSE PRACTITIONER

## 2021-10-20 PROCEDURE — 36415 COLL VENOUS BLD VENIPUNCTURE: CPT | Performed by: INTERNAL MEDICINE

## 2021-10-20 PROCEDURE — 83605 ASSAY OF LACTIC ACID: CPT | Performed by: NURSE PRACTITIONER

## 2021-10-20 PROCEDURE — 258N000003 HC RX IP 258 OP 636: Performed by: STUDENT IN AN ORGANIZED HEALTH CARE EDUCATION/TRAINING PROGRAM

## 2021-10-20 PROCEDURE — 999N000157 HC STATISTIC RCP TIME EA 10 MIN

## 2021-10-20 PROCEDURE — 94640 AIRWAY INHALATION TREATMENT: CPT

## 2021-10-20 PROCEDURE — 250N000013 HC RX MED GY IP 250 OP 250 PS 637: Performed by: INTERNAL MEDICINE

## 2021-10-20 PROCEDURE — 36415 COLL VENOUS BLD VENIPUNCTURE: CPT | Performed by: NURSE PRACTITIONER

## 2021-10-20 PROCEDURE — 97530 THERAPEUTIC ACTIVITIES: CPT | Mod: GO | Performed by: OCCUPATIONAL THERAPIST

## 2021-10-20 PROCEDURE — 250N000011 HC RX IP 250 OP 636: Performed by: STUDENT IN AN ORGANIZED HEALTH CARE EDUCATION/TRAINING PROGRAM

## 2021-10-20 PROCEDURE — 250N000013 HC RX MED GY IP 250 OP 250 PS 637: Performed by: STUDENT IN AN ORGANIZED HEALTH CARE EDUCATION/TRAINING PROGRAM

## 2021-10-20 PROCEDURE — 120N000001 HC R&B MED SURG/OB

## 2021-10-20 PROCEDURE — 80048 BASIC METABOLIC PNL TOTAL CA: CPT | Performed by: INTERNAL MEDICINE

## 2021-10-20 PROCEDURE — 97110 THERAPEUTIC EXERCISES: CPT | Mod: GP | Performed by: PHYSICAL THERAPIST

## 2021-10-20 PROCEDURE — 84132 ASSAY OF SERUM POTASSIUM: CPT | Performed by: INTERNAL MEDICINE

## 2021-10-20 PROCEDURE — 97530 THERAPEUTIC ACTIVITIES: CPT | Mod: GP | Performed by: PHYSICAL THERAPIST

## 2021-10-20 PROCEDURE — 85027 COMPLETE CBC AUTOMATED: CPT | Performed by: INTERNAL MEDICINE

## 2021-10-20 PROCEDURE — 99222 1ST HOSP IP/OBS MODERATE 55: CPT | Performed by: INTERNAL MEDICINE

## 2021-10-20 PROCEDURE — 250N000009 HC RX 250: Performed by: NURSE PRACTITIONER

## 2021-10-20 PROCEDURE — 71045 X-RAY EXAM CHEST 1 VIEW: CPT

## 2021-10-20 PROCEDURE — 250N000013 HC RX MED GY IP 250 OP 250 PS 637: Performed by: NURSE PRACTITIONER

## 2021-10-20 PROCEDURE — 99233 SBSQ HOSP IP/OBS HIGH 50: CPT | Performed by: INTERNAL MEDICINE

## 2021-10-20 PROCEDURE — 86140 C-REACTIVE PROTEIN: CPT | Performed by: INTERNAL MEDICINE

## 2021-10-20 PROCEDURE — 250N000011 HC RX IP 250 OP 636: Performed by: NURSE PRACTITIONER

## 2021-10-20 PROCEDURE — 80048 BASIC METABOLIC PNL TOTAL CA: CPT | Performed by: NURSE PRACTITIONER

## 2021-10-20 PROCEDURE — 83735 ASSAY OF MAGNESIUM: CPT | Performed by: STUDENT IN AN ORGANIZED HEALTH CARE EDUCATION/TRAINING PROGRAM

## 2021-10-20 PROCEDURE — 250N000009 HC RX 250: Performed by: STUDENT IN AN ORGANIZED HEALTH CARE EDUCATION/TRAINING PROGRAM

## 2021-10-20 RX ORDER — FUROSEMIDE 40 MG
40 TABLET ORAL EVERY MORNING
Status: DISCONTINUED | OUTPATIENT
Start: 2021-10-20 | End: 2021-10-23 | Stop reason: HOSPADM

## 2021-10-20 RX ORDER — FUROSEMIDE 10 MG/ML
INJECTION INTRAMUSCULAR; INTRAVENOUS
Status: DISPENSED
Start: 2021-10-20 | End: 2021-10-20

## 2021-10-20 RX ORDER — LEVALBUTEROL 1.25 MG/.5ML
1.25 SOLUTION, CONCENTRATE RESPIRATORY (INHALATION) EVERY 4 HOURS PRN
Status: DISCONTINUED | OUTPATIENT
Start: 2021-10-20 | End: 2021-10-23 | Stop reason: HOSPADM

## 2021-10-20 RX ORDER — LORAZEPAM 2 MG/ML
0.5 INJECTION INTRAMUSCULAR ONCE
Status: COMPLETED | OUTPATIENT
Start: 2021-10-20 | End: 2021-10-20

## 2021-10-20 RX ORDER — POTASSIUM CHLORIDE 1500 MG/1
40 TABLET, EXTENDED RELEASE ORAL ONCE
Status: COMPLETED | OUTPATIENT
Start: 2021-10-20 | End: 2021-10-20

## 2021-10-20 RX ORDER — FUROSEMIDE 10 MG/ML
40 INJECTION INTRAMUSCULAR; INTRAVENOUS ONCE
Status: COMPLETED | OUTPATIENT
Start: 2021-10-20 | End: 2021-10-20

## 2021-10-20 RX ORDER — POTASSIUM CHLORIDE 1500 MG/1
20 TABLET, EXTENDED RELEASE ORAL ONCE
Status: COMPLETED | OUTPATIENT
Start: 2021-10-20 | End: 2021-10-20

## 2021-10-20 RX ADMIN — METOPROLOL SUCCINATE 25 MG: 25 TABLET, EXTENDED RELEASE ORAL at 21:26

## 2021-10-20 RX ADMIN — THIAMINE HYDROCHLORIDE 500 MG: 100 INJECTION, SOLUTION INTRAMUSCULAR; INTRAVENOUS at 14:57

## 2021-10-20 RX ADMIN — POTASSIUM CHLORIDE 20 MEQ: 1500 TABLET, EXTENDED RELEASE ORAL at 12:40

## 2021-10-20 RX ADMIN — SPIRONOLACTONE 12.5 MG: 25 TABLET ORAL at 08:06

## 2021-10-20 RX ADMIN — FOLIC ACID 1 MG: 5 INJECTION, SOLUTION INTRAMUSCULAR; INTRAVENOUS; SUBCUTANEOUS at 08:10

## 2021-10-20 RX ADMIN — LEVALBUTEROL HYDROCHLORIDE 1.25 MG: 1.25 SOLUTION, CONCENTRATE RESPIRATORY (INHALATION) at 00:32

## 2021-10-20 RX ADMIN — PANTOPRAZOLE SODIUM 40 MG: 40 TABLET, DELAYED RELEASE ORAL at 21:26

## 2021-10-20 RX ADMIN — LORAZEPAM 0.5 MG: 2 INJECTION INTRAMUSCULAR; INTRAVENOUS at 00:31

## 2021-10-20 RX ADMIN — THIAMINE HYDROCHLORIDE 500 MG: 100 INJECTION, SOLUTION INTRAMUSCULAR; INTRAVENOUS at 08:06

## 2021-10-20 RX ADMIN — FUROSEMIDE 40 MG: 40 TABLET ORAL at 08:07

## 2021-10-20 RX ADMIN — FUROSEMIDE 40 MG: 10 INJECTION, SOLUTION INTRAVENOUS at 00:26

## 2021-10-20 RX ADMIN — CEFTRIAXONE SODIUM 2 G: 2 INJECTION, POWDER, FOR SOLUTION INTRAMUSCULAR; INTRAVENOUS at 05:06

## 2021-10-20 RX ADMIN — PANTOPRAZOLE SODIUM 40 MG: 40 TABLET, DELAYED RELEASE ORAL at 08:58

## 2021-10-20 RX ADMIN — METOPROLOL SUCCINATE 25 MG: 25 TABLET, EXTENDED RELEASE ORAL at 08:07

## 2021-10-20 RX ADMIN — ASPIRIN 81 MG: 81 TABLET, COATED ORAL at 08:07

## 2021-10-20 RX ADMIN — FLUTICASONE PROPIONATE 2 SPRAY: 50 SPRAY, METERED NASAL at 08:06

## 2021-10-20 RX ADMIN — LOSARTAN POTASSIUM 12.5 MG: 25 TABLET, FILM COATED ORAL at 08:06

## 2021-10-20 RX ADMIN — POTASSIUM CHLORIDE 40 MEQ: 1500 TABLET, EXTENDED RELEASE ORAL at 21:25

## 2021-10-20 RX ADMIN — ATORVASTATIN CALCIUM 40 MG: 40 TABLET, FILM COATED ORAL at 08:07

## 2021-10-20 ASSESSMENT — ACTIVITIES OF DAILY LIVING (ADL)
ADLS_ACUITY_SCORE: 14

## 2021-10-20 ASSESSMENT — MIFFLIN-ST. JEOR: SCORE: 901.38

## 2021-10-20 NOTE — PROGRESS NOTES
RRT called pt desating  88% on 8L [place].BBS: coarse. Xopnex prn given per MD. O2 sat improved to 93% on 8L [place].

## 2021-10-20 NOTE — PROGRESS NOTES
Shift note:  Off enteric precautions d/t no stool. Stable on 2L NC today.  Continue IV ABX until bcs negative.  EF improved. Went for 2 walks today. Continue to work on TCU placement.

## 2021-10-20 NOTE — CONSULTS
[facility]    Cardiology Consultation    Date of Admission:  10/18/2021  Primary cardiologist: has an appointment to establish with Dr. Ring on 10/27/21    Assessment & Plan   Kezia Dixon is a 65 year old female who was admitted on 10/18/2021. I was asked to see the patient for congestive heart failure.    Summary:   1.  Cardiomyopathy. With EF of 30 - 35% on echo in August. Thought to be possibly be ischemic per work up in August given abnormal nuclear stress. Possibly also alcohol induced.   -  On medical therapy prior to admission with losartan 12.5 mg daily, Metoprolol XL 25 mg twice daily, and spironolactone 12.5 mg daily.   -  Repeat echo here shows improvement with EF 55 - 60%. Normal RV function.  Mild to moderate MR and moderate TR.  2.  Acute hypoxic respiratory failure. Secondary to acute systolic congestive heart failure and ? Pneumonia.   -  NTproBNP this am tripled from admission (3000 -> 9000).  -  She has responded nicely to IV lasix and oxygen requirements have significantly decreased.  -  On abx per IM.   3.  Presume CAD. With abnormal nuclear stress test at [facility] in August. Due to lack of angina, medical management was advised. She continues to deny angina.   -  Continue aspirin and statin.   4.  Hypertension. BPs intermittently elevated here.   5.  Fall, progressive weakness. tentative plan for discharge to TCU.  6.  Severe hypokalemia. On admission. Resolved.  7.  Hyponatremia. Sodium stable at 131 today.  8.  Anemia.     Plan:  1.  Would continue with gentle diuresis. She has improved with that.  2.  We reviewed her echocardiogram results today. Would continue with her current medical therapy for her cardiomyopathy. If hyponatremia continues to be an issue could consider stopping spironolactone.   3.  If remains hypertensive, consider increasing losartan.  4.  Abx per IM.  5.  She can follow up in cardiology clinic after discharge as scheduled.     Please call  with questions.     Adrienne Torres PA-C    Reason for Consult   Reason for consult: I was asked by Dr. Patton to evaluate this patient for acute systolic congestive heart failure.    Primary Care Physician   Mustapha Velásquez    Chief Complaint   Weakness, falls    History is obtained from the patient and medical record    History of Present Illness   Kezia Dixon is a 65 year old female who presents with weakness and falls. She has a history of alcohol dependence and withdrawal, a cardiomyopathy and chronic systolic congestive heart failure, probably CAD, severe malnutrition, tobacco use, esophageal cancer, lung cancer s/p resection, and hypertension.    She was hospitalized in August at [facility] with weakness and poor oral intake. She had stopped drinking alcohol a few days prior on her own. Prior to that she was drinking 6 vodka-water drinks a day. This had been for the last year and a half. Prior to that, she had been sober for approximately 15 years. She was given IVF on admission but then developed acute heart failure for which she was diuresed. She was found to have a cardiomyopathy with a LVEF of 35%. She had wall motion abnormalities with her inferolateral wall functioning best. RV function was normal. She had a nuclear stress test that showed a partially reversible large sized moderate to severe perfusion defect in the anterior, anteroseptal, septal, and apex walls. Cardiology was involved during her stay. Medical management was recommended but consideration of coronary angiography was mentioned I she developed anginal symptoms. She was started on aspirin and statin as well as appropriate medical therapy for her cardiomyopathy. She was discharged on oral lasix as well.     Her course was also notable for pneumonia for which she was treated and alcohol withdrawal. She refused CD assessment.     She was discharged to a TCU but refused to stay there thus she went home with home care. She  recently established care with Dr. Velásquez on 9/9. It appears she missed her follow up from the hospital scheduled with cardiology on 9/7. Her medications including lasix, lisinopril, metoprolol, and spironolactone were restarted. She had not been drinking following her hospitalization.     She presented to the ED yesterday with a fall and progressive weakness and poor oral intake. She has been nauseous and also had some diarrhea. Labs were notable for severe hypokalemia with a potassium of 2.5. ALT was 55. AST was 90. CXR showed mild to moderately increased interstitial opacities in the lungs possibly representing interstitial pulmonary edema. NtproBNP was 3700 on admission. Procal was negative. Following admission she was placed on antibiotics for possible pneumonia.    Overnight, she had worsening shortness of breath and hypoxia requiring 8 L of supplemental oxygen. NTproBNP had tripled to over 9,000. She was given IV lasix. CXR showed findings similar to her admission CXR with slightly worsening interstitial/airspace infiltrates involving the right lung. Clinically, she has improved and is now on 2 L of supplemental oxygen.      Family requested a cardiology consult as she was set to establish with Dr. Ring in one week. She had a repeat echocardiogram here that showed improvement in her LV function with a LVEF of 55 - 60%. RV function was normal. She had grade 1 diastolic dysfunction. Moderate pulmonary hypertension was noted. She had moderate TR and mild to moderate MR.     She is feeling much better after diuresis this morning. O2 requirements have come down. She is currently on 2 L with oxygen saturations of 99%. She feels her breathing is getting back to her baseline. She continues to deny chest pain. No PND. No edema.    Past Medical History    Past Medical History:   Diagnosis Date     Alcoholism (H) 10/14/2016     Benign essential hypertension 10/14/2016     Carotid artery disease (H)     mile plaque 5/7      Chemical dependency (H)     alcohol     Depression with anxiety      Esophageal cancer (H)      Esophageal cancer (H) 3/22/2016     Esophageal mass      GERD (gastroesophageal reflux disease)      Hx of pancreatitis 2003     Hypercholesteremia      Hyperglycemia      Hyperlipemia      Impaired fasting glucose 10/14/2016     Impaired fasting glucose 10/14/2016     Nocturnal leg cramps      Pancreatic pseudocyst 10/14/2016     Pancreatic pseudocyst 10/14/2016     Pancreatitis     with pseudocyst     Pap smear with atypical squamous cells, cannot exclude high grade squamous intraepithelial lesion (ASC-H) 10/14/16    Colpo impression benign, ECC benign. Cotestin in 1 yr.     Shingles      Vasomotor rhinitis        Past Surgical History   Past Surgical History:   Procedure Laterality Date     AAA REPAIR      splenic artery aneurysm embolization     ABDOMEN SURGERY  2/2/2016     COLONOSCOPY  11/26/2013    Procedure: COMBINED COLONOSCOPY, SINGLE BIOPSY/POLYPECTOMY BY BIOPSY;  COLONOSCOPY (MAC);  Surgeon: Montana Rosa MD;  Location: [facility]     COLONOSCOPY  11/26/2013     COLONOSCOPY N/A 10/4/2018    Procedure: COMBINED COLONOSCOPY, SINGLE OR MULTIPLE BIOPSY/POLYPECTOMY BY BIOPSY;  colonoscopy;  Surgeon: Gio Apodaca MD;  Location: [facility]     ENT SURGERY       ESOPHAGOGASTRECTOMY N/A 3/22/2016    Procedure: ESOPHAGOGASTRECTOMY;  Surgeon: Alvin Riojas MD;  Location: [facility]     ESOPHAGOSCOPY, GASTROSCOPY, DUODENOSCOPY (EGD), COMBINED N/A 12/22/2015    Procedure: COMBINED ENDOSCOPIC ULTRASOUND, ESOPHAGOSCOPY, GASTROSCOPY, DUODENOSCOPY (EGD), FINE NEEDLE ASPIRATE/BIOPSY;  Surgeon: Danelle Michael MD;  Location:  GI     GASTROSTOMY, INSERT TUBE, COMBINED N/A 2/2/2016    Procedure: COMBINED GASTROSTOMY, INSERT TUBE (OPEN);  Surgeon: Alvin Riojas MD;  Location:  OR     GI SURGERY  12/22/2015     HAND SURGERY      right     HERNIORRHAPHY INCISIONAL (LOCATION) N/A 3/19/2019     Procedure: LAPARSCOPIC  INCISIONAL HERNIA REPAIR WITH MESH, LAPARSCOPIC LYSIS OF ADHENSIONS;  Surgeon: Lamberto Magaña MD;  Location:  OR     INSERT PORT VASCULAR ACCESS N/A 12/28/2015    Procedure: INSERT PORT VASCULAR ACCESS;  Surgeon: Alvin Riojas MD;  Location:  OR     LAPAROSCOPIC CHOLECYSTECTOMY N/A 3/19/2019    Procedure: LAPAROSCOPIC CHOLECYSTECTOMY;  Surgeon: Lamberto Magaña MD;  Location: [facility]     LOBECTOMY LUNG Right 10/16/2018    Procedure: LOBECTOMY LUNG;  Surgeon: Alvin Riojas MD;  Location: [facility] OR     REMOVE PORT VASCULAR ACCESS Left 7/22/2016    Procedure: REMOVE PORT VASCULAR ACCESS;  Surgeon: Alvin Riojas MD;  Location: [facility] OR     THORACOTOMY Right 10/16/2018    Procedure: REDO RIGHT THORACTOMY AND RIGHT LOWER LOBECTOMY, PLEURAL LYSIS;  Surgeon: Alvin Riojas MD;  Location:  OR     TONSILLECTOMY       VASCULAR SURGERY         Prior to Admission Medications   Prior to Admission Medications   Prescriptions Last Dose Informant Patient Reported? Taking?   aspirin (ASA) 81 MG EC tablet 10/17/2021 at am  No Yes   Sig: Take 1 tablet (81 mg) by mouth daily   atorvastatin (LIPITOR) 40 MG tablet 10/17/2021 at am  No Yes   Sig: Take 1 tablet (40 mg) by mouth daily   fluticasone (FLONASE) 50 MCG/ACT nasal spray 10/17/2021 at am  No Yes   Sig: INSTILL 2 SPRAYS INTO BOTH NOSTRILS DAILY.   folic acid (FOLVITE) 1 MG tablet 10/17/2021 at pm  No Yes   Sig: Take 1 tablet (1 mg) by mouth daily   furosemide (LASIX) 40 MG tablet 10/17/2021 at am  No Yes   Sig: Take 1 tablet (40 mg) by mouth every morning   losartan (COZAAR) 25 MG tablet 10/17/2021 at am  No Yes   Sig: Take 0.5 tablets (12.5 mg) by mouth daily   metoprolol succinate ER (TOPROL-XL) 25 MG 24 hr tablet 10/17/2021 at pm  No Yes   Sig: Take 1 tablet (25 mg) by mouth 2 times daily   omeprazole (PRILOSEC) 40 MG DR capsule 10/17/2021 at am  No Yes   Sig: TAKE 1 CAPSULE BY MOUTH EVERY DAY    spironolactone (ALDACTONE) 25 MG tablet 10/17/2021 at am  No Yes   Sig: Take 0.5 tablets (12.5 mg) by mouth every morning   thiamine (B-1) 100 MG tablet 10/17/2021 at am  No Yes   Sig: Take 1 tablet (100 mg) by mouth daily      Facility-Administered Medications: None     Allergies   Allergies   Allergen Reactions     Codeine Sulfate Nausea     Simvastatin Cramps     Leg cramps     Pcn [Penicillins] Rash       Social History   Hx regular alcohol use. Sober since August. Former smoker.    Family History   Family History   Problem Relation Age of Onset     Other Cancer Father         melanoma     Prostate Cancer Father      Diabetes Father      Other Cancer Brother         melanoma     Other Cancer Brother         melanoma     Other Cancer Brother        Review of Systems   The 10 point Review of Systems is negative other than noted in the HPI or here.     Physical Exam   Temp: 99.2  F (37.3  C) Temp src: Oral BP: (!) 146/61 Pulse: 106   Resp: 18 SpO2: 94 % O2 Device: Nasal cannula Oxygen Delivery: 2 LPM  Vital Signs with Ranges  Temp:  [97.8  F (36.6  C)-99.2  F (37.3  C)] 99.2  F (37.3  C)  Pulse:  [] 106  Resp:  [14-22] 18  BP: (108-170)/(49-82) 146/61  SpO2:  [89 %-98 %] 94 %  94 lbs 9.24 oz    Constitutional: Alert, no acute distress.  Eyes: sclera anicteric  Respiratory: No respiratory distress. Breath sounds are somewhat diminished with a few crackles.  Cardiovascular: Regular rate and rhythm. Normal S1, S2. No murmurs, rubs, or gallops.  GI: abdomen soft.  Skin: warm and dry.   Musculoskeletal: no LE edema bilaterally. Leroy  Neurologic: alert and oriented x 3.  Psychiatric: affect appropriate.     Data   -Data reviewed today: All pertinent laboratory and imaging results from this encounter were reviewed. I personally reviewed the EKG tracing showing NSR.  Recent Labs   Lab 10/20/21  0933 10/20/21  0030 10/19/21  0543 10/18/21  2139 10/18/21  1506   WBC 9.3  --  6.5  --  5.4   HGB 10.1*  --  9.4*  --   12.5   MCV 95  --  96  --  92   *  --  143*  --  189   * 131* 137  --  136   POTASSIUM 3.2* 3.4 3.6   < > 2.5*   CHLORIDE 94 98 101  --  91*   CO2 31 26 29  --  31   BUN 11 10 9  --  9   CR 0.48* 0.38* 0.45*  --  0.45*   ANIONGAP 6 7 7  --  14   VICENTE 8.2* 7.5* 7.0*  --  8.9   * 135* 91  --  94   ALBUMIN  --   --  2.4*  --  4.0   PROTTOTAL  --   --  5.5*  --  7.9   BILITOTAL  --   --  1.0  --  1.6*   ALKPHOS  --   --  86  --  132   ALT  --   --  34  --  55*   AST  --   --  50*  --  90*   LIPASE  --   --   --   --  70*    < > = values in this interval not displayed.       Imaging:  Recent Results (from the past 24 hour(s))   Echocardiogram Complete   Result Value    LVEF  55-60%    Narrative    [id number]  [id number]  [id number]  [id number]^YARON^MASHA^DEMETRICE     [facility]  Echocardiography Laboratory  [address] [place] [address]     Name: MATTEO CASTAÑEDA  MRN: [id number]  : 1955  Study Date: 10/19/2021 01:56 PM  Age: 65 yrs  Gender: Female  Patient Location: [facility]  Reason For Study: Heart Failure - Left  Ordering Physician: MASHA MARRUFO  Performed By: Vielka Jones     BSA: 1.4 m2  Height: 61 in  Weight: 94 lb  HR: 104  BP: 157/67 mmHg  ______________________________________________________________________________  Procedure  Complete Echo Adult.  ______________________________________________________________________________  Interpretation Summary     Left ventricular systolic function is normal.  The visual ejection fraction is 55-60%.  Grade 1 Diastolic dysfunction.  Normal right ventricle structure and size  Moderate (46-55mmHg) pulmonary hypertension is present. Moderate TR.  Mild-moderate MR.  The inferior vena cava was normal in size with preserved respiratory  variability.  There is no pericardial effusion.     No prior for comparison.  ______________________________________________________________________________  Left Ventricle  The left  ventricle is normal in structure, function and size. There is normal  left ventricular wall thickness. The visual ejection fraction is 55-60%. Left  ventricular systolic function is normal. Grade I or early diastolic  dysfunction.     Right Ventricle  Normal right ventricle structure and size.     Atria  Normal left atrial size. Right atrial size is normal.     Mitral Valve  There is mild mitral annular calcification. There is mild to moderate (1-2+)  mitral regurgitation.     Tricuspid Valve  Moderate (46-55mmHg) pulmonary hypertension is present. The right ventricular  systolic pressure is approximated at 45mmHg plus the right atrial pressure.  There is moderate (2+) tricuspid regurgitation.     Aortic Valve  There is mild trileaflet aortic sclerosis. There is mild (1+) aortic  regurgitation.     Pulmonic Valve  The pulmonic valve is normal in structure and function.     Vessels  The aortic root is normal size. Normal size ascending aorta. The inferior vena  cava was normal in size with preserved respiratory variability.     Pericardium  There is no pericardial effusion.     ______________________________________________________________________________  MMode/2D Measurements & Calculations  IVSd: 0.66 cm  LVIDd: 3.8 cm  LVIDs: 2.6 cm  LVPWd: 0.65 cm  FS: 32.0 %     LV mass(C)d: 64.5 grams  LV mass(C)dI: 47.0 grams/m2  Ao root diam: 3.6 cm  LA dimension: 3.9 cm  asc Aorta Diam: 2.7 cm  LA/Ao: 1.1  RWT: 0.34     Doppler Measurements & Calculations  MV E max richa: 58.2 cm/sec  MV A max richa: 74.7 cm/sec  MV E/A: 0.78  MV dec slope: 449.7 cm/sec2  AI P1/2t: 222.0 msec  TR max richa: 329.8 cm/sec  TR max P.5 mmHg  E/E' av.5  Lateral E/e': 5.6  Medial E/e': 13.4     ______________________________________________________________________________  Report approved by: Nina Collins 10/19/2021 02:56 PM         XR Chest Port 1 View    Narrative    EXAM: XR CHEST PORT 1 VIEW  LOCATION: [facility]  HOSPITAL  DATE/TIME: 10/20/2021 1:22 AM    INDICATION: hypoxia, shortness of breath  COMPARISON: 10/19/2021      Impression    IMPRESSION: Findings similar to previous study. Slight improvement in interstitial markings involving left lung but slight worsening in interstitial/airspace infiltrates involving right lung. Findings may relate to combination of interstitial edema and   right-sided pneumonia. Pleural thickening persists on the right. Heart size normal.

## 2021-10-20 NOTE — CODE/RAPID RESPONSE
[facility]  House [facility] RRT Note  10/20/2021   Time called: 0007  RRT called for: respiratory distress   Code status: No CPR- Do NOT Intubate    Assessment & Plan   I was paged to the bedside to evaluate Ms. Kezia Dixon for an acute episode of tachypnea, shortness of breath, and hypoxia (now req 8L oximyzer). Patient is notably hypertensive from baseline. Patient had an episode of hypoxia last night and was started on antibiotics despite a non-existent procalcitonin and elevated BNP. On exam patient had expiratory wheezing and crackles heard in the mid-bases, no LE edema noted. Previous pCXR demonstrated prominent pulmonary vasculature indicative of pulmonary edema. Patient's home lasix was held on admission, I have restarted this.     Diagnosis:  -- acute hypoxic respiratory failure 2/2 cardiogenic pulmonary edema (BNP has tripled from yesterday)    Plan:  -- labs  -- 40 mg IV lasix  -- xopenex neb  -- pCXR  -- ok to continue abx   -- patient is extremely anxious, one dose of ativan given  -- patient was adamant that she did not want NIPPV, will hold off for now     At the conclusion of this RRT patient was hemodynamically stable and will remain on current unit    Interval History     Ms. Kezia Dixon is a 65 year old female who was admitted on 10/18/2021 for falls and generalized weakness.    Her history is significant for:  Past Medical History:   Diagnosis Date     Alcoholism (H) 10/14/2016     Benign essential hypertension 10/14/2016     Carotid artery disease (H)     mile plaque 5/7     Chemical dependency (H)     alcohol     Depression with anxiety      Esophageal cancer (H)      Esophageal cancer (H) 3/22/2016     Esophageal mass      GERD (gastroesophageal reflux disease)      Hx of pancreatitis 2003     Hypercholesteremia      Hyperglycemia      Hyperlipemia      Impaired fasting glucose 10/14/2016     Impaired fasting glucose 10/14/2016     Nocturnal leg cramps       Pancreatic pseudocyst 10/14/2016     Pancreatic pseudocyst 10/14/2016     Pancreatitis     with pseudocyst     Pap smear with atypical squamous cells, cannot exclude high grade squamous intraepithelial lesion (ASC-H) 10/14/16    Colpo impression benign, ECC benign. Cotestin in 1 yr.     Shingles      Vasomotor rhinitis      Past Surgical History:   Procedure Laterality Date     AAA REPAIR      splenic artery aneurysm embolization     ABDOMEN SURGERY  2/2/2016     COLONOSCOPY  11/26/2013    Procedure: COMBINED COLONOSCOPY, SINGLE BIOPSY/POLYPECTOMY BY BIOPSY;  COLONOSCOPY (MAC);  Surgeon: Montana Rosa MD;  Location:  GI     COLONOSCOPY  11/26/2013     COLONOSCOPY N/A 10/4/2018    Procedure: COMBINED COLONOSCOPY, SINGLE OR MULTIPLE BIOPSY/POLYPECTOMY BY BIOPSY;  colonoscopy;  Surgeon: Gio Apodaca MD;  Location:  GI     ENT SURGERY       ESOPHAGOGASTRECTOMY N/A 3/22/2016    Procedure: ESOPHAGOGASTRECTOMY;  Surgeon: Alvin Riojas MD;  Location:  OR     ESOPHAGOSCOPY, GASTROSCOPY, DUODENOSCOPY (EGD), COMBINED N/A 12/22/2015    Procedure: COMBINED ENDOSCOPIC ULTRASOUND, ESOPHAGOSCOPY, GASTROSCOPY, DUODENOSCOPY (EGD), FINE NEEDLE ASPIRATE/BIOPSY;  Surgeon: Danelle Michael MD;  Location:  GI     GASTROSTOMY, INSERT TUBE, COMBINED N/A 2/2/2016    Procedure: COMBINED GASTROSTOMY, INSERT TUBE (OPEN);  Surgeon: Alvin Riojas MD;  Location:  OR     GI SURGERY  12/22/2015     HAND SURGERY      right     HERNIORRHAPHY INCISIONAL (LOCATION) N/A 3/19/2019    Procedure: LAPARSCOPIC  INCISIONAL HERNIA REPAIR WITH MESH, LAPARSCOPIC LYSIS OF ADHENSIONS;  Surgeon: Lamberto Magaña MD;  Location:  OR     INSERT PORT VASCULAR ACCESS N/A 12/28/2015    Procedure: INSERT PORT VASCULAR ACCESS;  Surgeon: Alvin Riojas MD;  Location:  OR     LAPAROSCOPIC CHOLECYSTECTOMY N/A 3/19/2019    Procedure: LAPAROSCOPIC CHOLECYSTECTOMY;  Surgeon: Lamberto Magaña,  MD;  Location: [facility] OR     LOBECTOMY LUNG Right 10/16/2018    Procedure: LOBECTOMY LUNG;  Surgeon: Alvin Riojas MD;  Location:  OR     REMOVE PORT VASCULAR ACCESS Left 7/22/2016    Procedure: REMOVE PORT VASCULAR ACCESS;  Surgeon: Alvin Riojas MD;  Location:  OR     THORACOTOMY Right 10/16/2018    Procedure: REDO RIGHT THORACTOMY AND RIGHT LOWER LOBECTOMY, PLEURAL LYSIS;  Surgeon: Alvin Riojas MD;  Location:  OR     TONSILLECTOMY       VASCULAR SURGERY         Review of Systems   The 10 point Review of Systems is negative other than noted in the HPI or here.     Allergies   Allergies   Allergen Reactions     Codeine Sulfate Nausea     Simvastatin Cramps     Leg cramps     Pcn [Penicillins] Rash       Physical Exam   Physical Exam  Constitutional:       Appearance: She is ill-appearing.   HENT:      Head: Normocephalic and atraumatic.      Nose: Signs of injury present.      Mouth/Throat:      Mouth: Mucous membranes are dry.   Eyes:      Extraocular Movements: Extraocular movements intact.      Pupils: Pupils are equal, round, and reactive to light.   Cardiovascular:      Rate and Rhythm: Tachycardia present. Rhythm irregular.      Heart sounds: S1 normal and S2 normal. No murmur heard.     Pulmonary:      Effort: Tachypnea present.      Breath sounds: Examination of the right-upper field reveals wheezing. Examination of the left-upper field reveals wheezing. Examination of the right-middle field reveals rhonchi. Examination of the left-middle field reveals rhonchi. Examination of the right-lower field reveals decreased breath sounds. Examination of the left-lower field reveals decreased breath sounds. Decreased breath sounds, wheezing and rhonchi present.   Abdominal:      General: Abdomen is flat. There is no distension.   Musculoskeletal:      Cervical back: Normal range of motion.      Right lower leg: No edema.      Left lower leg: No edema.   Skin:     General: Skin is  warm and dry.      Capillary Refill: Capillary refill takes 2 to 3 seconds.      Coloration: Skin is pale.   Neurological:      Mental Status: She is alert and oriented to person, place, and time. Mental status is at baseline.   Psychiatric:         Attention and Perception: Attention normal.         Mood and Affect: Mood is anxious.         Speech: Speech normal.         Behavior: Behavior normal.         Thought Content: Thought content normal.     Vital Signs with Ranges:  Temp:  [97.8  F (36.6  C)-98.7  F (37.1  C)] 98.7  F (37.1  C)  Pulse:  [] 111  Resp:  [14-34] 22  BP: (108-166)/(49-79) 166/79  SpO2:  [77 %-97 %] 90 %  I/O last 3 completed shifts:  In: 360 [P.O.:360]  Out: 475 [Urine:475]    Data   Results for orders placed or performed during the hospital encounter of 10/18/21 (from the past 24 hour(s))   XR Chest Port 1 View    Narrative    EXAM: CHEST SINGLE VIEW PORTABLE  LOCATION: [facility]  DATE/TIME: 10/19/2021 3:06 AM    INDICATION: Cough. Sputum. Shortness of breath.  COMPARISON: 10/26/2018.    FINDINGS: Mildly to moderately increased interstitial opacities in the lungs. Apparent pleural thickening at the lateral and inferior aspects of the right hemithorax. A few hazy opacities in bilateral lung bases. Normal-sized cardiac silhouette.      Impression    IMPRESSION:   1. Mildly to moderately increased interstitial opacities in the lungs, possibly representing interstitial pulmonary edema.  2. A few hazy opacities in bilateral lung bases most likely represent atelectasis, but pneumonia cannot be excluded.  3. Apparent pleural thickening at the lateral and inferior aspects of the right hemithorax that could relate to scarring or a small loculated right pleural effusion.    Comprehensive metabolic panel   Result Value Ref Range    Sodium 137 133 - 144 mmol/L    Potassium 3.6 3.4 - 5.3 mmol/L    Chloride 101 94 - 109 mmol/L    Carbon Dioxide (CO2) 29 20 - 32 mmol/L     Anion Gap 7 3 - 14 mmol/L    Urea Nitrogen 9 7 - 30 mg/dL    Creatinine 0.45 (L) 0.52 - 1.04 mg/dL    Calcium 7.0 (L) 8.5 - 10.1 mg/dL    Glucose 91 70 - 99 mg/dL    Alkaline Phosphatase 86 40 - 150 U/L    AST 50 (H) 0 - 45 U/L    ALT 34 0 - 50 U/L    Protein Total 5.5 (L) 6.8 - 8.8 g/dL    Albumin 2.4 (L) 3.4 - 5.0 g/dL    Bilirubin Total 1.0 0.2 - 1.3 mg/dL    GFR Estimate >90 >60 mL/min/1.73m2   CBC with platelets   Result Value Ref Range    WBC Count 6.5 4.0 - 11.0 10e3/uL    RBC Count 2.99 (L) 3.80 - 5.20 10e6/uL    Hemoglobin 9.4 (L) 11.7 - 15.7 g/dL    Hematocrit 28.6 (L) 35.0 - 47.0 %    MCV 96 78 - 100 fL    MCH 31.4 26.5 - 33.0 pg    MCHC 32.9 31.5 - 36.5 g/dL    RDW 16.3 (H) 10.0 - 15.0 %    Platelet Count 143 (L) 150 - 450 10e3/uL   Magnesium   Result Value Ref Range    Magnesium 1.4 (L) 1.6 - 2.3 mg/dL   Phosphorus   Result Value Ref Range    Phosphorus 2.3 (L) 2.5 - 4.5 mg/dL   Nutrition Services Adult IP Consult    Narrative    Corine Muñiz RD, LD     10/19/2021 11:17 AM  CLINICAL NUTRITION SERVICES  -  ASSESSMENT NOTE    Recommendations Ordered by Registered Dietitian (RD):   - Thera vit M daily   - Ensure clear @ 2 pm. Trial Gelatein today.   - Encourage protein/calorie-dense options  - May need to address short-term FT as a bridge to adequate PO.    Malnutrition:   % Weight Loss:  > 5% in 1 month (severe malnutrition)  % Intake:  </= 50% for >/= 5 days (severe malnutrition)  Subcutaneous Fat Loss:  Orbital region moderate depletion, Upper   arm region moderate-severe depletion and Thoracic region   moderate-severe depletion  Muscle Loss:  Temporal region moderate depletion, Clavicle bone   region moderate-severe depletion and Acromion bone region   moderate-severe depletion  Fluid Retention:  None noted    Malnutrition Diagnosis: Severe malnutrition  In Context of:  Acute illness or injury  Chronic illness or disease     REASON FOR ASSESSMENT  Keziawali Tobar Zack is a 65 year old female seen by  "Registered   Dietitian for Nutrition Admission Risk Screen Received -   Have you recently lost weight without trying in the last 6 months   ? - \"yes 2-13 pounds\"  Have you been eating poorly due to a decreased appetite ?- \"yes\"'   and Provider Order - malnutrition assessment, order supplements    NUTRITION HISTORY  - Information obtained from chart and patient report.   - H/o esophageal cancer, HTN, HLD, CAD, alcoholism. Presented   with nausea, vomiting, diarrhea.   - Loose stools after eating/drinking    Per MD -   \"Patient reports significantly decreased oral intake for several   years that has dropped off even more precipitously in the last   several months.  Currently taking 1 Ensure shake at most on a   daily basis.\"    - Pt seen in room. She tells me that the past few days she has   been taking a little as one Ensure clear each day (these provide   240 kcal and 8 g protein). The Ensure Plus/Enlive are too rich,   cause diarrhea.   - Nausea, vomiting, diarrhea for the past 5 days (Since last   Friday).   - She likes egg/chicken salad, eggs, some meats.   - Lactose intolerant - avoids yogurt, cottage cheese, milk.     - \"lost a lot of weight\" when her  , reports she was   able to regain some of this but is now losing again. Suspects   current wt is closer to 80-85#.  - NKFA    CURRENT NUTRITION ORDERS  Diet Order:     Regular     Current Intake/Tolerance:  Tolerated a fruit cup today so far. Stated it was a little   difficult to get it all down. Agreed to an Ensure Clear   supplement 1x daily. Asked \"what foods should I try to force   down\". Circled / starred some good lactose-free items on her   menu.     NUTRITION FOCUSED PHYSICAL ASSESSMENT FOR DIAGNOSING   MALNUTRITION)  Yes         Observed:    No nutrition-related physical findings observed    Obtained from Chart/Interdisciplinary Team:  Last BM 10/18 (diarrhea)  [facility] - Nutrition 1; total 17    ANTHROPOMETRICS  Height: 5' 1\"  Weight: 36.3 kg " "(80 lb)  Body mass index is 15.1 kg/m .  Weight Status:  Underweight BMI <18.5  IBW: 47.7 kg   % IBW: 76%  Weight History:   Patient stated that she had lost \"a lot\" of weight when her    passed away. She was able to regain some, but is now   losing again. Suspects weight of 94# is inaccurate. More likely   80-85# at most.     Up to 6% weight loss in the past ~1 month if admit wt is   accurate.     Wt Readings from Last 10 Encounters:   10/19/21 42.6 kg (94 lb)   09/09/21 38.7 kg (85 lb 4.8 oz)   02/05/20 40.3 kg (88 lb 14.4 oz)   03/19/19 40.8 kg (90 lb)   03/11/19 40.4 kg (89 lb)   02/20/19 39.9 kg (88 lb)   12/24/18 41.3 kg (91 lb)   11/23/18 39.5 kg (87 lb)   11/02/18 40.1 kg (88 lb 8 oz)   10/11/18 42.2 kg (93 lb)       LABS  Labs reviewed    Electrolytes  Potassium (mmol/L)   Date Value   10/19/2021 3.6   10/19/2021 3.3 (L)   10/18/2021 3.1 (L)   02/05/2020 4.4   03/11/2019 4.2   10/24/2018 3.8     Phosphorus (mg/dL)   Date Value   10/19/2021 2.3 (L)   10/18/2021 3.1   03/28/2016 3.5    Blood Glucose  Glucose (mg/dL)   Date Value   10/19/2021 91   10/18/2021 94   02/05/2020 95   03/11/2019 99   10/24/2018 189 (H)   10/22/2018 105 (H)   10/18/2018 192 (H)     Hemoglobin A1C (%)   Date Value   02/05/2020 5.4   10/11/2018 5.8 (H)   11/04/2015 5.9   11/20/2013 6.0   01/09/2013 6.1    Inflammatory Markers  WBC (10e9/L)   Date Value   02/05/2020 7.4   03/11/2019 6.5   10/24/2018 8.3     WBC Count (10e3/uL)   Date Value   10/19/2021 6.5   10/18/2021 5.4     Albumin (g/dL)   Date Value   10/19/2021 2.4 (L)   10/18/2021 4.0   02/05/2020 3.6   03/11/2019 3.8   10/18/2018 2.9 (L)      Magnesium (mg/dL)   Date Value   10/19/2021 1.4 (L)   10/18/2021 1.8   03/28/2016 2.1     Sodium (mmol/L)   Date Value   10/19/2021 137   10/18/2021 136   02/05/2020 134   03/11/2019 138   10/25/2018 136    Renal  Urea Nitrogen (mg/dL)   Date Value   10/19/2021 9   10/18/2021 9   02/05/2020 13   03/11/2019 18   10/24/2018 15 "     Creatinine (mg/dL)   Date Value   10/19/2021 0.45 (L)   10/18/2021 0.45 (L)   02/05/2020 0.51 (L)   03/11/2019 0.52   10/25/2018 0.56     Additional  Triglycerides (mg/dL)   Date Value   02/05/2020 98   11/09/2017 91   10/10/2016 112     Ketones Urine (mg/dL)   Date Value   10/18/2021 10  (A)           MEDICATIONS  Medications reviewed  Folic acid 1 mg daily, Thiamine (etoh protocol)    ASSESSED NUTRITION NEEDS PER APPROVED PRACTICE GUIDELINES:  Dosing Weight 36.3 kg - admit wt  Estimated Energy Needs: 8100-3998+ kcals (30-35+ Kcal/Kg)  Justification: repletion and underweight  Estimated Protein Needs: 55-73 grams protein (1.5-2 g pro/Kg)  Justification: Repletion and preservation of lean body mass  Estimated Fluid Needs: 1 mL/kcal  Justification: maintenance    MALNUTRITION:  % Weight Loss:  > 5% in 1 month (severe malnutrition)  % Intake:  </= 50% for >/= 5 days (severe malnutrition)  Subcutaneous Fat Loss:  Orbital region moderate depletion, Upper   arm region moderate-severe depletion and Thoracic region   moderate-severe depletion  Muscle Loss:  Temporal region moderate depletion, Clavicle bone   region moderate-severe depletion and Acromion bone region   moderate-severe depletion  Fluid Retention:  None noted    Malnutrition Diagnosis: Severe malnutrition  In Context of:  Acute illness or injury  Chronic illness or disease    NUTRITION DIAGNOSIS:  Inadequate oral intake related to poor appetite with nausea,   vomiting, diarrhea as evidenced by negligible PO for the past 5   days, up to 6% weight loss in 1 month.       NUTRITION INTERVENTIONS  Recommendations / Nutrition Prescription  - Continue Thera vit M daily   - Ensure clear @ 2 pm  - Encourage protein/calorie-dense options      Implementation  Nutrition education: Provided education on calorie/protein dense   options  Medical Food Supplement: ordered      Nutrition Goals  Intake of at least 50% meals BID-TID + 1 supplement daily       MONITORING AND  EVALUATION:  Progress towards goals will be monitored and evaluated per   protocol and Practice Guidelines    Corine Muñiz RD, [facility]  [facility], [facility], Ortho, Ortho Spine  Pager: [phone]  Weekend Pager: [phone]     Echocardiogram Complete   Result Value Ref Range    LVEF  55-60%     Narrative    [id number]  [id number]  [id number]  [id number]^YARON^MASHA^DEMETRICE     [facility]  Echocardiography Laboratory  [address] [place] [address]     Name: MATTEO CASTAÑEDA  MRN: [id number]  : 1955  Study Date: 10/19/2021 01:56 PM  Age: 65 yrs  Gender: Female  Patient Location: [facility]  Reason For Study: Heart Failure - Left  Ordering Physician: MASHA MARRUFO  Performed By: Vielka Jones     BSA: 1.4 m2  Height: 61 in  Weight: 94 lb  HR: 104  BP: 157/67 mmHg  ______________________________________________________________________________  Procedure  Complete Echo Adult.  ______________________________________________________________________________  Interpretation Summary     Left ventricular systolic function is normal.  The visual ejection fraction is 55-60%.  Grade 1 Diastolic dysfunction.  Normal right ventricle structure and size  Moderate (46-55mmHg) pulmonary hypertension is present. Moderate TR.  Mild-moderate MR.  The inferior vena cava was normal in size with preserved respiratory  variability.  There is no pericardial effusion.     No prior for comparison.  ______________________________________________________________________________  Left Ventricle  The left ventricle is normal in structure, function and size. There is normal  left ventricular wall thickness. The visual ejection fraction is 55-60%. Left  ventricular systolic function is normal. Grade I or early diastolic  dysfunction.     Right Ventricle  Normal right ventricle structure and size.     Atria  Normal left atrial size. Right atrial size is normal.     Mitral Valve  There is mild mitral annular calcification.  There is mild to moderate (1-2+)  mitral regurgitation.     Tricuspid Valve  Moderate (46-55mmHg) pulmonary hypertension is present. The right ventricular  systolic pressure is approximated at 45mmHg plus the right atrial pressure.  There is moderate (2+) tricuspid regurgitation.     Aortic Valve  There is mild trileaflet aortic sclerosis. There is mild (1+) aortic  regurgitation.     Pulmonic Valve  The pulmonic valve is normal in structure and function.     Vessels  The aortic root is normal size. Normal size ascending aorta. The inferior vena  cava was normal in size with preserved respiratory variability.     Pericardium  There is no pericardial effusion.     ______________________________________________________________________________  MMode/2D Measurements & Calculations  IVSd: 0.66 cm  LVIDd: 3.8 cm  LVIDs: 2.6 cm  LVPWd: 0.65 cm  FS: 32.0 %     LV mass(C)d: 64.5 grams  LV mass(C)dI: 47.0 grams/m2  Ao root diam: 3.6 cm  LA dimension: 3.9 cm  asc Aorta Diam: 2.7 cm  LA/Ao: 1.1  RWT: 0.34     Doppler Measurements & Calculations  MV E max richa: 58.2 cm/sec  MV A max richa: 74.7 cm/sec  MV E/A: 0.78  MV dec slope: 449.7 cm/sec2  AI P1/2t: 222.0 msec  TR max richa: 329.8 cm/sec  TR max P.5 mmHg  E/E' av.5  Lateral E/e': 5.6  Medial E/e': 13.4     ______________________________________________________________________________  Report approved by: Nina Collins 10/19/2021 02:56 PM         Extra Tube    Narrative    The following orders were created for panel order Extra Tube.  Procedure                               Abnormality         Status                     ---------                               -----------         ------                     Extra Green Top (Lithium...[[id number]]                      Final result                 Please view results for these tests on the individual orders.   Extra Green Top (Lithium Heparin) Tube   Result Value Ref Range    Hold Specimen JIC    Magnesium   Result  Value Ref Range    Magnesium 2.0 1.6 - 2.3 mg/dL   Nt probnp inpatient   Result Value Ref Range    N terminal Pro BNP Inpatient 9,828 (H) 0 - 900 pg/mL   Lactic acid whole blood   Result Value Ref Range    Lactic Acid 1.3 0.7 - 2.0 mmol/L   Basic metabolic panel   Result Value Ref Range    Sodium 131 (L) 133 - 144 mmol/L    Potassium 3.4 3.4 - 5.3 mmol/L    Chloride 98 94 - 109 mmol/L    Carbon Dioxide (CO2) 26 20 - 32 mmol/L    Anion Gap 7 3 - 14 mmol/L    Urea Nitrogen 10 7 - 30 mg/dL    Creatinine 0.38 (L) 0.52 - 1.04 mg/dL    Calcium 7.5 (L) 8.5 - 10.1 mg/dL    Glucose 135 (H) 70 - 99 mg/dL    GFR Estimate >90 >60 mL/min/1.73m2   XR Chest Port 1 View    Narrative    EXAM: XR CHEST PORT 1 VIEW  LOCATION: [facility]  DATE/TIME: 10/20/2021 1:22 AM    INDICATION: hypoxia, shortness of breath  COMPARISON: 10/19/2021      Impression    IMPRESSION: Findings similar to previous study. Slight improvement in interstitial markings involving left lung but slight worsening in interstitial/airspace infiltrates involving right lung. Findings may relate to combination of interstitial edema and   right-sided pneumonia. Pleural thickening persists on the right. Heart size normal.     COVID-19 PCR Results    COVID-19 PCR Results 10/18/21   SARS CoV2 PCR Negative      Comments are available for some flowsheets but are not being displayed.         COVID-19 Antibody Results, Testing for Immunity    COVID-19 Antibody Results, Testing for Immunity   No data to display.           EKG:  -- none    Time Spent on this Encounter   I spent 30 minutes of critical care time on the unit/floor managing the care of Kezia Dixon. Upon evaluation, this patient had a high probability of imminent or life-threatening deterioration due to acute hypoxic respiratory failure, which required my direct attention, intervention, and personal management. 100% of my time was spent at the bedside counseling the patient and/or  coordinating care regarding services listed in this note.    STEVEN Baron, CNP  Hospitalist - House [facility]  Text Page  [phone])

## 2021-10-20 NOTE — CONSULTS
Care Management Initial Consult    General Information  Assessment completed with: Patient,    Type of CM/SW Visit: Initial Assessment    Primary Care Provider verified and updated as needed:     Readmission within the last 30 days:        Reason for Consult: discharge planning, TCU is recommended  Advance Care Planning:            Communication Assessment  Patient's communication style: spoken language (English or Bilingual)    Hearing Difficulty or Deaf: no   Wear Glasses or Blind: yes    Cognitive  Cognitive/Neuro/Behavioral: WDL                      Living Environment:   People in home: alone     Current living Arrangements: house      Able to return to prior arrangements: other (see comments) (requires TCU first, goal is to return home post TCU)       Family/Social Support:  Care provided by: self  Provides care for:    Marital Status:   Children          Description of Support System:           Current Resources:   Patient receiving home care services: No     Community Resources:    Equipment currently used at home: none  Supplies currently used at home:      Employment/Financial:  Employment Status:          Financial Concerns:             Lifestyle & Psychosocial Needs:  Social Determinants of Health     Tobacco Use: Medium Risk     Smoking Tobacco Use: Former Smoker     Smokeless Tobacco Use: Never Used   Alcohol Use:      Frequency of Alcohol Consumption:      Average Number of Drinks:      Frequency of Binge Drinking:    Financial Resource Strain:      Difficulty of Paying Living Expenses:    Food Insecurity:      Worried About Running Out of Food in the Last Year:      Ran Out of Food in the Last Year:    Transportation Needs:      Lack of Transportation (Medical):      Lack of Transportation (Non-Medical):    Physical Activity:      Days of Exercise per Week:      Minutes of Exercise per Session:    Stress:      Feeling of Stress :    Social Connections:      Frequency of Communication with  Friends and Family:      Frequency of Social Gatherings with Friends and Family:      Attends Christian Services:      Active Member of Clubs or Organizations:      Attends Club or Organization Meetings:      Marital Status:    Intimate Partner Violence:      Fear of Current or Ex-Partner:      Emotionally Abused:      Physically Abused:      Sexually Abused:    Depression: Not at risk     PHQ-2 Score: 0   Housing Stability:      Unable to Pay for Housing in the Last Year:      Number of Places Lived in the Last Year:      Unstable Housing in the Last Year:        Functional Status:  Prior to admission patient needed assistance:              Mental Health Status:          Chemical Dependency Status:  Chemical Dependency Status: Past Concern             Values/Beliefs:  Spiritual, Cultural Beliefs, Christian Practices, Values that affect care:                 Additional Information:  Met with patient to complete assessment and discuss TCU recommendation.   Introduced role in discharge planning.  Patient reports she would prefer to go directly home however knows TCU is the best plan for her at this time.   Patient explains she lives alone in a large house of which she would like to down size from  in the future.  The complication is find a home for herself and her dog who is 11 years old and ofdbost874 lb's.    She currently hires a  2x a day.  Has cleaning help every other week and lawn and snow removal.   She has used some grocery delivery services in the past.   She has been independent with personal cares.  Patient has never been in a TCU before.  SNF benefits reviewed, described TCU services such as therapy twice a day.  She prefers a private room and is accepting of the private room fee averaging $40.00 a day.  She wants to know that the TCU can provide her with frequent small meals.    Our meeting was cut short as PT came to work with patient.   Will meet again tomorrow morning to obtain her TCU  choices and make TCU referrals.    Susan Paulino, LICSW

## 2021-10-20 NOTE — PLAN OF CARE
Summary:     DATE & TIME: 10/19/21 [id number]  Cognitive Concerns/ Orientation : A&Ox4, forgetful   BEHAVIOR & AGGRESSION TOOL COLOR: Green  CIWA SCORE: 1, 6 (scored higher due to Rapid response happening), 2  ABNL VS/O2: pt on 3-4L O2 right now- SOB with activity. Pt was up to 8 L during rapid response  MOBILITY: Ax1 GB/W to [facility] [place]  PAIN MANAGMENT: denies  DIET: regular  BOWEL/BLADDER: continent, purewick in place due to getting IV lasixs  ABNL LAB/BG: mag 2.0, BNP 9,828, Na 131  DRAIN/DEVICES: PIV x2 went bad, New IV placed by anesthesia, SL with intermittent antibiotics     TELEMETRY RHYTHM: sinus tachy at times  SKIN: nose abrasion and scattered bruising (face and R thigh)  TESTS/PROCEDURES: Echo  completed today  D/C DAY/GOALS/PLACE: home vs. TCU. Agreeable to TCU.  OTHER IMPORTANT INFO: Nutrition following. PT/OT ordered. Pt on enteric precautions, Still need stool sample.     At 0000 after hanging Zithromax antibiotic pt became extremely short of breath requiring 8 L O2 and became very congested. A rapid response was called. Gave IV lasix's and pt voided 1700 mL into purewick, Neb helped and pt not congested. One time dose of IV ativan given due to pt being very anxious and BP was elevated. Pt is now resting on 3-4 L O2 Nc. BP normal.    CT showed pulmonary edema, possible pneumonia.

## 2021-10-20 NOTE — PROGRESS NOTES
Care Transitions SW Note  Writer is acknowledging CT consult for discharge planning.  Transitional Care is recommended.  Writer reviewed chart and attempted to meet with patient but she is sleeping.   Patient admitted under In Patient status 10/18 with weakness, nausea, vomitting and diarrhea. Anticipated discharge date is 10/22.  Plan:  Will try again later today or tomorrow morning.    Susan Paulino, RADHASW

## 2021-10-20 NOTE — PROGRESS NOTES
[facility]    Medicine Progress Note - Hospitalist Service       Date of Admission:  10/18/2021    Assessment & Plan            Kezia Dixon is a 65 year old female admitted on 10/18/2021. She presents with weakness, fall, and poor appetite    Falls  Generalized weakness  Reports progressive weakness, some incoordination over past several days to weeks. This is in the setting of poor oral intake, hx of EtOH use although denies ongoing use. She was previously recommended to discharge to TCU after hospital stay in August but discharged home with home cares instead.   * differential of fall is broad. Likely due to progressive weakness in setting poor oral intake and possible ongoing EtOH use. Consider cardiac causes with cardiomyopathy hx. Possible vitamin deficiencies (pellagra, beriberi or wernickies) with loose stools, possible confusion, reported incoordination.   - PT/OT/SW  - vitamins  - nutrition consult  - TTE  - Tele    Acute hypoxic respiratory failure. Secondary to acute systolic congestive heart failure and +/- Pneumonia (Community-acquired pneumonia)  patient was started on azithromycin and ceftriaxone on 10/18. No fever, normal WBC and neg Procal but Chest x-ray shows possible Rt sided pneumonia.    -f/u sputum study, BC if neg will discontinue Abx 10/21.   - ct diuresis as below    Heart failure with reduced ejection fraction, chronic  Cardiomyopathy of uncertain cause  HTN  Noted to have EF of 35% with hypokinesia of the septal, anterior, anterolateral, and apical segments.  *Does not appear fluid overloaded on admission but required diuresis now. Interestingly EF 10/19 better to 50-55% and normal IVC.   -Monitor volume status closely with IV meds  -Continue PTA metoprolol, lisinopril, spironolactone, aspirin, atorvastatin   -resumed PTA Lasix ( Monitor volume status closely)   -Family requests cardiology follow up as inpatient so requested consult.     Hx of EtOH  dependence  Elevated LFTs  Reports she has been abstinent since August. LFTs seem consistent with EtOH use.  - monitor LFTs  - CIWA with lorazepam    Loose stools  Reports 2 loose stools per day after eating or drinking. No associated abdominal pain, fevers, chills, or other infectious symptoms.  There is some formed content to her stools.  -C. difficile and enteric panel ordered in the emergency department, continue precautions until it is resolved.  None since admission     Hypokalemia, severe  Hypomagnesemia    -Replace per protocol  -Check magnesium and phosphorus    Hyponatremia: monitor    Severe protein calorie malnutrition  Anorexia  Patient reports significantly decreased oral intake for several years that has dropped off even more precipitously in the last several months.  Currently taking 1 Ensure shake at most on a daily basis.  -Consult to nutrition  -Multivitamin  -Encourage oral intake    Status post esophagectomy with stomach pull-through  GERD  -Continue PTA PPI  -Encourage oral intake    Facial bruising  Noted after recent fall.  CT head and CT facial bones are negative for acute pathology.       Diet: Combination Diet Regular Diet Adult    DVT Prophylaxis: Pneumatic Compression Devices  Jaeger Catheter: Not present  Central Lines: None  Code Status: No CPR- Do NOT Intubate      Disposition Plan   Expected discharge: 10/22/2021   recommended to home vs rehab once safe disposition plan/ TCU bed available and electrolytes stable. breathing stable.     The patient's care was discussed with the Bedside Nurse, Care Coordinator/, Patient and Patient's Family.    Ahsan Patton MD, MD  Hospitalist Service  [facility]  Securely message with the Vocera Web Console (learn more here)  Text page via [employer] Paging/Directory      Clinically Significant Risk Factors Present on Admission            # Severe Malnutrition, POA: based on Weight loss;Reduced intake;Subcutaneous fat  "loss;Muscle loss (10/19/21 1100)   ______________________________________________________________________    Interval History   Last night events noted. RRT was called for worsening SOB and responded to lasix.   No fever   Denies any specific chest pain/palpitations.   Last drink in august as per her  4 point ROS done and negative unless mentioned     Data reviewed today: I reviewed all medications, new labs and imaging results over the last 24 hours. I personally reviewed the Head CT, CT facial bones image(s) showing As below.    Physical Exam   BP (!) 146/61 (BP Location: Left arm)   Pulse 106   Temp 99.2  F (37.3  C) (Oral)   Resp 18   Ht 1.549 m (5' 1\")   Wt 42.9 kg (94 lb 9.2 oz)   SpO2 94%   BMI 17.87 kg/m    Gen- pleasant  HEENT-bruising , EUSEBIA  Neck- supple  CVS- I+II+ no m/r/g  RS- CTAB with crackles at base/ infrascapular   Abdo- soft, no tenderness. No g/r/r   Ext- no edema   CNS-no gross focal deficit.  Oriented to person/place/time.    Data .  BMP  Recent Labs   Lab 10/20/21  0933 10/20/21  0030 10/19/21  0543 10/19/21  0133 10/18/21  2139 10/18/21  1506   * 131* 137  --   --  136   POTASSIUM 3.2* 3.4 3.6 3.3*   < > 2.5*   CHLORIDE 94 98 101  --   --  91*   VICENTE 8.2* 7.5* 7.0*  --   --  8.9   CO2 31 26 29  --   --  31   BUN 11 10 9  --   --  9   CR 0.48* 0.38* 0.45*  --   --  0.45*   * 135* 91  --   --  94    < > = values in this interval not displayed.     CBC  Recent Labs   Lab 10/20/21  0933 10/19/21  0543 10/19/21  0543 10/18/21  1506 10/18/21  1506   WBC 9.3  --  6.5  --  5.4   RBC 3.27*  --  2.99*  --  3.97   HGB 10.1*   < > 9.4*   < > 12.5   HCT 31.2*  --  28.6*  --  36.5   MCV 95  --  96  --  92   MCH 30.9  --  31.4  --  31.5   MCHC 32.4  --  32.9  --  34.2   RDW 15.9*  --  16.3*  --  16.1*   *  --  143*  --  189    < > = values in this interval not displayed.     INRNo lab results found in last 7 days.  LFTs  Recent Labs   Lab 10/19/21  0543 10/18/21  1506   ALKPHOS " 86 132   AST 50* 90*   ALT 34 55*   BILITOTAL 1.0 1.6*   PROTTOTAL 5.5* 7.9   ALBUMIN 2.4* 4.0      PANC  Recent Labs   Lab 10/18/21  1506   LIPASE 70*       Recent Results (from the past 24 hour(s))   Echocardiogram Complete   Result Value    LVEF  55-60%    [facility]    [id number]  [id number]  [id number]  [id number]^YARON^MASHA^DEMETRICE     [facility]  Echocardiography Laboratory  [address] [place] [address]     Name: MATTEO CASTAÑEDA  MRN: [id number]  : 1955  Study Date: 10/19/2021 01:56 PM  Age: 65 yrs  Gender: Female  Patient Location: [facility]  Reason For Study: Heart Failure - Left  Ordering Physician: MASHA MARRUFO  Performed By: Vielka Jones     BSA: 1.4 m2  Height: 61 in  Weight: 94 lb  HR: 104  BP: 157/67 mmHg  ______________________________________________________________________________  Procedure  Complete Echo Adult.  ______________________________________________________________________________  Interpretation Summary     Left ventricular systolic function is normal.  The visual ejection fraction is 55-60%.  Grade 1 Diastolic dysfunction.  Normal right ventricle structure and size  Moderate (46-55mmHg) pulmonary hypertension is present. Moderate TR.  Mild-moderate MR.  The inferior vena cava was normal in size with preserved respiratory  variability.  There is no pericardial effusion.     No prior for comparison.  ______________________________________________________________________________  Left Ventricle  The left ventricle is normal in structure, function and size. There is normal  left ventricular wall thickness. The visual ejection fraction is 55-60%. Left  ventricular systolic function is normal. Grade I or early diastolic  dysfunction.     Right Ventricle  Normal right ventricle structure and size.     Atria  Normal left atrial size. Right atrial size is normal.     Mitral Valve  There is mild mitral annular calcification. There is mild to moderate  (1-2+)  mitral regurgitation.     Tricuspid Valve  Moderate (46-55mmHg) pulmonary hypertension is present. The right ventricular  systolic pressure is approximated at 45mmHg plus the right atrial pressure.  There is moderate (2+) tricuspid regurgitation.     Aortic Valve  There is mild trileaflet aortic sclerosis. There is mild (1+) aortic  regurgitation.     Pulmonic Valve  The pulmonic valve is normal in structure and function.     Vessels  The aortic root is normal size. Normal size ascending aorta. The inferior vena  cava was normal in size with preserved respiratory variability.     Pericardium  There is no pericardial effusion.     ______________________________________________________________________________  MMode/2D Measurements & Calculations  IVSd: 0.66 cm  LVIDd: 3.8 cm  LVIDs: 2.6 cm  LVPWd: 0.65 cm  FS: 32.0 %     LV mass(C)d: 64.5 grams  LV mass(C)dI: 47.0 grams/m2  Ao root diam: 3.6 cm  LA dimension: 3.9 cm  asc Aorta Diam: 2.7 cm  LA/Ao: 1.1  RWT: 0.34     Doppler Measurements & Calculations  MV E max richa: 58.2 cm/sec  MV A max richa: 74.7 cm/sec  MV E/A: 0.78  MV dec slope: 449.7 cm/sec2  AI P1/2t: 222.0 msec  TR max richa: 329.8 cm/sec  TR max P.5 mmHg  E/E' av.5  Lateral E/e': 5.6  Medial E/e': 13.4     ______________________________________________________________________________  Report approved by: Nina Collins 10/19/2021 02:56 PM         XR Chest Port 1 View    Narrative    EXAM: XR CHEST PORT 1 VIEW  LOCATION: [facility]  DATE/TIME: 10/20/2021 1:22 AM    INDICATION: hypoxia, shortness of breath  COMPARISON: 10/19/2021      Impression    IMPRESSION: Findings similar to previous study. Slight improvement in interstitial markings involving left lung but slight worsening in interstitial/airspace infiltrates involving right lung. Findings may relate to combination of interstitial edema and   right-sided pneumonia. Pleural thickening persists on the right.  Heart size normal.

## 2021-10-21 ENCOUNTER — APPOINTMENT (OUTPATIENT)
Dept: OCCUPATIONAL THERAPY | Facility: CLINIC | Age: 66
DRG: 947 | End: 2021-10-21
Payer: MEDICARE

## 2021-10-21 LAB
ANION GAP SERPL CALCULATED.3IONS-SCNC: 5 MMOL/L (ref 3–14)
BUN SERPL-MCNC: 13 MG/DL (ref 7–30)
CALCIUM SERPL-MCNC: 7.8 MG/DL (ref 8.5–10.1)
CHLORIDE BLD-SCNC: 96 MMOL/L (ref 94–109)
CO2 SERPL-SCNC: 30 MMOL/L (ref 20–32)
CREAT SERPL-MCNC: 0.5 MG/DL (ref 0.52–1.04)
GFR SERPL CREATININE-BSD FRML MDRD: >90 ML/MIN/1.73M2
GLUCOSE BLD-MCNC: 205 MG/DL (ref 70–99)
GLUCOSE BLDC GLUCOMTR-MCNC: 125 MG/DL (ref 70–99)
MAGNESIUM SERPL-MCNC: 1.5 MG/DL (ref 1.6–2.3)
POTASSIUM BLD-SCNC: 2.8 MMOL/L (ref 3.4–5.3)
POTASSIUM BLD-SCNC: 3.3 MMOL/L (ref 3.4–5.3)
POTASSIUM BLD-SCNC: 3.4 MMOL/L (ref 3.4–5.3)
POTASSIUM BLD-SCNC: 3.4 MMOL/L (ref 3.4–5.3)
PROCALCITONIN SERPL-MCNC: 12.82 NG/ML
SODIUM SERPL-SCNC: 131 MMOL/L (ref 133–144)

## 2021-10-21 PROCEDURE — 258N000003 HC RX IP 258 OP 636: Performed by: STUDENT IN AN ORGANIZED HEALTH CARE EDUCATION/TRAINING PROGRAM

## 2021-10-21 PROCEDURE — 250N000013 HC RX MED GY IP 250 OP 250 PS 637: Performed by: NURSE PRACTITIONER

## 2021-10-21 PROCEDURE — 250N000011 HC RX IP 250 OP 636: Performed by: STUDENT IN AN ORGANIZED HEALTH CARE EDUCATION/TRAINING PROGRAM

## 2021-10-21 PROCEDURE — 250N000009 HC RX 250: Performed by: STUDENT IN AN ORGANIZED HEALTH CARE EDUCATION/TRAINING PROGRAM

## 2021-10-21 PROCEDURE — 80048 BASIC METABOLIC PNL TOTAL CA: CPT | Performed by: INTERNAL MEDICINE

## 2021-10-21 PROCEDURE — 999N000127 HC STATISTIC PERIPHERAL IV START W US GUIDANCE

## 2021-10-21 PROCEDURE — 84132 ASSAY OF SERUM POTASSIUM: CPT | Performed by: STUDENT IN AN ORGANIZED HEALTH CARE EDUCATION/TRAINING PROGRAM

## 2021-10-21 PROCEDURE — 36415 COLL VENOUS BLD VENIPUNCTURE: CPT | Performed by: INTERNAL MEDICINE

## 2021-10-21 PROCEDURE — 250N000013 HC RX MED GY IP 250 OP 250 PS 637: Performed by: INTERNAL MEDICINE

## 2021-10-21 PROCEDURE — 83735 ASSAY OF MAGNESIUM: CPT | Performed by: STUDENT IN AN ORGANIZED HEALTH CARE EDUCATION/TRAINING PROGRAM

## 2021-10-21 PROCEDURE — 97530 THERAPEUTIC ACTIVITIES: CPT | Mod: GO

## 2021-10-21 PROCEDURE — 250N000013 HC RX MED GY IP 250 OP 250 PS 637: Performed by: STUDENT IN AN ORGANIZED HEALTH CARE EDUCATION/TRAINING PROGRAM

## 2021-10-21 PROCEDURE — 120N000001 HC R&B MED SURG/OB

## 2021-10-21 PROCEDURE — 84145 PROCALCITONIN (PCT): CPT | Performed by: INTERNAL MEDICINE

## 2021-10-21 PROCEDURE — 84132 ASSAY OF SERUM POTASSIUM: CPT | Performed by: INTERNAL MEDICINE

## 2021-10-21 PROCEDURE — 99233 SBSQ HOSP IP/OBS HIGH 50: CPT | Performed by: INTERNAL MEDICINE

## 2021-10-21 RX ORDER — MAGNESIUM OXIDE 400 MG/1
400 TABLET ORAL 2 TIMES DAILY
Status: COMPLETED | OUTPATIENT
Start: 2021-10-21 | End: 2021-10-22

## 2021-10-21 RX ORDER — POTASSIUM CHLORIDE 1500 MG/1
20 TABLET, EXTENDED RELEASE ORAL ONCE
Status: COMPLETED | OUTPATIENT
Start: 2021-10-21 | End: 2021-10-21

## 2021-10-21 RX ORDER — POTASSIUM CHLORIDE 1500 MG/1
40 TABLET, EXTENDED RELEASE ORAL ONCE
Status: COMPLETED | OUTPATIENT
Start: 2021-10-21 | End: 2021-10-21

## 2021-10-21 RX ADMIN — ONDANSETRON 4 MG: 4 TABLET, ORALLY DISINTEGRATING ORAL at 19:01

## 2021-10-21 RX ADMIN — ASPIRIN 81 MG: 81 TABLET, COATED ORAL at 08:15

## 2021-10-21 RX ADMIN — AZITHROMYCIN MONOHYDRATE 250 MG: 500 INJECTION, POWDER, LYOPHILIZED, FOR SOLUTION INTRAVENOUS at 00:13

## 2021-10-21 RX ADMIN — CEFTRIAXONE SODIUM 2 G: 2 INJECTION, POWDER, FOR SOLUTION INTRAMUSCULAR; INTRAVENOUS at 04:52

## 2021-10-21 RX ADMIN — ACETAMINOPHEN 650 MG: 325 TABLET, FILM COATED ORAL at 19:01

## 2021-10-21 RX ADMIN — LOSARTAN POTASSIUM 12.5 MG: 25 TABLET, FILM COATED ORAL at 08:10

## 2021-10-21 RX ADMIN — METOPROLOL SUCCINATE 25 MG: 25 TABLET, EXTENDED RELEASE ORAL at 08:10

## 2021-10-21 RX ADMIN — PANTOPRAZOLE SODIUM 40 MG: 40 TABLET, DELAYED RELEASE ORAL at 08:11

## 2021-10-21 RX ADMIN — FUROSEMIDE 40 MG: 40 TABLET ORAL at 08:11

## 2021-10-21 RX ADMIN — POTASSIUM CHLORIDE 20 MEQ: 1500 TABLET, EXTENDED RELEASE ORAL at 11:48

## 2021-10-21 RX ADMIN — FLUTICASONE PROPIONATE 2 SPRAY: 50 SPRAY, METERED NASAL at 08:09

## 2021-10-21 RX ADMIN — METOPROLOL SUCCINATE 25 MG: 25 TABLET, EXTENDED RELEASE ORAL at 20:39

## 2021-10-21 RX ADMIN — FOLIC ACID 1 MG: 5 INJECTION, SOLUTION INTRAMUSCULAR; INTRAVENOUS; SUBCUTANEOUS at 08:10

## 2021-10-21 RX ADMIN — ATORVASTATIN CALCIUM 40 MG: 40 TABLET, FILM COATED ORAL at 08:11

## 2021-10-21 RX ADMIN — Medication 400 MG: at 20:39

## 2021-10-21 RX ADMIN — POTASSIUM CHLORIDE 40 MEQ: 1500 TABLET, EXTENDED RELEASE ORAL at 17:24

## 2021-10-21 RX ADMIN — SPIRONOLACTONE 12.5 MG: 25 TABLET ORAL at 08:10

## 2021-10-21 RX ADMIN — PANTOPRAZOLE SODIUM 40 MG: 40 TABLET, DELAYED RELEASE ORAL at 20:39

## 2021-10-21 RX ADMIN — Medication 400 MG: at 13:35

## 2021-10-21 RX ADMIN — THIAMINE HYDROCHLORIDE 250 MG: 100 INJECTION, SOLUTION INTRAMUSCULAR; INTRAVENOUS at 09:05

## 2021-10-21 ASSESSMENT — ACTIVITIES OF DAILY LIVING (ADL)
ADLS_ACUITY_SCORE: 14

## 2021-10-21 ASSESSMENT — MIFFLIN-ST. JEOR: SCORE: 878.8

## 2021-10-21 NOTE — PROGRESS NOTES
Care Management Follow Up    Length of Stay (days): 3    Expected Discharge Date: 10/22/2021     Concerns to be Addressed:       Patient plan of care discussed at interdisciplinary rounds: Yes    Anticipated Discharge Disposition:       Anticipated Discharge Services:    Anticipated Discharge DME:      Patient/family educated on Medicare website which has current facility and service quality ratings:    Education Provided on the Discharge Plan:    Patient/Family in Agreement with the Plan:      Referrals Placed by CM/SW:    Private pay costs discussed: private room/amenity fees    Additional Information  Reviewed TCU list with patient.  Referrals will be made to [facility] and [facility].      GRACY Varela

## 2021-10-21 NOTE — PROGRESS NOTES
Brief note:  No further cardiology recommendations. Appears she is on room air this morning. Labs stable. Would recommend continuing her current medical therapy and she can follow up as an outpatient.     Cardiology will sign off. Please call with any questions.     EDWARD Torres PA-C 11:27 AM 10/21/2021     Clinically Significant Risk Factors Present on Admission          # Severe Malnutrition, POA: based on Weight loss;Reduced intake;Subcutaneous fat loss;Muscle loss (10/19/21 1100)    Cardiovascular: Non-Rheumatic Valve Disease: Aortic valve insufficiency  Mitral valve insufficiency  Tricuspid valve insufficiency    Fluid & Electrolyte Disorders: Hypokalemia and Other fluid overload    Gastroenterology: Chronic Liver Disease: Alcoholic liver disease, unspecified    Hematology/Oncology: Thrombocytopenia Including Purpuric, HIT, & Other Platelet Defects: Thrombocytopenia, unspecified    Pulmonology: Pulmonary Heart Disease (Pulmonary hypertension or Cor pulmonale): Pulmonary hypertension, unspecified  Pneumonia    Systemic: Chronic Fatigue and Other Debilities: Limitation of activities due to disability

## 2021-10-21 NOTE — PLAN OF CARE
DATE & TIME: 10/21 07-19 Cognitive Concerns/ Orientation : A&Ox4, forgetful   BEHAVIOR & AGGRESSION TOOL COLOR: Green  CIWA SCORE:0/0  ABNL VS/O2: pt on room air, 90-93 % at rest , SOB with activity. Desats to mid 80's on room air with ambulation, with rest rebounded to 89%, placed on1l/NC.  MOBILITY: Ax1 GB/W to [facility] [place],walked in [facility] x4  PAIN MANAGMENT: denies  DIET: regular  BOWEL/BLADDER: continent, purewick in place due to getting IV lasix  ABNL LAB/BG: 3.4,replaced per protocol, recheck at 2.8. critical value procal of 12.82  DRAIN/DEVICES: sl     TELEMETRY RHYTHM: SR/ST  SKIN: nose abrasion and scattered bruising (face and R thigh)  TESTS/PROCEDURES: Echo  completed, seen by cardiology  D/C DAY/GOALS/PLACE: home vs. TCU. Agreeable to TCU.  OTHER IMPORTANT INFO: Nutrition following. PT/OT ordered. Off enteric precautions d/t no stool. Continue IV ABX until bcs negative.  EF improved.     CT showed pulmonary edema, possible pneumonia.

## 2021-10-21 NOTE — PROVIDER NOTIFICATION
MD Notification    Notified Person: MD    Notified Person Name:Abdi    Notification Date/Time:10/21 at 1010    Notification Interaction:text    Purpose of Notification:critical value procalcitonin    Orders Received:pending notificaeion    Comments:

## 2021-10-21 NOTE — PROGRESS NOTES
[facility]    Medicine Progress Note - Hospitalist Service       Date of Admission:  10/18/2021    Assessment & Plan            Kezia Dixon is a 65 year old female admitted on 10/18/2021. She presents with weakness, fall, and poor appetite    Falls  Generalized weakness  Reports progressive weakness, some incoordination over past several days to weeks. This is in the setting of poor oral intake, hx of EtOH use although denies ongoing use. She was previously recommended to discharge to TCU after hospital stay in August but discharged home with home cares instead.   * differential of fall is broad. Likely due to progressive weakness in setting poor oral intake and possible ongoing EtOH use. Consider cardiac causes with cardiomyopathy hx. Possible vitamin deficiencies (pellagra, beriberi or wernickies) with loose stools, possible confusion, reported incoordination.   - PT/OT/SW  - vitamins  - nutrition consult  - TTE  - Tele    Acute hypoxic respiratory failure. Secondary to acute systolic congestive heart failure and +/- Pneumonia (Community-acquired pneumonia)  patient was started on azithromycin and ceftriaxone on 10/18. No fever, normal WBC and neg Procal but Chest x-ray shows possible Rt sided pneumonia.    -f/u sputum study, BC (NGTD).  Pro-Michael increased from less than 0.05-12.82 on 10/21 so will complete 5-day course of antibiotic.  - ct diuresis as below    Heart failure with reduced ejection fraction, chronic  Cardiomyopathy of uncertain cause  HTN  Noted to have EF of 35% with hypokinesia of the septal, anterior, anterolateral, and apical segments.  *Does not appear fluid overloaded on admission but required diuresis now. Interestingly EF 10/19 better to 50-55% and normal IVC.   -Continue PTA metoprolol, lisinopril, spironolactone, aspirin, atorvastatin   -resumed PTA Lasix ( Monitor volume status closely)   Appreciate cardiology review     Hx of EtOH dependence  Elevated  LFTs  Reports she has been abstinent since August. LFTs seem consistent with EtOH use.  - monitor LFTs  - CIWA with lorazepam (not been requiring)     Loose stools  Reports 2 loose stools per day after eating or drinking. No associated abdominal pain, fevers, chills, or other infectious symptoms.  There is some formed content to her stools.  -C. difficile and enteric panel ordered in the emergency department, continue precautions until it is resolved.  None since admission     Hypokalemia, severe  Hypomagnesemia  Hypophosphatemia  -Replace per protocol    Hyponatremia: monitor    Severe protein calorie malnutrition  Anorexia  Patient reports significantly decreased oral intake for several years that has dropped off even more precipitously in the last several months.  Currently taking 1 Ensure shake at most on a daily basis.  -Consult to nutrition  -Multivitamin  -Encourage oral intake    Status post esophagectomy with stomach pull-through  GERD  -Continue PTA PPI  -Encourage oral intake    Facial bruising  Noted after recent fall.  CT head and CT facial bones are negative for acute pathology.       Diet: Combination Diet Regular Diet Adult    DVT Prophylaxis: Pneumatic Compression Devices  Jaeger Catheter: Not present  Central Lines: None  Code Status: No CPR- Do NOT Intubate      Disposition Plan   Expected discharge: 10/22/2021   recommended to home vs rehab once safe disposition plan/ TCU bed available and electrolytes stable. breathing stable.     The patient's care was discussed with the Bedside Nurse, Care Coordinator/, Patient and Patient's Family.    Ahsan Patton MD, MD  Hospitalist Service  [facility]  Securely message with the Vocera Web Console (learn more here)  Text page via [employer] Paging/Directory      Clinically Significant Risk Factors Present on Admission            # Severe Malnutrition, POA: based on Weight loss;Reduced intake;Subcutaneous fat loss;Muscle  "loss (10/19/21 1100)   ______________________________________________________________________    Interval History   Feels much better compared to yesterday.  Has been off oxygen this morning  Denies any chest pain/shortness of breath/palpitations/fever    4 point ROS done and negative unless mentioned     Data reviewed today: I reviewed all medications, new labs and imaging results over the last 24 hours. I personally reviewed no images or EKG's today.    Physical Exam   /49   Pulse 100   Temp 98.6  F (37  C) (Oral)   Resp 16   Ht 1.549 m (5' 1\")   Wt 40.6 kg (89 lb 9.6 oz)   SpO2 93%   BMI 16.93 kg/m    Gen- pleasant  HEENT-bruising , EUSEBIA  Neck- supple  CVS- I+II+ no m/r/g  RS- CTAB, decreased at bases   Abdo- soft, no tenderness. No g/r/r   Ext- no edema   CNS-no gross focal deficit.  Oriented to person/place/time.    Data .  BMP  Recent Labs   Lab 10/21/21  1146 10/21/21  0903 10/20/21  1729 10/20/21  0933 10/20/21  0030 10/19/21  0543 10/19/21  0543   NA  --  131*  --  131* 131*  --  137   POTASSIUM  --  3.4  3.4 2.8* 3.2* 3.4   < > 3.6   CHLORIDE  --  96  --  94 98  --  101   VICENTE  --  7.8*  --  8.2* 7.5*  --  7.0*   CO2  --  30  --  31 26  --  29   BUN  --  13  --  11 10  --  9   CR  --  0.50*  --  0.48* 0.38*  --  0.45*   * 205*  --  148* 135*   < > 91    < > = values in this interval not displayed.     CBC  Recent Labs   Lab 10/20/21  0933 10/19/21  0543 10/19/21  0543 10/18/21  1506 10/18/21  1506   WBC 9.3  --  6.5  --  5.4   RBC 3.27*  --  2.99*  --  3.97   HGB 10.1*   < > 9.4*   < > 12.5   HCT 31.2*  --  28.6*  --  36.5   MCV 95  --  96  --  92   MCH 30.9  --  31.4  --  31.5   MCHC 32.4  --  32.9  --  34.2   RDW 15.9*  --  16.3*  --  16.1*   *  --  143*  --  189    < > = values in this interval not displayed.     INRNo lab results found in last 7 days.  LFTs  Recent Labs   Lab 10/19/21  0543 10/18/21  1506   ALKPHOS 86 132   AST 50* 90*   ALT 34 55*   BILITOTAL 1.0 1.6* "   PROTTOTAL 5.5* 7.9   ALBUMIN 2.4* 4.0      PANC  Recent Labs   Lab 10/18/21  1506   LIPASE 70*

## 2021-10-22 ENCOUNTER — APPOINTMENT (OUTPATIENT)
Dept: PHYSICAL THERAPY | Facility: CLINIC | Age: 66
DRG: 947 | End: 2021-10-22
Payer: MEDICARE

## 2021-10-22 ENCOUNTER — DOCUMENTATION ONLY (OUTPATIENT)
Dept: OTHER | Facility: CLINIC | Age: 66
End: 2021-10-22

## 2021-10-22 ENCOUNTER — APPOINTMENT (OUTPATIENT)
Dept: OCCUPATIONAL THERAPY | Facility: CLINIC | Age: 66
DRG: 947 | End: 2021-10-22
Payer: MEDICARE

## 2021-10-22 PROBLEM — Z71.89 ACP (ADVANCE CARE PLANNING): Chronic | Status: RESOLVED | Noted: 2017-08-21 | Resolved: 2021-10-22

## 2021-10-22 LAB
ANION GAP SERPL CALCULATED.3IONS-SCNC: 3 MMOL/L (ref 3–14)
BUN SERPL-MCNC: 15 MG/DL (ref 7–30)
CALCIUM SERPL-MCNC: 8.1 MG/DL (ref 8.5–10.1)
CHLORIDE BLD-SCNC: 98 MMOL/L (ref 94–109)
CO2 SERPL-SCNC: 31 MMOL/L (ref 20–32)
CREAT SERPL-MCNC: 0.52 MG/DL (ref 0.52–1.04)
GFR SERPL CREATININE-BSD FRML MDRD: >90 ML/MIN/1.73M2
GLUCOSE BLD-MCNC: 163 MG/DL (ref 70–99)
MAGNESIUM SERPL-MCNC: 1.6 MG/DL (ref 1.6–2.3)
PHOSPHATE SERPL-MCNC: 2.5 MG/DL (ref 2.5–4.5)
POTASSIUM BLD-SCNC: 3.5 MMOL/L (ref 3.4–5.3)
SODIUM SERPL-SCNC: 132 MMOL/L (ref 133–144)

## 2021-10-22 PROCEDURE — 250N000013 HC RX MED GY IP 250 OP 250 PS 637: Performed by: INTERNAL MEDICINE

## 2021-10-22 PROCEDURE — 99233 SBSQ HOSP IP/OBS HIGH 50: CPT | Performed by: INTERNAL MEDICINE

## 2021-10-22 PROCEDURE — 258N000003 HC RX IP 258 OP 636: Performed by: STUDENT IN AN ORGANIZED HEALTH CARE EDUCATION/TRAINING PROGRAM

## 2021-10-22 PROCEDURE — 120N000001 HC R&B MED SURG/OB

## 2021-10-22 PROCEDURE — 250N000009 HC RX 250: Performed by: STUDENT IN AN ORGANIZED HEALTH CARE EDUCATION/TRAINING PROGRAM

## 2021-10-22 PROCEDURE — 84100 ASSAY OF PHOSPHORUS: CPT | Performed by: INTERNAL MEDICINE

## 2021-10-22 PROCEDURE — 36415 COLL VENOUS BLD VENIPUNCTURE: CPT | Performed by: INTERNAL MEDICINE

## 2021-10-22 PROCEDURE — 80048 BASIC METABOLIC PNL TOTAL CA: CPT | Performed by: INTERNAL MEDICINE

## 2021-10-22 PROCEDURE — 97535 SELF CARE MNGMENT TRAINING: CPT | Mod: GO

## 2021-10-22 PROCEDURE — 97750 PHYSICAL PERFORMANCE TEST: CPT | Mod: GP

## 2021-10-22 PROCEDURE — 250N000011 HC RX IP 250 OP 636: Performed by: STUDENT IN AN ORGANIZED HEALTH CARE EDUCATION/TRAINING PROGRAM

## 2021-10-22 PROCEDURE — 83735 ASSAY OF MAGNESIUM: CPT | Performed by: INTERNAL MEDICINE

## 2021-10-22 PROCEDURE — 250N000013 HC RX MED GY IP 250 OP 250 PS 637: Performed by: STUDENT IN AN ORGANIZED HEALTH CARE EDUCATION/TRAINING PROGRAM

## 2021-10-22 PROCEDURE — 97116 GAIT TRAINING THERAPY: CPT | Mod: GP

## 2021-10-22 PROCEDURE — 250N000013 HC RX MED GY IP 250 OP 250 PS 637: Performed by: NURSE PRACTITIONER

## 2021-10-22 RX ADMIN — FOLIC ACID 1 MG: 5 INJECTION, SOLUTION INTRAMUSCULAR; INTRAVENOUS; SUBCUTANEOUS at 08:01

## 2021-10-22 RX ADMIN — PANTOPRAZOLE SODIUM 40 MG: 40 TABLET, DELAYED RELEASE ORAL at 08:01

## 2021-10-22 RX ADMIN — Medication 400 MG: at 20:48

## 2021-10-22 RX ADMIN — SPIRONOLACTONE 12.5 MG: 25 TABLET ORAL at 08:01

## 2021-10-22 RX ADMIN — Medication 400 MG: at 08:01

## 2021-10-22 RX ADMIN — PANTOPRAZOLE SODIUM 40 MG: 40 TABLET, DELAYED RELEASE ORAL at 20:48

## 2021-10-22 RX ADMIN — AZITHROMYCIN MONOHYDRATE 250 MG: 500 INJECTION, POWDER, LYOPHILIZED, FOR SOLUTION INTRAVENOUS at 23:35

## 2021-10-22 RX ADMIN — FUROSEMIDE 40 MG: 40 TABLET ORAL at 08:01

## 2021-10-22 RX ADMIN — CEFTRIAXONE SODIUM 2 G: 2 INJECTION, POWDER, FOR SOLUTION INTRAMUSCULAR; INTRAVENOUS at 04:35

## 2021-10-22 RX ADMIN — METOPROLOL SUCCINATE 25 MG: 25 TABLET, EXTENDED RELEASE ORAL at 08:01

## 2021-10-22 RX ADMIN — AZITHROMYCIN MONOHYDRATE 250 MG: 500 INJECTION, POWDER, LYOPHILIZED, FOR SOLUTION INTRAVENOUS at 00:38

## 2021-10-22 RX ADMIN — FLUTICASONE PROPIONATE 2 SPRAY: 50 SPRAY, METERED NASAL at 08:01

## 2021-10-22 RX ADMIN — THIAMINE HYDROCHLORIDE 250 MG: 100 INJECTION, SOLUTION INTRAMUSCULAR; INTRAVENOUS at 08:34

## 2021-10-22 RX ADMIN — ASPIRIN 81 MG: 81 TABLET, COATED ORAL at 08:01

## 2021-10-22 RX ADMIN — ATORVASTATIN CALCIUM 40 MG: 40 TABLET, FILM COATED ORAL at 08:01

## 2021-10-22 RX ADMIN — LOSARTAN POTASSIUM 12.5 MG: 25 TABLET, FILM COATED ORAL at 07:59

## 2021-10-22 ASSESSMENT — ACTIVITIES OF DAILY LIVING (ADL)
ADLS_ACUITY_SCORE: 14
ADLS_ACUITY_SCORE: 12
ADLS_ACUITY_SCORE: 14
ADLS_ACUITY_SCORE: 14
ADLS_ACUITY_SCORE: 12
ADLS_ACUITY_SCORE: 14

## 2021-10-22 ASSESSMENT — MIFFLIN-ST. JEOR: SCORE: 871.38

## 2021-10-22 NOTE — PROGRESS NOTES
Care Management Follow Up    Length of Stay (days): 4    Expected Discharge Date: 10/23/2021     Concerns to be Addressed:       Patient plan of care discussed at interdisciplinary rounds: Yes    Anticipated Discharge Disposition:       Anticipated Discharge Services:    Anticipated Discharge DME:      Patient/family educated on Medicare website which has current facility and service quality ratings:    Education Provided on the Discharge Plan:    Patient/Family in Agreement with the Plan:      Referrals Placed by CM/SW:    Private pay costs discussed: private room/amenity fees    Additional Information:  Multiple referrals have been made for TCU placement.  [facility] and [facility] are at capacity.  [facility] declined due to patient's alcohol history and failure to thrive.  Additional referrals are pending at [facility] [place], [facility], [facility] and [facility].  Updated patient writer is still working on TCU options.  Patient expressed she is feeling discouraged.      RADHA VarelaSW

## 2021-10-22 NOTE — PROGRESS NOTES
10/22/21 0958   Signing Clinician's Name / Credentials   Signing clinician's name / credentials Maribel Whittington, PT, DPT   Francis Balance Scale (FRANCIS JAYNE, GETACHEW S, EDWIN RYAN, MEGAN B: MEASURING BALANCE IN THE ELDERLY: VALIDATION OF AN INSTRUMENT. CAN. J. PUB. HEALTH, JULY/AUGUST SUPPLEMENT 2:S7-11, 1992.)   Sit To Stand 3   Standing Unsupported 4   Sitting Unsupported 4   Stand to Sit 4   Transfers 4   Standing with Eyes Closed 3   Standing Unsupported, Feet Together 3   Reach Forward With Outstretched Arm 3   Retrieve Object From Floor 3   Turning to Look Behind 2   Turn 360 Degrees 2   Placing Alternate Foot on Stool (4-6 inches) 0   Unsupported Tandem Stand (Demonstrate to Subject) 2   One Leg Stand 0   Total Score (A score of 45 or less has been correlated with an increased risk of falls)   Total Score (out of 56) 37     Francis Balance Scale (BBS) Cutoff Scores: A score of < 45/56 indicates an increased risk for falls.   Patient Score: 37/56    The BBS is a measure of static and dynamic standing balance that has been validated in community dwelling elderly individuals and individuals who have Parkinson's Disease, MS, and those who are s/p CVA and TBI. The test is administered without an assistive device. Scores from the Francis are used to determine the probability of falling based on the patient's previous history of falls and their test performance.     Minimal Detectable Change = 6.5   & Minimal Detectable Change (Parkinson's Disease) = 5 according to [employer] 2008    Assessment (rationale for performing, application to patient s function & care plan): increased risk for falls, continued use of FWW needed  Minutes billed as physical performance test: 12

## 2021-10-22 NOTE — PROGRESS NOTES
CLINICAL NUTRITION SERVICES - REASSESSMENT NOTE      Malnutrition (10/19):   % Weight Loss:  > 5% in 1 month (severe malnutrition)  % Intake:  </= 50% for >/= 5 days (severe malnutrition)  Subcutaneous Fat Loss:  Orbital region moderate depletion, Upper arm region moderate-severe depletion and Thoracic region moderate-severe depletion  Muscle Loss:  Temporal region moderate depletion, Clavicle bone region moderate-severe depletion and Acromion bone region moderate-severe depletion  Fluid Retention:  None noted     Malnutrition Diagnosis: Severe malnutrition  In Context of:  Acute illness or injury  Chronic illness or disease       EVALUATION OF PROGRESS TOWARD GOALS   Diet:  Regular  Sending Ensure Clear once daily for PM snack    Intake/Tolerance:  Per review of meals ordered the past 3 days, they average 1735 calories and 57 gm protein per day. Per flow sheets, she's eating 50-75%. Pt ordering 3 meals/day and she states she is drinking the Ensure Clear.   Pt denies diarrhea, loose stools.    NEW FINDINGS:     Vitals:    10/18/21 1138 10/19/21 0813 10/20/21 0600 10/21/21 0625   Weight: 36.3 kg (80 lb) 42.6 kg (94 lb) 42.9 kg (94 lb 9.2 oz) 40.6 kg (89 lb 9.6 oz)    10/22/21 0556   Weight: 39.9 kg (87 lb 15.4 oz)     It appears pt's usual wt is 85-90#.  Wt Readings from Last 10 Encounters:   10/22/21 39.9 kg (87 lb 15.4 oz)   09/09/21 38.7 kg (85 lb 4.8 oz)   02/05/20 40.3 kg (88 lb 14.4 oz)   03/19/19 40.8 kg (90 lb)   03/11/19 40.4 kg (89 lb)   02/20/19 39.9 kg (88 lb)   12/24/18 41.3 kg (91 lb)   11/23/18 39.5 kg (87 lb)   11/02/18 40.1 kg (88 lb 8 oz)   10/11/18 42.2 kg (93 lb)         Previous Goals:   Intake of at least 50% meals BID-TID + 1 supplement daily   Evaluation: Met    Previous Nutrition Diagnosis:   Inadequate oral intake related to poor appetite with nausea, vomiting, diarrhea as evidenced by negligible PO for the past 5 days, up to 6% weight loss in 1 month  Evaluation: Improving      CURRENT  NUTRITION DIAGNOSIS  No nutrition diagnosis identified at this time     INTERVENTIONS  Recommendations / Nutrition Prescription  Continue regular diet and Ensure Clear    Implementation  General/healthful diet - encouraged intake    Goals  Pt will consume at least 75% of meals      MONITORING AND EVALUATION:  Progress towards goals will be monitored and evaluated per protocol and Practice Guidelines

## 2021-10-22 NOTE — PROGRESS NOTES
[facility]    Medicine Progress Note - Hospitalist Service       Date of Admission:  10/18/2021    Assessment & Plan            Kezia Dixon is a 65 year old female admitted on 10/18/2021. She presents with weakness, fall, and poor appetite    Falls  Generalized weakness  Reports progressive weakness, some incoordination over past several days to weeks. This is in the setting of poor oral intake, hx of EtOH use although denies ongoing use. She was previously recommended to discharge to TCU after hospital stay in August but discharged home with home cares instead.   * differential of fall is broad. Likely due to progressive weakness in setting poor oral intake and possible ongoing EtOH use. Consider cardiac causes with cardiomyopathy hx. Possible vitamin deficiencies (pellagra, beriberi or wernickies) with loose stools, possible confusion, reported incoordination.   - PT/OT/SW: Await TCU placement  -Continue vitamins, nutrition consult    Acute hypoxic respiratory failure. Secondary to acute systolic congestive heart failure and +/- Pneumonia (Community-acquired pneumonia)  patient was started on azithromycin and ceftriaxone on 10/18. No fever, normal WBC and neg Procal but Chest x-ray shows possible Rt sided pneumonia.    -f/u sputum study, BC (NGTD).  Pro-Michael increased from less than 0.05-12.82 on 10/21 so will complete 5-day course of antibiotic.  - ct diuresis as below    chronic heart failure with reduced ejection fraction  Cardiomyopathy of uncertain cause  HTN  Noted to have EF of 35% with hypokinesia of the septal, anterior, anterolateral, and apical segments.  *Does not appear fluid overloaded on admission but required diuresis now. Interestingly EF 10/19 better to 50-55% and normal IVC.   -Continued PTA metoprolol, lisinopril, spironolactone, aspirin, atorvastatin   -resumed PTA Lasix ( Monitor volume status closely)   Appreciate cardiology review     Hx of EtOH  "dependence  Elevated LFTs  Reports she has been abstinent since August.   - CIWA with lorazepam (not been requiring)     Loose stools  Reports 2 loose stools per day after eating or drinking. No associated abdominal pain, fevers, chills, or other infectious symptoms.  There is some formed content to her stools.  -C. difficile and enteric panel ordered in the emergency department, continue precautions until it is resolved.  None since admission     Hypokalemia, severe  Hypomagnesemia  Hypophosphatemia  -Replaced per protocol    Hyponatremia: monitor    Severe protein calorie malnutrition  Anorexia  Patient reports significantly decreased oral intake for several years that has dropped off even more precipitously in the last several months.  Currently taking 1 Ensure shake at most on a daily basis.  -Consult to nutrition  -Multivitamin  -Encourage oral intake    Status post esophagectomy with stomach pull-through  GERD  -Continue PTA PPI  -Encourage oral intake    Facial bruising  Noted after recent fall.  CT head and CT facial bones are negative for acute pathology.       Diet: Combination Diet Regular Diet Adult    DVT Prophylaxis: Pneumatic Compression Devices  Jaeger Catheter: Not present  Central Lines: None  Code Status: No CPR- Do NOT Intubate      Disposition Plan   Expected discharge: 10/23/2021   recommended to transitional care unit once safe disposition plan/ TCU bed available and electrolytes stable. breathing stable.     The patient's care was discussed with the Bedside Nurse, Care Coordinator/, Patient and Patient's Family.    Ahsan Patton MD, MD  Hospitalist Service  [facility]  Securely message with the Vocera Web Console (learn more here)  Text page via [employer] Paging/Directory    \"This dictation was performed with voice recognition software and may contain errors,  omissions and inadvertent word substitution.\"         Clinically Significant Risk Factors Present " "on Admission            # Severe Malnutrition, POA: based on Weight loss;Reduced intake;Subcutaneous fat loss;Muscle loss (10/19/21 1100)   ______________________________________________________________________    Interval History   Continues to feel much better and  Has been off oxygen  Denies any chest pain/shortness of breath/palpitations/fever    4 point ROS done and negative unless mentioned     Data reviewed today: I reviewed all medications, new labs and imaging results over the last 24 hours. I personally reviewed no images or EKG's today.    Physical Exam   /52 (BP Location: Left arm)   Pulse 82   Temp 97.4  F (36.3  C) (Oral)   Resp 18   Ht 1.549 m (5' 1\")   Wt 39.9 kg (87 lb 15.4 oz)   SpO2 92%   BMI 16.62 kg/m    Gen- pleasant  HEENT-bruising , EUSEBIA  Neck- supple  CVS- I+II+ no m/r/g  RS- CTAB, decreased at bases   Abdo- soft, no tenderness. No g/r/r   Ext- no edema   CNS-no gross focal deficit.  Oriented to person/place/time.    Data .  BMP  Recent Labs   Lab 10/22/21  0855 10/21/21  2056 10/21/21  1548 10/21/21  1146 10/21/21  0903 10/20/21  1729 10/20/21  0933 10/20/21  0030 10/20/21  0030   *  --   --   --  131*  --  131*  --  131*   POTASSIUM 3.5 3.3* 2.8*  --  3.4  3.4   < > 3.2*   < > 3.4   CHLORIDE 98  --   --   --  96  --  94  --  98   VICENTE 8.1*  --   --   --  7.8*  --  8.2*  --  7.5*   CO2 31  --   --   --  30  --  31  --  26   BUN 15  --   --   --  13  --  11  --  10   CR 0.52  --   --   --  0.50*  --  0.48*  --  0.38*   *  --   --  125* 205*  --  148*   < > 135*    < > = values in this interval not displayed.     CBC  Recent Labs   Lab 10/20/21  0933 10/19/21  0543 10/19/21  0543 10/18/21  1506 10/18/21  1506   WBC 9.3  --  6.5  --  5.4   RBC 3.27*  --  2.99*  --  3.97   HGB 10.1*   < > 9.4*   < > 12.5   HCT 31.2*  --  28.6*  --  36.5   MCV 95  --  96  --  92   MCH 30.9  --  31.4  --  31.5   MCHC 32.4  --  32.9  --  34.2   RDW 15.9*  --  16.3*  --  16.1*   *  " --  143*  --  189    < > = values in this interval not displayed.     INRNo lab results found in last 7 days.  LFTs  Recent Labs   Lab 10/19/21  0543 10/18/21  1506   ALKPHOS 86 132   AST 50* 90*   ALT 34 55*   BILITOTAL 1.0 1.6*   PROTTOTAL 5.5* 7.9   ALBUMIN 2.4* 4.0      PANC  Recent Labs   Lab 10/18/21  1506   LIPASE 70*

## 2021-10-22 NOTE — PLAN OF CARE
Summary: Admitted for general weakness and hypoxia    DATE & TIME: 10/22/21 3162-1121   Cognitive Concerns/ Orientation: A&Ox4, sometimes forgetful   BEHAVIOR & AGGRESSION TOOL COLOR: Green  CIWA SCORE: Rated 0 this shift. No anxiousness or tremors noted. Pt calm.  ABNL VS/O2: Weaned off of o2, sats above 91% on room air. Pt denies any SOB or chest pain. No respiratory distress noted.  MOBILITY: Ax1 GB/W to [facility]  PAIN MANAGMENT: denies  DIET: Regular  BOWEL/BLADDER: continent of B&B  ABNL LAB/BG: K+3.5, Mg 1.6, Ca 8.1, Hgb 10.1, , WBC 9.3  DRAIN/DEVICES: PIV SL     TELEMETRY RHYTHM: SR/ST  SKIN: Noted abrasion and scattered bruising to face and arms present upon admission  TESTS/PROCEDURES: Echo completed. Cardiology following.  D/C DAY/GOALS/PLACE: Agreeable to TCU placement for rehab. SW following for placement.  OTHER IMPORTANT INFO: Nutrition following. PT/OT ordered. Off enteric precautions d/t no stool. Continue IV ABO until bcs negative.

## 2021-10-22 NOTE — PLAN OF CARE
Summary:     DATE & TIME: 10/21 7208-3249   Cognitive Concerns/ Orientation : A&Ox4, forgetful   BEHAVIOR & AGGRESSION TOOL COLOR: Green  CIWA SCORE:0/0  ABNL VS/O2: Desatting at rest placed on 1.5L NC.  MOBILITY: Ax1 GB[place] to [facility]ed in [facility] x4  PAIN MANAGMENT: denies  DIET: regular  BOWEL/BLADDER: continent, purewick in place due to getting IV lasix  ABNL LAB/BG: 3.4, critical value procal of 12.82  DRAIN/DEVICES: sl     TELEMETRY RHYTHM: SR/ST  SKIN: nose abrasion and scattered bruising (face and R thigh)  TESTS/PROCEDURES: Echo  completed, seen by cardiology  D/C DAY/GOALS/PLACE: home vs. TCU. Agreeable to TCU.  OTHER IMPORTANT INFO: Nutrition following. PT/OT ordered. Off enteric precautions d/t no stool. Continue IV ABX until bcs negative.  EF improved.     CT showed pulmonary edema, possible pneumonia.

## 2021-10-22 NOTE — PLAN OF CARE
Summary: Admitted for general weakness and hypoxia    DATE & TIME: 10/22/21 3968-2639   Cognitive Concerns/ Orientation: A&Ox4, forgetful at times  BEHAVIOR & AGGRESSION TOOL COLOR: Green  CIWA SCORE: 0  ABNL VS/O2: Weaned off of o2, sats above 91% on room air. Pt denies any SOB or chest pain. No signs of respiratory distress noted.   MOBILITY: Ax1 GB/walker, falls risk   PAIN MANAGMENT: denies  DIET: Regular  BOWEL/BLADDER: continent of B&B  ABNL LAB/BG: K+3.5, Mg 1.6 (has been on replacement, will receive one more dose tonight, recheck tomorrow), Na 132  DRAIN/DEVICES: PIV SL     TELEMETRY RHYTHM: SR   SKIN: Noted abrasion and scattered bruising to face and arms present upon admission  TESTS/PROCEDURES: none scheduled   D/C DAY/GOALS/PLACE: Therapies recommending TCU, pt in agreement, SW following for placement.  OTHER IMPORTANT INFO: Nutrition following. PT/OT ordered. Off enteric precautions d/t no stool. Continue IV Azithromycin and Rocephin q24h.

## 2021-10-23 VITALS
SYSTOLIC BLOOD PRESSURE: 115 MMHG | RESPIRATION RATE: 20 BRPM | BODY MASS INDEX: 16.9 KG/M2 | HEIGHT: 61 IN | HEART RATE: 96 BPM | DIASTOLIC BLOOD PRESSURE: 55 MMHG | OXYGEN SATURATION: 95 % | WEIGHT: 89.51 LBS | TEMPERATURE: 97.5 F

## 2021-10-23 LAB
ANION GAP SERPL CALCULATED.3IONS-SCNC: 6 MMOL/L (ref 3–14)
BUN SERPL-MCNC: 13 MG/DL (ref 7–30)
CALCIUM SERPL-MCNC: 8.2 MG/DL (ref 8.5–10.1)
CHLORIDE BLD-SCNC: 95 MMOL/L (ref 94–109)
CO2 SERPL-SCNC: 29 MMOL/L (ref 20–32)
CREAT SERPL-MCNC: 0.49 MG/DL (ref 0.52–1.04)
GFR SERPL CREATININE-BSD FRML MDRD: >90 ML/MIN/1.73M2
GLUCOSE BLD-MCNC: 151 MG/DL (ref 70–99)
MAGNESIUM SERPL-MCNC: 1.8 MG/DL (ref 1.6–2.3)
POTASSIUM BLD-SCNC: 3.6 MMOL/L (ref 3.4–5.3)
SODIUM SERPL-SCNC: 130 MMOL/L (ref 133–144)

## 2021-10-23 PROCEDURE — 36415 COLL VENOUS BLD VENIPUNCTURE: CPT | Performed by: INTERNAL MEDICINE

## 2021-10-23 PROCEDURE — 250N000011 HC RX IP 250 OP 636: Performed by: STUDENT IN AN ORGANIZED HEALTH CARE EDUCATION/TRAINING PROGRAM

## 2021-10-23 PROCEDURE — 250N000009 HC RX 250: Performed by: STUDENT IN AN ORGANIZED HEALTH CARE EDUCATION/TRAINING PROGRAM

## 2021-10-23 PROCEDURE — 250N000013 HC RX MED GY IP 250 OP 250 PS 637: Performed by: INTERNAL MEDICINE

## 2021-10-23 PROCEDURE — 99239 HOSP IP/OBS DSCHRG MGMT >30: CPT | Performed by: INTERNAL MEDICINE

## 2021-10-23 PROCEDURE — 83735 ASSAY OF MAGNESIUM: CPT | Performed by: INTERNAL MEDICINE

## 2021-10-23 PROCEDURE — 250N000013 HC RX MED GY IP 250 OP 250 PS 637: Performed by: STUDENT IN AN ORGANIZED HEALTH CARE EDUCATION/TRAINING PROGRAM

## 2021-10-23 PROCEDURE — 250N000013 HC RX MED GY IP 250 OP 250 PS 637: Performed by: NURSE PRACTITIONER

## 2021-10-23 PROCEDURE — 82374 ASSAY BLOOD CARBON DIOXIDE: CPT | Performed by: INTERNAL MEDICINE

## 2021-10-23 PROCEDURE — 258N000003 HC RX IP 258 OP 636: Performed by: STUDENT IN AN ORGANIZED HEALTH CARE EDUCATION/TRAINING PROGRAM

## 2021-10-23 RX ORDER — FUROSEMIDE 40 MG
40 TABLET ORAL EVERY MORNING
Qty: 90 TABLET | Refills: 3 | DISCHARGE
Start: 2021-10-23 | End: 2022-01-27

## 2021-10-23 RX ORDER — ACETAMINOPHEN 325 MG/1
650 TABLET ORAL EVERY 6 HOURS PRN
Status: ON HOLD | DISCHARGE
Start: 2021-10-23 | End: 2022-05-15

## 2021-10-23 RX ORDER — POLYETHYLENE GLYCOL 3350 17 G/17G
17 POWDER, FOR SOLUTION ORAL DAILY PRN
Status: DISCONTINUED | OUTPATIENT
Start: 2021-10-23 | End: 2021-10-23 | Stop reason: HOSPADM

## 2021-10-23 RX ORDER — FUROSEMIDE 20 MG
TABLET ORAL
DISCHARGE
Start: 2021-10-23 | End: 2021-11-08

## 2021-10-23 RX ORDER — AMOXICILLIN 250 MG
1 CAPSULE ORAL 2 TIMES DAILY
Status: ON HOLD | DISCHARGE
Start: 2021-10-23 | End: 2022-05-15

## 2021-10-23 RX ORDER — AMOXICILLIN 250 MG
1 CAPSULE ORAL 2 TIMES DAILY
Status: DISCONTINUED | OUTPATIENT
Start: 2021-10-23 | End: 2021-10-23 | Stop reason: HOSPADM

## 2021-10-23 RX ORDER — CEFPODOXIME PROXETIL 200 MG/1
200 TABLET, FILM COATED ORAL 2 TIMES DAILY
DISCHARGE
Start: 2021-10-23 | End: 2021-10-26

## 2021-10-23 RX ADMIN — THIAMINE HYDROCHLORIDE 250 MG: 100 INJECTION, SOLUTION INTRAMUSCULAR; INTRAVENOUS at 10:27

## 2021-10-23 RX ADMIN — CEFTRIAXONE SODIUM 2 G: 2 INJECTION, POWDER, FOR SOLUTION INTRAMUSCULAR; INTRAVENOUS at 04:59

## 2021-10-23 RX ADMIN — SPIRONOLACTONE 12.5 MG: 25 TABLET ORAL at 08:08

## 2021-10-23 RX ADMIN — SENNOSIDES AND DOCUSATE SODIUM 1 TABLET: 8.6; 5 TABLET ORAL at 12:25

## 2021-10-23 RX ADMIN — FOLIC ACID 1 MG: 5 INJECTION, SOLUTION INTRAMUSCULAR; INTRAVENOUS; SUBCUTANEOUS at 08:09

## 2021-10-23 RX ADMIN — LOSARTAN POTASSIUM 12.5 MG: 25 TABLET, FILM COATED ORAL at 08:08

## 2021-10-23 RX ADMIN — FLUTICASONE PROPIONATE 2 SPRAY: 50 SPRAY, METERED NASAL at 08:08

## 2021-10-23 RX ADMIN — FUROSEMIDE 40 MG: 40 TABLET ORAL at 08:08

## 2021-10-23 RX ADMIN — ASPIRIN 81 MG: 81 TABLET, COATED ORAL at 08:08

## 2021-10-23 RX ADMIN — METOPROLOL SUCCINATE 25 MG: 25 TABLET, EXTENDED RELEASE ORAL at 08:08

## 2021-10-23 RX ADMIN — ATORVASTATIN CALCIUM 40 MG: 40 TABLET, FILM COATED ORAL at 08:08

## 2021-10-23 RX ADMIN — PANTOPRAZOLE SODIUM 40 MG: 40 TABLET, DELAYED RELEASE ORAL at 08:08

## 2021-10-23 ASSESSMENT — ACTIVITIES OF DAILY LIVING (ADL)
ADLS_ACUITY_SCORE: 12
ADLS_ACUITY_SCORE: 12
ADLS_ACUITY_SCORE: 14
ADLS_ACUITY_SCORE: 12
ADLS_ACUITY_SCORE: 14
ADLS_ACUITY_SCORE: 12

## 2021-10-23 ASSESSMENT — MIFFLIN-ST. JEOR: SCORE: 878.38

## 2021-10-23 NOTE — PROGRESS NOTES
Care Management Follow Up    Length of Stay (days): 5    Expected Discharge Date: 10/23/2021     Concerns to be Addressed:       Patient plan of care discussed at interdisciplinary rounds: Yes    Anticipated Discharge Disposition:  TCU     Anticipated Discharge Services:    Anticipated Discharge DME:      Patient/family educated on Medicare website which has current facility and service quality ratings:    Education Provided on the Discharge Plan:    Patient/Family in Agreement with the Plan:      Referrals Placed by CM/SW:    Private pay costs discussed: private room/amenity fees and transportation costs    Additional Information:  New referrals sent out this morning.  [facility] and [facility] are assessing patient for current vacancies.  [facility], is assessing for a vacancy on Sunday.  Once options are known, writer will update patient.      RADHA VarelaSW

## 2021-10-23 NOTE — PLAN OF CARE
Summary: Admitted for general weakness and hypoxia    DATE & TIME: 10/23/21 4358-7436  Cognitive Concerns/ Orientation: A&Ox4  BEHAVIOR & AGGRESSION TOOL COLOR: Green  CIWA SCORE: 0  ABNL VS/O2: VSS, stable on RA saturation above 91%.  MOBILITY: Ax1 GB/walker  PAIN MANAGMENT: Denies  DIET: Regular  BOWEL/BLADDER: Continent of B&B  ABNL LAB/BG: Mag 1.8, K+ 3.5, Na 132, & Hgb 10.1.   DRAIN/DEVICES: PIV SL   TELEMETRY RHYTHM: Discontinued per  - IRMA  SKIN: Abrasion and scattered contusions to face and arms. Contusions non-tender. No change in size.  TESTS/PROCEDURES: NA  D/C DAY/GOALS/PLACE: Discharging today to [facility] TCU.  OTHER IMPORTANT INFO: Nutrition, PT, OT following. IV Rocephin q 24 hours.

## 2021-10-23 NOTE — PROGRESS NOTES
Care Management Follow Up    Length of Stay (days): 5    Expected Discharge Date: 10/23/2021     Concerns to be Addressed:       Patient plan of care discussed at interdisciplinary rounds: Yes    Anticipated Discharge Disposition:  TCU     Anticipated Discharge Services:  none  Anticipated Discharge DME:  none    Patient/family educated on Medicare website which has current facility and service quality ratings:  yes  Education Provided on the Discharge Plan:  yes  Patient/Family in Agreement with the Plan:  Patient accepting this is the TCU option available for today    Referrals Placed by CM/SW:    Private pay costs discussed:     Additional Information:  [facility] offer patient a semi-private room for today pending three questions; 1. Is patient vaccinated - patient reports she had two Phizer COVID vaccines and the second one was in April of this year. 2. Patient needs to be told their is no alcohol use at the TCU, writer told patient this and she understands and accepts.  3.   Their RN supervisor, when reviewing patient's lab noted her sodium dropped today to 130 and is asking how the physician plans to address this.      When reviewing the availability of a semi-private room  patient reports she is claustrophobic. She asked about the  and door set up.  Spoke with RN at the TCU and obtain the following information for patient.  In the semi-private room she has her own window, there is a 3/4 wall that divides she and her roommate and a curtain dividing the remaining space.  She is able to keep the curtain open.  At night the door into their room may be closed.  Patient is fine with this set up.  She reports this TCU is very close to where her son lives.  Updated bedside RN and charge nurse and a page has been placed to Dr Patton regarding the Sodium question.  Patient is able to provide transport and is available today.  Will wait to hear from Dr Patton and update the TCU.        Susan DELEON  [place], RADHASW

## 2021-10-23 NOTE — PLAN OF CARE
Discharge    [facility] [place] with pt's son  Care plan note  Summary: Admitted for general weakness and hypoxia    DATE & TIME: 10/23/21 5138-0464  Cognitive Concerns/ Orientation: A&Ox4  BEHAVIOR & AGGRESSION TOOL COLOR: Green  CIWA SCORE: 0  ABNL VS/O2: VSS, stable on RA saturation above 91%.  MOBILITY: Ax1 GB/walker  PAIN MANAGMENT: Denies  DIET: Regular  BOWEL/BLADDER: Continent of B&B  ABNL LAB/BG: Mag 1.8, K+ 3.5, Na 132, & Hgb 10.1.   DRAIN/DEVICES: PIV access x1 removed   TELEMETRY RHYTHM: Discontinued per  - IRMA  SKIN: Abrasion and scattered contusions to face and arms. Contusions non-tender. No change in size.  TESTS/PROCEDURES: NA  D/C DAY/GOALS/PLACE: Discharged today 10/23/21 to [facility]CU at 1540, pt's son provided transportation.  OTHER IMPORTANT INFO: Went through AVS with the pt, verbalized understanding. Belongings packed and sent with the pt. Discharge package given to pt's son and instructed to hand it the nurse at the TCU.     Listed belongings gathered and given to patient (including from security/pharmacy). Yes  Care Plan and Patient education resolved: Yes  Prescriptions if needed, hard copies sent with patient  NA  Medication Bin checked and emptied on discharge Yes  SW/care coordinator/charge RN aware of discharge: Yes

## 2021-10-23 NOTE — PLAN OF CARE
Summary: Admitted for general weakness and hypoxia    DATE & TIME: 10/22/21-10/23/21 8939-2882  Cognitive Concerns/ Orientation: A&Ox4, forgetful at times  BEHAVIOR & AGGRESSION TOOL COLOR: Green  CIWA SCORE: 0  ABNL VS/O2: VSS, desat to 89-90% on RA, put on  O2 sats mid 90s @2L, weaned to RA currently sat in mid 90s  MOBILITY: Ax1 GB/walker  PAIN MANAGMENT: denies  DIET: Regular  BOWEL/BLADDER: continent, up to bathroom   ABNL LAB/BG: Mag replacement completed this evening, recheck in am. K 3.5, recheck in am. Na 132.   DRAIN/DEVICES: PIV SL   TELEMETRY RHYTHM: NSR   SKIN: Abrasion and scattered bruising to face and arms.  TESTS/PROCEDURES: n/a  D/C DAY/GOALS/PLACE: Pending TCU placement.   OTHER IMPORTANT INFO: Nutrition, PT, OT following. Continue IV Azithromycin and Rocephin q24h.

## 2021-10-23 NOTE — PLAN OF CARE
DATE & TIME: 10/22/21 7937-8569  Cognitive Concerns/ Orientation: A&Ox4, forgetful at times  BEHAVIOR & AGGRESSION TOOL COLOR: Green  CIWA SCORE: 0  ABNL VS/O2: VSS on RA, except BP 98/46 at HS.   MOBILITY: Ax1 GB/walker  PAIN MANAGMENT: denies  DIET: Regular  BOWEL/BLADDER: continent, up to bathroom   ABNL LAB/BG: Mag replacement completed this evening, recheck in am. K 3.5, recheck in am. Na 132.   DRAIN/DEVICES: PIV, saline locked.   TELEMETRY RHYTHM: NSR   SKIN: Abrasion and scattered bruising to face and arms.  TESTS/PROCEDURES: n/a  D/C DAY/GOALS/PLACE: Pending TCU placement.   OTHER IMPORTANT INFO: Nutrition, PT, OT following. Continue IV Azithromycin and Rocephin q24h.

## 2021-10-23 NOTE — PROGRESS NOTES
Care Management Discharge Note    Discharge Date: 10/23/2021       [facility]    Discharge Services: None    Discharge DME: None    Discharge Transportation: family or friend will provide    Private pay costs discussed: Not applicable    PAS Confirmation Code: [id number]  Patient/family educated on Medicare website which has current facility and service quality ratings: yes    Education Provided on the Discharge Plan:  yes  Persons Notified of Discharge Plans: patient  Patient/Family in Agreement with the Plan: yes    Handoff Referral Completed: Yes    Additional Information:  Patient discharging this afternoon at 1530 with son Hector transporting patient.  [place], [place] and [place] [facility] [phone].  Discharge time coordinated with the RN supervisor at [facility].         GRACY Varela

## 2021-10-23 NOTE — DISCHARGE SUMMARY
[facility]  Hospitalist Discharge Summary      Date of Admission:  10/18/2021  Date of Discharge:  10/23/2021  Discharging Provider: Ahsan Patton MD, MD      Discharge Diagnoses     Falls  Generalized weakness     Acute hypoxic respiratory failure. Secondary to acute systolic congestive heart failure and +/- Pneumonia (Community-acquired pneumonia)    Cardiomyopathy of uncertain cause  HTN    Hx of EtOH dependence  Elevated LFTs    Hypokalemia, severe  Hypomagnesemia  Hypophosphatemia  Hyponatremia:   Severe protein calorie malnutrition  Anorexia  Status post esophagectomy with stomach pull-through  GERD    Facial bruising    Follow-ups Needed After Discharge   Follow-up Appointments     Follow Up and recommended labs and tests      Follow up with care home physician.  The following labs/tests are   recommended: BMP weekly. (Adjust diuretics if required)     Follow up with Cardiology after discharge from TCU .         {  Unresulted Labs Ordered in the Past 30 Days of this Admission     Date and Time Order Name Status Description    10/19/2021  3:23 PM Blood Culture Hand, Left Preliminary     10/19/2021  3:23 PM Blood Culture Hand, Left Preliminary         Discharge Disposition   Discharged to home  Condition at discharge: Stable    Hospital Course   Kezia Dixon is a 65 year old female admitted on 10/18/2021. She presents with weakness, fall, and poor appetite     Falls  Generalized weakness  Reports progressive weakness, some incoordination over past several days to weeks. This is in the setting of poor oral intake, hx of EtOH use although denies ongoing use. She was previously recommended to discharge to TCU after hospital stay in August but discharged home with home cares instead.   * differential of fall is broad. Likely due to progressive weakness in setting poor oral intake and possible ongoing EtOH use. Consider cardiac causes with cardiomyopathy hx. Possible vitamin  deficiencies (pellagra, beriberi or wernickies) with loose stools, possible confusion, reported incoordination.     -She was monitored in the hospital and had IV fluids with resumption of some of her diuretics later, multivitamins.  She showed continued clinical improvement so was discharged to transitional care unit for continued cares     Acute hypoxic respiratory failure. Secondary to acute systolic congestive heart failure and +/- Pneumonia (Community-acquired pneumonia)  patient was started on azithromycin and ceftriaxone on 10/18. No fever, normal WBC and neg Procal but Chest x-ray showed possible Rt sided pneumonia.    -f/u sputum study, BC (NGTD).  Pro-Michael increased from less than 0.05-12.82 on 10/21 so decided to complete 7-day course of antibiotic (inpatient ceftriaxone was changed to Cefpodoxime on discharge)   Had diuresis as below     chronic heart failure with reduced ejection fraction  Cardiomyopathy of uncertain cause  HTN  Noted to have EF of 35% with hypokinesia of the septal, anterior, anterolateral, and apical segments.  *Does not appear fluid overloaded on admission but required diuresis due to sudden worsening of shortness of breath. Interestingly EF 10/19 better to 50-55% and normal IVC.   She was reviewed by cardiology and continued PTA metoprolol, lisinopril, spironolactone, aspirin, atorvastatin   -resumed PTA Lasix of 40 mg daily ( Monitor volume status closely) .   Vitals:    10/21/21 0625 10/22/21 0556 10/23/21 0640   Weight: 40.6 kg (89 lb 9.6 oz) 39.9 kg (87 lb 15.4 oz) 40.6 kg (89 lb 8.1 oz)     She was prescribed extra 20 mg daily for 3 days while going to rehab for slight increase in weight and hyponatremia (her weight is still up from her longtime weight of around 85 pounds) .  She will need to monitor her weight and kidney function in rehab and will need adjustment of diuretics as appropriate          Hx of EtOH dependence  Elevated LFTs  Reports she has been abstinent since  August.   - CIWA with lorazepam (not been requiring)      Loose stools  Reports 2 loose stools per day after eating or drinking. No associated abdominal pain, fevers, chills, or other infectious symptoms.  There is some formed content to her stools.  -C. difficile and enteric panel ordered in the emergency department, continue precautions until it is resolved.  None since admission . Had to be started on stool softeners     Hypokalemia, severe  Hypomagnesemia  Hypophosphatemia  -Replaced per protocol     Hyponatremia: Likely related to mild hypervolemia.  Remained stable  -Okay to discharge level of around 130.  Will give extra Lasix for 3 days and she can have recheck of labs in 3 days at rehab    Severe protein calorie malnutrition  Anorexia  Patient reports significantly decreased oral intake for several years that has dropped off even more precipitously in the last several months.  Currently taking 1 Ensure shake at most on a daily basis.  -Consult to nutrition  -Multivitamin  -Encourage oral intake     Status post esophagectomy with stomach pull-through  GERD  -Continue PTA PPI  -Encourage oral intake     Facial bruising  Noted after recent fall.  CT head and CT facial bones are negative for acute pathology.   Consultations This Hospital Stay   NUTRITION SERVICES ADULT IP CONSULT  CARE MANAGEMENT / SOCIAL WORK IP CONSULT  PHYSICAL THERAPY ADULT IP CONSULT  OCCUPATIONAL THERAPY ADULT IP CONSULT  CARDIOLOGY IP CONSULT  PHYSICAL THERAPY ADULT IP CONSULT  OCCUPATIONAL THERAPY ADULT IP CONSULT    Code Status   No CPR- Do NOT Intubate    Time Spent on this Encounter   I, Ahsan Patton MD, MD, personally saw the patient today and spent greater than 30 minutes discharging this patient.       Ahsan Patton MD, MD  [place] [address] [facility]  [facility] [place] [address]  Phone: [phone]  ______________________________________________________________________    Physical Exam    Vital Signs: Temp: 97.5  F (36.4  C) Temp src: Oral BP: 115/55 Pulse: 96   Resp: 20 SpO2: 95 % O2 Device: None (Room air) Oxygen Delivery: 2 LPM  Weight: 89 lbs 8.11 oz  Gen- pleasant  HEENT-bruising , EUSEBIA  Neck- supple  CVS- I+II+ no m/r/g  RS- CTAB, decreased at bases   Abdo- soft, no tenderness. No g/r/r   Ext- no edema   CNS-no gross focal deficit.  Oriented to person/place/time.     Primary Care Physician   Mustapha Velásquez    Discharge Orders      General info for SNF    Length of Stay Estimate: Short Term Care: Estimated # of Days <30  Condition at Discharge: Stable  Level of care:skilled   Rehabilitation Potential: Fair  Admission H&P remains valid and up-to-date: Yes  Recent Chemotherapy: N/A  Use Nursing Home Standing Orders: Yes     Mantoux instructions    Give two-step Mantoux (PPD) Per Facility Policy Yes     Follow Up and recommended labs and tests    Follow up with [facility] physician.  The following labs/tests are recommended: BMP weekly. (Adjust diuretics if required)     Follow up with Cardiology after discharge from TCU .     Reason for your hospital stay    Kezia Dixon is a 65 year old female admitted on 10/18/2021. She presents with weakness, fall, and poor appetite  Acute hypoxic respiratory failure. Secondary to acute systolic congestive heart failure and +/- Pneumonia (Community-acquired pneumonia)  patient was started on azithromycin and ceftriaxone on 10/18. No fever, normal WBC and neg Procal but Chest x-ray shows possible Rt sided pneumonia.    -f/u sputum study, BC (NGTD).  Pro-Michael increased from less than 0.05-12.82 on 10/21      chronic heart failure with reduced ejection fraction  Cardiomyopathy of uncertain cause  HTN  Noted to have EF of 35% with hypokinesia of the septal, anterior, anterolateral, and apical segments.  *Does not appear fluid overloaded on admission but required diuresis now. Interestingly EF 10/19 better to 50-55% and normal IVC.   -Continued PTA  metoprolol, lisinopril, spironolactone, aspirin, atorvastatin   -resumed PTA Lasix ( Monitor volume status closely)     Glucose monitor nursing POCT    Before meals and at bedtime     Intake and output    Every shift     Daily weights    Call Provider for weight gain of more than 2 pounds per day or 5 pounds per week.     Activity - Up with assistive device     Physical Therapy Adult Consult    Evaluate and treat as clinically indicated.    Reason:  for PT     Occupational Therapy Adult Consult    Evaluate and treat as clinically indicated.    Reason:  for OT     Fall precautions     Pneumatic Compression Device     Bilateral calf. Remove 30 mins BID.     Diet    Follow this diet upon discharge: Orders Placed This Encounter      Regular Diet Adult       Significant Results and Procedures    BMPRecent Labs   Lab 10/23/21  0921 10/22/21  0855 10/21/21  2056 10/21/21  1548 10/21/21  1146 10/21/21  0903 10/21/21  0903 10/20/21  1729 10/20/21  0933   * 132*  --   --   --   --  131*  --  131*   POTASSIUM 3.6 3.5 3.3* 2.8*  --    < > 3.4  3.4   < > 3.2*   CHLORIDE 95 98  --   --   --   --  96  --  94   VICENTE 8.2* 8.1*  --   --   --   --  7.8*  --  8.2*   CO2 29 31  --   --   --   --  30  --  31   BUN 13 15  --   --   --   --  13  --  11   CR 0.49* 0.52  --   --   --   --  0.50*  --  0.48*   * 163*  --   --  125*  --  205*  --  148*    < > = values in this interval not displayed.     CBC  Recent Labs   Lab 10/20/21  0933 10/19/21  0543 10/19/21  0543 10/18/21  1506 10/18/21  1506   WBC 9.3  --  6.5  --  5.4   RBC 3.27*  --  2.99*  --  3.97   HGB 10.1*   < > 9.4*   < > 12.5   HCT 31.2*  --  28.6*  --  36.5   MCV 95  --  96  --  92   MCH 30.9  --  31.4  --  31.5   MCHC 32.4  --  32.9  --  34.2   RDW 15.9*  --  16.3*  --  16.1*   *  --  143*  --  189    < > = values in this interval not displayed.     INRNo lab results found in last 7 days.  LFTs  Recent Labs   Lab 10/19/21  0543 10/18/21  1506   ALKPHOS 86  132   AST 50* 90*   ALT 34 55*   BILITOTAL 1.0 1.6*   PROTTOTAL 5.5* 7.9   ALBUMIN 2.4* 4.0      PANC  Recent Labs   Lab 10/18/21  1506   LIPASE 70*       Results for orders placed or performed during the hospital encounter of 10/18/21   Head CT w/o contrast    Narrative    CT SCAN OF THE HEAD WITHOUT CONTRAST   10/18/2021 3:37 PM     HISTORY: Head trauma, moderate-severe. Pain after fall.    TECHNIQUE:  Axial images of the head and coronal reformations without  IV contrast material.  Radiation dose for this scan was reduced using  automated exposure control, adjustment of the mA and/or kV according  to patient size, or iterative reconstruction technique.    COMPARISON: None.    FINDINGS: There is some mild cerebral atrophy. There is some low  density in the mira which is probably just artifact. There is some  minimal nonspecific white matter changes without mass effect. There is  no evidence for intracranial hemorrhage, mass effect, acute infarct,  or skull fracture. Visualized paranasal sinuses and mastoid air cells  are clear.      Impression    IMPRESSION: Chronic changes. No evidence for intracranial hemorrhage  or any acute process.    ANT LAST MD         SYSTEM ID:  [id number]   CT Facial Bones without Contrast    Narrative    CT FACIAL BONES WITHOUT CONTRAST 10/18/2021 3:37 PM     HISTORY: Trauma, facial injury. Facial pain after fall. Fever and  chills.    TECHNIQUE: Axial images were obtained through the facial bones without  contrast. Coronal and sagittal reconstructions were also acquired.  Radiation dose for this scan was reduced using automated exposure  control, adjustment of the mA and/or kV according to patient size, or  iterative reconstruction technique..    FINDINGS: The facial bones are intact. Specifically, the bony  mandible, pterygoid plates, zygomatic arches, nasal bones, and bony  orbits are intact. Paranasal sinuses are clear. Both globes are  normal. Retro-orbital structures are  unremarkable.      Impression    IMPRESSION: Negative facial bone CT examination.    ANT LAST MD         SYSTEM ID:  [id number]   XR Chest Port 1 View    Narrative    EXAM: CHEST SINGLE VIEW PORTABLE  LOCATION: [facility]  DATE/TIME: 10/19/2021 3:06 AM    INDICATION: Cough. Sputum. Shortness of breath.  COMPARISON: 10/26/2018.    FINDINGS: Mildly to moderately increased interstitial opacities in the lungs. Apparent pleural thickening at the lateral and inferior aspects of the right hemithorax. A few hazy opacities in bilateral lung bases. Normal-sized cardiac silhouette.      Impression    IMPRESSION:   1. Mildly to moderately increased interstitial opacities in the lungs, possibly representing interstitial pulmonary edema.  2. A few hazy opacities in bilateral lung bases most likely represent atelectasis, but pneumonia cannot be excluded.  3. Apparent pleural thickening at the lateral and inferior aspects of the right hemithorax that could relate to scarring or a small loculated right pleural effusion.    XR Chest Port 1 View    Narrative    EXAM: XR CHEST PORT 1 VIEW  LOCATION: [facility]  DATE/TIME: 10/20/2021 1:22 AM    INDICATION: hypoxia, shortness of breath  COMPARISON: 10/19/2021      Impression    IMPRESSION: Findings similar to previous study. Slight improvement in interstitial markings involving left lung but slight worsening in interstitial/airspace infiltrates involving right lung. Findings may relate to combination of interstitial edema and   right-sided pneumonia. Pleural thickening persists on the right. Heart size normal.   Echocardiogram Complete     Value    LVEF  55-60%    Narrative    [id number]  [id number]  [id number]  [id number]^YARON^MASHA^DEMETRICE     [facility]  Echocardiography Laboratory  [address] [place] [address]     Name: MATTEO CASTAÑEDA  MRN: [id number]  : 1955  Study Date: 10/19/2021 01:56  PM  Age: 65 yrs  Gender: Female  Patient Location: [facility]  Reason For Study: Heart Failure - Left  Ordering Physician: MASHA MARRUFO  Performed By: Vielka Jones     BSA: 1.4 m2  Height: 61 in  Weight: 94 lb  HR: 104  BP: 157/67 mmHg  ______________________________________________________________________________  Procedure  Complete Echo Adult.  ______________________________________________________________________________  Interpretation Summary     Left ventricular systolic function is normal.  The visual ejection fraction is 55-60%.  Grade 1 Diastolic dysfunction.  Normal right ventricle structure and size  Moderate (46-55mmHg) pulmonary hypertension is present. Moderate TR.  Mild-moderate MR.  The inferior vena cava was normal in size with preserved respiratory  variability.  There is no pericardial effusion.     No prior for comparison.  ______________________________________________________________________________  Left Ventricle  The left ventricle is normal in structure, function and size. There is normal  left ventricular wall thickness. The visual ejection fraction is 55-60%. Left  ventricular systolic function is normal. Grade I or early diastolic  dysfunction.     Right Ventricle  Normal right ventricle structure and size.     Atria  Normal left atrial size. Right atrial size is normal.     Mitral Valve  There is mild mitral annular calcification. There is mild to moderate (1-2+)  mitral regurgitation.     Tricuspid Valve  Moderate (46-55mmHg) pulmonary hypertension is present. The right ventricular  systolic pressure is approximated at 45mmHg plus the right atrial pressure.  There is moderate (2+) tricuspid regurgitation.     Aortic Valve  There is mild trileaflet aortic sclerosis. There is mild (1+) aortic  regurgitation.     Pulmonic Valve  The pulmonic valve is normal in structure and function.     Vessels  The aortic root is normal size. Normal size ascending aorta. The inferior vena  cava  was normal in size with preserved respiratory variability.     Pericardium  There is no pericardial effusion.     ______________________________________________________________________________  MMode/2D Measurements & Calculations  IVSd: 0.66 cm  LVIDd: 3.8 cm  LVIDs: 2.6 cm  LVPWd: 0.65 cm  FS: 32.0 %     LV mass(C)d: 64.5 grams  LV mass(C)dI: 47.0 grams/m2  Ao root diam: 3.6 cm  LA dimension: 3.9 cm  asc Aorta Diam: 2.7 cm  LA/Ao: 1.1  RWT: 0.34     Doppler Measurements & Calculations  MV E max richa: 58.2 cm/sec  MV A max richa: 74.7 cm/sec  MV E/A: 0.78  MV dec slope: 449.7 cm/sec2  AI P1/2t: 222.0 msec  TR max richa: 329.8 cm/sec  TR max P.5 mmHg  E/E' av.5  Lateral E/e': 5.6  Medial E/e': 13.4     ______________________________________________________________________________  Report approved by: Nina Collins 10/19/2021 02:56 PM               Discharge Medications   Current Discharge Medication List      START taking these medications    Details   acetaminophen (TYLENOL) 325 MG tablet Take 2 tablets (650 mg) by mouth every 6 hours as needed for mild pain or fever    Associated Diagnoses: Malignant neoplasm of lung, unspecified laterality, unspecified part of lung (H)      cefpodoxime (VANTIN) 200 MG tablet Take 1 tablet (200 mg) by mouth 2 times daily for 3 days    Associated Diagnoses: Pneumonia due to infectious organism, unspecified laterality, unspecified part of lung      senna-docusate (SENOKOT-S/PERICOLACE) 8.6-50 MG tablet Take 1 tablet by mouth 2 times daily    Associated Diagnoses: Chronic systolic congestive heart failure (H)         CONTINUE these medications which have CHANGED    Details   !! furosemide (LASIX) 20 MG tablet Take 20 mg daily at 4 pm for 3 days. Monitor weight and kidney function    Associated Diagnoses: Chronic systolic congestive heart failure (H)      !! furosemide (LASIX) 40 MG tablet Take 1 tablet (40 mg) by mouth every morning  Qty: 90 tablet, Refills: 3     Associated Diagnoses: Chronic systolic congestive heart failure (H)       !! - Potential duplicate medications found. Please discuss with provider.      CONTINUE these medications which have NOT CHANGED    Details   aspirin (ASA) 81 MG EC tablet Take 1 tablet (81 mg) by mouth daily    Associated Diagnoses: Cardiomyopathy, unspecified type (H)      atorvastatin (LIPITOR) 40 MG tablet Take 1 tablet (40 mg) by mouth daily  Qty: 90 tablet, Refills: 3    Associated Diagnoses: Hyperlipidemia LDL goal <70      fluticasone (FLONASE) 50 MCG/ACT nasal spray INSTILL 2 SPRAYS INTO BOTH NOSTRILS DAILY.  Qty: 48 mL, Refills: 0    Associated Diagnoses: Chronic rhinitis      folic acid (FOLVITE) 1 MG tablet Take 1 tablet (1 mg) by mouth daily  Qty: 90 tablet, Refills: 3    Associated Diagnoses: Alcoholism (H)      losartan (COZAAR) 25 MG tablet Take 0.5 tablets (12.5 mg) by mouth daily  Qty: 45 tablet, Refills: 3    Associated Diagnoses: Benign essential hypertension      metoprolol succinate ER (TOPROL-XL) 25 MG 24 hr tablet Take 1 tablet (25 mg) by mouth 2 times daily  Qty: 180 tablet, Refills: 3    Associated Diagnoses: Chronic systolic congestive heart failure (H)      omeprazole (PRILOSEC) 40 MG DR capsule TAKE 1 CAPSULE BY MOUTH EVERY DAY  Qty: 90 capsule, Refills: 1    Associated Diagnoses: Benign essential hypertension      spironolactone (ALDACTONE) 25 MG tablet Take 0.5 tablets (12.5 mg) by mouth every morning  Qty: 45 tablet, Refills: 3    Associated Diagnoses: Chronic systolic congestive heart failure (H)      thiamine (B-1) 100 MG tablet Take 1 tablet (100 mg) by mouth daily  Qty: 90 tablet, Refills: 3    Associated Diagnoses: Alcoholism (H)           Allergies   Allergies   Allergen Reactions     Codeine Sulfate Nausea     Simvastatin Cramps     Leg cramps     Pcn [Penicillins] Rash

## 2021-10-24 LAB
BACTERIA BLD CULT: NO GROWTH
BACTERIA BLD CULT: NO GROWTH

## 2021-10-25 NOTE — PLAN OF CARE
Occupational Therapy Discharge Summary    Reason for therapy discharge:    Discharged to transitional care facility.    Progress towards therapy goal(s). See goals on Care Plan in [facility] electronic health record for goal details.  Goals not met.  Barriers to achieving goals:   limited tolerance for therapy and discharge from facility.    Therapy recommendation(s):    Continued therapy is recommended.  Rationale/Recommendations:  Pt is significantly below her baseline level of function presenting with multiple falls and significant global weakness. Pt would benefit from further therapy in TCU to improve to prior level of function and decrease risk of fall and rehospitalization. If d/c home, then pt would need 24/7 supervision/assist for mobility and self-cares. .

## 2021-10-25 NOTE — PLAN OF CARE
Physical Therapy Discharge Summary    Reason for therapy discharge:    Discharged to transitional care facility.    Progress towards therapy goal(s). See goals on Care Plan in [facility] electronic health record for goal details.  Goals partially met.  Barriers to achieving goals:   discharge from facility.    Therapy recommendation(s):    Continued therapy is recommended.  Rationale/Recommendations:  cont PT at TCU to further address deficits and optimize functional recovery.

## 2021-10-27 ENCOUNTER — OFFICE VISIT (OUTPATIENT)
Dept: CARDIOLOGY | Facility: CLINIC | Age: 66
End: 2021-10-27
Attending: INTERNAL MEDICINE
Payer: MEDICARE

## 2021-10-27 ENCOUNTER — LAB REQUISITION (OUTPATIENT)
Dept: LAB | Facility: CLINIC | Age: 66
End: 2021-10-27
Payer: MEDICARE

## 2021-10-27 VITALS
DIASTOLIC BLOOD PRESSURE: 46 MMHG | SYSTOLIC BLOOD PRESSURE: 96 MMHG | WEIGHT: 86.9 LBS | HEART RATE: 73 BPM | BODY MASS INDEX: 16.42 KG/M2

## 2021-10-27 DIAGNOSIS — I50.22 CHRONIC SYSTOLIC CONGESTIVE HEART FAILURE (H): ICD-10-CM

## 2021-10-27 DIAGNOSIS — R94.39 ABNORMAL CARDIOVASCULAR STRESS TEST: Primary | ICD-10-CM

## 2021-10-27 DIAGNOSIS — I10 ESSENTIAL (PRIMARY) HYPERTENSION: ICD-10-CM

## 2021-10-27 PROCEDURE — 99204 OFFICE O/P NEW MOD 45 MIN: CPT | Performed by: INTERNAL MEDICINE

## 2021-10-27 NOTE — PROGRESS NOTES
HPI and Plan:   See dictation    Orders Placed This Encounter   Procedures     Follow-Up with Cardiac Advanced Practice Provider     Echocardiogram Complete       No orders of the defined types were placed in this encounter.      There are no discontinued medications.      Encounter Diagnoses   Name Primary?     Chronic systolic congestive heart failure (H)      Abnormal cardiovascular stress test Yes       CURRENT MEDICATIONS:  Current Outpatient Medications   Medication Sig Dispense Refill     acetaminophen (TYLENOL) 325 MG tablet Take 2 tablets (650 mg) by mouth every 6 hours as needed for mild pain or fever       aspirin (ASA) 81 MG EC tablet Take 1 tablet (81 mg) by mouth daily       atorvastatin (LIPITOR) 40 MG tablet Take 1 tablet (40 mg) by mouth daily 90 tablet 3     fluticasone (FLONASE) 50 MCG/ACT nasal spray INSTILL 2 SPRAYS INTO BOTH NOSTRILS DAILY. 48 mL 0     folic acid (FOLVITE) 1 MG tablet Take 1 tablet (1 mg) by mouth daily 90 tablet 3     furosemide (LASIX) 20 MG tablet Take 20 mg daily at 4 pm for 3 days. Monitor weight and kidney function       furosemide (LASIX) 40 MG tablet Take 1 tablet (40 mg) by mouth every morning 90 tablet 3     losartan (COZAAR) 25 MG tablet Take 0.5 tablets (12.5 mg) by mouth daily 45 tablet 3     metoprolol succinate ER (TOPROL-XL) 25 MG 24 hr tablet Take 1 tablet (25 mg) by mouth 2 times daily 180 tablet 3     omeprazole (PRILOSEC) 40 MG DR capsule TAKE 1 CAPSULE BY MOUTH EVERY DAY 90 capsule 1     senna-docusate (SENOKOT-S/PERICOLACE) 8.6-50 MG tablet Take 1 tablet by mouth 2 times daily       spironolactone (ALDACTONE) 25 MG tablet Take 0.5 tablets (12.5 mg) by mouth every morning 45 tablet 3     thiamine (B-1) 100 MG tablet Take 1 tablet (100 mg) by mouth daily 90 tablet 3       ALLERGIES     Allergies   Allergen Reactions     Codeine Sulfate Nausea     Simvastatin Cramps     Leg cramps     Pcn [Penicillins] Rash       PAST MEDICAL HISTORY:  Past Medical History:    Diagnosis Date     Alcoholism (H) 10/14/2016     Benign essential hypertension 10/14/2016     Carotid artery disease (H)     mile plaque 5/7     Chemical dependency (H)     alcohol     Depression with anxiety      Esophageal cancer (H)      Esophageal cancer (H) 3/22/2016     Esophageal mass      GERD (gastroesophageal reflux disease)      Hx of pancreatitis 2003     Hypercholesteremia      Hyperglycemia      Hyperlipemia      Impaired fasting glucose 10/14/2016     Impaired fasting glucose 10/14/2016     Nocturnal leg cramps      Pancreatic pseudocyst 10/14/2016     Pancreatic pseudocyst 10/14/2016     Pancreatitis     with pseudocyst     Pap smear with atypical squamous cells, cannot exclude high grade squamous intraepithelial lesion (ASC-H) 10/14/16    Colpo impression benign, ECC benign. Cotestin in 1 yr.     Shingles      Vasomotor rhinitis        PAST SURGICAL HISTORY:  Past Surgical History:   Procedure Laterality Date     AAA REPAIR      splenic artery aneurysm embolization     ABDOMEN SURGERY  2/2/2016     COLONOSCOPY  11/26/2013    Procedure: COMBINED COLONOSCOPY, SINGLE BIOPSY/POLYPECTOMY BY BIOPSY;  COLONOSCOPY (MAC);  Surgeon: Montana Rosa MD;  Location:  GI     COLONOSCOPY  11/26/2013     COLONOSCOPY N/A 10/4/2018    Procedure: COMBINED COLONOSCOPY, SINGLE OR MULTIPLE BIOPSY/POLYPECTOMY BY BIOPSY;  colonoscopy;  Surgeon: Gio Apodaca MD;  Location:  GI     ENT SURGERY       ESOPHAGOGASTRECTOMY N/A 3/22/2016    Procedure: ESOPHAGOGASTRECTOMY;  Surgeon: Alvin Riojas MD;  Location:  OR     ESOPHAGOSCOPY, GASTROSCOPY, DUODENOSCOPY (EGD), COMBINED N/A 12/22/2015    Procedure: COMBINED ENDOSCOPIC ULTRASOUND, ESOPHAGOSCOPY, GASTROSCOPY, DUODENOSCOPY (EGD), FINE NEEDLE ASPIRATE/BIOPSY;  Surgeon: Danelle Michael MD;  Location:  GI     GASTROSTOMY, INSERT TUBE, COMBINED N/A 2/2/2016    Procedure: COMBINED GASTROSTOMY, INSERT TUBE (OPEN);  Surgeon: Alvin Riojas  MD Angel;  Location:  OR     GI SURGERY  2015     HAND SURGERY      right     HERNIORRHAPHY INCISIONAL (LOCATION) N/A 3/19/2019    Procedure: LAPARSCOPIC  INCISIONAL HERNIA REPAIR WITH MESH, LAPARSCOPIC LYSIS OF ADHENSIONS;  Surgeon: Lamberto Magaña MD;  Location:  OR     INSERT PORT VASCULAR ACCESS N/A 2015    Procedure: INSERT PORT VASCULAR ACCESS;  Surgeon: Alvin Riojas MD;  Location:  OR     LAPAROSCOPIC CHOLECYSTECTOMY N/A 3/19/2019    Procedure: LAPAROSCOPIC CHOLECYSTECTOMY;  Surgeon: Lamberto Magaña MD;  Location:  OR     LOBECTOMY LUNG Right 10/16/2018    Procedure: LOBECTOMY LUNG;  Surgeon: Alvin Riojas MD;  Location:  OR     REMOVE PORT VASCULAR ACCESS Left 2016    Procedure: REMOVE PORT VASCULAR ACCESS;  Surgeon: Alvin Riojas MD;  Location:  OR     THORACOTOMY Right 10/16/2018    Procedure: REDO RIGHT THORACTOMY AND RIGHT LOWER LOBECTOMY, PLEURAL LYSIS;  Surgeon: Alvin Riojas MD;  Location:  OR     TONSILLECTOMY       VASCULAR SURGERY         FAMILY HISTORY:  Family History   Problem Relation Age of Onset     Other Cancer Father         melanoma     Prostate Cancer Father      Diabetes Father      Other Cancer Brother         melanoma     Other Cancer Brother         melanoma     Other Cancer Brother        SOCIAL HISTORY:  Social History     Socioeconomic History     Marital status:      Spouse name: None     Number of children: None     Years of education: None     Highest education level: None   Occupational History     None   Tobacco Use     Smoking status: Former Smoker     Packs/day: 0.25     Quit date: 2015     Years since quittin.8     Smokeless tobacco: Never Used   Substance and Sexual Activity     Alcohol use: No     Alcohol/week: 0.0 standard drinks     Comment: none     Drug use: No     Sexual activity: Yes     Partners: Male     Birth control/protection: Post-menopausal   Other  Topics Concern     Parent/sibling w/ CABG, MI or angioplasty before 65F 55M? No   Social History Narrative     None     Social Determinants of Health     Financial Resource Strain:      Difficulty of Paying Living Expenses:    Food Insecurity:      Worried About Running Out of Food in the Last Year:      Ran Out of Food in the Last Year:    Transportation Needs:      Lack of Transportation (Medical):      Lack of Transportation (Non-Medical):    Physical Activity:      Days of Exercise per Week:      Minutes of Exercise per Session:    Stress:      Feeling of Stress :    Social Connections:      Frequency of Communication with Friends and Family:      Frequency of Social Gatherings with Friends and Family:      Attends Confucianism Services:      Active Member of Clubs or Organizations:      Attends Club or Organization Meetings:      Marital Status:    Intimate Partner Violence:      Fear of Current or Ex-Partner:      Emotionally Abused:      Physically Abused:      Sexually Abused:        Review of Systems:  Skin:  Negative       Eyes:  Positive for glasses    ENT:  Negative      Respiratory:  Negative       Cardiovascular:  Negative      Gastroenterology: Positive for poor appetite    Genitourinary:  not assessed      Musculoskeletal:  Negative      Neurologic:  Positive for numbness or tingling of feet    Psychiatric:  Negative excessive stress;sleep disturbances;anxiety;depression    Heme/Lymph/Imm:  Negative      Endocrine:  Negative        Physical Exam:  Vitals: BP 96/46   Pulse 73   Wt 39.4 kg (86 lb 14.4 oz)   BMI 16.42 kg/m      Constitutional:  cooperative, alert and oriented, well developed, well nourished, in no acute distress        Skin:  warm and dry to the touch, no apparent skin lesions or masses noted          Head:  no masses or lesions        Eyes:  pupils equal and round        Lymph:      ENT:  no pallor or cyanosis        Neck:  JVP normal        Respiratory:  clear to auscultation          Cardiac: regular rhythm       systolic murmur        pulses full and equal                                        GI:  abdomen soft        Extremities and Muscular Skeletal:  no edema              Neurological:  no gross motor deficits        Psych:  Alert and Oriented x 3        CC  Mustapha Velásquez MD  [address]  [place] [address]

## 2021-10-27 NOTE — PROGRESS NOTES
Service Date: 10/27/2021    CARDIOLOGY CONSULTATION    REASON FOR CONSULTATION:  Recent admission for acute hypoxemic respiratory failure secondary to congestive heart failure.    HISTORY OF PRESENT ILLNESS:  I had the pleasure of seeing Ms. Dixon in consultation at the [facility] today.  She is a very pleasant 65-year-old female who was recently admitted to [facility] from 10/18/2021 through 10/23/2021 with acute hypoxemic respiratory failure that was thought to be cardiac in origin along with possibly community-acquired pneumonia.    The patient's cardiac history dates back to 08/2021 when she was admitted to [facility] from 08/22/2021 through 08/30/2021 with alcohol withdrawal in the context of a 1-1/2 year history of heavy use after her  passed away.  She stopped drinking approximately 3 days prior to admission at the time and presented with nausea, weakness and increased tremors.  She was also noted to be tachycardic and hypertensive.    The patient was treated appropriately for alcohol withdrawal but also was noted to have acute hypoxemic respiratory failure with an elevated BNP.  A transthoracic echocardiogram reported an ejection fraction of approximately 35% with mid to apical wall motion abnormalities and mild mitral regurgitation.  A subsequent Lexiscan stress perfusion study on 08/26/2021 demonstrated a large partially reversible anterior/anteroseptal/septal wall motion perfusion defect.  Her calculated LVEF, however, appeared to have normalized with the reported LVEF of 66%.        The patient was then readmitted to [facility] earlier this month when she presented with symptoms of weakness, decreased p.o. intake and dyspnea.  It was thought that she may have an element of congestive heart failure and she was again treated with IV diuretics with normalization of her respiratory status.  Interestingly, an echocardiogram obtained during  her hospitalization on 10/19/2021 demonstrated normalization of left ventricular systolic function with an estimated LVEF of 55%-60% and moderate tricuspid regurgitation as well as mild-to-moderate mitral regurgitation.  Moderate pulmonary hypertension was noted.    The patient's other pertinent medical history includes a history of esophageal cancer for which she had undergone esophagogastrectomy by Dr. Riojas in 2016.  She also underwent radiation and chemotherapy at the time and it does not appear that there is evidence of recurrence at this point.    The patient was discharged to a transitional care facility and states that she is doing quite well.  Today, she denies any chest discomfort, palpitations, syncope or presyncope.  She denies any PND or orthopnea.  She has been taking her furosemide twice a day and is curious as to whether she can cut back to once a day at this point.    She denies any syncope or presyncope.    Past medical history, family history, social history, allergies and medications are in Epic note.    PHYSICAL EXAMINATION:  Dictated below.    I personally reviewed her echocardiogram dated 10/19/2021.  I agree that left ventricular systolic function is normal, although the degree of mitral regurgitation noted is at least moderate.  The mechanism is unclear since the study was technically limited.    She had also undergone CT coronary artery calcium scoring back in 12/2015.  This demonstrated by a calcium score of 0.    IMPRESSION:    1.  Recent admission to [facility] for weakness/lack of appetite/dyspnea.  The clinical presentation was suggestive of an element of systolic heart failure.  Symptoms resolved with diuresis.  2.  Moderate presumed ischemic cardiomyopathy in 08/2021 with subsequent normalization of left ventricular systolic function.  3.  Abnormal stress perfusion study in 08/2021 suggestive of LAD distribution ischemia.  4.  History of esophageal cancer status post  esophagogastrectomy in 2016 as described above.    The patient appears to be stable from a cardiac standpoint after her recent admission during which she appears to have an element of systolic heart failure.  Her echocardiographic findings, however, are incongruent with her presentation.  Upon reviewing her echocardiogram personally, her mitral regurgitation appears moderate and it is possible that this may have played a part in her presentation.    At this point in time, I think it is important to rule out clinically significant obstructive coronary artery disease given her stress perfusion findings earlier this year.  To that effect, I have ordered a CT coronary angiogram.  She is agreeable with having this performed.  Given her calcium score of 0 five years ago, I anticipate that this will be diagnostic.    I will also obtain a followup echocardiogram in approximately 3 months for reassessment of her mitral and tricuspid valve disease.  Given her history of esophageal surgery, further evaluation with a JAVAD may not be possible.    Her medications are appropriate but I have asked her to decrease her furosemide to 20 mg daily for now given that she appears euvolemic today.    It was a pleasure seeing her in consultation.    Dennis Ring MD        D: 10/27/2021   T: 10/27/2021   MT: [email or web address]    Name:     MATTEO CASTAÑEDA  MRN:      [id number]        Account:      [id number]   :      1955           Service Date: 10/27/2021       Document: [id number]

## 2021-10-27 NOTE — LETTER
10/27/2021    Mustapha Velásquez MD  [address]  [facility] [address]    RE: Kezia Dixon       Dear Colleague,    I had the pleasure of seeing Kezia Dixon in the [facility].    HPI and Plan:   See dictation    Orders Placed This Encounter   Procedures     Follow-Up with Cardiac Advanced Practice Provider     Echocardiogram Complete       No orders of the defined types were placed in this encounter.      There are no discontinued medications.      Encounter Diagnoses   Name Primary?     Chronic systolic congestive heart failure (H)      Abnormal cardiovascular stress test Yes       CURRENT MEDICATIONS:  Current Outpatient Medications   Medication Sig Dispense Refill     acetaminophen (TYLENOL) 325 MG tablet Take 2 tablets (650 mg) by mouth every 6 hours as needed for mild pain or fever       aspirin (ASA) 81 MG EC tablet Take 1 tablet (81 mg) by mouth daily       atorvastatin (LIPITOR) 40 MG tablet Take 1 tablet (40 mg) by mouth daily 90 tablet 3     fluticasone (FLONASE) 50 MCG/ACT nasal spray INSTILL 2 SPRAYS INTO BOTH NOSTRILS DAILY. 48 mL 0     folic acid (FOLVITE) 1 MG tablet Take 1 tablet (1 mg) by mouth daily 90 tablet 3     furosemide (LASIX) 20 MG tablet Take 20 mg daily at 4 pm for 3 days. Monitor weight and kidney function       furosemide (LASIX) 40 MG tablet Take 1 tablet (40 mg) by mouth every morning 90 tablet 3     losartan (COZAAR) 25 MG tablet Take 0.5 tablets (12.5 mg) by mouth daily 45 tablet 3     metoprolol succinate ER (TOPROL-XL) 25 MG 24 hr tablet Take 1 tablet (25 mg) by mouth 2 times daily 180 tablet 3     omeprazole (PRILOSEC) 40 MG DR capsule TAKE 1 CAPSULE BY MOUTH EVERY DAY 90 capsule 1     senna-docusate (SENOKOT-S/PERICOLACE) 8.6-50 MG tablet Take 1 tablet by mouth 2 times daily       spironolactone (ALDACTONE) 25 MG tablet Take 0.5 tablets (12.5 mg) by mouth every morning 45 tablet 3     thiamine (B-1) 100 MG  tablet Take 1 tablet (100 mg) by mouth daily 90 tablet 3       ALLERGIES     Allergies   Allergen Reactions     Codeine Sulfate Nausea     Simvastatin Cramps     Leg cramps     Pcn [Penicillins] Rash       PAST MEDICAL HISTORY:  Past Medical History:   Diagnosis Date     Alcoholism (H) 10/14/2016     Benign essential hypertension 10/14/2016     Carotid artery disease (H)     mile plaque 5/7     Chemical dependency (H)     alcohol     Depression with anxiety      Esophageal cancer (H)      Esophageal cancer (H) 3/22/2016     Esophageal mass      GERD (gastroesophageal reflux disease)      Hx of pancreatitis 2003     Hypercholesteremia      Hyperglycemia      Hyperlipemia      Impaired fasting glucose 10/14/2016     Impaired fasting glucose 10/14/2016     Nocturnal leg cramps      Pancreatic pseudocyst 10/14/2016     Pancreatic pseudocyst 10/14/2016     Pancreatitis     with pseudocyst     Pap smear with atypical squamous cells, cannot exclude high grade squamous intraepithelial lesion (ASC-H) 10/14/16    Colpo impression benign, ECC benign. Cotestin in 1 yr.     Shingles      Vasomotor rhinitis        PAST SURGICAL HISTORY:  Past Surgical History:   Procedure Laterality Date     AAA REPAIR      splenic artery aneurysm embolization     ABDOMEN SURGERY  2/2/2016     COLONOSCOPY  11/26/2013    Procedure: COMBINED COLONOSCOPY, SINGLE BIOPSY/POLYPECTOMY BY BIOPSY;  COLONOSCOPY (MAC);  Surgeon: Montana Rosa MD;  Location:  GI     COLONOSCOPY  11/26/2013     COLONOSCOPY N/A 10/4/2018    Procedure: COMBINED COLONOSCOPY, SINGLE OR MULTIPLE BIOPSY/POLYPECTOMY BY BIOPSY;  colonoscopy;  Surgeon: Gio Apodaca MD;  Location:  GI     ENT SURGERY       ESOPHAGOGASTRECTOMY N/A 3/22/2016    Procedure: ESOPHAGOGASTRECTOMY;  Surgeon: Alvin Riojas MD;  Location:  OR     ESOPHAGOSCOPY, GASTROSCOPY, DUODENOSCOPY (EGD), COMBINED N/A 12/22/2015    Procedure: COMBINED ENDOSCOPIC ULTRASOUND, ESOPHAGOSCOPY,  GASTROSCOPY, DUODENOSCOPY (EGD), FINE NEEDLE ASPIRATE/BIOPSY;  Surgeon: Danelle Michael MD;  Location:  GI     GASTROSTOMY, INSERT TUBE, COMBINED N/A 2016    Procedure: COMBINED GASTROSTOMY, INSERT TUBE (OPEN);  Surgeon: Alvin Riojas MD;  Location:  OR     GI SURGERY  2015     HAND SURGERY      right     HERNIORRHAPHY INCISIONAL (LOCATION) N/A 3/19/2019    Procedure: LAPARSCOPIC  INCISIONAL HERNIA REPAIR WITH MESH, LAPARSCOPIC LYSIS OF ADHENSIONS;  Surgeon: Lamberto Magaña MD;  Location:  OR     INSERT PORT VASCULAR ACCESS N/A 2015    Procedure: INSERT PORT VASCULAR ACCESS;  Surgeon: Alvin Riojas MD;  Location:  OR     LAPAROSCOPIC CHOLECYSTECTOMY N/A 3/19/2019    Procedure: LAPAROSCOPIC CHOLECYSTECTOMY;  Surgeon: Lamberto Magaña MD;  Location:  OR     LOBECTOMY LUNG Right 10/16/2018    Procedure: LOBECTOMY LUNG;  Surgeon: Alvin Riojas MD;  Location:  OR     REMOVE PORT VASCULAR ACCESS Left 2016    Procedure: REMOVE PORT VASCULAR ACCESS;  Surgeon: Alvin Riojas MD;  Location:  OR     THORACOTOMY Right 10/16/2018    Procedure: REDO RIGHT THORACTOMY AND RIGHT LOWER LOBECTOMY, PLEURAL LYSIS;  Surgeon: Alvin Riojas MD;  Location:  OR     TONSILLECTOMY       VASCULAR SURGERY         FAMILY HISTORY:  Family History   Problem Relation Age of Onset     Other Cancer Father         melanoma     Prostate Cancer Father      Diabetes Father      Other Cancer Brother         melanoma     Other Cancer Brother         melanoma     Other Cancer Brother        SOCIAL HISTORY:  Social History     Socioeconomic History     Marital status:      Spouse name: None     Number of children: None     Years of education: None     Highest education level: None   Occupational History     None   Tobacco Use     Smoking status: Former Smoker     Packs/day: 0.25     Quit date: 2015     Years since quittin.8      Smokeless tobacco: Never Used   Substance and Sexual Activity     Alcohol use: No     Alcohol/week: 0.0 standard drinks     Comment: none     Drug use: No     Sexual activity: Yes     Partners: Male     Birth control/protection: Post-menopausal   Other Topics Concern     Parent/sibling w/ CABG, MI or angioplasty before 65F 55M? No   Social History Narrative     None     Social Determinants of Health     Financial Resource Strain:      Difficulty of Paying Living Expenses:    Food Insecurity:      Worried About Running Out of Food in the Last Year:      Ran Out of Food in the Last Year:    Transportation Needs:      Lack of Transportation (Medical):      Lack of Transportation (Non-Medical):    Physical Activity:      Days of Exercise per Week:      Minutes of Exercise per Session:    Stress:      Feeling of Stress :    Social Connections:      Frequency of Communication with Friends and Family:      Frequency of Social Gatherings with Friends and Family:      Attends Mormon Services:      Active Member of Clubs or Organizations:      Attends Club or Organization Meetings:      Marital Status:    Intimate Partner Violence:      Fear of Current or Ex-Partner:      Emotionally Abused:      Physically Abused:      Sexually Abused:        Review of Systems:  Skin:  Negative       Eyes:  Positive for glasses    ENT:  Negative      Respiratory:  Negative       Cardiovascular:  Negative      Gastroenterology: Positive for poor appetite    Genitourinary:  not assessed      Musculoskeletal:  Negative      Neurologic:  Positive for numbness or tingling of feet    Psychiatric:  Negative excessive stress;sleep disturbances;anxiety;depression    Heme/Lymph/Imm:  Negative      Endocrine:  Negative        Physical Exam:  Vitals: BP 96/46   Pulse 73   Wt 39.4 kg (86 lb 14.4 oz)   BMI 16.42 kg/m      Constitutional:  cooperative, alert and oriented, well developed, well nourished, in no acute distress        Skin:  warm and dry  to the touch, no apparent skin lesions or masses noted          Head:  no masses or lesions        Eyes:  pupils equal and round        Lymph:      ENT:  no pallor or cyanosis        Neck:  JVP normal        Respiratory:  clear to auscultation         Cardiac: regular rhythm       systolic murmur        pulses full and equal                                        GI:  abdomen soft        Extremities and Muscular Skeletal:  no edema              Neurological:  no gross motor deficits        Psych:  Alert and Oriented x 3        CC  Mustapha Velásquez MD  [address]  [place] [address]                  Thank you for allowing me to participate in the care of your patient.      Sincerely,     Dennis Ring MD     [facility]  cc:   Mustapha Velásquez MD  [address]  [place] [address]

## 2021-10-28 ENCOUNTER — MEDICAL CORRESPONDENCE (OUTPATIENT)
Dept: HEALTH INFORMATION MANAGEMENT | Facility: CLINIC | Age: 66
End: 2021-10-28
Payer: MEDICARE

## 2021-10-28 LAB
ANION GAP SERPL CALCULATED.3IONS-SCNC: 12 MMOL/L (ref 5–18)
BUN SERPL-MCNC: 11 MG/DL (ref 8–22)
CALCIUM SERPL-MCNC: 9.2 MG/DL (ref 8.5–10.5)
CHLORIDE BLD-SCNC: 96 MMOL/L (ref 98–107)
CO2 SERPL-SCNC: 29 MMOL/L (ref 22–31)
CREAT SERPL-MCNC: 0.59 MG/DL (ref 0.6–1.1)
GFR SERPL CREATININE-BSD FRML MDRD: >90 ML/MIN/1.73M2
GLUCOSE BLD-MCNC: 94 MG/DL (ref 70–125)
POTASSIUM BLD-SCNC: 3.1 MMOL/L (ref 3.5–5)
SODIUM SERPL-SCNC: 137 MMOL/L (ref 136–145)

## 2021-10-28 PROCEDURE — 36415 COLL VENOUS BLD VENIPUNCTURE: CPT | Performed by: NURSE PRACTITIONER

## 2021-10-28 PROCEDURE — 82310 ASSAY OF CALCIUM: CPT | Performed by: NURSE PRACTITIONER

## 2021-10-29 ENCOUNTER — LAB REQUISITION (OUTPATIENT)
Dept: LAB | Facility: CLINIC | Age: 66
End: 2021-10-29
Payer: MEDICARE

## 2021-10-29 DIAGNOSIS — E87.6 HYPOKALEMIA: ICD-10-CM

## 2021-10-30 ENCOUNTER — MEDICAL CORRESPONDENCE (OUTPATIENT)
Dept: HEALTH INFORMATION MANAGEMENT | Facility: CLINIC | Age: 66
End: 2021-10-30

## 2021-11-01 LAB — POTASSIUM BLD-SCNC: 4.6 MMOL/L (ref 3.5–5)

## 2021-11-01 PROCEDURE — 36415 COLL VENOUS BLD VENIPUNCTURE: CPT | Performed by: FAMILY MEDICINE

## 2021-11-01 PROCEDURE — P9604 ONE-WAY ALLOW PRORATED TRIP: HCPCS | Performed by: FAMILY MEDICINE

## 2021-11-01 PROCEDURE — 84132 ASSAY OF SERUM POTASSIUM: CPT | Performed by: FAMILY MEDICINE

## 2021-11-02 ENCOUNTER — HOSPITAL ENCOUNTER (OUTPATIENT)
Dept: CARDIOLOGY | Facility: CLINIC | Age: 66
Discharge: HOME | End: 2021-11-02
Attending: INTERNAL MEDICINE | Admitting: INTERNAL MEDICINE
Payer: MEDICARE

## 2021-11-02 ENCOUNTER — HOSPITAL ENCOUNTER (OUTPATIENT)
Dept: CARDIOLOGY | Facility: CLINIC | Age: 66
End: 2021-11-02
Attending: INTERNAL MEDICINE
Payer: MEDICARE

## 2021-11-02 VITALS — DIASTOLIC BLOOD PRESSURE: 50 MMHG | SYSTOLIC BLOOD PRESSURE: 113 MMHG | HEART RATE: 61 BPM

## 2021-11-02 DIAGNOSIS — R94.39 ABNORMAL CARDIOVASCULAR STRESS TEST: ICD-10-CM

## 2021-11-02 PROCEDURE — 999N000040 HC STATISTIC CONSULT NO CHARGE VASC ACCESS

## 2021-11-02 PROCEDURE — 75574 CT ANGIO HRT W/3D IMAGE: CPT | Mod: 26 | Performed by: INTERNAL MEDICINE

## 2021-11-02 PROCEDURE — 75574 CT ANGIO HRT W/3D IMAGE: CPT | Mod: MG

## 2021-11-02 PROCEDURE — 250N000009 HC RX 250: Performed by: INTERNAL MEDICINE

## 2021-11-02 PROCEDURE — 250N000013 HC RX MED GY IP 250 OP 250 PS 637: Performed by: INTERNAL MEDICINE

## 2021-11-02 PROCEDURE — G1004 CDSM NDSC: HCPCS | Performed by: INTERNAL MEDICINE

## 2021-11-02 PROCEDURE — 250N000011 HC RX IP 250 OP 636: Performed by: INTERNAL MEDICINE

## 2021-11-02 PROCEDURE — 999N000128 HC STATISTIC PERIPHERAL IV START W/O US GUIDANCE

## 2021-11-02 RX ORDER — METHYLPREDNISOLONE SODIUM SUCCINATE 125 MG/2ML
125 INJECTION, POWDER, LYOPHILIZED, FOR SOLUTION INTRAMUSCULAR; INTRAVENOUS
Status: DISCONTINUED | OUTPATIENT
Start: 2021-11-02 | End: 2021-11-03 | Stop reason: HOSPADM

## 2021-11-02 RX ORDER — IOPAMIDOL 755 MG/ML
500 INJECTION, SOLUTION INTRAVASCULAR ONCE
Status: COMPLETED | OUTPATIENT
Start: 2021-11-02 | End: 2021-11-02

## 2021-11-02 RX ORDER — METOPROLOL TARTRATE 25 MG/1
25-100 TABLET, FILM COATED ORAL
Status: COMPLETED | OUTPATIENT
Start: 2021-11-02 | End: 2021-11-02

## 2021-11-02 RX ORDER — DILTIAZEM HYDROCHLORIDE 5 MG/ML
10-15 INJECTION INTRAVENOUS
Status: DISCONTINUED | OUTPATIENT
Start: 2021-11-02 | End: 2021-11-03 | Stop reason: HOSPADM

## 2021-11-02 RX ORDER — IVABRADINE 5 MG/1
5-15 TABLET, FILM COATED ORAL
Status: COMPLETED | OUTPATIENT
Start: 2021-11-02 | End: 2021-11-02

## 2021-11-02 RX ORDER — ONDANSETRON 2 MG/ML
4 INJECTION INTRAMUSCULAR; INTRAVENOUS
Status: DISCONTINUED | OUTPATIENT
Start: 2021-11-02 | End: 2021-11-03 | Stop reason: HOSPADM

## 2021-11-02 RX ORDER — METOPROLOL TARTRATE 1 MG/ML
5-15 INJECTION, SOLUTION INTRAVENOUS
Status: DISCONTINUED | OUTPATIENT
Start: 2021-11-02 | End: 2021-11-03 | Stop reason: HOSPADM

## 2021-11-02 RX ORDER — NITROGLYCERIN 0.4 MG/1
0.4 TABLET SUBLINGUAL
Status: DISCONTINUED | OUTPATIENT
Start: 2021-11-02 | End: 2021-11-03 | Stop reason: HOSPADM

## 2021-11-02 RX ORDER — ACYCLOVIR 200 MG/1
0-1 CAPSULE ORAL
Status: DISCONTINUED | OUTPATIENT
Start: 2021-11-02 | End: 2021-11-03 | Stop reason: HOSPADM

## 2021-11-02 RX ORDER — DIPHENHYDRAMINE HYDROCHLORIDE 50 MG/ML
25-50 INJECTION INTRAMUSCULAR; INTRAVENOUS
Status: DISCONTINUED | OUTPATIENT
Start: 2021-11-02 | End: 2021-11-03 | Stop reason: HOSPADM

## 2021-11-02 RX ORDER — DIPHENHYDRAMINE HCL 25 MG
25 CAPSULE ORAL
Status: DISCONTINUED | OUTPATIENT
Start: 2021-11-02 | End: 2021-11-03 | Stop reason: HOSPADM

## 2021-11-02 RX ORDER — DILTIAZEM HCL 60 MG
120 TABLET ORAL
Status: DISCONTINUED | OUTPATIENT
Start: 2021-11-02 | End: 2021-11-03 | Stop reason: HOSPADM

## 2021-11-02 RX ADMIN — METOPROLOL TARTRATE 50 MG: 50 TABLET, FILM COATED ORAL at 13:15

## 2021-11-02 RX ADMIN — NITROGLYCERIN 0.4 MG: 0.4 TABLET SUBLINGUAL at 15:14

## 2021-11-02 RX ADMIN — IOPAMIDOL 110 ML: 755 INJECTION, SOLUTION INTRAVENOUS at 15:32

## 2021-11-02 RX ADMIN — METOPROLOL TARTRATE 10 MG: 5 INJECTION INTRAVENOUS at 15:22

## 2021-11-02 RX ADMIN — METOPROLOL TARTRATE 10 MG: 5 INJECTION INTRAVENOUS at 15:17

## 2021-11-02 RX ADMIN — METOPROLOL TARTRATE 10 MG: 5 INJECTION INTRAVENOUS at 15:27

## 2021-11-02 RX ADMIN — IVABRADINE 10 MG: 5 TABLET, FILM COATED ORAL at 13:15

## 2021-11-02 RX ADMIN — METOPROLOL TARTRATE 10 MG: 5 INJECTION INTRAVENOUS at 15:33

## 2021-11-03 ENCOUNTER — TELEPHONE (OUTPATIENT)
Dept: CARDIOLOGY | Facility: CLINIC | Age: 66
End: 2021-11-03

## 2021-11-03 LAB — RADIOLOGIST FLAGS: NORMAL

## 2021-11-03 NOTE — TELEPHONE ENCOUNTER
Spoke to patient, reviewed CTA results, message from Dr Ring, and plan for f/up 1/2022 w/ echo prior. Pt verbalized understanding. Team 2 phone number provided to call with any questions or symptoms. Pt to follow up with PCP regarding soft tissue findings.

## 2021-11-03 NOTE — TELEPHONE ENCOUNTER
The lesion is in a very small distal caliber vessel. I would recommend medical therapy for her CAD unless she has symptoms suggestive of angina. Thanks.

## 2021-11-03 NOTE — TELEPHONE ENCOUNTER
CTA completed as below. Ordered by Dr Ring to rule out CAD given previous abnormal stress test. Pt has orders for an echo/KASSIE OV for 2022. Routed to provider for review.     IMPRESSION:  1.  Severe proximal right posterolateral artery stenosis. Mild mid  left anterior descending artery stenosis.  2.  Total Agatston score 118 placing the patient in the 80 percentile  when compared to age and gender matched control group.  3.  Please review Radiology report for incidental noncardiac findings  that will follow separately.  CORONARY CALCIUM SCORE  Total Agatston calcium score: 118   Left main: 0  Left anterior descendin.7  Left circumflex: 0  Right coronary artery: 83.2   This places the patient in the 80 percentile when compared to age and  gender matched control group.  Soft tissue report: Esophagectomy with gastric pull-through. Right lower  lobectomy. Small right pleural effusion. Partial visualization of a  thick-walled cavity in the right upper lobe. Emphysema. Recommend  chest CT for further evaluation. Patient may also have more recent  outside comparison studies.

## 2021-11-08 ENCOUNTER — OFFICE VISIT (OUTPATIENT)
Dept: FAMILY MEDICINE | Facility: CLINIC | Age: 66
End: 2021-11-08
Payer: MEDICARE

## 2021-11-08 ENCOUNTER — MEDICAL CORRESPONDENCE (OUTPATIENT)
Dept: HEALTH INFORMATION MANAGEMENT | Facility: CLINIC | Age: 66
End: 2021-11-08

## 2021-11-08 VITALS
SYSTOLIC BLOOD PRESSURE: 142 MMHG | TEMPERATURE: 97.8 F | DIASTOLIC BLOOD PRESSURE: 57 MMHG | OXYGEN SATURATION: 100 % | HEIGHT: 61 IN | WEIGHT: 86 LBS | HEART RATE: 75 BPM | BODY MASS INDEX: 16.24 KG/M2

## 2021-11-08 DIAGNOSIS — E78.5 HYPERLIPIDEMIA LDL GOAL <70: ICD-10-CM

## 2021-11-08 DIAGNOSIS — C15.9 MALIGNANT NEOPLASM OF ESOPHAGUS, UNSPECIFIED LOCATION (H): ICD-10-CM

## 2021-11-08 DIAGNOSIS — R91.8 LUNG MASS: ICD-10-CM

## 2021-11-08 DIAGNOSIS — J18.9 PNEUMONIA DUE TO INFECTIOUS ORGANISM, UNSPECIFIED LATERALITY, UNSPECIFIED PART OF LUNG: ICD-10-CM

## 2021-11-08 DIAGNOSIS — C34.90 MALIGNANT NEOPLASM OF LUNG, UNSPECIFIED LATERALITY, UNSPECIFIED PART OF LUNG (H): ICD-10-CM

## 2021-11-08 DIAGNOSIS — E44.1 MILD PROTEIN-CALORIE MALNUTRITION (H): ICD-10-CM

## 2021-11-08 DIAGNOSIS — F10.21 ALCOHOL DEPENDENCE IN REMISSION (H): ICD-10-CM

## 2021-11-08 DIAGNOSIS — I10 BENIGN ESSENTIAL HYPERTENSION: ICD-10-CM

## 2021-11-08 DIAGNOSIS — Z23 HIGH PRIORITY FOR 2019-NCOV VACCINE: ICD-10-CM

## 2021-11-08 DIAGNOSIS — I50.9 CHRONIC CONGESTIVE HEART FAILURE, UNSPECIFIED HEART FAILURE TYPE (H): Primary | ICD-10-CM

## 2021-11-08 PROCEDURE — 0004A COVID-19,PF,PFIZER (12+ YRS): CPT | Performed by: INTERNAL MEDICINE

## 2021-11-08 PROCEDURE — 91300 COVID-19,PF,PFIZER (12+ YRS): CPT | Performed by: INTERNAL MEDICINE

## 2021-11-08 PROCEDURE — 99215 OFFICE O/P EST HI 40 MIN: CPT | Mod: 25 | Performed by: INTERNAL MEDICINE

## 2021-11-08 SDOH — HEALTH STABILITY: PHYSICAL HEALTH: ON AVERAGE, HOW MANY DAYS PER WEEK DO YOU ENGAGE IN MODERATE TO STRENUOUS EXERCISE (LIKE A BRISK WALK)?: 6 DAYS

## 2021-11-08 SDOH — ECONOMIC STABILITY: FOOD INSECURITY: WITHIN THE PAST 12 MONTHS, THE FOOD YOU BOUGHT JUST DIDN'T LAST AND YOU DIDN'T HAVE MONEY TO GET MORE.: NEVER TRUE

## 2021-11-08 SDOH — ECONOMIC STABILITY: TRANSPORTATION INSECURITY
IN THE PAST 12 MONTHS, HAS THE LACK OF TRANSPORTATION KEPT YOU FROM MEDICAL APPOINTMENTS OR FROM GETTING MEDICATIONS?: NO

## 2021-11-08 SDOH — ECONOMIC STABILITY: INCOME INSECURITY: IN THE LAST 12 MONTHS, WAS THERE A TIME WHEN YOU WERE NOT ABLE TO PAY THE MORTGAGE OR RENT ON TIME?: NO

## 2021-11-08 SDOH — ECONOMIC STABILITY: INCOME INSECURITY: HOW HARD IS IT FOR YOU TO PAY FOR THE VERY BASICS LIKE FOOD, HOUSING, MEDICAL CARE, AND HEATING?: NOT HARD AT ALL

## 2021-11-08 SDOH — ECONOMIC STABILITY: TRANSPORTATION INSECURITY
IN THE PAST 12 MONTHS, HAS LACK OF TRANSPORTATION KEPT YOU FROM MEETINGS, WORK, OR FROM GETTING THINGS NEEDED FOR DAILY LIVING?: NO

## 2021-11-08 SDOH — ECONOMIC STABILITY: FOOD INSECURITY: WITHIN THE PAST 12 MONTHS, YOU WORRIED THAT YOUR FOOD WOULD RUN OUT BEFORE YOU GOT MONEY TO BUY MORE.: NEVER TRUE

## 2021-11-08 SDOH — HEALTH STABILITY: PHYSICAL HEALTH: ON AVERAGE, HOW MANY MINUTES DO YOU ENGAGE IN EXERCISE AT THIS LEVEL?: 20 MIN

## 2021-11-08 ASSESSMENT — ENCOUNTER SYMPTOMS
BREAST MASS: 0
WEAKNESS: 1

## 2021-11-08 ASSESSMENT — ACTIVITIES OF DAILY LIVING (ADL): CURRENT_FUNCTION: NO ASSISTANCE NEEDED

## 2021-11-08 ASSESSMENT — SOCIAL DETERMINANTS OF HEALTH (SDOH)
IN A TYPICAL WEEK, HOW MANY TIMES DO YOU TALK ON THE PHONE WITH FAMILY, FRIENDS, OR NEIGHBORS?: MORE THAN THREE TIMES A WEEK
DO YOU BELONG TO ANY CLUBS OR ORGANIZATIONS SUCH AS CHURCH GROUPS UNIONS, FRATERNAL OR ATHLETIC GROUPS, OR SCHOOL GROUPS?: YES
HOW OFTEN DO YOU ATTEND CHURCH OR RELIGIOUS SERVICES?: NEVER
HOW OFTEN DO YOU GET TOGETHER WITH FRIENDS OR RELATIVES?: TWICE A WEEK

## 2021-11-08 ASSESSMENT — LIFESTYLE VARIABLES
HOW OFTEN DO YOU HAVE A DRINK CONTAINING ALCOHOL: NEVER
HOW MANY STANDARD DRINKS CONTAINING ALCOHOL DO YOU HAVE ON A TYPICAL DAY: PATIENT DECLINED
HOW OFTEN DO YOU HAVE SIX OR MORE DRINKS ON ONE OCCASION: NEVER

## 2021-11-08 ASSESSMENT — MIFFLIN-ST. JEOR: SCORE: 857.47

## 2021-11-08 NOTE — PROGRESS NOTES
Discussed CT with Dr. Rosado  Most recent CT does not image right upper lung completely  He recommended dedicated chest CT  He cannot rule out new lesion or metastatic lesion even when compared to 9/2020 CT at [facility] imaging    I called Kezia and recommend a CT with contrast.  She will call  radiology to schedule

## 2021-11-08 NOTE — PROGRESS NOTES
Assessment & Plan     Chronic congestive heart failure, unspecified heart failure type (H)  Stable, continue follow up with cardiology     Alcohol dependence in remission (H)  Sober for several months now     Mild protein-calorie malnutrition (H)      Malignant neoplasm of esophagus, unspecified location (H)  Reminded to follow up with Dr. Riojas     Malignant neoplasm of lung, unspecified laterality, unspecified part of lung (H)  Check with radiologist to have CT scans compared     Pneumonia due to infectious organism, unspecified laterality, unspecified part of lung  Improved     Benign essential hypertension  Reading a little today     Hyperlipidemia LDL goal <70  On statin therapy ; LDL was 28 2 months ago outside [facility]    High priority for 2019-nCoV vaccine    - COVID-19,PF,PFIZER (12+ Yrs PURPLE LABEL)    Discussion of management or test interpretation with external physician/other qualified healthcare professional/appropriate source - radiology to compare CT's  53 minutes spent on the date of the encounter doing chart review, history and exam, documentation and further activities per the note           Return in about 3 months (around 2/8/2022) for Preventive Visit.  Patient instructed to return to clinic or contact us sooner if symptoms worsen or new symptoms develop.     Mustapha Velásquez MD  [facility] RAPHAEL Mast is a 66 year old who presents for the following health issues     HPI       Hospital Follow-up Visit:    Hospital/Nursing Home/IP Rehab Facility: [facility]  Date of Admission: October 18, 2021  Date of Discharge: October 23, 2021  Reason(s) for Admission: Pneumonia community-acquired      Was your hospitalization related to COVID-19? YES   How are you feeling today? Better  In the past 24 hours have you had shortness of breath when speaking, walking, or climbing stairs? My breathing issues have improved  Do you have a cough? I don't have a  "cough  When is the last time you had a fever greater than 100?   not recently  Are you having any other symptoms? None   Do you have any other stressors you would like to discuss with your provider? No         Was the patient in the ICU or did the patient experience delirium during hospitalization?  No    Problems taking medications regularly:  None  Medication changes since discharge: None  Problems adhering to non-medication therapy:  None    Summary of hospitalization:  [facility] discharge summary reviewed  Diagnostic Tests/Treatments reviewed.  Follow up needed: Needs: Needs cardiothoracic surgery follow-up  Other Healthcare Providers Involved in Patient s Care:         None  Update since discharge: improved. Post Discharge Medication Reconciliation: discharge medications reconciled and changed, per note/orders.  Plan of care communicated with patient          Hospitalized for pneumonia  Went to to TCU  Discharged from TCU 11/2  Saw cardiology since  CTA obtained  CT showed cavitary lesion in right upper lung  Was not compared to 9/2020 CT at [facility]   She feels better   Weights are stable  Sober for nearly 3 months!  Congratulated    Review of Systems         Objective    BP (!) 142/57 (BP Location: Right arm, Cuff Size: Adult Regular)   Pulse 75   Temp 97.8  F (36.6  C) (Tympanic)   Ht 1.549 m (5' 1\")   Wt 39 kg (86 lb)   SpO2 100%   BMI 16.25 kg/m    Body mass index is 16.25 kg/m .  Physical Exam   GENERAL: Thin, chronically ill appearing, unchanged   NECK: no adenopathy, no asymmetry, masses, or scars and thyroid normal to palpation  RESP: lungs clear to auscultation - no rales, rhonchi or wheezes  CV: Heart with regular rate and rhythm.   ABDOMEN: soft, nontender, no hepatosplenomegaly, no masses and bowel sounds normal  MS: no gross musculoskeletal defects noted, no edema  NEURO: Normal strength and tone, mentation intact and speech normal  PSYCH: mentation appears normal, " affect normal/bright

## 2021-11-09 ENCOUNTER — MEDICAL CORRESPONDENCE (OUTPATIENT)
Dept: HEALTH INFORMATION MANAGEMENT | Facility: CLINIC | Age: 66
End: 2021-11-09
Payer: MEDICARE

## 2021-11-16 ENCOUNTER — TELEPHONE (OUTPATIENT)
Dept: FAMILY MEDICINE | Facility: CLINIC | Age: 66
End: 2021-11-16
Payer: MEDICARE

## 2021-11-16 NOTE — TELEPHONE ENCOUNTER
Verbal approval given for the homecare request below. Homecare/Hospice agency to fax orders for provider signature.    PT for lower extermentiy strengthening, balance trainging, home exercise program upgrade.  1 time a week for 5 weeks, with start date of today.    Mindy Velasquez RN

## 2021-11-17 ENCOUNTER — HOSPITAL ENCOUNTER (OUTPATIENT)
Dept: CT IMAGING | Facility: CLINIC | Age: 66
Discharge: HOME | End: 2021-11-17
Attending: INTERNAL MEDICINE | Admitting: INTERNAL MEDICINE
Payer: MEDICARE

## 2021-11-17 DIAGNOSIS — R91.8 LUNG MASS: ICD-10-CM

## 2021-11-17 DIAGNOSIS — C34.90 MALIGNANT NEOPLASM OF LUNG, UNSPECIFIED LATERALITY, UNSPECIFIED PART OF LUNG (H): Primary | ICD-10-CM

## 2021-11-17 PROCEDURE — 250N000011 HC RX IP 250 OP 636: Performed by: INTERNAL MEDICINE

## 2021-11-17 PROCEDURE — 999N000128 HC STATISTIC PERIPHERAL IV START W/O US GUIDANCE

## 2021-11-17 PROCEDURE — 71260 CT THORAX DX C+: CPT

## 2021-11-17 PROCEDURE — 250N000009 HC RX 250: Performed by: INTERNAL MEDICINE

## 2021-11-17 RX ORDER — IOPAMIDOL 755 MG/ML
80 INJECTION, SOLUTION INTRAVASCULAR ONCE
Status: COMPLETED | OUTPATIENT
Start: 2021-11-17 | End: 2021-11-17

## 2021-11-17 RX ADMIN — SODIUM CHLORIDE 70 ML: 900 INJECTION INTRAVENOUS at 11:31

## 2021-11-17 RX ADMIN — IOPAMIDOL 80 ML: 755 INJECTION, SOLUTION INTRAVENOUS at 11:29

## 2021-11-17 NOTE — LETTER
November 18, 2021      Kezia Dixon  [address] [place] [address]        Dear ,    The following letter pertains to your most recent diagnostic tests:       The cavitary lesions in your right upper lung are not new from 2019.  However the walls of the cavities are thicker than those identified 3 years ago.  There is also a new small lung nodule.  As such, the radiologist recommends a follow-up CT scan in a 3-month time interval.  Please call [place] [facility] at [phone] to schedule your CT scan in February.       Sincerely,     Dr. Velásquez       Resulted Orders   CT Chest w Contrast    Narrative    CT CHEST WITH CONTRAST November 17, 2021 11:44 AM     HISTORY: Lung mass.    COMPARISON: PET/CT from September 19, 2018, chest CT from September  10, 2018, limited chest CT from November 2, 2021.    TECHNIQUE: Volumetric helical acquisition of CT images of the chest  from the clavicles to the kidneys were acquired after the  administration of 80ml isovue 370 IV contrast. Radiation dose for this  scan was reduced using automated exposure control, adjustment of the  mA and/or kV according to patient size, or iterative reconstruction  technique.    FINDINGS: Thick-walled cavitary lesion in the right upper lobe  measures 2.6 x 2.0 cm. A cavitary lesion was present in 2018, the  walls were thinner without nodularity. This may be postinflammatory  change. Three month follow-up study recommended to evaluate for  stability. Gastric pull-through changes and minimal pleural fluid on  the right again evident. Moderate to severe emphysema. 7 mm nodule in  the left upper lobe was not present in 2019. This can be reevaluated  on three month follow-up study. Areas of fibrosis and/or atelectasis  noted. No left pleural or pericardial effusion. There are moderate  atherosclerotic changes of the visualized aorta and its branches.  There is no evidence of aortic dissection or aneurysm. No significant  visualized  coronary artery calcifications. No acute findings in the  visualized upper abdomen.      Impression    IMPRESSION:  1. Thick-walled cavity in the right upper lobe measuring 2.6 x 2.0 cm  with some nodularity. A cavitary lesion was present in 2018, but the  walls are considerably more thick today. CT follow-up in three months  recommended to confirm stability.  2. 7 mm nodule left upper lobe, this can be reevaluated in three  months as well. This was not present in 2018.    ANGIE ROSE MD         SYSTEM ID:  [id number]

## 2021-11-18 NOTE — RESULT ENCOUNTER NOTE
The following letter pertains to your most recent diagnostic tests:      The cavitary lesions in your right upper lung are not new from 2019.  However the walls of the cavities are thicker than those identified 3 years ago.  There is also a new small lung nodule.  As such, the radiologist recommends a follow-up CT scan in a 3-month time interval.  Please call [place] [facility] at [phone] to schedule your CT scan in February.      Sincerely,    Dr. Velásquez

## 2021-11-23 ENCOUNTER — MEDICAL CORRESPONDENCE (OUTPATIENT)
Dept: HEALTH INFORMATION MANAGEMENT | Facility: CLINIC | Age: 66
End: 2021-11-23
Payer: MEDICARE

## 2022-01-05 DIAGNOSIS — J31.0 CHRONIC RHINITIS: ICD-10-CM

## 2022-01-05 DIAGNOSIS — I10 BENIGN ESSENTIAL HYPERTENSION: ICD-10-CM

## 2022-01-07 RX ORDER — OMEPRAZOLE 40 MG/1
CAPSULE, DELAYED RELEASE ORAL
Qty: 90 CAPSULE | Refills: 2 | Status: SHIPPED | OUTPATIENT
Start: 2022-01-07 | End: 2022-02-15

## 2022-01-07 RX ORDER — FLUTICASONE PROPIONATE 50 MCG
SPRAY, SUSPENSION (ML) NASAL
Qty: 48 ML | Refills: 2 | Status: SHIPPED | OUTPATIENT
Start: 2022-01-07

## 2022-01-07 NOTE — TELEPHONE ENCOUNTER
Prescription approved per [facility] Refill Protocol.  Dawn BENITES, Triage RN  [facility]

## 2022-01-26 ENCOUNTER — HOSPITAL ENCOUNTER (OUTPATIENT)
Dept: CARDIOLOGY | Facility: CLINIC | Age: 67
Discharge: HOME | End: 2022-01-26
Attending: INTERNAL MEDICINE | Admitting: INTERNAL MEDICINE
Payer: MEDICARE

## 2022-01-26 DIAGNOSIS — I50.22 CHRONIC SYSTOLIC CONGESTIVE HEART FAILURE (H): ICD-10-CM

## 2022-01-26 LAB — LVEF ECHO: NORMAL

## 2022-01-26 PROCEDURE — 93306 TTE W/DOPPLER COMPLETE: CPT | Mod: 26 | Performed by: INTERNAL MEDICINE

## 2022-01-26 PROCEDURE — 93306 TTE W/DOPPLER COMPLETE: CPT

## 2022-01-27 ENCOUNTER — OFFICE VISIT (OUTPATIENT)
Dept: CARDIOLOGY | Facility: CLINIC | Age: 67
End: 2022-01-27
Payer: MEDICARE

## 2022-01-27 VITALS
OXYGEN SATURATION: 100 % | WEIGHT: 90 LBS | DIASTOLIC BLOOD PRESSURE: 80 MMHG | HEIGHT: 62 IN | HEART RATE: 102 BPM | BODY MASS INDEX: 16.56 KG/M2 | SYSTOLIC BLOOD PRESSURE: 144 MMHG

## 2022-01-27 DIAGNOSIS — I50.22 CHRONIC SYSTOLIC CONGESTIVE HEART FAILURE (H): Primary | ICD-10-CM

## 2022-01-27 DIAGNOSIS — F10.21 ALCOHOL DEPENDENCE IN REMISSION (H): ICD-10-CM

## 2022-01-27 DIAGNOSIS — I10 BENIGN ESSENTIAL HYPERTENSION: ICD-10-CM

## 2022-01-27 DIAGNOSIS — I25.10 CORONARY ARTERY DISEASE INVOLVING NATIVE CORONARY ARTERY OF NATIVE HEART WITHOUT ANGINA PECTORIS: ICD-10-CM

## 2022-01-27 DIAGNOSIS — E87.6 HYPOKALEMIA: ICD-10-CM

## 2022-01-27 PROCEDURE — 99214 OFFICE O/P EST MOD 30 MIN: CPT | Performed by: NURSE PRACTITIONER

## 2022-01-27 RX ORDER — METOPROLOL SUCCINATE 50 MG/1
50 TABLET, EXTENDED RELEASE ORAL EVERY MORNING
Qty: 90 TABLET | Refills: 3 | Status: ON HOLD | OUTPATIENT
Start: 2022-01-27 | End: 2022-05-17

## 2022-01-27 RX ORDER — METOPROLOL SUCCINATE 25 MG/1
25 TABLET, EXTENDED RELEASE ORAL EVERY EVENING
Qty: 180 TABLET | Refills: 3 | Status: ON HOLD
Start: 2022-01-27 | End: 2022-05-17

## 2022-01-27 RX ORDER — FUROSEMIDE 40 MG
20 TABLET ORAL EVERY MORNING
Qty: 90 TABLET | Refills: 3 | Status: ON HOLD | OUTPATIENT
Start: 2022-01-27 | End: 2022-06-20

## 2022-01-27 ASSESSMENT — MIFFLIN-ST. JEOR: SCORE: 883.55

## 2022-01-27 NOTE — LETTER
1/27/2022    Mustapha Velásquez MD  [address]  [facility] [address]    RE: Kezia Dixon       Dear Colleague,     I had the pleasure of seeing Kezia Dixon in the [facility].  Cardiology Clinic Progress Note  Kezia Dixon MRN# [id number]   YOB: 1955 Age: 66 year old     Reason For Visit:  3 month follow up   Primary Cardiologist:   Dr. Ring           History of Presenting Illness:      Kezia Dixon is a pleasant 66 year old patient who carries a past medical history significant for she carries a PMH significant for systolic heart failure, esophageal cancer s/p esophagogastrectomy, chemotherapy and radiation in 2016, cardiomyopathy w/ normalized EF on last echo, moderate tricuspid regurgitation, mild to moderate mitral regurgitation, moderate pulmonary hypertension and alcohol abuse.     She was last seen on 10/27/2021 in follow up to a August admission for CHF and acute hypoxemic respiratory failure. She responded well to aggressive diuresis and GDMT.  Follow up echocardiogram showed normal EF 55-60%, grade 1 diastolic dysfunction, normal RV size and function, moderate PH, mod TR and mild-moderate MR. In August, she underwent a abnormal lexiscan showing a partially reversible, large sized, moderate to severe perfusion defect in the anterior, anteroseptal, septal and apex wall. A follow up CTA coronary showed severe proximal right posterior lateral artery stenosis and mild mid LAD artery stenosis.  Total calcium score was 118 placing her in the 8th percentile when compared to age and gender matched control group.  Images were reviewed by Dr. Ring who recommended medical therapy.  She returns to the office today for a 3-month follow-up and review of results.     She is feeling well on a cardiac standpoint, denies chest pain, shortness of breath, PND, orthopnea, presyncope, syncope, edema, heart racing, or palpitations.  She will occasionally notice heart  pounding.  Upon exam, she is a very thin woman with a BMI of 16.73, lungs clear bilaterally, heart rate and rhythm regular, no neck vein distention, abdominal distention, or lower extremity edema.    Yesterday, she had a follow-up echocardiogram that shows a hyperdynamic LV function estimated at 65 to 70%, grade 1 diastolic dysfunction, normal RV size and function, trace to mild mitral regurgitation, elevated RV systolic pressure at 30 mmHg, and mild aortic regurgitation.    Blood pressure is mildly elevated at 144/80, heart rate 102, managed on furosemide, losartan, metoprolol, and spironolactone.  Last BMP showed a sodium of 137, potassium 4.6, BUN 11, creatinine 0.59, and GFR greater than 90    Activity consists of minimal walking in her home  Denies any alcohol use  Follows a strict low-sodium, heart healthy diet  Remains compliant with all medications.                   Assessment and Plan:     Kezia Dixon is a pleasant 66 year old patient who carries a past medical history significant for she carries a PMH significant for systolic heart failure, esophageal cancer s/p esophagogastrectomy, chemotherapy and radiation in 2016, cardiomyopathy w/ normalized EF on last echo, moderate tricuspid regurgitation, mild to moderate mitral regurgitation, moderate pulmonary hypertension and alcohol abuse.  Overall, she is doing well on a cardiac standpoint.  Recent echocardiogram showed a hyperdynamic LV estimated at 65 to 70% with grade 1 diastolic dysfunction with improvement in mitral regurgitation tricuspid regurgitation, and aortic regurgitation, now mild at most.  She is euvolemic upon exam.  She denies any symptoms of heart failure.  I will decrease her Lasix to 20 mg daily, okay to take an additional 20 mg for a weight gain of 3 pounds overnight or 5 pounds in 1 week, worsening shortness of breath, or leg swelling.  Blood pressure and heart rate are mildly elevated.  I will increase her Toprol to 50 mg in the  a.m. and 25 mg in the p.m.  Monitor closely for fatigue, lightheadedness, dizziness, or hypotension.  CTA results were reviewed with verbal understanding.  She denies any ischemic symptoms.  Continue with medical management.  She has a history of both hypokalemia and hyponatremia, follow-up BMP in 1 month.  Encourage exercise and strict heart healthy diet.  Continue with alcohol abstinence.                  Thank you for allowing me to participate in this delightful patient's care. I have recommended she follow up in 6 months with KASSIE or sooner if needed..       Angelique Montanez, APRN CNP         Review of Systems:     Review of Systems:  Skin:  Negative     Eyes:  Positive for glasses  ENT:  Negative    Respiratory:  Negative    Cardiovascular:  Negative    Gastroenterology: Positive for heartburn  Genitourinary:  not assessed    Musculoskeletal:  Negative    Neurologic:  Positive for numbness or tingling of feet  Psychiatric:  Negative excessive stress;sleep disturbances;anxiety;depression  Heme/Lymph/Imm:  Negative    Endocrine:  Negative                Physical Exam:     GEN:  In general, this is a thin female in no acute distress.  HEENT:  Pupils equal, round. Sclerae nonicteric. Clear oropharynx. Mucous membranes moist.  NECK: Supple, no masses appreciated. Trachea midline.  No JVD   C/V:  Regular rate and rhythm, no murmur, rub or gallop. No S3 or RV heave.   RESP: Respirations are unlabored. No use of accessory muscles. Clear to auscultation bilaterally without wheezing, rales, or rhonchi.  GI: Abdomen soft, nontender, nondistended. No HSM appreciated.   EXTREM: No LE edema. No cyanosis or clubbing.  NEURO: Alert and oriented, cooperative. No obvious focal deficits.   PSYCH: Normal affect.  SKIN: Warm and dry. No rashes or petechiae appreciated.          Past Medical History:     Past Medical History:   Diagnosis Date     Alcoholism (H) 10/14/2016     Benign essential hypertension 10/14/2016     Carotid  artery disease (H)     mile plaque 5/7     Chemical dependency (H)     alcohol     Depression with anxiety      Esophageal cancer (H)      Esophageal cancer (H) 3/22/2016     Esophageal mass      GERD (gastroesophageal reflux disease)      Hx of pancreatitis 2003     Hypercholesteremia      Hyperglycemia      Hyperlipemia      Impaired fasting glucose 10/14/2016     Impaired fasting glucose 10/14/2016     Nocturnal leg cramps      Pancreatic pseudocyst 10/14/2016     Pancreatic pseudocyst 10/14/2016     Pancreatitis     with pseudocyst     Pap smear with atypical squamous cells, cannot exclude high grade squamous intraepithelial lesion (ASC-H) 10/14/16    Colpo impression benign, ECC benign. Cotestin in 1 yr.     Shingles      Vasomotor rhinitis               Past Surgical History:     Past Surgical History:   Procedure Laterality Date     AAA REPAIR      splenic artery aneurysm embolization     ABDOMEN SURGERY  2/2/2016     COLONOSCOPY  11/26/2013    Procedure: COMBINED COLONOSCOPY, SINGLE BIOPSY/POLYPECTOMY BY BIOPSY;  COLONOSCOPY (MAC);  Surgeon: Montana Rosa MD;  Location:  GI     COLONOSCOPY  11/26/2013     COLONOSCOPY N/A 10/4/2018    Procedure: COMBINED COLONOSCOPY, SINGLE OR MULTIPLE BIOPSY/POLYPECTOMY BY BIOPSY;  colonoscopy;  Surgeon: Gio Apodaca MD;  Location:  GI     ENT SURGERY       ESOPHAGOGASTRECTOMY N/A 3/22/2016    Procedure: ESOPHAGOGASTRECTOMY;  Surgeon: Alvin Riojas MD;  Location: [facility]     ESOPHAGOSCOPY, GASTROSCOPY, DUODENOSCOPY (EGD), COMBINED N/A 12/22/2015    Procedure: COMBINED ENDOSCOPIC ULTRASOUND, ESOPHAGOSCOPY, GASTROSCOPY, DUODENOSCOPY (EGD), FINE NEEDLE ASPIRATE/BIOPSY;  Surgeon: Danelle Michael MD;  Location:  GI     GASTROSTOMY, INSERT TUBE, COMBINED N/A 2/2/2016    Procedure: COMBINED GASTROSTOMY, INSERT TUBE (OPEN);  Surgeon: Alvin Riojas MD;  Location:  OR     GI SURGERY  12/22/2015     HAND SURGERY      right      HERNIORRHAPHY INCISIONAL (LOCATION) N/A 3/19/2019    Procedure: LAPARSCOPIC  INCISIONAL HERNIA REPAIR WITH MESH, LAPARSCOPIC LYSIS OF ADHENSIONS;  Surgeon: Lamberto Magaña MD;  Location:  OR     INSERT PORT VASCULAR ACCESS N/A 12/28/2015    Procedure: INSERT PORT VASCULAR ACCESS;  Surgeon: Alvin Riojas MD;  Location:  OR     LAPAROSCOPIC CHOLECYSTECTOMY N/A 3/19/2019    Procedure: LAPAROSCOPIC CHOLECYSTECTOMY;  Surgeon: Lamberto Magaña MD;  Location:  OR     LOBECTOMY LUNG Right 10/16/2018    Procedure: LOBECTOMY LUNG;  Surgeon: Alvin Riojas MD;  Location:  OR     REMOVE PORT VASCULAR ACCESS Left 7/22/2016    Procedure: REMOVE PORT VASCULAR ACCESS;  Surgeon: Alvin Riojas MD;  Location:  OR     THORACOTOMY Right 10/16/2018    Procedure: REDO RIGHT THORACTOMY AND RIGHT LOWER LOBECTOMY, PLEURAL LYSIS;  Surgeon: Alvin Riojas MD;  Location:  OR     TONSILLECTOMY       VASCULAR SURGERY                Allergies:   Codeine sulfate, Simvastatin, and Pcn [penicillins]       Data:   All laboratory data reviewed:    LAST CHOLESTEROL:  Lab Results   Component Value Date    CHOL 155 02/05/2020     Lab Results   Component Value Date    HDL 93 02/05/2020     Lab Results   Component Value Date    LDL 42 02/05/2020     Lab Results   Component Value Date    TRIG 98 02/05/2020     No results found for: CHOLHDLRATIO    LAST BMP:  Lab Results   Component Value Date     10/28/2021     02/05/2020      Lab Results   Component Value Date    POTASSIUM 4.6 11/01/2021    POTASSIUM 4.4 02/05/2020     Lab Results   Component Value Date    CHLORIDE 96 10/28/2021    CHLORIDE 99 02/05/2020     Lab Results   Component Value Date    VICENTE 9.2 10/28/2021    VICENTE 8.9 02/05/2020     Lab Results   Component Value Date    CO2 29 10/28/2021    CO2 28 02/05/2020     Lab Results   Component Value Date    BUN 11 10/28/2021    BUN 13 02/05/2020     Lab Results   Component Value  Date    CR 0.59 10/28/2021    CR 0.51 02/05/2020     Lab Results   Component Value Date    GLC 94 10/28/2021    GLC 95 02/05/2020       LAST CBC:  Lab Results   Component Value Date    WBC 9.3 10/20/2021    WBC 7.4 02/05/2020     Lab Results   Component Value Date    RBC 3.27 10/20/2021    RBC 3.77 02/05/2020     Lab Results   Component Value Date    HGB 10.1 10/20/2021    HGB 12.3 02/05/2020     Lab Results   Component Value Date    HCT 31.2 10/20/2021    HCT 35.7 02/05/2020     Lab Results   Component Value Date    MCV 95 10/20/2021    MCV 95 02/05/2020     Lab Results   Component Value Date    MCH 30.9 10/20/2021    MCH 32.6 02/05/2020     Lab Results   Component Value Date    MCHC 32.4 10/20/2021    MCHC 34.5 02/05/2020     Lab Results   Component Value Date    RDW 15.9 10/20/2021    RDW 12.7 02/05/2020     Lab Results   Component Value Date     10/20/2021     02/05/2020     Thank you for allowing me to participate in the care of your patient.      Sincerely,     STEVEN Gutierrez [facility]  cc:   Dennis Ring MD  [address]  [place] [id number]

## 2022-01-27 NOTE — PROGRESS NOTES
Cardiology Clinic Progress Note  Kezia Dixon MRN# [id number]   YOB: 1955 Age: 66 year old     Reason For Visit:  3 month follow up   Primary Cardiologist:   Dr. Ring           History of Presenting Illness:      Kezia Dixon is a pleasant 66 year old patient who carries a past medical history significant for she carries a PMH significant for systolic heart failure, esophageal cancer s/p esophagogastrectomy, chemotherapy and radiation in 2016, cardiomyopathy w/ normalized EF on last echo, moderate tricuspid regurgitation, mild to moderate mitral regurgitation, moderate pulmonary hypertension and alcohol abuse.     She was last seen on 10/27/2021 in follow up to a August admission for CHF and acute hypoxemic respiratory failure. She responded well to aggressive diuresis and GDMT.  Follow up echocardiogram showed normal EF 55-60%, grade 1 diastolic dysfunction, normal RV size and function, moderate PH, mod TR and mild-moderate MR. In August, she underwent a abnormal lexiscan showing a partially reversible, large sized, moderate to severe perfusion defect in the anterior, anteroseptal, septal and apex wall. A follow up CTA coronary showed severe proximal right posterior lateral artery stenosis and mild mid LAD artery stenosis.  Total calcium score was 118 placing her in the 8th percentile when compared to age and gender matched control group.  Images were reviewed by Dr. Ring who recommended medical therapy.  She returns to the office today for a 3-month follow-up and review of results.     She is feeling well on a cardiac standpoint, denies chest pain, shortness of breath, PND, orthopnea, presyncope, syncope, edema, heart racing, or palpitations.  She will occasionally notice heart pounding.  Upon exam, she is a very thin woman with a BMI of 16.73, lungs clear bilaterally, heart rate and rhythm regular, no neck vein distention, abdominal distention, or lower extremity edema.    Yesterday, she  had a follow-up echocardiogram that shows a hyperdynamic LV function estimated at 65 to 70%, grade 1 diastolic dysfunction, normal RV size and function, trace to mild mitral regurgitation, elevated RV systolic pressure at 30 mmHg, and mild aortic regurgitation.    Blood pressure is mildly elevated at 144/80, heart rate 102, managed on furosemide, losartan, metoprolol, and spironolactone.  Last BMP showed a sodium of 137, potassium 4.6, BUN 11, creatinine 0.59, and GFR greater than 90    Activity consists of minimal walking in her home  Denies any alcohol use  Follows a strict low-sodium, heart healthy diet  Remains compliant with all medications.                   Assessment and Plan:     Kezia Dixon is a pleasant 66 year old patient who carries a past medical history significant for she carries a PMH significant for systolic heart failure, esophageal cancer s/p esophagogastrectomy, chemotherapy and radiation in 2016, cardiomyopathy w/ normalized EF on last echo, moderate tricuspid regurgitation, mild to moderate mitral regurgitation, moderate pulmonary hypertension and alcohol abuse.  Overall, she is doing well on a cardiac standpoint.  Recent echocardiogram showed a hyperdynamic LV estimated at 65 to 70% with grade 1 diastolic dysfunction with improvement in mitral regurgitation tricuspid regurgitation, and aortic regurgitation, now mild at most.  She is euvolemic upon exam.  She denies any symptoms of heart failure.  I will decrease her Lasix to 20 mg daily, okay to take an additional 20 mg for a weight gain of 3 pounds overnight or 5 pounds in 1 week, worsening shortness of breath, or leg swelling.  Blood pressure and heart rate are mildly elevated.  I will increase her Toprol to 50 mg in the a.m. and 25 mg in the p.m.  Monitor closely for fatigue, lightheadedness, dizziness, or hypotension.  CTA results were reviewed with verbal understanding.  She denies any ischemic symptoms.  Continue with medical  management.  She has a history of both hypokalemia and hyponatremia, follow-up BMP in 1 month.  Encourage exercise and strict heart healthy diet.  Continue with alcohol abstinence.                  Thank you for allowing me to participate in this delightful patient's care. I have recommended she follow up in 6 months with [facility] or sooner if needed..       Angelique Montanez, APRN CNP         Review of Systems:     Review of Systems:  Skin:  Negative     Eyes:  Positive for glasses  ENT:  Negative    Respiratory:  Negative    Cardiovascular:  Negative    Gastroenterology: Positive for heartburn  Genitourinary:  not assessed    Musculoskeletal:  Negative    Neurologic:  Positive for numbness or tingling of feet  Psychiatric:  Negative excessive stress;sleep disturbances;anxiety;depression  Heme/Lymph/Imm:  Negative    Endocrine:  Negative                Physical Exam:     GEN:  In general, this is a thin female in no acute distress.  HEENT:  Pupils equal, round. Sclerae nonicteric. Clear oropharynx. Mucous membranes moist.  NECK: Supple, no masses appreciated. Trachea midline.  No JVD   C/V:  Regular rate and rhythm, no murmur, rub or gallop. No S3 or RV heave.   RESP: Respirations are unlabored. No use of accessory muscles. Clear to auscultation bilaterally without wheezing, rales, or rhonchi.  GI: Abdomen soft, nontender, nondistended. No HSM appreciated.   EXTREM: No LE edema. No cyanosis or clubbing.  NEURO: Alert and oriented, cooperative. No obvious focal deficits.   PSYCH: Normal affect.  SKIN: Warm and dry. No rashes or petechiae appreciated.          Past Medical History:     Past Medical History:   Diagnosis Date     Alcoholism (H) 10/14/2016     Benign essential hypertension 10/14/2016     Carotid artery disease (H)     mile plaque 5/7     Chemical dependency (H)     alcohol     Depression with anxiety      Esophageal cancer (H)      Esophageal cancer (H) 3/22/2016     Esophageal mass      GERD (gastroesophageal  reflux disease)      Hx of pancreatitis 2003     Hypercholesteremia      Hyperglycemia      Hyperlipemia      Impaired fasting glucose 10/14/2016     Impaired fasting glucose 10/14/2016     Nocturnal leg cramps      Pancreatic pseudocyst 10/14/2016     Pancreatic pseudocyst 10/14/2016     Pancreatitis     with pseudocyst     Pap smear with atypical squamous cells, cannot exclude high grade squamous intraepithelial lesion (ASC-H) 10/14/16    Colpo impression benign, ECC benign. Cotestin in 1 yr.     Shingles      Vasomotor rhinitis               Past Surgical History:     Past Surgical History:   Procedure Laterality Date     AAA REPAIR      splenic artery aneurysm embolization     ABDOMEN SURGERY  2/2/2016     COLONOSCOPY  11/26/2013    Procedure: COMBINED COLONOSCOPY, SINGLE BIOPSY/POLYPECTOMY BY BIOPSY;  COLONOSCOPY (MAC);  Surgeon: Montana Rosa MD;  Location:  GI     COLONOSCOPY  11/26/2013     COLONOSCOPY N/A 10/4/2018    Procedure: COMBINED COLONOSCOPY, SINGLE OR MULTIPLE BIOPSY/POLYPECTOMY BY BIOPSY;  colonoscopy;  Surgeon: Gio Apodaca MD;  Location:  GI     ENT SURGERY       ESOPHAGOGASTRECTOMY N/A 3/22/2016    Procedure: ESOPHAGOGASTRECTOMY;  Surgeon: Alvin Riojas MD;  Location:  OR     ESOPHAGOSCOPY, GASTROSCOPY, DUODENOSCOPY (EGD), COMBINED N/A 12/22/2015    Procedure: COMBINED ENDOSCOPIC ULTRASOUND, ESOPHAGOSCOPY, GASTROSCOPY, DUODENOSCOPY (EGD), FINE NEEDLE ASPIRATE/BIOPSY;  Surgeon: Danelle Michael MD;  Location:  GI     GASTROSTOMY, INSERT TUBE, COMBINED N/A 2/2/2016    Procedure: COMBINED GASTROSTOMY, INSERT TUBE (OPEN);  Surgeon: Alvin Riojas MD;  Location:  OR     GI SURGERY  12/22/2015     HAND SURGERY      right     HERNIORRHAPHY INCISIONAL (LOCATION) N/A 3/19/2019    Procedure: LAPARSCOPIC  INCISIONAL HERNIA REPAIR WITH MESH, LAPARSCOPIC LYSIS OF ADHENSIONS;  Surgeon: Lamberto Magaña MD;  Location:  OR     INSERT PORT  VASCULAR ACCESS N/A 12/28/2015    Procedure: INSERT PORT VASCULAR ACCESS;  Surgeon: Alvin Riojas MD;  Location:  OR     LAPAROSCOPIC CHOLECYSTECTOMY N/A 3/19/2019    Procedure: LAPAROSCOPIC CHOLECYSTECTOMY;  Surgeon: Lamberto Magaña MD;  Location:  OR     LOBECTOMY LUNG Right 10/16/2018    Procedure: LOBECTOMY LUNG;  Surgeon: Alvin Riojas MD;  Location:  OR     REMOVE PORT VASCULAR ACCESS Left 7/22/2016    Procedure: REMOVE PORT VASCULAR ACCESS;  Surgeon: Alvin Riojas MD;  Location:  OR     THORACOTOMY Right 10/16/2018    Procedure: REDO RIGHT THORACTOMY AND RIGHT LOWER LOBECTOMY, PLEURAL LYSIS;  Surgeon: Alvin Riojas MD;  Location:  OR     TONSILLECTOMY       VASCULAR SURGERY                Allergies:   Codeine sulfate, Simvastatin, and Pcn [penicillins]       Data:   All laboratory data reviewed:    LAST CHOLESTEROL:  Lab Results   Component Value Date    CHOL 155 02/05/2020     Lab Results   Component Value Date    HDL 93 02/05/2020     Lab Results   Component Value Date    LDL 42 02/05/2020     Lab Results   Component Value Date    TRIG 98 02/05/2020     No results found for: CHOLHDLRATIO    LAST BMP:  Lab Results   Component Value Date     10/28/2021     02/05/2020      Lab Results   Component Value Date    POTASSIUM 4.6 11/01/2021    POTASSIUM 4.4 02/05/2020     Lab Results   Component Value Date    CHLORIDE 96 10/28/2021    CHLORIDE 99 02/05/2020     Lab Results   Component Value Date    VICENTE 9.2 10/28/2021    VICENTE 8.9 02/05/2020     Lab Results   Component Value Date    CO2 29 10/28/2021    CO2 28 02/05/2020     Lab Results   Component Value Date    BUN 11 10/28/2021    BUN 13 02/05/2020     Lab Results   Component Value Date    CR 0.59 10/28/2021    CR 0.51 02/05/2020     Lab Results   Component Value Date    GLC 94 10/28/2021    GLC 95 02/05/2020       LAST CBC:  Lab Results   Component Value Date    WBC 9.3 10/20/2021    WBC 7.4  02/05/2020     Lab Results   Component Value Date    RBC 3.27 10/20/2021    RBC 3.77 02/05/2020     Lab Results   Component Value Date    HGB 10.1 10/20/2021    HGB 12.3 02/05/2020     Lab Results   Component Value Date    HCT 31.2 10/20/2021    HCT 35.7 02/05/2020     Lab Results   Component Value Date    MCV 95 10/20/2021    MCV 95 02/05/2020     Lab Results   Component Value Date    MCH 30.9 10/20/2021    MCH 32.6 02/05/2020     Lab Results   Component Value Date    MCHC 32.4 10/20/2021    MCHC 34.5 02/05/2020     Lab Results   Component Value Date    RDW 15.9 10/20/2021    RDW 12.7 02/05/2020     Lab Results   Component Value Date     10/20/2021     02/05/2020

## 2022-01-27 NOTE — PATIENT INSTRUCTIONS
Thanks for participating in a office visit with the [facility] Heart clinic today.    Doing well on a cardiac standpoint  Reviewed results of echocardiogram and CTA coronary.   No symptoms of chest pain or shortness of breath  Weights stable.   Recommend decreasing lasix to 20 mg daily. Ok to take additional 20 mg if needed for shortness of breath, weight gain of 3 lbs overnight or 5 lbs in a week, or leg swelling.   Blood pressure elevated with elevated pulse rate.   Increase metoprolol to 50 mg in the AM and 25 mg in the PM  Follow up BMP in 1 month  Encourage exercise and heart healthy diet.     Follow up in 6 months with [facility] or sooner if needed     Please call my nurse at [phone] with any questions or concerns.    Scheduling phone number: [phone]  Reminder: Please bring in all current medications, over the counter supplements and vitamin bottles to your next appointment.

## 2022-02-13 ENCOUNTER — HEALTH MAINTENANCE LETTER (OUTPATIENT)
Age: 67
End: 2022-02-13

## 2022-02-17 PROBLEM — G89.4 CHRONIC PAIN SYNDROME: Status: ACTIVE | Noted: 2018-12-19

## 2022-02-21 ENCOUNTER — TRANSFERRED RECORDS (OUTPATIENT)
Dept: HEALTH INFORMATION MANAGEMENT | Facility: CLINIC | Age: 67
End: 2022-02-21
Payer: MEDICARE

## 2022-02-28 ENCOUNTER — LAB (OUTPATIENT)
Dept: LAB | Facility: CLINIC | Age: 67
End: 2022-02-28
Payer: MEDICARE

## 2022-02-28 DIAGNOSIS — I25.10 CORONARY ARTERY DISEASE INVOLVING NATIVE CORONARY ARTERY OF NATIVE HEART WITHOUT ANGINA PECTORIS: ICD-10-CM

## 2022-02-28 DIAGNOSIS — I50.22 CHRONIC SYSTOLIC CONGESTIVE HEART FAILURE (H): ICD-10-CM

## 2022-02-28 LAB
ANION GAP SERPL CALCULATED.3IONS-SCNC: 10 MMOL/L (ref 3–14)
BUN SERPL-MCNC: 13 MG/DL (ref 7–30)
CALCIUM SERPL-MCNC: 9.2 MG/DL (ref 8.5–10.1)
CHLORIDE BLD-SCNC: 98 MMOL/L (ref 94–109)
CO2 SERPL-SCNC: 27 MMOL/L (ref 20–32)
CREAT SERPL-MCNC: 0.57 MG/DL (ref 0.52–1.04)
GFR SERPL CREATININE-BSD FRML MDRD: >90 ML/MIN/1.73M2
GLUCOSE BLD-MCNC: 229 MG/DL (ref 70–99)
POTASSIUM BLD-SCNC: 3.3 MMOL/L (ref 3.4–5.3)
SODIUM SERPL-SCNC: 135 MMOL/L (ref 133–144)

## 2022-02-28 PROCEDURE — 80048 BASIC METABOLIC PNL TOTAL CA: CPT | Performed by: NURSE PRACTITIONER

## 2022-02-28 PROCEDURE — 36415 COLL VENOUS BLD VENIPUNCTURE: CPT | Performed by: NURSE PRACTITIONER

## 2022-05-14 ENCOUNTER — HOSPITAL ENCOUNTER (OUTPATIENT)
Facility: CLINIC | Age: 67
Setting detail: OBSERVATION
Discharge: NURSING, REHAB OR LONG-TERM CARE | End: 2022-05-18
Attending: EMERGENCY MEDICINE | Admitting: HOSPITALIST
Payer: MEDICARE

## 2022-05-14 DIAGNOSIS — R63.8 DECREASED ORAL INTAKE: ICD-10-CM

## 2022-05-14 DIAGNOSIS — F10.21 ALCOHOL DEPENDENCE IN REMISSION (H): Primary | ICD-10-CM

## 2022-05-14 DIAGNOSIS — F10.21 ALCOHOL INTOXICATION IN RELAPSED ALCOHOLIC (H): ICD-10-CM

## 2022-05-14 DIAGNOSIS — K21.9 GASTROESOPHAGEAL REFLUX DISEASE, UNSPECIFIED WHETHER ESOPHAGITIS PRESENT: ICD-10-CM

## 2022-05-14 DIAGNOSIS — M62.81 GENERALIZED MUSCLE WEAKNESS: ICD-10-CM

## 2022-05-14 DIAGNOSIS — K59.00 CONSTIPATION, UNSPECIFIED CONSTIPATION TYPE: ICD-10-CM

## 2022-05-14 DIAGNOSIS — I50.22 CHRONIC SYSTOLIC CONGESTIVE HEART FAILURE (H): ICD-10-CM

## 2022-05-14 LAB
ALBUMIN SERPL-MCNC: 3.7 G/DL (ref 3.4–5)
ALP SERPL-CCNC: 109 U/L (ref 40–150)
ALT SERPL W P-5'-P-CCNC: 77 U/L (ref 0–50)
ANION GAP SERPL CALCULATED.3IONS-SCNC: 10 MMOL/L (ref 3–14)
AST SERPL W P-5'-P-CCNC: 108 U/L (ref 0–45)
ATRIAL RATE - MUSE: 83 BPM
BASOPHILS # BLD AUTO: 0 10E3/UL (ref 0–0.2)
BASOPHILS NFR BLD AUTO: 1 %
BILIRUB SERPL-MCNC: 1.1 MG/DL (ref 0.2–1.3)
BUN SERPL-MCNC: 10 MG/DL (ref 7–30)
CALCIUM SERPL-MCNC: 9 MG/DL (ref 8.5–10.1)
CHLORIDE BLD-SCNC: 100 MMOL/L (ref 94–109)
CO2 SERPL-SCNC: 26 MMOL/L (ref 20–32)
CREAT SERPL-MCNC: 0.44 MG/DL (ref 0.52–1.04)
DIASTOLIC BLOOD PRESSURE - MUSE: NORMAL MMHG
EOSINOPHIL # BLD AUTO: 0 10E3/UL (ref 0–0.7)
EOSINOPHIL NFR BLD AUTO: 1 %
ERYTHROCYTE [DISTWIDTH] IN BLOOD BY AUTOMATED COUNT: 14.4 % (ref 10–15)
ETHANOL SERPL-MCNC: 0.23 G/DL
GFR SERPL CREATININE-BSD FRML MDRD: >90 ML/MIN/1.73M2
GLUCOSE BLD-MCNC: 98 MG/DL (ref 70–99)
HCT VFR BLD AUTO: 39.7 % (ref 35–47)
HGB BLD-MCNC: 13.5 G/DL (ref 11.7–15.7)
HOLD SPECIMEN: NORMAL
IMM GRANULOCYTES # BLD: 0 10E3/UL
IMM GRANULOCYTES NFR BLD: 1 %
INTERPRETATION ECG - MUSE: NORMAL
LIPASE SERPL-CCNC: 69 U/L (ref 73–393)
LYMPHOCYTES # BLD AUTO: 1.1 10E3/UL (ref 0.8–5.3)
LYMPHOCYTES NFR BLD AUTO: 25 %
MAGNESIUM SERPL-MCNC: 1.9 MG/DL (ref 1.6–2.3)
MCH RBC QN AUTO: 31.7 PG (ref 26.5–33)
MCHC RBC AUTO-ENTMCNC: 34 G/DL (ref 31.5–36.5)
MCV RBC AUTO: 93 FL (ref 78–100)
MONOCYTES # BLD AUTO: 0.3 10E3/UL (ref 0–1.3)
MONOCYTES NFR BLD AUTO: 8 %
NEUTROPHILS # BLD AUTO: 2.8 10E3/UL (ref 1.6–8.3)
NEUTROPHILS NFR BLD AUTO: 64 %
NRBC # BLD AUTO: 0 10E3/UL
NRBC BLD AUTO-RTO: 0 /100
P AXIS - MUSE: 79 DEGREES
PLATELET # BLD AUTO: 163 10E3/UL (ref 150–450)
POTASSIUM BLD-SCNC: 5.1 MMOL/L (ref 3.4–5.3)
PR INTERVAL - MUSE: 132 MS
PROT SERPL-MCNC: 7 G/DL (ref 6.8–8.8)
QRS DURATION - MUSE: 74 MS
QT - MUSE: 394 MS
QTC - MUSE: 462 MS
R AXIS - MUSE: 90 DEGREES
RBC # BLD AUTO: 4.26 10E6/UL (ref 3.8–5.2)
SODIUM SERPL-SCNC: 136 MMOL/L (ref 133–144)
SYSTOLIC BLOOD PRESSURE - MUSE: NORMAL MMHG
T AXIS - MUSE: 76 DEGREES
VENTRICULAR RATE- MUSE: 83 BPM
WBC # BLD AUTO: 4.3 10E3/UL (ref 4–11)

## 2022-05-14 PROCEDURE — 83735 ASSAY OF MAGNESIUM: CPT | Performed by: EMERGENCY MEDICINE

## 2022-05-14 PROCEDURE — 96365 THER/PROPH/DIAG IV INF INIT: CPT

## 2022-05-14 PROCEDURE — 99285 EMERGENCY DEPT VISIT HI MDM: CPT | Mod: 25

## 2022-05-14 PROCEDURE — 85025 COMPLETE CBC W/AUTO DIFF WBC: CPT | Performed by: EMERGENCY MEDICINE

## 2022-05-14 PROCEDURE — C9803 HOPD COVID-19 SPEC COLLECT: HCPCS

## 2022-05-14 PROCEDURE — 83690 ASSAY OF LIPASE: CPT | Performed by: EMERGENCY MEDICINE

## 2022-05-14 PROCEDURE — 93005 ELECTROCARDIOGRAM TRACING: CPT

## 2022-05-14 PROCEDURE — 96366 THER/PROPH/DIAG IV INF ADDON: CPT

## 2022-05-14 PROCEDURE — 80053 COMPREHEN METABOLIC PANEL: CPT | Performed by: EMERGENCY MEDICINE

## 2022-05-14 PROCEDURE — 36415 COLL VENOUS BLD VENIPUNCTURE: CPT | Performed by: EMERGENCY MEDICINE

## 2022-05-14 PROCEDURE — 96375 TX/PRO/DX INJ NEW DRUG ADDON: CPT

## 2022-05-14 PROCEDURE — 258N000003 HC RX IP 258 OP 636: Performed by: EMERGENCY MEDICINE

## 2022-05-14 PROCEDURE — 250N000013 HC RX MED GY IP 250 OP 250 PS 637: Performed by: EMERGENCY MEDICINE

## 2022-05-14 PROCEDURE — 84100 ASSAY OF PHOSPHORUS: CPT | Performed by: HOSPITALIST

## 2022-05-14 PROCEDURE — 250N000009 HC RX 250: Performed by: EMERGENCY MEDICINE

## 2022-05-14 PROCEDURE — 250N000011 HC RX IP 250 OP 636: Performed by: EMERGENCY MEDICINE

## 2022-05-14 PROCEDURE — 82077 ASSAY SPEC XCP UR&BREATH IA: CPT | Performed by: EMERGENCY MEDICINE

## 2022-05-14 RX ORDER — DIAZEPAM 5 MG
10 TABLET ORAL ONCE
Status: COMPLETED | OUTPATIENT
Start: 2022-05-14 | End: 2022-05-14

## 2022-05-14 RX ADMIN — SODIUM CHLORIDE 1000 ML: 9 INJECTION, SOLUTION INTRAVENOUS at 22:56

## 2022-05-14 RX ADMIN — THIAMINE HYDROCHLORIDE: 100 INJECTION, SOLUTION INTRAMUSCULAR; INTRAVENOUS at 23:27

## 2022-05-14 RX ADMIN — DIAZEPAM 10 MG: 5 TABLET ORAL at 22:32

## 2022-05-15 LAB
FOLATE SERPL-MCNC: >100 NG/ML
MAGNESIUM SERPL-MCNC: 1.7 MG/DL (ref 1.6–2.3)
PHOSPHATE SERPL-MCNC: 3.7 MG/DL (ref 2.5–4.5)
SARS-COV-2 RNA RESP QL NAA+PROBE: NEGATIVE
VIT B12 SERPL-MCNC: 634 PG/ML (ref 193–986)

## 2022-05-15 PROCEDURE — 36415 COLL VENOUS BLD VENIPUNCTURE: CPT | Performed by: HOSPITALIST

## 2022-05-15 PROCEDURE — 250N000009 HC RX 250: Performed by: HOSPITALIST

## 2022-05-15 PROCEDURE — 99220 PR INITIAL OBSERVATION CARE,LEVEL III: CPT | Performed by: HOSPITALIST

## 2022-05-15 PROCEDURE — 258N000003 HC RX IP 258 OP 636: Performed by: HOSPITALIST

## 2022-05-15 PROCEDURE — 83735 ASSAY OF MAGNESIUM: CPT | Performed by: HOSPITALIST

## 2022-05-15 PROCEDURE — 250N000013 HC RX MED GY IP 250 OP 250 PS 637: Performed by: HOSPITALIST

## 2022-05-15 PROCEDURE — 82746 ASSAY OF FOLIC ACID SERUM: CPT | Performed by: HOSPITALIST

## 2022-05-15 PROCEDURE — G0378 HOSPITAL OBSERVATION PER HR: HCPCS

## 2022-05-15 PROCEDURE — 250N000011 HC RX IP 250 OP 636: Performed by: HOSPITALIST

## 2022-05-15 PROCEDURE — 82607 VITAMIN B-12: CPT | Performed by: HOSPITALIST

## 2022-05-15 PROCEDURE — 250N000011 HC RX IP 250 OP 636: Performed by: EMERGENCY MEDICINE

## 2022-05-15 PROCEDURE — 250N000013 HC RX MED GY IP 250 OP 250 PS 637: Performed by: EMERGENCY MEDICINE

## 2022-05-15 PROCEDURE — U0005 INFEC AGEN DETEC AMPLI PROBE: HCPCS | Performed by: EMERGENCY MEDICINE

## 2022-05-15 RX ORDER — DIAZEPAM 10 MG/2ML
5-10 INJECTION, SOLUTION INTRAMUSCULAR; INTRAVENOUS EVERY 30 MIN PRN
Status: DISCONTINUED | OUTPATIENT
Start: 2022-05-15 | End: 2022-05-17

## 2022-05-15 RX ORDER — FOLIC ACID 1 MG/1
1 TABLET ORAL DAILY
Status: DISCONTINUED | OUTPATIENT
Start: 2022-05-16 | End: 2022-05-18 | Stop reason: HOSPADM

## 2022-05-15 RX ORDER — IBUPROFEN 200 MG
200-400 TABLET ORAL EVERY 6 HOURS PRN
Status: DISCONTINUED | OUTPATIENT
Start: 2022-05-15 | End: 2022-05-18 | Stop reason: HOSPADM

## 2022-05-15 RX ORDER — ONDANSETRON 4 MG/1
4-8 TABLET, ORALLY DISINTEGRATING ORAL EVERY 8 HOURS PRN
Qty: 12 TABLET | Refills: 0 | Status: SHIPPED | OUTPATIENT
Start: 2022-05-15 | End: 2022-05-18

## 2022-05-15 RX ORDER — GABAPENTIN 600 MG/1
1200 TABLET ORAL ONCE
Status: COMPLETED | OUTPATIENT
Start: 2022-05-15 | End: 2022-05-15

## 2022-05-15 RX ORDER — ACETAMINOPHEN 325 MG/1
650 TABLET ORAL EVERY 6 HOURS PRN
Status: DISCONTINUED | OUTPATIENT
Start: 2022-05-15 | End: 2022-05-18 | Stop reason: HOSPADM

## 2022-05-15 RX ORDER — GABAPENTIN 300 MG/1
300 CAPSULE ORAL EVERY 8 HOURS
Status: DISCONTINUED | OUTPATIENT
Start: 2022-05-20 | End: 2022-05-18 | Stop reason: HOSPADM

## 2022-05-15 RX ORDER — FUROSEMIDE 20 MG
20 TABLET ORAL EVERY MORNING
Status: DISCONTINUED | OUTPATIENT
Start: 2022-05-15 | End: 2022-05-18 | Stop reason: HOSPADM

## 2022-05-15 RX ORDER — CLONIDINE HYDROCHLORIDE 0.1 MG/1
0.1 TABLET ORAL EVERY 8 HOURS
Status: DISCONTINUED | OUTPATIENT
Start: 2022-05-15 | End: 2022-05-17

## 2022-05-15 RX ORDER — HYDRALAZINE HYDROCHLORIDE 10 MG/1
10 TABLET, FILM COATED ORAL EVERY 6 HOURS PRN
Status: DISCONTINUED | OUTPATIENT
Start: 2022-05-15 | End: 2022-05-18 | Stop reason: HOSPADM

## 2022-05-15 RX ORDER — NALOXONE HYDROCHLORIDE 0.4 MG/ML
0.2 INJECTION, SOLUTION INTRAMUSCULAR; INTRAVENOUS; SUBCUTANEOUS
Status: DISCONTINUED | OUTPATIENT
Start: 2022-05-15 | End: 2022-05-18 | Stop reason: HOSPADM

## 2022-05-15 RX ORDER — GABAPENTIN 100 MG/1
100 CAPSULE ORAL EVERY 8 HOURS
Status: DISCONTINUED | OUTPATIENT
Start: 2022-05-22 | End: 2022-05-18 | Stop reason: HOSPADM

## 2022-05-15 RX ORDER — ONDANSETRON 4 MG/1
4 TABLET, ORALLY DISINTEGRATING ORAL ONCE
Status: COMPLETED | OUTPATIENT
Start: 2022-05-15 | End: 2022-05-15

## 2022-05-15 RX ORDER — FOLIC ACID 1 MG/1
1 TABLET ORAL DAILY
Status: DISCONTINUED | OUTPATIENT
Start: 2022-05-15 | End: 2022-05-15

## 2022-05-15 RX ORDER — OLANZAPINE 5 MG/1
5-10 TABLET, ORALLY DISINTEGRATING ORAL EVERY 6 HOURS PRN
Status: DISCONTINUED | OUTPATIENT
Start: 2022-05-15 | End: 2022-05-18 | Stop reason: HOSPADM

## 2022-05-15 RX ORDER — DIAZEPAM 5 MG
5 TABLET ORAL ONCE
Status: COMPLETED | OUTPATIENT
Start: 2022-05-15 | End: 2022-05-15

## 2022-05-15 RX ORDER — GABAPENTIN 300 MG/1
600 CAPSULE ORAL EVERY 8 HOURS
Status: DISCONTINUED | OUTPATIENT
Start: 2022-05-18 | End: 2022-05-18 | Stop reason: HOSPADM

## 2022-05-15 RX ORDER — ONDANSETRON 4 MG/1
4 TABLET, ORALLY DISINTEGRATING ORAL EVERY 6 HOURS PRN
Status: DISCONTINUED | OUTPATIENT
Start: 2022-05-15 | End: 2022-05-18 | Stop reason: HOSPADM

## 2022-05-15 RX ORDER — HYDROMORPHONE HCL IN WATER/PF 6 MG/30 ML
0.2 PATIENT CONTROLLED ANALGESIA SYRINGE INTRAVENOUS
Status: DISCONTINUED | OUTPATIENT
Start: 2022-05-15 | End: 2022-05-18 | Stop reason: HOSPADM

## 2022-05-15 RX ORDER — SPIRONOLACTONE 25 MG
12.5 TABLET ORAL EVERY MORNING
Status: DISCONTINUED | OUTPATIENT
Start: 2022-05-15 | End: 2022-05-18 | Stop reason: HOSPADM

## 2022-05-15 RX ORDER — ONDANSETRON 2 MG/ML
4 INJECTION INTRAMUSCULAR; INTRAVENOUS EVERY 6 HOURS PRN
Status: DISCONTINUED | OUTPATIENT
Start: 2022-05-15 | End: 2022-05-18 | Stop reason: HOSPADM

## 2022-05-15 RX ORDER — GABAPENTIN 300 MG/1
900 CAPSULE ORAL EVERY 8 HOURS
Status: COMPLETED | OUTPATIENT
Start: 2022-05-15 | End: 2022-05-18

## 2022-05-15 RX ORDER — METOPROLOL SUCCINATE 25 MG/1
25 TABLET, EXTENDED RELEASE ORAL EVERY EVENING
Status: DISCONTINUED | OUTPATIENT
Start: 2022-05-15 | End: 2022-05-17

## 2022-05-15 RX ORDER — LIDOCAINE 40 MG/G
CREAM TOPICAL
Status: DISCONTINUED | OUTPATIENT
Start: 2022-05-15 | End: 2022-05-18 | Stop reason: HOSPADM

## 2022-05-15 RX ORDER — MULTIPLE VITAMINS W/ MINERALS TAB 9MG-400MCG
1 TAB ORAL DAILY
Status: DISCONTINUED | OUTPATIENT
Start: 2022-05-16 | End: 2022-05-18 | Stop reason: HOSPADM

## 2022-05-15 RX ORDER — ASPIRIN 81 MG/1
81 TABLET ORAL DAILY
Status: DISCONTINUED | OUTPATIENT
Start: 2022-05-15 | End: 2022-05-18 | Stop reason: HOSPADM

## 2022-05-15 RX ORDER — HALOPERIDOL 5 MG/ML
2.5-5 INJECTION INTRAMUSCULAR EVERY 6 HOURS PRN
Status: DISCONTINUED | OUTPATIENT
Start: 2022-05-15 | End: 2022-05-18 | Stop reason: HOSPADM

## 2022-05-15 RX ORDER — NALOXONE HYDROCHLORIDE 0.4 MG/ML
0.4 INJECTION, SOLUTION INTRAMUSCULAR; INTRAVENOUS; SUBCUTANEOUS
Status: DISCONTINUED | OUTPATIENT
Start: 2022-05-15 | End: 2022-05-18 | Stop reason: HOSPADM

## 2022-05-15 RX ORDER — FLUMAZENIL 0.1 MG/ML
0.2 INJECTION, SOLUTION INTRAVENOUS
Status: DISCONTINUED | OUTPATIENT
Start: 2022-05-15 | End: 2022-05-18 | Stop reason: HOSPADM

## 2022-05-15 RX ORDER — IBUPROFEN 200 MG
400 TABLET ORAL EVERY 4 HOURS PRN
COMMUNITY

## 2022-05-15 RX ORDER — METOPROLOL SUCCINATE 50 MG/1
50 TABLET, EXTENDED RELEASE ORAL EVERY MORNING
Status: DISCONTINUED | OUTPATIENT
Start: 2022-05-15 | End: 2022-05-17

## 2022-05-15 RX ORDER — ACETAMINOPHEN 650 MG/1
650 SUPPOSITORY RECTAL EVERY 6 HOURS PRN
Status: DISCONTINUED | OUTPATIENT
Start: 2022-05-15 | End: 2022-05-18 | Stop reason: HOSPADM

## 2022-05-15 RX ORDER — DIAZEPAM 5 MG
10 TABLET ORAL EVERY 30 MIN PRN
Status: DISCONTINUED | OUTPATIENT
Start: 2022-05-15 | End: 2022-05-17

## 2022-05-15 RX ORDER — FLUTICASONE PROPIONATE 50 MCG
2 SPRAY, SUSPENSION (ML) NASAL DAILY
Status: DISCONTINUED | OUTPATIENT
Start: 2022-05-15 | End: 2022-05-18 | Stop reason: HOSPADM

## 2022-05-15 RX ORDER — GABAPENTIN 300 MG/1
CAPSULE ORAL
Qty: 27 CAPSULE | Refills: 0 | Status: SHIPPED | OUTPATIENT
Start: 2022-05-15 | End: 2022-05-16

## 2022-05-15 RX ADMIN — GABAPENTIN 900 MG: 300 CAPSULE ORAL at 18:15

## 2022-05-15 RX ADMIN — OMEPRAZOLE 40 MG: 20 CAPSULE, DELAYED RELEASE ORAL at 12:54

## 2022-05-15 RX ADMIN — CLONIDINE HYDROCHLORIDE 0.1 MG: 0.1 TABLET ORAL at 12:53

## 2022-05-15 RX ADMIN — ONDANSETRON 4 MG: 4 TABLET, ORALLY DISINTEGRATING ORAL at 18:14

## 2022-05-15 RX ADMIN — ASPIRIN 81 MG: 81 TABLET, COATED ORAL at 12:54

## 2022-05-15 RX ADMIN — DIAZEPAM 10 MG: 5 INJECTION INTRAMUSCULAR; INTRAVENOUS at 03:54

## 2022-05-15 RX ADMIN — GABAPENTIN 1200 MG: 600 TABLET, FILM COATED ORAL at 04:00

## 2022-05-15 RX ADMIN — THIAMINE HYDROCHLORIDE: 100 INJECTION, SOLUTION INTRAMUSCULAR; INTRAVENOUS at 03:45

## 2022-05-15 RX ADMIN — ACETAMINOPHEN 325 MG: 325 TABLET ORAL at 07:35

## 2022-05-15 RX ADMIN — CLONIDINE HYDROCHLORIDE 0.1 MG: 0.1 TABLET ORAL at 18:14

## 2022-05-15 RX ADMIN — CLONIDINE HYDROCHLORIDE 0.1 MG: 0.1 TABLET ORAL at 04:00

## 2022-05-15 RX ADMIN — IBUPROFEN 400 MG: 200 TABLET, FILM COATED ORAL at 19:22

## 2022-05-15 RX ADMIN — FUROSEMIDE 20 MG: 20 TABLET ORAL at 12:54

## 2022-05-15 RX ADMIN — ONDANSETRON 4 MG: 2 INJECTION INTRAMUSCULAR; INTRAVENOUS at 03:45

## 2022-05-15 RX ADMIN — METOPROLOL SUCCINATE 50 MG: 50 TABLET, EXTENDED RELEASE ORAL at 12:54

## 2022-05-15 RX ADMIN — DIAZEPAM 5 MG: 5 TABLET ORAL at 00:40

## 2022-05-15 RX ADMIN — LOSARTAN POTASSIUM 12.5 MG: 25 TABLET, FILM COATED ORAL at 12:54

## 2022-05-15 RX ADMIN — FLUTICASONE PROPIONATE 2 SPRAY: 50 SPRAY, METERED NASAL at 12:54

## 2022-05-15 RX ADMIN — SPIRONOLACTONE 12.5 MG: 25 TABLET ORAL at 12:54

## 2022-05-15 RX ADMIN — ONDANSETRON 4 MG: 4 TABLET, ORALLY DISINTEGRATING ORAL at 00:19

## 2022-05-15 RX ADMIN — GABAPENTIN 900 MG: 300 CAPSULE ORAL at 10:25

## 2022-05-15 RX ADMIN — ONDANSETRON 4 MG: 4 TABLET, ORALLY DISINTEGRATING ORAL at 10:24

## 2022-05-15 NOTE — ED NOTES
"[facility]  ED Nurse Handoff Report    ED Chief complaint: Generalized Weakness and de      ED Diagnosis:   Final diagnoses:   Alcohol intoxication in relapsed alcoholic (H)   Generalized muscle weakness   Decreased oral intake       Code Status: Full Code    Allergies:   Allergies   Allergen Reactions     Codeine Sulfate Nausea     Simvastatin Cramps     Leg cramps     Pcn [Penicillins] Rash       Patient Story: pt reports decreased appetite. Pt hasnt been eating for the past 3-4 days. Pt has hx of ETOH abuse. Pt reports drinking in the last few days. Pt c/o weakness. Last drink was at 1900. Pt reports she has been drinking 2-3 drinks a day. Pt reports feeling shaky. Pt denies feeling suicidal  Focused Assessment:  Weakness, shaky    Treatments and/or interventions provided: see MAR  Patient's response to treatments and/or interventions:     To be done/followed up on inpatient unit:      Does this patient have any cognitive concerns?: alert and oriented    Activity level - Baseline/Home:  Independent  Activity Level - Current:   Stand with Assist    Patient's Preferred language: English   Needed?: No    Isolation: None  Infection: Not Applicable  Patient tested for COVID 19 prior to admission: YES  Bariatric?: No    Vital Signs:   Vitals:    05/14/22 2207 05/14/22 2351   BP: (!) 193/79    Pulse: 93    Resp: 16 16   Temp: 98.1  F (36.7  C)    TempSrc: Oral    SpO2: 93% 99%   Weight: 38.6 kg (85 lb)    Height: 1.549 m (5' 1\")        Cardiac Rhythm:     Was the PSS-3 completed:   Yes  What interventions are required if any?               Family Comments:   OBS brochure/video discussed/provided to patient/family: Yes              Name of person given brochure if not patient:               Relationship to patient:     For the majority of the shift this patient's behavior was Green.   Behavioral interventions performed were .    ED NURSE PHONE NUMBER: [phone]       "

## 2022-05-15 NOTE — PHARMACY-ADMISSION MEDICATION HISTORY
Pharmacy Medication History  Admission medication history interview status for the 5/14/2022  admission is complete. See EPIC admission navigator for prior to admission medications     Location of Interview: Patient room  Medication history sources: Patient and Surescripts    Significant changes made to the medication list:  none    In the past week, patient estimated taking medication this percent of the time: greater than 90%    Medication reconciliation completed by provider prior to medication history? No    Time spent in this activity: 20 minutes    Prior to Admission medications    Medication Sig Last Dose Taking? Auth Provider   aspirin (ASA) 81 MG EC tablet Take 1 tablet (81 mg) by mouth daily 5/14/2022 at Unknown time Yes Mustapha Velásquez MD   atorvastatin (LIPITOR) 40 MG tablet Take 1 tablet (40 mg) by mouth daily 5/14/2022 at Unknown time Yes Mustapha Velásquez MD   fluticasone (FLONASE) 50 MCG/ACT nasal spray INSTILL 2 SPRAYS INTO BOTH NOSTRILS DAILY. 5/14/2022 at Unknown time Yes Mustapha Velásquez MD   folic acid (FOLVITE) 1 MG tablet Take 1 tablet (1 mg) by mouth daily 5/14/2022 at Unknown time Yes Mustapha Velásquez MD   furosemide (LASIX) 40 MG tablet Take 0.5 tablets (20 mg) by mouth every morning Ok to take additional 0.5 tab for worsening shortness of breath, leg swelling or weight gain. 5/14/2022 at Unknown time Yes Angelique Montanez APRN CNP   gabapentin (NEURONTIN) 300 MG capsule 600mg 3 times a day for 3 days then 300mg 3 times a day for 3 days  Yes Cha Ames MD   ibuprofen (ADVIL/MOTRIN) 200 MG tablet Take 400 mg by mouth every 4 hours as needed for mild pain as needed Yes Unknown, Entered By History   losartan (COZAAR) 25 MG tablet Take 0.5 tablets (12.5 mg) by mouth daily 5/14/2022 at Unknown time Yes Mustapha Velásquez MD   metoprolol succinate ER (TOPROL-XL) 25 MG 24 hr tablet Take 1 tablet (25 mg) by mouth every evening 5/14/2022 at Unknown time Yes Angelique Montanez APRN CNP    metoprolol succinate ER (TOPROL-XL) 50 MG 24 hr tablet Take 1 tablet (50 mg) by mouth every morning 5/14/2022 at Unknown time Yes Angelique Montanez APRN CNP   omeprazole (PRILOSEC) 40 MG DR capsule TAKE 1 CAPSULE BY MOUTH EVERY DAY 5/14/2022 at Unknown time Yes Mustapha Velásquez MD   ondansetron (ZOFRAN ODT) 4 MG ODT tab Take 1-2 tablets (4-8 mg) by mouth every 8 hours as needed for nausea  Yes Cha Ames MD   thiamine (B-1) 100 MG tablet Take 1 tablet (100 mg) by mouth daily 5/14/2022 at Unknown time Yes Mustapha Velásquez MD   spironolactone (ALDACTONE) 25 MG tablet Take 0.5 tablets (12.5 mg) by mouth every morning   Mustapha Velásquez MD       The information provided in this note is only as accurate as the sources available at the time of update(s)

## 2022-05-15 NOTE — PROGRESS NOTES
"PRIMARY DIAGNOSIS: \"GENERIC\" NURSING  OUTPATIENT/OBSERVATION GOALS TO BE MET BEFORE DISCHARGE:  1. ADLs back to baseline: No    2. Activity and level of assistance: Up with standby assistance.    3. Pain status: No improvement noted. Consider adjustment in pain regimen.    4. Return to near baseline physical activity: No     Discharge Planner Nurse   Safe discharge environment identified: No  Barriers to discharge: No       Entered by: Rachel Pollack RN 05/15/2022 8:17 AM     Please review provider order for any additional goals.   Nurse to notify provider when observation goals have been met and patient is ready for discharge.  "

## 2022-05-15 NOTE — PROGRESS NOTES
Cross cover    Paged regarding pain unresponsive to tylenol.  Ordered prn ibuprofen per patient request.

## 2022-05-15 NOTE — ED TRIAGE NOTES
pt reports decreased appetite. Pt hasnt been eating for the past 3-4 days. Pt has hx of ETOH abuse. Pt reports drinking in the last few days. Pt c/o weakness. Last drink was at 1900. Pt reports she has been drinking 2-3 drinks a day. Pt reports feeling shaky. Pt denies feeling suicidal

## 2022-05-15 NOTE — H&P
[facility]    History and Physical  Hospitalist     Date of Admission:  5/14/2022    Assessment & Plan   Kezia Dixon is a 66 year old female with a past medical history of alcohol abuse, pancreatitis, hypertension, hyperlipidemia, presents to the hospital with generalized weakness.    Alcohol withdrawal  Hx of Alcohol abuse  History of pancreatitis  Neuropathy  Patient presented with alcohol level of 0.23 and complaint of generalized weakness after drinking for the last 1 week.  She had previously been sober since August 2021.  Was ready to be discharged from the ed, but was noted to be unsteady and er provider became concerned for alcohol withdrawal.  Patient had tachycardia and hypertension.  Patient has a history of alcohol withdrawal without alcohol withdrawal seizures.  Patient was shaky during my exam.  Patient complaining of neuropathy in the lower extremities which she reports has been progressing, likely due to her alcohol use.  -Monitor on CIWA protocol  -IV fluids  -Multivitamins  -Monitor on telemetry  -Follow-up B12 and folate levels    Transaminitis   Likely due to alcohol abuse  -repeat LFTs in am    Hypertension  Initially blood pressure was significantly elevated in the emergency room however it improved with time.  -Hydralazine as needed for SBP greater than 180  -Resume PTA meds once med rec is complete    Hyperlipidemia  -Resume PTA meds once med rec is complete    GERD  -Protonix    History esophageal cancer  S/p resection    History of lung cancer  Status post resection    Anxiety  Depression  -Resume PTA meds once med rec is complete      Code Status   Full Code  DVT ppx: Ambulate  Expected length of stay less than 2 days      Primary Care Physician   Mustapha Velásquez    Chief Complaint   Generalized weakness    History obtained from the patient    History of Present Illness   Kezia Dixon is a 66 year old female with a past medical history of esophageal  cancer, lung cancer, alcohol abuse presents to hospital with generalized weakness.  The patient reports that she has had a relapse of her alcohol abuse.  She had been sober since August 2021 unfortunately over the last week she has been drinking daily.  Her preferred drink is scotch and she reports drinking at least 3 drinks per day.  She reports that over the last 4 days she has had decreased appetite and has been eating very little food if anything at all.  During this time she has become progressively weak and has been having difficulty driving and has been having difficulty taking care of herself at home so she came to hospital on May 14.  The patient also complains of a progressive bilateral lower extremity numbness which has been progressing up her legs.  The patient reports that the numbness has been ongoing for at least 6 months.  The patient reports chronic diarrhea. the patient provides no other complaints and denies any fevers, chills, nausea, vomiting, chest pain, cough, shortness of breath.    Past Medical History    I have reviewed this patient's medical history and updated it with pertinent information if needed.   Past Medical History:   Diagnosis Date     Alcoholism (H) 10/14/2016     Benign essential hypertension 10/14/2016     Carotid artery disease (H)     mile plaque 5/7     Chemical dependency (H)     alcohol     Depression with anxiety      Esophageal cancer (H)      Esophageal cancer (H) 3/22/2016     Esophageal mass      GERD (gastroesophageal reflux disease)      Hx of pancreatitis 2003     Hypercholesteremia      Hyperglycemia      Hyperlipemia      Impaired fasting glucose 10/14/2016     Impaired fasting glucose 10/14/2016     Nocturnal leg cramps      Pancreatic pseudocyst 10/14/2016     Pancreatic pseudocyst 10/14/2016     Pancreatitis     with pseudocyst     Pap smear with atypical squamous cells, cannot exclude high grade squamous intraepithelial lesion (ASC-H) 10/14/16    Colpo  impression benign, ECC benign. Cotestin in 1 yr.     Shingles      Vasomotor rhinitis        Past Surgical History   I have reviewed this patient's surgical history and updated it with pertinent information if needed.  Past Surgical History:   Procedure Laterality Date     AAA REPAIR      splenic artery aneurysm embolization     ABDOMEN SURGERY  2/2/2016     COLONOSCOPY  11/26/2013    Procedure: COMBINED COLONOSCOPY, SINGLE BIOPSY/POLYPECTOMY BY BIOPSY;  COLONOSCOPY (MAC);  Surgeon: Montana Rosa MD;  Location:  GI     COLONOSCOPY  11/26/2013     COLONOSCOPY N/A 10/4/2018    Procedure: COMBINED COLONOSCOPY, SINGLE OR MULTIPLE BIOPSY/POLYPECTOMY BY BIOPSY;  colonoscopy;  Surgeon: Gio Apodaca MD;  Location:  GI     ENT SURGERY       ESOPHAGOGASTRECTOMY N/A 3/22/2016    Procedure: ESOPHAGOGASTRECTOMY;  Surgeon: Alvin Riojas MD;  Location:  OR     ESOPHAGOSCOPY, GASTROSCOPY, DUODENOSCOPY (EGD), COMBINED N/A 12/22/2015    Procedure: COMBINED ENDOSCOPIC ULTRASOUND, ESOPHAGOSCOPY, GASTROSCOPY, DUODENOSCOPY (EGD), FINE NEEDLE ASPIRATE/BIOPSY;  Surgeon: Danelle Michael MD;  Location:  GI     GASTROSTOMY, INSERT TUBE, COMBINED N/A 2/2/2016    Procedure: COMBINED GASTROSTOMY, INSERT TUBE (OPEN);  Surgeon: Alvin Riojas MD;  Location:  OR     GI SURGERY  12/22/2015     HAND SURGERY      right     HERNIORRHAPHY INCISIONAL (LOCATION) N/A 3/19/2019    Procedure: LAPARSCOPIC  INCISIONAL HERNIA REPAIR WITH MESH, LAPARSCOPIC LYSIS OF ADHENSIONS;  Surgeon: Lamberto Magaña MD;  Location:  OR     INSERT PORT VASCULAR ACCESS N/A 12/28/2015    Procedure: INSERT PORT VASCULAR ACCESS;  Surgeon: Alvin Riojas MD;  Location:  OR     LAPAROSCOPIC CHOLECYSTECTOMY N/A 3/19/2019    Procedure: LAPAROSCOPIC CHOLECYSTECTOMY;  Surgeon: Lamberto Magaña MD;  Location:  OR     LOBECTOMY LUNG Right 10/16/2018    Procedure: LOBECTOMY LUNG;  Surgeon: Shine  Alvin Ortiz MD;  Location: [facility] OR     REMOVE PORT VASCULAR ACCESS Left 7/22/2016    Procedure: REMOVE PORT VASCULAR ACCESS;  Surgeon: Alvin Riojas MD;  Location: [facility] OR     THORACOTOMY Right 10/16/2018    Procedure: REDO RIGHT THORACTOMY AND RIGHT LOWER LOBECTOMY, PLEURAL LYSIS;  Surgeon: Alvin Riojas MD;  Location: [facility] OR     TONSILLECTOMY       VASCULAR SURGERY         Prior to Admission Medications   Prior to Admission Medications   Prescriptions Last Dose Informant Patient Reported? Taking?   IBUPROFEN PO   Yes No   acetaminophen (TYLENOL) 325 MG tablet   No No   Sig: Take 2 tablets (650 mg) by mouth every 6 hours as needed for mild pain or fever   Patient not taking: Reported on 1/27/2022   aspirin (ASA) 81 MG EC tablet   No No   Sig: Take 1 tablet (81 mg) by mouth daily   atorvastatin (LIPITOR) 40 MG tablet   No No   Sig: Take 1 tablet (40 mg) by mouth daily   fluticasone (FLONASE) 50 MCG/ACT nasal spray   No No   Sig: INSTILL 2 SPRAYS INTO BOTH NOSTRILS DAILY.   folic acid (FOLVITE) 1 MG tablet   No No   Sig: Take 1 tablet (1 mg) by mouth daily   furosemide (LASIX) 40 MG tablet   No No   Sig: Take 0.5 tablets (20 mg) by mouth every morning Ok to take additional 0.5 tab for worsening shortness of breath, leg swelling or weight gain.   losartan (COZAAR) 25 MG tablet   No No   Sig: Take 0.5 tablets (12.5 mg) by mouth daily   metoprolol succinate ER (TOPROL-XL) 25 MG 24 hr tablet   No No   Sig: Take 1 tablet (25 mg) by mouth every evening   metoprolol succinate ER (TOPROL-XL) 50 MG 24 hr tablet   No No   Sig: Take 1 tablet (50 mg) by mouth every morning   omeprazole (PRILOSEC) 40 MG DR capsule   No No   Sig: TAKE 1 CAPSULE BY MOUTH EVERY DAY   senna-docusate (SENOKOT-S/PERICOLACE) 8.6-50 MG tablet   No No   Sig: Take 1 tablet by mouth 2 times daily   Patient not taking: Reported on 1/27/2022   spironolactone (ALDACTONE) 25 MG tablet   No No   Sig: Take 0.5 tablets (12.5 mg) by mouth every  morning   thiamine (B-1) 100 MG tablet   No No   Sig: Take 1 tablet (100 mg) by mouth daily      Facility-Administered Medications: None     Allergies   Allergies   Allergen Reactions     Codeine Sulfate Nausea     Simvastatin Cramps     Leg cramps     Pcn [Penicillins] Rash       Social History   I have reviewed this patient's social history and updated it with pertinent information if needed. Kezia Dixon  reports that she quit smoking about 6 years ago. She smoked 0.25 packs per day. She has never used smokeless tobacco. She reports that she does not drink alcohol and does not use drugs.    Family History   I have reviewed this patient's family history and updated it with pertinent information if needed.   Family History   Problem Relation Age of Onset     Other Cancer Father         melanoma     Prostate Cancer Father      Diabetes Father      Other Cancer Brother         melanoma     Other Cancer Brother         melanoma     Other Cancer Brother        Review of Systems   The 10 point Review of Systems is negative other than noted in the HPI or here.     Physical Exam   Temp: 98.1  F (36.7  C) Temp src: Oral BP: (!) 193/79 Pulse: 93   Resp: 16 SpO2: 99 % O2 Device: None (Room air)    Vital Signs with Ranges  Temp:  [98.1  F (36.7  C)] 98.1  F (36.7  C)  Pulse:  [93] 93  Resp:  [16] 16  BP: (193)/(79) 193/79  SpO2:  [93 %-99 %] 99 %  85 lbs 0 oz  Physical Exam  Vitals reviewed.   Constitutional:       Appearance: Normal appearance.      Comments: Pleasant lady seen resting in bed in no apparent distress.   HENT:      Head: Normocephalic and atraumatic.      Mouth/Throat:      Mouth: Mucous membranes are moist.      Pharynx: Oropharynx is clear.   Eyes:      Extraocular Movements: Extraocular movements intact.      Conjunctiva/sclera: Conjunctivae normal.      Pupils: Pupils are equal, round, and reactive to light.   Cardiovascular:      Rate and Rhythm: Normal rate and regular rhythm.      Pulses: Normal  pulses.      Heart sounds: Normal heart sounds. No murmur heard.  Pulmonary:      Effort: Pulmonary effort is normal.      Breath sounds: Normal breath sounds. No wheezing, rhonchi or rales.   Abdominal:      General: Abdomen is flat. Bowel sounds are normal.      Palpations: Abdomen is soft.   Musculoskeletal:         General: No swelling. Normal range of motion.      Cervical back: Normal range of motion and neck supple.   Skin:     General: Skin is warm and dry.   Neurological:      General: No focal deficit present.      Mental Status: She is alert and oriented to person, place, and time. Mental status is at baseline.      Cranial Nerves: No cranial nerve deficit.      Comments: Fine tremor in bilateral hands

## 2022-05-15 NOTE — PROGRESS NOTES
RECEIVING UNIT ED HANDOFF REVIEW    ED Nurse Handoff Report was reviewed by: Rachel Pollack RN on May 15, 2022 at 7:21 AM

## 2022-05-15 NOTE — PLAN OF CARE
Goal Outcome Evaluation:  Pt A/O x 4. Forgetful, flat. Intermittent HA. CIWA 6 and 3. Intermittent nausea covered with zofran. Tolerating diet. Son at the bedside this afternoon. Up with SBA.

## 2022-05-15 NOTE — ED NOTES
Pt woke to voice c/o nausea. Prn zofran given along with scheduled meds. Prn valium per ciwa protocol. Vss, pt back to bed with call light at side.

## 2022-05-15 NOTE — PROGRESS NOTES
Hospitalist brief update note:    Admission H&P reviewed and evaluated patient this morning.  Reports generalized weakness otherwise no new symptoms.  No nausea or vomiting.    Mildly hypertensive otherwise VSS  Noted elevated CIWA score of 2-6, received diazepam overnight after admission appears calm.  No agitation or hallucination.  Lungs clear, CV S1-S2 regular, no tachycardia    Labs noted, mildly elevated AST > ALT  Blood alcohol level 0.23 on presentation.    Continue to manage acute alcohol withdrawal as outlined in H&P.  PTA medications reviewed and appropriate once resumed.  PT OT eval    Discussed with patient and nursing staff.      Minesh Glass MD  Hospitalist

## 2022-05-16 ENCOUNTER — APPOINTMENT (OUTPATIENT)
Dept: PHYSICAL THERAPY | Facility: CLINIC | Age: 67
End: 2022-05-16
Attending: HOSPITALIST
Payer: MEDICARE

## 2022-05-16 LAB
ALBUMIN SERPL-MCNC: 2.8 G/DL (ref 3.4–5)
ALP SERPL-CCNC: 107 U/L (ref 40–150)
ALT SERPL W P-5'-P-CCNC: 54 U/L (ref 0–50)
ANION GAP SERPL CALCULATED.3IONS-SCNC: 8 MMOL/L (ref 3–14)
AST SERPL W P-5'-P-CCNC: 53 U/L (ref 0–45)
BASOPHILS # BLD AUTO: 0 10E3/UL (ref 0–0.2)
BASOPHILS NFR BLD AUTO: 1 %
BILIRUB DIRECT SERPL-MCNC: 0.3 MG/DL (ref 0–0.2)
BILIRUB SERPL-MCNC: 0.7 MG/DL (ref 0.2–1.3)
BUN SERPL-MCNC: 10 MG/DL (ref 7–30)
CALCIUM SERPL-MCNC: 7.9 MG/DL (ref 8.5–10.1)
CHLORIDE BLD-SCNC: 102 MMOL/L (ref 94–109)
CO2 SERPL-SCNC: 23 MMOL/L (ref 20–32)
CREAT SERPL-MCNC: 0.53 MG/DL (ref 0.52–1.04)
EOSINOPHIL # BLD AUTO: 0.1 10E3/UL (ref 0–0.7)
EOSINOPHIL NFR BLD AUTO: 2 %
ERYTHROCYTE [DISTWIDTH] IN BLOOD BY AUTOMATED COUNT: 14.6 % (ref 10–15)
GFR SERPL CREATININE-BSD FRML MDRD: >90 ML/MIN/1.73M2
GLUCOSE BLD-MCNC: 234 MG/DL (ref 70–99)
GLUCOSE BLDC GLUCOMTR-MCNC: 103 MG/DL (ref 70–99)
GLUCOSE BLDC GLUCOMTR-MCNC: 123 MG/DL (ref 70–99)
GLUCOSE BLDC GLUCOMTR-MCNC: 174 MG/DL (ref 70–99)
HBA1C MFR BLD: 5.8 % (ref 0–5.6)
HCT VFR BLD AUTO: 37.4 % (ref 35–47)
HGB BLD-MCNC: 11.8 G/DL (ref 11.7–15.7)
IMM GRANULOCYTES # BLD: 0 10E3/UL
IMM GRANULOCYTES NFR BLD: 0 %
INR PPP: 1 (ref 0.85–1.15)
LYMPHOCYTES # BLD AUTO: 0.5 10E3/UL (ref 0.8–5.3)
LYMPHOCYTES NFR BLD AUTO: 14 %
MCH RBC QN AUTO: 31.5 PG (ref 26.5–33)
MCHC RBC AUTO-ENTMCNC: 31.6 G/DL (ref 31.5–36.5)
MCV RBC AUTO: 100 FL (ref 78–100)
MONOCYTES # BLD AUTO: 0.2 10E3/UL (ref 0–1.3)
MONOCYTES NFR BLD AUTO: 5 %
NEUTROPHILS # BLD AUTO: 2.6 10E3/UL (ref 1.6–8.3)
NEUTROPHILS NFR BLD AUTO: 78 %
NRBC # BLD AUTO: 0 10E3/UL
NRBC BLD AUTO-RTO: 0 /100
PLATELET # BLD AUTO: 109 10E3/UL (ref 150–450)
POTASSIUM BLD-SCNC: 3.4 MMOL/L (ref 3.4–5.3)
PROT SERPL-MCNC: 5.7 G/DL (ref 6.8–8.8)
RBC # BLD AUTO: 3.75 10E6/UL (ref 3.8–5.2)
SODIUM SERPL-SCNC: 133 MMOL/L (ref 133–144)
WBC # BLD AUTO: 3.3 10E3/UL (ref 4–11)

## 2022-05-16 PROCEDURE — 82962 GLUCOSE BLOOD TEST: CPT

## 2022-05-16 PROCEDURE — 80053 COMPREHEN METABOLIC PANEL: CPT | Performed by: HOSPITALIST

## 2022-05-16 PROCEDURE — 96372 THER/PROPH/DIAG INJ SC/IM: CPT | Performed by: HOSPITALIST

## 2022-05-16 PROCEDURE — G0378 HOSPITAL OBSERVATION PER HR: HCPCS

## 2022-05-16 PROCEDURE — 99225 PR SUBSEQUENT OBSERVATION CARE,LEVEL II: CPT | Performed by: HOSPITALIST

## 2022-05-16 PROCEDURE — 85610 PROTHROMBIN TIME: CPT | Performed by: HOSPITALIST

## 2022-05-16 PROCEDURE — 97161 PT EVAL LOW COMPLEX 20 MIN: CPT | Mod: GP | Performed by: PHYSICAL THERAPIST

## 2022-05-16 PROCEDURE — 250N000013 HC RX MED GY IP 250 OP 250 PS 637: Performed by: HOSPITALIST

## 2022-05-16 PROCEDURE — 83036 HEMOGLOBIN GLYCOSYLATED A1C: CPT | Performed by: HOSPITALIST

## 2022-05-16 PROCEDURE — 82248 BILIRUBIN DIRECT: CPT | Performed by: HOSPITALIST

## 2022-05-16 PROCEDURE — 97116 GAIT TRAINING THERAPY: CPT | Mod: GP | Performed by: PHYSICAL THERAPIST

## 2022-05-16 PROCEDURE — 97530 THERAPEUTIC ACTIVITIES: CPT | Mod: GP | Performed by: PHYSICAL THERAPIST

## 2022-05-16 PROCEDURE — 250N000012 HC RX MED GY IP 250 OP 636 PS 637: Performed by: HOSPITALIST

## 2022-05-16 PROCEDURE — 85025 COMPLETE CBC W/AUTO DIFF WBC: CPT | Performed by: HOSPITALIST

## 2022-05-16 PROCEDURE — 36415 COLL VENOUS BLD VENIPUNCTURE: CPT | Performed by: HOSPITALIST

## 2022-05-16 PROCEDURE — 82040 ASSAY OF SERUM ALBUMIN: CPT | Performed by: HOSPITALIST

## 2022-05-16 RX ORDER — OMEPRAZOLE 20 MG/1
20 TABLET, DELAYED RELEASE ORAL DAILY
DISCHARGE
Start: 2022-05-16 | End: 2022-05-30

## 2022-05-16 RX ORDER — GABAPENTIN 300 MG/1
CAPSULE ORAL
DISCHARGE
Start: 2022-05-16 | End: 2022-06-09

## 2022-05-16 RX ORDER — NICOTINE POLACRILEX 4 MG
15-30 LOZENGE BUCCAL
Status: DISCONTINUED | OUTPATIENT
Start: 2022-05-16 | End: 2022-05-18 | Stop reason: HOSPADM

## 2022-05-16 RX ORDER — DEXTROSE MONOHYDRATE 25 G/50ML
25-50 INJECTION, SOLUTION INTRAVENOUS
Status: DISCONTINUED | OUTPATIENT
Start: 2022-05-16 | End: 2022-05-18 | Stop reason: HOSPADM

## 2022-05-16 RX ADMIN — GABAPENTIN 900 MG: 300 CAPSULE ORAL at 11:37

## 2022-05-16 RX ADMIN — THIAMINE HCL TAB 100 MG 100 MG: 100 TAB at 09:16

## 2022-05-16 RX ADMIN — FUROSEMIDE 20 MG: 20 TABLET ORAL at 09:17

## 2022-05-16 RX ADMIN — MULTIPLE VITAMINS W/ MINERALS TAB 1 TABLET: TAB at 09:17

## 2022-05-16 RX ADMIN — ASPIRIN 81 MG: 81 TABLET, COATED ORAL at 09:17

## 2022-05-16 RX ADMIN — SPIRONOLACTONE 12.5 MG: 25 TABLET ORAL at 09:17

## 2022-05-16 RX ADMIN — CLONIDINE HYDROCHLORIDE 0.1 MG: 0.1 TABLET ORAL at 19:43

## 2022-05-16 RX ADMIN — CLONIDINE HYDROCHLORIDE 0.1 MG: 0.1 TABLET ORAL at 11:37

## 2022-05-16 RX ADMIN — INSULIN ASPART 1 UNITS: 100 INJECTION, SOLUTION INTRAVENOUS; SUBCUTANEOUS at 12:47

## 2022-05-16 RX ADMIN — LOSARTAN POTASSIUM 12.5 MG: 25 TABLET, FILM COATED ORAL at 09:16

## 2022-05-16 RX ADMIN — OMEPRAZOLE 40 MG: 20 CAPSULE, DELAYED RELEASE ORAL at 09:16

## 2022-05-16 RX ADMIN — GABAPENTIN 900 MG: 300 CAPSULE ORAL at 02:10

## 2022-05-16 RX ADMIN — FLUTICASONE PROPIONATE 2 SPRAY: 50 SPRAY, METERED NASAL at 09:15

## 2022-05-16 RX ADMIN — METOPROLOL SUCCINATE 50 MG: 50 TABLET, EXTENDED RELEASE ORAL at 09:16

## 2022-05-16 RX ADMIN — GABAPENTIN 900 MG: 300 CAPSULE ORAL at 19:42

## 2022-05-16 RX ADMIN — FOLIC ACID 1 MG: 1 TABLET ORAL at 09:17

## 2022-05-16 RX ADMIN — IBUPROFEN 400 MG: 200 TABLET, FILM COATED ORAL at 14:29

## 2022-05-16 NOTE — PROGRESS NOTES
Care Management Follow Up    Length of Stay (days): 0    Expected Discharge Date: 05/19/2022     Concerns to be Addressed:       Patient plan of care discussed at interdisciplinary rounds: Yes    Anticipated Discharge Disposition:       Anticipated Discharge Services:    Anticipated Discharge DME:      Patient/family educated on Medicare website which has current facility and service quality ratings:    Education Provided on the Discharge Plan:    Patient/Family in Agreement with the Plan:      Referrals Placed by CM/SW:    Private pay costs discussed: Not applicable    Additional Information:  Patient is recommended for tcu. Patient would like referrals sent to [facility] ([facility]), [facility], [facility], and [facility]. Writer sent referrals via [facility]. Left messages for tcu referrals. [facility] is reviewing.    DELIA Rocha

## 2022-05-16 NOTE — PLAN OF CARE
Goal Outcome Evaluation:           Cognitive Concerns/ Orientation : A&Ox4   BEHAVIOR & AGGRESSION TOOL COLOR: green  CIWA SCORE: 3, 3 - nausea and headache. Given zofran and ibuprofen  ABNL VS/O2: VSS on RA  MOBILITY: A1 gb/w  PAIN MANAGMENT: c/o headache-  ibuprofen ordered- effective  DIET: Reg- fair appetite, pt states she has had decreased appetite for a while BOWEL/BLADDER: continent- up to BR. No BM this shift  ABNL LAB/BG: ALT 77, .   DRAIN/DEVICES: PIV SL  TELEMETRY RHYTHM: NSR  SKIN: WDL  TESTS/PROCEDURES: nothing scheduled  D/C DAY/GOALS/PLACE: pending  OTHER IMPORTANT INFO: Obs patient, but no observation goals ordered.

## 2022-05-16 NOTE — PLAN OF CARE
Summary: Patient presented with alcohol level of 0.23 and complaint of generalized weakness after drinking for the last 1 week.  DATE & TIME: 5/16/22 7-3 pm  Cognitive Concerns/ Orientation : A & O x4   BEHAVIOR & AGGRESSION TOOL COLOR: Green  CIWA SCORE: Not ordered. But 1 for a tremor this am.   ABNL VS/O2: VSS on RA  MOBILITY: A1 with gait belt and walker. Very unsteady without walker.   PAIN MANAGMENT: C/O of headache at end of shift. Advil (ibuprofen) x1.   DIET: Reg- fair appetite. Pt eats smaller amounts of food, and per pt this is baseline after a surgery for her past throat cancer.   BOWEL/BLADDER: Continent- up to BR. No BM this shift. Passing flatus.   ABNL LAB/BG: Calcium 7.9, ALT 54, AST 53, Hgb A1C 5.8, WBC 3.3, and Platelet 109.   DRAIN/DEVICES: PIV SL  TELEMETRY RHYTHM: NSR  SKIN: WDL, pale/dusky. Bruising to arms.   TESTS/PROCEDURES: N/A  D/C DAY/GOALS/PLACE: SW following for TCU placement.   OTHER IMPORTANT INFO: Feeling overall much improved. Tolerating food and liquid. Still unsteady gait and needing assistance. OBS patient, but no observation goals ordered.

## 2022-05-16 NOTE — PROGRESS NOTES
[facility]  Medicine Progress Note - Hospitalist Service    Date of Admission:  5/14/2022    Assessment & Plan            Kezia Dixon is a 66 year old female with a past medical history of alcohol abuse, pancreatitis, hypertension, hyperlipidemia, presents to the hospital with generalized weakness.     Alcohol withdrawal  Hx of Alcohol abuse  History of pancreatitis  Neuropathy  Patient presented with alcohol level of 0.23 and complaint of generalized weakness after drinking for the last 1 week.  She had previously been sober since August 2021.  Was ready to be discharged from the ed, but was noted to be unsteady and er provider became concerned for alcohol withdrawal.  Patient had tachycardia and hypertension.  Patient has a history of alcohol withdrawal without alcohol withdrawal seizures.  Patient was shaky during my exam.  Patient complaining of neuropathy in the lower extremities which she reports has been progressing, likely due to her alcohol use.  -On CIWA protocol. Needed benzo after admission, but has not needed for almost 24 hours, does not appear to be in withdrawal currently, discontinue CIWA. Discontinue IVF, encourage PO.     Transaminitis   Thrombocytopenia  Likely due to alcohol abuse. Monitor periodically     Generalized weakness.   Likely related to chronic alcoholism.   PT eval noted, recommending TCU at discharge, SW following.     Hyperglycemia  BS in 200s.   - HbA1C ordered- 5.8  - ISS      Hypertension  Initially blood pressure was significantly elevated in the emergency room however it improved with time.  -Hydralazine as needed for SBP greater than 180  -Resumed PTA meds including metoprolol cozaar and aldactone.      Hyperlipidemia  -Resume PTA med at discharge if LFTs trend down     GERD  -Protonix     History esophageal cancer  S/p resection     History of lung cancer  Status post resection     Anxiety  Depression  -Not on med PTA        Diet: Combination  Diet Regular Diet Adult  Diet    DVT Prophylaxis: Pneumatic Compression Devices  Jaeger Catheter: Not present  Central Lines: None  Cardiac Monitoring: ACTIVE order. Indication: alcohol withdrawal  Code Status: Full Code      Disposition Plan   Expected Discharge: 05/19/2022   Medically stable for discharge, pending TCU bed   Anticipated discharge location:  Awaiting care coordination huddle  Delays:              The patient's care was discussed with the Bedside Nurse, Patient and SW/CC.    Minesh Glass MD  Hospitalist Service  [facility]  Securely message with the Vocera Web Console (learn more here)  Text page via addwish Paging/Directory         Clinically Significant Risk Factors Present on Admission             # Hypoalbuminemia: Albumin = 2.8 g/dL (Ref range: 3.4 - 5.0 g/dL) on admission, will monitor as appropriate    # Platelet Defect: home medication list includes an antiplatelet medication       ______________________________________________________________________    Interval History     Patient evaluated this afternoon, up in chair. Feels stronger, up with therapy. Denies dizziness, nausea, dyspnea, abd pain, diarrhea.   - Patient reports she has quit drinking in past without any help and is not interested in CD consult    Data reviewed today: I reviewed all medications, new labs and imaging results over the last 24 hours. I personally reviewed no images or EKG's today.    Physical Exam   Vital Signs: Temp: 97.7  F (36.5  C) Temp src: Oral BP: 133/59 Pulse: 74   Resp: 18 SpO2: 97 % O2 Device: None (Room air)    Weight: 85 lbs 0 oz      General: AAOx3, up in the chair, appears comfortable.  HEENT: PERRLA EOMI. Mucosa moist.  Lungs: Bilateral equal air entry. Clear to auscultation, normal work of breathing.  CVS: S1S2 regular, no tachycardia or murmur.  Abdomen: Soft, NT, ND. BS heard.  MSK: No edema or deformities.  Neuro: AAOX3. CN 2-12 normal. Strength symmetrical.  Skin: No  rash.      Data   Recent Labs   Lab 05/16/22  1213 05/16/22  1028 05/14/22  2243 05/14/22  2220   WBC  --  3.3*  --  4.3   HGB  --  11.8  --  13.5   MCV  --  100  --  93   PLT  --  109*  --  163   INR  --  1.00  --   --    NA  --  133 136  --    POTASSIUM  --  3.4 5.1  --    CHLORIDE  --  102 100  --    CO2  --  23 26  --    BUN  --  10 10  --    CR  --  0.53 0.44*  --    ANIONGAP  --  8 10  --    VICENTE  --  7.9* 9.0  --    * 234* 98  --    ALBUMIN  --  2.8* 3.7  --    PROTTOTAL  --  5.7* 7.0  --    BILITOTAL  --  0.7 1.1  --    ALKPHOS  --  107 109  --    ALT  --  54* 77*  --    AST  --  53* 108*  --    LIPASE  --   --  69*  --      No results found for this or any previous visit (from the past 24 hour(s)).  Medications       aspirin  81 mg Oral Daily     cloNIDine  0.1 mg Oral Q8H     fluticasone  2 spray Both Nostrils Daily     folic acid  1 mg Oral Daily     furosemide  20 mg Oral QAM     [START ON 5/22/2022] gabapentin  100 mg Oral Q8H     [START ON 5/20/2022] gabapentin  300 mg Oral Q8H     [START ON 5/18/2022] gabapentin  600 mg Oral Q8H     gabapentin  900 mg Oral Q8H     insulin aspart  1-7 Units Subcutaneous TID AC     insulin aspart  1-5 Units Subcutaneous At Bedtime     losartan  12.5 mg Oral Daily     metoprolol succinate ER  25 mg Oral QPM     metoprolol succinate ER  50 mg Oral QAM     multivitamin w/minerals  1 tablet Oral Daily     omeprazole  40 mg Oral QAM     sodium chloride (PF)  3 mL Intracatheter Q8H     spironolactone  12.5 mg Oral QAM     thiamine  100 mg Oral Daily

## 2022-05-16 NOTE — UTILIZATION REVIEW
Continued stay Observation     Concurrent stay review; Secondary Review Determination         Under the authority of the Utilization Management Committee, the utilization review process indicated a secondary review on the above patient.  The review outcome is based on review of the medical records, discussions with staff, and applying clinical experience noted on the date of the review.          (x) Observation Status Appropriate - Concurrent stay review    RATIONALE FOR DETERMINATION   66-year-old female with history of alcohol abuse, pancreatitis, hypertension presented to [facility] on 5/14/2022 with generalized weakness.  She was admitted with concerns for alcohol withdrawal.  Patient appears stable at this time.  She is not requiring any further benzodiazepine.  Awaiting placement at this time.  She does not meet inpatient criteria at this time.    Patient is clinically improving and there is no clear indication to change patient's status to inpatient. The severity of illness, intensity of service provided, expected LOS and risk for adverse outcome make the care appropriate for observation.      This document was produced using voice recognition software       The information on this document is developed by the utilization review team in order for the business office to ensure compliance.  This only denotes the appropriateness of proper admission status and does not reflect the quality of care rendered.         The definitions of Inpatient Status and Observation Status used in making the determination above are those provided in the CMS Coverage Manual, Chapter 1 and Chapter 6, section 70.4.      Sincerely,     Will Romo MD    Utilization Review  Physician Advisor  [facility].

## 2022-05-16 NOTE — PLAN OF CARE
[facility]      OUTPATIENT PHYSICAL THERAPY EVALUATION  PLAN OF TREATMENT FOR OUTPATIENT REHABILITATION  (COMPLETE FOR INITIAL CLAIMS ONLY)  Patient's Last Name, First Name, M.I.  YOB: 1955  Kezia Dixon                        Provider's Name  [facility] Medical Record No.  [id number]                               Onset Date:  05/15/22   Start of Care Date:  05/15/22      Type:     _X_PT   ___OT   ___SLP Medical Diagnosis:  ETOH withdrawal                        PT Diagnosis:  Difficulty walking   Visits from SOC:  1   _________________________________________________________________________________  Plan of Treatment/Functional Goals    Planned Interventions: bed mobility training, gait training, transfer training, strengthening, stair training     Goals: See Physical Therapy Goals on Care Plan in [employer] electronic health record.    Therapy Frequency: 5x/week  Predicted Duration of Therapy Intervention: 05/21/22  _________________________________________________________________________________    I CERTIFY THE NEED FOR THESE SERVICES FURNISHED UNDER        THIS PLAN OF TREATMENT AND WHILE UNDER MY CARE     (Physician co-signature of this document indicates review and certification of the therapy plan).              Certification date from: 05/16/22, Certification date to: 05/21/22    Referring Physician: Anita Eric MD            Initial Assessment        See Physical Therapy evaluation dated 05/15/22 in Epic electronic health record.

## 2022-05-16 NOTE — PROGRESS NOTES
05/16/22 1300   Quick Adds   Type of Visit Initial PT Evaluation   Living Environment   People in Home alone   Current Living Arrangements house   Home Accessibility stairs to enter home;stairs within home   Number of Stairs, Main Entrance 2   Number of Stairs, Within Home, Primary greater than 10 stairs   Stair Railings, Within Home, Primary railings safe and in good condition   Living Environment Comments rambler, stairs to lower level all needs met on main   Self-Care   Usual Activity Tolerance good   Current Activity Tolerance fair   Equipment Currently Used at Home cane, straight;walker, rolling   Fall history within last six months yes   Number of times patient has fallen within last six months 3   Activity/Exercise/Self-Care Comment mod indep w mobility, drives, reports indep w/ ADLS and IADLS   General Information   Onset of Illness/Injury or Date of Surgery 05/15/22   Referring Physician Anita Eric MD   Pertinent History of Current Problem (include personal factors and/or comorbidities that impact the POC) 66 year old female with a past medical history of alcohol abuse, pancreatitis, hypertension, hyperlipidemia, presents to the hospital with generalized weakness   Existing Precautions/Restrictions fall   Cognition   Affect/Mental Status (Cognition) WFL   Orientation Status (Cognition) oriented x 3   Follows Commands (Cognition) WFL   Safety Deficit (Cognition) ability to follow commands;awareness of need for assistance;at risk behavior observed   Posture    Posture Forward head position;Protracted shoulders   Range of Motion (ROM)   ROM Comment UE/LE ROm WFL   Strength (Manual Muscle Testing)   Strength Comments B HF 4-/5, remaining LE strength 4+/5 grossly   Bed Mobility   Comment, (Bed Mobility) CGA   Transfers   Comment, (Transfers) CGA w/ WW, min A w/o AD   Gait/Stairs (Locomotion)   Comment, (Gait/Stairs) min A w/ HHA no AD dec step length reduced gait speed, ataxic gait patter    Balance   Balance Comments impaired in standing and walking, reached for UE support of furnishings upon standing   Coordination   Coordination Comments mod pathway deviation with amb; resting tremor   Clinical Impression   Criteria for Skilled Therapeutic Intervention Yes, treatment indicated   PT Diagnosis (PT) Difficulty walking   Influenced by the following impairments impaired gait, impaired balance dec strength   Functional limitations due to impairments impaired mobility   Clinical Presentation (PT Evaluation Complexity) Evolving/Changing   Clinical Presentation Rationale clinical assessment   Clinical Decision Making (Complexity) low complexity   Planned Therapy Interventions (PT) bed mobility training;gait training;transfer training;strengthening;stair training   Risk & Benefits of therapy have been explained evaluation/treatment results reviewed;care plan/treatment goals reviewed;risks/benefits reviewed   PT Discharge Planning   PT Discharge Recommendation (DC Rec) Transitional Care Facility;home with assist;home with home care physical therapy   PT Rationale for DC Rec Pt below baseline for mobility and balance, will benefit  from PT during stay, anticipate will need continued PT in rehab setting, pt lives alone is a fall risk with mobility, should pt refuse pt should have home PT/OT with frequent check ins from family   Therapy Certification   Start of care date 05/15/22   Certification date from 05/16/22   Certification date to 05/21/22   Medical Diagnosis ETOH withdrawal   Physical Therapy Goals   PT Frequency 5x/week   PT Predicted Duration/Target Date for Goal Attainment 05/21/22   PT Goals Bed Mobility;Transfers;Gait;Stairs   PT: Bed Mobility Independent   PT: Transfers Modified independent;Sit to/from stand;Assistive device   PT: Gait Modified independent;Greater than 200 feet;Rolling walker   PT: Stairs 2 stairs;Supervision/stand-by assist

## 2022-05-16 NOTE — PLAN OF CARE
Summary:     DATE & TIME: 5/15/22 9955-6033  Cognitive Concerns/ Orientation : A&Ox4   BEHAVIOR & AGGRESSION TOOL COLOR: green  CIWA SCORE: 3, 3 - nausea and headache. Given zofran and ibuprofen  ABNL VS/O2: VSS on RA  MOBILITY: A1 gb/w  PAIN MANAGMENT: c/o headache- mild. Tylenol given in ED (ineffective), ibuprofen ordered- effective  DIET: Reg- fair appetite, pt states she has had decreased appetite for a while which is what brought her into the ED  BOWEL/BLADDER: continent- up to BR. No BM this shift  ABNL LAB/BG: ALT 77, .   DRAIN/DEVICES: PIV SL  TELEMETRY RHYTHM: NSR  SKIN: WDL  TESTS/PROCEDURES: nothing scheduled  D/C DAY/GOALS/PLACE: pending  OTHER IMPORTANT INFO: Obs patient, but no observation goals ordered.

## 2022-05-17 ENCOUNTER — APPOINTMENT (OUTPATIENT)
Dept: PHYSICAL THERAPY | Facility: CLINIC | Age: 67
End: 2022-05-17
Payer: MEDICARE

## 2022-05-17 LAB
GLUCOSE BLDC GLUCOMTR-MCNC: 238 MG/DL (ref 70–99)
GLUCOSE BLDC GLUCOMTR-MCNC: 63 MG/DL (ref 70–99)
GLUCOSE BLDC GLUCOMTR-MCNC: 84 MG/DL (ref 70–99)

## 2022-05-17 PROCEDURE — 250N000013 HC RX MED GY IP 250 OP 250 PS 637: Performed by: HOSPITALIST

## 2022-05-17 PROCEDURE — 82962 GLUCOSE BLOOD TEST: CPT | Mod: 91

## 2022-05-17 PROCEDURE — 99225 PR SUBSEQUENT OBSERVATION CARE,LEVEL II: CPT | Performed by: HOSPITALIST

## 2022-05-17 PROCEDURE — 97116 GAIT TRAINING THERAPY: CPT | Mod: GP | Performed by: PHYSICAL THERAPIST

## 2022-05-17 PROCEDURE — G0378 HOSPITAL OBSERVATION PER HR: HCPCS

## 2022-05-17 PROCEDURE — 97750 PHYSICAL PERFORMANCE TEST: CPT | Mod: GP | Performed by: PHYSICAL THERAPIST

## 2022-05-17 PROCEDURE — 97110 THERAPEUTIC EXERCISES: CPT | Mod: GP | Performed by: PHYSICAL THERAPIST

## 2022-05-17 RX ORDER — METOPROLOL SUCCINATE 25 MG/1
25 TABLET, EXTENDED RELEASE ORAL EVERY MORNING
Status: DISCONTINUED | OUTPATIENT
Start: 2022-05-17 | End: 2022-05-18 | Stop reason: HOSPADM

## 2022-05-17 RX ORDER — POLYETHYLENE GLYCOL 3350 17 G/17G
17 POWDER, FOR SOLUTION ORAL DAILY
Qty: 510 G | DISCHARGE
Start: 2022-05-17 | End: 2022-06-09

## 2022-05-17 RX ORDER — METOPROLOL SUCCINATE 25 MG/1
25 TABLET, EXTENDED RELEASE ORAL EVERY MORNING
DISCHARGE
Start: 2022-05-17 | End: 2022-06-09 | Stop reason: DRUGHIGH

## 2022-05-17 RX ORDER — POLYETHYLENE GLYCOL 3350 17 G/17G
17 POWDER, FOR SOLUTION ORAL 2 TIMES DAILY PRN
Status: DISCONTINUED | OUTPATIENT
Start: 2022-05-17 | End: 2022-05-18 | Stop reason: HOSPADM

## 2022-05-17 RX ADMIN — POLYETHYLENE GLYCOL 3350 17 G: 17 POWDER, FOR SOLUTION ORAL at 09:10

## 2022-05-17 RX ADMIN — FOLIC ACID 1 MG: 1 TABLET ORAL at 08:50

## 2022-05-17 RX ADMIN — IBUPROFEN 400 MG: 200 TABLET, FILM COATED ORAL at 08:49

## 2022-05-17 RX ADMIN — IBUPROFEN 400 MG: 200 TABLET, FILM COATED ORAL at 15:07

## 2022-05-17 RX ADMIN — MULTIPLE VITAMINS W/ MINERALS TAB 1 TABLET: TAB at 08:49

## 2022-05-17 RX ADMIN — FUROSEMIDE 20 MG: 20 TABLET ORAL at 08:49

## 2022-05-17 RX ADMIN — THIAMINE HCL TAB 100 MG 100 MG: 100 TAB at 08:49

## 2022-05-17 RX ADMIN — METOPROLOL SUCCINATE 25 MG: 25 TABLET, EXTENDED RELEASE ORAL at 08:49

## 2022-05-17 RX ADMIN — LOSARTAN POTASSIUM 12.5 MG: 25 TABLET, FILM COATED ORAL at 08:49

## 2022-05-17 RX ADMIN — GABAPENTIN 900 MG: 300 CAPSULE ORAL at 13:20

## 2022-05-17 RX ADMIN — GABAPENTIN 900 MG: 300 CAPSULE ORAL at 02:20

## 2022-05-17 RX ADMIN — OMEPRAZOLE 40 MG: 20 CAPSULE, DELAYED RELEASE ORAL at 08:49

## 2022-05-17 RX ADMIN — SPIRONOLACTONE 12.5 MG: 25 TABLET ORAL at 08:49

## 2022-05-17 RX ADMIN — FLUTICASONE PROPIONATE 2 SPRAY: 50 SPRAY, METERED NASAL at 08:53

## 2022-05-17 RX ADMIN — GABAPENTIN 900 MG: 300 CAPSULE ORAL at 18:23

## 2022-05-17 RX ADMIN — ASPIRIN 81 MG: 81 TABLET, COATED ORAL at 08:49

## 2022-05-17 NOTE — PROVIDER NOTIFICATION
5/16/22 2122    MD (Dr. Miller) notified of hypotension. BP 90-70's/40's. Denies dizziness. Can we get some of her BP meds held or add holding parameters. No IVF running, low oral intake.

## 2022-05-17 NOTE — PROGRESS NOTES
[facility]  Medicine Progress Note - Hospitalist Service    Date of Admission:  5/14/2022    Assessment & Plan              Kezia Dixon is a 66 year old female with a past medical history of alcohol abuse, pancreatitis, hypertension, hyperlipidemia, presents to the hospital with generalized weakness.     Alcohol withdrawal  Hx of Alcohol abuse  History of pancreatitis  Neuropathy  Patient presented with alcohol level of 0.23 and complaint of generalized weakness after drinking for the last 1 week.  She had previously been sober since August 2021.  Was ready to be discharged from the ed, but was noted to be unsteady and er provider became concerned for alcohol withdrawal.  Patient had tachycardia and hypertension.  Patient has a history of alcohol withdrawal without alcohol withdrawal seizures.  Patient was shaky during my exam.  Patient complaining of neuropathy in the lower extremities which she reports has been progressing, likely due to her alcohol use.  -Initiated on CIWA protocol and needed benzo earlier this is stay but has not needed it for 2 days, DC CIWA.  -Patient declined CD assessment.  Reported she quit alcohol before without help.        Transaminitis   Thrombocytopenia  Likely due to alcohol abuse. Monitor periodically     Generalized weakness.   Likely related to chronic alcoholism.   PT bev noted, recommending TCU at discharge, SW following.     Hyperglycemia and hypoglycemia  BS in 200s.   - HbA1C ordered- 5.8  - ISS ordered but now hypoglycemic this morning.  -DC ISS  -Encourage p.o. intake, hypoglycemia protocol in place      Hypertension, with episode of hypotension  Initially blood pressure was significantly elevated in the emergency room however it improved with time.  -Hydralazine as needed for SBP greater than 180  -Resumed PTA meds including metoprolol cozaar and aldactone.  Patient was also on clonidine for her alcohol withdrawal.  Resulted in hypotension  overnight but asymptomatic.  Discontinue clonidine, decrease PTA metoprolol.  Hold parameters in place for other medications.     Hyperlipidemia  -Resume PTA med at discharge if LFTs trend down     GERD  -Protonix     History esophageal cancer  S/p resection     History of lung cancer  Status post resection     Anxiety  Depression  -Not on med PTA        Diet: Combination Diet Regular Diet Adult  Diet    DVT Prophylaxis: Pneumatic Compression Devices  Jaeger Catheter: Not present  Central Lines: None  Cardiac Monitoring: None  Code Status: Full Code      Disposition Plan   Expected Discharge: 05/18/2022   Medically stable for discharge, pending TCU bed   Anticipated discharge location:  Awaiting care coordination huddle  Delays:              The patient's care was discussed with the Bedside Nurse, Patient and SW/CC.    Minesh Glass MD  Hospitalist Service  [facility]  Securely message with the Vocera Web Console (learn more here)  Text page via [employer] Paging/Directory         Clinically Significant Risk Factors Present on Admission             # Hypoalbuminemia: Albumin = 2.8 g/dL (Ref range: 3.4 - 5.0 g/dL) on admission, will monitor as appropriate    # Platelet Defect: home medication list includes an antiplatelet medication       ______________________________________________________________________    Interval History     Evaluated patient this morning, sitting up in the chair.  Denies dizziness or nausea.  Reports constipation.  -Noted mild hypotension overnight, denies dizziness or chest pain.  -Noted blood sugar of 63 this morning.  Was hyperglycemic and on sliding scale insulin, HbA1c 5.8     Data reviewed today: I reviewed all medications, new labs and imaging results over the last 24 hours. I personally reviewed no images or EKG's today.    Physical Exam   Vital Signs: Temp: 97.5  F (36.4  C) Temp src: Oral BP: 136/52 Pulse: 70   Resp: 18 SpO2: 100 % O2 Device: None (Room air)     Weight: 85 lbs 0 oz      General: AAOx3, up in the chair, appears comfortable.  HEENT: PERRLA EOMI. Mucosa moist.  Lungs: Bilateral equal air entry. Clear to auscultation, normal work of breathing.  CVS: S1S2 regular, no tachycardia or murmur.  Abdomen: Soft, NT, ND. BS heard.  MSK: No edema or deformities.  Neuro: AAOX3. CN 2-12 normal. Strength symmetrical.  Skin: No rash.      Data   Recent Labs   Lab 05/17/22  0722 05/17/22  0200 05/16/22  2131 05/16/22  1213 05/16/22  1028 05/14/22  2243 05/14/22  2243 05/14/22 2220   WBC  --   --   --   --  3.3*  --   --  4.3   HGB  --   --   --   --  11.8  --   --  13.5   MCV  --   --   --   --  100  --   --  93   PLT  --   --   --   --  109*  --   --  163   INR  --   --   --   --  1.00  --   --   --    NA  --   --   --   --  133  --  136  --    POTASSIUM  --   --   --   --  3.4  --  5.1  --    CHLORIDE  --   --   --   --  102  --  100  --    CO2  --   --   --   --  23  --  26  --    BUN  --   --   --   --  10  --  10  --    CR  --   --   --   --  0.53  --  0.44*  --    ANIONGAP  --   --   --   --  8  --  10  --    VICENTE  --   --   --   --  7.9*  --  9.0  --    GLC 63* 84 103*   < > 234*   < > 98  --    ALBUMIN  --   --   --   --  2.8*  --  3.7  --    PROTTOTAL  --   --   --   --  5.7*  --  7.0  --    BILITOTAL  --   --   --   --  0.7  --  1.1  --    ALKPHOS  --   --   --   --  107  --  109  --    ALT  --   --   --   --  54*  --  77*  --    AST  --   --   --   --  53*  --  108*  --    LIPASE  --   --   --   --   --   --  69*  --     < > = values in this interval not displayed.     No results found for this or any previous visit (from the past 24 hour(s)).  Medications       aspirin  81 mg Oral Daily     fluticasone  2 spray Both Nostrils Daily     folic acid  1 mg Oral Daily     furosemide  20 mg Oral QAM     [START ON 5/22/2022] gabapentin  100 mg Oral Q8H     [START ON 5/20/2022] gabapentin  300 mg Oral Q8H     [START ON 5/18/2022] gabapentin  600 mg Oral Q8H      gabapentin  900 mg Oral Q8H     insulin aspart  1-7 Units Subcutaneous TID AC     insulin aspart  1-5 Units Subcutaneous At Bedtime     losartan  12.5 mg Oral Daily     metoprolol succinate ER  25 mg Oral QAM     multivitamin w/minerals  1 tablet Oral Daily     omeprazole  40 mg Oral QAM     sodium chloride (PF)  3 mL Intracatheter Q8H     spironolactone  12.5 mg Oral QAM     thiamine  100 mg Oral Daily

## 2022-05-17 NOTE — PROGRESS NOTES
Care Management Follow Up    Length of Stay (days): 0    Expected Discharge Date: 05/18/2022     Concerns to be Addressed:       Patient plan of care discussed at interdisciplinary rounds: Yes    Anticipated Discharge Disposition:       Anticipated Discharge Services:    Anticipated Discharge DME:      Patient/family educated on Medicare website which has current facility and service quality ratings:    Education Provided on the Discharge Plan:    Patient/Family in Agreement with the Plan:      Referrals Placed by CM/SW:    Private pay costs discussed:     Additional Information:  Met with patient to explain the facilities of her interest have declined her primarily due to her alcohol use prior to this admission.  [facility] use.  [facility] does not have any [facility] and [facility] [place] [facility] reason for decline is not known.   Patient expressed disappointment sharing her relapse was only recent.  Writer explained additional referral will need to be made. Patient accepted this plan however requested to not refer her to [facility].  Additional referrals made to [facility]-  They have a possible vacancy tomorrow  [facility]  [facility] [place] [facility]        Susan Paulino [facility]

## 2022-05-17 NOTE — PROGRESS NOTES
05/17/22 1400   Signing Clinician's Name / Credentials   Signing clinician's name / credentials Mónica Cesar PT   [place] Balance Scale (PENNY K, GETACHEW S, EDWIN RYAN, MEGAN B: MEASURING BALANCE IN THE ELDERLY: VALIDATION OF AN INSTRUMENT. CAN. J. PUB. HEALTH, JULY/AUGUST SUPPLEMENT 2:S7-11, 1992.)   Sit To Stand 2   Standing Unsupported 3   Sitting Unsupported 4   Stand to Sit 2   Transfers 3   Standing with Eyes Closed 3   Standing Unsupported, Feet Together 3   Reach Forward With Outstretched Arm 3   Retrieve Object From Floor 3   Turning to Look Behind 4   Turn 360 Degrees 2   Placing Alternate Foot on Stool (4-6 inches) 2   Unsupported Tandem Stand (Demonstrate to Subject) 2   One Leg Stand 1   Total Score (A score of 45 or less has been correlated with an increased risk of falls)   Total Score (out of 56) 37

## 2022-05-17 NOTE — PLAN OF CARE
DATE & TIME: 5/16/22 1166-2712  Cognitive Concerns/ Orientation : A & O x4   BEHAVIOR & AGGRESSION TOOL COLOR: Green  CIWA SCORE: 1 (for mild headache)  ABNL VS/O2: VSS on RA ex hypotension. Pt is on clonidine (q8h), metoprolol BID and cozaar- may need some of these meds discontinued. PM metoprolol held the last 2 days due to hypotension.  MOBILITY: SBA with gait belt and walker. Very unsteady without walker.   PAIN MANAGMENT: denies pain this shift  DIET: Reg- fair appetite. Pt eats smaller amounts of food, and per pt this is baseline after a surgery for her past throat cancer.   BOWEL/BLADDER: Continent- up to BR. No BM this shift. Passing flatus.   ABNL LAB/BG: Calcium 7.9, ALT 54, AST 53, Hgb A1C 5.8, WBC 3.3, and Platelet 109. , 103  DRAIN/DEVICES: PIV SL  TELEMETRY RHYTHM: NSR  SKIN: WDL, pale/dusky. Bruising to arms from IV attempts and lab draws.   TESTS/PROCEDURES: N/A  D/C DAY/GOALS/PLACE: SW following for TCU placement.   OTHER IMPORTANT INFO: Feeling overall much improved. Tolerating food and liquid. Still unsteady gait, but improving. Walked in halls 5x today. OBS patient, but no observation goals ordered.

## 2022-05-17 NOTE — PROGRESS NOTES
Cognitive Concerns/ Orientation : A&Ox4   BEHAVIOR & AGGRESSION TOOL COLOR: green  CIWA SCORE: 0, not ordered for pt anymore  ABNL VS/O2: VSS on RA  MOBILITY: A1 gb/w - unsteady without walker  PAIN MANAGMENT: denies pain this shift  DIET: Reg- fair appetite, pt states she has had decreased appetite for a while BOWEL/BLADDER: continent- up to BR. No BM this shift  ABNL LAB/BG: Calcium 7.9, ALT 54, AST 53, Hgb A1C 5.8, WBC 3.3, and Platelet 109.   DRAIN/DEVICES: PIV SL  TELEMETRY RHYTHM: NSR  SKIN: WDL  TESTS/PROCEDURES: NA  D/C DAY/GOALS/PLACE: pending  OTHER IMPORTANT INFO: Obs patient, but no observation goals ordered. Awaiting TCU placement. Pt hypotensive 90-70/40's - MD aware. Clonidine held.

## 2022-05-17 NOTE — PLAN OF CARE
Timed Up and Go (TUG): TUG is a test of basic mobility skills. It is used to screen individuals prone to falls.  Gait assistive device used: Wheeled Walker     Patient score 21.7 seconds  ?13.5 seconds indicate at risk for falls in older adults according to Bharati et al 2000.  ?30 seconds - indicates dependency in most ADL and mobility skills according to Sophia & Alexis 1991  >11.5 seconds indicate at risk for falls in adults with Parkinson's Disease    Minimal Detectable Change for patients with Alzheimer s = 4.09 sec   Minimal Detectable Change for patients with Parkinson s Disease = 3.5 sec   according to Kimi & Quincy Vidal 2011    Assessment (rationale for performing, application to patient s function & care plan):   (Minutes billed as physical performance test)

## 2022-05-18 VITALS
TEMPERATURE: 98.4 F | WEIGHT: 85 LBS | RESPIRATION RATE: 19 BRPM | DIASTOLIC BLOOD PRESSURE: 56 MMHG | HEART RATE: 83 BPM | BODY MASS INDEX: 16.05 KG/M2 | SYSTOLIC BLOOD PRESSURE: 135 MMHG | HEIGHT: 61 IN | OXYGEN SATURATION: 99 %

## 2022-05-18 LAB — GLUCOSE BLDC GLUCOMTR-MCNC: 94 MG/DL (ref 70–99)

## 2022-05-18 PROCEDURE — 82962 GLUCOSE BLOOD TEST: CPT

## 2022-05-18 PROCEDURE — G0378 HOSPITAL OBSERVATION PER HR: HCPCS

## 2022-05-18 PROCEDURE — 250N000013 HC RX MED GY IP 250 OP 250 PS 637: Performed by: HOSPITALIST

## 2022-05-18 PROCEDURE — 250N000011 HC RX IP 250 OP 636: Performed by: HOSPITALIST

## 2022-05-18 PROCEDURE — 99217 PR OBSERVATION CARE DISCHARGE: CPT | Performed by: STUDENT IN AN ORGANIZED HEALTH CARE EDUCATION/TRAINING PROGRAM

## 2022-05-18 RX ADMIN — ASPIRIN 81 MG: 81 TABLET, COATED ORAL at 08:02

## 2022-05-18 RX ADMIN — MULTIPLE VITAMINS W/ MINERALS TAB 1 TABLET: TAB at 08:02

## 2022-05-18 RX ADMIN — METOPROLOL SUCCINATE 25 MG: 25 TABLET, EXTENDED RELEASE ORAL at 08:17

## 2022-05-18 RX ADMIN — ONDANSETRON 4 MG: 4 TABLET, ORALLY DISINTEGRATING ORAL at 10:10

## 2022-05-18 RX ADMIN — GABAPENTIN 900 MG: 300 CAPSULE ORAL at 03:08

## 2022-05-18 RX ADMIN — OMEPRAZOLE 40 MG: 20 CAPSULE, DELAYED RELEASE ORAL at 08:17

## 2022-05-18 RX ADMIN — GABAPENTIN 600 MG: 300 CAPSULE ORAL at 10:10

## 2022-05-18 RX ADMIN — FOLIC ACID 1 MG: 1 TABLET ORAL at 08:03

## 2022-05-18 RX ADMIN — FLUTICASONE PROPIONATE 2 SPRAY: 50 SPRAY, METERED NASAL at 08:03

## 2022-05-18 RX ADMIN — SPIRONOLACTONE 12.5 MG: 25 TABLET ORAL at 08:03

## 2022-05-18 RX ADMIN — LOSARTAN POTASSIUM 12.5 MG: 25 TABLET, FILM COATED ORAL at 08:02

## 2022-05-18 RX ADMIN — THIAMINE HCL TAB 100 MG 100 MG: 100 TAB at 08:02

## 2022-05-18 RX ADMIN — FUROSEMIDE 20 MG: 20 TABLET ORAL at 08:02

## 2022-05-18 NOTE — PROGRESS NOTES
Care Management Discharge Note    Discharge Date: 05/18/2022       Discharge Disposition:  ([facility])    Discharge Services:      Discharge DME:      Discharge Transportation:      Private pay costs discussed: transportation costs    PAS Confirmation Code:    Patient/family educated on Medicare website which has current facility and service quality ratings:  yes    Education Provided on the Discharge Plan:  yes  Persons Notified of Discharge Plans: patient  Patient/Family in Agreement with the Plan: yes    Handoff Referral Completed: No    Additional Information:  Patient clinically accepted at [facility] into a double room for today in the afternoon.   Met with patient, gave her the Care Options Fact Sheet and the Medicare Report.  Patient accepts the vacancy.  Discussed transportation and she is requesting a medivan with knowledge of cost.   [facility] medivan is scheduled for 1300          GRACY Varela

## 2022-05-18 NOTE — DISCHARGE SUMMARY
[facility]  Hospitalist Discharge Summary      Date of Admission:  5/14/2022  Date of Discharge:  5/18/2022  Discharging Provider: Nate Zaman MD  Discharge Service: Hospitalist Service    Discharge Diagnoses   Alcohol withdrawal, resolved  Hx of Alcohol abuse  History of pancreatitis  Neuropathy  Transaminitis   Thrombocytopenia  Generalized weakness  Hyperglycemia and hypoglycemia  Hypertension, with episode of hypotension  Hyperlipidemia  GERD  History esophageal cancer  History of lung cancer  Anxiety  Depression    Follow-ups Needed After Discharge   Follow-up Appointments     Follow Up and recommended labs and tests      Follow up with longterm physician.  The following labs/tests are   recommended: CBC and CMP in a week to follow up on cell counts and   LFTS/lytes.           Unresulted Labs Ordered in the Past 30 Days of this Admission     No orders found from 4/14/2022 to 5/15/2022.      These results will be followed up by n/a    Discharge Disposition   Discharged to rehabilitation facility  Condition at discharge: Stable    Hospital Course   Kezia Dixon is a 66 year old female with a past medical history of alcohol abuse, pancreatitis, hypertension, hyperlipidemia, presents to the hospital with generalized weakness.     Alcohol withdrawal  Hx of Alcohol abuse  History of pancreatitis  Neuropathy  Patient presented with alcohol level of 0.23 and complaint of generalized weakness after drinking for the last 1 week.  She had previously been sober since August 2021.  Was ready to be discharged from the ed, but was noted to be unsteady and er provider became concerned for alcohol withdrawal.  Patient had tachycardia and hypertension.  Patient has a history of alcohol withdrawal without alcohol withdrawal seizures.  Patient was shaky during my exam.  Patient complaining of neuropathy in the lower extremities which she reports has been progressing, likely due to her  alcohol use.  -Initiated on CIWA protocol and needed benzo earlier this is stay but has not needed it for 2 days, DC CIWA.  -Patient declined CD assessment.  Reported she quit alcohol before without help.     Transaminitis   Thrombocytopenia  Likely due to alcohol abuse. Monitor periodically     Generalized weakness.   Likely related to chronic alcoholism.   PT bev noted, recommending TCU at discharge, SW following.      Hyperglycemia and hypoglycemia  BS in 200s.   - HbA1C ordered- 5.8  -Encourage p.o. intake     Hypertension, with episode of hypotension  Initially blood pressure was significantly elevated in the emergency room however it improved with time.  -Resumed PTA meds including metoprolol cozaar and aldactone.      Hyperlipidemia  -Resume PTA med at discharge if LFTs trend down     GERD  -Protonix     History esophageal cancer  S/p resection  - needs frequent small meals     History of lung cancer  Status post resection     Anxiety  Depression  -Not on med PTA        Consultations This Hospital Stay   PHYSICAL THERAPY ADULT IP CONSULT  PHYSICAL THERAPY ADULT IP CONSULT  OCCUPATIONAL THERAPY ADULT IP CONSULT    Code Status   Prior    Time Spent on this Encounter   I, Nate Zaman MD, personally saw the patient today and spent greater than 30 minutes discharging this patient.       Nate Zaman MD  [place] [address] [facility]  [address] [place] [address]  Phone: [phone]  ______________________________________________________________________    Physical Exam   Vital Signs: Temp: 98.4  F (36.9  C) Temp src: Oral BP: 135/56 Pulse: 83   Resp: 19 SpO2: 99 % O2 Device: None (Room air)    Weight: 85 lbs 0 oz  Constitutional: Awake, alert, cooperative, no apparent distress  Respiratory: Clear to auscultation bilaterally, no crackles or wheezing  Cardiovascular: Regular rate and rhythm, normal S1 and S2, and no murmur noted  GI: Normal bowel sounds, soft,  non-distended, non-tender  Skin/Integumen: No rashes, no cyanosis, no edema  Other: Seen ambulating sharma before discharge with slow but steady gait.          Primary Care Physician   Mustapha Velásquez    Discharge Orders      Primary Care - Care Coordination Referral      General info for SNF    Length of Stay Estimate: Short Term Care: Estimated # of Days <30  Condition at Discharge: Stable  Level of care:skilled   Rehabilitation Potential: Good  Admission H&P remains valid and up-to-date: Yes  Recent Chemotherapy: N/A  Use Nursing Home Standing Orders: Yes     Mantoux instructions    Give two-step Mantoux (PPD) Per Facility Policy Yes     Follow Up and recommended labs and tests    Follow up with group home physician.  The following labs/tests are recommended: CBC and CMP in a week to follow up on cell counts and LFTS/lytes.     Reason for your hospital stay    Alcohol intoxication and withdrawal     Activity - Up with assistive device     Activity - Up with nursing assistance     Physical Therapy Adult Consult    Evaluate and treat as clinically indicated.    Reason:  Generalized weakenss     Occupational Therapy Adult Consult    Evaluate and treat as clinically indicated.    Reason:  weakness and deconditioning     Fall precautions     Diet    Follow this diet upon discharge: Orders Placed This Encounter      Combination Diet Regular Diet Adult    Patient should have frequent small meals and snacks       Significant Results and Procedures   Most Recent 3 CBC's:Recent Labs   Lab Test 05/16/22  1028 05/14/22  2220 10/20/21  0933   WBC 3.3* 4.3 9.3   HGB 11.8 13.5 10.1*    93 95   * 163 131*     Most Recent 3 BMP's:Recent Labs   Lab Test 05/18/22  0154 05/17/22  0906 05/17/22  0722 05/16/22  1213 05/16/22  1028 05/14/22  2243 02/28/22  1032   NA  --   --   --   --  133 136 135   POTASSIUM  --   --   --   --  3.4 5.1 3.3*   CHLORIDE  --   --   --   --  102 100 98   CO2  --   --   --   --  23 26 27    BUN  --   --   --   --  10 10 13   CR  --   --   --   --  0.53 0.44* 0.57   ANIONGAP  --   --   --   --  8 10 10   VICENTE  --   --   --   --  7.9* 9.0 9.2   GLC 94 238* 63*   < > 234* 98 229*    < > = values in this interval not displayed.     Most Recent 2 LFT's:Recent Labs   Lab Test 22  1028 22  2243   AST 53* 108*   ALT 54* 77*   ALKPHOS 107 109   BILITOTAL 0.7 1.1   ,   Results for orders placed or performed during the hospital encounter of 22   Echocardiogram Complete     Value    LVEF  65-70%    Narrative    [id number]  [id number]  [id number]  [id number]^EMILIE^ASA^DHARA     [facility]  [facility]  Echocardiography Laboratory  [address]  [address]  [place] [address]  Phone [phone]  Fax [phone]     Name: MATTEO CASTAÑEDA  MRN: [id number]  : 1955  Study Date: 2022 11:07 AM  Age: 66 yrs  Gender: Female  Patient Location: [facility]  Reason For Study: Chronic systolic congestive heart failure; Pulmonary  Hypertension  Ordering Physician: ASA PHAN  Referring Physician: Mustapha Velásquez  Performed By: Nickie Pope     BSA: 1.3 m2  Height: 61 in  Weight: 86 lb  HR: 77  BP: 117/77 mmHg  ______________________________________________________________________________  Procedure  Complete Echo Adult.     ______________________________________________________________________________  Interpretation Summary     Poor image quality  Hyperdynamic left ventricular function  The visual ejection fraction is 65-70%.  Diastolic Doppler findings (E/E' ratio and/or other parameters) suggest left  ventricular filling pressures are increased.  The left ventricular apex is not well visualized.  There is mild (1+) aortic regurgitation.  ______________________________________________________________________________  Left Ventricle  The left ventricular cavity is small. Hyperdynamic left ventricular function.  The visual ejection fraction is  65-70%. Grade I or early diastolic  dysfunction. Diastolic Doppler findings (E/E' ratio and/or other parameters)  suggest left ventricular filling pressures are increased. The left ventricular  apex is not well visualized.     Right Ventricle  The right ventricle is not well visualized. The right ventricular systolic  function is normal.     Atria  The left atrium is not well visualized. Normal left atrial size. Right atrium  not well visualized.     Mitral Valve  The mitral valve is not well visualized. There is trace to mild mitral  regurgitation. Normal mitral valve velocity.     Tricuspid Valve  The tricuspid valve is not well visualized. The right ventricular systolic  pressure is elevated at 30.0 mmHg.     Aortic Valve  The aortic valve is not well visualized. There is mild (1+) aortic  regurgitation. No hemodynamically significant valvular aortic stenosis.     Pulmonic Valve  The pulmonic valve is not well visualized. Normal pulmonic valve velocity.     Vessels  Normal size aorta. The inferior vena cava is normal.     Pericardium  There is pericardial thickening and/or a small pericardial effusion.     ______________________________________________________________________________  MMode/2D Measurements & Calculations  IVSd: 0.87 cm  LVIDd: 2.6 cm  LVIDs: 1.7 cm  LVPWd: 0.83 cm  FS: 35.2 %  LV mass(C)d: 52.8 grams  LV mass(C)dI: 40.0 grams/m2  Ao root diam: 3.2 cm  asc Aorta Diam: 3.2 cm  LVOT diam: 2.0 cm  LVOT area: 3.1 cm2  RWT: 0.63     TAPSE: 2.3 cm     Doppler Measurements & Calculations  MV E max richa: 115.0 cm/sec  MV A max richa: 62.6 cm/sec  MV E/A: 1.8  MV dec slope: 652.2 cm/sec2  AI P1/2t: 279.0 msec  LV V1 max PG: 3.6 mmHg  LV V1 max: 94.3 cm/sec  LV V1 VTI: 24.3 cm  SV(LVOT): 75.3 ml  SI(LVOT): 57.1 ml/m2  PA acc time: 0.11 sec  TR max richa: 273.6 cm/sec  TR max P.0 mmHg  E/E' av.6  Lateral E/e': 17.9  Medial E/e': 19.2      ______________________________________________________________________________  Report approved by: Nina Sanchez 01/26/2022 01:01 PM               Discharge Medications   Discharge Medication List as of 5/18/2022 12:47 PM      START taking these medications    Details   gabapentin (NEURONTIN) 300 MG capsule Take 900 mg PO  TID for 2 days then 600mg PO TID for next 2 days then 300 mg PO TID for next 2 days then 100 mg PO TID for next 2 days then stop, Transitional      omeprazole (PRILOSEC OTC) 20 MG EC tablet Take 1 tablet (20 mg) by mouth daily for 14 days, Transitional      ondansetron (ZOFRAN ODT) 4 MG ODT tab Take 1-2 tablets (4-8 mg) by mouth every 8 hours as needed for nausea, Disp-12 tablet, R-0, E-Prescribe      polyethylene glycol (MIRALAX) 17 GM/Dose powder Take 17 g by mouth daily, Disp-510 g, Transitional         CONTINUE these medications which have CHANGED    Details   metoprolol succinate ER (TOPROL XL) 25 MG 24 hr tablet Take 1 tablet (25 mg) by mouth every morning, Transitional         CONTINUE these medications which have NOT CHANGED    Details   aspirin (ASA) 81 MG EC tablet Take 1 tablet (81 mg) by mouth daily, OTC      atorvastatin (LIPITOR) 40 MG tablet Take 1 tablet (40 mg) by mouth daily, Disp-90 tablet, R-3, E-Prescribe      fluticasone (FLONASE) 50 MCG/ACT nasal spray INSTILL 2 SPRAYS INTO BOTH NOSTRILS DAILY., Disp-48 mL, R-2, E-Prescribe      folic acid (FOLVITE) 1 MG tablet Take 1 tablet (1 mg) by mouth daily, Disp-90 tablet, R-3, E-Prescribe      furosemide (LASIX) 40 MG tablet Take 0.5 tablets (20 mg) by mouth every morning Ok to take additional 0.5 tab for worsening shortness of breath, leg swelling or weight gain., Disp-90 tablet, R-3, E-Prescribe      ibuprofen (ADVIL/MOTRIN) 200 MG tablet Take 400 mg by mouth every 4 hours as needed for mild pain, Historical      losartan (COZAAR) 25 MG tablet Take 0.5 tablets (12.5 mg) by mouth daily, Disp-45 tablet, R-3, E-Prescribe       omeprazole (PRILOSEC) 40 MG DR capsule TAKE 1 CAPSULE BY MOUTH EVERY DAY, Disp-90 capsule, R-1, E-Prescribe      thiamine (B-1) 100 MG tablet Take 1 tablet (100 mg) by mouth daily, Disp-90 tablet, R-3, E-Prescribe      spironolactone (ALDACTONE) 25 MG tablet Take 0.5 tablets (12.5 mg) by mouth every morning, Disp-45 tablet, R-3, E-Prescribe           Allergies   Allergies   Allergen Reactions     Codeine Sulfate Nausea     Simvastatin Cramps     Leg cramps     Pcn [Penicillins] Rash

## 2022-05-18 NOTE — PLAN OF CARE
Goal Outcome Evaluation:           Cognitive Concerns/ Orientation : A & O x4   BEHAVIOR & AGGRESSION TOOL COLOR: Green  CIWA SCORE: Not ordered. Not scoring.   ABNL VS/O2: VSS on RA  MOBILITY: IND - 2 walks this shift  PAIN MANAGMENT:denies - scheduled gabapentin  DIET: Reg- good appetite.   BOWEL/BLADDER: Continent- up to BR. No BM this shift. Passing flatus.   ABNL LAB/BG: No new labs drawn   DRAIN/DEVICES: PIV SL  TELEMETRY RHYTHM: Discontinued.   SKIN: WDL, pale/dusky. Bruising to arms from IV attempts and lab draws.   TESTS/PROCEDURES: N/A  D/C DAY/GOALS/PLACE: TCU placement.   OTHER IMPORTANT INFO:

## 2022-05-18 NOTE — PLAN OF CARE
Goal Outcome Evaluation:    DATE & TIME: 5/17/22 [id number]  Cognitive Concerns/ Orientation : A & O x4   BEHAVIOR & AGGRESSION TOOL COLOR: Green  CIWA SCORE: Not ordered. Not scoring.   ABNL VS/O2: VSS on RA  MOBILITY: IND - 2 walks this shift  PAIN MANAGMENT:denies - scheduled gabapentin  DIET: Reg- good appetite.   BOWEL/BLADDER: Continent- up to BR. No BM this shift. Passing flatus.   ABNL LAB/BG: No new labs drawn   DRAIN/DEVICES: PIV SL  TELEMETRY RHYTHM: Discontinued.   SKIN: WDL, pale/dusky. Bruising to arms from IV attempts and lab draws.   TESTS/PROCEDURES: N/A  D/C DAY/GOALS/PLACE: TCU placement.   OTHER IMPORTANT INFO:  Ambulated in halls x2 - up in recliner.

## 2022-05-18 NOTE — PLAN OF CARE
Physical Therapy Discharge Summary    Reason for therapy discharge:    Discharged to transitional care facility.    Progress towards therapy goal(s). See goals on Care Plan in [facility] electronic health record for goal details.  Goals not met.  Barriers to achieving goals:   discharge from facility.    Therapy recommendation(s):    Continued therapy is recommended.  Rationale/Recommendations:  Pt would benefit from continued skilled PT services via TCU to address deficits and improve IND with safety and functional mobility.

## 2022-05-18 NOTE — DISCHARGE SUMMARY
VSS. Pt discharged to [facility] with EMS. AVS printed, questions answered. Pt verbalized understanding. IV discontinued. All belongings went with pt.

## 2022-05-18 NOTE — PROGRESS NOTES
PAS-RR    D: Per DHS regulation, SW completed and submitted PAS-RR to [place] [employer] Direct Connect via the Senior LinkAge Line.  PAS-RR confirmation # is : [id number]    I: SW spoke with patient and they are aware a PAS-RR has been submitted.  SW reviewed with patient that they may be contacted for a follow up appointment within 10 days of hospital discharge if their SNF stay is < 30 days.  Contact information for Senior LinkAge Line was also provided.    A: patient verbalized understanding.    P: Further questions may be directed to Senior LinkAge Line at #[phone], option #4 for PAS-RR staff.    DELIA Davidson

## 2022-05-19 ENCOUNTER — PATIENT OUTREACH (OUTPATIENT)
Dept: CARE COORDINATION | Facility: CLINIC | Age: 67
End: 2022-05-19
Payer: MEDICARE

## 2022-05-19 NOTE — LETTER
[facility]   To:   Gisella at [facility]          Please give to facility    From:   JULIO Flowers Care Coordinator [facility]     Patient Name:  Kezia Dixon  YOB: 1955    Admit date: 5/18/2022      *Information Needed:  Please contact me when the patient will discharge (or if they will move to long term care)- include the discharge date, disposition, and main diagnosis   - If the patient is discharged with home care services, please provide the name of the agency    Also- Please inform me if a care conference is being held.   Phone, Fax or Email with information       Thank You,   ERIK Flowers, MSW  Clinic Care Coordinator  [facility] [place] [place] [place]  Ph: [phone]  [email or web address]

## 2022-05-19 NOTE — PROGRESS NOTES
Clinic Care Coordination Contact  Care Coordination Transition Communication         Clinical Data: [facility]  Hospitalist Discharge Summary       Date of Admission:  5/14/2022  Date of Discharge:  5/18/2022  Discharging Provider: Nate Zaman MD  Discharge Service: Hospitalist Service        Discharge Diagnoses     Alcohol withdrawal, resolved  Hx of Alcohol abuse  History of pancreatitis  Neuropathy  Transaminitis   Thrombocytopenia  Generalized weakness  Hyperglycemia and hypoglycemia  Hypertension, with episode of hypotension  Hyperlipidemia  GERD  History esophageal cancer  History of lung cancer  Anxiety  Depression    Transition to Facility:              Facility Name: [facility]              Contact name and phone number/fax: [phone]    Plan: RN/SW Care Coordinator will await notification from facility staff informing RN/SW Care Coordinator of patient's discharge plans/needs. RN/SW Care Coordinator will review chart and outreach to facility staff every 4 weeks and as needed. Fax sent to TCU with my contact information requesting notification upon discharge.    GRACY Flowers  Clinic Care Coordinator  [facility]  [phone]  [email or web address]

## 2022-06-08 ENCOUNTER — HOSPITAL ENCOUNTER (INPATIENT)
Facility: CLINIC | Age: 67
LOS: 2 days | Discharge: HOME | DRG: 178 | End: 2022-06-11
Attending: EMERGENCY MEDICINE | Admitting: INTERNAL MEDICINE
Payer: MEDICARE

## 2022-06-08 ENCOUNTER — APPOINTMENT (OUTPATIENT)
Dept: GENERAL RADIOLOGY | Facility: CLINIC | Age: 67
DRG: 178 | End: 2022-06-08
Attending: EMERGENCY MEDICINE
Payer: MEDICARE

## 2022-06-08 DIAGNOSIS — U07.1 INFECTION DUE TO 2019 NOVEL CORONAVIRUS: ICD-10-CM

## 2022-06-08 DIAGNOSIS — I42.9 CARDIOMYOPATHY, UNSPECIFIED TYPE (H): ICD-10-CM

## 2022-06-08 DIAGNOSIS — E87.6 HYPOKALEMIA: ICD-10-CM

## 2022-06-08 DIAGNOSIS — E16.2 HYPOGLYCEMIA: ICD-10-CM

## 2022-06-08 LAB
ALBUMIN SERPL-MCNC: 3 G/DL (ref 3.4–5)
ALP SERPL-CCNC: 94 U/L (ref 40–150)
ALT SERPL W P-5'-P-CCNC: 31 U/L (ref 0–50)
AMMONIA PLAS-SCNC: 26 UMOL/L (ref 10–50)
ANION GAP SERPL CALCULATED.3IONS-SCNC: 15 MMOL/L (ref 3–14)
AST SERPL W P-5'-P-CCNC: 41 U/L (ref 0–45)
ATRIAL RATE - MUSE: 87 BPM
BASOPHILS # BLD AUTO: 0 10E3/UL (ref 0–0.2)
BASOPHILS NFR BLD AUTO: 1 %
BILIRUB SERPL-MCNC: 0.3 MG/DL (ref 0.2–1.3)
BUN SERPL-MCNC: 15 MG/DL (ref 7–30)
CALCIUM SERPL-MCNC: 8 MG/DL (ref 8.5–10.1)
CHLORIDE BLD-SCNC: 98 MMOL/L (ref 94–109)
CO2 SERPL-SCNC: 23 MMOL/L (ref 20–32)
CREAT SERPL-MCNC: 0.53 MG/DL (ref 0.52–1.04)
DIASTOLIC BLOOD PRESSURE - MUSE: NORMAL MMHG
EOSINOPHIL # BLD AUTO: 0 10E3/UL (ref 0–0.7)
EOSINOPHIL NFR BLD AUTO: 1 %
ERYTHROCYTE [DISTWIDTH] IN BLOOD BY AUTOMATED COUNT: 13.6 % (ref 10–15)
FLUAV RNA SPEC QL NAA+PROBE: NEGATIVE
FLUBV RNA RESP QL NAA+PROBE: NEGATIVE
GFR SERPL CREATININE-BSD FRML MDRD: >90 ML/MIN/1.73M2
GLUCOSE BLD-MCNC: 56 MG/DL (ref 70–99)
GLUCOSE BLDC GLUCOMTR-MCNC: 254 MG/DL (ref 70–99)
HCO3 BLDV-SCNC: 24 MMOL/L (ref 21–28)
HCT VFR BLD AUTO: 36.1 % (ref 35–47)
HGB BLD-MCNC: 12 G/DL (ref 11.7–15.7)
HOLD SPECIMEN: NORMAL
IMM GRANULOCYTES # BLD: 0 10E3/UL
IMM GRANULOCYTES NFR BLD: 1 %
INTERPRETATION ECG - MUSE: NORMAL
LACTATE BLD-SCNC: 3.1 MMOL/L
LIPASE SERPL-CCNC: 52 U/L (ref 73–393)
LYMPHOCYTES # BLD AUTO: 1.5 10E3/UL (ref 0.8–5.3)
LYMPHOCYTES NFR BLD AUTO: 24 %
MCH RBC QN AUTO: 30.5 PG (ref 26.5–33)
MCHC RBC AUTO-ENTMCNC: 33.2 G/DL (ref 31.5–36.5)
MCV RBC AUTO: 92 FL (ref 78–100)
MONOCYTES # BLD AUTO: 0.7 10E3/UL (ref 0–1.3)
MONOCYTES NFR BLD AUTO: 11 %
NEUTROPHILS # BLD AUTO: 3.8 10E3/UL (ref 1.6–8.3)
NEUTROPHILS NFR BLD AUTO: 62 %
NRBC # BLD AUTO: 0 10E3/UL
NRBC BLD AUTO-RTO: 0 /100
P AXIS - MUSE: 76 DEGREES
PCO2 BLDV: 32 MM HG (ref 40–50)
PH BLDV: 7.48 [PH] (ref 7.32–7.43)
PLATELET # BLD AUTO: 277 10E3/UL (ref 150–450)
PO2 BLDV: 60 MM HG (ref 25–47)
POTASSIUM BLD-SCNC: 3.3 MMOL/L (ref 3.4–5.3)
PR INTERVAL - MUSE: 140 MS
PROT SERPL-MCNC: 6.5 G/DL (ref 6.8–8.8)
QRS DURATION - MUSE: 64 MS
QT - MUSE: 386 MS
QTC - MUSE: 464 MS
R AXIS - MUSE: 86 DEGREES
RBC # BLD AUTO: 3.93 10E6/UL (ref 3.8–5.2)
RSV RNA SPEC NAA+PROBE: NEGATIVE
SAO2 % BLDV: 93 % (ref 94–100)
SARS-COV-2 RNA RESP QL NAA+PROBE: POSITIVE
SODIUM SERPL-SCNC: 136 MMOL/L (ref 133–144)
SYSTOLIC BLOOD PRESSURE - MUSE: NORMAL MMHG
T AXIS - MUSE: 68 DEGREES
TROPONIN I SERPL HS-MCNC: 9 NG/L
VENTRICULAR RATE- MUSE: 87 BPM
WBC # BLD AUTO: 6 10E3/UL (ref 4–11)

## 2022-06-08 PROCEDURE — 96361 HYDRATE IV INFUSION ADD-ON: CPT

## 2022-06-08 PROCEDURE — 99285 EMERGENCY DEPT VISIT HI MDM: CPT | Mod: 25

## 2022-06-08 PROCEDURE — 86140 C-REACTIVE PROTEIN: CPT | Performed by: INTERNAL MEDICINE

## 2022-06-08 PROCEDURE — 82140 ASSAY OF AMMONIA: CPT | Performed by: EMERGENCY MEDICINE

## 2022-06-08 PROCEDURE — 87637 SARSCOV2&INF A&B&RSV AMP PRB: CPT | Performed by: EMERGENCY MEDICINE

## 2022-06-08 PROCEDURE — 84484 ASSAY OF TROPONIN QUANT: CPT | Performed by: EMERGENCY MEDICINE

## 2022-06-08 PROCEDURE — 258N000003 HC RX IP 258 OP 636: Performed by: EMERGENCY MEDICINE

## 2022-06-08 PROCEDURE — 85025 COMPLETE CBC W/AUTO DIFF WBC: CPT | Performed by: EMERGENCY MEDICINE

## 2022-06-08 PROCEDURE — 80053 COMPREHEN METABOLIC PANEL: CPT | Performed by: EMERGENCY MEDICINE

## 2022-06-08 PROCEDURE — 85379 FIBRIN DEGRADATION QUANT: CPT | Performed by: INTERNAL MEDICINE

## 2022-06-08 PROCEDURE — 83880 ASSAY OF NATRIURETIC PEPTIDE: CPT | Performed by: INTERNAL MEDICINE

## 2022-06-08 PROCEDURE — 87040 BLOOD CULTURE FOR BACTERIA: CPT | Performed by: EMERGENCY MEDICINE

## 2022-06-08 PROCEDURE — 82962 GLUCOSE BLOOD TEST: CPT

## 2022-06-08 PROCEDURE — C9803 HOPD COVID-19 SPEC COLLECT: HCPCS

## 2022-06-08 PROCEDURE — 71045 X-RAY EXAM CHEST 1 VIEW: CPT

## 2022-06-08 PROCEDURE — 83605 ASSAY OF LACTIC ACID: CPT

## 2022-06-08 PROCEDURE — 258N000001 HC RX 258: Performed by: EMERGENCY MEDICINE

## 2022-06-08 PROCEDURE — 83690 ASSAY OF LIPASE: CPT | Performed by: EMERGENCY MEDICINE

## 2022-06-08 PROCEDURE — 93005 ELECTROCARDIOGRAM TRACING: CPT

## 2022-06-08 PROCEDURE — 36415 COLL VENOUS BLD VENIPUNCTURE: CPT | Performed by: EMERGENCY MEDICINE

## 2022-06-08 PROCEDURE — 96374 THER/PROPH/DIAG INJ IV PUSH: CPT

## 2022-06-08 PROCEDURE — 84145 PROCALCITONIN (PCT): CPT | Performed by: INTERNAL MEDICINE

## 2022-06-08 RX ORDER — DEXTROSE MONOHYDRATE 25 G/50ML
50 INJECTION, SOLUTION INTRAVENOUS ONCE
Status: COMPLETED | OUTPATIENT
Start: 2022-06-08 | End: 2022-06-08

## 2022-06-08 RX ORDER — DEXTROSE MONOHYDRATE 25 G/50ML
25 INJECTION, SOLUTION INTRAVENOUS ONCE
Status: DISCONTINUED | OUTPATIENT
Start: 2022-06-08 | End: 2022-06-08

## 2022-06-08 RX ORDER — POTASSIUM CHLORIDE 1.5 G/1.58G
40 POWDER, FOR SOLUTION ORAL ONCE
Status: COMPLETED | OUTPATIENT
Start: 2022-06-08 | End: 2022-06-09

## 2022-06-08 RX ADMIN — DEXTROSE MONOHYDRATE 50 ML: 25 INJECTION, SOLUTION INTRAVENOUS at 23:10

## 2022-06-08 RX ADMIN — SODIUM CHLORIDE 1000 ML: 9 INJECTION, SOLUTION INTRAVENOUS at 23:39

## 2022-06-08 ASSESSMENT — ENCOUNTER SYMPTOMS
SHORTNESS OF BREATH: 1
DIARRHEA: 1
COUGH: 1
CHILLS: 1
DIZZINESS: 1
FEVER: 0
LIGHT-HEADEDNESS: 1
MYALGIAS: 1
HEADACHES: 1
SORE THROAT: 1

## 2022-06-09 LAB
CRP SERPL-MCNC: 3.5 MG/L (ref 0–8)
D DIMER PPP FEU-MCNC: 0.68 UG/ML FEU (ref 0–0.5)
GLUCOSE BLDC GLUCOMTR-MCNC: 88 MG/DL (ref 70–99)
LACTATE SERPL-SCNC: 1.7 MMOL/L (ref 0.7–2)
NT-PROBNP SERPL-MCNC: 435 PG/ML (ref 0–900)
POTASSIUM BLD-SCNC: 3.1 MMOL/L (ref 3.4–5.3)
POTASSIUM BLD-SCNC: 4.2 MMOL/L (ref 3.4–5.3)
PROCALCITONIN SERPL-MCNC: 0.07 NG/ML

## 2022-06-09 PROCEDURE — 99223 1ST HOSP IP/OBS HIGH 75: CPT | Mod: AI | Performed by: INTERNAL MEDICINE

## 2022-06-09 PROCEDURE — 120N000001 HC R&B MED SURG/OB

## 2022-06-09 PROCEDURE — G0378 HOSPITAL OBSERVATION PER HR: HCPCS

## 2022-06-09 PROCEDURE — 84132 ASSAY OF SERUM POTASSIUM: CPT | Performed by: INTERNAL MEDICINE

## 2022-06-09 PROCEDURE — 258N000003 HC RX IP 258 OP 636: Performed by: INTERNAL MEDICINE

## 2022-06-09 PROCEDURE — 250N000013 HC RX MED GY IP 250 OP 250 PS 637: Performed by: HOSPITALIST

## 2022-06-09 PROCEDURE — 250N000013 HC RX MED GY IP 250 OP 250 PS 637: Performed by: EMERGENCY MEDICINE

## 2022-06-09 PROCEDURE — 87040 BLOOD CULTURE FOR BACTERIA: CPT | Performed by: EMERGENCY MEDICINE

## 2022-06-09 PROCEDURE — 36415 COLL VENOUS BLD VENIPUNCTURE: CPT | Performed by: HOSPITALIST

## 2022-06-09 PROCEDURE — XW033E5 INTRODUCTION OF REMDESIVIR ANTI-INFECTIVE INTO PERIPHERAL VEIN, PERCUTANEOUS APPROACH, NEW TECHNOLOGY GROUP 5: ICD-10-PCS | Performed by: HOSPITALIST

## 2022-06-09 PROCEDURE — 36415 COLL VENOUS BLD VENIPUNCTURE: CPT | Performed by: INTERNAL MEDICINE

## 2022-06-09 PROCEDURE — 83605 ASSAY OF LACTIC ACID: CPT | Performed by: INTERNAL MEDICINE

## 2022-06-09 PROCEDURE — 250N000011 HC RX IP 250 OP 636: Performed by: INTERNAL MEDICINE

## 2022-06-09 PROCEDURE — 84132 ASSAY OF SERUM POTASSIUM: CPT | Performed by: HOSPITALIST

## 2022-06-09 PROCEDURE — 36415 COLL VENOUS BLD VENIPUNCTURE: CPT | Performed by: EMERGENCY MEDICINE

## 2022-06-09 PROCEDURE — 250N000013 HC RX MED GY IP 250 OP 250 PS 637: Performed by: INTERNAL MEDICINE

## 2022-06-09 PROCEDURE — C9113 INJ PANTOPRAZOLE SODIUM, VIA: HCPCS | Performed by: INTERNAL MEDICINE

## 2022-06-09 RX ORDER — ONDANSETRON 2 MG/ML
4 INJECTION INTRAMUSCULAR; INTRAVENOUS EVERY 6 HOURS PRN
Status: DISCONTINUED | OUTPATIENT
Start: 2022-06-09 | End: 2022-06-11 | Stop reason: HOSPADM

## 2022-06-09 RX ORDER — METOPROLOL SUCCINATE 25 MG/1
25 TABLET, EXTENDED RELEASE ORAL EVERY EVENING
Status: DISCONTINUED | OUTPATIENT
Start: 2022-06-09 | End: 2022-06-11 | Stop reason: HOSPADM

## 2022-06-09 RX ORDER — POTASSIUM CHLORIDE 1.5 G/1.58G
20 POWDER, FOR SOLUTION ORAL ONCE
Status: COMPLETED | OUTPATIENT
Start: 2022-06-09 | End: 2022-06-09

## 2022-06-09 RX ORDER — AMOXICILLIN 250 MG
1 CAPSULE ORAL 2 TIMES DAILY PRN
Status: DISCONTINUED | OUTPATIENT
Start: 2022-06-09 | End: 2022-06-11 | Stop reason: HOSPADM

## 2022-06-09 RX ORDER — ATORVASTATIN CALCIUM 40 MG/1
40 TABLET, FILM COATED ORAL DAILY
Status: DISCONTINUED | OUTPATIENT
Start: 2022-06-09 | End: 2022-06-11 | Stop reason: HOSPADM

## 2022-06-09 RX ORDER — ACETAMINOPHEN 325 MG/1
650 TABLET ORAL EVERY 6 HOURS PRN
Status: DISCONTINUED | OUTPATIENT
Start: 2022-06-09 | End: 2022-06-11 | Stop reason: HOSPADM

## 2022-06-09 RX ORDER — LIDOCAINE 40 MG/G
CREAM TOPICAL
Status: DISCONTINUED | OUTPATIENT
Start: 2022-06-09 | End: 2022-06-11 | Stop reason: HOSPADM

## 2022-06-09 RX ORDER — METOPROLOL SUCCINATE 50 MG/1
50 TABLET, EXTENDED RELEASE ORAL EVERY MORNING
Status: DISCONTINUED | OUTPATIENT
Start: 2022-06-10 | End: 2022-06-11 | Stop reason: HOSPADM

## 2022-06-09 RX ORDER — ONDANSETRON 4 MG/1
4 TABLET, ORALLY DISINTEGRATING ORAL EVERY 6 HOURS PRN
Status: DISCONTINUED | OUTPATIENT
Start: 2022-06-09 | End: 2022-06-11 | Stop reason: HOSPADM

## 2022-06-09 RX ORDER — FLUTICASONE PROPIONATE 50 MCG
2 SPRAY, SUSPENSION (ML) NASAL DAILY
Status: DISCONTINUED | OUTPATIENT
Start: 2022-06-09 | End: 2022-06-11 | Stop reason: HOSPADM

## 2022-06-09 RX ORDER — POLYETHYLENE GLYCOL 3350 17 G/17G
17 POWDER, FOR SOLUTION ORAL DAILY PRN
Status: DISCONTINUED | OUTPATIENT
Start: 2022-06-09 | End: 2022-06-11 | Stop reason: HOSPADM

## 2022-06-09 RX ORDER — ENOXAPARIN SODIUM 100 MG/ML
30 INJECTION SUBCUTANEOUS EVERY 24 HOURS
Status: DISCONTINUED | OUTPATIENT
Start: 2022-06-09 | End: 2022-06-11 | Stop reason: HOSPADM

## 2022-06-09 RX ORDER — FOLIC ACID 1 MG/1
1 TABLET ORAL DAILY
Status: DISCONTINUED | OUTPATIENT
Start: 2022-06-09 | End: 2022-06-11 | Stop reason: HOSPADM

## 2022-06-09 RX ORDER — ACETAMINOPHEN 650 MG/1
650 SUPPOSITORY RECTAL EVERY 6 HOURS PRN
Status: DISCONTINUED | OUTPATIENT
Start: 2022-06-09 | End: 2022-06-11 | Stop reason: HOSPADM

## 2022-06-09 RX ORDER — SPIRONOLACTONE 25 MG
12.5 TABLET ORAL EVERY MORNING
Status: DISCONTINUED | OUTPATIENT
Start: 2022-06-10 | End: 2022-06-11 | Stop reason: HOSPADM

## 2022-06-09 RX ORDER — ASPIRIN 81 MG/1
81 TABLET ORAL DAILY
Status: DISCONTINUED | OUTPATIENT
Start: 2022-06-09 | End: 2022-06-11 | Stop reason: HOSPADM

## 2022-06-09 RX ORDER — GABAPENTIN 300 MG/1
600 CAPSULE ORAL 3 TIMES DAILY
Status: DISCONTINUED | OUTPATIENT
Start: 2022-06-09 | End: 2022-06-11 | Stop reason: HOSPADM

## 2022-06-09 RX ORDER — METOPROLOL SUCCINATE 25 MG/1
25 TABLET, EXTENDED RELEASE ORAL EVERY EVENING
COMMUNITY
End: 2022-08-09

## 2022-06-09 RX ORDER — SODIUM CHLORIDE 9 MG/ML
INJECTION, SOLUTION INTRAVENOUS CONTINUOUS
Status: DISCONTINUED | OUTPATIENT
Start: 2022-06-09 | End: 2022-06-09

## 2022-06-09 RX ORDER — FUROSEMIDE 20 MG
20 TABLET ORAL EVERY MORNING
Status: DISCONTINUED | OUTPATIENT
Start: 2022-06-10 | End: 2022-06-11 | Stop reason: HOSPADM

## 2022-06-09 RX ORDER — AMOXICILLIN 250 MG
2 CAPSULE ORAL 2 TIMES DAILY PRN
Status: DISCONTINUED | OUTPATIENT
Start: 2022-06-09 | End: 2022-06-11 | Stop reason: HOSPADM

## 2022-06-09 RX ORDER — METOPROLOL SUCCINATE 50 MG/1
50 TABLET, EXTENDED RELEASE ORAL EVERY MORNING
COMMUNITY
End: 2022-08-09

## 2022-06-09 RX ADMIN — METOPROLOL SUCCINATE 25 MG: 25 TABLET, EXTENDED RELEASE ORAL at 20:35

## 2022-06-09 RX ADMIN — ENOXAPARIN SODIUM 30 MG: 30 INJECTION SUBCUTANEOUS at 10:05

## 2022-06-09 RX ADMIN — FOLIC ACID 1 MG: 1 TABLET ORAL at 08:59

## 2022-06-09 RX ADMIN — SODIUM CHLORIDE 50 ML: 900 INJECTION INTRAVENOUS at 01:52

## 2022-06-09 RX ADMIN — ONDANSETRON 4 MG: 2 INJECTION INTRAMUSCULAR; INTRAVENOUS at 13:54

## 2022-06-09 RX ADMIN — POTASSIUM CHLORIDE 40 MEQ: 1.5 POWDER, FOR SOLUTION ORAL at 00:22

## 2022-06-09 RX ADMIN — POTASSIUM CHLORIDE 20 MEQ: 1.5 POWDER, FOR SOLUTION ORAL at 05:51

## 2022-06-09 RX ADMIN — THIAMINE HCL TAB 100 MG 100 MG: 100 TAB at 08:59

## 2022-06-09 RX ADMIN — REMDESIVIR 200 MG: 100 INJECTION, POWDER, LYOPHILIZED, FOR SOLUTION INTRAVENOUS at 01:49

## 2022-06-09 RX ADMIN — ONDANSETRON 4 MG: 2 INJECTION INTRAMUSCULAR; INTRAVENOUS at 05:37

## 2022-06-09 RX ADMIN — ACETAMINOPHEN 650 MG: 325 TABLET ORAL at 10:47

## 2022-06-09 RX ADMIN — GABAPENTIN 600 MG: 300 CAPSULE ORAL at 13:54

## 2022-06-09 RX ADMIN — GABAPENTIN 600 MG: 300 CAPSULE ORAL at 08:59

## 2022-06-09 RX ADMIN — LOSARTAN POTASSIUM 12.5 MG: 25 TABLET, FILM COATED ORAL at 18:33

## 2022-06-09 RX ADMIN — PANTOPRAZOLE SODIUM 40 MG: 40 INJECTION, POWDER, FOR SOLUTION INTRAVENOUS at 08:59

## 2022-06-09 RX ADMIN — SODIUM CHLORIDE, PRESERVATIVE FREE: 5 INJECTION INTRAVENOUS at 05:13

## 2022-06-09 RX ADMIN — GABAPENTIN 600 MG: 300 CAPSULE ORAL at 20:35

## 2022-06-09 RX ADMIN — ATORVASTATIN CALCIUM 40 MG: 40 TABLET, FILM COATED ORAL at 17:54

## 2022-06-09 RX ADMIN — ASPIRIN 81 MG: 81 TABLET, COATED ORAL at 17:54

## 2022-06-09 RX ADMIN — PANTOPRAZOLE SODIUM 40 MG: 40 INJECTION, POWDER, FOR SOLUTION INTRAVENOUS at 20:35

## 2022-06-09 RX ADMIN — FLUTICASONE PROPIONATE 2 SPRAY: 50 SPRAY, METERED NASAL at 18:34

## 2022-06-09 ASSESSMENT — ACTIVITIES OF DAILY LIVING (ADL)
ADLS_ACUITY_SCORE: 23
TOILETING_ISSUES: NO
ADLS_ACUITY_SCORE: 23
WALKING_OR_CLIMBING_STAIRS_DIFFICULTY: NO
ADLS_ACUITY_SCORE: 23
DIFFICULTY_EATING/SWALLOWING: NO
WEAR_GLASSES_OR_BLIND: YES
ADLS_ACUITY_SCORE: 38
FALL_HISTORY_WITHIN_LAST_SIX_MONTHS: NO
ADLS_ACUITY_SCORE: 23
ADLS_ACUITY_SCORE: 23
DRESSING/BATHING_DIFFICULTY: NO
DOING_ERRANDS_INDEPENDENTLY_DIFFICULTY: NO
ADLS_ACUITY_SCORE: 23
ADLS_ACUITY_SCORE: 23
ADLS_ACUITY_SCORE: 38
CHANGE_IN_FUNCTIONAL_STATUS_SINCE_ONSET_OF_CURRENT_ILLNESS/INJURY: NO
ADLS_ACUITY_SCORE: 35
CONCENTRATING,_REMEMBERING_OR_MAKING_DECISIONS_DIFFICULTY: NO

## 2022-06-09 NOTE — PLAN OF CARE
Goal Outcome Evaluation:  Pt is alert and oriented.Denies pain.Blood sugar was 88 in the morning .pt was drinking juice.assist x1 to bedside commode.Will monitor

## 2022-06-09 NOTE — PLAN OF CARE
Covid positive. A&Ox4. VSS ex HTN, on RA. Dim LS, SANCHEZ, pt reports SOB. Encouraging IS use. Gave PRN tylenol for HA. Up A1 gb+w to BR, sat in chair, ambulated with rehab. Continent of B&B, AUO, +BM. C/o intermittent nausea, gave PRN zofran. Regular diet. PIV SL. Continue to monitor.

## 2022-06-09 NOTE — ED NOTES
[facility]  ED Nurse Handoff Report    ED Chief complaint: Chills (Weakness/) and Shortness of Breath      ED Diagnosis:   Final diagnoses:   Infection due to 2019 novel coronavirus   Hypokalemia   Hypoglycemia       Code Status: To be addressed by admitting provider    Allergies:   Allergies   Allergen Reactions     Codeine Sulfate Nausea     Simvastatin Cramps     Leg cramps     Pcn [Penicillins] Rash       Patient Story: Pt developed Covid sx of a productive cough with clear sputum, headache, and muscle aches on 6/6 and tested positive for Covid on 6/8. Pt was admitted because her son noticed she was drowsy and had slurred speech. Pt's blood sugar was 54. Hx of pancreatitis and alcoholism.  Focused Assessment:  A&O x 4, vitals stable on RA. Generalized weakness.  Labs Ordered and Resulted from Time of ED Arrival to Time of ED Departure   INFLUENZA A/B & SARS-COV2 PCR MULTIPLEX - Abnormal       Result Value    Influenza A PCR Negative      Influenza B PCR Negative      RSV PCR Negative      SARS CoV2 PCR Positive (*)    COMPREHENSIVE METABOLIC PANEL - Abnormal    Sodium 136      Potassium 3.3 (*)     Chloride 98      Carbon Dioxide (CO2) 23      Anion Gap 15 (*)     Urea Nitrogen 15      Creatinine 0.53      Calcium 8.0 (*)     Glucose 56 (*)     Alkaline Phosphatase 94      AST 41      ALT 31      Protein Total 6.5 (*)     Albumin 3.0 (*)     Bilirubin Total 0.3      GFR Estimate >90     LIPASE - Abnormal    Lipase 52 (*)    ISTAT GASES LACTATE VENOUS POCT - Abnormal    Lactic Acid POCT 3.1 (*)     Bicarbonate Venous POCT 24      O2 Sat, Venous POCT 93 (*)     pCO2V Venous POCT 32 (*)     pH Venous POCT 7.48 (*)     pO2 Venous POCT 60 (*)    GLUCOSE BY METER - Abnormal    GLUCOSE BY METER POCT 254 (*)    D DIMER QUANTITATIVE - Abnormal    D-Dimer Quantitative 0.68 (*)    PROCALCITONIN - Abnormal    Procalcitonin 0.07 (*)    TROPONIN I - Normal    Troponin I High Sensitivity 9     AMMONIA - Normal     Ammonia 26     CRP INFLAMMATION - Normal    CRP Inflammation 3.5     NT PROBNP INPATIENT - Normal    N terminal Pro BNP Inpatient 435     CBC WITH PLATELETS AND DIFFERENTIAL    WBC Count 6.0      RBC Count 3.93      Hemoglobin 12.0      Hematocrit 36.1      MCV 92      MCH 30.5      MCHC 33.2      RDW 13.6      Platelet Count 277      % Neutrophils 62      % Lymphocytes 24      % Monocytes 11      % Eosinophils 1      % Basophils 1      % Immature Granulocytes 1      NRBCs per 100 WBC 0      Absolute Neutrophils 3.8      Absolute Lymphocytes 1.5      Absolute Monocytes 0.7      Absolute Eosinophils 0.0      Absolute Basophils 0.0      Absolute Immature Granulocytes 0.0      Absolute NRBCs 0.0     BLOOD CULTURE   BLOOD CULTURE     XR Chest Port 1 View   Final Result   IMPRESSION:       No focal airspace disease.      Trace right pleural effusion. No left pleural effusion. No pneumothorax.      The cardiomediastinal silhouette is unremarkable.      Redemonstrated post surgical changes from gastric pull-through.            Treatments and/or interventions provided: Chest XR, lab draws, K replacement, D50  Patient's response to treatments and/or interventions: Improved sugar    To be done/followed up on inpatient unit:  Continued monitoringh    Does this patient have any cognitive concerns?: None    Activity level - Baseline/Home:  Independent  Activity Level - Current:   Stand with Assist    Patient's Preferred language: English   Needed?: NO    Isolation: COVID r/o and special precautions  Infection: Not Applicable  COVID r/o and special precautions  Patient tested for COVID 19 prior to admission: YES  Bariatric?: No    Vital Signs:   Vitals:    06/09/22 0000 06/09/22 0100 06/09/22 0155 06/09/22 0200   BP: 137/55 139/56  (!) 142/61   Pulse: 82 85  83   Resp:       Temp:       TempSrc:       SpO2: 96%  94% 95%       Cardiac Rhythm: not monitored    Was the PSS-3 completed:   Yes  What interventions are  required if any?  None             Family Comments: Patient updated family by self.  OBS brochure/video discussed/provided to patient/family: Yes           For the majority of the shift this patient's behavior was green  Behavioral interventions performed were none    ED NURSE PHONE NUMBER: *[phone]

## 2022-06-09 NOTE — PLAN OF CARE
3a-7a Boarder patient in ED 27. A&Ox4, anxious at times. VSS on RA. Up assist x1 GBW, pivot to BSC. LS clear/fine crackles, SANCHEZ, frequent congested cough. C/o nausea, IV zofran given with relief. K 3.1, replaced and recheck ordered for 10am. IVF infusing. Continent. Regular diet. Discharge pending covid tx.

## 2022-06-09 NOTE — H&P
[facility]    History and Physical  Hospitalist       Date of Admission:  6/8/2022  Date of Service (when I saw the patient): 06/09/22    Assessment & Plan   Kezia Dixon is a 66 year old female who presents with sob, weakness    COVID-19 infection  Vaccinated and boosted. Tested positive for COVID 2 days ago with cough, headache, some SOB. General severe malaise and fatigue. In ED vitals stable O2 sats 92-99% RA. Lactic 3.1. WBC normal. CXR without focal airspace disease.   - gentle fluids  - repeat lactic x 1   - check CRP, D-dimer, BNP  - blood cultures pending  - given high risk will give remdesivir x 3 days/ while in hospital  - no indication for steroids  - lovenox ppx daily pending d-dimer    Hypokalemia  K at 3.3  - replace per protocol    Alcohol use disorder  Recent hospitalization for Etoh w/d 5/2022  Hx withdrawal seizures  Was sober since 8/2021 with relapse in 5/2022. Brief hospital stay with apparent limited need for benzodiazepines. Denies Etoh since discharge.  - thiamine, folate  - CIWA  - start ativan if any w/d    Hx systolic heart failure, resolved EF 65-70% 1/2022  CAD, medical management  Hypertension  HLD  [needs rec- aspirin 81 mg daily, atorvastatin 40 mg daily, furosemide 20 mg daily, losartan 12.5 mg daily, metoprolol 25 mg daily, spironolatone 12.5 mg daily]  - resume meds with rec  - BNP as above    Hyperglycemia/ hypoglycemia  A1C 5/2022 at 5.8%. reported history of hypoglycemia, also noted to have on presentation with BS 56.   - encourage PO intake  - hypoglycemia protocol    Abdominal pain  Hx Esophageal cancer  S/p resection, esophagectomy with stomach pull-through 5 years ago. Reports unable to eat very much. Has abdominal pain in epigastric area, states has had before and improves with ppi  - pantoprazole 40 mg IV BID  - ensure liquid supplement    Hx lung cancer  S/p resection 3 years ago. No chemo or XRT.    MDD  Anxiety  Not currently on meds for  this    Peripheral neuropathy  Thought 2/2 Etoh use.   - gabapentin 600 mg TID    DVT Prophylaxis: Enoxaparin (Lovenox) SQ  Code Status: DNR / DNI    Disposition: Expected discharge in 3-4 days     Rodriguez Rubin MD, MD  [phone] (P)  Text Page     Primary Care Physician   Mustapha Velásquez    Chief Complaint   COVID-19 infection    History is obtained from the patient and medical records    History of Present Illness   Kezia Dixon is a 66 year old female who presents with cough and general malaise.  She was recently hospitalized at [facility] for alcohol withdrawal and was discharged to TCU.  She reports she has cut back on Monday.  On Monday she had onset of cough and some mild shortness of breath.  By today she states she had profound fatigue and worsening cough.  She felt her breathing occasionally was hard.  Her cough was worse.  She is vaccinated and boosted.  She denied fevers or chills.  She does have some diarrhea.  She has some nausea and vomiting and notes some abdominal pain as well.  She is unsure where she got the COVID infection from but was just discharged from her care facility.  She is back at home.  She denies any alcohol use since her discharge from the hospital.    Past Medical History    I have reviewed this patient's medical history and updated it with pertinent information if needed.   Past Medical History:   Diagnosis Date     Alcoholism (H) 10/14/2016     Benign essential hypertension 10/14/2016     Carotid artery disease (H)     mile plaque 5/7     Chemical dependency (H)     alcohol     Depression with anxiety      Esophageal cancer (H)      Esophageal cancer (H) 3/22/2016     Esophageal mass      GERD (gastroesophageal reflux disease)      Hx of pancreatitis 2003     Hypercholesteremia      Hyperglycemia      Hyperlipemia      Impaired fasting glucose 10/14/2016     Impaired fasting glucose 10/14/2016     Nocturnal leg cramps      Pancreatic pseudocyst 10/14/2016      Pancreatic pseudocyst 10/14/2016     Pancreatitis     with pseudocyst     Pap smear with atypical squamous cells, cannot exclude high grade squamous intraepithelial lesion (ASC-H) 10/14/16    Colpo impression benign, ECC benign. Cotestin in 1 yr.     Shingles      Vasomotor rhinitis        Past Surgical History   I have reviewed this patient's surgical history and updated it with pertinent information if needed.  Past Surgical History:   Procedure Laterality Date     AAA REPAIR      splenic artery aneurysm embolization     ABDOMEN SURGERY  2/2/2016     COLONOSCOPY  11/26/2013    Procedure: COMBINED COLONOSCOPY, SINGLE BIOPSY/POLYPECTOMY BY BIOPSY;  COLONOSCOPY (MAC);  Surgeon: Montana Rosa MD;  Location:  GI     COLONOSCOPY  11/26/2013     COLONOSCOPY N/A 10/4/2018    Procedure: COMBINED COLONOSCOPY, SINGLE OR MULTIPLE BIOPSY/POLYPECTOMY BY BIOPSY;  colonoscopy;  Surgeon: Gio Apodaca MD;  Location:  GI     ENT SURGERY       ESOPHAGOGASTRECTOMY N/A 3/22/2016    Procedure: ESOPHAGOGASTRECTOMY;  Surgeon: Alvin Riojas MD;  Location:  OR     ESOPHAGOSCOPY, GASTROSCOPY, DUODENOSCOPY (EGD), COMBINED N/A 12/22/2015    Procedure: COMBINED ENDOSCOPIC ULTRASOUND, ESOPHAGOSCOPY, GASTROSCOPY, DUODENOSCOPY (EGD), FINE NEEDLE ASPIRATE/BIOPSY;  Surgeon: Danelle Michael MD;  Location:  GI     GASTROSTOMY, INSERT TUBE, COMBINED N/A 2/2/2016    Procedure: COMBINED GASTROSTOMY, INSERT TUBE (OPEN);  Surgeon: Alvin Riojas MD;  Location:  OR     GI SURGERY  12/22/2015     HAND SURGERY      right     HERNIORRHAPHY INCISIONAL (LOCATION) N/A 3/19/2019    Procedure: LAPARSCOPIC  INCISIONAL HERNIA REPAIR WITH MESH, LAPARSCOPIC LYSIS OF ADHENSIONS;  Surgeon: Lamberto Magaña MD;  Location:  OR     INSERT PORT VASCULAR ACCESS N/A 12/28/2015    Procedure: INSERT PORT VASCULAR ACCESS;  Surgeon: Alvin Riojas MD;  Location:  OR     LAPAROSCOPIC CHOLECYSTECTOMY N/A  3/19/2019    Procedure: LAPAROSCOPIC CHOLECYSTECTOMY;  Surgeon: Lamberto Magaña MD;  Location: [facility] OR     LOBECTOMY LUNG Right 10/16/2018    Procedure: LOBECTOMY LUNG;  Surgeon: Alvin Riojas MD;  Location: [facility] OR     REMOVE PORT VASCULAR ACCESS Left 7/22/2016    Procedure: REMOVE PORT VASCULAR ACCESS;  Surgeon: Alvin Riojas MD;  Location: [facility] OR     THORACOTOMY Right 10/16/2018    Procedure: REDO RIGHT THORACTOMY AND RIGHT LOWER LOBECTOMY, PLEURAL LYSIS;  Surgeon: Alvin Riojas MD;  Location: [facility] OR     TONSILLECTOMY       VASCULAR SURGERY         Prior to Admission Medications   Prior to Admission Medications   Prescriptions Last Dose Informant Patient Reported? Taking?   aspirin (ASA) 81 MG EC tablet  Self No No   Sig: Take 1 tablet (81 mg) by mouth daily   atorvastatin (LIPITOR) 40 MG tablet  Self No No   Sig: Take 1 tablet (40 mg) by mouth daily   fluticasone (FLONASE) 50 MCG/ACT nasal spray  Self No No   Sig: INSTILL 2 SPRAYS INTO BOTH NOSTRILS DAILY.   folic acid (FOLVITE) 1 MG tablet  Self No No   Sig: Take 1 tablet (1 mg) by mouth daily   furosemide (LASIX) 40 MG tablet  Self No No   Sig: Take 0.5 tablets (20 mg) by mouth every morning Ok to take additional 0.5 tab for worsening shortness of breath, leg swelling or weight gain.   gabapentin (NEURONTIN) 300 MG capsule   No No   Sig: Take 900 mg PO  TID for 2 days then 600mg PO TID for next 2 days then 300 mg PO TID for next 2 days then 100 mg PO TID for next 2 days then stop   ibuprofen (ADVIL/MOTRIN) 200 MG tablet  Self Yes No   Sig: Take 400 mg by mouth every 4 hours as needed for mild pain   losartan (COZAAR) 25 MG tablet  Self No No   Sig: Take 0.5 tablets (12.5 mg) by mouth daily   metoprolol succinate ER (TOPROL XL) 25 MG 24 hr tablet   No No   Sig: Take 1 tablet (25 mg) by mouth every morning   omeprazole (PRILOSEC) 40 MG DR capsule  Self No No   Sig: TAKE 1 CAPSULE BY MOUTH EVERY DAY   polyethylene glycol  (MIRALAX) 17 GM/Dose powder   No No   Sig: Take 17 g by mouth daily   spironolactone (ALDACTONE) 25 MG tablet  Self No No   Sig: Take 0.5 tablets (12.5 mg) by mouth every morning   thiamine (B-1) 100 MG tablet  Self No No   Sig: Take 1 tablet (100 mg) by mouth daily      Facility-Administered Medications: None     Allergies   Allergies   Allergen Reactions     Codeine Sulfate Nausea     Simvastatin Cramps     Leg cramps     Pcn [Penicillins] Rash       Social History   I have reviewed this patient's social history and updated it with pertinent information if needed. Kezia Dixon  reports that she quit smoking about 6 years ago. She smoked 0.25 packs per day. She has never used smokeless tobacco. She reports that she does not drink alcohol and does not use drugs.    Family History   I have reviewed this patient's family history and updated it with pertinent information if needed.   Family History   Problem Relation Age of Onset     Other Cancer Father         melanoma     Prostate Cancer Father      Diabetes Father      Other Cancer Brother         melanoma     Other Cancer Brother         melanoma     Other Cancer Brother        Review of Systems   The 10 point Review of Systems is negative other than noted in the HPI or here.     Physical Exam   Temp: 98.3  F (36.8  C) Temp src: Oral BP: 132/46 Pulse: 86   Resp: 18 SpO2: 93 % O2 Device: None (Room air)    Vital Signs with Ranges  0 lbs 0 oz    Constitutional: alert, appears quite ill  Eyes: EOMI, PERRL  HEENT: OP clear  Respiratory: lungs clear bilaterally, occ crackle, no wheezing  Cardiovascular: RRR no murmur. no edema.  GI: soft, tender in epigastric region, nondistended, BS present  Lymph/Hematologic: no cervical LAD  Genitourinary: deferred  Skin: no rashes or lesions grossly  Musculoskeletal: no deformities or arthritis  Neurologic: CN II-XII, PENDLETON  Psychiatric: flat affect    Data   Data reviewed today:  I personally reviewed the EKG tracing showing  NSR, no changes from previous and the chest x-ray image(s) showing no infiltrate.  Recent Labs   Lab 06/08/22  2346 06/08/22  2146   WBC  --  6.0   HGB  --  12.0   MCV  --  92   PLT  --  277   NA  --  136   POTASSIUM  --  3.3*   CHLORIDE  --  98   CO2  --  23   BUN  --  15   CR  --  0.53   ANIONGAP  --  15*   VICENTE  --  8.0*   * 56*   ALBUMIN  --  3.0*   PROTTOTAL  --  6.5*   BILITOTAL  --  0.3   ALKPHOS  --  94   ALT  --  31   AST  --  41   LIPASE  --  52*       Recent Results (from the past 24 hour(s))   XR Chest Port 1 View    Narrative    EXAM: XR CHEST PORT 1 VIEW  LOCATION: [facility]  DATE/TIME: 6/8/2022 10:33 PM    INDICATION: Confirmed COVID-19. Cough and dyspnea.  COMPARISON: CT chest 02/21/2022      Impression    IMPRESSION:     No focal airspace disease.    Trace right pleural effusion. No left pleural effusion. No pneumothorax.    The cardiomediastinal silhouette is unremarkable.    Redemonstrated post surgical changes from gastric pull-through.

## 2022-06-09 NOTE — ED TRIAGE NOTES
Come to the ED for SOB and chills, headache and productive cough     Pt states slurred speech since 1200 yesterday, son also states more slurred speech he has not seen her since Monday.     No other neuro changes noted in triage, facial symmetry intact, CMS intact

## 2022-06-09 NOTE — PHARMACY-ADMISSION MEDICATION HISTORY
Pharmacy Medication History  Admission medication history interview status for the 6/8/2022  admission is complete. See EPIC admission navigator for prior to admission medications     Location of Interview: Patient room  Medication history sources: Patient, chart review, [facility], and outside med report    Significant changes made to the medication list:  Discontinued Gabapentin (patient stated she completed)  Changed Toprol to 50mg qam and 25mg qpm per patient and 1/27/22 chart review    In the past week, patient estimated taking medication this percent of the time: greater than 90%    Additional medication history information:   Patient stated she was on Toprol 25mg BID, then changed to 50mg daily, then it changed to 50mg qam and 25mg qpm    Medication reconciliation completed by provider prior to medication history? No    Time spent in this activity: 30 mins    Prior to Admission medications    Medication Sig Last Dose Taking? Auth Provider   aspirin (ASA) 81 MG EC tablet Take 1 tablet (81 mg) by mouth daily 6/8/2022 at am Yes Mustapha Velásquez MD   atorvastatin (LIPITOR) 40 MG tablet Take 1 tablet (40 mg) by mouth daily 6/8/2022 at am Yes Mustapha Velásquez MD   fluticasone (FLONASE) 50 MCG/ACT nasal spray INSTILL 2 SPRAYS INTO BOTH NOSTRILS DAILY. 6/8/2022 at am Yes Mustapha Velásquez MD   folic acid (FOLVITE) 1 MG tablet Take 1 tablet (1 mg) by mouth daily 6/8/2022 at am Yes Mustapha Velásquez MD   furosemide (LASIX) 40 MG tablet Take 0.5 tablets (20 mg) by mouth every morning Ok to take additional 0.5 tab for worsening shortness of breath, leg swelling or weight gain. 6/8/2022 at am Yes Angelique Montanez APRN CNP   ibuprofen (ADVIL/MOTRIN) 200 MG tablet Take 400 mg by mouth every 4 hours as needed for mild pain prn at prn Yes Unknown, Entered By History   losartan (COZAAR) 25 MG tablet Take 0.5 tablets (12.5 mg) by mouth daily 6/8/2022 at am Yes Mustapha Velásquez MD   metoprolol succinate ER (TOPROL XL) 25  MG 24 hr tablet Take 25 mg by mouth every evening 6/7/2022 at pm Yes Unknown, Entered By History   metoprolol succinate ER (TOPROL XL) 50 MG 24 hr tablet Take 50 mg by mouth every morning 6/8/2022 at am Yes Unknown, Entered By History   omeprazole (PRILOSEC) 40 MG DR capsule TAKE 1 CAPSULE BY MOUTH EVERY DAY 6/8/2022 at am Yes Mustapha Velásquez MD   spironolactone (ALDACTONE) 25 MG tablet Take 0.5 tablets (12.5 mg) by mouth every morning 6/8/2022 at am Yes Mustapha Velásquez MD   thiamine (B-1) 100 MG tablet Take 1 tablet (100 mg) by mouth daily 6/8/2022 at am Yes Mustapha Velásquez MD       The information provided in this note is only as accurate as the sources available at the time of update(s)

## 2022-06-09 NOTE — ED PROVIDER NOTES
History   Chief Complaint:  Chills (Weakness/) and Shortness of Breath       HPI   Kezia Dixon is a 66 year old female with history of carotid artery disease who presents with shortness of breath and weakness. The patient claims that she tested postive for Covid and also claims that she had cough, headache, felt hard to breath since Monday (2 days ago). She also reports that she went to rehab last year for generalized weakness. She also states to be vaccinated for Covid with booster. Denies chest pain, fever, smoking, lung problem, diabetes, history of heart attack, abnormal bowel movement, history of stroke but does indicate feeling light headedness, dizzyness, fatigued, sore throat, runny noses, slight diarrhea.    Review of Systems   Constitutional: Positive for chills. Negative for fever.   HENT: Positive for sore throat.    Respiratory: Positive for cough and shortness of breath.    Cardiovascular: Negative for chest pain.   Gastrointestinal: Positive for diarrhea.   Musculoskeletal: Positive for myalgias.   Neurological: Positive for dizziness, light-headedness and headaches.   All other systems reviewed and are negative.      Allergies:  Codeine Sulfate  Simvastatin  Penicillins    Medications:  FLONASE  PRILOSEC  LIPITOR  LASIX  ZESTRIL  TOPROL XL  THERAGRAN  THIAMINE  ALDACTONE  METOPROLOL  FLUTICASONE  FOLIC ACID  FUROSEMIDE  ASPIRIN  ATORVASTATIN  LOSARTAN  OMEPRAZOLE    Past Medical History:     BEH  Carotid artery disease  Chemical dependency  Depression with anxiety  Esophageal cancer*3  GERD  Pancreatitis  Hypercholesteremia  Hyperglycemia  Hyperlipemia  Impaired fasting glucose*2  Pancreatic pseudocyst*3  Shingles  Vasomotor rhinitis    Past Surgical History:    AAA repair  Abdomen surgery  Colonoscopy*3  Esophagogastrectomy  Esophagoscopy  Gastrostomy  GI surgery  Hand surgery  Herniorrhaphy incisional  Insert port vascular access  Laparoscopic cholecystectomy  Lobectomy lungs    Thoracotomy  Tonsillectomy  Vascular surgery    Family History:    Prostate cancer- father  Diabetes- father  Melanoma- brother    Social History:  The patient presents with relative.      Physical Exam     Patient Vitals for the past 24 hrs:   BP Temp Temp src Pulse Resp SpO2   06/08/22 2330 -- -- -- -- -- 93 %   06/08/22 2310 132/46 -- -- 86 18 94 %   06/08/22 2300 -- -- -- -- -- 92 %   06/08/22 2230 -- -- -- -- -- 97 %   06/08/22 2200 -- -- -- -- -- 95 %   06/08/22 2132 135/49 -- -- 77 -- 99 %   06/08/22 2011 -- 98.3  F (36.8  C) Oral 85 18 97 %     Physical Exam  SKIN:  Warm, dry.  No jaundice.  HEMATOLOGIC/IMMUNOLOGIC/LYMPHATIC:  No pallor.  No extremity edema.  HENT: Normal phonation.  No stridor.  EYES:  Conjunctivae normal.  Anicteric.  Pupils equal round and reactive to light.  Normal extraocular motion.  Visual fields intact.  CARDIOVASCULAR:  Regular rate and rhythm.  No murmur.  No rub.  RESPIRATORY:  No respiratory distress, breath sounds equal and normal.  GASTROINTESTINAL:  Soft, nontender abdomen with active bowel sounds.  No distention.  No palpable mass.  MUSCULOSKELETAL: Normal body habitus.  Full active range of motion of extremities.  NEUROLOGIC: Slight somnolence, oriented to self place and time, conversant, no aphasia or dysarthria, no tactile sensory or motor deficit of the face or limbs.  No limb tremor.  PSYCHIATRIC: No psychotic features.    Emergency Department Course   ECG  ECG:  ECG taken at 2233, ECG read at 2257  Normal sinus rhythm  Septal infarct, age undetermined  Abnormal ECG  No change compared to EKG dated 5/14/22   Rate 87 bpm. IN interval 140 ms. QRS duration 64 ms. QT/QTc 386/464 ms. P-R-T axes 76 86 68.    Report per radiology    Laboratory:  Labs Ordered and Resulted from Time of ED Arrival to Time of ED Departure   INFLUENZA A/B & SARS-COV2 PCR MULTIPLEX - Abnormal       Result Value    Influenza A PCR Negative      Influenza B PCR Negative      RSV PCR Negative       SARS CoV2 PCR Positive (*)    COMPREHENSIVE METABOLIC PANEL - Abnormal    Sodium 136      Potassium 3.3 (*)     Chloride 98      Carbon Dioxide (CO2) 23      Anion Gap 15 (*)     Urea Nitrogen 15      Creatinine 0.53      Calcium 8.0 (*)     Glucose 56 (*)     Alkaline Phosphatase 94      AST 41      ALT 31      Protein Total 6.5 (*)     Albumin 3.0 (*)     Bilirubin Total 0.3      GFR Estimate >90     LIPASE - Abnormal    Lipase 52 (*)    ISTAT GASES LACTATE VENOUS POCT - Abnormal    Lactic Acid POCT 3.1 (*)     Bicarbonate Venous POCT 24      O2 Sat, Venous POCT 93 (*)     pCO2V Venous POCT 32 (*)     pH Venous POCT 7.48 (*)     pO2 Venous POCT 60 (*)    GLUCOSE BY METER - Abnormal    GLUCOSE BY METER POCT 254 (*)    TROPONIN I - Normal    Troponin I High Sensitivity 9     AMMONIA - Normal    Ammonia 26     CBC WITH PLATELETS AND DIFFERENTIAL    WBC Count 6.0      RBC Count 3.93      Hemoglobin 12.0      Hematocrit 36.1      MCV 92      MCH 30.5      MCHC 33.2      RDW 13.6      Platelet Count 277      % Neutrophils 62      % Lymphocytes 24      % Monocytes 11      % Eosinophils 1      % Basophils 1      % Immature Granulocytes 1      NRBCs per 100 WBC 0      Absolute Neutrophils 3.8      Absolute Lymphocytes 1.5      Absolute Monocytes 0.7      Absolute Eosinophils 0.0      Absolute Basophils 0.0      Absolute Immature Granulocytes 0.0      Absolute NRBCs 0.0     BLOOD CULTURE   BLOOD CULTURE        Emergency Department Course:       Reviewed:  I reviewed nursing notes, vitals, past medical history and Care Everywhere    Assessments:  2143 I obtained history and examined the patient as noted above.    I rechecked the patient and explained findings.     Consults:  2352 Consulted with Dr. Rubin hospitalist.     Interventions:  2310 Dextrose 50ml IV  2339 NS 1L IV   0022 KLOR-CON 40mEq oral    Disposition:  The patient was admitted to the hospital under the care of Dr. Rubin, hospitalist.     Impression &  Plan       Medical Decision Making:  This patient presents with symptoms that suggested COVID-19 and testing positive for that.  From the description of her confusion that may represent encephalopathy secondary to COVID-19.  I doubt cerebrovascular accident.  I do not think she required brain imaging.  Hemodynamically stable although lactic acid elevated which suggested the possibility of severe sepsis although she is afebrile without leukocytosis and I do not think there is an associated infectious disease beyond COVID-19 at this point in time.  Given her underlying health conditions and age and the subtle symptoms of encephalopathy and elevated lactic acid I opted to admit the patient for observation.  This is the patient's preference that she does feel fairly poorly.      Diagnosis:    ICD-10-CM    1. Infection due to 2019 novel coronavirus  U07.1    2. Hypokalemia  E87.6    3. Hypoglycemia  E16.2        Scribe Disclosure:  I, ANJEL WILLIAMSON, am serving as a scribe at 9:43 PM on 6/8/2022 to document services personally performed by Lincoln Temple MD,  based on my observations and the provider's statements to me.              Lincoln Temple MD  06/09/22 0101

## 2022-06-10 LAB
ANION GAP SERPL CALCULATED.3IONS-SCNC: 3 MMOL/L (ref 3–14)
BUN SERPL-MCNC: 14 MG/DL (ref 7–30)
CALCIUM SERPL-MCNC: 7.9 MG/DL (ref 8.5–10.1)
CHLORIDE BLD-SCNC: 109 MMOL/L (ref 94–109)
CO2 SERPL-SCNC: 28 MMOL/L (ref 20–32)
CREAT SERPL-MCNC: 0.47 MG/DL (ref 0.52–1.04)
CRP SERPL-MCNC: <2.9 MG/L (ref 0–8)
ERYTHROCYTE [DISTWIDTH] IN BLOOD BY AUTOMATED COUNT: 14.3 % (ref 10–15)
GFR SERPL CREATININE-BSD FRML MDRD: >90 ML/MIN/1.73M2
GLUCOSE BLD-MCNC: 93 MG/DL (ref 70–99)
HCT VFR BLD AUTO: 31.4 % (ref 35–47)
HGB BLD-MCNC: 10.6 G/DL (ref 11.7–15.7)
MCH RBC QN AUTO: 30.8 PG (ref 26.5–33)
MCHC RBC AUTO-ENTMCNC: 33.8 G/DL (ref 31.5–36.5)
MCV RBC AUTO: 91 FL (ref 78–100)
PLATELET # BLD AUTO: 193 10E3/UL (ref 150–450)
POTASSIUM BLD-SCNC: 3.7 MMOL/L (ref 3.4–5.3)
RBC # BLD AUTO: 3.44 10E6/UL (ref 3.8–5.2)
SODIUM SERPL-SCNC: 140 MMOL/L (ref 133–144)
WBC # BLD AUTO: 3.3 10E3/UL (ref 4–11)

## 2022-06-10 PROCEDURE — 258N000003 HC RX IP 258 OP 636: Performed by: INTERNAL MEDICINE

## 2022-06-10 PROCEDURE — 250N000013 HC RX MED GY IP 250 OP 250 PS 637: Performed by: INTERNAL MEDICINE

## 2022-06-10 PROCEDURE — 85014 HEMATOCRIT: CPT | Performed by: INTERNAL MEDICINE

## 2022-06-10 PROCEDURE — 120N000001 HC R&B MED SURG/OB

## 2022-06-10 PROCEDURE — 82310 ASSAY OF CALCIUM: CPT | Performed by: INTERNAL MEDICINE

## 2022-06-10 PROCEDURE — C9113 INJ PANTOPRAZOLE SODIUM, VIA: HCPCS | Performed by: INTERNAL MEDICINE

## 2022-06-10 PROCEDURE — 86140 C-REACTIVE PROTEIN: CPT | Performed by: INTERNAL MEDICINE

## 2022-06-10 PROCEDURE — 99232 SBSQ HOSP IP/OBS MODERATE 35: CPT | Performed by: HOSPITALIST

## 2022-06-10 PROCEDURE — 250N000013 HC RX MED GY IP 250 OP 250 PS 637: Performed by: HOSPITALIST

## 2022-06-10 PROCEDURE — 250N000011 HC RX IP 250 OP 636: Performed by: INTERNAL MEDICINE

## 2022-06-10 PROCEDURE — 36415 COLL VENOUS BLD VENIPUNCTURE: CPT | Performed by: INTERNAL MEDICINE

## 2022-06-10 RX ORDER — PANTOPRAZOLE SODIUM 40 MG/1
40 TABLET, DELAYED RELEASE ORAL
Status: DISCONTINUED | OUTPATIENT
Start: 2022-06-11 | End: 2022-06-11 | Stop reason: HOSPADM

## 2022-06-10 RX ORDER — LOPERAMIDE HCL 2 MG
2 CAPSULE ORAL 4 TIMES DAILY PRN
Status: DISCONTINUED | OUTPATIENT
Start: 2022-06-10 | End: 2022-06-11 | Stop reason: HOSPADM

## 2022-06-10 RX ADMIN — PANTOPRAZOLE SODIUM 40 MG: 40 INJECTION, POWDER, FOR SOLUTION INTRAVENOUS at 09:24

## 2022-06-10 RX ADMIN — GABAPENTIN 600 MG: 300 CAPSULE ORAL at 19:59

## 2022-06-10 RX ADMIN — FUROSEMIDE 20 MG: 20 TABLET ORAL at 09:23

## 2022-06-10 RX ADMIN — ENOXAPARIN SODIUM 30 MG: 30 INJECTION SUBCUTANEOUS at 09:24

## 2022-06-10 RX ADMIN — LOPERAMIDE HYDROCHLORIDE 2 MG: 2 CAPSULE ORAL at 14:02

## 2022-06-10 RX ADMIN — REMDESIVIR 100 MG: 100 INJECTION, POWDER, LYOPHILIZED, FOR SOLUTION INTRAVENOUS at 02:16

## 2022-06-10 RX ADMIN — FLUTICASONE PROPIONATE 2 SPRAY: 50 SPRAY, METERED NASAL at 09:28

## 2022-06-10 RX ADMIN — GABAPENTIN 600 MG: 300 CAPSULE ORAL at 09:22

## 2022-06-10 RX ADMIN — GABAPENTIN 600 MG: 300 CAPSULE ORAL at 13:56

## 2022-06-10 RX ADMIN — FOLIC ACID 1 MG: 1 TABLET ORAL at 09:23

## 2022-06-10 RX ADMIN — SPIRONOLACTONE 12.5 MG: 25 TABLET ORAL at 09:23

## 2022-06-10 RX ADMIN — THIAMINE HCL TAB 100 MG 100 MG: 100 TAB at 09:23

## 2022-06-10 RX ADMIN — METOPROLOL SUCCINATE 50 MG: 50 TABLET, EXTENDED RELEASE ORAL at 09:23

## 2022-06-10 RX ADMIN — SODIUM CHLORIDE 50 ML: 9 INJECTION, SOLUTION INTRAVENOUS at 02:40

## 2022-06-10 RX ADMIN — ATORVASTATIN CALCIUM 40 MG: 40 TABLET, FILM COATED ORAL at 09:23

## 2022-06-10 RX ADMIN — METOPROLOL SUCCINATE 25 MG: 25 TABLET, EXTENDED RELEASE ORAL at 19:58

## 2022-06-10 RX ADMIN — ACETAMINOPHEN 650 MG: 325 TABLET ORAL at 20:04

## 2022-06-10 RX ADMIN — ASPIRIN 81 MG: 81 TABLET, COATED ORAL at 09:23

## 2022-06-10 RX ADMIN — LOSARTAN POTASSIUM 12.5 MG: 25 TABLET, FILM COATED ORAL at 09:24

## 2022-06-10 ASSESSMENT — ACTIVITIES OF DAILY LIVING (ADL)
ADLS_ACUITY_SCORE: 27
ADLS_ACUITY_SCORE: 27
ADLS_ACUITY_SCORE: 22
ADLS_ACUITY_SCORE: 27
ADLS_ACUITY_SCORE: 24
ADLS_ACUITY_SCORE: 27
ADLS_ACUITY_SCORE: 24
ADLS_ACUITY_SCORE: 24
ADLS_ACUITY_SCORE: 27
ADLS_ACUITY_SCORE: 27

## 2022-06-10 NOTE — PLAN OF CARE
Goal Outcome Evaluation:           COVID positive. A/O x4. VSS on RA. SBA. Reg diet. Q4 vitals. Hypertensive has been since highschool. Controlled with scheduled meds. Pt did not feel comfortable to discharge today, doctor will reevaluate patient in the morning and hopes for possible early discharge.

## 2022-06-10 NOTE — PROGRESS NOTES
[facility]    Medicine Progress Note - Hospitalist Service    Date of Admission:  6/8/2022    Assessment & Plan            Kezia Dixon is a 66 year old female admitted on 6/8/2022.  She presents presents with sob, weakness     COVID-19 infection  Vaccinated and boosted. Tested positive for COVID 2 days ago with cough, headache, some SOB. General severe malaise and fatigue. In ED vitals stable O2 sats 92-99% RA. Lactic 3.1, normalized with fluid bolus. WBC normal. CXR without focal airspace disease.     - CRP remains wnl  - blood cultures ngtd  - continue remdesivir x 3 days/ while in hospital (day 2/3)  - no indication for steroids  - lovenox ppx daily  - will ask PT to assess, would be open to home therapies if indicated      Hypokalemia  K at 3.3 at admission.   - replace per protocol     Alcohol use disorder  Recent hospitalization for Etoh w/d 5/2022  Hx withdrawal seizures  Was sober since 8/2021 with relapse in 5/2022. Brief hospital stay with apparent limited need for benzodiazepines. Denies Etoh since discharge.  - thiamine, folate  - CIWA  - no signs of withdrawal     Hx systolic heart failure, resolved EF 65-70% 1/2022  CAD, medical management  Hypertension  HLD  [aspirin 81 mg daily, atorvastatin 40 mg daily, furosemide 20 mg daily, losartan 12.5 mg daily, metoprolol 25 mg daily, spironolatone 12.5 mg daily]  - continue PTA regimen     Hyperglycemia/ hypoglycemia  A1C 5/2022 at 5.8%. reported history of hypoglycemia, also noted to have on presentation with BS 56.   - encourage PO intake  - hypoglycemia protocol     Abdominal pain  Hx Esophageal cancer  S/p resection, esophagectomy with stomach pull-through 5 years ago. Reports unable to eat very much. Has abdominal pain in epigastric area, states has had before and improves with ppi  - pantoprazole 40 mg IV BID  - ensure liquid supplement     Hx lung cancer  S/p resection 3 years ago. No chemo or XRT.     MDD  Anxiety  Not  currently on meds for this     Peripheral neuropathy  Thought 2/2 Etoh use.   - gabapentin 600 mg TID       Diet: Combination Diet Regular Diet Adult  Snacks/Supplements Pediatric: Ensure Clear; Between Meals    DVT Prophylaxis: Enoxaparin (Lovenox) SQ  Jaeger Catheter: Not present  Central Lines: None  Cardiac Monitoring: None  Code Status: No CPR- Do NOT Intubate      Disposition Plan   Expected Discharge: 06/11/2022     Anticipated discharge location:  Awaiting care coordination huddle  Delays:            The patient's care was discussed with the Bedside Nurse and Patient.    Mathew Caraballo MD  Hospitalist Service  [facility]  Securely message with the Vocera Web Console (learn more here)  Text page via [employer] Paging/Directory         Clinically Significant Risk Factors Present on Admission                 ______________________________________________________________________    Interval History   Feels somewhat better today. Cough unchanged today but still markedly improved from admission.  Dyspnea and fatigue about the same.  Open to having PT assessment.  No fever/chills.  Having some diarrhea with some crampy pain. No recent antibiotics or history of c.diff.      Data reviewed today: I reviewed all medications, new labs and imaging results over the last 24 hours. I personally reviewed no images or EKG's today.    Physical Exam   Vital Signs: Temp: 97.8  F (36.6  C) Temp src: Axillary BP: (!) 147/62 Pulse: 91   Resp: 20 SpO2: 100 % O2 Device: None (Room air)    Weight: 88 lbs 6.47 oz  General Appearance: Well nourished female in NAD  Respiratory: lungs CTAB, no wheezes or crackles, no tachypnea   Cardiovascular: RRR, normal s1/s2 without murmur  GI: abdomen soft, normal bowel sounds  Skin: no peripheral edema   Other: Alert and appropriate, cranial nerves grossly intact      Data   Recent Labs   Lab 06/10/22  0546 06/09/22  0931 06/09/22  0927 06/09/22  0410 06/08/22  2346 06/08/22  3040    WBC 3.3*  --   --   --   --  6.0   HGB 10.6*  --   --   --   --  12.0   MCV 91  --   --   --   --  92     --   --   --   --  277     --   --   --   --  136   POTASSIUM 3.7  --  4.2 3.1*  --  3.3*   CHLORIDE 109  --   --   --   --  98   CO2 28  --   --   --   --  23   BUN 14  --   --   --   --  15   CR 0.47*  --   --   --   --  0.53   ANIONGAP 3  --   --   --   --  15*   VICENTE 7.9*  --   --   --   --  8.0*   GLC 93 88  --   --  254* 56*   ALBUMIN  --   --   --   --   --  3.0*   PROTTOTAL  --   --   --   --   --  6.5*   BILITOTAL  --   --   --   --   --  0.3   ALKPHOS  --   --   --   --   --  94   ALT  --   --   --   --   --  31   AST  --   --   --   --   --  41   LIPASE  --   --   --   --   --  52*

## 2022-06-10 NOTE — PLAN OF CARE
Neuro: A&O x4  Tele/cardiac: N/A  Respiration: VSS on RA  Activity: SBA  Pain: Denies  Drips: PIV SL  Drains/tubes: none  Skin: Intact  GI/: continent, voiding in BR  Aggression color: Green  Isolation: covid precautions  Plan: continue with plan of care

## 2022-06-10 NOTE — CONSULTS
Care Management Initial Consult    General Information  Assessment completed with: Kezia Parsons  Type of CM/SW Visit: Initial Assessment    Primary Care Provider verified and updated as needed: Yes   Readmission within the last 30 days: no previous admission in last 30 days      Reason for Consult: discharge planning  Advance Care Planning:            Communication Assessment  Patient's communication style: spoken language (English or Bilingual)    Hearing Difficulty or Deaf: no   Wear Glasses or Blind: yes    Cognitive  Cognitive/Neuro/Behavioral: WDL                      Living Environment:   People in home: alone     Current living Arrangements: house      Able to return to prior arrangements: yes       Family/Social Support:  Care provided by: self  Provides care for: no one  Marital Status:   Children          Description of Support System:           Current Resources:   Patient receiving home care services: No     Community Resources: None  Equipment currently used at home: none  Supplies currently used at home: None    Employment/Financial:  Employment Status:          Financial Concerns:             Lifestyle & Psychosocial Needs:  Social Determinants of Health     Tobacco Use: Medium Risk     Smoking Tobacco Use: Former Smoker     Smokeless Tobacco Use: Never Used   Alcohol Use: Not At Risk     Frequency of Alcohol Consumption: Never     Average Number of Drinks: Patient refused     Frequency of Binge Drinking: Never   Financial Resource Strain: Low Risk      Difficulty of Paying Living Expenses: Not hard at all   Food Insecurity: No Food Insecurity     Worried About Running Out of Food in the Last Year: Never true     Ran Out of Food in the Last Year: Never true   Transportation Needs: No Transportation Needs     Lack of Transportation (Medical): No     Lack of Transportation (Non-Medical): No   Physical Activity: Insufficiently Active     Days of Exercise per Week: 6 days     Minutes of Exercise  per Session: 20 min   Stress: Stress Concern Present     Feeling of Stress : To some extent   Social Connections: Moderately Isolated     Frequency of Communication with Friends and Family: More than three times a week     Frequency of Social Gatherings with Friends and Family: Twice a week     Attends Nondenominational Services: Never     Active Member of Clubs or Organizations: Yes     Attends Club or Organization Meetings: Not on file     Marital Status:    Intimate Partner Violence: Not on file   Depression: Not at risk     PHQ-2 Score: 0   Housing Stability: Low Risk      Unable to Pay for Housing in the Last Year: No     Number of Places Lived in the Last Year: 1     Unstable Housing in the Last Year: No       Functional Status:  Prior to admission patient needed assistance:              Mental Health Status:          Chemical Dependency Status:                Values/Beliefs:  Spiritual, Cultural Beliefs, Nondenominational Practices, Values that affect care:                 Additional Information:  Per consult for discharge planning, called pt on the phone to discuss discharge plan.  Per pt, she lives alone and had been independent with all ADLs and was driving prior to admit.  Discussed PT will evaluate for discharge needs and may recommend home care. Patient was ok with this plan and ok with any home care agency.  Discussed PCP follow-up and pt requested to have a virtual or phone follow-up scheduled.  Referral sent to [facility] and ina Mascorro RN Liaison, they are able to accept for PT services.  Patient was informed of this.  PCP follow-up scheduled and placed on AVS.  Care Coordinator will continue to follow you for discharge needs.  Lynette Wright RN  Lynette Wright RN, BSN, OCN   Inpatient Care Coordination [address]  Office: [phone]

## 2022-06-11 ENCOUNTER — APPOINTMENT (OUTPATIENT)
Dept: PHYSICAL THERAPY | Facility: CLINIC | Age: 67
DRG: 178 | End: 2022-06-11
Attending: HOSPITALIST
Payer: MEDICARE

## 2022-06-11 VITALS
OXYGEN SATURATION: 98 % | HEART RATE: 69 BPM | WEIGHT: 92.3 LBS | SYSTOLIC BLOOD PRESSURE: 106 MMHG | DIASTOLIC BLOOD PRESSURE: 52 MMHG | RESPIRATION RATE: 16 BRPM | BODY MASS INDEX: 17.43 KG/M2 | TEMPERATURE: 97.9 F | HEIGHT: 61 IN

## 2022-06-11 DIAGNOSIS — G62.9 PERIPHERAL POLYNEUROPATHY: Primary | ICD-10-CM

## 2022-06-11 LAB
ANION GAP SERPL CALCULATED.3IONS-SCNC: 6 MMOL/L (ref 3–14)
BUN SERPL-MCNC: 15 MG/DL (ref 7–30)
CALCIUM SERPL-MCNC: 7.8 MG/DL (ref 8.5–10.1)
CHLORIDE BLD-SCNC: 107 MMOL/L (ref 94–109)
CO2 SERPL-SCNC: 27 MMOL/L (ref 20–32)
CREAT SERPL-MCNC: 0.52 MG/DL (ref 0.52–1.04)
CRP SERPL-MCNC: 3 MG/L (ref 0–8)
ERYTHROCYTE [DISTWIDTH] IN BLOOD BY AUTOMATED COUNT: 14.4 % (ref 10–15)
GFR SERPL CREATININE-BSD FRML MDRD: >90 ML/MIN/1.73M2
GLUCOSE BLD-MCNC: 104 MG/DL (ref 70–99)
HCT VFR BLD AUTO: 34.6 % (ref 35–47)
HGB BLD-MCNC: 11.2 G/DL (ref 11.7–15.7)
MCH RBC QN AUTO: 30.7 PG (ref 26.5–33)
MCHC RBC AUTO-ENTMCNC: 32.4 G/DL (ref 31.5–36.5)
MCV RBC AUTO: 95 FL (ref 78–100)
PLATELET # BLD AUTO: 187 10E3/UL (ref 150–450)
POTASSIUM BLD-SCNC: 3.8 MMOL/L (ref 3.4–5.3)
RBC # BLD AUTO: 3.65 10E6/UL (ref 3.8–5.2)
SODIUM SERPL-SCNC: 140 MMOL/L (ref 133–144)
WBC # BLD AUTO: 4.9 10E3/UL (ref 4–11)

## 2022-06-11 PROCEDURE — 258N000003 HC RX IP 258 OP 636: Performed by: INTERNAL MEDICINE

## 2022-06-11 PROCEDURE — 99239 HOSP IP/OBS DSCHRG MGMT >30: CPT | Performed by: HOSPITALIST

## 2022-06-11 PROCEDURE — 97161 PT EVAL LOW COMPLEX 20 MIN: CPT | Mod: GP

## 2022-06-11 PROCEDURE — 97110 THERAPEUTIC EXERCISES: CPT | Mod: GP

## 2022-06-11 PROCEDURE — 97530 THERAPEUTIC ACTIVITIES: CPT | Mod: GP

## 2022-06-11 PROCEDURE — 86140 C-REACTIVE PROTEIN: CPT | Performed by: INTERNAL MEDICINE

## 2022-06-11 PROCEDURE — 250N000013 HC RX MED GY IP 250 OP 250 PS 637: Performed by: INTERNAL MEDICINE

## 2022-06-11 PROCEDURE — 85027 COMPLETE CBC AUTOMATED: CPT | Performed by: INTERNAL MEDICINE

## 2022-06-11 PROCEDURE — 250N000013 HC RX MED GY IP 250 OP 250 PS 637: Performed by: HOSPITALIST

## 2022-06-11 PROCEDURE — 36415 COLL VENOUS BLD VENIPUNCTURE: CPT | Performed by: INTERNAL MEDICINE

## 2022-06-11 PROCEDURE — 80048 BASIC METABOLIC PNL TOTAL CA: CPT | Performed by: INTERNAL MEDICINE

## 2022-06-11 PROCEDURE — 250N000011 HC RX IP 250 OP 636: Performed by: INTERNAL MEDICINE

## 2022-06-11 RX ORDER — GABAPENTIN 600 MG/1
600 TABLET ORAL 3 TIMES DAILY
Qty: 90 TABLET | Refills: 1 | Status: SHIPPED | OUTPATIENT
Start: 2022-06-11 | End: 2022-09-14

## 2022-06-11 RX ADMIN — SODIUM CHLORIDE 50 ML: 9 INJECTION, SOLUTION INTRAVENOUS at 03:08

## 2022-06-11 RX ADMIN — GABAPENTIN 600 MG: 300 CAPSULE ORAL at 08:10

## 2022-06-11 RX ADMIN — LOSARTAN POTASSIUM 12.5 MG: 25 TABLET, FILM COATED ORAL at 08:10

## 2022-06-11 RX ADMIN — ENOXAPARIN SODIUM 30 MG: 30 INJECTION SUBCUTANEOUS at 08:13

## 2022-06-11 RX ADMIN — THIAMINE HCL TAB 100 MG 100 MG: 100 TAB at 08:10

## 2022-06-11 RX ADMIN — PANTOPRAZOLE SODIUM 40 MG: 40 TABLET, DELAYED RELEASE ORAL at 08:03

## 2022-06-11 RX ADMIN — FLUTICASONE PROPIONATE 2 SPRAY: 50 SPRAY, METERED NASAL at 08:16

## 2022-06-11 RX ADMIN — SPIRONOLACTONE 12.5 MG: 25 TABLET ORAL at 08:04

## 2022-06-11 RX ADMIN — METOPROLOL SUCCINATE 50 MG: 50 TABLET, EXTENDED RELEASE ORAL at 08:10

## 2022-06-11 RX ADMIN — ATORVASTATIN CALCIUM 40 MG: 40 TABLET, FILM COATED ORAL at 08:11

## 2022-06-11 RX ADMIN — REMDESIVIR 100 MG: 100 INJECTION, POWDER, LYOPHILIZED, FOR SOLUTION INTRAVENOUS at 03:02

## 2022-06-11 RX ADMIN — ASPIRIN 81 MG: 81 TABLET, COATED ORAL at 08:04

## 2022-06-11 RX ADMIN — FOLIC ACID 1 MG: 1 TABLET ORAL at 08:13

## 2022-06-11 ASSESSMENT — ACTIVITIES OF DAILY LIVING (ADL)
ADLS_ACUITY_SCORE: 22

## 2022-06-11 NOTE — PLAN OF CARE
Physical Therapy Discharge Summary    Reason for therapy discharge:    All goals and outcomes met, no further needs identified.    Progress towards therapy goal(s). See goals on Care Plan in [facility] electronic health record for goal details.  Goals met    Therapy recommendation(s):    No further therapy is recommended.

## 2022-06-11 NOTE — PLAN OF CARE
[id number]: Patient A&O x4. C/o headache, PRN tylenol given. VSS on RA. Denies SOB. Up in room independently.

## 2022-06-11 NOTE — PROGRESS NOTES
Took care of patient for less than 3 hours. No complaints. Patient is independent in the room. Should be discharging tomorrow AM. No oxygen needs. Slight cough. Regular diet. DNR/DNI. A+O x 4.

## 2022-06-11 NOTE — DISCHARGE SUMMARY
[facility]  Hospitalist Discharge Summary      Date of Admission:  6/8/2022  Date of Discharge:  6/11/2022 11:22 AM  Discharging Provider: Mathew Caraballo MD  Discharge Service: Hospitalist Service    Discharge Diagnoses   COVID-19 infection  Lactic acidosis  Hypokalemia   Alcohol use disorder, in remission  Hx systolic CHF  CAD  HTN  HLD  Hx hypoglycemia   Suspected GERD  Hx esophageal cancer  Hx lung cancer   MDD  Anxiety  Peripheral neuropathy     Follow-ups Needed After Discharge   Follow-up Appointments     Follow-up and recommended labs and tests       Follow up with primary care provider, Mustapha Velásquez, within 7 days for   hospital follow- up.  No follow up labs or test are needed.             Discharge Disposition   Discharged to home  Condition at discharge: Stable    Hospital Course         Kezia Dixon is a 66 year old female admitted on 6/8/2022.  She presents presents with sob, weakness due to COVID-19 infection.       COVID-19 infection  Vaccinated and boosted. Tested positive for COVID 2 days prior to admission with cough, headache, some SOB. General severe malaise and fatigue. Lactic 3.1, normalized with fluid bolus. CXR without focal airspace disease.  She was treated with 3 days remdesivir, never developed hypoxia so no dexamethasone.  Weakness gradually improved and felt ok to discharge home by PT assessment.  Will discharge on Eliquis for prophylaxis.    Lactic acidosis, resolved  Admission lactate 3.5, was wnl on repeat following IVF.      Hypokalemia, resolved  K at 3.3 at admission.  Replaced per protocol      Alcohol use disorder, in remission  Was sober since 8/2021 with relapse in 5/2022. Brief hospital stay 5/2022 with apparent limited need for benzodiazepines. Has a history of withdrawal seizure.  Denies Etoh since discharge and showed no signs of withdrawal this admission.       Hx systolic heart failure, resolved EF 65-70% 1/2022  CAD, medical  management  Hypertension  HLD  Continue PTA aspirin 81 mg daily, atorvastatin 40 mg daily, furosemide 20 mg daily, losartan 12.5 mg daily, metoprolol 25 mg daily, spironolatone 12.5 mg daily.      Hypoglycemia  A1C 5/2022 at 5.8%. reported history of hypoglycemia, also noted to have on presentation with BS 56.  Blood sugars have remained wnl since.      Suspected GERD  Hx Esophageal cancer  S/p resection, esophagectomy with stomach pull-through 5 years ago. Reports unable to eat very much. She was treated with twice daily IV PPI with improvement.  Continue PTA PPI at discharge.      Hx lung cancer  S/p resection 3 years ago. No chemo or XRT.     MDD  Anxiety  Not currently on meds for this     Peripheral neuropathy  Thought 2/2 Etoh use.  Initiated on gabapentin 600 mg TID      Consultations This Hospital Stay   PHYSICAL THERAPY ADULT IP CONSULT  CARE MANAGEMENT / SOCIAL WORK IP CONSULT    Code Status   No CPR- Do NOT Intubate    Time Spent on this Encounter   I, Mathew Caraballo MD, personally saw the patient today and spent greater than 30 minutes discharging this patient.       Mathew Caraballo MD  [place] [address] [place] [address]  Phone: [phone]  ______________________________________________________________________    Physical Exam   Vital Signs: Temp: 97.9  F (36.6  C) Temp src: Oral BP: 106/52 Pulse: 69   Resp: 16 SpO2: 98 % O2 Device: None (Room air)    Weight: 92 lbs 4.8 oz  General Appearance: Thin female in NAD  Respiratory: lungs CTAB, no wheezes or crackles, no tachypnea   Cardiovascular: RRR, normal s1/s2 without murmur  GI: abdomen soft, normal bowel sounds  Skin: no peripheral edema   Other: Alert and appropriate, cranial nerves grossly intact         Primary Care Physician   Mustapha Velásquez    Discharge Orders      COVID-19 GetWell Loop Referral      Reason for your hospital stay    You were admitted for weakness due to COVID-19 infection.      Contact provider    Contact your primary care provider if If increased trouble breathing, new arm/leg swelling, dizziness/passing out, falls, bleeding that doesn't stop, or uncontrolled pain.     Follow-up and recommended labs and tests     Follow up with primary care provider, Mustapha Velásquez, within 7 days for hospital follow- up.  No follow up labs or test are needed.     Discharge - Quarantine/Isolation Instruction    Date of symptom onset:     Date of first positive test:    If you tested positive COVID-19 and show symptoms (fever, cough, body aches or trouble breathing):        Stay home and away from others (self-isolate) until:        At least 10 days have passed since your symptoms started. AND...        You've had no fever-and no medicine that reduces fever for 1 full day (24 hours). AND...         Your other symptoms have resolved (gotten better).  If you tested positive for COVID-19 but don't show symptoms:       Stay home and away from others (self-isolate) until at least 10 days have passed since the date of your first positive COVID-19 test.     Activity    Your activity upon discharge: activity as tolerated     Discharge Instructions    Hold your aspirin while taking apixaban (Eliquis) for the next 30 days.  Resume taking aspirin 81 mg daily once Eliquis course is complete.     When to contact your care team    Call 911 if you have any of the following: stroke-like symptoms (weakness on one side of the body, slurred speech, confusion, etc) or if you develop sudden onset severe headache (a typical headache is not of concern).  Monitor stool for blood or black, sticky, tar-like stool (a sign of digested blood).  If this occurs in small amount and you are feeling well, hold your blood thinner and contact your primary clinic.  If this occurs in a large amount or you are feeling weak, lightheaded, short of breath or otherwise unwell, present to the emergency room.     Diet    Follow this diet upon  discharge:       Snacks/Supplements Pediatric: Ensure Clear; Between Meals      Combination Diet Regular Diet Adult       Significant Results and Procedures   Most Recent 3 CBC's:Recent Labs   Lab Test 06/11/22  0630 06/10/22  0546 06/08/22  2146   WBC 4.9 3.3* 6.0   HGB 11.2* 10.6* 12.0   MCV 95 91 92    193 277     Most Recent 3 BMP's:Recent Labs   Lab Test 06/11/22  0630 06/10/22  0546 06/09/22  0931 06/09/22  0927 06/08/22  2346 06/08/22  2146    140  --   --   --  136   POTASSIUM 3.8 3.7  --  4.2   < > 3.3*   CHLORIDE 107 109  --   --   --  98   CO2 27 28  --   --   --  23   BUN 15 14  --   --   --  15   CR 0.52 0.47*  --   --   --  0.53   ANIONGAP 6 3  --   --   --  15*   VICENTE 7.8* 7.9*  --   --   --  8.0*   * 93 88  --    < > 56*    < > = values in this interval not displayed.     Most Recent 2 LFT's:Recent Labs   Lab Test 06/08/22 2146 05/16/22  1028   AST 41 53*   ALT 31 54*   ALKPHOS 94 107   BILITOTAL 0.3 0.7     Most Recent ESR & CRP:Recent Labs   Lab Test 06/11/22  0630   CRP 3.0   ,   Results for orders placed or performed during the hospital encounter of 06/08/22   XR Chest Port 1 View    Narrative    EXAM: XR CHEST PORT 1 VIEW  LOCATION: [facility]  DATE/TIME: 6/8/2022 10:33 PM    INDICATION: Confirmed COVID-19. Cough and dyspnea.  COMPARISON: CT chest 02/21/2022      Impression    IMPRESSION:     No focal airspace disease.    Trace right pleural effusion. No left pleural effusion. No pneumothorax.    The cardiomediastinal silhouette is unremarkable.    Redemonstrated post surgical changes from gastric pull-through.       Discharge Medications   Current Discharge Medication List      START taking these medications    Details   apixaban ANTICOAGULANT (ELIQUIS) 2.5 MG tablet Take 1 tablet (2.5 mg) by mouth 2 times daily  Qty: 60 tablet, Refills: 0    Associated Diagnoses: Infection due to 2019 novel coronavirus         CONTINUE these medications which have  CHANGED    Details   aspirin (ASA) 81 MG EC tablet Take 1 tablet (81 mg) by mouth daily    Associated Diagnoses: Cardiomyopathy, unspecified type (H)         CONTINUE these medications which have NOT CHANGED    Details   atorvastatin (LIPITOR) 40 MG tablet Take 1 tablet (40 mg) by mouth daily  Qty: 90 tablet, Refills: 3    Associated Diagnoses: Hyperlipidemia LDL goal <70      fluticasone (FLONASE) 50 MCG/ACT nasal spray INSTILL 2 SPRAYS INTO BOTH NOSTRILS DAILY.  Qty: 48 mL, Refills: 2    Associated Diagnoses: Chronic rhinitis      folic acid (FOLVITE) 1 MG tablet Take 1 tablet (1 mg) by mouth daily  Qty: 90 tablet, Refills: 3    Associated Diagnoses: Alcoholism (H)      furosemide (LASIX) 40 MG tablet Take 0.5 tablets (20 mg) by mouth every morning Ok to take additional 0.5 tab for worsening shortness of breath, leg swelling or weight gain.  Qty: 90 tablet, Refills: 3    Associated Diagnoses: Chronic systolic congestive heart failure (H)      ibuprofen (ADVIL/MOTRIN) 200 MG tablet Take 400 mg by mouth every 4 hours as needed for mild pain      losartan (COZAAR) 25 MG tablet Take 0.5 tablets (12.5 mg) by mouth daily  Qty: 45 tablet, Refills: 3    Associated Diagnoses: Benign essential hypertension      !! metoprolol succinate ER (TOPROL XL) 25 MG 24 hr tablet Take 25 mg by mouth every evening      !! metoprolol succinate ER (TOPROL XL) 50 MG 24 hr tablet Take 50 mg by mouth every morning      omeprazole (PRILOSEC) 40 MG DR capsule TAKE 1 CAPSULE BY MOUTH EVERY DAY  Qty: 90 capsule, Refills: 1    Associated Diagnoses: Benign essential hypertension      spironolactone (ALDACTONE) 25 MG tablet Take 0.5 tablets (12.5 mg) by mouth every morning  Qty: 45 tablet, Refills: 3    Associated Diagnoses: Chronic systolic congestive heart failure (H)      thiamine (B-1) 100 MG tablet Take 1 tablet (100 mg) by mouth daily  Qty: 90 tablet, Refills: 3    Associated Diagnoses: Alcoholism (H)       !! - Potential duplicate  medications found. Please discuss with provider.        Allergies   Allergies   Allergen Reactions     Codeine Sulfate Nausea     Simvastatin Cramps     Leg cramps     Pcn [Penicillins] Rash

## 2022-06-11 NOTE — PROGRESS NOTES
06/11/22 0858   Quick Adds   Type of Visit Initial PT Evaluation   Living Environment   People in Home alone   Current Living Arrangements house   Home Accessibility stairs to enter home;stairs within home   Number of Stairs, Main Entrance 2   Stair Railings, Main Entrance railings safe and in good condition   Number of Stairs, Within Home, Primary greater than 10 stairs  (14)   Stair Railings, Within Home, Primary railings safe and in good condition   Transportation Anticipated car, drives self;family or friend will provide   Living Environment Comments All needs met on main level, does have lower level but doesn't need to use   Self-Care   Usual Activity Tolerance good   Current Activity Tolerance moderate   Regular Exercise No   Equipment Currently Used at Home none   Fall history within last six months no   Activity/Exercise/Self-Care Comment Pt reports she is independent with ADLs and IADLs, does not use assistive device   General Information   Onset of Illness/Injury or Date of Surgery 06/08/22   Referring Physician Mathew Caraballo MD   Patient/Family Therapy Goals Statement (PT) To go home as soon as possible   Pertinent History of Current Problem (include personal factors and/or comorbidities that impact the POC) Pt is 66 year old female adm on 6/8/22 with SOB and weakness, had tested positive for COVID two days prior to admission. Pt has a history of alcohol abuse, pancreatitis, HTN, incr chol, and a recent hospitalization for weakness in May.   Existing Precautions/Restrictions no known precautions/restrictions   Cognition   Affect/Mental Status (Cognition) WFL   Orientation Status (Cognition) oriented x 3   Follows Commands (Cognition) WFL   Pain Assessment   Patient Currently in Pain No   Posture    Posture Forward head position;Protracted shoulders   Range of Motion (ROM)   ROM Comment B LE ROM WFL   Strength (Manual Muscle Testing)   Strength Comments B LE strength WFL, no focal weakness noted   Bed  Mobility   Bed Mobility no deficits identified   Transfers   Transfers no deficits identified   Gait/Stairs (Locomotion)   Karnes Level (Gait) independent   Balance   Balance Comments No LOB with functional reach or ambulation tasks   Clinical Impression   Criteria for Skilled Therapeutic Intervention Yes, treatment indicated   PT Diagnosis (PT) Impaired mobility   Influenced by the following impairments Decreased activity tolerance   Functional limitations due to impairments Decreased ability to participate in daily tasks   Clinical Presentation (PT Evaluation Complexity) Stable/Uncomplicated   Clinical Presentation Rationale Current presentation, [facility]   Clinical Decision Making (Complexity) low complexity   Planned Therapy Interventions (PT) patient/family education;progressive activity/exercise;home program guidelines   Risk & Benefits of therapy have been explained evaluation/treatment results reviewed;care plan/treatment goals reviewed;risks/benefits reviewed;current/potential barriers reviewed;participants voiced agreement with care plan;participants included;patient   PT Discharge Planning   PT Discharge Recommendation (DC Rec) home   PT Rationale for DC Rec Pt appears near baseline level of function, is independent with mobility, education provided on safely increasing activiyt at home, HEP recommendations, and potential for outpatient COVID rehab if has difficulty getting back to every day tasks. Pt stating understanding. No further inpatient or home care PT needs at this time. Anticipate discharge to home with follow up with PCP as needed.   PT Brief overview of current status Independent   Total Evaluation Time   Total Evaluation Time (Minutes) 10   Physical Therapy Goals   PT Frequency One time eval and treatment only   PT Predicted Duration/Target Date for Goal Attainment 06/11/22   PT: Bed Mobility Independent;Goal Met   PT: Transfers Independent;Goal Met   PT: Gait Independent;Greater than 200  feet;Goal Met   PT: Stairs Supervision/stand-by assist;2 stairs;Goal Met

## 2022-06-11 NOTE — PLAN OF CARE
Goal Outcome Evaluation:      [id number]  Pt. A & O x4. VSS on RA.  Denies pain, SOB.  Up in room independently.regular diet. Last dose of remdeliver given.   Plan= discharge today.

## 2022-06-14 LAB
BACTERIA BLD CULT: NO GROWTH
BACTERIA BLD CULT: NO GROWTH

## 2022-06-15 ENCOUNTER — PATIENT OUTREACH (OUTPATIENT)
Dept: NURSING | Facility: CLINIC | Age: 67
End: 2022-06-15
Payer: MEDICARE

## 2022-06-15 ENCOUNTER — PATIENT OUTREACH (OUTPATIENT)
Dept: CARE COORDINATION | Facility: CLINIC | Age: 67
End: 2022-06-15
Payer: MEDICARE

## 2022-06-15 DIAGNOSIS — F10.21 ALCOHOL DEPENDENCE IN REMISSION (H): Primary | ICD-10-CM

## 2022-06-15 NOTE — PROGRESS NOTES
"Clinic Care Coordination Contact  [facility]: Post-Discharge Note  SITUATION                                                      Admission:    Admission Date: 06/08/22   Reason for Admission: Shortness of breath  Discharge:   Discharge Date: 06/11/22  Discharge Diagnosis: COVID-19    BACKGROUND                                                        Per hospital discharge summary and inpatient provider notes:    Kezia Dixon is a 66 year old female admitted on 6/8/2022.  She presents presents with sob, weakness due to COVID-19 infection.       COVID-19 infection  Vaccinated and boosted. Tested positive for COVID 2 days prior to admission with cough, headache, some SOB. General severe malaise and fatigue. Lactic 3.1, normalized with fluid bolus. CXR without focal airspace disease.  She was treated with 3 days remdesivir, never developed hypoxia so no dexamethasone.  Weakness gradually improved and felt ok to discharge home by PT assessment. Will discharge on Eliquis for prophylaxis.      ASSESSMENT      Enrollment  Primary Care Care Coordination Status: Not a Candidate    Discharge Assessment  How are you doing now that you are home?: Tired, \"draggy\",  How are your symptoms? (Red Flag symptoms escalate to triage hotline per guidelines): Improved  Do you feel your condition is stable enough to be safe at home until your provider visit?: Yes  Does the patient have their discharge instructions? : Yes  Does the patient have questions regarding their discharge instructions? : No  Were you started on any new medications or were there changes to any of your previous medications? : Yes  Does the patient have all of their medications?: Yes  Do you have questions regarding any of your medications? : No  Do you have all of your needed medical supplies or equipment (DME)?  (i.e. oxygen tank, CPAP, cane, etc.): Yes  Discharge follow-up appointment scheduled within 14 calendar days? : Yes  Discharge Follow Up " Appointment Date: 06/17/22  Discharge Follow Up Appointment Scheduled with?: Primary Care Provider (Not with Primary Care Provider Dr. Calles)         Post-op (Clinicians Only)  Did the patient have surgery or a procedure: No  Fever: No  Chills: No  Eating & Drinking: eating and drinking without complaints/concerns  Additional Symptoms:  (fatigue)    Pt shared she feels her COVID symptoms are improving overall but she continues to feel tired. Pt requested resources for alcohol use treatment and a referral for [facility] addiction medicine. CC  place referral.    PLAN                                                      Outpatient Plan:     Future Appointments   Date Time Provider Department Center   6/17/2022 11:00 AM Manny Calles MD [facility]         For any urgent concerns, please contact our 24 hour nurse triage line: [phone] ([phone])       Alina Dubois GENA  Clinic Care Coordinator  Covering for [facility]   [phone]

## 2022-06-15 NOTE — LETTER
[facility] CARE COORDINATION    Petty 15, 2022    Kezia Dixon  [address]  [place] [address]      Dear Kezia,    I am a clinic care coordinator who works with Mustapha Velásquez MD with the [facility]. I wanted to thank you for spending the time to talk with me.  Below is a description of clinic care coordination and how I can further assist you.       The clinic care coordination team is made up of a registered nurse, , financial resource worker and community health worker who understand the health care system. The goal of clinic care coordination is to help you manage your health and improve access to the health care system. Our team works alongside your provider to assist you in determining your health and social needs. We can help you obtain health care and community resources, providing you with necessary information and education. We can work with you through any barriers and develop a care plan that helps coordinate and strengthen the communication between you and your care team.    Please feel free to contact me with any questions or concerns regarding care coordination and what we can offer.      We are focused on providing you with the highest-quality healthcare experience possible.    Sincerely,     DELIA Olmedo  [facility]  [phone]    Attached below are the resources that we had discussed during our phone call:    [employer]:   [email or web address]    [facility] Helpline:  [phone]    I have also submitted a referral on your behalf for therapy and recovery support. They will call you within 2 business days or you are welcome to call anytime. The number is [phone].

## 2022-06-15 NOTE — PROGRESS NOTES
Clinic Care Coordination Contact  [facility]/Voicemail       Clinical Data: Care Coordinator Outreach  Outreach attempted x 1.     Left message on patient's voicemail with call back information and requested return call.    Plan: Care Coordinator will try to reach patient again in 1-2 business days.     Alina Dubois [facility]  Clinic Care Coordinator   Covering for [facility]  [phone]

## 2022-06-17 ENCOUNTER — VIRTUAL VISIT (OUTPATIENT)
Dept: FAMILY MEDICINE | Facility: CLINIC | Age: 67
End: 2022-06-17
Payer: MEDICARE

## 2022-06-17 ENCOUNTER — HOSPITAL ENCOUNTER (INPATIENT)
Facility: CLINIC | Age: 67
LOS: 2 days | Discharge: HOME | DRG: 204 | End: 2022-06-20
Attending: EMERGENCY MEDICINE | Admitting: INTERNAL MEDICINE
Payer: MEDICARE

## 2022-06-17 DIAGNOSIS — F10.10 ALCOHOL ABUSE: ICD-10-CM

## 2022-06-17 DIAGNOSIS — R82.90 ABNORMAL FINDING ON URINALYSIS: ICD-10-CM

## 2022-06-17 DIAGNOSIS — E16.2 HYPOGLYCEMIA: ICD-10-CM

## 2022-06-17 DIAGNOSIS — J98.4 CAVITARY LESION OF LUNG: ICD-10-CM

## 2022-06-17 DIAGNOSIS — I50.22 CHRONIC SYSTOLIC CONGESTIVE HEART FAILURE (H): ICD-10-CM

## 2022-06-17 DIAGNOSIS — E87.20 LACTIC ACIDOSIS: ICD-10-CM

## 2022-06-17 DIAGNOSIS — Z53.9 ERRONEOUS ENCOUNTER--DISREGARD: Primary | ICD-10-CM

## 2022-06-17 DIAGNOSIS — F10.920 ALCOHOLIC INTOXICATION WITHOUT COMPLICATION (H): ICD-10-CM

## 2022-06-17 LAB
ANION GAP SERPL CALCULATED.3IONS-SCNC: 13 MMOL/L (ref 3–14)
ATRIAL RATE - MUSE: 82 BPM
BASOPHILS # BLD AUTO: 0.1 10E3/UL (ref 0–0.2)
BASOPHILS NFR BLD AUTO: 1 %
BUN SERPL-MCNC: 11 MG/DL (ref 7–30)
CALCIUM SERPL-MCNC: 8.3 MG/DL (ref 8.5–10.1)
CHLORIDE BLD-SCNC: 103 MMOL/L (ref 94–109)
CO2 SERPL-SCNC: 22 MMOL/L (ref 20–32)
CREAT SERPL-MCNC: 0.46 MG/DL (ref 0.52–1.04)
D DIMER PPP FEU-MCNC: 0.69 UG/ML FEU (ref 0–0.5)
DIASTOLIC BLOOD PRESSURE - MUSE: NORMAL MMHG
EOSINOPHIL # BLD AUTO: 0 10E3/UL (ref 0–0.7)
EOSINOPHIL NFR BLD AUTO: 0 %
ERYTHROCYTE [DISTWIDTH] IN BLOOD BY AUTOMATED COUNT: 14.8 % (ref 10–15)
ETHANOL SERPL-MCNC: 0.16 G/DL
GFR SERPL CREATININE-BSD FRML MDRD: >90 ML/MIN/1.73M2
GLUCOSE BLD-MCNC: 51 MG/DL (ref 70–99)
HCT VFR BLD AUTO: 35.1 % (ref 35–47)
HGB BLD-MCNC: 11.6 G/DL (ref 11.7–15.7)
IMM GRANULOCYTES # BLD: 0 10E3/UL
IMM GRANULOCYTES NFR BLD: 0 %
INTERPRETATION ECG - MUSE: NORMAL
LACTATE SERPL-SCNC: 3.5 MMOL/L (ref 0.7–2)
LYMPHOCYTES # BLD AUTO: 1.3 10E3/UL (ref 0.8–5.3)
LYMPHOCYTES NFR BLD AUTO: 16 %
MCH RBC QN AUTO: 30.9 PG (ref 26.5–33)
MCHC RBC AUTO-ENTMCNC: 33 G/DL (ref 31.5–36.5)
MCV RBC AUTO: 93 FL (ref 78–100)
MONOCYTES # BLD AUTO: 0.4 10E3/UL (ref 0–1.3)
MONOCYTES NFR BLD AUTO: 5 %
NEUTROPHILS # BLD AUTO: 6 10E3/UL (ref 1.6–8.3)
NEUTROPHILS NFR BLD AUTO: 78 %
NRBC # BLD AUTO: 0 10E3/UL
NRBC BLD AUTO-RTO: 0 /100
NT-PROBNP SERPL-MCNC: 454 PG/ML (ref 0–900)
P AXIS - MUSE: 69 DEGREES
PLATELET # BLD AUTO: 191 10E3/UL (ref 150–450)
POTASSIUM BLD-SCNC: 4 MMOL/L (ref 3.4–5.3)
PR INTERVAL - MUSE: 122 MS
QRS DURATION - MUSE: 68 MS
QT - MUSE: 374 MS
QTC - MUSE: 436 MS
R AXIS - MUSE: 82 DEGREES
RBC # BLD AUTO: 3.76 10E6/UL (ref 3.8–5.2)
SODIUM SERPL-SCNC: 138 MMOL/L (ref 133–144)
SYSTOLIC BLOOD PRESSURE - MUSE: NORMAL MMHG
T AXIS - MUSE: 74 DEGREES
TROPONIN I SERPL HS-MCNC: 5 NG/L
TSH SERPL DL<=0.005 MIU/L-ACNC: 2.58 MU/L (ref 0.4–4)
VENTRICULAR RATE- MUSE: 82 BPM
WBC # BLD AUTO: 7.8 10E3/UL (ref 4–11)

## 2022-06-17 PROCEDURE — 36415 COLL VENOUS BLD VENIPUNCTURE: CPT | Performed by: EMERGENCY MEDICINE

## 2022-06-17 PROCEDURE — 80307 DRUG TEST PRSMV CHEM ANLYZR: CPT | Performed by: EMERGENCY MEDICINE

## 2022-06-17 PROCEDURE — 258N000003 HC RX IP 258 OP 636: Performed by: EMERGENCY MEDICINE

## 2022-06-17 PROCEDURE — 85379 FIBRIN DEGRADATION QUANT: CPT | Performed by: EMERGENCY MEDICINE

## 2022-06-17 PROCEDURE — 96361 HYDRATE IV INFUSION ADD-ON: CPT

## 2022-06-17 PROCEDURE — 250N000011 HC RX IP 250 OP 636: Performed by: EMERGENCY MEDICINE

## 2022-06-17 PROCEDURE — 93005 ELECTROCARDIOGRAM TRACING: CPT

## 2022-06-17 PROCEDURE — 96375 TX/PRO/DX INJ NEW DRUG ADDON: CPT

## 2022-06-17 PROCEDURE — 80053 COMPREHEN METABOLIC PANEL: CPT | Performed by: EMERGENCY MEDICINE

## 2022-06-17 PROCEDURE — 82248 BILIRUBIN DIRECT: CPT | Performed by: EMERGENCY MEDICINE

## 2022-06-17 PROCEDURE — 83605 ASSAY OF LACTIC ACID: CPT | Performed by: EMERGENCY MEDICINE

## 2022-06-17 PROCEDURE — 84484 ASSAY OF TROPONIN QUANT: CPT | Performed by: EMERGENCY MEDICINE

## 2022-06-17 PROCEDURE — 99285 EMERGENCY DEPT VISIT HI MDM: CPT | Mod: 25

## 2022-06-17 PROCEDURE — 81001 URINALYSIS AUTO W/SCOPE: CPT | Performed by: EMERGENCY MEDICINE

## 2022-06-17 PROCEDURE — 84443 ASSAY THYROID STIM HORMONE: CPT | Performed by: EMERGENCY MEDICINE

## 2022-06-17 PROCEDURE — 83880 ASSAY OF NATRIURETIC PEPTIDE: CPT | Performed by: EMERGENCY MEDICINE

## 2022-06-17 PROCEDURE — 85025 COMPLETE CBC W/AUTO DIFF WBC: CPT | Performed by: EMERGENCY MEDICINE

## 2022-06-17 PROCEDURE — 87086 URINE CULTURE/COLONY COUNT: CPT | Performed by: EMERGENCY MEDICINE

## 2022-06-17 PROCEDURE — 83735 ASSAY OF MAGNESIUM: CPT | Performed by: INTERNAL MEDICINE

## 2022-06-17 PROCEDURE — 250N000013 HC RX MED GY IP 250 OP 250 PS 637: Performed by: EMERGENCY MEDICINE

## 2022-06-17 PROCEDURE — 82077 ASSAY SPEC XCP UR&BREATH IA: CPT | Performed by: EMERGENCY MEDICINE

## 2022-06-17 RX ORDER — ACETAMINOPHEN 500 MG
1000 TABLET ORAL ONCE
Status: COMPLETED | OUTPATIENT
Start: 2022-06-17 | End: 2022-06-17

## 2022-06-17 RX ORDER — METOCLOPRAMIDE HYDROCHLORIDE 5 MG/ML
5 INJECTION INTRAMUSCULAR; INTRAVENOUS ONCE
Status: COMPLETED | OUTPATIENT
Start: 2022-06-17 | End: 2022-06-17

## 2022-06-17 RX ORDER — SODIUM CHLORIDE 9 MG/ML
INJECTION, SOLUTION INTRAVENOUS CONTINUOUS
Status: DISCONTINUED | OUTPATIENT
Start: 2022-06-17 | End: 2022-06-17

## 2022-06-17 RX ORDER — DIPHENHYDRAMINE HYDROCHLORIDE 50 MG/ML
25 INJECTION INTRAMUSCULAR; INTRAVENOUS ONCE
Status: COMPLETED | OUTPATIENT
Start: 2022-06-17 | End: 2022-06-17

## 2022-06-17 RX ADMIN — DIPHENHYDRAMINE HYDROCHLORIDE 25 MG: 50 INJECTION, SOLUTION INTRAMUSCULAR; INTRAVENOUS at 23:16

## 2022-06-17 RX ADMIN — ACETAMINOPHEN 1000 MG: 500 TABLET, FILM COATED ORAL at 23:15

## 2022-06-17 RX ADMIN — METOCLOPRAMIDE 5 MG: 5 INJECTION, SOLUTION INTRAMUSCULAR; INTRAVENOUS at 23:18

## 2022-06-17 RX ADMIN — SODIUM CHLORIDE 1000 ML: 9 INJECTION, SOLUTION INTRAVENOUS at 22:59

## 2022-06-17 ASSESSMENT — ENCOUNTER SYMPTOMS
NUMBNESS: 0
VOMITING: 0
NAUSEA: 1
SPEECH DIFFICULTY: 1
CONFUSION: 1
CHILLS: 1
ABDOMINAL PAIN: 0
APPETITE CHANGE: 0
FEVER: 0
SHORTNESS OF BREATH: 1
COUGH: 0
HEADACHES: 1

## 2022-06-17 NOTE — ED TRIAGE NOTES
Had COVID, with cough, fever, chills, shortness of breath, nausea.  Now complains of chills and brain fog. VSS.  Was seen last week and prescribed antivirals. She started getting better, now son states she has worsened. Headache.     Triage Assessment     Row Name 06/17/22 5243       Triage Assessment (Adult)    Airway WDL WDL       Respiratory WDL    Respiratory WDL X;all    Rhythm/Pattern, Respiratory shortness of breath       Skin Circulation/Temperature WDL    Skin Circulation/Temperature WDL WDL       Cardiac WDL    Cardiac WDL WDL       Peripheral/Neurovascular WDL    Peripheral Neurovascular WDL WDL       Cognitive/Neuro/Behavioral WDL    Cognitive/Neuro/Behavioral WDL X;all    Level of Consciousness other (see comments)  reports brain fog

## 2022-06-17 NOTE — PROGRESS NOTES
Attempted to phone x 5 separate occasions and no answer    This encounter was opened in error. Please disregard.

## 2022-06-18 ENCOUNTER — APPOINTMENT (OUTPATIENT)
Dept: CT IMAGING | Facility: CLINIC | Age: 67
DRG: 204 | End: 2022-06-18
Attending: EMERGENCY MEDICINE
Payer: MEDICARE

## 2022-06-18 PROBLEM — J98.4 CAVITARY LESION OF LUNG: Status: ACTIVE | Noted: 2022-06-18

## 2022-06-18 LAB
ALBUMIN SERPL-MCNC: 3.1 G/DL (ref 3.4–5)
ALBUMIN UR-MCNC: 20 MG/DL
ALP SERPL-CCNC: 113 U/L (ref 40–150)
ALT SERPL W P-5'-P-CCNC: 30 U/L (ref 0–50)
AMPHETAMINES UR QL SCN: ABNORMAL
APPEARANCE UR: ABNORMAL
AST SERPL W P-5'-P-CCNC: 60 U/L (ref 0–45)
BACTERIA #/AREA URNS HPF: ABNORMAL /HPF
BARBITURATES UR QL: ABNORMAL
BENZODIAZ UR QL: ABNORMAL
BILIRUB DIRECT SERPL-MCNC: 0.2 MG/DL (ref 0–0.2)
BILIRUB SERPL-MCNC: 0.7 MG/DL (ref 0.2–1.3)
BILIRUB UR QL STRIP: NEGATIVE
CANNABINOIDS UR QL SCN: ABNORMAL
COCAINE UR QL: ABNORMAL
COLOR UR AUTO: YELLOW
CRP SERPL-MCNC: <2.9 MG/L (ref 0–8)
D DIMER PPP FEU-MCNC: 0.34 UG/ML FEU (ref 0–0.5)
GLUCOSE BLDC GLUCOMTR-MCNC: 161 MG/DL (ref 70–99)
GLUCOSE BLDC GLUCOMTR-MCNC: 172 MG/DL (ref 70–99)
GLUCOSE BLDC GLUCOMTR-MCNC: 224 MG/DL (ref 70–99)
GLUCOSE BLDC GLUCOMTR-MCNC: 57 MG/DL (ref 70–99)
GLUCOSE UR STRIP-MCNC: NEGATIVE MG/DL
HGB UR QL STRIP: NEGATIVE
HOLD SPECIMEN: NORMAL
KETONES UR STRIP-MCNC: 60 MG/DL
LACTATE SERPL-SCNC: 1.5 MMOL/L (ref 0.7–2)
LACTATE SERPL-SCNC: 3.5 MMOL/L (ref 0.7–2)
LEUKOCYTE ESTERASE UR QL STRIP: ABNORMAL
MAGNESIUM SERPL-MCNC: 1.9 MG/DL (ref 1.6–2.3)
MAGNESIUM SERPL-MCNC: 2 MG/DL (ref 1.6–2.3)
MUCOUS THREADS #/AREA URNS LPF: PRESENT /LPF
NITRATE UR QL: NEGATIVE
OPIATES UR QL SCN: ABNORMAL
PCP UR QL SCN: ABNORMAL
PH UR STRIP: 5.5 [PH] (ref 5–7)
PROT SERPL-MCNC: 6.4 G/DL (ref 6.8–8.8)
RBC URINE: 4 /HPF
SP GR UR STRIP: 1.02 (ref 1–1.03)
SQUAMOUS EPITHELIAL: 7 /HPF
UROBILINOGEN UR STRIP-MCNC: 2 MG/DL
WBC URINE: 24 /HPF

## 2022-06-18 PROCEDURE — 250N000013 HC RX MED GY IP 250 OP 250 PS 637: Performed by: INTERNAL MEDICINE

## 2022-06-18 PROCEDURE — 36415 COLL VENOUS BLD VENIPUNCTURE: CPT | Performed by: EMERGENCY MEDICINE

## 2022-06-18 PROCEDURE — G0378 HOSPITAL OBSERVATION PER HR: HCPCS

## 2022-06-18 PROCEDURE — 87305 ASPERGILLUS AG IA: CPT | Performed by: INTERNAL MEDICINE

## 2022-06-18 PROCEDURE — 83735 ASSAY OF MAGNESIUM: CPT | Performed by: INTERNAL MEDICINE

## 2022-06-18 PROCEDURE — 70496 CT ANGIOGRAPHY HEAD: CPT

## 2022-06-18 PROCEDURE — 85379 FIBRIN DEGRADATION QUANT: CPT | Performed by: INTERNAL MEDICINE

## 2022-06-18 PROCEDURE — 250N000011 HC RX IP 250 OP 636: Performed by: INTERNAL MEDICINE

## 2022-06-18 PROCEDURE — 258N000003 HC RX IP 258 OP 636: Performed by: EMERGENCY MEDICINE

## 2022-06-18 PROCEDURE — 70498 CT ANGIOGRAPHY NECK: CPT

## 2022-06-18 PROCEDURE — 87449 NOS EACH ORGANISM AG IA: CPT | Performed by: INTERNAL MEDICINE

## 2022-06-18 PROCEDURE — 96375 TX/PRO/DX INJ NEW DRUG ADDON: CPT

## 2022-06-18 PROCEDURE — 120N000001 HC R&B MED SURG/OB

## 2022-06-18 PROCEDURE — 86140 C-REACTIVE PROTEIN: CPT | Performed by: INTERNAL MEDICINE

## 2022-06-18 PROCEDURE — 71250 CT THORAX DX C-: CPT

## 2022-06-18 PROCEDURE — 250N000011 HC RX IP 250 OP 636: Performed by: EMERGENCY MEDICINE

## 2022-06-18 PROCEDURE — 96368 THER/DIAG CONCURRENT INF: CPT

## 2022-06-18 PROCEDURE — 250N000013 HC RX MED GY IP 250 OP 250 PS 637: Performed by: HOSPITALIST

## 2022-06-18 PROCEDURE — 250N000013 HC RX MED GY IP 250 OP 250 PS 637: Performed by: EMERGENCY MEDICINE

## 2022-06-18 PROCEDURE — C9113 INJ PANTOPRAZOLE SODIUM, VIA: HCPCS | Performed by: INTERNAL MEDICINE

## 2022-06-18 PROCEDURE — 99223 1ST HOSP IP/OBS HIGH 75: CPT | Mod: AI | Performed by: INTERNAL MEDICINE

## 2022-06-18 PROCEDURE — 250N000009 HC RX 250: Performed by: EMERGENCY MEDICINE

## 2022-06-18 PROCEDURE — 99222 1ST HOSP IP/OBS MODERATE 55: CPT | Performed by: INTERNAL MEDICINE

## 2022-06-18 PROCEDURE — 86481 TB AG RESPONSE T-CELL SUSP: CPT | Performed by: INTERNAL MEDICINE

## 2022-06-18 PROCEDURE — 96365 THER/PROPH/DIAG IV INF INIT: CPT | Mod: 59

## 2022-06-18 PROCEDURE — 83605 ASSAY OF LACTIC ACID: CPT | Performed by: EMERGENCY MEDICINE

## 2022-06-18 PROCEDURE — 70450 CT HEAD/BRAIN W/O DYE: CPT

## 2022-06-18 PROCEDURE — 36415 COLL VENOUS BLD VENIPUNCTURE: CPT | Performed by: INTERNAL MEDICINE

## 2022-06-18 RX ORDER — KETOROLAC TROMETHAMINE 15 MG/ML
15 INJECTION, SOLUTION INTRAMUSCULAR; INTRAVENOUS ONCE
Status: COMPLETED | OUTPATIENT
Start: 2022-06-18 | End: 2022-06-18

## 2022-06-18 RX ORDER — DEXTROSE MONOHYDRATE 25 G/50ML
25-50 INJECTION, SOLUTION INTRAVENOUS
Status: DISCONTINUED | OUTPATIENT
Start: 2022-06-18 | End: 2022-06-20 | Stop reason: HOSPADM

## 2022-06-18 RX ORDER — ENOXAPARIN SODIUM 100 MG/ML
30 INJECTION SUBCUTANEOUS EVERY 24 HOURS
Status: DISCONTINUED | OUTPATIENT
Start: 2022-06-18 | End: 2022-06-20 | Stop reason: HOSPADM

## 2022-06-18 RX ORDER — AMOXICILLIN 250 MG
2 CAPSULE ORAL 2 TIMES DAILY PRN
Status: DISCONTINUED | OUTPATIENT
Start: 2022-06-18 | End: 2022-06-20 | Stop reason: HOSPADM

## 2022-06-18 RX ORDER — METOPROLOL SUCCINATE 25 MG/1
25 TABLET, EXTENDED RELEASE ORAL EVERY EVENING
Status: DISCONTINUED | OUTPATIENT
Start: 2022-06-18 | End: 2022-06-20 | Stop reason: HOSPADM

## 2022-06-18 RX ORDER — LIDOCAINE 40 MG/G
CREAM TOPICAL
Status: DISCONTINUED | OUTPATIENT
Start: 2022-06-18 | End: 2022-06-20 | Stop reason: HOSPADM

## 2022-06-18 RX ORDER — AMOXICILLIN 250 MG
1 CAPSULE ORAL 2 TIMES DAILY PRN
Status: DISCONTINUED | OUTPATIENT
Start: 2022-06-18 | End: 2022-06-20 | Stop reason: HOSPADM

## 2022-06-18 RX ORDER — CEFTRIAXONE 1 G/1
1 INJECTION, POWDER, FOR SOLUTION INTRAMUSCULAR; INTRAVENOUS ONCE
Status: COMPLETED | OUTPATIENT
Start: 2022-06-18 | End: 2022-06-18

## 2022-06-18 RX ORDER — IOPAMIDOL 755 MG/ML
75 INJECTION, SOLUTION INTRAVASCULAR ONCE
Status: COMPLETED | OUTPATIENT
Start: 2022-06-18 | End: 2022-06-18

## 2022-06-18 RX ORDER — MULTIVITAMIN,THERAPEUTIC
1 TABLET ORAL ONCE
Status: COMPLETED | OUTPATIENT
Start: 2022-06-18 | End: 2022-06-18

## 2022-06-18 RX ORDER — ASPIRIN 81 MG/1
81 TABLET ORAL DAILY
Status: DISCONTINUED | OUTPATIENT
Start: 2022-06-18 | End: 2022-06-20 | Stop reason: HOSPADM

## 2022-06-18 RX ORDER — FOLIC ACID 1 MG/1
1 TABLET ORAL DAILY
Status: DISCONTINUED | OUTPATIENT
Start: 2022-06-19 | End: 2022-06-20 | Stop reason: HOSPADM

## 2022-06-18 RX ORDER — ACETAMINOPHEN 500 MG
1000 TABLET ORAL ONCE
Status: COMPLETED | OUTPATIENT
Start: 2022-06-18 | End: 2022-06-18

## 2022-06-18 RX ORDER — NICOTINE POLACRILEX 4 MG
15-30 LOZENGE BUCCAL
Status: DISCONTINUED | OUTPATIENT
Start: 2022-06-18 | End: 2022-06-20 | Stop reason: HOSPADM

## 2022-06-18 RX ORDER — MAGNESIUM OXIDE 400 MG/1
800 TABLET ORAL DAILY
Status: DISCONTINUED | OUTPATIENT
Start: 2022-06-18 | End: 2022-06-18

## 2022-06-18 RX ORDER — SPIRONOLACTONE 25 MG
12.5 TABLET ORAL EVERY MORNING
Status: DISCONTINUED | OUTPATIENT
Start: 2022-06-19 | End: 2022-06-20 | Stop reason: HOSPADM

## 2022-06-18 RX ORDER — ACETAMINOPHEN 650 MG/1
650 SUPPOSITORY RECTAL EVERY 4 HOURS PRN
Status: DISCONTINUED | OUTPATIENT
Start: 2022-06-18 | End: 2022-06-20 | Stop reason: HOSPADM

## 2022-06-18 RX ORDER — FOLIC ACID 1 MG/1
1 TABLET ORAL ONCE
Status: COMPLETED | OUTPATIENT
Start: 2022-06-18 | End: 2022-06-18

## 2022-06-18 RX ORDER — ATORVASTATIN CALCIUM 40 MG/1
40 TABLET, FILM COATED ORAL DAILY
Status: DISCONTINUED | OUTPATIENT
Start: 2022-06-18 | End: 2022-06-20 | Stop reason: HOSPADM

## 2022-06-18 RX ORDER — ONDANSETRON 4 MG/1
4 TABLET, ORALLY DISINTEGRATING ORAL EVERY 6 HOURS PRN
Status: DISCONTINUED | OUTPATIENT
Start: 2022-06-18 | End: 2022-06-20 | Stop reason: HOSPADM

## 2022-06-18 RX ORDER — ACETAMINOPHEN 325 MG/1
650 TABLET ORAL EVERY 4 HOURS PRN
Status: DISCONTINUED | OUTPATIENT
Start: 2022-06-18 | End: 2022-06-20 | Stop reason: HOSPADM

## 2022-06-18 RX ORDER — ONDANSETRON 2 MG/ML
4 INJECTION INTRAMUSCULAR; INTRAVENOUS EVERY 6 HOURS PRN
Status: DISCONTINUED | OUTPATIENT
Start: 2022-06-18 | End: 2022-06-20 | Stop reason: HOSPADM

## 2022-06-18 RX ORDER — LIDOCAINE 40 MG/G
CREAM TOPICAL
Status: DISCONTINUED | OUTPATIENT
Start: 2022-06-18 | End: 2022-06-18

## 2022-06-18 RX ORDER — METOPROLOL SUCCINATE 50 MG/1
50 TABLET, EXTENDED RELEASE ORAL EVERY MORNING
Status: DISCONTINUED | OUTPATIENT
Start: 2022-06-19 | End: 2022-06-20 | Stop reason: HOSPADM

## 2022-06-18 RX ORDER — MAGNESIUM OXIDE 400 MG/1
400 TABLET ORAL DAILY
Status: DISCONTINUED | OUTPATIENT
Start: 2022-06-19 | End: 2022-06-20 | Stop reason: HOSPADM

## 2022-06-18 RX ORDER — PROCHLORPERAZINE MALEATE 5 MG
5 TABLET ORAL EVERY 6 HOURS PRN
Status: DISCONTINUED | OUTPATIENT
Start: 2022-06-18 | End: 2022-06-20 | Stop reason: HOSPADM

## 2022-06-18 RX ORDER — CEFTRIAXONE 1 G/1
1 INJECTION, POWDER, FOR SOLUTION INTRAMUSCULAR; INTRAVENOUS EVERY 24 HOURS
Status: DISCONTINUED | OUTPATIENT
Start: 2022-06-19 | End: 2022-06-19

## 2022-06-18 RX ORDER — GABAPENTIN 600 MG/1
600 TABLET ORAL 3 TIMES DAILY
Status: DISCONTINUED | OUTPATIENT
Start: 2022-06-18 | End: 2022-06-20 | Stop reason: HOSPADM

## 2022-06-18 RX ORDER — MULTIVITAMIN,THERAPEUTIC
1 TABLET ORAL DAILY
Status: DISCONTINUED | OUTPATIENT
Start: 2022-06-19 | End: 2022-06-20 | Stop reason: HOSPADM

## 2022-06-18 RX ORDER — PROCHLORPERAZINE 25 MG
12.5 SUPPOSITORY, RECTAL RECTAL EVERY 12 HOURS PRN
Status: DISCONTINUED | OUTPATIENT
Start: 2022-06-18 | End: 2022-06-20 | Stop reason: HOSPADM

## 2022-06-18 RX ADMIN — ATORVASTATIN CALCIUM 40 MG: 40 TABLET, FILM COATED ORAL at 13:35

## 2022-06-18 RX ADMIN — PANTOPRAZOLE SODIUM 40 MG: 40 INJECTION, POWDER, FOR SOLUTION INTRAVENOUS at 20:50

## 2022-06-18 RX ADMIN — ACETAMINOPHEN 650 MG: 325 TABLET ORAL at 13:35

## 2022-06-18 RX ADMIN — GABAPENTIN 600 MG: 600 TABLET, FILM COATED ORAL at 22:50

## 2022-06-18 RX ADMIN — SODIUM CHLORIDE 90 ML: 900 INJECTION INTRAVENOUS at 03:14

## 2022-06-18 RX ADMIN — THERA TABS 1 TABLET: TAB at 05:50

## 2022-06-18 RX ADMIN — KETOROLAC TROMETHAMINE 15 MG: 15 INJECTION, SOLUTION INTRAMUSCULAR; INTRAVENOUS at 05:50

## 2022-06-18 RX ADMIN — METOPROLOL SUCCINATE 25 MG: 25 TABLET, EXTENDED RELEASE ORAL at 20:50

## 2022-06-18 RX ADMIN — CEFTRIAXONE SODIUM 1 G: 1 INJECTION, POWDER, FOR SOLUTION INTRAMUSCULAR; INTRAVENOUS at 02:09

## 2022-06-18 RX ADMIN — FOLIC ACID 1 MG: 1 TABLET ORAL at 05:50

## 2022-06-18 RX ADMIN — ACETAMINOPHEN 650 MG: 325 TABLET ORAL at 18:04

## 2022-06-18 RX ADMIN — THIAMINE HCL TAB 100 MG 100 MG: 100 TAB at 05:50

## 2022-06-18 RX ADMIN — ACETAMINOPHEN 1000 MG: 500 TABLET, FILM COATED ORAL at 05:49

## 2022-06-18 RX ADMIN — GABAPENTIN 600 MG: 600 TABLET, FILM COATED ORAL at 18:04

## 2022-06-18 RX ADMIN — ASPIRIN 81 MG: 81 TABLET, COATED ORAL at 09:15

## 2022-06-18 RX ADMIN — IOPAMIDOL 75 ML: 755 INJECTION, SOLUTION INTRAVENOUS at 03:14

## 2022-06-18 RX ADMIN — DEXTROSE AND SODIUM CHLORIDE: 5; 900 INJECTION, SOLUTION INTRAVENOUS at 02:09

## 2022-06-18 RX ADMIN — LOSARTAN POTASSIUM 12.5 MG: 25 TABLET, FILM COATED ORAL at 18:59

## 2022-06-18 RX ADMIN — PANTOPRAZOLE SODIUM 40 MG: 40 INJECTION, POWDER, FOR SOLUTION INTRAVENOUS at 09:16

## 2022-06-18 RX ADMIN — ENOXAPARIN SODIUM 30 MG: 30 INJECTION SUBCUTANEOUS at 13:16

## 2022-06-18 ASSESSMENT — ACTIVITIES OF DAILY LIVING (ADL)
ADLS_ACUITY_SCORE: 25
ADLS_ACUITY_SCORE: 35
DIFFICULTY_EATING/SWALLOWING: NO
ADLS_ACUITY_SCORE: 25
VISION_MANAGEMENT: GLASSES
ADLS_ACUITY_SCORE: 25
ADLS_ACUITY_SCORE: 35
CHANGE_IN_FUNCTIONAL_STATUS_SINCE_ONSET_OF_CURRENT_ILLNESS/INJURY: YES
ADLS_ACUITY_SCORE: 35
ADLS_ACUITY_SCORE: 25
ADLS_ACUITY_SCORE: 23
FALL_HISTORY_WITHIN_LAST_SIX_MONTHS: NO
WALKING_OR_CLIMBING_STAIRS: AMBULATION DIFFICULTY, ASSISTANCE 1 PERSON
ADLS_ACUITY_SCORE: 35
TRANSFERRING: 0-->ASSISTANCE NEEDED (DEVELOPMENTALLY APPROPRIATE)
CONCENTRATING,_REMEMBERING_OR_MAKING_DECISIONS_DIFFICULTY: NO
WALKING_OR_CLIMBING_STAIRS_DIFFICULTY: YES
DRESSING/BATHING_DIFFICULTY: NO
DOING_ERRANDS_INDEPENDENTLY_DIFFICULTY: NO
WEAR_GLASSES_OR_BLIND: YES
TOILETING_ISSUES: NO
TRANSFERRING: 1-->ASSISTANCE (EQUIPMENT/PERSON) NEEDED

## 2022-06-18 NOTE — CONSULTS
"CLINICAL NUTRITION SERVICES  -  ASSESSMENT NOTE      Recommendations Ordered by Registered Dietitian (RD):   Ensure Clear Mixed Berry once daily (more available per request)   Malnutrition:   % Weight Loss:  Weight loss does not meet criteria for malnutrition - chronically underweight   % Intake:  <75% for >/= 3 months (moderate malnutrition) - chronic   Subcutaneous Fat Loss: at least moderate losses per chart review   Muscle Loss:  at least moderate losses per chart review   Fluid Retention:  None noted    Malnutrition Diagnosis: Moderate malnutrition (or greater)  In Context of:  Chronic illness or disease  Environmental or social circumstances        REASON FOR ASSESSMENT  Kezia Dixon is a 66 year old female seen by Registered Dietitian for Provider Order - malnutrition, hypoglycemia       NUTRITION HISTORY  Chart reviewed and discussed with patient over the phone (COVID + isolation)  Recent hospitalization for COVID and similar symptoms 6/8/22. [facility]/c'd to [facility] 6/11/[facility] and then home 6/13/22  Resides independently   Per H&P, has been drinking alcohol ~2 glasses scotch per day or more (was hospitalized in May 2022 w/ Etoh withdrawal)  Pt seen here by the RD 8 months ago & met criteria for severe malnutrition based on weight loss, decreased intake and fat/muscle wasting   Previously taking Ensure Clear     Per patient, intake was \"going better than had been going last time\"  She had been doing more take out \"which helped\" usually 1-2 times per week which included things like steak, potatoes and asparagus   Otherwise at home eats 3 small meals per day including hot dogs, ham sandwiches, potato chips, eggs, and \"not a lot of veggies\"  Regular Ensure makes her ill so she only likes Clear mixed berry - states she can only consume 1 per day   Apart from appetite fluctuations at baseline (with relation to ETOH), reports that decreased intake also related to prior esophageal cancer with stomach pull-through that only " "allows her to eat a small amount at a time   Reports that she has tried doing smaller, more frequent meals but it is hard   She feels weight has been stable ~86 lbs      CURRENT NUTRITION ORDERS  Diet Order:     Regular     Current Intake/Tolerance:  Waiting for first meal - feeling hungry but also having nausea    Agreeable to 1 Ensure clear/day - declines more a this time    NUTRITION FOCUSED PHYSICAL ASSESSMENT FOR DIAGNOSING MALNUTRITION)  No:  Unable to observe per direction of department guidelines in the setting of COVID19    Observed:    Deferred    Obtained from Chart/Interdisciplinary Team:  \"Thin with wasting of musculature and subcutaneous fat loss advanced for patient's age of 66.\"  \"Chronically ill-appearing \"    ANTHROPOMETRICS  Height: 5' 1\"  Weight: 43.6 kg (96 lb 1.9 oz)  Body mass index is 18.16 kg/m .  Weight Status:  Underweight BMI <18.5  IBW: 47.7 kg   % IBW: 91%  Weight History: Patient is chronically underweight. Weight this morning up from typical range of 86-90 lbs  Wt Readings from Last 10 Encounters:   06/18/22 43.6 kg (96 lb 1.9 oz)   06/11/22 41.9 kg (92 lb 4.8 oz)   05/14/22 38.6 kg (85 lb)   01/27/22 40.8 kg (90 lb)   11/08/21 39 kg (86 lb)   10/27/21 39.4 kg (86 lb 14.4 oz)   10/23/21 40.6 kg (89 lb 8.1 oz)   09/09/21 38.7 kg (85 lb 4.8 oz)   02/05/20 40.3 kg (88 lb 14.4 oz)   03/19/19 40.8 kg (90 lb)       LABS  Labs reviewed  Recent Labs   Lab 06/18/22  1226 06/18/22  0226 06/18/22  0015 06/17/22  2220   * 172* 57* 51*       MEDICATIONS  Medications reviewed      ASSESSED NUTRITION NEEDS PER APPROVED PRACTICE GUIDELINES:    Dosing Weight 43.6 kg   Estimated Energy Needs: 9295-5819 kcals (35-40 Kcal/Kg)  Justification: repletion and underweight  Estimated Protein Needs: 53-66+ grams protein (1.2-1.5+ g pro/Kg)  Justification: Repletion and preservation of lean body mass  Estimated Fluid Needs: 1 mL/kcal or per MD      MALNUTRITION:  % Weight Loss:  Weight loss does not meet " criteria for malnutrition   % Intake:  <75% for >/= 3 months (moderate malnutrition) - chronic   Subcutaneous Fat Loss: at least moderate losses per chart review   Muscle Loss:   at least moderate losses per chart review   Fluid Retention:  None noted    Malnutrition Diagnosis: Moderate malnutrition (or greater)  In Context of:  Chronic illness or disease  Environmental or social circumstances    NUTRITION DIAGNOSIS:  Inadequate oral intake related to chronic variable appetite 2/2 ETOH use and s/p esophageal cancer w/ stomach pull through as evidenced by intakes suspected <75% for >3 months that include limited nutrient dense food items       NUTRITION INTERVENTIONS  Recommendations / Nutrition Prescription  Regular diet  Ensure clear once daily   Reviewed foods to focus on and increased nutrient needs during acute on chronic illness   Encouraged to focus on protein and calorie intake       Implementation  Nutrition education: Per Provider order if indicated. Provided education on tips for increasing calories and protein above   Composition of Meals and Snacks and Medical Food Supplement: as above       Nutrition Goals  Patient will consume >50% nutritionally adequate meals TID + 100% 1 supplement/day       MONITORING AND EVALUATION:  Progress towards goals will be monitored and evaluated per protocol and Practice Guidelines        Vesta Garcia RD, LD  Clinical Dietitian   Weekend pager [phone]

## 2022-06-18 NOTE — H&P
[facility]    History and Physical - Hospitalist Service       Date of Admission:  6/17/2022    Assessment & Plan      Kezia Dixon is a 66 year old female admitted on 6/17/2022. She presents to the emergency department with multiple complaints including myalgias, cough, headache, fatigue.  Recent COVID-19 diagnosis.  Has relapsed on alcohol.  On imaging, found to have a change in a known cavitary lung lesion concerning for aspergillosis.    Cavitary lung lesion: Noted initially on a 2018 CT scan, though more thick-walled as of November 2021 chest CT.  Now with what appears to be mobile debris concerning for aspergillosis.  Consider also chronic abscess related to aspiration with patient's malnourished state, history of hypoglycemia, and alcohol abuse history.  -Beta D glucan  -Airborne precautions; lower suspicion for tuberculosis, however.  Very likely that cough is primarily secondary to recent COVID infection  -QuantiFERON  -AFB sputum culture and stain via expectorate if able.  Minimal cough production, however.  -Infectious disease consulted.  Have not sent fungal serologies at this juncture as typically take 2 to 6 days to return.  Will await thoracic surgery and infectious disease consultation prior to this order  -Thoracic surgery has been consulted given history of right-sided squamous cell lung cancer status post resection    # Confirmed COVID-19 infection       Symptom Onset 6/7/2022   Date of 1st Positive Test 6/7/2022 home test, 6/8/2022 in ER   Vaccination Status Fully Vaccinated       - COVID-19 special precautions, continuous pulse-ox  - Oxygen: Not currently requiring oxygen supplementation.  If needed, titrate to keep SpO2 between 90-96%  - Labs: Standard COVID admission labs ordered (CBC with diff, CMP, INR, D-dimer, CRP).   - Imaging: no additional imaging needed at this time  - Antibiotics: On ceftriaxone for pyuria   - COVID-Focused Medications: No COVID focused  medications.  Recently hospitalized and completed 3 days of remdesivir for mild COVID-19 with clinical risk factors.  - DVT Prophylaxis:         - At high risk of thrombotic complications due to COVID-19 (DDimer = 0.69 ug/mL FEU (Ref range: 0.00 - 0.50 ug/mL FEU) )         - PROPHYLACTIC dosing: lovenox 30mg daily     Severe protein calorie malnutrition: BMI in the 16 range.  History of poor intake somewhat related to prior esophageal cancer with stomach pull-through, though I also anticipated contribution from ongoing alcohol abuse and possibly a chronic inflammatory/infectious condition with cavitary lung lesion.  Hypoglycemia: Patient with hypoglycemia in the 50s range at last admission as well as with current admission.  Suspect related to poor oral intake, impaired gluconeogenesis with alcohol abuse history, though possible metabolically significant lung lesion such as chronic abscess contributing.  -Every 4 hour blood glucose checks with hypoglycemia protocol in place  -Intake and output  -Discussed importance of alcohol cessation  -Hepatic panel added on emergency department nutrition consult    History of alcohol dependence with relapse: Patient tells me that she has been drinking 2 scotches per day since April.  Had alcohol withdrawal during hospitalization in May, though none during her hospitalization this past month.  Got out of rehab on Monday from her last hospitalization with COVID, and since this time has gone back to drinking a scotch in the morning and a scotch in the afternoon.  She describes a glass with an approximately 1 inch pour twice per day.  Major depression: Not on medications  -Blood alcohol level of 0.16 approximately 12 hours after her last reported drink suggests fairly significant alcohol use.  -Psychiatry consulted for depression, alcohol use  -Social work consulted for chemical dependency  -CIWA protocol ordered; no benzodiazepines have been ordered at this time pending evidence of  alcohol withdrawal.  No withdrawal during last admission  -Rally pack daily    Abnormal urinalysis, pyuria:  -Ceftriaxone 1 g every 24 hours  -Await urine culture    History of esophageal cancer: Status postresection with esophagectomy and stomach pull-through.  -Nutrition consult  -Continue IV PPI twice daily    Coronary artery disease:  -Aspirin 81 mg daily  -Atorvastatin 40 mg daily  -Resume prior to admission losartan, metoprolol, and spironolactone when reconciled by pharmacy       Diet:    Regular diet as tolerated  DVT Prophylaxis: Lovenox  Jaeger Catheter: Not present  Central Lines: None  Cardiac Monitoring: None  Code Status:  Full code.  Confirmed with patient on admission    Clinically Significant Risk Factors Present on Admission             # Hypoalbuminemia: Albumin = 3.1 g/dL (Ref range: 3.4 - 5.0 g/dL) on admission, will monitor as appropriate   # Coagulation Defect: home medication list includes an anticoagulant medication  # Platelet Defect: home medication list includes an antiplatelet medication       Disposition Plan   Expected Discharge:  anticipated discharge 3 to 4 days.  Patient is not hypoxic, and her cavitary lung lesion appears to be a chronic finding, though changing.  This said, ruling out for tuberculosis may be a 3-day hospitalization.  Anticipated discharge location:  Awaiting care coordination huddle  Delays:           The patient's care was discussed with the Patient, Dr. Beal in the emergency department.    Curt Ervin MD  Hospitalist Service  [facility]  Securely message with the Vocera Web Console (learn more here)  Text page via [facility] Paging/Directory         ______________________________________________________________________    Chief Complaint   Fatigue, headache, cough, brain fog    History is obtained from patient, chart review, discussion with ER provider    History of Present Illness   Kezia Dixon is a 66 year old female who  presents to the emergency department for ongoing fatigue, brain fog, malaise.  Recent hospitalization for COVID-19 with similar symptoms 6/8/2022.  Discharged to TCU 6/11/2022 after completing 3 days of IV remdesivir.  She did not have hypoxia with the symptoms.  She has been vaccinated times 2+ booster.    Went to TCU after hospitalization, was discharged from TCU 6/13/2022.  She lives independently, and has been at home.  Since returning from U, she has been drinking alcohol, as above, describes as 2 glasses of scotch per day.  1 glass in the morning, 1 in the afternoon.  She complains of brain fog on [facility]'s presentation, blood alcohol level 0.16 approximately 12 hours after last reported drink.  Several hours later, as she is clinically sober, she reports that her brain fog has resolved.  She tells me that she has not told her son that she has relapsed to alcohol, though will do so following tonight's admission.  She was hospitalized in May and had some alcohol withdrawal, though tells me that she has been drinking again since April.  She was sober during her 6/8-6/2011 hospitalization as well as 3-day TCU stay.  No withdrawal at that time.     Patient is not hypoxic.  Many of her symptoms could be attributed to her viral illness with COVID-19.  A CT of the chest was performed, however, and demonstrated a right upper lobe cavitary lesion with mobile debris concerning for fungal ball.  She has had a known right lung cavitary lesion since at least 2018, though on serial imaging in November 2021 this had changed with some wall thickening, now with mobile debris.  Has a history of right-sided lung cancer status postresection with Dr. Riojas.  She also, as above, has a history of alcohol abuse, and is at high risk for aspiration.  Other than alcohol abuse, no clear TB risk factors, though has never been tested for tuberculosis.  She has not had fevers, though is malnourished, and this could be indicative of a  chronic metabolically significant lesion such as tuberculosis, abscess or aspergilloma.    Patient will occasionally have a cough productive for sputum.  Suspect this is more related to recent COVID-19 diagnosis.  She tells me her sputum is infrequent, clear.    Review of Systems    The 10 point Review of Systems is negative other than noted in the HPI or here.  Positive for myalgias, chills, fatigue  No vomiting  Positive headache    Past Medical History    I have reviewed this patient's medical history and updated it with pertinent information if needed.   Past Medical History:   Diagnosis Date     Alcoholism (H) 10/14/2016     Benign essential hypertension 10/14/2016     Carotid artery disease (H)     mile plaque 5/7     Chemical dependency (H)     alcohol     Depression with anxiety      Esophageal cancer (H)      Esophageal cancer (H) 3/22/2016     Esophageal mass      GERD (gastroesophageal reflux disease)      Hx of pancreatitis 2003     Hypercholesteremia      Hyperglycemia      Hyperlipemia      Impaired fasting glucose 10/14/2016     Impaired fasting glucose 10/14/2016     Nocturnal leg cramps      Pancreatic pseudocyst 10/14/2016     Pancreatic pseudocyst 10/14/2016     Pancreatitis     with pseudocyst     Pap smear with atypical squamous cells, cannot exclude high grade squamous intraepithelial lesion (ASC-H) 10/14/16    Colpo impression benign, ECC benign. Cotestin in 1 yr.     Shingles      Vasomotor rhinitis        Past Surgical History   I have reviewed this patient's surgical history and updated it with pertinent information if needed.  Past Surgical History:   Procedure Laterality Date     AAA REPAIR      splenic artery aneurysm embolization     ABDOMEN SURGERY  2/2/2016     COLONOSCOPY  11/26/2013    Procedure: COMBINED COLONOSCOPY, SINGLE BIOPSY/POLYPECTOMY BY BIOPSY;  COLONOSCOPY (MAC);  Surgeon: Montana Rosa MD;  Location:  GI     COLONOSCOPY  11/26/2013     COLONOSCOPY N/A 10/4/2018     Procedure: COMBINED COLONOSCOPY, SINGLE OR MULTIPLE BIOPSY/POLYPECTOMY BY BIOPSY;  colonoscopy;  Surgeon: Gio Apodaca MD;  Location:  GI     ENT SURGERY       ESOPHAGOGASTRECTOMY N/A 3/22/2016    Procedure: ESOPHAGOGASTRECTOMY;  Surgeon: Alvin Riojas MD;  Location:  OR     ESOPHAGOSCOPY, GASTROSCOPY, DUODENOSCOPY (EGD), COMBINED N/A 12/22/2015    Procedure: COMBINED ENDOSCOPIC ULTRASOUND, ESOPHAGOSCOPY, GASTROSCOPY, DUODENOSCOPY (EGD), FINE NEEDLE ASPIRATE/BIOPSY;  Surgeon: Danelle Michael MD;  Location:  GI     GASTROSTOMY, INSERT TUBE, COMBINED N/A 2/2/2016    Procedure: COMBINED GASTROSTOMY, INSERT TUBE (OPEN);  Surgeon: Alvin Riojas MD;  Location:  OR     GI SURGERY  12/22/2015     HAND SURGERY      right     HERNIORRHAPHY INCISIONAL (LOCATION) N/A 3/19/2019    Procedure: LAPARSCOPIC  INCISIONAL HERNIA REPAIR WITH MESH, LAPARSCOPIC LYSIS OF ADHENSIONS;  Surgeon: Lamberto Magaña MD;  Location:  OR     INSERT PORT VASCULAR ACCESS N/A 12/28/2015    Procedure: INSERT PORT VASCULAR ACCESS;  Surgeon: Alvin Riojas MD;  Location:  OR     LAPAROSCOPIC CHOLECYSTECTOMY N/A 3/19/2019    Procedure: LAPAROSCOPIC CHOLECYSTECTOMY;  Surgeon: Lamberto Magaña MD;  Location:  OR     LOBECTOMY LUNG Right 10/16/2018    Procedure: LOBECTOMY LUNG;  Surgeon: Alvin Riojas MD;  Location:  OR     REMOVE PORT VASCULAR ACCESS Left 7/22/2016    Procedure: REMOVE PORT VASCULAR ACCESS;  Surgeon: Alvin Riojas MD;  Location:  OR     THORACOTOMY Right 10/16/2018    Procedure: REDO RIGHT THORACTOMY AND RIGHT LOWER LOBECTOMY, PLEURAL LYSIS;  Surgeon: Alvin Riojas MD;  Location:  OR     TONSILLECTOMY       VASCULAR SURGERY         Social History   I have reviewed this patient's social history and updated it with pertinent information if needed.  Social History     Tobacco Use     Smoking status: Former Smoker      Packs/day: 0.25     Quit date: 2015     Years since quittin.5     Smokeless tobacco: Never Used   Substance Use Topics     Alcohol use: No     Alcohol/week: 0.0 standard drinks     Comment: none     Drug use: No       Family History   I have reviewed this patient's family history and updated it with pertinent information if needed.  Family History   Problem Relation Age of Onset     Other Cancer Father         melanoma     Prostate Cancer Father      Diabetes Father      Other Cancer Brother         melanoma     Other Cancer Brother         melanoma     Other Cancer Brother        Prior to Admission Medications   Prior to Admission Medications   Prescriptions Last Dose Informant Patient Reported? Taking?   apixaban ANTICOAGULANT (ELIQUIS) 2.5 MG tablet   No No   Sig: Take 1 tablet (2.5 mg) by mouth 2 times daily   aspirin (ASA) 81 MG EC tablet   No No   Sig: Take 1 tablet (81 mg) by mouth daily   atorvastatin (LIPITOR) 40 MG tablet  Self No No   Sig: Take 1 tablet (40 mg) by mouth daily   fluticasone (FLONASE) 50 MCG/ACT nasal spray  Self No No   Sig: INSTILL 2 SPRAYS INTO BOTH NOSTRILS DAILY.   folic acid (FOLVITE) 1 MG tablet  Self No No   Sig: Take 1 tablet (1 mg) by mouth daily   furosemide (LASIX) 40 MG tablet  Self No No   Sig: Take 0.5 tablets (20 mg) by mouth every morning Ok to take additional 0.5 tab for worsening shortness of breath, leg swelling or weight gain.   gabapentin (NEURONTIN) 600 MG tablet   No No   Sig: Take 1 tablet (600 mg) by mouth 3 times daily   ibuprofen (ADVIL/MOTRIN) 200 MG tablet  Self Yes No   Sig: Take 400 mg by mouth every 4 hours as needed for mild pain   losartan (COZAAR) 25 MG tablet  Self No No   Sig: Take 0.5 tablets (12.5 mg) by mouth daily   metoprolol succinate ER (TOPROL XL) 25 MG 24 hr tablet  Self Yes No   Sig: Take 25 mg by mouth every evening   metoprolol succinate ER (TOPROL XL) 50 MG 24 hr tablet  Self Yes No   Sig: Take 50 mg by mouth every morning    Patient not taking: Reported on 6/17/2022   omeprazole (PRILOSEC) 40 MG DR capsule  Self No No   Sig: TAKE 1 CAPSULE BY MOUTH EVERY DAY   spironolactone (ALDACTONE) 25 MG tablet  Self No No   Sig: Take 0.5 tablets (12.5 mg) by mouth every morning   thiamine (B-1) 100 MG tablet  Self No No   Sig: Take 1 tablet (100 mg) by mouth daily      Facility-Administered Medications: None     Allergies   Allergies   Allergen Reactions     Codeine Sulfate Nausea     Simvastatin Cramps     Leg cramps     Pcn [Penicillins] Rash       Physical Exam   Vital Signs: Temp: 98.3  F (36.8  C) Temp src: Oral BP: (!) 173/77 Pulse: 92   Resp: 18 SpO2: 100 % O2 Device: None (Room air)    Weight: 85 lbs 0 oz    General Appearance: Chronically ill-appearing 66-year-old female sitting comfortably on [facility]  Eyes: No scleral icterus or injection  HEENT: Normocephalic and atraumatic  Respiratory: Breath sounds are clear bilaterally to auscultation.  No wheezes, no crackles  Cardiovascular: Regular rate and rhythm.  Pronounced PMI, though very thin.  No murmur  GI: Abdomen thin, nontender to palpation.  No palpable mass  Skin: No jaundice, no ecchymoses  Musculoskeletal: Thin with wasting of musculature and subcutaneous fat loss advanced for patient's age of 66.  Neurologic: Alert, conversant, appropriate in conversation.  Mental status is grossly intact.  She is not overtly tremulous  Psychiatric: Blunted affect.  Pleasant    Data   Data reviewed today: I reviewed all medications, new labs and imaging results over the last 24 hours. I personally reviewed the chest CT image(s) showing R sided cavitary lung lesion..    Recent Labs   Lab 06/18/22  0226 06/18/22  0015 06/17/22  2220   WBC  --   --  7.8   HGB  --   --  11.6*   MCV  --   --  93   PLT  --   --  191   NA  --   --  138   POTASSIUM  --   --  4.0   CHLORIDE  --   --  103   CO2  --   --  22   BUN  --   --  11   CR  --   --  0.46*   ANIONGAP  --   --  13   VICENTE  --   --  8.3*   GLC  172* 57* 51*   ALBUMIN  --   --  3.1*   PROTTOTAL  --   --  6.4*   BILITOTAL  --   --  0.7   ALKPHOS  --   --  113   ALT  --   --  30   AST  --   --  60*

## 2022-06-18 NOTE — PROGRESS NOTES
Brief hospitalist progress note    Admitted early this AM by Dr. Ervin.  She has no new complaints.  ID recs noted.  Thoracic Surg consult pending.

## 2022-06-18 NOTE — ED PROVIDER NOTES
"  History     Chief Complaint:  Covid Concern      The history is provided by the patient.      Kezia Dixon is a 66 year old female with history of CHF who presents with a COVID-19 concern.  Kezia was admitted 6/8/22 through 6/11/2022 for COVID-19 and was given remdesivir.  In the 6 days she has been discharged she has had persistent shortness of breath.  For the last 4 days she has also had \"severe\" headache on the \"top\" of her head for which she last took Advil 5 hours prior to arrival.  She does not have cough or fever but feels cold.  She does have nausea without vomiting and has been able to eat.  She describes brain \"fog\" and ultimately presented today because she had a hard time finding her words which \"scared\" her.  She has no lower extremity edema, chest pain, or abdominal pain.  She has not noticed any change to her vision or new numbness/tingling.    Review of Systems   Constitutional: Positive for chills. Negative for appetite change and fever.   Eyes: Negative for visual disturbance.   Respiratory: Positive for shortness of breath. Negative for cough.    Cardiovascular: Negative for chest pain and leg swelling.   Gastrointestinal: Positive for nausea. Negative for abdominal pain and vomiting.   Neurological: Positive for speech difficulty and headaches. Negative for numbness.   Psychiatric/Behavioral: Positive for confusion (\"brain fog\").   All other systems reviewed and are negative.    Allergies:  Codeine Sulfate  Simvastatin  Pcn [Penicillins]      Medications:   apixaban ANTICOAGULANT (ELIQUIS) 2.5 MG tablet  [START ON 7/11/2022] aspirin (ASA) 81 MG EC tablet  atorvastatin (LIPITOR) 40 MG tablet  fluticasone (FLONASE) 50 MCG/ACT nasal spray  folic acid (FOLVITE) 1 MG tablet  furosemide (LASIX) 40 MG tablet  gabapentin (NEURONTIN) 600 MG tablet  ibuprofen (ADVIL/MOTRIN) 200 MG tablet  losartan (COZAAR) 25 MG tablet  metoprolol succinate ER (TOPROL XL)  omeprazole (PRILOSEC) 40 MG DR" capsule  spironolactone (ALDACTONE) 25 MG tablet  thiamine (B-1) 100 MG tablet    Past Medical History:    Past Medical History:   Diagnosis Date     Alcoholism (H) 10/14/2016     Benign essential hypertension 10/14/2016     Carotid artery disease (H)      Chemical dependency (H)      Depression with anxiety      Esophageal cancer (H)      Esophageal mass      GERD (gastroesophageal reflux disease)      Hx of pancreatitis 2003     Hypercholesteremia      Hyperglycemia      Hyperlipemia      Impaired fasting glucose 10/14/2016     Nocturnal leg cramps      Pancreatic pseudocyst 10/14/2016     Pancreatitis      Pap smear with atypical squamous cells, cannot exclude high grade squamous intraepithelial lesion (ASC-H) 10/14/16     Shingles      Vasomotor rhinitis      Patient Active Problem List    Diagnosis Date Noted     Hypoglycemia 06/08/2022     Priority: Medium     Infection due to 2019 novel coronavirus 06/08/2022     Priority: Medium     Alcohol dependence in remission (H) 11/08/2021     Priority: Medium     CHF (congestive heart failure) (H) 11/08/2021     Priority: Medium     Diarrhea 10/18/2021     Priority: Medium     Hypokalemia 10/18/2021     Priority: Medium     Nausea 10/18/2021     Priority: Medium     Generalized muscle weakness 10/18/2021     Priority: Medium     Injury of head, initial encounter 10/18/2021     Priority: Medium     Malignant neoplasm of lung, unspecified laterality, unspecified part of lung (H) 08/21/2020     Priority: Medium     Cholelithiasis 03/19/2019     Priority: Medium     Incisional hernia without obstruction or gangrene 03/19/2019     Priority: Medium     Chronic pain syndrome 12/19/2018     Priority: Medium     Protein-calorie malnutrition (H) 07/06/2018     Priority: Medium     Post herpetic neuralgia 07/06/2018     Priority: Medium     Benign essential hypertension 10/14/2016     Priority: Medium      Past Surgical History:    Past Surgical History:   Procedure Laterality  Date     AAA REPAIR      splenic artery aneurysm embolization     ABDOMEN SURGERY  2/2/2016     COLONOSCOPY  11/26/2013    Procedure: COMBINED COLONOSCOPY, SINGLE BIOPSY/POLYPECTOMY BY BIOPSY;  COLONOSCOPY (MAC);  Surgeon: Montana Rosa MD;  Location:  GI     COLONOSCOPY  11/26/2013     COLONOSCOPY N/A 10/4/2018    Procedure: COMBINED COLONOSCOPY, SINGLE OR MULTIPLE BIOPSY/POLYPECTOMY BY BIOPSY;  colonoscopy;  Surgeon: Gio Apodaca MD;  Location:  GI     ENT SURGERY       ESOPHAGOGASTRECTOMY N/A 3/22/2016    Procedure: ESOPHAGOGASTRECTOMY;  Surgeon: Alvin Riojas MD;  Location:  OR     ESOPHAGOSCOPY, GASTROSCOPY, DUODENOSCOPY (EGD), COMBINED N/A 12/22/2015    Procedure: COMBINED ENDOSCOPIC ULTRASOUND, ESOPHAGOSCOPY, GASTROSCOPY, DUODENOSCOPY (EGD), FINE NEEDLE ASPIRATE/BIOPSY;  Surgeon: Danelle Michael MD;  Location:  GI     GASTROSTOMY, INSERT TUBE, COMBINED N/A 2/2/2016    Procedure: COMBINED GASTROSTOMY, INSERT TUBE (OPEN);  Surgeon: Alvin Riojas MD;  Location:  OR     GI SURGERY  12/22/2015     HAND SURGERY      right     HERNIORRHAPHY INCISIONAL (LOCATION) N/A 3/19/2019    Procedure: LAPARSCOPIC  INCISIONAL HERNIA REPAIR WITH MESH, LAPARSCOPIC LYSIS OF ADHENSIONS;  Surgeon: Lamberto Magaña MD;  Location:  OR     INSERT PORT VASCULAR ACCESS N/A 12/28/2015    Procedure: INSERT PORT VASCULAR ACCESS;  Surgeon: Alvin Riojas MD;  Location:  OR     LAPAROSCOPIC CHOLECYSTECTOMY N/A 3/19/2019    Procedure: LAPAROSCOPIC CHOLECYSTECTOMY;  Surgeon: Lamberto Magaña MD;  Location:  OR     LOBECTOMY LUNG Right 10/16/2018    Procedure: LOBECTOMY LUNG;  Surgeon: Alvin Riojas MD;  Location:  OR     REMOVE PORT VASCULAR ACCESS Left 7/22/2016    Procedure: REMOVE PORT VASCULAR ACCESS;  Surgeon: Alvin Riojas MD;  Location:  OR     THORACOTOMY Right 10/16/2018    Procedure: REDO RIGHT THORACTOMY AND RIGHT LOWER  LOBECTOMY, PLEURAL LYSIS;  Surgeon: Alvin Riojas MD;  Location: [facility] OR     TONSILLECTOMY       VASCULAR SURGERY         Family History:    Family History   Problem Relation Age of Onset     Other Cancer Father         melanoma     Prostate Cancer Father      Diabetes Father      Other Cancer Brother         melanoma     Other Cancer Brother         melanoma     Other Cancer Brother        Social History:  Kezia was interviewed alone. She was brought in by her son.  Drinks alcohol.    Physical Exam     Patient Vitals for the past 24 hrs:   BP Temp Temp src Pulse Resp SpO2 Weight   06/17/22 2150 -- -- -- 82 19 98 % --   06/17/22 2140 (!) 145/61 -- -- 91 -- -- --   06/17/22 1822 (!) 107/33 98.3  F (36.8  C) Oral 83 17 100 % 38.6 kg (85 lb)       Physical Exam  General: Well-developed and well-nourished. Well appearing elderly  woman. Cooperative.  Head:  Atraumatic.  Eyes:  Conjunctivae, lids, and sclerae are normal.  ENT:    Normal nose. Moist mucous membranes.  Neck:  Supple. Normal range of motion.  CV:  Regular rate and rhythm. Normal heart sounds with no murmurs, rubs, or gallops detected.  Resp:  No respiratory distress. Clear to auscultation bilaterally without decreased breath sounds, wheezing, rales, or rhonchi.  GI:  Soft. Non-distended. Non-tender.    MS:  Normal ROM.   Skin:  Warm. Non-diaphoretic. No pallor.  Neuro: Awake. A&Ox3.     Strength 5/5 bilateral upper and lower extremities.    No pronator drift.    Sensation intact to light touch.    No facial droop. No dysarthria.    No aphasia, but often takes her longer to think of a word than a a typical patient.  Psych:  Normal mood and affect. Normal speech.  Vitals reviewed.    Emergency Department Course   EKG  Indication: dyspnea  Time: 2251  Rate  82 bpm. WI interval 122. QRS duration 68. QT/QTc 374/436.   Normal sinus rhythm  Normal ECG  No acute ST changes.  No change as compared to prior, dated 6/8/2022.    Imaging:  CTA Head Neck  with Contrast   Final Result   IMPRESSION:       HEAD CTA:    1.  No flow-limiting stenosis or proximal occlusion.      NECK CTA:   1.  Scattered atherosclerotic disease without flow-limiting stenosis.      Chest CT w/o contrast   Final Result   IMPRESSION:    1.  Multiple pulmonary parenchymal nodular opacities are stable to decreased in size as detailed above.   2.  Right upper lobe cavity appears to contain mobile debris. Correlate clinically for superinfection/aspergillosis.   3.  No other significant interval change.         CT Head w/o Contrast   Final Result   IMPRESSION:   1.  No acute intracranial process.        Laboratory:  Labs Ordered and Resulted from Time of ED Arrival to Time of ED Departure   BASIC METABOLIC PANEL - Abnormal       Result Value    Sodium 138      Potassium 4.0      Chloride 103      Carbon Dioxide (CO2) 22      Anion Gap 13      Urea Nitrogen 11      Creatinine 0.46 (*)     Calcium 8.3 (*)     Glucose 51 (*)     GFR Estimate >90     LACTIC ACID WHOLE BLOOD - Abnormal    Lactic Acid 3.5 (*)    CBC WITH PLATELETS AND DIFFERENTIAL - Abnormal    WBC Count 7.8      RBC Count 3.76 (*)     Hemoglobin 11.6 (*)     Hematocrit 35.1      MCV 93      MCH 30.9      MCHC 33.0      RDW 14.8      Platelet Count 191      % Neutrophils 78      % Lymphocytes 16      % Monocytes 5      % Eosinophils 0      % Basophils 1      % Immature Granulocytes 0      NRBCs per 100 WBC 0      Absolute Neutrophils 6.0      Absolute Lymphocytes 1.3      Absolute Monocytes 0.4      Absolute Eosinophils 0.0      Absolute Basophils 0.1      Absolute Immature Granulocytes 0.0      Absolute NRBCs 0.0     D DIMER QUANTITATIVE - Abnormal    D-Dimer Quantitative 0.69 (*)    ROUTINE UA WITH MICROSCOPIC REFLEX TO CULTURE - Abnormal    Color Urine Yellow      Appearance Urine Slightly Cloudy (*)     Glucose Urine Negative      Bilirubin Urine Negative      Ketones Urine 60  (*)     Specific Gravity Urine 1.019      Blood Urine  Negative      pH Urine 5.5      Protein Albumin Urine 20  (*)     Urobilinogen Urine 2.0      Nitrite Urine Negative      Leukocyte Esterase Urine Large (*)     Bacteria Urine Few (*)     Mucus Urine Present (*)     RBC Urine 4 (*)     WBC Urine 24 (*)     Squamous Epithelials Urine 7 (*)    ETHYL ALCOHOL LEVEL - Abnormal    Alcohol ethyl 0.16 (*)    DRUG ABUSE SCREEN 77 URINE (FL, RH, SH) - Abnormal    Amphetamines Urine Screen Negative      Barbiturates Urine Screen Negative      Benzodiazepines Urine Screen Positive (*)     Cannabinoids Urine Screen Negative      Cocaine Urine Screen Negative      Opiates Urine Screen Negative      PCP Urine Screen Negative     LACTIC ACID WHOLE BLOOD - Abnormal    Lactic Acid 3.5 (*)    GLUCOSE BY METER - Abnormal    GLUCOSE BY METER POCT 57 (*)    GLUCOSE BY METER - Abnormal    GLUCOSE BY METER POCT 172 (*)    HEPATIC FUNCTION PANEL - Abnormal    Bilirubin Total 0.7      Bilirubin Direct 0.2      Protein Total 6.4 (*)     Albumin 3.1 (*)     Alkaline Phosphatase 113      AST 60 (*)     ALT 30     TROPONIN I - Normal    Troponin I High Sensitivity 5     TSH WITH FREE T4 REFLEX - Normal    TSH 2.58     NT PROBNP INPATIENT - Normal    N terminal Pro BNP Inpatient 454     LACTIC ACID WHOLE BLOOD - Normal    Lactic Acid 1.5       Emergency Department Course:  Reviewed:  I reviewed nursing notes, vitals, and past medical history.    Assessments:   I obtained history and examined the patient as noted above.   0034 I rechecked the patient and explained findings.   0415 I rechecked the patient and explained plan.     Consults:   5431 I spoke with Dr. Ervin, hospitalist.    Interventions:  2259 1L NS bolus IV  2315 Tylenol 1 g PO  2316 Benadryl 25 mg IV  2318 Reglan 5 mg IV  0209 D5NS infusion IV  0209 Rocephin 1g IV  0549 Tylenol 1 g PO  0550 Toradol 15 mg IV  0550 Thimine 100 mg PO  0550 Multivitamin tablet pO  0550 Folic acid 1 mg PO    Disposition:  The patient was admitted to the  "hospital under the care of Dr. Ervin.     Impression & Plan    CMS Diagnoses: The Lactic acid level is elevated due to alcohol use/liver disease, at this time there is no sign of severe sepsis or septic shock.  Medical Decision Making:  Kezia is a 66 year old alcoholic woman who was recently discharged from this hospital after a stay for COVID-19 returning with persistent dyspnea and \"mind fog\" such that she is having a hard time finding words.  She has several other concerns, as above, including headache and nausea.  Fortunately, she appears well on exam.  She has no focal neurologic deficits.  Because she is describing neurologic symptoms and a headache I did send her for CT of the head which reveals no intracranial hemorrhage.  I also included CTA head and neck which reveals no acute pathology.  The cause for this \"fog\" may be related to her recent COVID-19 but is also more likely related to alcohol use with blood alcohol level of 0.16. TSH is normal. She was treated with IV fluids, Tylenol, Toradol, Reglan, and Benadryl for headache.    The etiology for her shortness of breath was investigated as well.  EKG is reassuring without ischemic changes or arrhythmias and troponin is normal.  BNP is also normal and she does not appear volume overloaded.  D-dimer is grossly normal when adjusted for age. I did perform noncontrast enhanced CT of the chest.  This reveals stable to decreased nodular opacities which may be related to her COVID-19.  However, there is also a right upper lobe cavity which contains mobile debris and could represent superinfection or aspergillosis.  There is no leukocytosis.  There is lactic acidosis to 3.5 which is more likely related to alcohol use and liver disease than to infection although she was empirically treated with Rocephin for abnormal UA with 24 WBC/hpf and few bacteria.  She had no kidney injury with hypoglycemia to 51 upon arrival which resolved with both oral intake and D5 NS " infusion.    I am concerned her dyspnea may be related to a superinfection of her cavitary lung lesion, particularly concerning given her alcohol abuse history.  She warrants hospitalization for further work-up of this and likely ID consultation.  I updated Kezia on findings and plan for admission and answered all her questions.  She verbalized understanding and is amenable.  I discussed the patient's case with Dr. Ervin, hospitalist, who accepts admission and requests folic acid, thiamine, and multivitamin in addition to sputum culture which were all ordered.    Covid-19  Kezia Dixon was evaluated during a global COVID-19 pandemic, which necessitated consideration that the patient might be at risk for infection with the SARS-CoV-2 virus that causes COVID-19.   Applicable protocols for evaluation were followed during the patient's care.   COVID-19 was considered as part of the patient's evaluation.    Diagnosis:    ICD-10-CM    1. Cavitary lesion of lung  J98.4    2. Alcohol abuse  F10.10    3. Alcoholic intoxication without complication (H)  F10.920    4. Hypoglycemia  E16.2    5. Lactic acidosis  E87.2    6. Abnormal finding on urinalysis  R82.90           Dianna Beal MD  06/20/22 4918

## 2022-06-18 NOTE — PROGRESS NOTES
RECEIVING UNIT ED HANDOFF REVIEW    ED Nurse Handoff Report was reviewed by: Ysabel Rinaldi RN on June 18, 2022 at 11:56 AM

## 2022-06-18 NOTE — CONSULTS
ID consult dictated IMP 1 65 yo female vague sx, ? Alcohol related  2 recent mostly asymp COVID 19  3 Chronic imaging changes on CT doubt acute or active infection issue    REc get asp gal, no tx await CT surgery review

## 2022-06-18 NOTE — PLAN OF CARE
Summary: Multiple complaints including myalgias, cough, headache, fatigue.  Recent COVID-19 diagnosis.  Has relapsed on alcohol. Found to have a change in a known cavitary lung lesion   DATE & TIME: 06/18/22 4462-3897  Cognitive Concerns/ Orientation : A & O x4  BEHAVIOR & AGGRESSION TOOL COLOR: Green   CIWA SCORE: 0   ABNL VS/O2: VSS on RA except HTN and mild tachycardia.   MOBILITY: SBA with gait belt. Unsteady gait.   PAIN MANAGMENT: C/O of headache, PRN Tylenol x1.   DIET: Regular  BOWEL/BLADDER: No issues. Ambulates to BR.  ABNL LAB/BG: , Cr 0.46, AST 60, Hgb 11.0, and MITESH 0.16.   DRAIN/DEVICES: PIV SL  TELEMETRY RHYTHM: N/A  SKIN: Flushed, césar, blotchy. Intact.   TESTS/PROCEDURES: None  D/C DAY/GOALS/PLACE: Pending, new admit this shift.   OTHER IMPORTANT INFO: Special precautions and Airborne precautions in place. ID/Psych/SW/Thoracic Surgery consulted. Stable.

## 2022-06-18 NOTE — PHARMACY-ADMISSION MEDICATION HISTORY
Pharmacy Medication History  Admission medication history interview status for the 6/17/2022  admission is complete. See EPIC admission navigator for prior to admission medications     Location of Interview: Patient room (COVID precautions maintained)   Medication history sources: Patient, Surescripts and Care Everywhere    Significant changes made to the medication list:  Adjusted furosemide 20 mg in AM/20 mg additional PRN --> PRN only     In the past week, patient estimated taking medication this percent of the time: greater than 90%    Additional medication history information:   Patient is a reliable historian, could not recall whether last dose of Eliquis was 6/14 or 6/15 since this is a new medication.     Metoprolol succinate 25 mg last filled 1/4/22 #90ds #180 (directions to take 1 tablet BID)   Metoprolol succinate 50 mg last filled 4/27/22 #90ds #90   50 mg strength reported not taking on 6/17/22; patient stated in interview 6/18/22 taking 50 mg in AM and 25 mg in PM (when asked about multiple tablet strengths) - kept both on PTA med list.     Medication reconciliation completed by provider prior to medication history? partially    Time spent in this activity: 15 minutes    Prior to Admission medications    Medication Sig Last Dose Taking? Auth Provider Long Term End Date   apixaban ANTICOAGULANT (ELIQUIS) 2.5 MG tablet Take 1 tablet (2.5 mg) by mouth 2 times daily  at 6/14 or 6/15 Yes Mathew Caraballo MD     aspirin (ASA) 81 MG EC tablet Take 1 tablet (81 mg) by mouth daily 6/17/2022 at AM Yes Mathew Caraballo MD     atorvastatin (LIPITOR) 40 MG tablet Take 1 tablet (40 mg) by mouth daily 6/17/2022 at AM Yes Mustapha Velásquez MD Yes    fluticasone (FLONASE) 50 MCG/ACT nasal spray INSTILL 2 SPRAYS INTO BOTH NOSTRILS DAILY. 6/17/2022 at AM Yes Mustapha Velásquez MD     folic acid (FOLVITE) 1 MG tablet Take 1 tablet (1 mg) by mouth daily 6/17/2022 at AM Yes Mustapha Velásquez MD     furosemide (LASIX) 40 MG tablet  Take 0.5 tablets (20 mg) by mouth every morning Ok to take additional 0.5 tab for worsening shortness of breath, leg swelling or weight gain.  Patient taking differently: Take 20 mg by mouth daily as needed For worsening shortness of breath, leg swelling or weight gain. 6/14/2022 at Unknown time Yes Angelique Montanez, APRN CNP Yes    gabapentin (NEURONTIN) 600 MG tablet Take 1 tablet (600 mg) by mouth 3 times daily 6/17/2022 at X2 doses Yes Mathew Caraballo MD Yes    ibuprofen (ADVIL/MOTRIN) 200 MG tablet Take 400 mg by mouth every 4 hours as needed for mild pain Past Week at Unknown time Yes Unknown, Entered By History     losartan (COZAAR) 25 MG tablet Take 0.5 tablets (12.5 mg) by mouth daily 6/17/2022 at AM Yes Mustapha Velásquez MD Yes    metoprolol succinate ER (TOPROL XL) 25 MG 24 hr tablet Take 25 mg by mouth every evening 6/16/2022 at PM Yes Unknown, Entered By History No    metoprolol succinate ER (TOPROL XL) 50 MG 24 hr tablet Take 50 mg by mouth every morning 6/17/2022 at AM Yes Unknown, Entered By History No    omeprazole (PRILOSEC) 40 MG DR capsule TAKE 1 CAPSULE BY MOUTH EVERY DAY 6/17/2022 at AM Yes Mustapha Velásquez MD     spironolactone (ALDACTONE) 25 MG tablet Take 0.5 tablets (12.5 mg) by mouth every morning 6/17/2022 at AM Yes Mustapha Velásquez MD Yes    thiamine (B-1) 100 MG tablet Take 1 tablet (100 mg) by mouth daily 6/17/2022 at AM Yes Mustapha Velásquez MD         The information provided in this note is only as accurate as the sources available at the time of update(s)   Justa Hadley, Jatin

## 2022-06-18 NOTE — PLAN OF CARE
[facility]  ED Nurse Handoff Report    ED Chief complaint: Covid Concern      ED Diagnosis:   Final diagnoses:   None       Code Status: To be discussed      Allergies:   Allergies   Allergen Reactions     Codeine Sulfate Nausea     Simvastatin Cramps     Leg cramps     Pcn [Penicillins] Rash       Patient Story: Had COVID, with cough, fever, chills, shortness of breath, nausea.  Now complains of chills and brain fog. VSS.  Was seen last week and prescribed antivirals. She started getting better, now son states she has worsened. Headache.  Focused Assessment:  Headache, SOB, Dyspnea on exertion    Treatments and/or interventions provided: BG monitoring/intervention, fluids, pain medication, vitamins, abx  Patient's response to treatments and/or interventions: improvement    To be done/followed up on inpatient unit:  possibly sputum sample    Does this patient have any cognitive concerns?: no    Activity level - Baseline/Home:  Independent  Activity Level - Current:   Stand with Assist    Patient's Preferred language: English   Needed?: No    Isolation: COVID r/o and special precautions  Infection: COVID r/o and special precautions  Patient tested for COVID 19 prior to admission: YES  Bariatric?: No    Vital Signs:   Vitals:    06/18/22 0345 06/18/22 0400 06/18/22 0545 06/18/22 0600   BP:   (!) 170/73 (!) 173/77   Pulse: 85 92 97 92   Resp: 18 20 20 18   Temp:       TempSrc:       SpO2: 99% 98% 99% 100%   Weight:           Cardiac Rhythm:     Was the PSS-3 completed:   Yes  What interventions are required if any?               Family Comments: NA  OBS brochure/video discussed/provided to patient/family: Yes      For the majority of the shift this patient's behavior was Green.   Behavioral interventions performed were NA.    ED NURSE PHONE NUMBER: [phone]

## 2022-06-19 LAB
ALBUMIN SERPL-MCNC: 2.6 G/DL (ref 3.4–5)
ALP SERPL-CCNC: 119 U/L (ref 40–150)
ALT SERPL W P-5'-P-CCNC: 28 U/L (ref 0–50)
ANION GAP SERPL CALCULATED.3IONS-SCNC: 8 MMOL/L (ref 3–14)
AST SERPL W P-5'-P-CCNC: 39 U/L (ref 0–45)
BACTERIA UR CULT: NORMAL
BILIRUB SERPL-MCNC: 0.8 MG/DL (ref 0.2–1.3)
BUN SERPL-MCNC: 8 MG/DL (ref 7–30)
CALCIUM SERPL-MCNC: 8.1 MG/DL (ref 8.5–10.1)
CHLORIDE BLD-SCNC: 103 MMOL/L (ref 94–109)
CO2 SERPL-SCNC: 23 MMOL/L (ref 20–32)
CREAT SERPL-MCNC: 0.5 MG/DL (ref 0.52–1.04)
ERYTHROCYTE [DISTWIDTH] IN BLOOD BY AUTOMATED COUNT: 15 % (ref 10–15)
GFR SERPL CREATININE-BSD FRML MDRD: >90 ML/MIN/1.73M2
GLUCOSE BLD-MCNC: 170 MG/DL (ref 70–99)
GLUCOSE BLDC GLUCOMTR-MCNC: 114 MG/DL (ref 70–99)
GLUCOSE BLDC GLUCOMTR-MCNC: 75 MG/DL (ref 70–99)
GLUCOSE BLDC GLUCOMTR-MCNC: 80 MG/DL (ref 70–99)
GLUCOSE BLDC GLUCOMTR-MCNC: 91 MG/DL (ref 70–99)
HCT VFR BLD AUTO: 35.4 % (ref 35–47)
HGB BLD-MCNC: 11.7 G/DL (ref 11.7–15.7)
MAGNESIUM SERPL-MCNC: 1.9 MG/DL (ref 1.6–2.3)
MCH RBC QN AUTO: 31.3 PG (ref 26.5–33)
MCHC RBC AUTO-ENTMCNC: 33.1 G/DL (ref 31.5–36.5)
MCV RBC AUTO: 95 FL (ref 78–100)
PHOSPHATE SERPL-MCNC: 3.5 MG/DL (ref 2.5–4.5)
PLATELET # BLD AUTO: 196 10E3/UL (ref 150–450)
POTASSIUM BLD-SCNC: 3.3 MMOL/L (ref 3.4–5.3)
POTASSIUM BLD-SCNC: 4.7 MMOL/L (ref 3.4–5.3)
PROT SERPL-MCNC: 6 G/DL (ref 6.8–8.8)
RBC # BLD AUTO: 3.74 10E6/UL (ref 3.8–5.2)
SODIUM SERPL-SCNC: 134 MMOL/L (ref 133–144)
WBC # BLD AUTO: 4.5 10E3/UL (ref 4–11)

## 2022-06-19 PROCEDURE — 87206 SMEAR FLUORESCENT/ACID STAI: CPT | Performed by: INTERNAL MEDICINE

## 2022-06-19 PROCEDURE — 120N000001 HC R&B MED SURG/OB

## 2022-06-19 PROCEDURE — 250N000013 HC RX MED GY IP 250 OP 250 PS 637: Performed by: HOSPITALIST

## 2022-06-19 PROCEDURE — 84132 ASSAY OF SERUM POTASSIUM: CPT | Performed by: HOSPITALIST

## 2022-06-19 PROCEDURE — 36415 COLL VENOUS BLD VENIPUNCTURE: CPT | Performed by: INTERNAL MEDICINE

## 2022-06-19 PROCEDURE — 83735 ASSAY OF MAGNESIUM: CPT | Performed by: HOSPITALIST

## 2022-06-19 PROCEDURE — 250N000013 HC RX MED GY IP 250 OP 250 PS 637: Performed by: INTERNAL MEDICINE

## 2022-06-19 PROCEDURE — 80053 COMPREHEN METABOLIC PANEL: CPT | Performed by: INTERNAL MEDICINE

## 2022-06-19 PROCEDURE — 250N000011 HC RX IP 250 OP 636: Performed by: INTERNAL MEDICINE

## 2022-06-19 PROCEDURE — 87116 MYCOBACTERIA CULTURE: CPT | Performed by: INTERNAL MEDICINE

## 2022-06-19 PROCEDURE — 99232 SBSQ HOSP IP/OBS MODERATE 35: CPT | Performed by: HOSPITALIST

## 2022-06-19 PROCEDURE — 99232 SBSQ HOSP IP/OBS MODERATE 35: CPT | Performed by: INTERNAL MEDICINE

## 2022-06-19 PROCEDURE — 85027 COMPLETE CBC AUTOMATED: CPT | Performed by: INTERNAL MEDICINE

## 2022-06-19 PROCEDURE — 84100 ASSAY OF PHOSPHORUS: CPT | Performed by: HOSPITALIST

## 2022-06-19 PROCEDURE — 87205 SMEAR GRAM STAIN: CPT | Performed by: INTERNAL MEDICINE

## 2022-06-19 PROCEDURE — C9113 INJ PANTOPRAZOLE SODIUM, VIA: HCPCS | Performed by: INTERNAL MEDICINE

## 2022-06-19 PROCEDURE — 36415 COLL VENOUS BLD VENIPUNCTURE: CPT | Performed by: HOSPITALIST

## 2022-06-19 PROCEDURE — 82040 ASSAY OF SERUM ALBUMIN: CPT | Performed by: INTERNAL MEDICINE

## 2022-06-19 RX ORDER — POTASSIUM CHLORIDE 1.5 G/1.58G
20 POWDER, FOR SOLUTION ORAL ONCE
Status: COMPLETED | OUTPATIENT
Start: 2022-06-19 | End: 2022-06-19

## 2022-06-19 RX ADMIN — LOSARTAN POTASSIUM 12.5 MG: 25 TABLET, FILM COATED ORAL at 08:52

## 2022-06-19 RX ADMIN — GABAPENTIN 600 MG: 600 TABLET, FILM COATED ORAL at 08:52

## 2022-06-19 RX ADMIN — SPIRONOLACTONE 12.5 MG: 25 TABLET ORAL at 08:52

## 2022-06-19 RX ADMIN — THIAMINE HCL TAB 100 MG 100 MG: 100 TAB at 08:52

## 2022-06-19 RX ADMIN — POTASSIUM CHLORIDE 20 MEQ: 1.5 POWDER, FOR SOLUTION ORAL at 12:53

## 2022-06-19 RX ADMIN — ASPIRIN 81 MG: 81 TABLET, COATED ORAL at 08:53

## 2022-06-19 RX ADMIN — GABAPENTIN 600 MG: 600 TABLET, FILM COATED ORAL at 16:50

## 2022-06-19 RX ADMIN — PANTOPRAZOLE SODIUM 40 MG: 40 INJECTION, POWDER, FOR SOLUTION INTRAVENOUS at 09:11

## 2022-06-19 RX ADMIN — CEFTRIAXONE SODIUM 1 G: 1 INJECTION, POWDER, FOR SOLUTION INTRAMUSCULAR; INTRAVENOUS at 03:06

## 2022-06-19 RX ADMIN — GABAPENTIN 600 MG: 600 TABLET, FILM COATED ORAL at 21:09

## 2022-06-19 RX ADMIN — METOPROLOL SUCCINATE 25 MG: 25 TABLET, EXTENDED RELEASE ORAL at 20:12

## 2022-06-19 RX ADMIN — ATORVASTATIN CALCIUM 40 MG: 40 TABLET, FILM COATED ORAL at 08:53

## 2022-06-19 RX ADMIN — THERA TABS 1 TABLET: TAB at 08:53

## 2022-06-19 RX ADMIN — ENOXAPARIN SODIUM 30 MG: 30 INJECTION SUBCUTANEOUS at 12:53

## 2022-06-19 RX ADMIN — METOPROLOL SUCCINATE 50 MG: 50 TABLET, EXTENDED RELEASE ORAL at 08:52

## 2022-06-19 RX ADMIN — Medication 400 MG: at 08:53

## 2022-06-19 RX ADMIN — FOLIC ACID 1 MG: 1 TABLET ORAL at 08:52

## 2022-06-19 ASSESSMENT — ACTIVITIES OF DAILY LIVING (ADL)
ADLS_ACUITY_SCORE: 25
ADLS_ACUITY_SCORE: 26
ADLS_ACUITY_SCORE: 25
ADLS_ACUITY_SCORE: 25

## 2022-06-19 NOTE — PLAN OF CARE
Summary: Multiple complaints including myalgias, cough, headache, fatigue.  Recent COVID-19 diagnosis.  Has relapsed on alcohol. Found to have a change in a known cavitary lung lesion   DATE & TIME: 06/19/22 pm 7-3 pm   Cognitive Concerns/ Orientation : A & O x4  BEHAVIOR & AGGRESSION TOOL COLOR: Green   CIWA SCORE: 0/0  ABNL VS/O2: VSS on RA except HTN  MOBILITY: SBA with gait belt.    PAIN MANAGMENT: Denies any pain.   DIET: Regular.   BOWEL/BLADDER: Continent. Ambulates to BR. No issues.   ABNL LAB/BG: K+ 3.3, replaced recheck 1700. Cr 0.50.   DRAIN/DEVICES: PIV SL  TELEMETRY RHYTHM: N/A  SKIN: Flushed, césar, bruises on both fore arms.  TESTS/PROCEDURES: None  D/C DAY/GOALS/PLACE: Waiting for some labs to come back and input from TS.   OTHER IMPORTANT INFO: Special precautions and Airborne precautions in place. ID following. Psych/SW/Thoracic Surgery consulted. Doing well, states she feels much better. Ambulated in room x2. Up in recliner x1.

## 2022-06-19 NOTE — PROGRESS NOTES
[facility]    Medicine Progress Note - Hospitalist Service    Date of Admission:  6/17/2022    Assessment & Plan            Kezia Dixon is a 66 year old female admitted on 6/17/2022.  Past history of CAD, HTN, HLD, GERD, alcohol abuse with recent admission for COVID-19 infection (discharged to TCU 6/11, discharged home 6/13) who presents with multiple complaints including myalgias, cough, headache, fatigue.  Recent COVID-19 diagnosis.  Has relapsed on alcohol.  On imaging, found to have a change in a known cavitary lung lesion concerning for aspergillosis.       Cavitary lung lesion  Noted initially on a 2018 CT scan, though more thick-walled as of November 2021 chest CT.  Now with what appears to be mobile debris concerning for aspergillosis.  Consider also chronic abscess related to aspiration with patient's malnourished state, history of hypoglycemia, and alcohol abuse history.   * CRP wnl  - fungitell, quantiferon gold and aspergillus Ag pending  - Airborne precautions; lower suspicion for tuberculosis, however  - AFB sputum culture and stain via expectorate if able.  - initiated on ceftriaxone for urine but culture shows probable contamination; will stop ceftriaxone unless ID feels indicated from resp perspective   - Thoracic surgery has been consulted given history of right-sided squamous cell lung cancer status post resection     COVID-19 infection, recovered  Symptom onset 6/7, admitted 6/8-6/11 with generalized weakness s/p 3 doses remdesivir.  Never developed hypoxia.   - lovenox prophylaxis (PTA Eliquis prophylaxis on hold)     Severe protein calorie malnutrition  Hypoglycemia   BMI in the 16 range.  History of poor intake somewhat related to prior esophageal cancer with stomach pull-through, though may be contribution from ongoing alcohol abuse or possibly a chronic inflammatory/infectious condition with cavitary lung lesion.  - no hypoglycemia following admission and  tolerating diet, will stop POC glucose checks    Hypokalemia  - replace per protocol     History of alcohol dependence with relapse  Reports drinking 2 scotches per day since April, resumed drinking after discharge from rehab. Admission BAL 0.16 which was 12 hours after last reported drink.  Admission AST mildly elevated at 60.   - Psychiatry consulted for depression, alcohol use  - Social work consulted for chemical dependency  - CIWA protocol ordered; no benzodiazepines have been ordered at this time pending evidence of alcohol withdrawal.  No withdrawal during last admission  - Rally pack daily    Peripheral neuropathy  - continue PTA gabapentin    Major depression  Not on medications     UTI ruled out  Admission UA mildly abnormal with culture growing multiple organisms consistent with contamination.   - discontinue ceftriaxone      History of esophageal cancer s/p resection with esophagectomy and stomach pull-through  - continue PTA PPI     Coronary artery disease  HTN  HLD  - continue PTA aspirin, atorvastatin, metoprolol, losartan, spironolactone           Diet: Combination Diet Regular Diet Adult  Snacks/Supplements Adult: Ensure Clear; Between Meals    DVT Prophylaxis: Enoxaparin (Lovenox) SQ  Jaeger Catheter: Not present  Central Lines: None  Cardiac Monitoring: None  Code Status: Full Code      Disposition Plan   Expected Discharge: 06/20/2022     Anticipated discharge location:  Awaiting care coordination huddle  Delays:            The patient's care was discussed with the Bedside Nurse and Patient.    Mathew Caraballo MD  Hospitalist Service  [facility]  Securely message with the Vocera Web Console (learn more here)  Text page via [employer] Paging/Directory         Clinically Significant Risk Factors Present on Admission             # Moderate Malnutrition: based on nutrition assessment     ______________________________________________________________________    Interval History    Feeling better with less dyspnea, less myalgias, arthralgias.  Headache resolved.  No other complaints.     Data reviewed today: I reviewed all medications, new labs and imaging results over the last 24 hours. I personally reviewed no images or EKG's today.    Physical Exam   Vital Signs: Temp: 97.8  F (36.6  C) Temp src: Oral BP: (!) 141/56 Pulse: 81   Resp: 16 SpO2: 98 % O2 Device: None (Room air)    Weight: 96 lbs 1.93 oz  General Appearance: Thin female in NAD  Respiratory: lungs CTAB, no wheezes or crackles, no tachypnea   Cardiovascular: RRR, normal s1/s2 without murmur  GI: abdomen soft, normal bowel sounds, nontender  Skin: no peripheral edema   Other: Alert and appropriate, cranial nerves grossly intact      Data   Recent Labs   Lab 06/19/22  0831 06/19/22  0745 06/19/22  0623 06/18/22  0015 06/17/22  2220   WBC 4.5  --   --   --  7.8   HGB 11.7  --   --   --  11.6*   MCV 95  --   --   --  93     --   --   --  191     --   --   --  138   POTASSIUM 3.3*  --   --   --  4.0   CHLORIDE 103  --   --   --  103   CO2 23  --   --   --  22   BUN 8  --   --   --  11   CR 0.50*  --   --   --  0.46*   ANIONGAP 8  --   --   --  13   VICENTE 8.1*  --   --   --  8.3*   * 75 80   < > 51*   ALBUMIN 2.6*  --   --   --  3.1*   PROTTOTAL 6.0*  --   --   --  6.4*   BILITOTAL 0.8  --   --   --  0.7   ALKPHOS 119  --   --   --  113   ALT 28  --   --   --  30   AST 39  --   --   --  60*    < > = values in this interval not displayed.

## 2022-06-19 NOTE — PLAN OF CARE
Goal Outcome Evaluation:    [id number]     Assumed patient care at 2300. Here with lung lesion, COVID+, rule-out TB on airborne precautions. LS diminished, nonproductive cough noted. Still need sputum sample. VSS on RA, stating 100%. A&Ox4, baseline neuropathy per pt. Up to BR appropriately. , 80. Blood cultures pending. IV Ceftriaxone initiated, tolerating well. Anticipate further workup and evaluation from various disciplines consulted. Anticipate discharge when cleared. Will continue plan of care.

## 2022-06-19 NOTE — CONSULTS
Consult Date: 06/19/2022    INFECTIOUS DISEASE CONSULTATION    LOCATION:  Room 627.    REFERRING PHYSICIAN:  Curt Ervin MD    IMPRESSION:    1.  A 66-year-old female, vague clinical syndrome of cough, myalgias, headache, fatigue, malaise, notably a recent relapse of prior alcohol issues and found to have COVID-19.  COVID-19 was diagnosed on 06/08/2022, seems to be largely asymptomatic, other than the current vague symptoms, which may be related.  2.  Largely asymptomatic COVID-19 first positive on 06/08/2022.  Imaging without particular unusual changes.  3.  Chest CT done has a cavitary change that looks old to me, doubt either acute bacterial infection, active fungal infection, certainly TB not a likely diagnosis at all.    RECOMMENDATIONS:    1.  Getting ceftriaxone, but unlikely bacterial.  2.  Will get galactomannan, other studies pending.  I do not think we need to isolate for TB, nor get TB studies.  QuantiFERON TB Gold has been done.  If surprise positive, reconsider.  3.  Dr. Riojas has operated on her previously and this has been a chronically abnormal area; agree with his evaluation and await his opinion.    HISTORY OF PRESENT ILLNESS:  This 66-year-old female is seen in consultation with concern for possible cavitary lung lesion.  The patient presents initially with vague malaise, slight cough, headaches, fatigue, actually was recently diagnosed with COVID-19 on 06/08/2022 which has largely been asymptomatic and his current symptoms are that.  He has not had particular major fevers, chills or sweats.  At presentation, a CT scan was done.  There is a cavitary area present, but in reviewing CT scans back to 2018, and as recently as 11/2021, it does not look that significantly different to me.  Doubt this is new or different, looks old and not significant for acute issue.  No history of TB or exposures.  No significant chronic fevers, chills, etc.    PAST MEDICAL HISTORY:    1.  Significant alcohol  dependency issue.  2.  Prior history of esophagectomy and surgical intervention.  3.  Coronary artery disease.  4.  Chronic cavitary area noted.    ALLERGIES:  AMONG THEM, PENICILLIN, WITH PRIOR RASH.    SOCIAL AND FAMILY HISTORY:  Unremarkable otherwise.  No exposures.  No TB history.    MEDICATIONS:  As listed.    REVIEW OF SYSTEMS:  Unremarkable.    PHYSICAL EXAMINATION:    GENERAL:  The patient appears his stated age, lying in bed comfortably, off oxygen.  Does not look toxic or ill.  VITAL SIGNS:  Afebrile.  HEENT:  No visible lesions.  NECK:  Supple and nontender.  HEART:  Regular rhythm, no major murmur.  LUNGS:  Crackles right lung but okay air movement.  ABDOMEN:  Soft and nontender.   EXTREMITIES:  No rash or skin lesions.    LABORATORY DATA:  CT scan is noted.  Does not look significantly different to me than historical, nor not anything to suggest an acute process.    Thank you very much for the consultation.  I will follow the patient with you.    Domingo Pike MD        D: 2022   T: 2022   MT: [id number]    Name:     MATTEO CASTAÑEDA  MRN:      [id number]        Account:      [id number]   :      1955           Consult Date: 2022     Document: [id number]   not sure

## 2022-06-19 NOTE — PLAN OF CARE
Goal Outcome Evaluation:    Plan of Care Reviewed With: patient     Overall Patient Progress: no change         Summary: Multiple complaints including myalgias, cough, headache, fatigue.  Recent COVID-19 diagnosis.  Has relapsed on alcohol. Found to have a change in a known cavitary lung lesion   DATE & TIME: 06/18/22 pm shift  Cognitive Concerns/ Orientation : A & O x4  BEHAVIOR & AGGRESSION TOOL COLOR: Green   CIWA SCORE: 3/0 due to headache.  ABNL VS/O2: VSS on RA except mild tachycardia. On schedule Metoprolol.  MOBILITY: SBA with gait belt.    PAIN MANAGMENT: C/O of headache, PRN Tylenol x1.   DIET: Regular. Fair appetite.  BOWEL/BLADDER: Continent. Ambulates to BR.  ABNL LAB/BG:  at bedtime.  DRAIN/DEVICES: PIV SL  TELEMETRY RHYTHM: N/A  SKIN: Flushed, césar, bruises on both fore arms.  TESTS/PROCEDURES: None  D/C DAY/GOALS/PLACE: Pending   OTHER IMPORTANT INFO: Special precautions and Airborne precautions in place. ID/Psych/SW/Thoracic Surgery consulted. SANCHEZ. IS use initiated. Infrequent non productive cough. Need sputum sample for culture.

## 2022-06-19 NOTE — PROGRESS NOTES
[facility]  Infectious Disease Progress Note          Assessment and Plan:   IMPRESSION:    1.  A 66-year-old female, vague clinical syndrome of cough, myalgias, headache, fatigue, malaise, notably a recent relapse of prior alcohol issues and found to have COVID-19.  COVID-19 was diagnosed on 06/08/2022, seems to be largely asymptomatic, other than the current vague symptoms, which may be related.  2.  Largely asymptomatic COVID-19 first positive on 06/08/2022.  Imaging without particular unusual changes.  3.  Chest CT done has a cavitary change that looks old to me, doubt either acute bacterial infection, active fungal infection, certainly TB not a likely diagnosis at all.     RECOMMENDATIONS:    1.  Getting ceftriaxone, but very unlikely any bacterial infection.  2.  Pending  galactomannan, other studies pending.  I do not think we need to isolate for TB, nor get TB studies.  QuantiFERON TB Gold has been done.  If surprise positive, reconsider.  3.  Dr. Riojas has operated on her previously and this has been a chronically abnormal area; agree with his evaluation and await his opinion.        Interval History:   no new complaints and doing well; no cp, sob, n/v/d, or abd pain. Mild cough no fever no new micro              Medications:       aspirin  81 mg Oral Daily     atorvastatin  40 mg Oral Daily     enoxaparin ANTICOAGULANT  30 mg Subcutaneous Q24H     folic acid  1 mg Oral Daily     gabapentin  600 mg Oral TID     losartan  12.5 mg Oral Daily     magnesium oxide  400 mg Oral Daily     metoprolol succinate ER  25 mg Oral QPM     metoprolol succinate ER  50 mg Oral QAM     multivitamin, therapeutic  1 tablet Oral Daily     omeprazole  40 mg Oral QAM     sodium chloride (PF)  3 mL Intracatheter Q8H     sodium chloride (PF)  3 mL Intracatheter Q8H     spironolactone  12.5 mg Oral QAM     thiamine  100 mg Oral Daily                  Physical Exam:   Blood pressure (!) 141/56, pulse 81,  "temperature 97.8  F (36.6  C), temperature source Oral, resp. rate 16, height 1.549 m (5' 1\"), weight 43.6 kg (96 lb 1.9 oz), SpO2 98 %, not currently breastfeeding.  Wt Readings from Last 2 Encounters:   06/18/22 43.6 kg (96 lb 1.9 oz)   06/11/22 41.9 kg (92 lb 4.8 oz)     Vital Signs with Ranges  Temp:  [97.8  F (36.6  C)-98.9  F (37.2  C)] 97.8  F (36.6  C)  Pulse:  [] 81  Resp:  [16-24] 16  BP: (126-156)/(56-92) 141/56  SpO2:  [98 %-100 %] 98 %    Constitutional: Awake, alert, cooperative, no apparent distress   Lungs: Clear to auscultation bilaterally, no crackles or wheezing   Cardiovascular: Regular rate and rhythm, normal S1 and S2, and no murmur noted   Abdomen: Normal bowel sounds, soft, non-distended, non-tender   Skin: No rashes, no cyanosis, no edema   Other:           Data:   All microbiology laboratory data reviewed.  Recent Labs   Lab Test 06/19/22  0831 06/17/22  2220 06/11/22  0630   WBC 4.5 7.8 4.9   HGB 11.7 11.6* 11.2*   HCT 35.4 35.1 34.6*   MCV 95 93 95    191 187     Recent Labs   Lab Test 06/19/22  0831 06/17/22  2220 06/11/22  0630   CR 0.50* 0.46* 0.52     No lab results found.  No lab results found.    Invalid input(s):     "

## 2022-06-19 NOTE — CONSULTS
Triage and Transition - Consult and Liaison     Kezia Dixon  June 19, 2022    Plan:     Continue care coordination with medical specialties including nutrition and consult psychiatry again if suspect mental health further inferering with feeding.     Maintain current transition plan. Next steps include: Diagnostic assessment for Intensive Outpatient therapy for mental health, possibly also for co-occurring substance use disorder. Appointment scheduled--SEE AVS. Additional interventions may involve outpatient follow up with Addiction Medicine and/or outpatient Psychiatry.    Additional Inpatient Psychiatry consult order placed for medication management.     Session start: 3:50PM  Session end: 4:30PM  Session duration in minutes: 40  CPT utilized: 63282 - Brief diagnostic assessment (modifier 52)  Patient was seen in-person.    Reason for consult: Psychiatry consult was requested due to depression and alcohol use. Patient was seen by [facility] Consult & Liaison team.     Identifying information: Kezia is 66 year old White  female person followed related to depression, alcohol use, and hospitalized in a context of recent COVID19 infection.     Presenting problem (including symptoms, strengths risks, resources used): Patient reports symptoms of depressed mood, tearfulness, . In individual therapeutic contact with patient today, patient presents with tearful at time, congruent with content, full affect, depressed mood, some anxiety.. Safety concerns ARE NOT present today. The patient denied suicidal ideation, plan, intent, and previous intent. Did not appear to be responding to internal stimuli. Current risk factors for suicide include living alone, history of or current substance use and chronic pain and/or chronic illness. Protective factors against suicide include identifies reason for living, strong bond to family/friends and reality testing ability.    Mental Status Exam   Affect: Other: tearful, congruent with  content  Appearance: Other: within expected limits for context   Attention Span/Concentration: Attentive    Eye Contact: Engaged  Fund of Knowledge: Other: within expected limits   Language /Speech Content: Fluent  Language /Speech Volume: Other: within expected limits   Language /Speech Rate/Productions: within expected limits  Recent Memory: Intact  Remote Memory: Intact  Mood: Depressed   Orientation:   Person: Yes   Place: Yes  Time of Day: Yes   Date: Yes   Situation (Do they understand why they are here?): Yes   Psychomotor Behavior: Other: within expected limits, noted some anxious movements like lightly tapping fingers in pattern at more uncomfortable moments in conversation, maybe as if playing a piano scale  Thought Content: Clear  Thought Form: Intact    Current medications:   Current Facility-Administered Medications   Medication     acetaminophen (TYLENOL) tablet 650 mg    Or     acetaminophen (TYLENOL) Suppository 650 mg     aspirin EC tablet 81 mg     atorvastatin (LIPITOR) tablet 40 mg     glucose gel 15-30 g    Or     dextrose 50 % injection 25-50 mL    Or     glucagon injection 1 mg     enoxaparin ANTICOAGULANT (LOVENOX) injection 30 mg     folic acid (FOLVITE) tablet 1 mg     gabapentin (NEURONTIN) tablet 600 mg     lidocaine (LMX4) cream     lidocaine 1 % 0.1-1 mL     losartan (COZAAR) half-tab 12.5 mg     magnesium oxide (MAG-OX) tablet 400 mg     Medication instructions: Do NOT use nebulized medications     melatonin tablet 1 mg     metoprolol succinate ER (TOPROL XL) 24 hr tablet 25 mg     metoprolol succinate ER (TOPROL XL) 24 hr tablet 50 mg     multivitamin, therapeutic (THERA-VIT) tablet 1 tablet     omeprazole (priLOSEC) CR capsule 40 mg     ondansetron (ZOFRAN ODT) ODT tab 4 mg    Or     ondansetron (ZOFRAN) injection 4 mg     prochlorperazine (COMPAZINE) injection 5 mg    Or     prochlorperazine (COMPAZINE) tablet 5 mg    Or     prochlorperazine (COMPAZINE) suppository 12.5 mg      "senna-docusate (SENOKOT-S/PERICOLACE) 8.6-50 MG per tablet 1 tablet    Or     senna-docusate (SENOKOT-S/PERICOLACE) 8.6-50 MG per tablet 2 tablet     sodium chloride (PF) 0.9% PF flush 3 mL     sodium chloride (PF) 0.9% PF flush 3 mL     sodium chloride (PF) 0.9% PF flush 3 mL     sodium chloride (PF) 0.9% PF flush 3 mL     spironolactone (ALDACTONE) half-tab 12.5 mg     thiamine (B-1) tablet 100 mg       Relevant history: The patient has been undergoing various forms of medical care for serious illness over the last several years per chart review, including for esophogeal cancer and lung cancer. Per chart review and per the patient's own report during today's interview, she has been drinking alcohol to excess. The patient reported that when her  was alive, he turned to alcohol saying \"oh, what the hell,\" and that she joined in. She reported that he   a year ago, and that she has been drinking now because she is \"lonely and depressed.\" The patient reported that \"I know I can stop drinking because I've done it before for 15 years.\" The patient reported that she wants to stop drinking, that she wants support to stop doing it, and that she has a desire to get healthier and live longer. The patient reported that she has not been in therapy before, but that she is willing to try now. She reported that she would be more interested in group therapy because she thinks she would be \"less intimidated,\" and that she would more \"more likely to talk\" and \"not just sit there like a lump.\" The patient reported that she has support from her 28-year-old son who lives in [place] and currently \"has custody of\" her cat and dog. She reported that she expects she will probably go to stay with her son at first when she discharges from the hospital. She reported that she is looking forward to a video visit with her three elder brother this evening at 5:30PM--\"they have to see me alive\" she said with a smile. The " "patient reported that she has two social knitting groups, she is able to pay for people to help her with upkeep at home, and she has a trusted  to help her manage her affairs.     In summary, the patient appeared emotionally overwhelmed internally by her grief after losing her . \"I'm realizing I've been depressed and I've just been soldiering on.\"  When she talked through things, she found some relief and verbalized that she has the resources and desire to engage with additional supports.     The patient is interested in discussing medication for her mental health (depression) while still in the hospital. She would be interested in medication for alcohol cravings, as well. She is willing to participate in a telehealth assessment for virtual outpatient IOP, and an appointment is scheduled.     Cultural Influences: None noted at this time.     Therapeutic intervention and patient response:  Therapeutic intervention consisted of building therapeutic rapport, active listening, validation, engaging in learning/practicing coping skills, CBT concepts and motivational interviewing, brief solution focused techniques. The patient response was cooperative and engaged.     Collateral information:   Reviewed chart and coordinated with Ysabel MALDONADO.  Update provided to care team including JOEL CUEVAS     Diagnosis:   311 (F32.9) Unspecified Depressive Disorder - primary  F41.9 Unspecified Anxiety Disorder  Alcohol Use Disorder   303.90 (F10.20) Severe    Plan:     Continue care coordination with medical specialties including nutrition and consult psychiatry again if suspect mental health further inferering with feeding.     Maintain current transition plan. Next steps include: Diagnostic assessment for Intensive Outpatient therapy for mental health, possibly also for co-occurring substance use disorder. Appointment scheduled--SEE AVS. Additional interventions may involve outpatient follow up with Addiction Medicine and/or " outpatient Psychiatry.    Additional Inpatient Psychiatry consult order placed for medication management.       OUTPATIENT APPOINTMENT:   Date:6/23/2022  Time:8:00 AM Length:90  Visit Type:VIDEO VISIT  Provider: GRCAY Milan  Notes: Adult Diagnostic Assessment (referred for mental health IOP 55+ vs dual diagnosis IOP)  If you need to change your appointment, please call Behavioral Health Access Line [phone]. Please note, we do ask for a minimum of a 24-hour notice for canceled or rescheduled appointments.  (Department:[facility] ADULT/[facility] EVAL Encounter #:[id number])    IZAIAH GARCÍA [facility], LADC  Triage and Transition - Consult and Liaison   [phone]

## 2022-06-20 VITALS
WEIGHT: 96.12 LBS | BODY MASS INDEX: 18.15 KG/M2 | RESPIRATION RATE: 16 BRPM | HEART RATE: 80 BPM | DIASTOLIC BLOOD PRESSURE: 55 MMHG | HEIGHT: 61 IN | OXYGEN SATURATION: 93 % | SYSTOLIC BLOOD PRESSURE: 135 MMHG | TEMPERATURE: 97.9 F

## 2022-06-20 LAB
1,3 BETA GLUCAN SER-MCNC: 148 PG/ML
ANION GAP SERPL CALCULATED.3IONS-SCNC: 4 MMOL/L (ref 3–14)
BUN SERPL-MCNC: 6 MG/DL (ref 7–30)
CALCIUM SERPL-MCNC: 8.3 MG/DL (ref 8.5–10.1)
CHLORIDE BLD-SCNC: 107 MMOL/L (ref 94–109)
CO2 SERPL-SCNC: 23 MMOL/L (ref 20–32)
CREAT SERPL-MCNC: 0.51 MG/DL (ref 0.52–1.04)
ERYTHROCYTE [DISTWIDTH] IN BLOOD BY AUTOMATED COUNT: 15.4 % (ref 10–15)
GALACTOMANNAN AG SERPL QL IA: NEGATIVE
GALACTOMANNAN AG SPEC IA-ACNC: 0.09
GAMMA INTERFERON BACKGROUND BLD IA-ACNC: 0.06 IU/ML
GFR SERPL CREATININE-BSD FRML MDRD: >90 ML/MIN/1.73M2
GLUCOSE BLD-MCNC: 166 MG/DL (ref 70–99)
HCT VFR BLD AUTO: 32 % (ref 35–47)
HGB BLD-MCNC: 10.6 G/DL (ref 11.7–15.7)
M TB IFN-G BLD-IMP: NEGATIVE
M TB IFN-G CD4+ BCKGRND COR BLD-ACNC: 9.94 IU/ML
MAGNESIUM SERPL-MCNC: 1.9 MG/DL (ref 1.6–2.3)
MCH RBC QN AUTO: 31 PG (ref 26.5–33)
MCHC RBC AUTO-ENTMCNC: 33.1 G/DL (ref 31.5–36.5)
MCV RBC AUTO: 94 FL (ref 78–100)
MITOGEN IGNF BCKGRD COR BLD-ACNC: 0 IU/ML
MITOGEN IGNF BCKGRD COR BLD-ACNC: 0.01 IU/ML
OBSERVATION IMP: POSITIVE
PLATELET # BLD AUTO: 150 10E3/UL (ref 150–450)
POTASSIUM BLD-SCNC: 4.5 MMOL/L (ref 3.4–5.3)
QUANTIFERON MITOGEN: 10 IU/ML
QUANTIFERON NIL TUBE: 0.06 IU/ML
QUANTIFERON TB1 TUBE: 0.06 IU/ML
QUANTIFERON TB2 TUBE: 0.07
RBC # BLD AUTO: 3.42 10E6/UL (ref 3.8–5.2)
SODIUM SERPL-SCNC: 134 MMOL/L (ref 133–144)
WBC # BLD AUTO: 6.4 10E3/UL (ref 4–11)

## 2022-06-20 PROCEDURE — 250N000013 HC RX MED GY IP 250 OP 250 PS 637: Performed by: INTERNAL MEDICINE

## 2022-06-20 PROCEDURE — 250N000013 HC RX MED GY IP 250 OP 250 PS 637: Performed by: HOSPITALIST

## 2022-06-20 PROCEDURE — 99222 1ST HOSP IP/OBS MODERATE 55: CPT | Mod: 95 | Performed by: FAMILY MEDICINE

## 2022-06-20 PROCEDURE — 83735 ASSAY OF MAGNESIUM: CPT | Performed by: HOSPITALIST

## 2022-06-20 PROCEDURE — 36415 COLL VENOUS BLD VENIPUNCTURE: CPT | Performed by: INTERNAL MEDICINE

## 2022-06-20 PROCEDURE — 999N000216 HC STATISTIC ADULT CD FACE TO FACE-NO CHRG

## 2022-06-20 PROCEDURE — 82310 ASSAY OF CALCIUM: CPT | Performed by: HOSPITALIST

## 2022-06-20 PROCEDURE — 250N000011 HC RX IP 250 OP 636: Performed by: INTERNAL MEDICINE

## 2022-06-20 PROCEDURE — 99232 SBSQ HOSP IP/OBS MODERATE 35: CPT | Performed by: INTERNAL MEDICINE

## 2022-06-20 PROCEDURE — 85027 COMPLETE CBC AUTOMATED: CPT | Performed by: INTERNAL MEDICINE

## 2022-06-20 PROCEDURE — 99239 HOSP IP/OBS DSCHRG MGMT >30: CPT | Performed by: HOSPITALIST

## 2022-06-20 PROCEDURE — 82374 ASSAY BLOOD CARBON DIOXIDE: CPT | Performed by: HOSPITALIST

## 2022-06-20 RX ORDER — FUROSEMIDE 40 MG
20 TABLET ORAL DAILY PRN
Start: 2022-06-20

## 2022-06-20 RX ADMIN — ENOXAPARIN SODIUM 30 MG: 30 INJECTION SUBCUTANEOUS at 14:00

## 2022-06-20 RX ADMIN — GABAPENTIN 600 MG: 600 TABLET, FILM COATED ORAL at 15:58

## 2022-06-20 RX ADMIN — GABAPENTIN 600 MG: 600 TABLET, FILM COATED ORAL at 09:11

## 2022-06-20 RX ADMIN — FOLIC ACID 1 MG: 1 TABLET ORAL at 09:11

## 2022-06-20 RX ADMIN — LOSARTAN POTASSIUM 12.5 MG: 25 TABLET, FILM COATED ORAL at 09:11

## 2022-06-20 RX ADMIN — SPIRONOLACTONE 12.5 MG: 25 TABLET ORAL at 09:12

## 2022-06-20 RX ADMIN — THIAMINE HCL TAB 100 MG 100 MG: 100 TAB at 09:11

## 2022-06-20 RX ADMIN — THERA TABS 1 TABLET: TAB at 09:11

## 2022-06-20 RX ADMIN — ASPIRIN 81 MG: 81 TABLET, COATED ORAL at 09:12

## 2022-06-20 RX ADMIN — ATORVASTATIN CALCIUM 40 MG: 40 TABLET, FILM COATED ORAL at 09:11

## 2022-06-20 RX ADMIN — METOPROLOL SUCCINATE 50 MG: 50 TABLET, EXTENDED RELEASE ORAL at 09:12

## 2022-06-20 RX ADMIN — Medication 400 MG: at 09:11

## 2022-06-20 RX ADMIN — OMEPRAZOLE 40 MG: 20 CAPSULE, DELAYED RELEASE ORAL at 09:11

## 2022-06-20 ASSESSMENT — ACTIVITIES OF DAILY LIVING (ADL)
ADLS_ACUITY_SCORE: 25

## 2022-06-20 NOTE — PROGRESS NOTES
Thoracic Surgery Consult Note:    Kezia Dixon  1955   [id number]    Date of Admission: 6/17/2022  9:35 PM  Date of Consult: 6/20/2022     CC: Cavitary lesion of lung [J98.4]    Referring Provider: Dr. Mathew Caraballo    Consulting Thoracic Surgeon: Dr. Alvin Riojas    ASSESSMENT/PLAN:   1) 66-yr-old admitted with Covid and cavitary lesion of right lung:  Well-known to our practice from 2015 esophagectomy and 2018 RLLobectomy for Stage I right lower lobe lung squamous cell carcinoma. Last seen 2/2022 with CT CAP showing 2.6 x 2.0 cm cavitary nodule in right upper lobe lung and 7 mm lesion in left upper lobe lung, both stable since 11/2021 CT scan. Recommendation was for continued observation of these indeterminate nodules. Current CT chest shows decreased right upper lobe lung cavitary lesion, measuring 2.1 x 1.1 cm. Additional unchanged nodular opacities, including JULIEN 8 x 5 mm opacity. Patient is mildly symptomatic from Covid but denies significant febrile illness/productive cough/SOB/weight loss/night sweats/vomiting/aspiration. Recommend continued observation with CT chest in 4 months- see Dr. Riojas/me at that time. I will set this up. D/W Dr. Caraballo.       History of the Present Illness: Pt is a 66 year old female admitted for the treatment of Cavitary lesion of lung [J98.4] and Covid-19.  Thoracic Surgery Consult was requested for recommendations/management of cavitary lesion. Kezia was recently admitted for Covid-19 6/8-6/11 (tx with Remdesivir) and then stayed in TCU for a few days and then home. Denies productive cough, fevers, extreme fatigue, vomiting/aspiration, SOB, night sweats. Has difficulty with appetite and eating meals due to past hx of esophagectomy (early satiety due to small stomach). Admits to recent ETOH use again and has met with an Addiction Medicine specialist who provide resources-- Kezia says she intends to stop alcohol use and she's done it before so is confident she can manage  to again. Presented ot the ED on 6/17 with headache/nausea/speech difficulties/chills. These symptoms have now resolved. Saw Dr. Riojas 2/2022 with CT chest showing 2.6 x 2.0 cm RUL cavitary lesion and 7 mm JULIEN pulmonary opacity which were both stable when compared to 11/2021 CT chest and indeterminate in nature. ID assessment here states low suspicion for TB and infectious disease and labs for aspergilloma are pending.     ROS: A 12-point review of systems was performed and negative except as noted in the HPI above.    Past Medical History:  Past Medical History:   Diagnosis Date     Alcohol use disorder, severe, dependence (H) 10/14/2016     Alcoholism (H) 10/14/2016     Benign essential hypertension 10/14/2016     Carotid artery disease (H)     mile plaque 5/7     Chemical dependency (H)     alcohol     Depression with anxiety      Esophageal cancer (H)      Esophageal cancer (H) 3/22/2016     Esophageal mass      GERD (gastroesophageal reflux disease)      Hx of pancreatitis 2003     Hypercholesteremia      Hyperglycemia      Hyperlipemia      Impaired fasting glucose 10/14/2016     Impaired fasting glucose 10/14/2016     Nocturnal leg cramps      Pancreatic pseudocyst 10/14/2016     Pancreatic pseudocyst 10/14/2016     Pancreatitis     with pseudocyst     Pap smear with atypical squamous cells, cannot exclude high grade squamous intraepithelial lesion (ASC-H) 10/14/16    Colpo impression benign, ECC benign. Cotestin in 1 yr.     Shingles      Vasomotor rhinitis         Past Surgical History:  Past Surgical History:   Procedure Laterality Date     AAA REPAIR      splenic artery aneurysm embolization     ABDOMEN SURGERY  2/2/2016     COLONOSCOPY  11/26/2013    Procedure: COMBINED COLONOSCOPY, SINGLE BIOPSY/POLYPECTOMY BY BIOPSY;  COLONOSCOPY (MAC);  Surgeon: Montana Rosa MD;  Location:  GI     COLONOSCOPY  11/26/2013     COLONOSCOPY N/A 10/4/2018    Procedure: COMBINED COLONOSCOPY, SINGLE OR MULTIPLE  BIOPSY/POLYPECTOMY BY BIOPSY;  colonoscopy;  Surgeon: Gio Apodaca MD;  Location:  GI     ENT SURGERY       ESOPHAGOGASTRECTOMY N/A 3/22/2016    Procedure: ESOPHAGOGASTRECTOMY;  Surgeon: Alvin Riojas MD;  Location:  OR     ESOPHAGOSCOPY, GASTROSCOPY, DUODENOSCOPY (EGD), COMBINED N/A 12/22/2015    Procedure: COMBINED ENDOSCOPIC ULTRASOUND, ESOPHAGOSCOPY, GASTROSCOPY, DUODENOSCOPY (EGD), FINE NEEDLE ASPIRATE/BIOPSY;  Surgeon: Danelle Michael MD;  Location:  GI     GASTROSTOMY, INSERT TUBE, COMBINED N/A 2/2/2016    Procedure: COMBINED GASTROSTOMY, INSERT TUBE (OPEN);  Surgeon: Alvin Riojas MD;  Location:  OR     GI SURGERY  12/22/2015     HAND SURGERY      right     HERNIORRHAPHY INCISIONAL (LOCATION) N/A 3/19/2019    Procedure: LAPARSCOPIC  INCISIONAL HERNIA REPAIR WITH MESH, LAPARSCOPIC LYSIS OF ADHENSIONS;  Surgeon: Lamberto Magaña MD;  Location:  OR     INSERT PORT VASCULAR ACCESS N/A 12/28/2015    Procedure: INSERT PORT VASCULAR ACCESS;  Surgeon: Alvin Riojas MD;  Location:  OR     LAPAROSCOPIC CHOLECYSTECTOMY N/A 3/19/2019    Procedure: LAPAROSCOPIC CHOLECYSTECTOMY;  Surgeon: Lamberto Magaña MD;  Location:  OR     LOBECTOMY LUNG Right 10/16/2018    Procedure: LOBECTOMY LUNG;  Surgeon: Alvin Riojas MD;  Location:  OR     REMOVE PORT VASCULAR ACCESS Left 7/22/2016    Procedure: REMOVE PORT VASCULAR ACCESS;  Surgeon: Alvin Riojas MD;  Location:  OR     THORACOTOMY Right 10/16/2018    Procedure: REDO RIGHT THORACTOMY AND RIGHT LOWER LOBECTOMY, PLEURAL LYSIS;  Surgeon: Alvin Riojas MD;  Location:  OR     TONSILLECTOMY       VASCULAR SURGERY         Family History:  Family History   Problem Relation Age of Onset     Other Cancer Father         melanoma     Prostate Cancer Father      Diabetes Father      Other Cancer Brother         melanoma     Other Cancer Brother         melanoma     Other  "Cancer Brother        Medications:  No current outpatient medications on file.       S: Sitting upright in chair on room air. Articulate, no acute distress, speaks in full sentences.    O: /43 (BP Location: Left arm)   Pulse 67   Temp 97.8  F (36.6  C) (Oral)   Resp 16   Ht 1.549 m (5' 1\")   Wt 43.6 kg (96 lb 1.9 oz)   SpO2 92%   BMI 18.16 kg/m   on room air    Labs: WBC 4.5, Aspergillus galactomannan antigen: pending, Quantiferon neg, CRP: <2.9    Imaging:  EXAM: CT CHEST W/O CONTRAST  LOCATION: [facility]  DATE/TIME: 6/18/2022 12:49 AM     INDICATION: Covid 19, dyspnea, headache, nausea and unable to eat.  COMPARISON: 02/21/2022  TECHNIQUE: CT chest without IV contrast. Multiplanar reformats were obtained. Dose reduction techniques were used.  CONTRAST: None.     FINDINGS:   LUNGS AND PLEURA: Prior partial right lower lobe resection. Diffuse centrilobular emphysema. Right sided cavitary lesion with central filling defect has slightly decreased in size measuring 2.1 x 1.1 cm, previously 2.1 x 1.7 cm. Additional scattered   nodular opacities are likely unchanged, including dominant left upper lobe nodule measuring 8 x 5 mm, image 81. Patent central airways. Small right pleural effusion, stable to decreased.     MEDIASTINUM/AXILLAE: Prior esophagectomy and gastric pull-through. No significant mediastinal adenopathy. Moderate atherosclerotic calcification.     CORONARY ARTERY CALCIFICATION: Moderate.     UPPER ABDOMEN: Cholecystectomy.     MUSCULOSKELETAL: No acute bony abnormalities.                                                                      IMPRESSION:   1.  Multiple pulmonary parenchymal nodular opacities are stable to decreased in size as detailed above.  2.  Right upper lobe cavity appears to contain mobile debris. Correlate clinically for superinfection/aspergillosis.  3.  No other significant interval change    RADIOLOGY REVIEW: I personally reviewed 6/18/22 CT " chest. RUL cavitary lesion now measuring 2.1 x 1.1 cm, which is decreased in size when compared to 11/2021 and 2/2022 scans.     Discussed the above plan of care with patient and Dr. Caraballo.  We will continue to follow with you.  Thank you for allowing us to participate in the care of this patient and please call if there are further questions or concerns.    Time dedicated to Consultation: 32 minutes were spent at bedside, floor and unit with greater than 50% of the time spent on patient counseling and education, radiology review and coordination of care.    Destiny Kendall PA-C with Dr. Alvin Riojas  [facility]  Cell [phone]

## 2022-06-20 NOTE — PROGRESS NOTES
[facility]  Infectious Disease Progress Note          Assessment and Plan:   IMPRESSION:    1.  A 66-year-old female, vague clinical syndrome of cough, myalgias, headache, fatigue, malaise, notably a recent relapse of prior alcohol issues and found to have COVID-19.  COVID-19 was diagnosed on 06/08/2022, seems to be largely asymptomatic, other than the current vague symptoms, which may be related.  2.  Largely asymptomatic COVID-19 first positive on 06/08/2022.  Imaging without particular unusual changes.  3.  Chest CT done has a cavitary change that looks old to me, doubt either acute bacterial infection, active fungal infection, certainly TB not a likely diagnosis at all.     RECOMMENDATIONS:    1.  On ceftriaxone, but very unlikely any bacterial infection.  2.  Pending  galactomannan, other studies pending.  I do not think we need to isolate for TB, nor get TB studies.  QuantiFERON TB Gold has been done.  If surprise positive, reconsider. COVID recovered discontinue all iso  3.  Dr. Riojas has operated on her previously and this has been a chronically abnormal area; agree with his evaluation and await his opinion. Note as I thought not really any worrisome worsening imaging        Interval History:   no new complaints and doing well; no cp, sob, n/v/d, or abd pain. Mild cough no fever no new micro              Medications:       aspirin  81 mg Oral Daily     atorvastatin  40 mg Oral Daily     enoxaparin ANTICOAGULANT  30 mg Subcutaneous Q24H     folic acid  1 mg Oral Daily     gabapentin  600 mg Oral TID     losartan  12.5 mg Oral Daily     magnesium oxide  400 mg Oral Daily     metoprolol succinate ER  25 mg Oral QPM     metoprolol succinate ER  50 mg Oral QAM     multivitamin, therapeutic  1 tablet Oral Daily     omeprazole  40 mg Oral QAM     sodium chloride (PF)  3 mL Intracatheter Q8H     sodium chloride (PF)  3 mL Intracatheter Q8H     spironolactone  12.5 mg Oral QAM     thiamine   "100 mg Oral Daily                  Physical Exam:   Blood pressure 135/55, pulse 80, temperature 97.9  F (36.6  C), temperature source Oral, resp. rate 16, height 1.549 m (5' 1\"), weight 43.6 kg (96 lb 1.9 oz), SpO2 93 %, not currently breastfeeding.  Wt Readings from Last 2 Encounters:   06/18/22 43.6 kg (96 lb 1.9 oz)   06/11/22 41.9 kg (92 lb 4.8 oz)     Vital Signs with Ranges  Temp:  [97.5  F (36.4  C)-98.7  F (37.1  C)] 97.9  F (36.6  C)  Pulse:  [67-91] 80  Resp:  [16-17] 16  BP: (112-135)/(43-58) 135/55  SpO2:  [89 %-99 %] 93 %    Constitutional: Awake, alert, cooperative, no apparent distress   Lungs: Clear to auscultation bilaterally, no crackles or wheezing   Cardiovascular: Regular rate and rhythm, normal S1 and S2, and no murmur noted   Abdomen: Normal bowel sounds, soft, non-distended, non-tender   Skin: No rashes, no cyanosis, no edema   Other:           Data:   All microbiology laboratory data reviewed.  Recent Labs   Lab Test 06/20/22  0830 06/19/22  0831 06/17/22  2220   WBC 6.4 4.5 7.8   HGB 10.6* 11.7 11.6*   HCT 32.0* 35.4 35.1   MCV 94 95 93    196 191     Recent Labs   Lab Test 06/20/22  0830 06/19/22  0831 06/17/22  2220   CR 0.51* 0.50* 0.46*     No lab results found.  No lab results found.    Invalid input(s):     "

## 2022-06-20 NOTE — DISCHARGE SUMMARY
Pt is VSS, IV discontinued. AVS printed and pt educated. All questions answered. All belongings are with pt. Discharged home with son.

## 2022-06-20 NOTE — PLAN OF CARE
Summary: Multiple complaints including myalgias, cough, headache, fatigue.  Recent COVID-19 diagnosis.  Has relapsed on alcohol. Found to have a change in a known cavitary lung lesion   DATE & TIME: 06/19/22 [id number]  Cognitive Concerns/ Orientation : A & O x4  BEHAVIOR & AGGRESSION TOOL COLOR: Green   CIWA SCORE: 0/0  ABNL VS/O2: VSS on RA.  MOBILITY: Independent   PAIN MANAGMENT: Denies any pain.   DIET: Regular. Up in recliner for dinner.  BOWEL/BLADDER: Continent. Ambulates to BR.   ABNL LAB/BG: Cr 0.50.   DRAIN/DEVICES: PIV SL  TELEMETRY RHYTHM: N/A  SKIN: Flushed, césar, bruises on both fore arms.  TESTS/PROCEDURES: Quantiferon TB Gold plus  and Galactomannan result pending.  D/C DAY/GOALS/PLACE: Waiting for some labs to come back and input from Thoracic Surgery. Seen by Psych this shift. Psych consult tomorrow.  OTHER IMPORTANT INFO: Special precautions and Airborne precautions in place. ID following. SW/Thoracic Surgery consulted.

## 2022-06-20 NOTE — DISCHARGE SUMMARY
[facility]  Hospitalist Discharge Summary      Date of Admission:  6/17/2022  Date of Discharge:  6/20/2022  Discharging Provider: Mathew Caraballo MD  Discharge Service: Hospitalist Service    Discharge Diagnoses   Cavitary lung lesion  COVID-19, recovered  Severe protein calorie malnutrition  Hypoglycemia  Hx alcohol dependence with relapse  Peripheral neuropathy  Major depression  Hx esophageal cancer s/p resection   CAD  HTN  HLD    Follow-ups Needed After Discharge   Follow-up Appointments     Follow-up and recommended labs and tests       Follow up with primary care provider, Mustapha Velásquez, within 7 days for   hospital follow- up.  No follow up labs or test are needed.  Follow up with Thoracic Surgery clinic in 4 months with repeat CT of the   chest prior to visit for ongoing monitoring of cavitary lesion of the   lung.  The clinic should contact you to arrange these appointments.   Follow up for evaluation of alcohol cessation program and other outpatient   resources (AA, etc.).             Unresulted Labs Ordered in the Past 30 Days of this Admission     Date and Time Order Name Status Description    6/18/2022  5:27 AM 1,3 Beta D glucan fungitell In process     6/18/2022  5:17 AM Respiratory Aerobic Bacterial Culture Preliminary       These results will be followed up by: Hospitalist service     Discharge Disposition   Discharged to home  Condition at discharge: Stable    Hospital Course              Kezia Dixon is a 66 year old female admitted on 6/17/2022.  Past history of CAD, HTN, HLD, GERD, alcohol abuse with recent admission for COVID-19 infection (discharged to TCU 6/11, discharged home 6/13) who presents with multiple complaints including myalgias, cough, headache, fatigue.  Recent COVID-19 diagnosis.  Has relapsed on alcohol.  On imaging, found to have a change in a known cavitary lung lesion concerning for aspergillosis.       Cavitary lung lesion  Noted initially on  a 2018 CT scan, though more thick-walled as of November 2021 chest CT.  Now with what appears to be mobile debris concerning for aspergillosis.  Consider also chronic abscess related to aspiration with patient's malnourished state, history of hypoglycemia, and alcohol abuse history. CRP wnl.  Quantiferon gold negative (ID did not feel TB rule out indicated), Aspergillus Ag negative.  Thoracic Surg was consulted, felt lesion was slightly smaller compared to prior scan and recommend 4 month follow up in clinic with repeat CT.  Fungitell pending at discharge.  Sputum culture ngtd.  ID did not recommend any antibiotic or antifungal treatment at this time.      COVID-19 infection, recovered  Symptom onset 6/7, admitted 6/8-6/11 with generalized weakness s/p 3 doses remdesivir.  Never developed hypoxia.  Resume PTA Eliquis to complete prior course.      Severe protein calorie malnutrition  Hypoglycemia, resolved  BMI in the 16 range.  History of poor intake somewhat related to prior esophageal cancer with stomach pull-through, though may be contribution from ongoing alcohol abuse.  No recurrence with diet during admission.     Hypokalemia, resolved  Replaced per protocol     History of alcohol dependence with relapse  Reports drinking 2 scotches per day since April, resumed drinking after discharge from rehab. Admission BAL 0.16 which was 12 hours after last reported drink.  Admission AST mildly elevated at 60.  Chem Dep and Addiction Medicine consulted, recommend outpatient evaluation for treatment program.  Recommended Psychiatry evaluation, however, she did not wish to remain inpatient waiting for this and will follow up with PCP.      Major depression  Follow up with PCP.     All other chronic medical issues remain stable.       Consultations This Hospital Stay   THORACIC SURGERY IP CONSULT  INFECTIOUS DISEASES IP CONSULT  PSYCHIATRY IP CONSULT  SOCIAL WORK IP CONSULT  NUTRITION SERVICES ADULT IP CONSULT  PSYCHIATRY  IP CONSULT  CHEMICAL DEPENDENCY IP CONSULT    Code Status   Full Code    Time Spent on this Encounter   I, Mathew Caraballo MD, personally saw the patient today and spent greater than 30 minutes discharging this patient.       Mathew Caraballo MD  [place] [address] [facility]  [address] [address]  Phone: [phone]  ______________________________________________________________________    Physical Exam   Vital Signs: Temp: 97.9  F (36.6  C) Temp src: Oral BP: 135/55 Pulse: 80   Resp: 16 SpO2: 93 % O2 Device: None (Room air)    Weight: 96 lbs 1.93 oz  General Appearance:  Thin female in NAD  Respiratory: lungs CTAB, no wheezes or crackles, no tachypnea   Cardiovascular: RRR, normal s1/s2 without murmur  GI: abdomen soft, normal bowel sounds, nontender  Skin: no peripheral edema   Other:  Alert and appropriate, cranial nerves grossly intact        Primary Care Physician   Mustapha Velásquez    Discharge Orders      Reason for your hospital stay    You were admitted for cough, headache, body aches and fatigue, the cause of which is unclear but may be related to recent COVID infection.     Follow-up and recommended labs and tests     Follow up with primary care provider, Mustapha Velásquez, within 7 days for hospital follow- up.  No follow up labs or test are needed.  Follow up with Thoracic Surgery clinic in 4 months with repeat CT of the chest prior to visit for ongoing monitoring of cavitary lesion of the lung.  The clinic should contact you to arrange these appointments.   Follow up for evaluation of alcohol cessation program and other outpatient resources (AA, etc.).     Activity    Your activity upon discharge: activity as tolerated     Discharge Instructions    Hold your baby aspirin while taking Eliquis.  Resume aspirin once Eliquis course complete.     Diet    Follow this diet upon discharge:       Snacks/Supplements Adult: Ensure Clear; Between Meals      Combination Diet  Regular Diet Adult       Significant Results and Procedures   Most Recent 3 CBC's:Recent Labs   Lab Test 06/20/22  0830 06/19/22  0831 06/17/22  2220   WBC 6.4 4.5 7.8   HGB 10.6* 11.7 11.6*   MCV 94 95 93    196 191     Most Recent 3 BMP's:Recent Labs   Lab Test 06/20/22  0830 06/19/22  1922 06/19/22  1223 06/19/22  0831 06/18/22  0015 06/17/22  2220     --   --  134  --  138   POTASSIUM 4.5 4.7  --  3.3*  --  4.0   CHLORIDE 107  --   --  103  --  103   CO2 23  --   --  23  --  22   BUN 6*  --   --  8  --  11   CR 0.51*  --   --  0.50*  --  0.46*   ANIONGAP 4  --   --  8  --  13   VICENTE 8.3*  --   --  8.1*  --  8.3*   *  --  91 170*   < > 51*    < > = values in this interval not displayed.     Most Recent 6 Bacteria Isolates From Any Culture (See EPIC Reports for Culture Details):No lab results found.,   Results for orders placed or performed during the hospital encounter of 06/17/22   CT Head w/o Contrast    Narrative    EXAM: CT HEAD W/O CONTRAST  LOCATION: [facility]  DATE/TIME: 6/18/2022 12:49 AM    INDICATION: Mental status change, unknown cause  COMPARISON: CT head 10/18/2021  TECHNIQUE: Routine CT Head without IV contrast. Multiplanar reformats. Dose reduction techniques were used.    FINDINGS:  INTRACRANIAL CONTENTS: No intracranial hemorrhage, extraaxial collection, or mass effect.  No CT evidence of acute infarct. Mild presumed chronic small vessel ischemic changes. Mild generalized volume loss. No hydrocephalus.     VISUALIZED ORBITS/SINUSES/MASTOIDS: No intraorbital abnormality. No paranasal sinus mucosal disease. No middle ear or mastoid effusion.    BONES/SOFT TISSUES: No acute abnormality.      Impression    IMPRESSION:  1.  No acute intracranial process.   CTA Head Neck with Contrast    Narrative    EXAM: CTA HEAD NECK W CONTRAST  LOCATION: [facility]  DATE/TIME: 6/18/2022 3:10 AM    INDICATION: Neuro deficit, acute, stroke  suspected.  COMPARISON: None.  CONTRAST: 75 mL Isovue 370  TECHNIQUE: Head and neck CT angiogram with IV contrast. Axial helical CT images of the head and neck vessels obtained during the arterial phase of intravenous contrast administration. Axial 2D reconstructed images and multiplanar 3D MIP reconstructed   images of the head and neck vessels were performed by the technologist. Dose reduction techniques were used. All stenosis measurements made according to NASCET criteria unless otherwise specified.    FINDINGS:     HEAD CTA:  ANTERIOR CIRCULATION: No proximal occlusion. Nonflow limiting atherosclerotic calcification in the internal carotid arteries. Fetal origin of the left posterior cerebral artery from the anterior circulation. Small left A1 segment of the anterior cerebral   artery.    POSTERIOR CIRCULATION: No stenosis/occlusion, aneurysm, or high flow vascular malformation. Dominant left vertebral artery supplies the basilar artery with a small right vertebral artery supplying the right posterior inferior cerebellar artery (PICA).     DURAL VENOUS SINUSES: Expected enhancement of the major dural venous sinuses.    NECK CTA:  RIGHT CAROTID: Atherosclerotic plaque results in less than 50% stenosis in the right ICA. No dissection.    LEFT CAROTID: Atherosclerotic plaque results in less than 50% stenosis in the left ICA. No dissection.    VERTEBRAL ARTERIES: Nonflow limiting atherosclerotic calcification at the left vertebral artery origin. No evidence of dissection. Dominant left and smaller right vertebral arteries.    AORTIC ARCH: There is an aberrant right subclavian artery. Scattered atherosclerotic calcification in the aortic arch, extending into the proximal great vessels without flow-limiting stenosis.    NONVASCULAR STRUCTURES: Mild degenerative changes of the cervical spine without high-grade spinal canal stenosis. Multilevel bilateral neural foraminal narrowing. See separately dictated chest CT for  intrathoracic and postoperative findings.      Impression    IMPRESSION:     HEAD CTA:   1.  No flow-limiting stenosis or proximal occlusion.    NECK CTA:  1.  Scattered atherosclerotic disease without flow-limiting stenosis.   Chest CT w/o contrast    Narrative    EXAM: CT CHEST W/O CONTRAST  LOCATION: [facility]  DATE/TIME: 6/18/2022 12:49 AM    INDICATION: Covid 19, dyspnea, headache, nausea and unable to eat.  COMPARISON: 02/21/2022  TECHNIQUE: CT chest without IV contrast. Multiplanar reformats were obtained. Dose reduction techniques were used.  CONTRAST: None.    FINDINGS:   LUNGS AND PLEURA: Prior partial right lower lobe resection. Diffuse centrilobular emphysema. Right sided cavitary lesion with central filling defect has slightly decreased in size measuring 2.1 x 1.1 cm, previously 2.1 x 1.7 cm. Additional scattered   nodular opacities are likely unchanged, including dominant left upper lobe nodule measuring 8 x 5 mm, image 81. Patent central airways. Small right pleural effusion, stable to decreased.    MEDIASTINUM/AXILLAE: Prior esophagectomy and gastric pull-through. No significant mediastinal adenopathy. Moderate atherosclerotic calcification.    CORONARY ARTERY CALCIFICATION: Moderate.    UPPER ABDOMEN: Cholecystectomy.    MUSCULOSKELETAL: No acute bony abnormalities.      Impression    IMPRESSION:   1.  Multiple pulmonary parenchymal nodular opacities are stable to decreased in size as detailed above.  2.  Right upper lobe cavity appears to contain mobile debris. Correlate clinically for superinfection/aspergillosis.  3.  No other significant interval change.           Discharge Medications   Current Discharge Medication List      CONTINUE these medications which have CHANGED    Details   furosemide (LASIX) 40 MG tablet Take 0.5 tablets (20 mg) by mouth daily as needed (edema or shortness of breath) For worsening shortness of breath, leg swelling or weight gain.     Associated Diagnoses: Chronic systolic congestive heart failure (H)         CONTINUE these medications which have NOT CHANGED    Details   apixaban ANTICOAGULANT (ELIQUIS) 2.5 MG tablet Take 1 tablet (2.5 mg) by mouth 2 times daily  Qty: 60 tablet, Refills: 0    Associated Diagnoses: Infection due to 2019 novel coronavirus      aspirin (ASA) 81 MG EC tablet Take 1 tablet (81 mg) by mouth daily    Associated Diagnoses: Cardiomyopathy, unspecified type (H)      atorvastatin (LIPITOR) 40 MG tablet Take 1 tablet (40 mg) by mouth daily  Qty: 90 tablet, Refills: 3    Associated Diagnoses: Hyperlipidemia LDL goal <70      fluticasone (FLONASE) 50 MCG/ACT nasal spray INSTILL 2 SPRAYS INTO BOTH NOSTRILS DAILY.  Qty: 48 mL, Refills: 2    Associated Diagnoses: Chronic rhinitis      folic acid (FOLVITE) 1 MG tablet Take 1 tablet (1 mg) by mouth daily  Qty: 90 tablet, Refills: 3    Associated Diagnoses: Alcoholism (H)      gabapentin (NEURONTIN) 600 MG tablet Take 1 tablet (600 mg) by mouth 3 times daily  Qty: 90 tablet, Refills: 1    Comments: Future refills by PCP Dr. Mustapha Velásquez with phone number [phone].  Associated Diagnoses: Peripheral polyneuropathy      ibuprofen (ADVIL/MOTRIN) 200 MG tablet Take 400 mg by mouth every 4 hours as needed for mild pain      losartan (COZAAR) 25 MG tablet Take 0.5 tablets (12.5 mg) by mouth daily  Qty: 45 tablet, Refills: 3    Associated Diagnoses: Benign essential hypertension      !! metoprolol succinate ER (TOPROL XL) 25 MG 24 hr tablet Take 25 mg by mouth every evening      !! metoprolol succinate ER (TOPROL XL) 50 MG 24 hr tablet Take 50 mg by mouth every morning      omeprazole (PRILOSEC) 40 MG DR capsule TAKE 1 CAPSULE BY MOUTH EVERY DAY  Qty: 90 capsule, Refills: 1    Associated Diagnoses: Benign essential hypertension      spironolactone (ALDACTONE) 25 MG tablet Take 0.5 tablets (12.5 mg) by mouth every morning  Qty: 45 tablet, Refills: 3    Associated Diagnoses: Chronic  systolic congestive heart failure (H)      thiamine (B-1) 100 MG tablet Take 1 tablet (100 mg) by mouth daily  Qty: 90 tablet, Refills: 3    Associated Diagnoses: Alcoholism (H)       !! - Potential duplicate medications found. Please discuss with provider.        Allergies   Allergies   Allergen Reactions     Codeine Sulfate Nausea     Simvastatin Cramps     Leg cramps     Pcn [Penicillins] Rash

## 2022-06-20 NOTE — DISCHARGE INSTRUCTIONS
OUTPATIENT APPOINTMENT:   Date:6/23/2022  Time:8:00 AM Length:90  Visit Type:VIDEO VISIT  Provider: GRACY Milan  Notes: Adult Diagnostic Assessment (referred for mental health IOP)  If you need to change your appointment, please call Behavioral Health Access Line [phone]. Please note, we do ask for a minimum of a 24-hour notice for canceled or rescheduled appointments.  Department:[facility] ADULT/ADOL EVAL Encounter #:[id number]

## 2022-06-20 NOTE — PLAN OF CARE
Goal Outcome Evaluation:         Shift [id number] 6/20/22  Summary: Multiple complaints including myalgias, cough, headache, fatigue.  Recent COVID-19 diagnosis.  Has relapsed on alcohol. Found to have a change in a known cavitary lung lesion   Cognitive Concerns/ Orientation : A & O x4  BEHAVIOR & AGGRESSION TOOL COLOR: Green   CIWA SCORE: 0/0  ABNL VS/O2: VSS on RA.  Discontinued COVID/TB ISO  MOBILITY: Independent and abulationX3  PAIN MANAGMENT: Denies any pain.   DIET: Regular good appetite . Up in recliner  BOWEL/BLADDER: Continent. Ambulates to BR.   DRAIN/DEVICES: PIV SL  TELEMETRY RHYTHM: N/A  SKIN: Flushed, césar, bruises on both fore arms.  TESTS/PROCEDURES: Quantiferon TB Gold plus and Galactomannan result both came negative  Psych consult pending  Seen by addiction doctor.   Discharge pending Psych eval and ID plan

## 2022-06-20 NOTE — CONSULTS
6/20/2022    Pt has a scheduled virtual visit for an evaluation on 6/23/2022 with [facility].   Spoke with pt via telephone to see if pt had decide to attend inpatient CD treatment instead of IOP. Pt reports she is still planning to do the virtual evaluation she has scheduled this week and does not need referrals to IP CD treatment or CD resources at this time.    RYAN Catherine  Phone: [phone]  Email: [email or web address]

## 2022-06-20 NOTE — CONSULTS
Addiction Medicine Consultation    Kezia Dixon, 1955, [id number]    Primary:  Mustapha Velásquez, [phone]       Tele-Visit Details    Type of service:  Video Visit  Video Start Time (time video started): 1032  Video End Time (time video stopped): 1103  Originating Location (pt. Location): Patient's room  Distant Location (provider location): On site at [facility]  Reason for Televisit: COVID-19  Mode of Communication:  Video Conference via polycom  Physician has received verbal consent for a video visit from the patient? Yes    Assessment and Plan:     Active Problems:    Alcohol use disorder, severe, dependence (H)    Cavitary lesion of lung      Kezia Dixon is a 66 year old old female with severe alcohol use disorder who was admitted for lesions concerning for aspergillosis, myalgias, cough, headache and fatigue.  She has high motivation for sobriety from alcohol and will also be seeing psychiatry to discuss medications for depression.  She is interested in IOP for 55+.  Currently on gabapentin for the last month for neuropathy and no other pharmacotherapy for alcohol use disorder.    Recommendations:  1. Continue gabapentin 600 mg 3 times daily  2. Follow-up in the outpatient addiction medicine clinic.  Referral placed and this should show up in the patient's AVS when she is discharged.  Agree with IOP for depression  3. Psychiatry will see to discuss medications for depression, consider sertraline.  4. Hold on starting naltrexone because of current medical issues but can consider this when she follows up with the addiction clinic.  5. Lengthy discussion with patient about attending AA as I think this will be quite beneficial for her, especially to establish connections with others.  6. Addiction medicine will sign off    For questions and concerns, please contact me through the [employer] scott (or [employer] paging.)    Chief Complaint:  Relapse on alcohol     HPI:    Kezia  "Amadou Dixon is a 66 year old old female who presented to [facility] ER for myalgias, cough, headache, fatigue and recent COVID-19 diagnosis.  Additionally, she admitted to relapsing on alcohol.  On imaging she was found to have lesions concerning for aspergillosis (known cavitary lung lesion which was initially noted in 2018.)  Consultation requested for alcohol use disorder.    ethanol 0.16 on 06/17/2022 at 2220    ETOH:  Reports drinking 2 scotches per day since April.  Had alcohol withdrawal during hospitalization in May, though none during her hospitalization this past month.  Got out of TCU on Monday from her last hospitalization with COVID, and since then has continued drinking scotch twice a day, 1 inch pours.  Patient reports that she had 15 years of sobriety and relapsed about 3 years ago with her .  She describes \"physically it is no problem but stopping mentally it is really hard.\"  She \"kind of has cravings like a drink would be good.\"  She has not experienced any withdrawal during this hospitalization.  She has never attended AA but will consider it even though she is hesitant because of the \"God talk\".    Denies any other substance use past or present.    Previous treatments:  None    Current symptoms:  No alcohol withdrawal symptoms  Fatigue, generalized malaise, cough, increased depression without suicidal ideation     The assessment from [facility] Crist[facility], [facility]-06/19/2022 has been reviewed:    Presenting problem (including symptoms, strengths risks, resources used): Patient reports symptoms of depressed mood, tearfulness, . In individual therapeutic contact with patient today, patient presents with tearful at time, congruent with content, full affect, depressed mood, some anxiety.. Safety concerns ARE NOT present today. The patient denied suicidal ideation, plan, intent, and previous intent. Did not appear to be responding to internal stimuli. Current risk factors for suicide include living " "alone, history of or current substance use and chronic pain and/or chronic illness. Protective factors against suicide include identifies reason for living, strong bond to family/friends and reality testing ability.    Relevant history: The patient has been undergoing various forms of medical care for serious illness over the last several years per chart review, including for esophogeal cancer and lung cancer. Per chart review and per the patient's own report during today's interview, she has been drinking alcohol to excess. The patient reported that when her  was alive, he turned to alcohol saying \"oh, what the hell,\" and that she joined in. She reported that he   a year ago, and that she has been drinking now because she is \"lonely and depressed.\" The patient reported that \"I know I can stop drinking because I've done it before for 15 years.\" The patient reported that she wants to stop drinking, that she wants support to stop doing it, and that she has a desire to get healthier and live longer. The patient reported that she has not been in therapy before, but that she is willing to try now. She reported that she would be more interested in group therapy because she thinks she would be \"less intimidated,\" and that she would more \"more likely to talk\" and \"not just sit there like a lump.\" The patient reported that she has support from her 28-year-old son who lives in [place] and currently \"has custody of\" her cat and dog. She reported that she expects she will probably go to stay with her son at first when she discharges from the hospital. She reported that she is looking forward to a video visit with her three elder brother this evening at 5:30PM--\"they have to see me alive\" she said with a smile. The patient reported that she has two social knitting groups, she is able to pay for people to help her with upkeep at home, and she has a trusted  to help her manage her affairs.      In " "summary, the patient appeared emotionally overwhelmed internally by her grief after losing her . \"I'm realizing I've been depressed and I've just been soldiering on.\"  When she talked through things, she found some relief and verbalized that she has the resources and desire to engage with additional supports.      The patient is interested in discussing medication for her mental health (depression) while still in the hospital. She would be interested in medication for alcohol cravings, as well. She is willing to participate in a telehealth assessment for virtual outpatient IOP, and an appointment is scheduled.         Medical History  Past Medical History:   Diagnosis Date     Alcohol use disorder, severe, dependence (H) 10/14/2016     Alcoholism (H) 10/14/2016     Benign essential hypertension 10/14/2016     Carotid artery disease (H)     mile plaque 5/7     Chemical dependency (H)     alcohol     Depression with anxiety      Esophageal cancer (H)      Esophageal cancer (H) 3/22/2016     Esophageal mass      GERD (gastroesophageal reflux disease)      Hx of pancreatitis 2003     Hypercholesteremia      Hyperglycemia      Hyperlipemia      Impaired fasting glucose 10/14/2016     Impaired fasting glucose 10/14/2016     Nocturnal leg cramps      Pancreatic pseudocyst 10/14/2016     Pancreatic pseudocyst 10/14/2016     Pancreatitis     with pseudocyst     Pap smear with atypical squamous cells, cannot exclude high grade squamous intraepithelial lesion (ASC-H) 10/14/16    Colpo impression benign, ECC benign. Cotestin in 1 yr.     Shingles      Vasomotor rhinitis        Surgical History  Past Surgical History:   Procedure Laterality Date     AAA REPAIR      splenic artery aneurysm embolization     ABDOMEN SURGERY  2/2/2016     COLONOSCOPY  11/26/2013    Procedure: COMBINED COLONOSCOPY, SINGLE BIOPSY/POLYPECTOMY BY BIOPSY;  COLONOSCOPY (MAC);  Surgeon: Montana Rosa MD;  Location:  GI     COLONOSCOPY  " 11/26/2013     COLONOSCOPY N/A 10/4/2018    Procedure: COMBINED COLONOSCOPY, SINGLE OR MULTIPLE BIOPSY/POLYPECTOMY BY BIOPSY;  colonoscopy;  Surgeon: Gio Apodaca MD;  Location:  GI     ENT SURGERY       ESOPHAGOGASTRECTOMY N/A 3/22/2016    Procedure: ESOPHAGOGASTRECTOMY;  Surgeon: Alvin Riojas MD;  Location:  OR     ESOPHAGOSCOPY, GASTROSCOPY, DUODENOSCOPY (EGD), COMBINED N/A 12/22/2015    Procedure: COMBINED ENDOSCOPIC ULTRASOUND, ESOPHAGOSCOPY, GASTROSCOPY, DUODENOSCOPY (EGD), FINE NEEDLE ASPIRATE/BIOPSY;  Surgeon: Danelle Michael MD;  Location:  GI     GASTROSTOMY, INSERT TUBE, COMBINED N/A 2/2/2016    Procedure: COMBINED GASTROSTOMY, INSERT TUBE (OPEN);  Surgeon: Alvin Riojas MD;  Location:  OR     GI SURGERY  12/22/2015     HAND SURGERY      right     HERNIORRHAPHY INCISIONAL (LOCATION) N/A 3/19/2019    Procedure: LAPARSCOPIC  INCISIONAL HERNIA REPAIR WITH MESH, LAPARSCOPIC LYSIS OF ADHENSIONS;  Surgeon: Lamberto Magaña MD;  Location:  OR     INSERT PORT VASCULAR ACCESS N/A 12/28/2015    Procedure: INSERT PORT VASCULAR ACCESS;  Surgeon: Alvin Riojas MD;  Location:  OR     LAPAROSCOPIC CHOLECYSTECTOMY N/A 3/19/2019    Procedure: LAPAROSCOPIC CHOLECYSTECTOMY;  Surgeon: Lamberto Magaña MD;  Location:  OR     LOBECTOMY LUNG Right 10/16/2018    Procedure: LOBECTOMY LUNG;  Surgeon: Alvin Riojas MD;  Location:  OR     REMOVE PORT VASCULAR ACCESS Left 7/22/2016    Procedure: REMOVE PORT VASCULAR ACCESS;  Surgeon: Alvin Riojas MD;  Location:  OR     THORACOTOMY Right 10/16/2018    Procedure: REDO RIGHT THORACTOMY AND RIGHT LOWER LOBECTOMY, PLEURAL LYSIS;  Surgeon: Alvin Riojas MD;  Location:  OR     TONSILLECTOMY       VASCULAR SURGERY         Social History  As above    Social History     Tobacco Use     Smoking status: Former Smoker     Packs/day: 0.25     Quit date: 12/11/2015     Years  since quittin.5     Smokeless tobacco: Never Used       Family History  Reviewed    family history includes Diabetes in her father; Other Cancer in her brother, brother, brother, and father; Prostate Cancer in her father.    Prior to Admission Medications   Medications Prior to Admission   Medication Sig Dispense Refill Last Dose     apixaban ANTICOAGULANT (ELIQUIS) 2.5 MG tablet Take 1 tablet (2.5 mg) by mouth 2 times daily 60 tablet 0  at  or 6/15     [START ON 2022] aspirin (ASA) 81 MG EC tablet Take 1 tablet (81 mg) by mouth daily   2022 at AM     atorvastatin (LIPITOR) 40 MG tablet Take 1 tablet (40 mg) by mouth daily 90 tablet 3 2022 at AM     fluticasone (FLONASE) 50 MCG/ACT nasal spray INSTILL 2 SPRAYS INTO BOTH NOSTRILS DAILY. 48 mL 2 2022 at AM     folic acid (FOLVITE) 1 MG tablet Take 1 tablet (1 mg) by mouth daily 90 tablet 3 2022 at AM     furosemide (LASIX) 40 MG tablet Take 0.5 tablets (20 mg) by mouth every morning Ok to take additional 0.5 tab for worsening shortness of breath, leg swelling or weight gain. (Patient taking differently: Take 20 mg by mouth daily as needed For worsening shortness of breath, leg swelling or weight gain.) 90 tablet 3 2022 at Unknown time     gabapentin (NEURONTIN) 600 MG tablet Take 1 tablet (600 mg) by mouth 3 times daily 90 tablet 1 2022 at X2 doses     ibuprofen (ADVIL/MOTRIN) 200 MG tablet Take 400 mg by mouth every 4 hours as needed for mild pain   Past Week at Unknown time     losartan (COZAAR) 25 MG tablet Take 0.5 tablets (12.5 mg) by mouth daily 45 tablet 3 2022 at AM     metoprolol succinate ER (TOPROL XL) 25 MG 24 hr tablet Take 25 mg by mouth every evening   2022 at PM     metoprolol succinate ER (TOPROL XL) 50 MG 24 hr tablet Take 50 mg by mouth every morning   2022 at AM     omeprazole (PRILOSEC) 40 MG DR capsule TAKE 1 CAPSULE BY MOUTH EVERY DAY 90 capsule 1 2022 at AM     spironolactone  "(ALDACTONE) 25 MG tablet Take 0.5 tablets (12.5 mg) by mouth every morning 45 tablet 3 6/17/2022 at AM     thiamine (B-1) 100 MG tablet Take 1 tablet (100 mg) by mouth daily 90 tablet 3 6/17/2022 at AM       Allergies  Allergies   Allergen Reactions     Codeine Sulfate Nausea     Simvastatin Cramps     Leg cramps     Pcn [Penicillins] Rash        Review of Systems:    A 10 point review of systems was completed and negative apart from that which is mentioned in the HPI.      Physical Exam:    /43 (BP Location: Left arm)   Pulse 67   Temp 97.8  F (36.6  C) (Oral)   Resp 16   Ht 1.549 m (5' 1\")   Wt 43.6 kg (96 lb 1.9 oz)   SpO2 92%   BMI 18.16 kg/m      Standard physical exam not performed today since this encounter is via telehealth during the COVID-19 pandemic.  The exams performed by previous providers are personally reviewed in the chart.    General appearance   Gen: awake, alert, and oriented   Dermatologic   No rash. No piloerection or diaphoresis. No jaundice  HEENT   EOMI.    No yawning  Pulmonary   No respiratory distress.  Occasional cough noted but speaking in full sentences.  Neurologic   Oriented to person, place, time and situation   No Tremor  Affect is blunted    Results:  Lab Results personally reviewed.  Notably ethanol 0.16 on 06/17/2022 at 2220, urine drug screen positive for benzodiazepines only on 06/17/2022 at 2350     personally reviewed and shows:  Prescriptions  Total Prescriptions: 4    Total Private Pay: 3    Fill Date ID   Written Drug Qty Days Prescriber Rx # Pharmacy Refill   Daily Dose* [facility] Type [facility]     06/11/2022  2   06/11/2022  Gabapentin 600 MG Tablet    90.00  [address]   [id number]   Hop ([id number])   0/1  2.01 LME  [place]   05/26/2022  1   05/26/2022  Gabapentin 600 MG Tablet    90.00  30 [facility]   [id number]   Chris ([id number])   0/0  2.01 [place]   05/19/2022  1   05/19/2022  Gabapentin 100 MG Capsule    6.00  2 [facility]   [id number]   Chris ([id number])   0/0   " [place]   05/19/2022  1   05/19/2022  Gabapentin 300 MG Capsule    36.00  6 [address]   [id number]   Chris ([id number])   0/0   [place]   [place]         Ria Ulrich MD  Addiction Medicine

## 2022-06-20 NOTE — PLAN OF CARE
Goal Outcome Evaluation:    Plan of Care Reviewed With: patient     Overall Patient Progress: improving         Summary: Multiple complaints including myalgias, cough, headache, fatigue.  Recent COVID-19 diagnosis.  Has relapsed on alcohol. Found to have a change in a known cavitary lung lesion   DATE & TIME: 06/19/22 pm shift  Cognitive Concerns/ Orientation : A & O x4  BEHAVIOR & AGGRESSION TOOL COLOR: Green   CIWA SCORE: 0/0  ABNL VS/O2: VSS on RA.  MOBILITY: SBA with gait belt.    PAIN MANAGMENT: Denies any pain.   DIET: Regular. Up in recliner for dinner.  BOWEL/BLADDER: Continent. Ambulates to BR.   ABNL LAB/BG: K+ 3.3, replaced recheck 4.7 . Cr 0.50.   DRAIN/DEVICES: PIV SL  TELEMETRY RHYTHM: N/A  SKIN: Flushed, césar, bruises on both fore arms.  TESTS/PROCEDURES: Quantiferon TB Gold plus  and Galactomannan result pending.  D/C DAY/GOALS/PLACE: Waiting for some labs to come back and input from Thoracic Surgery. Seen by Psych this shift. Psych consult tomorrow.  OTHER IMPORTANT INFO: Special precautions and Airborne precautions in place. ID following. SW/Thoracic Surgery consulted. Doing well, states she feels much better today.

## 2022-06-21 ENCOUNTER — TELEPHONE (OUTPATIENT)
Dept: BEHAVIORAL HEALTH | Facility: CLINIC | Age: 67
End: 2022-06-21
Payer: MEDICARE

## 2022-06-21 ENCOUNTER — PATIENT OUTREACH (OUTPATIENT)
Dept: CARE COORDINATION | Facility: CLINIC | Age: 67
End: 2022-06-21
Payer: MEDICARE

## 2022-06-21 DIAGNOSIS — Z71.89 OTHER SPECIFIED COUNSELING: ICD-10-CM

## 2022-06-21 LAB
BACTERIA SPT CULT: NORMAL
GRAM STAIN RESULT: NORMAL

## 2022-06-21 NOTE — PROGRESS NOTES
"Clinic Care Coordination Contact  [facility]: Post-Discharge Note  SITUATION                                                      Admission:    Admission Date: 06/17/22   Reason for Admission: 1. Cavitary lesion of lung  J98.4    2. Alcohol abuse  F10.10    3. Alcoholic intoxication without complication (H)  F10.920    4. Hypoglycemia  E16.2    5. Lactic acidosis  E87.2    6. Abnormal finding on urinalysis  R82.90  Discharge:   Discharge Date: 06/20/22  Discharge Diagnosis: Cavitary lung lesion  COVID-19, recovered  Severe protein calorie malnutrition  Hypoglycemia  Hx alcohol dependence with relapse  Peripheral neuropathy  Major depression  Hx esophageal cancer s/p resection   CAD  HTN  HLD    BACKGROUND                                                      Per hospital discharge summary and inpatient provider notes:    Kezia Dixon is a 66 year old female with history of CHF who presents with a COVID-19 concern.  Kezia was admitted 6/8/22 through 6/11/2022 for COVID-19 and was given remdesivir.  In the 6 days she has been discharged she has had persistent shortness of breath.  For the last 4 days she has also had \"severe\" headache on the \"top\" of her head for which she last took Advil 5 hours prior to arrival.  She does not have cough or fever but feels cold.  She does have nausea without vomiting and has been able to eat.  She describes brain \"fog\" and ultimately presented today because she had a hard time finding her words which \"scared\" her.  She has no lower extremity edema, chest pain, or abdominal pain.  She has not noticed any change to her vision or new numbness/tingling.        ASSESSMENT      Enrollment  Primary Care Care Coordination Status: Potential    Discharge Assessment  How are you doing now that you are home?: Patient states she is \"feeling pretty good but tired\"  How are your symptoms? (Red Flag symptoms escalate to triage hotline per guidelines): Improved  Do you feel your condition " is stable enough to be safe at home until your provider visit?: Yes  Does the patient have their discharge instructions? : Yes  Does the patient have questions regarding their discharge instructions? : No  Were you started on any new medications or were there changes to any of your previous medications? : Yes  Does the patient have all of their medications?: Yes  Do you have questions regarding any of your medications? : No  Do you have all of your needed medical supplies or equipment (DME)?  (i.e. oxygen tank, CPAP, cane, etc.):  (Denies)  Discharge follow-up appointment scheduled within 14 calendar days? : No  Is patient agreeable to assistance with scheduling? : No (Patient prefers to go through [facility].)         Post-op (Clinicians Only)  Did the patient have surgery or a procedure: No    Care Management   Community Health Worker Initial Outreach    CHW Initial Information Gathering:  Referral Source: IP Report  Preferred Hospital: [facility]  [phone]  Preferred Urgent Care: [facility], [phone]  Current living arrangement:: I live in a private home, I live alone  Type of residence:: Private home - stairs (States everything she needs is on main level with no stairs.)  Community Resources: None  Supplies Currently Used at Home: None  Equipment Currently Used at Home: grab bar, toilet, grab bar, tub/shower (Grab bars- Entrance by the garage.)  Informal Support system:: Children  No PCP office visit in Past Year: No  Transportation means:: Regular car       Patient accepts CC: Yes. Patient scheduled for assessment with JULIO Flowers on 6/22/2022 at 10:30am. Patient noted desire to discuss resources for support groups related to alcohol cessation programs. .     Patient given [employer] support line in case she is unable to get into her MyChart.  Prefers to go through [facility] to schedule appointments.      PLAN                                                       Outpatient Plan:   Follow up with primary care provider, Mustapha Velásquez, within 7 days for   hospital follow- up.  No follow up labs or test are needed.  Follow up with Thoracic Surgery clinic in 4 months with repeat CT of the   chest prior to visit for ongoing monitoring of cavitary lesion of the   lung.  The clinic should contact you to arrange these appointments.   Follow up for evaluation of alcohol cessation program and other outpatient   resources (AA, etc.).         Future Appointments   Date Time Provider Department [place]   6/22/2022 10:30 AM [facility]   6/23/2022  8:00 AM Dara Contreras LICSW [facility] [place]   7/6/2022  8:30 AM [facility]         For any urgent concerns, please contact our 24 hour nurse triage line: [phone] ([phone])         Maribel Hernandez RN

## 2022-06-22 ENCOUNTER — PATIENT OUTREACH (OUTPATIENT)
Dept: NURSING | Facility: CLINIC | Age: 67
End: 2022-06-22
Attending: INTERNAL MEDICINE

## 2022-06-22 DIAGNOSIS — Z71.89 OTHER SPECIFIED COUNSELING: ICD-10-CM

## 2022-06-22 SDOH — ECONOMIC STABILITY: FOOD INSECURITY: WITHIN THE PAST 12 MONTHS, THE FOOD YOU BOUGHT JUST DIDN'T LAST AND YOU DIDN'T HAVE MONEY TO GET MORE.: NEVER TRUE

## 2022-06-22 SDOH — ECONOMIC STABILITY: FOOD INSECURITY: WITHIN THE PAST 12 MONTHS, YOU WORRIED THAT YOUR FOOD WOULD RUN OUT BEFORE YOU GOT MONEY TO BUY MORE.: NEVER TRUE

## 2022-06-22 SDOH — ECONOMIC STABILITY: INCOME INSECURITY: IN THE LAST 12 MONTHS, WAS THERE A TIME WHEN YOU WERE NOT ABLE TO PAY THE MORTGAGE OR RENT ON TIME?: NO

## 2022-06-22 ASSESSMENT — ACTIVITIES OF DAILY LIVING (ADL): DEPENDENT_IADLS:: INDEPENDENT

## 2022-06-22 ASSESSMENT — SOCIAL DETERMINANTS OF HEALTH (SDOH): HOW HARD IS IT FOR YOU TO PAY FOR THE VERY BASICS LIKE FOOD, HOUSING, MEDICAL CARE, AND HEATING?: NOT HARD AT ALL

## 2022-06-22 NOTE — PROGRESS NOTES
Clinic Care Coordination Contact    Clinic Care Coordination Contact  OUTREACH    Referral Information:  Referral Source: IP Report    Primary Diagnosis: Psychosocial    Chief Complaint   Patient presents with     Clinic Care Coordination - Initial        Universal Utilization: recent hospitalization  Clinic Utilization  Difficulty keeping appointments:: No  Compliance Concerns: No  No-Show Concerns: No  No PCP office visit in Past Year: No  Utilization    Hospital Admissions  4             ED Visits  4             No Show Count (past year)  0                Current as of: 6/21/2022  5:43 PM            Clinical Concerns:  ANTONELLA KIM spoke with pt regarding her overall wellbeing and recent hospital stay. Pt shared she continues to be tired, likely from recovering from COVID.     Pt has quit drinking in the past on her own but she had her  for support. Her  passed away last year. She has friends and family that are supportive but they do not know about her struggle with alcohol and therefore aren't really going to be helpful for this.    Pt has an assessment set up with [facility] tomorrow. ANTONELLA KIM explained the purpose of this assessment. Pt shared that she spoke with someone in the hospital about a 55+ group. ANTONELLA KIM shared this is the assessment to get her into that program if it is appropriate for her.     ANTONELLA KIM shared that last week a CC JLUIO sent her information on other support services outside of [facility] and she can explore these as well. Pt plans to attend assessment tomorrow.    Pt stated she has no other concerns at this time.    Current Medical Concerns:  none    Current Behavioral Concerns: alcohol use    Education Provided to patient: ANTONELLA hurt   Pain  Pain (GOAL):: No  Health Maintenance Reviewed: Due/Overdue   Clinical Pathway: None    Medication Management:  Did not discuss medication at this time    Functional Status:  Dependent ADLs:: Independent  Dependent IADLs:: Independent  Bed or  wheelchair confined:: No  Mobility Status: Independent  Fallen 2 or more times in the past year?: No  Any fall with injury in the past year?: No    Living Situation:  Current living arrangement:: I live in a private home, I live alone  Type of residence:: Private home - stairs (States everything she needs is on main level with no stairs.)    Lifestyle & Psychosocial Needs:    Social Determinants of Health     Tobacco Use: Medium Risk     Smoking Tobacco Use: Former Smoker     Smokeless Tobacco Use: Never Used   Alcohol Use: Not At Risk     Frequency of Alcohol Consumption: Never     Average Number of Drinks: Patient refused     Frequency of Binge Drinking: Never   Financial Resource Strain: Low Risk      Difficulty of Paying Living Expenses: Not hard at all   Food Insecurity: No Food Insecurity     Worried About Running Out of Food in the Last Year: Never true     Ran Out of Food in the Last Year: Never true   Transportation Needs: No Transportation Needs     Lack of Transportation (Medical): No     Lack of Transportation (Non-Medical): No   Physical Activity: Insufficiently Active     Days of Exercise per Week: 6 days     Minutes of Exercise per Session: 20 min   Stress: Stress Concern Present     Feeling of Stress : To some extent   Social Connections: Moderately Isolated     Frequency of Communication with Friends and Family: More than three times a week     Frequency of Social Gatherings with Friends and Family: Twice a week     Attends Yazidism Services: Never     Active Member of Clubs or Organizations: Yes     Attends Club or Organization Meetings: Not on file     Marital Status:    Intimate Partner Violence: Not on file   Depression: Not at risk     PHQ-2 Score: 2   Housing Stability: Low Risk      Unable to Pay for Housing in the Last Year: No     Number of Places Lived in the Last Year: 1     Unstable Housing in the Last Year: No     Diet:: Regular  Inadequate nutrition (GOAL):: No  Tube Feeding:  No  Inadequate activity/exercise (GOAL):: No  Significant changes in sleep pattern (GOAL): No  Transportation means:: Regular car     Holiness or spiritual beliefs that impact treatment:: No  Mental health DX:: Yes  Mental health management concern (GOAL):: No  Chemical Dependency Status: Current Concern  Informal Support system:: Children      Resources and Interventions:  Current Resources:      Community Resources: None  Supplies Currently Used at Home: None  Equipment Currently Used at Home: grab bar, toilet, grab bar, tub/shower (Grab bars- Entrance by the garage.)            Advance Care Plan/Directive  Advanced Care Plans/Directives on file:: No  Advanced Care Plan/Directive Status: Not Applicable    Referrals Placed: None    Patient/Caregiver understanding: Pt reports understanding and denies any additional questions or concerns at this times. SW CC engaged in AIDET communication during encounter.       Future Appointments              Tomorrow [facility]    In 2 weeks Amy Bright CNP [facility], [facility]        Plan: At this time, pt denies outstanding need for connection or referral to resources or assistance navigating recommended follow up care. No further outreaches will be made at this time unless a new referral is made or a change in the pt's status occurs. Patient was provided with CC SW contact information and encouraged to call with any questions or concerns.    RADHA Flowers  Clinic Care Coordinator  [facility]  [phone]  [email or web address]

## 2022-06-22 NOTE — LETTER
[facility] CARE COORDINATION  [address] [place] [address]    June 22, 2022    Kezia Dixon  [address]  [facility] [address]      Dear Kezia,    I am a clinic care coordinator who works with Mustapha Velásquez MD with the [facility]. I wanted to thank you for spending the time to talk with me.  Below is a description of clinic care coordination and how I can further assist you.       The clinic care coordination team is made up of a registered nurse, , financial resource worker and community health worker who understand the health care system. The goal of clinic care coordination is to help you manage your health and improve access to the health care system. Our team works alongside your provider to assist you in determining your health and social needs. We can help you obtain health care and community resources, providing you with necessary information and education. We can work with you through any barriers and develop a care plan that helps coordinate and strengthen the communication between you and your care team.    Please feel free to contact me with any questions or concerns regarding care coordination and what we can offer.      We are focused on providing you with the highest-quality healthcare experience possible.    Sincerely,     Roasnna Saez, [facility]  Clinic Care Coordinator  [facility]  [phone]

## 2022-06-23 ENCOUNTER — TELEPHONE (OUTPATIENT)
Dept: BEHAVIORAL HEALTH | Facility: CLINIC | Age: 67
End: 2022-06-23

## 2022-06-23 NOTE — TELEPHONE ENCOUNTER
Tried reaching out to patient to check them in for their virtual MH eval today, 6/23/2022 at 0800. Called, but no answer so a voice message was left for patient to return call to check in.

## 2022-06-27 ENCOUNTER — HOSPITAL ENCOUNTER (OUTPATIENT)
Dept: BEHAVIORAL HEALTH | Facility: CLINIC | Age: 67
Discharge: HOME | End: 2022-06-27
Attending: FAMILY MEDICINE | Admitting: FAMILY MEDICINE
Payer: MEDICARE

## 2022-06-27 PROCEDURE — 90791 PSYCH DIAGNOSTIC EVALUATION: CPT | Mod: PO | Performed by: COUNSELOR

## 2022-06-27 ASSESSMENT — COLUMBIA-SUICIDE SEVERITY RATING SCALE - C-SSRS
TOTAL  NUMBER OF INTERRUPTED ATTEMPTS LIFETIME: NO
5. HAVE YOU STARTED TO WORK OUT OR WORKED OUT THE DETAILS OF HOW TO KILL YOURSELF? DO YOU INTEND TO CARRY OUT THIS PLAN?: NO
1. IN THE PAST MONTH, HAVE YOU WISHED YOU WERE DEAD OR WISHED YOU COULD GO TO SLEEP AND NOT WAKE UP?: NO
6. HAVE YOU EVER DONE ANYTHING, STARTED TO DO ANYTHING, OR PREPARED TO DO ANYTHING TO END YOUR LIFE?: NO
6. HAVE YOU EVER DONE ANYTHING, STARTED TO DO ANYTHING, OR PREPARED TO DO ANYTHING TO END YOUR LIFE?: NO
2. HAVE YOU ACTUALLY HAD ANY THOUGHTS OF KILLING YOURSELF IN THE PAST MONTH?: NO
TOTAL  NUMBER OF ABORTED OR SELF INTERRUPTED ATTEMPTS LIFETIME: NO
4. HAVE YOU HAD THESE THOUGHTS AND HAD SOME INTENTION OF ACTING ON THEM?: NO
2. HAVE YOU ACTUALLY HAD ANY THOUGHTS OF KILLING YOURSELF?: NO
1. HAVE YOU WISHED YOU WERE DEAD OR WISHED YOU COULD GO TO SLEEP AND NOT WAKE UP?: NO
3. HAVE YOU BEEN THINKING ABOUT HOW YOU MIGHT KILL YOURSELF?: NO
ATTEMPT LIFETIME: NO

## 2022-06-27 ASSESSMENT — ANXIETY QUESTIONNAIRES
3. WORRYING TOO MUCH ABOUT DIFFERENT THINGS: MORE THAN HALF THE DAYS
4. TROUBLE RELAXING: NOT AT ALL
7. FEELING AFRAID AS IF SOMETHING AWFUL MIGHT HAPPEN: NOT AT ALL
GAD7 TOTAL SCORE: 6
GAD7 TOTAL SCORE: 6
1. FEELING NERVOUS, ANXIOUS, OR ON EDGE: SEVERAL DAYS
5. BEING SO RESTLESS THAT IT IS HARD TO SIT STILL: NOT AT ALL
6. BECOMING EASILY ANNOYED OR IRRITABLE: SEVERAL DAYS
2. NOT BEING ABLE TO STOP OR CONTROL WORRYING: MORE THAN HALF THE DAYS

## 2022-06-27 ASSESSMENT — PATIENT HEALTH QUESTIONNAIRE - PHQ9: SUM OF ALL RESPONSES TO PHQ QUESTIONS 1-9: 17

## 2022-06-27 NOTE — PROGRESS NOTES
"[facility]  Provider Name:  SIMA Milan, LICSW, [facility]      PATIENT'S NAME: Kezia Dixon  PREFERRED NAME: Kezia  PRONOUNS:     She/her  MRN: [id number]  : 1955  ADDRESS: [address]  [place] [address]  ACCT. NUMBER:  [id number]  DATE OF SERVICE: 22  START TIME: 8:00am  END TIME: 8:44am  PREFERRED PHONE: [phone]  [email or web address]  Emergency Contact: Jeffery Dixon [phone]  May we leave a program related message: Yes  SERVICE MODALITY:  Video Visit:      Provider verified identity through the following two step process.  Patient provided:  Patient  and Patient address    Telemedicine Visit: The patient's condition can be safely assessed and treated via synchronous audio and visual telemedicine encounter.      Reason for Telemedicine Visit: Services only offered telehealth    Originating Site (Patient Location): Patient's home    Distant Site (Provider Location): Provider Remote Setting- Home Office    Consent:  The patient/guardian has verbally consented to: the potential risks and benefits of telemedicine (video visit) versus in person care; bill my insurance or make self-payment for services provided; and responsibility for payment of non-covered services.     Patient would like the video invitation sent by:  My Chart    Mode of Communication:  Video Conference via [employer]    As the provider I attest to compliance with applicable laws and regulations related to telemedicine.    UNIVERSAL ADULT Dual-Disorder DIAGNOSTIC ASSESSMENT    Identifying Information:  Patient is a 66 year old.  The pronoun use throughout this assessment reflects the patient's chosen pronoun.  Patient was referred for an assessment by \"hospital.\"  Patient attended the session alone.    Chief Complaint:   The reason for seeking services at this time is: \"alcohol\". \"I need an assessment. I need help for alcohol.\" The problem(s) began " "\"2022.\" The problems started in April or May and she thinks that depression contributed. She thinks depression started when her   in 2020. She has always been a strong person, but his death was too much. They were  28 years. 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 She hasn't talked with a counselor because she has always been strong. Patient was hospitalized at [facility] from  until 2022 for Covid and alcohol use. She would like to start an anti-depressant and Naltrexone to decrease cravings.  explained [facility] outpatient programs via video. Patient would like to try the Dual Diagnosis Program. She is using her phone right now for this assessment, but her son can help get her Mac set up for video groups. Patient does not appear to be in severe withdrawal, an imminent safety risk to self or others, or requiring immediate medical attention and may proceed with the assessment interview.      **Per 2022 Discharge Summary:  \"Kezia Dixon is a 66 year old female admitted on 2022.  Past history of CAD, HTN, HLD, GERD, alcohol abuse with recent admission for COVID-19 infection (discharged to TCU , discharged home ) who presents with multiple complaints including myalgias, cough, headache, fatigue.  Recent COVID-19 diagnosis.  Has relapsed on alcohol.  On imaging, found to have a change in a known cavitary lung lesion concerning for aspergillosis.\"    **Per 2022 Addiction Medicine Consult: Reports drinking 2 scotches per day since April.  Had alcohol withdrawal during hospitalization in May, though none during her hospitalization this past month.  Got out of TCU on Monday from her last hospitalization with COVID, and since then has continued drinking scotch twice a day, 1 inch pours. Patient reports that she had 15 years of sobriety and relapsed about 3 years ago with her .  She describes \"physically it is " "no problem but stopping mentally it is really hard.\"  She \"kind of has cravings like a drink would be good.\"  She has not experienced any withdrawal during this hospitalization.  She has never attended AA but will consider it even though she is hesitant because of the \"God talk\".    Relevant history: The patient has been undergoing various forms of medical care for serious illness over the last several years per chart review, including for esophogeal cancer and lung cancer. Per chart review and per the patient's own report during today's interview, she has been drinking alcohol to excess. The patient reported that when her  was alive, he turned to alcohol saying \"oh, what the hell,\" and that she joined in. She reported that he   a year ago, and that she has been drinking now because she is \"lonely and depressed.\" The patient reported that \"I know I can stop drinking because I've done it before for 15 years.\" The patient reported that she wants to stop drinking, that she wants support to stop doing it, and that she has a desire to get healthier and live longer. The patient reported that she has not been in therapy before, but that she is willing to try now. She reported that she would be more interested in group therapy because she thinks she would be \"less intimidated,\" and that she would more \"more likely to talk\" and \"not just sit there like a lump.\" The patient reported that she has support from her 28-year-old son who lives in [place] and currently \"has custody of\" her cat and dog. She reported that she expects she will probably go to stay with her son at first when she discharges from the hospital. She reported that she is looking forward to a video visit with her three elder brother this evening at 5:30PM--\"they have to see me alive\" she said with a smile. The patient reported that she has two social knitting groups, she is able to pay for people to help her with upkeep at home, and she " "has a trusted  to help her manage her affairs.      In summary, the patient appeared emotionally overwhelmed internally by her grief after losing her . \"I'm realizing I've been depressed and I've just been soldiering on.\"  When she talked through things, she found some relief and verbalized that she has the resources and desire to engage with additional supports.      The patient is interested in discussing medication for her mental health (depression) while still in the hospital. She would be interested in medication for alcohol cravings, as well. She is willing to participate in a telehealth assessment for virtual outpatient IOP, and an appointment is scheduled.     Social/Family History:  Patient reported they grew up in \"[place].  They were raised by biological parents.  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 Her father had a cocktail hour every evening. She denied all forms of abuse. Patient described their current relationships with family of origin as: Her brothers are supportive since her  . They have been doing a Zoom call once per week. One brother drinks, but the others don't want to acknowledge alcohol.       The patient describes their cultural background as: \".\"  Cultural influences and impact on patient's life structure, values, norms, and healthcare: \"urban, 3 older brothers, not Moravian, mother had MS since I was 2.\"  Contextual influences on patient's health include: Family Factors.  Patient identified their preferred language to be English. Patient reported she does need the assistance of an  or other support involved in therapy.  Patient reports they are not involved in community of gen activities.  They reports spirituality impacts recovery in the following ways:  Not Moravian.     Patient reported no significant delays in developmental " "tasks. Patient's highest education level was \"graduate school.\" Patient identified the following learning problems: none reported.  Patient reports they are able to understand written materials.    Patient reported the following relationship history: She was  to her first  for 5 years, her son was born, and then her first  . Patient was  to her second , Fuentes, for 28 years until he  in 2020. Patient's current relationship status is \"\".   Patient identified their sexual orientation as \"heterosexual.\"  Patient reported having 1 son - He is age 28, lives in [place], and is a good support.     Patient's current living/housing situation involves staying in own home. She reports that housing is stable. Patient identified \"siblings; adult child; friends\" as part of their support system.  Patient identified the quality of these relationships as \"stable and meaningful.\"      Patient reports engaging in the following recreational/leisure activities: reading and knitting.  Patient is currently \"retired.\" She retired 2 years ago when her  got sick. 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 Patient reports their income is obtained through \"social security, pension, BARRE[employer].\"  Patient does identify finances as a current stressor - She knows she doesn't need to be stressed about finances, but she is worried. The house is too big for one person and doesn't know what to do.    Patient denies arrests or legal issues.  They are not under any current court jurisdiction.    Patient's Strengths and Limitations:  Patient identified the following strengths or resources that will help them succeed in treatment: commitment to health and well being, friends / good social support, family support, insight, intelligence, motivation and work ethic. Things that may interfere with the patient's success in treatment include: physical health concerns.     Assessments:  The " "following assessments were completed by patient for this visit:  PHQ9:   PHQ-9 SCORE 11/3/2016 12/24/2018 6/21/2022 6/27/2022   PHQ-9 Total Score MyChart - - 7 (Mild depression) -   PHQ-9 Total Score 0 0 7 17     GAD7:   SILVANO-7 SCORE 12/24/2018 6/21/2022 6/27/2022   Total Score - 5 (mild anxiety) -   Total Score 0 5 6     PROMIS 10-Global Health (only subscores and total score):   PROMIS-10 Scores Only 6/21/2022 6/27/2022   Global Mental Health Score 12 11   Global Physical Health Score 11 11   PROMIS TOTAL - SUBSCORES 23 22     [place] Suicide Severity Rating Scale (Short Version)  [place] Suicide Severity Rating (Short Version) 3/19/2019 10/18/2021 5/14/2022 6/8/2022 6/17/2022 6/27/2022   Over the past 2 weeks have you felt down, depressed, or hopeless? - no no no no -   Over the past 2 weeks have you had thoughts of killing yourself? - no no no no -   Have you ever attempted to kill yourself? - no no no no -   Q1 Wished to be Dead (Past Month) no - - - - no   Q2 Suicidal Thoughts (Past Month) no - - - - no   Q3 Suicidal Thought Method - - - - - no   Q4 Suicidal Intent without Specific Plan - - - - - no   Q5 Suicide Intent with Specific Plan - - - - - no   Q6 Suicide Behavior (Lifetime) no - - - - no   Level of Risk per Screen - - - - - low risk       Personal and Family Medical History:  Patient does report a family history of mental health concerns - Her mother had MS her whole life and patient assumes her mother had some depression.  Patient reports family history includes Diabetes in her father; Other Cancer in her brother, brother, brother, and father; Prostate Cancer in her father.     Patient reported the following previous diagnoses which include(s): \"alcohol.\"  Patient reported symptoms began in 2020 with her 's illness.  Patient has not received mental health services in the past:  \"reports no services.\"  Psychiatric Hospitalizations: \"none\".  Patient denies a history of civil commitment.  " "Currently, patient is not receiving other mental health services.       Patient has had a physical exam to rule out medical causes for current symptoms.  Date of last physical exam was within the past year. The patient has a [facility] Provider, who is named Mustapha Velásquez. Patient reports the following current medical concerns: high cholesterol, high blood pressure, congestive heart failure.  Patient denies any issues with pain.  There are significant appetite / nutritional concerns / weight changes - She weighs 85lbs. Her stomach is \"miniscule\" and she can't eat much because she had esophageal cancer, they removed her esophagus, and connected her stomach. Patient does not report a history of head injury / trauma / cognitive impairment.      Current Outpatient Medications   Medication     apixaban ANTICOAGULANT (ELIQUIS) 2.5 MG tablet     [START ON 7/11/2022] aspirin (ASA) 81 MG EC tablet     atorvastatin (LIPITOR) 40 MG tablet     fluticasone (FLONASE) 50 MCG/ACT nasal spray     folic acid (FOLVITE) 1 MG tablet     furosemide (LASIX) 40 MG tablet     gabapentin (NEURONTIN) 600 MG tablet     ibuprofen (ADVIL/MOTRIN) 200 MG tablet     losartan (COZAAR) 25 MG tablet     metoprolol succinate ER (TOPROL XL) 25 MG 24 hr tablet     metoprolol succinate ER (TOPROL XL) 50 MG 24 hr tablet     omeprazole (PRILOSEC) 40 MG DR capsule     spironolactone (ALDACTONE) 25 MG tablet     thiamine (B-1) 100 MG tablet     Medication Adherence:  Patient reports taking medications as prescribed. She would like to start an anti-depressant and Naltrexone.      Patient Allergies:    Allergies   Allergen Reactions     Codeine Sulfate Nausea     Simvastatin Cramps     Leg cramps     Pcn [Penicillins] Rash       Medical History:    Past Medical History:   Diagnosis Date     Alcohol use disorder, severe, dependence (H) 10/14/2016     Alcoholism (H) 10/14/2016     Benign essential hypertension 10/14/2016     Carotid artery disease " "(H)     mile plaque 5/7     Chemical dependency (H)     alcohol     Depression with anxiety      Esophageal cancer (H)      Esophageal cancer (H) 3/22/2016     Esophageal mass      GERD (gastroesophageal reflux disease)      Hx of pancreatitis 2003     Hypercholesteremia      Hyperglycemia      Hyperlipemia      Impaired fasting glucose 10/14/2016     Impaired fasting glucose 10/14/2016     Nocturnal leg cramps      Pancreatic pseudocyst 10/14/2016     Pancreatic pseudocyst 10/14/2016     Pancreatitis     with pseudocyst     Pap smear with atypical squamous cells, cannot exclude high grade squamous intraepithelial lesion (ASC-H) 10/14/16    Colpo impression benign, ECC benign. Cotestin in 1 yr.     Shingles      Vasomotor rhinitis      Current Mental Status Exam:   Appearance:  Appropriate    Eye Contact:  Fair to Poor  Psychomotor:  Slowed      Gait / station:  sitting  Attitude / Demeanor: Cooperative  Pleasant  Speech      Rate / Production: Slow       Volume:  Normal  volume      Language:  intact  Mood:   Sad  Dysphoric Anhedonia  Affect:   Subdued    Thought Content: Clear   Thought Process: Coherent  Logical       Associations: No loosening of associations  Insight:   Fair   Judgment:  Intact   Orientation:  All  Attention/concentration: Good      Substance Use:  Patient reported the following biological family members or relatives with chemical health issues:  Her grandfather and her father were alcohol dependent. Patient has not received substance use disorder treatment in the past.  Patient has never been to detox.  Patient is not currently receiving any chemical dependency treatment. Patient reports no history of support group attendance, but she is thinking about going to AA. She is not sure if she likes \"the higher power thing\" but she stated \"my, I gotta do something.\"       Substance History of use Age of first use Pattern and duration of use (include amounts and frequency) Date of last use   " "Withdrawal potential Route of administration   Alcohol currently use 14 Patient reported she drank daily in the past. She and her  both quit drinking for 16 years. Her  got sick in 2020. For the last 2 months of his life, he said \"What the hell\" and they started drinking. Patient stopped drinking from 11/2020 until 04/2022. When asked how, she said \"I just did it.\"     She restarted drinking in April or May 2022 because the depression was becoming too much. Currently, she drinks 2oz of Scotch, consuming about 3 drinks throughout the day.     She drank last night. She called her son last night and told him that she had been drinking. He came over and sat with her. He is a good support. She stated she has stopped on her own before, but this is harder.  6/26/2022, 1 drink No Oral   Cannabis   never used          Amphetamines   never used          Cocaine/crack    never used            Hallucinogens never used              Inhalants never used              Heroin never used              Other Opiates never used          Benzodiazepine   never used          Barbiturates never used          Over the counter meds never used          Caffeine currently use 10 She drinks 1 cup of tea. She drinks caffeine free soda (ginger ale and root beer.  06/27/22  No Oral   Nicotine  used in the past 14  1/15/2012     other substances not listed above: never used              Patient reported the following problems as a result of their substance use: \"no problems, not applicable.\"  Patient is concerned about substance use. Patient reports her son is concerned about their substance use. Patient reports their recovery goals are: She wants to stop drinking altogether.     Patient reports experiencing the following withdrawal symptoms within the past 12 months: fatigue, sad/depressed feeling, nausea and anxiety/worry and the following within the past 30 days: fatigue, sad/depressed feeling, nausea and anxiety/worry. She stated " she didn't have withdrawal when she was in the hospital. Patients reports urges to use Alcohol.  Patient reports she has used more Alcohol than intended and over a longer period of time than intended. Patient reports she has had unsuccessful attempts to cut down or control use of Alcohol. Patient reports she has needed to use more Alcohol to achieve the same effect. Patient does  report diminished effect with use of same amount of Alcohol.     Patient does  report a great deal of time is spent in activities necessary to obtain, use, or recover from Alcohol effects.  Patient does  report important social, occupational, or recreational activities are given up or reduced because of Alcohol use.  Alcohol use is continued despite knowledge of having a persistent or recurrent physical or psychological problem that is likely to have caused or exacerbated by use.  Patient reports the following problem behaviors while under the influence of substances: She drinks at home by herself. She doesn't drive. She has become more withdrawn due to depression and alcohol use.      Patient reports substance use has not impacted their ability to function in a school setting. Patient reports substance use has not impacted their ability to function in a work setting.  Patients demographics and history impact their recovery in the following ways: She lives alone since her  .  Patient reports engaging in the following recreation/leisure activities while using:  She likes to read and knit.  Patient reports the following people are supportive of recovery: her son       Patient does not have a history of gambling concerns and/or treatment.  Patient does not have other addictive behaviors she is concerned about.      Significant Losses / Trauma / Abuse / Neglect Issues:   Patient did not serve in the .  There are indications or report of significant loss, trauma, abuse or neglect issues related to: death of first and second  "husbands.  Concerns for possible neglect are not present.     Safety Assessment: Patient denied suicidal ideation, plan, and intent. She denied past suicide attempts.  Patient denies current homicidal ideation and behaviors.  Patient denies current self-injurious ideation and behaviors.    Patient denied risk behaviors associated with substance use.  Patient reported substance use associated with mental health symptoms.  Patient reports the following current concerns for their personal safety: None.  Patient reports there are not firearms in the house.           History of Safety Concerns:  Patient denied a history of homicidal ideation.     Patient denied a history of personal safety concerns.    Patient denied a history of assaultive behaviors.    Patient denied a history of sexual assault behaviors.     Patient denied a history of risk behaviors associated with substance use.  Patient reported a history of substance use associated with mental health symptoms.  Patient reports the following protective factors: \"forward or future oriented thinking; dedication to family or friends; daily obligations; access to a variety of clinical interventions and pets\"    Risk Plan:  See Recommendations for Safety and Risk Management Plan    Review of Symptoms per patient report:  Depression: Change in sleep, Lack of interest, Change in energy level, Change in appetite, Feelings of helplessness, Feeling sad, down, or depressed and Withdrawn - She is sleeping too much, 12 to 14 hours per night, and she feels tired the next day.   Fe:  No Symptoms  Psychosis: No Symptoms  Anxiety: Excessive worry and Ruminations - She is worried about the house and finances.   Panic:  No symptoms  Post Traumatic Stress Disorder:  No Symptoms   Eating Disorder: No Symptoms  ADD / ADHD:  No symptoms  Conduct Disorder: No symptoms  Autism Spectrum Disorder: No symptoms  Obsessive Compulsive Disorder: No Symptoms    Patient reports the following " compulsive behaviors and treatment history: None.      Diagnostic Criteria:   Major Depressive Disorder  A) Recurrent episode(s) - symptoms have been present during the same 2-week period and represent a change from previous functioning 5 or more symptoms (required for diagnosis)   - Depressed mood. Note: In children and adolescents, can be irritable mood.     - Diminished interest or pleasure in all, or almost all, activities.    - Significant weight loss when not dieting decrease in appetite.    - Increased sleep.    - Psychomotor activity retardation.    - Fatigue or loss of energy.    - Feelings of worthlessness or inappropriate and excessive guilt.    - Diminished ability to think or concentrate, or indecisiveness.   B) The symptoms cause clinically significant distress or impairment in social, occupational, or other important areas of functioning  C) The episode is not attributable to the physiological effects of a substance or to another medical condition  D) The occurence of major depressive episode is not better explained by other thought / psychotic disorders  E) There has never been a manic episode or hypomanic episode  Alcohol Use Disorder  1.) Alcohol/drug is often taken in larger amounts or over a longer period than was intended.  Met for Alcohol.  2.) There is a persistent desire or unsuccessful efforts to cut down or control alcohol/drug use.  Met for Alcohol.  3.) A great deal of time is spent in activities necessary to obtain alcohol, use alcohol, or recover from its effects.  Met for Alcohol.  4.) Craving, or a strong desire or urge to use alcohol/drug.  Met for Alcohol.  6.) Continued alcohol use despite having persistent or recurrent social or interpersonal problems caused or exacerbated by the effects of alcohol/drug.  Met for Alcohol.  7.) Important social, occupational, or recreational activities are given up or reduced because of alcohol/drug use.  Met for Alcohol.  8.) Recurrent  "alcohol/drug use in situations in which it is physically hazardous.  Met for Alcohol.  9.) Alcohol/drug use is continued despite knowledge of having a persistent or recurrent physical or psychological problem that is likely to have been caused or exacerbated by the alcohol/drug.  Met for Alcohol.  10.) Tolerance, as defined by either of the following: A need for markedly increased amounts of alcohol/drug to achieve intoxication or desired effect..  Met for Alcohol.    Functional Status:  Patient reports the following functional impairments:  \"health maintenance.\"       Programmatic care:  Current LOCUS was assessed and patient needs the following level of care based on score 18.    Clinical Summary:  1. Reason for assessment: Patient was referred to [facility] 55+ program from [facility] to address depression, grief/loss, and alcohol use.  recommended [facility]  2. Psychosocial, Cultural and Contextual Factors: lives alone, isolated, 's death, retired, few social supports  3. Principal DSM5 Diagnoses  (Sustained by DSM5 Criteria Listed Above):   296.32 (F33.1) Major Depressive Disorder, Recurrent Episode, Moderate     4. Other Diagnoses that is relevant to services:   303.90 (F10.20) Alcohol Use Disorder Severe  5. Provisional Diagnosis:     6. Prognosis: Expect Improvement and Relieve Acute Symptoms  7. Likely consequences of symptoms if not treated: Patient may need higher level of care  8. Client strengths include:  educated, intelligent, motivated, support of family, friends and providers and work history      Recommendations:     1. Plan for Safety and Risk Management:   Recommended that patient call 911 or go to the local ED should there be a change in any of these risk factors. Report to child / adult protection services was NA.     2. Patient did not identify concerns with a cultural influence.     3. Initial Treatment will focus on: Depressed Mood, Alcohol / " "Substance Use.     4. Resources/Service Plan:    services are not indicated.   Modifications to assist communication are not indicated.   Additional disability accommodations are not indicated.      5. Collaboration:  Collaboration / coordination of treatment will be initiated with the following support professionals: Possibly with [facility].      6.  Referrals:   The following referral(s) will be initiated: None. Next Scheduled Appointment:   recommended Dual Diagnosis Program.  discussed start date with .  called patient back at [phone] to tell her it would be a 2 week wait to start the group. Patient stated she is scheduled to admit to [facility] on this Thursday.  was surprised because [facility] doesn't accept Medicare. Patient stated she found that out and she will pay for it out of pocket because \"I have to\" do it.  explained patient can return to [facility] for aftercare.     7. TAWNY: Recommendations:  Abstain from alcohol. Discuss antidepressant medication and Naltrexone with primary care doctor. Attend AA or [employer] [facility] Sobriety (https://womenforsobriety.org/meetings/) meetings at least once weekly.  Patient reports they are willing to follow these recommendations. Patient does not have a history of opiate use.    8. Records were reviewed at time of assessment. Information in this assessment was obtained from the medical record and provided by patient who is a good historian. Patient will have open access to their mental health medical record.      Provider Name/ Credentials:  SIMA Milan, GRACY, RYAN  June 23, 2022  "

## 2022-06-27 NOTE — PATIENT INSTRUCTIONS
Lemuel Mast,  It was a pleasure meeting you this morning and thank you for doing the assessment. We had discussed [place] that focuses on both mental health and substance use. It meets virtually over video on Mondays, Tuesdays, Thursdays, and Fridays from 10am to 1pm. We would be able to start you in about 2 weeks. We also discussed that you will talk with your doctor on Wednesday to start antidepressant medication and Naltrexone to curb cravings for alcohol.     We just spoke later today and you said you are scheduled to admit to [facility] on this Thursday. I'm glad that you are getting into a program as soon as possible. After [facility], let me know if you would like to do [place] program for aftercare.    Thank you,  Dara  [phone]

## 2022-08-01 ENCOUNTER — MYC MEDICAL ADVICE (OUTPATIENT)
Dept: CARDIOLOGY | Facility: CLINIC | Age: 67
End: 2022-08-01

## 2022-08-09 ENCOUNTER — OFFICE VISIT (OUTPATIENT)
Dept: CARDIOLOGY | Facility: CLINIC | Age: 67
End: 2022-08-09
Attending: NURSE PRACTITIONER
Payer: MEDICARE

## 2022-08-09 VITALS
OXYGEN SATURATION: 100 % | BODY MASS INDEX: 16.52 KG/M2 | HEART RATE: 70 BPM | DIASTOLIC BLOOD PRESSURE: 75 MMHG | HEIGHT: 62 IN | SYSTOLIC BLOOD PRESSURE: 142 MMHG | WEIGHT: 89.8 LBS

## 2022-08-09 DIAGNOSIS — I10 BENIGN ESSENTIAL HYPERTENSION: ICD-10-CM

## 2022-08-09 DIAGNOSIS — I50.22 CHRONIC SYSTOLIC CONGESTIVE HEART FAILURE (H): ICD-10-CM

## 2022-08-09 DIAGNOSIS — I25.10 CORONARY ARTERY DISEASE INVOLVING NATIVE CORONARY ARTERY OF NATIVE HEART WITHOUT ANGINA PECTORIS: ICD-10-CM

## 2022-08-09 DIAGNOSIS — U07.1 INFECTION DUE TO 2019 NOVEL CORONAVIRUS: ICD-10-CM

## 2022-08-09 DIAGNOSIS — E78.5 HYPERLIPIDEMIA LDL GOAL <70: ICD-10-CM

## 2022-08-09 DIAGNOSIS — I42.9 CARDIOMYOPATHY, UNSPECIFIED TYPE (H): ICD-10-CM

## 2022-08-09 PROCEDURE — 99214 OFFICE O/P EST MOD 30 MIN: CPT | Performed by: NURSE PRACTITIONER

## 2022-08-09 RX ORDER — SPIRONOLACTONE 25 MG/1
12.5 TABLET ORAL EVERY MORNING
Qty: 45 TABLET | Refills: 3 | Status: SHIPPED | OUTPATIENT
Start: 2022-08-09 | End: 2023-05-02

## 2022-08-09 RX ORDER — LOSARTAN POTASSIUM 50 MG/1
50 TABLET ORAL DAILY
Qty: 90 TABLET | Refills: 3 | Status: SHIPPED | OUTPATIENT
Start: 2022-08-09 | End: 2023-05-02

## 2022-08-09 RX ORDER — ATORVASTATIN CALCIUM 40 MG/1
40 TABLET, FILM COATED ORAL DAILY
Qty: 90 TABLET | Refills: 3 | Status: SHIPPED | OUTPATIENT
Start: 2022-08-09 | End: 2023-05-02

## 2022-08-09 RX ORDER — METOPROLOL SUCCINATE 50 MG/1
50 TABLET, EXTENDED RELEASE ORAL EVERY MORNING
Qty: 90 TABLET | Refills: 3 | Status: SHIPPED | OUTPATIENT
Start: 2022-08-09 | End: 2023-05-02

## 2022-08-09 RX ORDER — LOSARTAN POTASSIUM 25 MG/1
25 TABLET ORAL DAILY
Qty: 45 TABLET | Refills: 3 | COMMUNITY
Start: 2022-08-09 | End: 2022-08-09

## 2022-08-09 NOTE — PATIENT INSTRUCTIONS
Today's Recommendations    Increase Losartan to 50 mg daily  Change baby aspirin to every other day due to bruising  Continue all other medications without changes.  Please follow up with Cardiology in 6 months.     Please send a [place] message or call [phone] to the RN team with questions or concerns.     Scheduling number [phone]    STEVEN Hyatt, CNP

## 2022-08-09 NOTE — LETTER
8/9/2022    Mustapha Velásquez MD  [address]  [place] [address]    RE: Kezia Dixon       Dear Colleague,     I had the pleasure of seeing Kezia Dixon in the [facility].    General Cardiology Clinic Progress Note  Kezia Dixon MRN# [id number]   YOB: 1955 Age: 66 year old     Primary cardiologist: Dr. Ring    Reason for visit: HFpEF follow up    History of presenting illness:    Kezia Dixon, a pleasant 66 year old patient who has a past medical history significant for:   1. CAD  2. Hypertension  3. Hyperlipidemia  4. GERD  5. Alcohol abuse  6. History of pancreatitis, transaminitis, thrombocytopenia secondary to alcohol abuse  7. Recent COVID-19 admitted 6/8/2022-6/11/2022  8. Depression   9. Lung cancer s/p resection 3 years ago  10. Esophageal cancer s/p esophagogastrectomy and chemo and radiation in 2016  11. Cardiomyopathy, resolved     In August of 2021 she was admitted to [facility] with alcohol withdrawal in the context of heavy alcohol use for 1 and half years after her  passed away.  She was found to be tachycardic and hypertensive and an echocardiogram revealed an LVEF of 35% with mid to apical wall motion abnormalities and MR.  She underwent a stress test that demonstrated a large partially reversible anterior/anteroseptal/septal wall motion perfusion defect.  Her calculated LVEF, however, appeared to have normalized with the reported LVEF of 66%.      She was then admitted to [facility] in October 2021 acute hypoxemic respiratory failure thought to be cardiac in origin.  Her echocardiogram during that admission noted and normalized LVEF of 55 to 60% with moderate TR and mild to moderate MR.  There is also moderate pulmonary hypertension.    Post her admission in October she was evaluated by Dr. Ring in clinic.  He recommended that she undergo a CT coronary angiogram that revealed a total score of 118 with  severe proximal R PATRICK and mild mid LAD stenosis.  Given the patient was asymptomatic at that time it was recommended that she pursue medical management.  He also recommended that she undergo a repeat echocardiogram in 3 months that was completed in January 2022.  Her LVEF was 65 to 70% with mild AR.  There was trace to mild MR and the tricuspid valve is not well visualized.    She has had multiple admissions/ED visits in May and June of 2022 for ETOH abuse, COVID and mind fog.  She is reported that at the end of June she was admitted to [facility] for ETOH treatment and is doing well since discharge.  Today she reports that she is doing overall well.  She is actually gained weight after her recent admission for alcohol treatment.  She denies chest pain, chest discomfort, shortness of breath on exertion, PND orthopnea.  Her blood pressure is elevated today in clinic and reports it was elevated during the inpatient treatment.    Diagnostic Studies:    Echocardiogram (8/2021): LVEF of 35% to mid to apical wall motion abnormalities with mild MR    Lexiscan nuclear stress test (8/20/2021):a large partially reversible anterior/anteroseptal/septal wall motion perfusion defect.  Her calculated LVEF, however, appeared to have normalized with the reported LVEF of 66%.      Echocardiogram (1/2022): LVEF 65-70% with mild AR    CT coronary angiogram (11/2022): Total score 118 (placing her in the 80th percentile when compared to age and gender matched group). Severe prox RPLA. Mild mid LAD    Echocardiogram (10/2021): LVEF 55-60%, moderate PH, moderate TR and mild to moderate MR.           Assessment and Plan:     ASSESSMENT:    1. Cardiomyopathy, with previous HFpEF exacerbation    LVEF 35% in 8/2021    Improved to 65-70% in 1/2022    Compensated    Current on metoprolol XL 50 mg daily, losartan 25 mg daily, spironolactone 12.5 mg daily and 20 mg of lasix    2. CAD, mild to mod     CT coronary angiogram (1/2022): Total score 118  (placing her in the 80th percentile when compared to age and gender matched group)    Asymptomatic    ASA and Statin    Report increased bruising on baby ASA     3. Hypertension    Elevated     4. Hyperlipidemia    FLP (2020): , HDL 93, LDL 42, trigs 98    Atorvastatin 40 mg daily    5. Recent COVID-19 infection 2022    Treated with remdesivir    Placed on short term apixaban 2.5 mg twice daily     PLAN:     1. Increase losartan to 50 mg daily  2. Decrease aspirin to every other day due to bruising  3. Return to clinic and follow up in 6 months       Orders this Visit:  Orders Placed This Encounter   Procedures     Follow-Up with Cardiology KASSIE     Orders Placed This Encounter   Medications     DISCONTD: losartan (COZAAR) 25 MG tablet     Sig: Take 1 tablet (25 mg) by mouth daily     Dispense:  45 tablet     Refill:  3     losartan (COZAAR) 50 MG tablet     Sig: Take 1 tablet (50 mg) by mouth daily     Dispense:  90 tablet     Refill:  3     DISCONTD: apixaban ANTICOAGULANT (ELIQUIS) 2.5 MG tablet     Sig: Take 1 tablet (2.5 mg) by mouth 2 times daily     Dispense:  60 tablet     Refill:  0     atorvastatin (LIPITOR) 40 MG tablet     Sig: Take 1 tablet (40 mg) by mouth daily     Dispense:  90 tablet     Refill:  3     DO NOT FILL UNTIL PATIENT CALLS.     metoprolol succinate ER (TOPROL XL) 50 MG 24 hr tablet     Sig: Take 1 tablet (50 mg) by mouth every morning     Dispense:  90 tablet     Refill:  3     DO NOT FILL UNTIL PATIENT CALLS.     spironolactone (ALDACTONE) 25 MG tablet     Sig: Take 0.5 tablets (12.5 mg) by mouth every morning     Dispense:  45 tablet     Refill:  3     DO NOT FILL UNTIL PATIENT CALLS.     aspirin (ASA) 81 MG EC tablet     Si tablet every other day     Medications Discontinued During This Encounter   Medication Reason     metoprolol succinate ER (TOPROL XL) 25 MG 24 hr tablet      losartan (COZAAR) 25 MG tablet      losartan (COZAAR) 25 MG tablet Reorder     apixaban  "ANTICOAGULANT (ELIQUIS) 2.5 MG tablet Reorder     apixaban ANTICOAGULANT (ELIQUIS) 2.5 MG tablet      spironolactone (ALDACTONE) 25 MG tablet Reorder     atorvastatin (LIPITOR) 40 MG tablet Reorder     metoprolol succinate ER (TOPROL XL) 50 MG 24 hr tablet Reorder     aspirin (ASA) 81 MG EC tablet             Review of Systems:     Review of Systems:  Skin:  Positive for bruising   Eyes:  Positive for    ENT:  Negative    Respiratory:  Positive for shortness of breath;dyspnea on exertion  Cardiovascular:    edema;Positive for  Gastroenterology: Negative    Genitourinary:  Negative    Musculoskeletal:  Positive for arthritis  Neurologic:  Positive for    Psychiatric:  Negative    Heme/Lymph/Imm:  Negative    Endocrine:  Negative              Physical Exam:     Vitals: BP (!) 142/75 (BP Location: Left arm)   Pulse 70   Ht 1.562 m (5' 1.5\")   Wt 40.7 kg (89 lb 12.8 oz)   SpO2 100%   BMI 16.69 kg/m    Constitutional: Thin and in no apparent distress.   Eyes: Pupils equal, round. Sclerae anicteric.   HEENT: Normocephalic, atraumatic.   Neck: Supple. JVD   Respiratory: Breathing non-labored. Lungs clear to auscultation bilaterally. No crackles, wheezes, rhonchi, or rales.  Cardiovascular:  Regular rate and rhythm, normal S1 and S2. No murmur, rub, or gallop.  Skin: Warm, dry. No rashes, cyanosis, or xanthelasma.  Extremities: No edema.  Neurologic: No gross motor deficits. Alert, awake, and oriented to person, place and time.  Psychiatric: Affect appropriate.             Medications:     Current Outpatient Medications   Medication Sig Dispense Refill     aspirin (ASA) 81 MG EC tablet 1 tablet every other day       atorvastatin (LIPITOR) 40 MG tablet Take 1 tablet (40 mg) by mouth daily 90 tablet 3     fluticasone (FLONASE) 50 MCG/ACT nasal spray INSTILL 2 SPRAYS INTO BOTH NOSTRILS DAILY. 48 mL 2     folic acid (FOLVITE) 1 MG tablet Take 1 tablet (1 mg) by mouth daily 90 tablet 3     furosemide (LASIX) 40 MG tablet " Take 0.5 tablets (20 mg) by mouth daily as needed (edema or shortness of breath) For worsening shortness of breath, leg swelling or weight gain.       gabapentin (NEURONTIN) 600 MG tablet Take 1 tablet (600 mg) by mouth 3 times daily 90 tablet 1     ibuprofen (ADVIL/MOTRIN) 200 MG tablet Take 400 mg by mouth every 4 hours as needed for mild pain       losartan (COZAAR) 50 MG tablet Take 1 tablet (50 mg) by mouth daily 90 tablet 3     metoprolol succinate ER (TOPROL XL) 50 MG 24 hr tablet Take 1 tablet (50 mg) by mouth every morning 90 tablet 3     omeprazole (PRILOSEC) 40 MG DR capsule TAKE 1 CAPSULE BY MOUTH EVERY DAY 90 capsule 1     spironolactone (ALDACTONE) 25 MG tablet Take 0.5 tablets (12.5 mg) by mouth every morning 45 tablet 3     thiamine (B-1) 100 MG tablet Take 1 tablet (100 mg) by mouth daily 90 tablet 3       Family History   Problem Relation Age of Onset     Depression Mother      Substance Abuse Father      Other Cancer Father         melanoma     Prostate Cancer Father      Diabetes Father      Substance Abuse Paternal Grandfather      Other Cancer Brother         melanoma     Substance Abuse Brother      Other Cancer Brother         melanoma     Other Cancer Brother        Social History     Socioeconomic History     Marital status:      Spouse name: Not on file     Number of children: Not on file     Years of education: Not on file     Highest education level: Not on file   Occupational History     Not on file   Tobacco Use     Smoking status: Former Smoker     Packs/day: 0.25     Quit date: 2015     Years since quittin.6     Smokeless tobacco: Never Used   Substance and Sexual Activity     Alcohol use: Not Currently     Comment: 2 to 3 drinks of 2oz of Scotch     Drug use: No     Sexual activity: Not Currently     Partners: Male     Birth control/protection: Post-menopausal   Other Topics Concern     Parent/sibling w/ CABG, MI or angioplasty before 65F 55M? No   Social History  Narrative     Not on file     Social Determinants of Health     Financial Resource Strain: Low Risk      Difficulty of Paying Living Expenses: Not hard at all   Food Insecurity: No Food Insecurity     Worried About Running Out of Food in the Last Year: Never true     Ran Out of Food in the Last Year: Never true   Transportation Needs: No Transportation Needs     Lack of Transportation (Medical): No     Lack of Transportation (Non-Medical): No   Physical Activity: Insufficiently Active     Days of Exercise per Week: 6 days     Minutes of Exercise per Session: 20 min   Stress: Stress Concern Present     Feeling of Stress : To some extent   Social Connections: Moderately Isolated     Frequency of Communication with Friends and Family: More than three times a week     Frequency of Social Gatherings with Friends and Family: Twice a week     Attends [employer] Services: Never     Active Member of Clubs or Organizations: Yes     Attends Club or Organization Meetings: Not on file     Marital Status:    Intimate Partner Violence: Not on file   Housing Stability: Low Risk      Unable to Pay for Housing in the Last Year: No     Number of Places Lived in the Last Year: 1     Unstable Housing in the Last Year: No            Past Medical History:     Past Medical History:   Diagnosis Date     Alcohol use disorder, severe, dependence (H) 10/14/2016     Alcoholism (H) 10/14/2016     Benign essential hypertension 10/14/2016     Carotid artery disease (H)     mile plaque 5/7     Chemical dependency (H)     alcohol     Depression with anxiety      Esophageal cancer (H)      Esophageal cancer (H) 3/22/2016     Esophageal mass      GERD (gastroesophageal reflux disease)      Hx of pancreatitis 2003     Hypercholesteremia      Hyperglycemia      Hyperlipemia      Impaired fasting glucose 10/14/2016     Impaired fasting glucose 10/14/2016     Nocturnal leg cramps      Pancreatic pseudocyst 10/14/2016     Pancreatic pseudocyst  10/14/2016     Pancreatitis     with pseudocyst     Pap smear with atypical squamous cells, cannot exclude high grade squamous intraepithelial lesion (ASC-H) 10/14/16    Colpo impression benign, ECC benign. Cotestin in 1 yr.     Shingles      Vasomotor rhinitis               Past Surgical History:     Past Surgical History:   Procedure Laterality Date     AAA REPAIR      splenic artery aneurysm embolization     ABDOMEN SURGERY  2/2/2016     COLONOSCOPY  11/26/2013    Procedure: COMBINED COLONOSCOPY, SINGLE BIOPSY/POLYPECTOMY BY BIOPSY;  COLONOSCOPY (MAC);  Surgeon: Montana Rosa MD;  Location:  GI     COLONOSCOPY  11/26/2013     COLONOSCOPY N/A 10/4/2018    Procedure: COMBINED COLONOSCOPY, SINGLE OR MULTIPLE BIOPSY/POLYPECTOMY BY BIOPSY;  colonoscopy;  Surgeon: Gio Apodaca MD;  Location:  GI     ENT SURGERY       ESOPHAGOGASTRECTOMY N/A 3/22/2016    Procedure: ESOPHAGOGASTRECTOMY;  Surgeon: Alvin Riojas MD;  Location:  OR     ESOPHAGOSCOPY, GASTROSCOPY, DUODENOSCOPY (EGD), COMBINED N/A 12/22/2015    Procedure: COMBINED ENDOSCOPIC ULTRASOUND, ESOPHAGOSCOPY, GASTROSCOPY, DUODENOSCOPY (EGD), FINE NEEDLE ASPIRATE/BIOPSY;  Surgeon: Danelle Michael MD;  Location:  GI     GASTROSTOMY, INSERT TUBE, COMBINED N/A 2/2/2016    Procedure: COMBINED GASTROSTOMY, INSERT TUBE (OPEN);  Surgeon: Alvin Riojas MD;  Location:  OR     GI SURGERY  12/22/2015     HAND SURGERY      right     HERNIORRHAPHY INCISIONAL (LOCATION) N/A 3/19/2019    Procedure: LAPARSCOPIC  INCISIONAL HERNIA REPAIR WITH MESH, LAPARSCOPIC LYSIS OF ADHENSIONS;  Surgeon: Lamberto Magaña MD;  Location:  OR     INSERT PORT VASCULAR ACCESS N/A 12/28/2015    Procedure: INSERT PORT VASCULAR ACCESS;  Surgeon: Alvin Riojas MD;  Location:  OR     LAPAROSCOPIC CHOLECYSTECTOMY N/A 3/19/2019    Procedure: LAPAROSCOPIC CHOLECYSTECTOMY;  Surgeon: Lamberto Magaña MD;  Location:  OR      LOBECTOMY LUNG Right 10/16/2018    Procedure: LOBECTOMY LUNG;  Surgeon: Alvin Riojas MD;  Location:  OR     REMOVE PORT VASCULAR ACCESS Left 7/22/2016    Procedure: REMOVE PORT VASCULAR ACCESS;  Surgeon: Alvin Riojas MD;  Location:  OR     THORACOTOMY Right 10/16/2018    Procedure: REDO RIGHT THORACTOMY AND RIGHT LOWER LOBECTOMY, PLEURAL LYSIS;  Surgeon: Alvin Riojas MD;  Location:  OR     TONSILLECTOMY       VASCULAR SURGERY                Allergies:   Codeine sulfate, Simvastatin, and Pcn [penicillins]       Data:   All laboratory data reviewed:    Recent Labs   Lab Test 06/17/22  2220 02/05/20  0849 10/18/18  0811 11/09/17  1026 10/10/16  0800   LDL  --  42  --  41 62   HDL  --  93  --  89 76   NHDL  --  62  --  59 84   CHOL  --  155  --  148 160   TRIG  --  98  --  91 112   TSH 2.58  --   --   --   --    IRON  --   --  39  --   --    FEB  --   --  243  --   --    IRONSAT  --   --  16  --   --        Lab Results   Component Value Date    WBC 6.4 06/20/2022    WBC 7.4 02/05/2020    RBC 3.42 (L) 06/20/2022    RBC 3.77 (L) 02/05/2020    HGB 10.6 (L) 06/20/2022    HGB 12.3 02/05/2020    HCT 32.0 (L) 06/20/2022    HCT 35.7 02/05/2020    MCV 94 06/20/2022    MCV 95 02/05/2020    MCH 31.0 06/20/2022    MCH 32.6 02/05/2020    MCHC 33.1 06/20/2022    MCHC 34.5 02/05/2020    RDW 15.4 (H) 06/20/2022    RDW 12.7 02/05/2020     06/20/2022     02/05/2020       Lab Results   Component Value Date     06/20/2022     02/05/2020    POTASSIUM 4.5 06/20/2022    POTASSIUM 4.4 02/05/2020    CHLORIDE 107 06/20/2022    CHLORIDE 99 02/05/2020    CO2 23 06/20/2022    CO2 28 02/05/2020    ANIONGAP 4 06/20/2022    ANIONGAP 7 02/05/2020     (H) 06/20/2022    GLC 95 02/05/2020    BUN 6 (L) 06/20/2022    BUN 13 02/05/2020    CR 0.51 (L) 06/20/2022    CR 0.51 (L) 02/05/2020    GFRESTIMATED >90 06/20/2022    GFRESTIMATED >90 02/05/2020    GFRESTBLACK >90 02/05/2020    VICENTE  8.3 (L) 06/20/2022    VICENTE 8.9 02/05/2020      Lab Results   Component Value Date    AST 39 06/19/2022    AST 19 02/05/2020    ALT 28 06/19/2022    ALT 22 02/05/2020       Lab Results   Component Value Date    A1C 5.8 (H) 05/16/2022    A1C 5.4 02/05/2020       Lab Results   Component Value Date    INR 1.00 05/16/2022    INR 1.01 10/16/2018    INR 1.12 03/25/2016         STEVEN DUNHAM CNP  [facility]  Pager: [phone]  RN phone: [phone]      Thank you for allowing me to participate in the care of your patient.      Sincerely,     STEVEN DUNHAM CNP     [facility]  cc:   STEVEN Gutierrez CNP  [address] [place] [address]

## 2022-08-09 NOTE — PROGRESS NOTES
General Cardiology Clinic Progress Note  Kezia Dixon MRN# [id number]   YOB: 1955 Age: 66 year old     Primary cardiologist: Dr. Ring    Reason for visit: HFpEF follow up    History of presenting illness:    Kezia Dixon, a pleasant 66 year old patient who has a past medical history significant for:   1. CAD  2. Hypertension  3. Hyperlipidemia  4. GERD  5. Alcohol abuse  6. History of pancreatitis, transaminitis, thrombocytopenia secondary to alcohol abuse  7. Recent COVID-19 admitted 6/8/2022-6/11/2022  8. Depression   9. Lung cancer s/p resection 3 years ago  10. Esophageal cancer s/p esophagogastrectomy and chemo and radiation in 2016  11. Cardiomyopathy, resolved     In August of 2021 she was admitted to [facility] with alcohol withdrawal in the context of heavy alcohol use for 1 and half years after her  passed away.  She was found to be tachycardic and hypertensive and an echocardiogram revealed an LVEF of 35% with mid to apical wall motion abnormalities and MR.  She underwent a stress test that demonstrated a large partially reversible anterior/anteroseptal/septal wall motion perfusion defect.  Her calculated LVEF, however, appeared to have normalized with the reported LVEF of 66%.      She was then admitted to [facility] in October 2021 acute hypoxemic respiratory failure thought to be cardiac in origin.  Her echocardiogram during that admission noted and normalized LVEF of 55 to 60% with moderate TR and mild to moderate MR.  There is also moderate pulmonary hypertension.    Post her admission in October she was evaluated by Dr. Ring in clinic.  He recommended that she undergo a CT coronary angiogram that revealed a total score of 118 with severe proximal R PATRICK and mild mid LAD stenosis.  Given the patient was asymptomatic at that time it was recommended that she pursue medical management.  He also recommended that she undergo a repeat echocardiogram in  3 months that was completed in January 2022.  Her LVEF was 65 to 70% with mild AR.  There was trace to mild MR and the tricuspid valve is not well visualized.    She has had multiple admissions/ED visits in May and June of 2022 for ETOH abuse, COVID and mind fog.  She is reported that at the end of June she was admitted to [facility] for ETOH treatment and is doing well since discharge.  Today she reports that she is doing overall well.  She is actually gained weight after her recent admission for alcohol treatment.  She denies chest pain, chest discomfort, shortness of breath on exertion, PND orthopnea.  Her blood pressure is elevated today in clinic and reports it was elevated during the inpatient treatment.    Diagnostic Studies:    Echocardiogram (8/2021): LVEF of 35% to mid to apical wall motion abnormalities with mild MR    Lexiscan nuclear stress test (8/20/2021):a large partially reversible anterior/anteroseptal/septal wall motion perfusion defect.  Her calculated LVEF, however, appeared to have normalized with the reported LVEF of 66%.      Echocardiogram (1/2022): LVEF 65-70% with mild AR    CT coronary angiogram (11/2022): Total score 118 (placing her in the 80th percentile when compared to age and gender matched group). Severe prox RPLA. Mild mid LAD    Echocardiogram (10/2021): LVEF 55-60%, moderate PH, moderate TR and mild to moderate MR.           Assessment and Plan:     ASSESSMENT:    1. Cardiomyopathy, with previous HFpEF exacerbation    LVEF 35% in 8/2021    Improved to 65-70% in 1/2022    Compensated    Current on metoprolol XL 50 mg daily, losartan 25 mg daily, spironolactone 12.5 mg daily and 20 mg of lasix    2. CAD, mild to mod     CT coronary angiogram (1/2022): Total score 118 (placing her in the 80th percentile when compared to age and gender matched group)    Asymptomatic    ASA and Statin    Report increased bruising on baby ASA     3. Hypertension    Elevated     4. Hyperlipidemia    FLP  (2020): , HDL 93, LDL 42, trigs 98    Atorvastatin 40 mg daily    5. Recent COVID-19 infection 2022    Treated with remdesivir    Placed on short term apixaban 2.5 mg twice daily     PLAN:     1. Increase losartan to 50 mg daily  2. Decrease aspirin to every other day due to bruising  3. Return to clinic and follow up in 6 months       Orders this Visit:  Orders Placed This Encounter   Procedures     Follow-Up with Cardiology KASSIE     Orders Placed This Encounter   Medications     DISCONTD: losartan (COZAAR) 25 MG tablet     Sig: Take 1 tablet (25 mg) by mouth daily     Dispense:  45 tablet     Refill:  3     losartan (COZAAR) 50 MG tablet     Sig: Take 1 tablet (50 mg) by mouth daily     Dispense:  90 tablet     Refill:  3     DISCONTD: apixaban ANTICOAGULANT (ELIQUIS) 2.5 MG tablet     Sig: Take 1 tablet (2.5 mg) by mouth 2 times daily     Dispense:  60 tablet     Refill:  0     atorvastatin (LIPITOR) 40 MG tablet     Sig: Take 1 tablet (40 mg) by mouth daily     Dispense:  90 tablet     Refill:  3     DO NOT FILL UNTIL PATIENT CALLS.     metoprolol succinate ER (TOPROL XL) 50 MG 24 hr tablet     Sig: Take 1 tablet (50 mg) by mouth every morning     Dispense:  90 tablet     Refill:  3     DO NOT FILL UNTIL PATIENT CALLS.     spironolactone (ALDACTONE) 25 MG tablet     Sig: Take 0.5 tablets (12.5 mg) by mouth every morning     Dispense:  45 tablet     Refill:  3     DO NOT FILL UNTIL PATIENT CALLS.     aspirin (ASA) 81 MG EC tablet     Si tablet every other day     Medications Discontinued During This Encounter   Medication Reason     metoprolol succinate ER (TOPROL XL) 25 MG 24 hr tablet      losartan (COZAAR) 25 MG tablet      losartan (COZAAR) 25 MG tablet Reorder     apixaban ANTICOAGULANT (ELIQUIS) 2.5 MG tablet Reorder     apixaban ANTICOAGULANT (ELIQUIS) 2.5 MG tablet      spironolactone (ALDACTONE) 25 MG tablet Reorder     atorvastatin (LIPITOR) 40 MG tablet Reorder     metoprolol succinate ER  "(TOPROL XL) 50 MG 24 hr tablet Reorder     aspirin (ASA) 81 MG EC tablet             Review of Systems:     Review of Systems:  Skin:  Positive for bruising   Eyes:  Positive for    ENT:  Negative    Respiratory:  Positive for shortness of breath;dyspnea on exertion  Cardiovascular:    edema;Positive for  Gastroenterology: Negative    Genitourinary:  Negative    Musculoskeletal:  Positive for arthritis  Neurologic:  Positive for    Psychiatric:  Negative    Heme/Lymph/Imm:  Negative    Endocrine:  Negative              Physical Exam:     Vitals: BP (!) 142/75 (BP Location: Left arm)   Pulse 70   Ht 1.562 m (5' 1.5\")   Wt 40.7 kg (89 lb 12.8 oz)   SpO2 100%   BMI 16.69 kg/m    Constitutional: Thin and in no apparent distress.   Eyes: Pupils equal, round. Sclerae anicteric.   HEENT: Normocephalic, atraumatic.   Neck: Supple. JVD   Respiratory: Breathing non-labored. Lungs clear to auscultation bilaterally. No crackles, wheezes, rhonchi, or rales.  Cardiovascular:  Regular rate and rhythm, normal S1 and S2. No murmur, rub, or gallop.  Skin: Warm, dry. No rashes, cyanosis, or xanthelasma.  Extremities: No edema.  Neurologic: No gross motor deficits. Alert, awake, and oriented to person, place and time.  Psychiatric: Affect appropriate.             Medications:     Current Outpatient Medications   Medication Sig Dispense Refill     aspirin (ASA) 81 MG EC tablet 1 tablet every other day       atorvastatin (LIPITOR) 40 MG tablet Take 1 tablet (40 mg) by mouth daily 90 tablet 3     fluticasone (FLONASE) 50 MCG/ACT nasal spray INSTILL 2 SPRAYS INTO BOTH NOSTRILS DAILY. 48 mL 2     folic acid (FOLVITE) 1 MG tablet Take 1 tablet (1 mg) by mouth daily 90 tablet 3     furosemide (LASIX) 40 MG tablet Take 0.5 tablets (20 mg) by mouth daily as needed (edema or shortness of breath) For worsening shortness of breath, leg swelling or weight gain.       gabapentin (NEURONTIN) 600 MG tablet Take 1 tablet (600 mg) by mouth 3 times " daily 90 tablet 1     ibuprofen (ADVIL/MOTRIN) 200 MG tablet Take 400 mg by mouth every 4 hours as needed for mild pain       losartan (COZAAR) 50 MG tablet Take 1 tablet (50 mg) by mouth daily 90 tablet 3     metoprolol succinate ER (TOPROL XL) 50 MG 24 hr tablet Take 1 tablet (50 mg) by mouth every morning 90 tablet 3     omeprazole (PRILOSEC) 40 MG DR capsule TAKE 1 CAPSULE BY MOUTH EVERY DAY 90 capsule 1     spironolactone (ALDACTONE) 25 MG tablet Take 0.5 tablets (12.5 mg) by mouth every morning 45 tablet 3     thiamine (B-1) 100 MG tablet Take 1 tablet (100 mg) by mouth daily 90 tablet 3       Family History   Problem Relation Age of Onset     Depression Mother      Substance Abuse Father      Other Cancer Father         melanoma     Prostate Cancer Father      Diabetes Father      Substance Abuse Paternal Grandfather      Other Cancer Brother         melanoma     Substance Abuse Brother      Other Cancer Brother         melanoma     Other Cancer Brother        Social History     Socioeconomic History     Marital status:      Spouse name: Not on file     Number of children: Not on file     Years of education: Not on file     Highest education level: Not on file   Occupational History     Not on file   Tobacco Use     Smoking status: Former Smoker     Packs/day: 0.25     Quit date: 2015     Years since quittin.6     Smokeless tobacco: Never Used   Substance and Sexual Activity     Alcohol use: Not Currently     Comment: 2 to 3 drinks of 2oz of Scotch     Drug use: No     Sexual activity: Not Currently     Partners: Male     Birth control/protection: Post-menopausal   Other Topics Concern     Parent/sibling w/ CABG, MI or angioplasty before 65F 55M? No   Social History Narrative     Not on file     Social Determinants of Health     Financial Resource Strain: Low Risk      Difficulty of Paying Living Expenses: Not hard at all   Food Insecurity: No Food Insecurity     Worried About Running Out of  Food in the Last Year: Never true     Ran Out of Food in the Last Year: Never true   Transportation Needs: No Transportation Needs     Lack of Transportation (Medical): No     Lack of Transportation (Non-Medical): No   Physical Activity: Insufficiently Active     Days of Exercise per Week: 6 days     Minutes of Exercise per Session: 20 min   Stress: Stress Concern Present     Feeling of Stress : To some extent   Social Connections: Moderately Isolated     Frequency of Communication with Friends and Family: More than three times a week     Frequency of Social Gatherings with Friends and Family: Twice a week     Attends [employer] Services: Never     Active Member of Clubs or Organizations: Yes     Attends Club or Organization Meetings: Not on file     Marital Status:    Intimate Partner Violence: Not on file   Housing Stability: Low Risk      Unable to Pay for Housing in the Last Year: No     Number of Places Lived in the Last Year: 1     Unstable Housing in the Last Year: No            Past Medical History:     Past Medical History:   Diagnosis Date     Alcohol use disorder, severe, dependence (H) 10/14/2016     Alcoholism (H) 10/14/2016     Benign essential hypertension 10/14/2016     Carotid artery disease (H)     mile plaque 5/7     Chemical dependency (H)     alcohol     Depression with anxiety      Esophageal cancer (H)      Esophageal cancer (H) 3/22/2016     Esophageal mass      GERD (gastroesophageal reflux disease)      Hx of pancreatitis 2003     Hypercholesteremia      Hyperglycemia      Hyperlipemia      Impaired fasting glucose 10/14/2016     Impaired fasting glucose 10/14/2016     Nocturnal leg cramps      Pancreatic pseudocyst 10/14/2016     Pancreatic pseudocyst 10/14/2016     Pancreatitis     with pseudocyst     Pap smear with atypical squamous cells, cannot exclude high grade squamous intraepithelial lesion (ASC-H) 10/14/16    Colpo impression benign, ECC benign. Cotestin in 1 yr.      Shingles      Vasomotor rhinitis               Past Surgical History:     Past Surgical History:   Procedure Laterality Date     AAA REPAIR      splenic artery aneurysm embolization     ABDOMEN SURGERY  2/2/2016     COLONOSCOPY  11/26/2013    Procedure: COMBINED COLONOSCOPY, SINGLE BIOPSY/POLYPECTOMY BY BIOPSY;  COLONOSCOPY (MAC);  Surgeon: Montana Rosa MD;  Location:  GI     COLONOSCOPY  11/26/2013     COLONOSCOPY N/A 10/4/2018    Procedure: COMBINED COLONOSCOPY, SINGLE OR MULTIPLE BIOPSY/POLYPECTOMY BY BIOPSY;  colonoscopy;  Surgeon: Gio Apodaca MD;  Location:  GI     ENT SURGERY       ESOPHAGOGASTRECTOMY N/A 3/22/2016    Procedure: ESOPHAGOGASTRECTOMY;  Surgeon: Alvin Riojas MD;  Location:  OR     ESOPHAGOSCOPY, GASTROSCOPY, DUODENOSCOPY (EGD), COMBINED N/A 12/22/2015    Procedure: COMBINED ENDOSCOPIC ULTRASOUND, ESOPHAGOSCOPY, GASTROSCOPY, DUODENOSCOPY (EGD), FINE NEEDLE ASPIRATE/BIOPSY;  Surgeon: Danelle Michael MD;  Location:  GI     GASTROSTOMY, INSERT TUBE, COMBINED N/A 2/2/2016    Procedure: COMBINED GASTROSTOMY, INSERT TUBE (OPEN);  Surgeon: Alvin Riojas MD;  Location:  OR     GI SURGERY  12/22/2015     HAND SURGERY      right     HERNIORRHAPHY INCISIONAL (LOCATION) N/A 3/19/2019    Procedure: LAPARSCOPIC  INCISIONAL HERNIA REPAIR WITH MESH, LAPARSCOPIC LYSIS OF ADHENSIONS;  Surgeon: Lamberto Magaña MD;  Location:  OR     INSERT PORT VASCULAR ACCESS N/A 12/28/2015    Procedure: INSERT PORT VASCULAR ACCESS;  Surgeon: Alvin Riojas MD;  Location:  OR     LAPAROSCOPIC CHOLECYSTECTOMY N/A 3/19/2019    Procedure: LAPAROSCOPIC CHOLECYSTECTOMY;  Surgeon: Lamberto Magaña MD;  Location:  OR     LOBECTOMY LUNG Right 10/16/2018    Procedure: LOBECTOMY LUNG;  Surgeon: Alvin Riojas MD;  Location:  OR     REMOVE PORT VASCULAR ACCESS Left 7/22/2016    Procedure: REMOVE PORT VASCULAR ACCESS;  Surgeon: Shine  Alvin Ortiz MD;  Location: [facility] OR     THORACOTOMY Right 10/16/2018    Procedure: REDO RIGHT THORACTOMY AND RIGHT LOWER LOBECTOMY, PLEURAL LYSIS;  Surgeon: Alvin Riojas MD;  Location: [facility] OR     TONSILLECTOMY       VASCULAR SURGERY                Allergies:   Codeine sulfate, Simvastatin, and Pcn [penicillins]       Data:   All laboratory data reviewed:    Recent Labs   Lab Test 06/17/22  2220 02/05/20  0849 10/18/18  0811 11/09/17  1026 10/10/16  0800   LDL  --  42  --  41 62   HDL  --  93  --  89 76   NHDL  --  62  --  59 84   CHOL  --  155  --  148 160   TRIG  --  98  --  91 112   TSH 2.58  --   --   --   --    IRON  --   --  39  --   --    FEB  --   --  243  --   --    IRONSAT  --   --  16  --   --        Lab Results   Component Value Date    WBC 6.4 06/20/2022    WBC 7.4 02/05/2020    RBC 3.42 (L) 06/20/2022    RBC 3.77 (L) 02/05/2020    HGB 10.6 (L) 06/20/2022    HGB 12.3 02/05/2020    HCT 32.0 (L) 06/20/2022    HCT 35.7 02/05/2020    MCV 94 06/20/2022    MCV 95 02/05/2020    MCH 31.0 06/20/2022    MCH 32.6 02/05/2020    MCHC 33.1 06/20/2022    MCHC 34.5 02/05/2020    RDW 15.4 (H) 06/20/2022    RDW 12.7 02/05/2020     06/20/2022     02/05/2020       Lab Results   Component Value Date     06/20/2022     02/05/2020    POTASSIUM 4.5 06/20/2022    POTASSIUM 4.4 02/05/2020    CHLORIDE 107 06/20/2022    CHLORIDE 99 02/05/2020    CO2 23 06/20/2022    CO2 28 02/05/2020    ANIONGAP 4 06/20/2022    ANIONGAP 7 02/05/2020     (H) 06/20/2022    GLC 95 02/05/2020    BUN 6 (L) 06/20/2022    BUN 13 02/05/2020    CR 0.51 (L) 06/20/2022    CR 0.51 (L) 02/05/2020    GFRESTIMATED >90 06/20/2022    GFRESTIMATED >90 02/05/2020    GFRESTBLACK >90 02/05/2020    VICENTE 8.3 (L) 06/20/2022    VICENTE 8.9 02/05/2020      Lab Results   Component Value Date    AST 39 06/19/2022    AST 19 02/05/2020    ALT 28 06/19/2022    ALT 22 02/05/2020       Lab Results   Component Value Date    A1C 5.8 (H)  05/16/2022    A1C 5.4 02/05/2020       Lab Results   Component Value Date    INR 1.00 05/16/2022    INR 1.01 10/16/2018    INR 1.12 03/25/2016         STEVEN DUNHAM [facility]  Pager: [phone]  RN phone: [phone]

## 2022-09-13 ENCOUNTER — MYC MEDICAL ADVICE (OUTPATIENT)
Dept: FAMILY MEDICINE | Facility: CLINIC | Age: 67
End: 2022-09-13

## 2022-09-13 DIAGNOSIS — G62.9 PERIPHERAL POLYNEUROPATHY: ICD-10-CM

## 2022-09-14 DIAGNOSIS — G62.9 PERIPHERAL POLYNEUROPATHY: ICD-10-CM

## 2022-09-14 RX ORDER — GABAPENTIN 600 MG/1
TABLET ORAL
Qty: 90 TABLET | Refills: 0 | Status: SHIPPED | OUTPATIENT
Start: 2022-09-14 | End: 2022-11-23

## 2022-09-14 NOTE — TELEPHONE ENCOUNTER
Outpatient Medication Detail     Disp Refills Start End MASON   gabapentin (NEURONTIN) 600 MG tablet 90 tablet 1 6/11/2022  --   Sig - Route: Take 1 tablet (600 mg) by mouth 3 times daily - Oral   Sent to pharmacy as: Gabapentin 600 MG Oral Tablet (NEURONTIN)   Class: E-Prescribe   Notes to Pharmacy: Future refills by PCP Dr. Mustapha Velásquez with phone number [phone].   Order: [id number]   E-Prescribing Status: Receipt confirmed by pharmacy (6/11/2022  4:31 PM CDT)     Associated Diagnoses    Peripheral polyneuropathy [G62.9]  - Primary         Gabapentin originally Rx'd at hospital discharge 6/11/2022.  Future fills by PCP Dr Velásquez.    Historically - it appears that possible gabapentin was being weaned?  About May/June 2022 (per past medication list)    Dr Velásquez - please review.  Is medication appropriate for patient?    Patient sent Mychart on 9/13 requesting Rx, but RN told her refill at pharmacy (incorrect information).  Pharmacy now requesting.    LOV 11-8-2021 Christen  No future OV scheduled      Ronna Hayes RT (R)

## 2022-10-15 ENCOUNTER — HEALTH MAINTENANCE LETTER (OUTPATIENT)
Age: 67
End: 2022-10-15

## 2022-10-17 ENCOUNTER — ANCILLARY PROCEDURE (OUTPATIENT)
Dept: CT IMAGING | Facility: CLINIC | Age: 67
End: 2022-10-17
Attending: THORACIC SURGERY (CARDIOTHORACIC VASCULAR SURGERY)
Payer: MEDICARE

## 2022-10-17 ENCOUNTER — TRANSFERRED RECORDS (OUTPATIENT)
Dept: HEALTH INFORMATION MANAGEMENT | Facility: CLINIC | Age: 67
End: 2022-10-17

## 2022-10-17 DIAGNOSIS — C34.90 NON-SMALL CELL LUNG CANCER (H): ICD-10-CM

## 2022-10-17 PROCEDURE — 74177 CT ABD & PELVIS W/CONTRAST: CPT

## 2022-10-17 PROCEDURE — 255N000002 HC RX 255 OP 636: Performed by: THORACIC SURGERY (CARDIOTHORACIC VASCULAR SURGERY)

## 2022-10-17 RX ADMIN — IOHEXOL 75 ML: 350 INJECTION, SOLUTION INTRAVENOUS at 12:15

## 2022-11-23 DIAGNOSIS — G62.9 PERIPHERAL POLYNEUROPATHY: ICD-10-CM

## 2022-11-23 RX ORDER — GABAPENTIN 600 MG/1
TABLET ORAL
Qty: 90 TABLET | Refills: 0 | Status: SHIPPED | OUTPATIENT
Start: 2022-11-23 | End: 2023-03-27

## 2023-03-26 ENCOUNTER — HEALTH MAINTENANCE LETTER (OUTPATIENT)
Age: 68
End: 2023-03-26

## 2023-03-27 DIAGNOSIS — I10 BENIGN ESSENTIAL HYPERTENSION: ICD-10-CM

## 2023-03-27 DIAGNOSIS — G62.9 PERIPHERAL POLYNEUROPATHY: ICD-10-CM

## 2023-03-27 RX ORDER — GABAPENTIN 600 MG/1
TABLET ORAL
Qty: 90 TABLET | Refills: 2 | Status: SHIPPED | OUTPATIENT
Start: 2023-03-27 | End: 2023-12-27

## 2023-03-27 RX ORDER — OMEPRAZOLE 40 MG/1
CAPSULE, DELAYED RELEASE ORAL
Qty: 90 CAPSULE | Refills: 2 | Status: SHIPPED | OUTPATIENT
Start: 2023-03-27 | End: 2023-12-22

## 2023-04-10 ENCOUNTER — ANCILLARY PROCEDURE (OUTPATIENT)
Dept: CT IMAGING | Facility: CLINIC | Age: 68
End: 2023-04-10
Attending: THORACIC SURGERY (CARDIOTHORACIC VASCULAR SURGERY)
Payer: MEDICARE

## 2023-04-10 DIAGNOSIS — C34.90 NON-SMALL CELL LUNG CANCER (H): ICD-10-CM

## 2023-04-10 DIAGNOSIS — Z85.01 HISTORY OF MALIGNANT NEOPLASM OF ESOPHAGUS: ICD-10-CM

## 2023-04-10 PROCEDURE — 255N000002 HC RX 255 OP 636: Performed by: THORACIC SURGERY (CARDIOTHORACIC VASCULAR SURGERY)

## 2023-04-10 PROCEDURE — 74177 CT ABD & PELVIS W/CONTRAST: CPT

## 2023-04-10 RX ADMIN — IOHEXOL 75 ML: 350 INJECTION, SOLUTION INTRAVENOUS at 13:26

## 2023-05-02 ENCOUNTER — OFFICE VISIT (OUTPATIENT)
Dept: CARDIOLOGY | Facility: CLINIC | Age: 68
End: 2023-05-02
Attending: NURSE PRACTITIONER
Payer: MEDICARE

## 2023-05-02 VITALS
HEART RATE: 80 BPM | DIASTOLIC BLOOD PRESSURE: 58 MMHG | WEIGHT: 84.5 LBS | BODY MASS INDEX: 15.55 KG/M2 | SYSTOLIC BLOOD PRESSURE: 122 MMHG | HEIGHT: 62 IN

## 2023-05-02 DIAGNOSIS — E78.5 HYPERLIPIDEMIA LDL GOAL <70: ICD-10-CM

## 2023-05-02 DIAGNOSIS — I25.10 CORONARY ARTERY DISEASE INVOLVING NATIVE CORONARY ARTERY OF NATIVE HEART WITHOUT ANGINA PECTORIS: ICD-10-CM

## 2023-05-02 DIAGNOSIS — I50.22 CHRONIC SYSTOLIC CONGESTIVE HEART FAILURE (H): ICD-10-CM

## 2023-05-02 DIAGNOSIS — I10 BENIGN ESSENTIAL HYPERTENSION: ICD-10-CM

## 2023-05-02 PROCEDURE — 99214 OFFICE O/P EST MOD 30 MIN: CPT | Performed by: NURSE PRACTITIONER

## 2023-05-02 RX ORDER — LOSARTAN POTASSIUM 50 MG/1
50 TABLET ORAL DAILY
Qty: 90 TABLET | Refills: 3 | Status: SHIPPED | OUTPATIENT
Start: 2023-05-02 | End: 2024-05-06

## 2023-05-02 RX ORDER — ATORVASTATIN CALCIUM 40 MG/1
40 TABLET, FILM COATED ORAL DAILY
Qty: 90 TABLET | Refills: 3 | Status: SHIPPED | OUTPATIENT
Start: 2023-05-02 | End: 2024-05-13

## 2023-05-02 RX ORDER — METOPROLOL SUCCINATE 50 MG/1
50 TABLET, EXTENDED RELEASE ORAL EVERY MORNING
Qty: 90 TABLET | Refills: 3 | Status: SHIPPED | OUTPATIENT
Start: 2023-05-02 | End: 2024-05-13

## 2023-05-02 RX ORDER — SPIRONOLACTONE 25 MG/1
12.5 TABLET ORAL EVERY MORNING
Qty: 45 TABLET | Refills: 3 | Status: SHIPPED | OUTPATIENT
Start: 2023-05-02 | End: 2024-05-13

## 2023-05-02 NOTE — PATIENT INSTRUCTIONS
Today's Recommendations    Continue all medications without changes.  Please follow up with Dr. Ring in 1 year.    Please send a 24x7 Learning message or call [phone] to the RN team with questions or concerns.     Scheduling number [phone]    STEVEN Hyatt, CNP

## 2023-05-02 NOTE — PROGRESS NOTES
General Cardiology Clinic Progress Note  Kezia Dixon MRN# [id number]   YOB: 1955 Age: 67 year old     Primary cardiologist: Dr. Ring    Reason for visit: HFpEF follow up    History of presenting illness:    Kezia Dixon, a pleasant 67 year old patient who has a past medical history significant for:   1. CAD  2. Hypertension  3. Hyperlipidemia  4. GERD  5. Alcohol abuse  6. History of pancreatitis, transaminitis, thrombocytopenia secondary to alcohol abuse  7. Recent COVID-19 admitted 6/8/2022-6/11/2022  8. Depression   9. Lung cancer s/p resection 3 years ago  10. Esophageal cancer s/p esophagogastrectomy and chemo and radiation in 2016  11. Cardiomyopathy, resolved     In August of 2021 she was admitted to [facility] with alcohol withdrawal in the context of heavy alcohol use for 1 and half years after her  passed away.  She was found to be tachycardic and hypertensive and an echocardiogram revealed an LVEF of 35% with mid to apical wall motion abnormalities and MR.  She underwent a stress test that demonstrated a large partially reversible anterior/anteroseptal/septal wall motion perfusion defect.  Her calculated LVEF, however, appeared to have normalized with the reported LVEF of 66%.      She was then admitted to [facility] in October 2021 acute hypoxemic respiratory failure thought to be cardiac in origin.  Her echocardiogram during that admission noted and normalized LVEF of 55 to 60% with moderate TR and mild to moderate MR.  There is also moderate pulmonary hypertension.    Post her admission in October she was evaluated by Dr. Ring in clinic.  He recommended that she undergo a CT coronary angiogram that revealed a total score of 118 with severe proximal R PATRICK and mild mid LAD stenosis.  Given the patient was asymptomatic at that time it was recommended that she pursue medical management.  He also recommended that she undergo a repeat echocardiogram in  3 months that was completed in January 2022.  Her LVEF was 65 to 70% with mild AR.  There was trace to mild MR and the tricuspid valve is not well visualized.    She has had multiple admissions/ED visits in May and June of 2022 for ETOH abuse, COVID and mind fog.  She is reported that at the end of June she was admitted to [facility] for ETOH treatment and thus far has been doing well.     Today she returns for a follow-up.  Overall, from a cardiovascular standpoint she has no complaints.  She gets occasional palpitations when anxious and in a hurry, but the palpitations resolved very quickly after resting.  She denies any chest discomfort, shortness of breath on exertion and no lower extremity edema.  Blood pressure was initially elevated but improved upon my recheck.  She states that her brother recently had a carotid intervention and inquires about her risk.  There were no bruits auscultated bilaterally on exam and a recent CTA head in 6/20/2022 showed scattered atherosclerotic disease without flow-limiting lesions.    Diagnostic Studies:    CTA head and neck (6/2022): No flow-limiting stenosis or proximal occlusion of the head and scattered atherosclerotic disease without flow-limiting lesions of the neck    Echocardiogram (1/2022): LVEF 65-70% with mild AR    CT coronary angiogram (11/2022): Total score 118 (placing her in the 80th percentile when compared to age and gender matched group). Severe prox RPLA. Mild mid LAD    Echocardiogram (10/2021): LVEF 55-60%, moderate PH, moderate TR and mild to moderate MR.    Echocardiogram (8/2021): LVEF of 35% to mid to apical wall motion abnormalities with mild MR    Lexiscan nuclear stress test (8/20/2021):a large partially reversible anterior/anteroseptal/septal wall motion perfusion defect.  Her calculated LVEF, however, appeared to have normalized with the reported LVEF of 66%.           Assessment and Plan:     ASSESSMENT:    1. Cardiomyopathy, with previous HFpEF  exacerbation    LVEF 35% in 8/2021    Improved to 65-70% in 1/2022    Compensated    Current on metoprolol XL 50 mg daily, losartan 50 mg daily, spironolactone 12.5 mg daily and 20 mg of lasix    2. CAD, mild to mod     CT coronary angiogram (1/2022): Total score 118 (placing her in the 80th percentile when compared to age and gender matched group)    Asymptomatic    ASA and Statin    Report increased bruising on baby ASA and was decreased to every other day.    3. Hypertension    Elevated     4. Hyperlipidemia    FLP (2/2020): , HDL 93, LDL 42, trigs 98    Atorvastatin 40 mg daily    5. Recent COVID-19 infection 6/2022    Treated with remdesivir    Placed on short term apixaban 2.5 mg twice daily     PLAN:     1. Continue present medical therapy without changes  2. Return to clinic in follow-up in 1 year or sooner if needed with Dr. María Elena Linn this Visit:  Orders Placed This Encounter   Procedures     Follow-Up with Cardiology     Orders Placed This Encounter   Medications     losartan (COZAAR) 50 MG tablet     Sig: Take 1 tablet (50 mg) by mouth daily     Dispense:  90 tablet     Refill:  3     atorvastatin (LIPITOR) 40 MG tablet     Sig: Take 1 tablet (40 mg) by mouth daily     Dispense:  90 tablet     Refill:  3     spironolactone (ALDACTONE) 25 MG tablet     Sig: Take 0.5 tablets (12.5 mg) by mouth every morning     Dispense:  45 tablet     Refill:  3     metoprolol succinate ER (TOPROL XL) 50 MG 24 hr tablet     Sig: Take 1 tablet (50 mg) by mouth every morning     Dispense:  90 tablet     Refill:  3     Medications Discontinued During This Encounter   Medication Reason     losartan (COZAAR) 50 MG tablet Reorder (No AVS / No eCancel)     atorvastatin (LIPITOR) 40 MG tablet Reorder (No AVS / No eCancel)     metoprolol succinate ER (TOPROL XL) 50 MG 24 hr tablet Reorder (No AVS / No eCancel)     spironolactone (ALDACTONE) 25 MG tablet Reorder (No AVS / No eCancel)            Review of Systems:  "    Review of Systems:  Skin:        Eyes:       ENT:       Respiratory:  Negative    Cardiovascular:  Negative;lightheadedness;dizziness;edema;fatigue chest pain;Positive for;palpitations  Gastroenterology: Positive for heartburn  Genitourinary:       Musculoskeletal:       Neurologic:       Psychiatric:       Heme/Lymph/Imm:       Endocrine:  Negative              Physical Exam:     Vitals: /58   Pulse 80   Ht 1.562 m (5' 1.5\")   Wt 38.3 kg (84 lb 8 oz)   BMI 15.71 kg/m    Constitutional: Thin and in no apparent distress.   Eyes: Pupils equal, round. Sclerae anicteric.   HEENT: Normocephalic, atraumatic.   Neck: Supple. JVD   Respiratory: Breathing non-labored. Lungs clear to auscultation bilaterally. No crackles, wheezes, rhonchi, or rales.  Cardiovascular:  Regular rate and rhythm, normal S1 and S2. No murmur, rub, or gallop.  Skin: Warm, dry. No rashes, cyanosis, or xanthelasma.  Extremities: No edema.  Neurologic: No gross motor deficits. Alert, awake, and oriented to person, place and time.  Psychiatric: Affect appropriate.             Medications:     Current Outpatient Medications   Medication Sig Dispense Refill     aspirin (ASA) 81 MG EC tablet 1 tablet every other day       atorvastatin (LIPITOR) 40 MG tablet Take 1 tablet (40 mg) by mouth daily 90 tablet 3     fluticasone (FLONASE) 50 MCG/ACT nasal spray INSTILL 2 SPRAYS INTO BOTH NOSTRILS DAILY. 48 mL 2     furosemide (LASIX) 40 MG tablet Take 0.5 tablets (20 mg) by mouth daily as needed (edema or shortness of breath) For worsening shortness of breath, leg swelling or weight gain.       gabapentin (NEURONTIN) 600 MG tablet TAKE ONE (1) TABLET BY MOUTH THREE TIMES DAILY. 90 tablet 2     ibuprofen (ADVIL/MOTRIN) 200 MG tablet Take 400 mg by mouth every 4 hours as needed for mild pain       losartan (COZAAR) 50 MG tablet Take 1 tablet (50 mg) by mouth daily 90 tablet 3     metoprolol succinate ER (TOPROL XL) 50 MG 24 hr tablet Take 1 tablet (50 " mg) by mouth every morning 90 tablet 3     omeprazole (PRILOSEC) 40 MG DR capsule TAKE 1 CAPSULE BY MOUTH EVERY DAY 90 capsule 2     spironolactone (ALDACTONE) 25 MG tablet Take 0.5 tablets (12.5 mg) by mouth every morning 45 tablet 3     folic acid (FOLVITE) 1 MG tablet Take 1 tablet (1 mg) by mouth daily (Patient not taking: Reported on 2023) 90 tablet 3     thiamine (B-1) 100 MG tablet Take 1 tablet (100 mg) by mouth daily (Patient not taking: Reported on 2023) 90 tablet 3       Family History   Problem Relation Age of Onset     Depression Mother      Substance Abuse Father      Other Cancer Father         melanoma     Prostate Cancer Father      Diabetes Father      Substance Abuse Paternal Grandfather      Other Cancer Brother         melanoma     Substance Abuse Brother      Other Cancer Brother         melanoma     Other Cancer Brother        Social History     Socioeconomic History     Marital status:      Spouse name: Not on file     Number of children: Not on file     Years of education: Not on file     Highest education level: Not on file   Occupational History     Not on file   Tobacco Use     Smoking status: Former     Packs/day: 0.25     Types: Cigarettes     Quit date: 2015     Years since quittin.3     Smokeless tobacco: Never   Vaping Use     Vaping status: Not on file   Substance and Sexual Activity     Alcohol use: Not Currently     Comment: 2 to 3 drinks of 2oz of Scotch     Drug use: No     Sexual activity: Not Currently     Partners: Male     Birth control/protection: Post-menopausal   Other Topics Concern     Parent/sibling w/ CABG, MI or angioplasty before 65F 55M? No   Social History Narrative     Not on file     Social Determinants of Health     Financial Resource Strain: Low Risk  (2022)    Overall Financial Resource Strain (CARDIA)      Difficulty of Paying Living Expenses: Not hard at all   Food Insecurity: No Food Insecurity (2022)    Hunger Vital Sign       Worried About Running Out of Food in the Last Year: Never true      Ran Out of Food in the Last Year: Never true   Transportation Needs: No Transportation Needs (6/22/2022)    PRAPARE - Transportation      Lack of Transportation (Medical): No      Lack of Transportation (Non-Medical): No   Physical Activity: Insufficiently Active (11/8/2021)    Exercise Vital Sign      Days of Exercise per Week: 6 days      Minutes of Exercise per Session: 20 min   Stress: Stress Concern Present (11/8/2021)    [employer] - Occupational Stress Questionnaire      Feeling of Stress : To some extent   Social Connections: Moderately Isolated (11/8/2021)    Social Connection and Isolation Panel [NHANES]      Frequency of Communication with Friends and Family: More than three times a week      Frequency of Social Gatherings with Friends and Family: Twice a week      Attends Jainism Services: Never      Active Member of Clubs or Organizations: Yes      Attends Club or Organization Meetings: Not on file      Marital Status:    Intimate Partner Violence: Not on file   Housing Stability: Unknown (6/22/2022)    Housing Stability Vital Sign      Unable to Pay for Housing in the Last Year: No      Number of Places Lived in the Last Year: Not on file      Unstable Housing in the Last Year: No            Past Medical History:     Past Medical History:   Diagnosis Date     Alcohol use disorder, severe, dependence (H) 10/14/2016     Alcoholism (H) 10/14/2016     Benign essential hypertension 10/14/2016     Carotid artery disease (H)     mile plaque 5/7     Chemical dependency (H)     alcohol     Depression with anxiety      Esophageal cancer (H)      Esophageal cancer (H) 3/22/2016     Esophageal mass      GERD (gastroesophageal reflux disease)      Hx of pancreatitis 2003     Hypercholesteremia      Hyperglycemia      Hyperlipemia      Impaired fasting glucose 10/14/2016     Impaired fasting glucose  10/14/2016     Nocturnal leg cramps      Pancreatic pseudocyst 10/14/2016     Pancreatic pseudocyst 10/14/2016     Pancreatitis     with pseudocyst     Pap smear with atypical squamous cells, cannot exclude high grade squamous intraepithelial lesion (ASC-H) 10/14/16    Colpo impression benign, ECC benign. Cotestin in 1 yr.     Shingles      Vasomotor rhinitis               Past Surgical History:     Past Surgical History:   Procedure Laterality Date     AAA REPAIR      splenic artery aneurysm embolization     ABDOMEN SURGERY  2/2/2016     COLONOSCOPY  11/26/2013    Procedure: COMBINED COLONOSCOPY, SINGLE BIOPSY/POLYPECTOMY BY BIOPSY;  COLONOSCOPY (MAC);  Surgeon: Montana Rosa MD;  Location:  GI     COLONOSCOPY  11/26/2013     COLONOSCOPY N/A 10/4/2018    Procedure: COMBINED COLONOSCOPY, SINGLE OR MULTIPLE BIOPSY/POLYPECTOMY BY BIOPSY;  colonoscopy;  Surgeon: Gio Apodaca MD;  Location:  GI     ENT SURGERY       ESOPHAGOGASTRECTOMY N/A 3/22/2016    Procedure: ESOPHAGOGASTRECTOMY;  Surgeon: Alvin Riojas MD;  Location:  OR     ESOPHAGOSCOPY, GASTROSCOPY, DUODENOSCOPY (EGD), COMBINED N/A 12/22/2015    Procedure: COMBINED ENDOSCOPIC ULTRASOUND, ESOPHAGOSCOPY, GASTROSCOPY, DUODENOSCOPY (EGD), FINE NEEDLE ASPIRATE/BIOPSY;  Surgeon: Danelle Michael MD;  Location:  GI     GASTROSTOMY, INSERT TUBE, COMBINED N/A 2/2/2016    Procedure: COMBINED GASTROSTOMY, INSERT TUBE (OPEN);  Surgeon: Alvin Riojas MD;  Location:  OR     GI SURGERY  12/22/2015     HAND SURGERY      right     HERNIORRHAPHY INCISIONAL (LOCATION) N/A 3/19/2019    Procedure: LAPARSCOPIC  INCISIONAL HERNIA REPAIR WITH MESH, LAPARSCOPIC LYSIS OF ADHENSIONS;  Surgeon: Lamberto Magaña MD;  Location:  OR     INSERT PORT VASCULAR ACCESS N/A 12/28/2015    Procedure: INSERT PORT VASCULAR ACCESS;  Surgeon: Alvin Riojas MD;  Location:  OR     LAPAROSCOPIC CHOLECYSTECTOMY N/A 3/19/2019     Procedure: LAPAROSCOPIC CHOLECYSTECTOMY;  Surgeon: Lamberto Magaña MD;  Location:  OR     LOBECTOMY LUNG Right 10/16/2018    Procedure: LOBECTOMY LUNG;  Surgeon: Alvin Riojas MD;  Location:  OR     REMOVE PORT VASCULAR ACCESS Left 7/22/2016    Procedure: REMOVE PORT VASCULAR ACCESS;  Surgeon: Alvin Riojas MD;  Location:  OR     THORACOTOMY Right 10/16/2018    Procedure: REDO RIGHT THORACTOMY AND RIGHT LOWER LOBECTOMY, PLEURAL LYSIS;  Surgeon: Alvin Riojas MD;  Location:  OR     TONSILLECTOMY       VASCULAR SURGERY                Allergies:   Codeine sulfate, Simvastatin, and Pcn [penicillins]       Data:   All laboratory data reviewed:    Recent Labs   Lab Test 06/17/22  2220 02/05/20  0849 10/18/18  0811 11/09/17  1026 10/10/16  0800   LDL  --  42  --  41 62   HDL  --  93  --  89 76   NHDL  --  62  --  59 84   CHOL  --  155  --  148 160   TRIG  --  98  --  91 112   TSH 2.58  --   --   --   --    IRON  --   --  39  --   --    FEB  --   --  243  --   --    IRONSAT  --   --  16  --   --        Lab Results   Component Value Date    WBC 6.4 06/20/2022    WBC 7.4 02/05/2020    RBC 3.42 (L) 06/20/2022    RBC 3.77 (L) 02/05/2020    HGB 10.6 (L) 06/20/2022    HGB 12.3 02/05/2020    HCT 32.0 (L) 06/20/2022    HCT 35.7 02/05/2020    MCV 94 06/20/2022    MCV 95 02/05/2020    MCH 31.0 06/20/2022    MCH 32.6 02/05/2020    MCHC 33.1 06/20/2022    MCHC 34.5 02/05/2020    RDW 15.4 (H) 06/20/2022    RDW 12.7 02/05/2020     06/20/2022     02/05/2020       Lab Results   Component Value Date     06/20/2022     02/05/2020    POTASSIUM 4.5 06/20/2022    POTASSIUM 4.4 02/05/2020    CHLORIDE 107 06/20/2022    CHLORIDE 99 02/05/2020    CO2 23 06/20/2022    CO2 28 02/05/2020    ANIONGAP 4 06/20/2022    ANIONGAP 7 02/05/2020     (H) 06/20/2022    GLC 95 02/05/2020    BUN 6 (L) 06/20/2022    BUN 13 02/05/2020    CR 0.51 (L) 06/20/2022    CR 0.51 (L) 02/05/2020     GFRESTIMATED >90 06/20/2022    GFRESTIMATED >90 02/05/2020    GFRESTBLACK >90 02/05/2020    VICENTE 8.3 (L) 06/20/2022    VICENTE 8.9 02/05/2020      Lab Results   Component Value Date    AST 39 06/19/2022    AST 19 02/05/2020    ALT 28 06/19/2022    ALT 22 02/05/2020       Lab Results   Component Value Date    A1C 5.8 (H) 05/16/2022    A1C 5.4 02/05/2020       Lab Results   Component Value Date    INR 1.00 05/16/2022    INR 1.01 10/16/2018    INR 1.12 03/25/2016         STEVEN DUNHAM [facility]  Pager: [phone]  RN phone: [phone]

## 2023-05-02 NOTE — LETTER
5/2/2023    Mustapha Velásquez MD  [address]  [place] [address]    RE: Kezia Dixon       Dear Colleague,     I had the pleasure of seeing Kezia Dixon in the [facility].    General Cardiology Clinic Progress Note  Kezia Dixon MRN# [id number]   YOB: 1955 Age: 67 year old     Primary cardiologist: Dr. Ring    Reason for visit: HFpEF follow up    History of presenting illness:    Kezia Dixon, a pleasant 67 year old patient who has a past medical history significant for:   CAD  Hypertension  Hyperlipidemia  GERD  Alcohol abuse  History of pancreatitis, transaminitis, thrombocytopenia secondary to alcohol abuse  Recent COVID-19 admitted 6/8/2022-6/11/2022  Depression   Lung cancer s/p resection 3 years ago  Esophageal cancer s/p esophagogastrectomy and chemo and radiation in 2016  Cardiomyopathy, resolved     In August of 2021 she was admitted to [facility] with alcohol withdrawal in the context of heavy alcohol use for 1 and half years after her  passed away.  She was found to be tachycardic and hypertensive and an echocardiogram revealed an LVEF of 35% with mid to apical wall motion abnormalities and MR.  She underwent a stress test that demonstrated a large partially reversible anterior/anteroseptal/septal wall motion perfusion defect.  Her calculated LVEF, however, appeared to have normalized with the reported LVEF of 66%.      She was then admitted to [facility] in October 2021 acute hypoxemic respiratory failure thought to be cardiac in origin.  Her echocardiogram during that admission noted and normalized LVEF of 55 to 60% with moderate TR and mild to moderate MR.  There is also moderate pulmonary hypertension.    Post her admission in October she was evaluated by Dr. Ring in clinic.  He recommended that she undergo a CT coronary angiogram that revealed a total score of 118 with severe proximal R PATRICK and mild mid LAD  stenosis.  Given the patient was asymptomatic at that time it was recommended that she pursue medical management.  He also recommended that she undergo a repeat echocardiogram in 3 months that was completed in January 2022.  Her LVEF was 65 to 70% with mild AR.  There was trace to mild MR and the tricuspid valve is not well visualized.    She has had multiple admissions/ED visits in May and June of 2022 for ETOH abuse, COVID and mind fog.  She is reported that at the end of June she was admitted to [facility] for ETOH treatment and thus far has been doing well.     Today she returns for a follow-up.  Overall, from a cardiovascular standpoint she has no complaints.  She gets occasional palpitations when anxious and in a hurry, but the palpitations resolved very quickly after resting.  She denies any chest discomfort, shortness of breath on exertion and no lower extremity edema.  Blood pressure was initially elevated but improved upon my recheck.  She states that her brother recently had a carotid intervention and inquires about her risk.  There were no bruits auscultated bilaterally on exam and a recent CTA head in 6/20/2022 showed scattered atherosclerotic disease without flow-limiting lesions.    Diagnostic Studies:  CTA head and neck (6/2022): No flow-limiting stenosis or proximal occlusion of the head and scattered atherosclerotic disease without flow-limiting lesions of the neck  Echocardiogram (1/2022): LVEF 65-70% with mild AR  CT coronary angiogram (11/2022): Total score 118 (placing her in the 80th percentile when compared to age and gender matched group). Severe prox RPLA. Mild mid LAD  Echocardiogram (10/2021): LVEF 55-60%, moderate PH, moderate TR and mild to moderate MR.  Echocardiogram (8/2021): LVEF of 35% to mid to apical wall motion abnormalities with mild MR  Lexiscan nuclear stress test (8/20/2021):a large partially reversible anterior/anteroseptal/septal wall motion perfusion defect.  Her calculated  LVEF, however, appeared to have normalized with the reported LVEF of 66%.           Assessment and Plan:     ASSESSMENT:    Cardiomyopathy, with previous HFpEF exacerbation  LVEF 35% in 8/2021  Improved to 65-70% in 1/2022  Compensated  Current on metoprolol XL 50 mg daily, losartan 50 mg daily, spironolactone 12.5 mg daily and 20 mg of lasix    CAD, mild to mod   CT coronary angiogram (1/2022): Total score 118 (placing her in the 80th percentile when compared to age and gender matched group)  Asymptomatic  ASA and Statin  Report increased bruising on baby ASA and was decreased to every other day.    Hypertension  Elevated     Hyperlipidemia  FLP (2/2020): , HDL 93, LDL 42, trigs 98  Atorvastatin 40 mg daily    Recent COVID-19 infection 6/2022  Treated with remdesivir  Placed on short term apixaban 2.5 mg twice daily     PLAN:     Continue present medical therapy without changes  Return to clinic in follow-up in 1 year or sooner if needed with Dr. María Elena Linn this Visit:  Orders Placed This Encounter   Procedures    Follow-Up with Cardiology     Orders Placed This Encounter   Medications    losartan (COZAAR) 50 MG tablet     Sig: Take 1 tablet (50 mg) by mouth daily     Dispense:  90 tablet     Refill:  3    atorvastatin (LIPITOR) 40 MG tablet     Sig: Take 1 tablet (40 mg) by mouth daily     Dispense:  90 tablet     Refill:  3    spironolactone (ALDACTONE) 25 MG tablet     Sig: Take 0.5 tablets (12.5 mg) by mouth every morning     Dispense:  45 tablet     Refill:  3    metoprolol succinate ER (TOPROL XL) 50 MG 24 hr tablet     Sig: Take 1 tablet (50 mg) by mouth every morning     Dispense:  90 tablet     Refill:  3     Medications Discontinued During This Encounter   Medication Reason    losartan (COZAAR) 50 MG tablet Reorder (No AVS / No eCancel)    atorvastatin (LIPITOR) 40 MG tablet Reorder (No AVS / No eCancel)    metoprolol succinate ER (TOPROL XL) 50 MG 24 hr tablet Reorder (No AVS / No eCancel)     "spironolactone (ALDACTONE) 25 MG tablet Reorder (No AVS / No eCancel)            Review of Systems:     Review of Systems:  Skin:        Eyes:       ENT:       Respiratory:  Negative    Cardiovascular:  Negative;lightheadedness;dizziness;edema;fatigue chest pain;Positive for;palpitations  Gastroenterology: Positive for heartburn  Genitourinary:       Musculoskeletal:       Neurologic:       Psychiatric:       Heme/Lymph/Imm:       Endocrine:  Negative              Physical Exam:     Vitals: /58   Pulse 80   Ht 1.562 m (5' 1.5\")   Wt 38.3 kg (84 lb 8 oz)   BMI 15.71 kg/m    Constitutional: Thin and in no apparent distress.   Eyes: Pupils equal, round. Sclerae anicteric.   HEENT: Normocephalic, atraumatic.   Neck: Supple. JVD   Respiratory: Breathing non-labored. Lungs clear to auscultation bilaterally. No crackles, wheezes, rhonchi, or rales.  Cardiovascular:  Regular rate and rhythm, normal S1 and S2. No murmur, rub, or gallop.  Skin: Warm, dry. No rashes, cyanosis, or xanthelasma.  Extremities: No edema.  Neurologic: No gross motor deficits. Alert, awake, and oriented to person, place and time.  Psychiatric: Affect appropriate.             Medications:     Current Outpatient Medications   Medication Sig Dispense Refill    aspirin (ASA) 81 MG EC tablet 1 tablet every other day      atorvastatin (LIPITOR) 40 MG tablet Take 1 tablet (40 mg) by mouth daily 90 tablet 3    fluticasone (FLONASE) 50 MCG/ACT nasal spray INSTILL 2 SPRAYS INTO BOTH NOSTRILS DAILY. 48 mL 2    furosemide (LASIX) 40 MG tablet Take 0.5 tablets (20 mg) by mouth daily as needed (edema or shortness of breath) For worsening shortness of breath, leg swelling or weight gain.      gabapentin (NEURONTIN) 600 MG tablet TAKE ONE (1) TABLET BY MOUTH THREE TIMES DAILY. 90 tablet 2    ibuprofen (ADVIL/MOTRIN) 200 MG tablet Take 400 mg by mouth every 4 hours as needed for mild pain      losartan (COZAAR) 50 MG tablet Take 1 tablet (50 mg) by mouth " daily 90 tablet 3    metoprolol succinate ER (TOPROL XL) 50 MG 24 hr tablet Take 1 tablet (50 mg) by mouth every morning 90 tablet 3    omeprazole (PRILOSEC) 40 MG DR capsule TAKE 1 CAPSULE BY MOUTH EVERY DAY 90 capsule 2    spironolactone (ALDACTONE) 25 MG tablet Take 0.5 tablets (12.5 mg) by mouth every morning 45 tablet 3    folic acid (FOLVITE) 1 MG tablet Take 1 tablet (1 mg) by mouth daily (Patient not taking: Reported on 2023) 90 tablet 3    thiamine (B-1) 100 MG tablet Take 1 tablet (100 mg) by mouth daily (Patient not taking: Reported on 2023) 90 tablet 3       Family History   Problem Relation Age of Onset    Depression Mother     Substance Abuse Father     Other Cancer Father         melanoma    Prostate Cancer Father     Diabetes Father     Substance Abuse Paternal Grandfather     Other Cancer Brother         melanoma    Substance Abuse Brother     Other Cancer Brother         melanoma    Other Cancer Brother        Social History     Socioeconomic History    Marital status:      Spouse name: Not on file    Number of children: Not on file    Years of education: Not on file    Highest education level: Not on file   Occupational History    Not on file   Tobacco Use    Smoking status: Former     Packs/day: 0.25     Types: Cigarettes     Quit date: 2015     Years since quittin.3    Smokeless tobacco: Never   Vaping Use    Vaping status: Not on file   Substance and Sexual Activity    Alcohol use: Not Currently     Comment: 2 to 3 drinks of 2oz of Scotch    Drug use: No    Sexual activity: Not Currently     Partners: Male     Birth control/protection: Post-menopausal   Other Topics Concern    Parent/sibling w/ CABG, MI or angioplasty before 65F 55M? No   Social History Narrative    Not on file     Social Determinants of Health     Financial Resource Strain: Low Risk  (2022)    Overall Financial Resource Strain (CARDIA)     Difficulty of Paying Living Expenses: Not hard at all    Food Insecurity: No Food Insecurity (6/22/2022)    Hunger Vital Sign     Worried About Running Out of Food in the Last Year: Never true     Ran Out of Food in the Last Year: Never true   Transportation Needs: No Transportation Needs (6/22/2022)    PRAPARE - Transportation     Lack of Transportation (Medical): No     Lack of Transportation (Non-Medical): No   Physical Activity: Insufficiently Active (11/8/2021)    Exercise Vital Sign     Days of Exercise per Week: 6 days     Minutes of Exercise per Session: 20 min   Stress: Stress Concern Present (11/8/2021)    [employer] [place] [employer] - Occupational Stress Questionnaire     Feeling of Stress : To some extent   Social Connections: Moderately Isolated (11/8/2021)    Social Connection and Isolation Panel [NHANES]     Frequency of Communication with Friends and Family: More than three times a week     Frequency of Social Gatherings with Friends and Family: Twice a week     Attends Restorationist Services: Never     Active Member of Clubs or Organizations: Yes     Attends Club or Organization Meetings: Not on file     Marital Status:    Intimate Partner Violence: Not on file   Housing Stability: Unknown (6/22/2022)    Housing Stability Vital Sign     Unable to Pay for Housing in the Last Year: No     Number of Places Lived in the Last Year: Not on file     Unstable Housing in the Last Year: No            Past Medical History:     Past Medical History:   Diagnosis Date    Alcohol use disorder, severe, dependence (H) 10/14/2016    Alcoholism (H) 10/14/2016    Benign essential hypertension 10/14/2016    Carotid artery disease (H)     mile plaque 5/7    Chemical dependency (H)     alcohol    Depression with anxiety     Esophageal cancer (H)     Esophageal cancer (H) 3/22/2016    Esophageal mass     GERD (gastroesophageal reflux disease)     Hx of pancreatitis 2003    Hypercholesteremia     Hyperglycemia     Hyperlipemia     Impaired fasting glucose  10/14/2016    Impaired fasting glucose 10/14/2016    Nocturnal leg cramps     Pancreatic pseudocyst 10/14/2016    Pancreatic pseudocyst 10/14/2016    Pancreatitis     with pseudocyst    Pap smear with atypical squamous cells, cannot exclude high grade squamous intraepithelial lesion (ASC-H) 10/14/16    Colpo impression benign, ECC benign. Cotestin in 1 yr.    Shingles     Vasomotor rhinitis               Past Surgical History:     Past Surgical History:   Procedure Laterality Date    AAA REPAIR      splenic artery aneurysm embolization    ABDOMEN SURGERY  2/2/2016    COLONOSCOPY  11/26/2013    Procedure: COMBINED COLONOSCOPY, SINGLE BIOPSY/POLYPECTOMY BY BIOPSY;  COLONOSCOPY (MAC);  Surgeon: Montana Rosa MD;  Location:  GI    COLONOSCOPY  11/26/2013    COLONOSCOPY N/A 10/4/2018    Procedure: COMBINED COLONOSCOPY, SINGLE OR MULTIPLE BIOPSY/POLYPECTOMY BY BIOPSY;  colonoscopy;  Surgeon: Gio Apodaca MD;  Location:  GI    ENT SURGERY      ESOPHAGOGASTRECTOMY N/A 3/22/2016    Procedure: ESOPHAGOGASTRECTOMY;  Surgeon: Alvin Riojas MD;  Location:  OR    ESOPHAGOSCOPY, GASTROSCOPY, DUODENOSCOPY (EGD), COMBINED N/A 12/22/2015    Procedure: COMBINED ENDOSCOPIC ULTRASOUND, ESOPHAGOSCOPY, GASTROSCOPY, DUODENOSCOPY (EGD), FINE NEEDLE ASPIRATE/BIOPSY;  Surgeon: Danelle Michael MD;  Location:  GI    GASTROSTOMY, INSERT TUBE, COMBINED N/A 2/2/2016    Procedure: COMBINED GASTROSTOMY, INSERT TUBE (OPEN);  Surgeon: Alvin Riojas MD;  Location:  OR    GI SURGERY  12/22/2015    HAND SURGERY      right    HERNIORRHAPHY INCISIONAL (LOCATION) N/A 3/19/2019    Procedure: LAPARSCOPIC  INCISIONAL HERNIA REPAIR WITH MESH, LAPARSCOPIC LYSIS OF ADHENSIONS;  Surgeon: Lamberto Magaña MD;  Location:  OR    INSERT PORT VASCULAR ACCESS N/A 12/28/2015    Procedure: INSERT PORT VASCULAR ACCESS;  Surgeon: Alvin Riojas MD;  Location:  OR    LAPAROSCOPIC CHOLECYSTECTOMY  N/A 3/19/2019    Procedure: LAPAROSCOPIC CHOLECYSTECTOMY;  Surgeon: Lamberto Magaña MD;  Location:  OR    LOBECTOMY LUNG Right 10/16/2018    Procedure: LOBECTOMY LUNG;  Surgeon: Alvin Riojas MD;  Location:  OR    REMOVE PORT VASCULAR ACCESS Left 7/22/2016    Procedure: REMOVE PORT VASCULAR ACCESS;  Surgeon: Alvin Riojas MD;  Location:  OR    THORACOTOMY Right 10/16/2018    Procedure: REDO RIGHT THORACTOMY AND RIGHT LOWER LOBECTOMY, PLEURAL LYSIS;  Surgeon: Alvin Riojas MD;  Location:  OR    TONSILLECTOMY      VASCULAR SURGERY                Allergies:   Codeine sulfate, Simvastatin, and Pcn [penicillins]       Data:   All laboratory data reviewed:    Recent Labs   Lab Test 06/17/22  2220 02/05/20  0849 10/18/18  0811 11/09/17  1026 10/10/16  0800   LDL  --  42  --  41 62   HDL  --  93  --  89 76   NHDL  --  62  --  59 84   CHOL  --  155  --  148 160   TRIG  --  98  --  91 112   TSH 2.58  --   --   --   --    IRON  --   --  39  --   --    FEB  --   --  243  --   --    IRONSAT  --   --  16  --   --        Lab Results   Component Value Date    WBC 6.4 06/20/2022    WBC 7.4 02/05/2020    RBC 3.42 (L) 06/20/2022    RBC 3.77 (L) 02/05/2020    HGB 10.6 (L) 06/20/2022    HGB 12.3 02/05/2020    HCT 32.0 (L) 06/20/2022    HCT 35.7 02/05/2020    MCV 94 06/20/2022    MCV 95 02/05/2020    MCH 31.0 06/20/2022    MCH 32.6 02/05/2020    MCHC 33.1 06/20/2022    MCHC 34.5 02/05/2020    RDW 15.4 (H) 06/20/2022    RDW 12.7 02/05/2020     06/20/2022     02/05/2020       Lab Results   Component Value Date     06/20/2022     02/05/2020    POTASSIUM 4.5 06/20/2022    POTASSIUM 4.4 02/05/2020    CHLORIDE 107 06/20/2022    CHLORIDE 99 02/05/2020    CO2 23 06/20/2022    CO2 28 02/05/2020    ANIONGAP 4 06/20/2022    ANIONGAP 7 02/05/2020     (H) 06/20/2022    GLC 95 02/05/2020    BUN 6 (L) 06/20/2022    BUN 13 02/05/2020    CR 0.51 (L) 06/20/2022    CR 0.51  (L) 02/05/2020    GFRESTIMATED >90 06/20/2022    GFRESTIMATED >90 02/05/2020    GFRESTBLACK >90 02/05/2020    VICENTE 8.3 (L) 06/20/2022    VICENTE 8.9 02/05/2020      Lab Results   Component Value Date    AST 39 06/19/2022    AST 19 02/05/2020    ALT 28 06/19/2022    ALT 22 02/05/2020       Lab Results   Component Value Date    A1C 5.8 (H) 05/16/2022    A1C 5.4 02/05/2020       Lab Results   Component Value Date    INR 1.00 05/16/2022    INR 1.01 10/16/2018    INR 1.12 03/25/2016         STEVEN DUNHAM CNP  [facility]  Pager: [phone]  RN phone: [phone]      Thank you for allowing me to participate in the care of your patient.      Sincerely,     STEVEN Gan CNP     [facility]  cc:   STEVEN Hyatt CNP  [address] [facility] [address]  [place] [address]

## 2023-07-11 NOTE — H&P
[facility]    History and Physical - Hospitalist Service       Date of Admission:  10/18/2021    Assessment & Plan      Kezia Dixon is a 65 year old female admitted on 10/18/2021. She presents with weakness, fall, and poor appetite    Falls  Generalized weakness  Reports progressive weakness, some incoordination over past several days to weeks. This is in the setting of poor oral intake, hx of EtOH use although denies ongoing use. She was previously recommended to discharge to TCU after hospital stay in August but discharged home with home cares instead.   * differential of fall is broad. Likely due to progressive weakness in setting poor oral intake and possible ongoing EtOH use. Consider cardiac causes with cardiomyopathy hx. Possible vitamin deficiencies (pellagra, beriberi or wernickies) with loose stools, possible confusion, reported incoordination.   - PT/OT/SW  - IV vitamins  - nutrition consult  - TTE  - Tele    Hx of EtOH dependence  Elevated LFTs  Reports she has been abstinent since August. LFTs seem consistent with EtOH use.  - monitor LFTs  - CIWA with lorazepam    Loose stools  Reports 2 loose stools per day after eating or drinking. No associated abdominal pain, fevers, chills, or other infectious symptoms.  There is some formed content to her stools.  -C. difficile and enteric panel ordered in the emergency department, continue precautions until it is resolved.    Heart failure with reduced ejection fraction, chronic  Cardiomyopathy of uncertain cause  HTN  Noted to have EF of 35% with hypokinesia of the septal, anterior, anterolateral, and apical segments.  *Does not appear fluid overloaded on admission  -Monitor volume status closely with IV meds  -Continue PTA metoprolol, lisinopril, spironolactone, aspirin, atorvastatin  -Hold PTA Lasix until potassium was corrected  -Repeat TTE    Hypokalemia, severe  -Replace per protocol  -Check magnesium and  phosphorus    Severe protein calorie malnutrition  Anorexia  Patient reports significantly decreased oral intake for several years that has dropped off even more precipitously in the last several months.  Currently taking 1 Ensure shake at most on a daily basis.  -Consult to nutrition  -Multivitamin  -Encourage oral intake    Status post esophagectomy with stomach pull-through  GERD  -Continue PTA PPI  -Encourage oral intake    Facial bruising  Noted after recent fall.  CT head and CT facial bones are negative for acute pathology.       Diet:   regular  DVT Prophylaxis: Pneumatic Compression Devices  Jaeger Catheter: Not present  Central Lines: PRESENT     Code Status:   DNR/DNI, discussed on admission    Clinically Significant Risk Factors Present on Admission        # Hypokalemia: K = 2.5 mmol/L (Ref range: 3.4 - 5.3 mmol/L) on admission, will replace as needed       # Platelet Defect: home medication list includes an antiplatelet medication      Disposition Plan   Expected discharge:  2-3 day recommended to transitional care unit once lytes fixed, consults complete.     The patient's care was discussed with the Bedside Nurse, Patient and ED MD Team.    Nate Zaman MD  [facility]  Securely message with the Vocera Web Console (learn more here)  Text page via [facility] Paging/Directory      ______________________________________________________________________    Chief Complaint   Weakness, loose stools and fall    History is obtained from the patient, electronic health record and emergency department physician    History of Present Illness   Kezia Dixon is a 65 year old female who presents with weakness, recent fall, and loose stools.  She reports that several months ago she was in the hospital and recommended to go to TCU, but declined.  She has been doing home cares.  She reports that over the last several weeks she has had very poor oral intake, due to anorexia since the time of  her esophagectomy.  She also reports that she has started to become nauseous when she puts anything in her mouth.  As well, she has a bowel movement after she eats anything.  She states that there is formed content to these stools, but they are very loose otherwise.  Typically has 2 episodes of bowel movement per day.  No associated abdominal pain, fevers, chills, rigor, or other associated symptoms.  She is denies any ongoing alcohol use stating that her last drink was in August.    Review of Systems    The 10 point Review of Systems is negative other than noted in the HPI or here.     Past Medical History    I have reviewed this patient's medical history and updated it with pertinent information if needed.   Past Medical History:   Diagnosis Date     Alcoholism (H) 10/14/2016     Benign essential hypertension 10/14/2016     Carotid artery disease (H)     mile plaque 5/7     Chemical dependency (H)     alcohol     Depression with anxiety      Esophageal cancer (H)      Esophageal cancer (H) 3/22/2016     Esophageal mass      GERD (gastroesophageal reflux disease)      Hx of pancreatitis 2003     Hypercholesteremia      Hyperglycemia      Hyperlipemia      Impaired fasting glucose 10/14/2016     Impaired fasting glucose 10/14/2016     Nocturnal leg cramps      Pancreatic pseudocyst 10/14/2016     Pancreatic pseudocyst 10/14/2016     Pancreatitis     with pseudocyst     Pap smear with atypical squamous cells, cannot exclude high grade squamous intraepithelial lesion (ASC-H) 10/14/16    Colpo impression benign, ECC benign. Cotestin in 1 yr.     Shingles      Vasomotor rhinitis        Past Surgical History   I have reviewed this patient's surgical history and updated it with pertinent information if needed.  Past Surgical History:   Procedure Laterality Date     AAA REPAIR      splenic artery aneurysm embolization     ABDOMEN SURGERY  2/2/2016     COLONOSCOPY  11/26/2013    Procedure: COMBINED COLONOSCOPY, SINGLE  BIOPSY/POLYPECTOMY BY BIOPSY;  COLONOSCOPY (MAC);  Surgeon: Montana Rosa MD;  Location:  GI     COLONOSCOPY  11/26/2013     COLONOSCOPY N/A 10/4/2018    Procedure: COMBINED COLONOSCOPY, SINGLE OR MULTIPLE BIOPSY/POLYPECTOMY BY BIOPSY;  colonoscopy;  Surgeon: Gio Apodaca MD;  Location:  GI     ENT SURGERY       ESOPHAGOGASTRECTOMY N/A 3/22/2016    Procedure: ESOPHAGOGASTRECTOMY;  Surgeon: Alvin Riojas MD;  Location:  OR     ESOPHAGOSCOPY, GASTROSCOPY, DUODENOSCOPY (EGD), COMBINED N/A 12/22/2015    Procedure: COMBINED ENDOSCOPIC ULTRASOUND, ESOPHAGOSCOPY, GASTROSCOPY, DUODENOSCOPY (EGD), FINE NEEDLE ASPIRATE/BIOPSY;  Surgeon: Danelle Michael MD;  Location:  GI     GASTROSTOMY, INSERT TUBE, COMBINED N/A 2/2/2016    Procedure: COMBINED GASTROSTOMY, INSERT TUBE (OPEN);  Surgeon: Alvin Riojas MD;  Location:  OR     GI SURGERY  12/22/2015     HAND SURGERY      right     HERNIORRHAPHY INCISIONAL (LOCATION) N/A 3/19/2019    Procedure: LAPARSCOPIC  INCISIONAL HERNIA REPAIR WITH MESH, LAPARSCOPIC LYSIS OF ADHENSIONS;  Surgeon: Lamberto Magaña MD;  Location:  OR     INSERT PORT VASCULAR ACCESS N/A 12/28/2015    Procedure: INSERT PORT VASCULAR ACCESS;  Surgeon: Alvin Riojas MD;  Location:  OR     LAPAROSCOPIC CHOLECYSTECTOMY N/A 3/19/2019    Procedure: LAPAROSCOPIC CHOLECYSTECTOMY;  Surgeon: Lamberto Magaña MD;  Location:  OR     LOBECTOMY LUNG Right 10/16/2018    Procedure: LOBECTOMY LUNG;  Surgeon: Alvin Riojas MD;  Location:  OR     REMOVE PORT VASCULAR ACCESS Left 7/22/2016    Procedure: REMOVE PORT VASCULAR ACCESS;  Surgeon: Alvin Riojas MD;  Location:  OR     THORACOTOMY Right 10/16/2018    Procedure: REDO RIGHT THORACTOMY AND RIGHT LOWER LOBECTOMY, PLEURAL LYSIS;  Surgeon: Alvin Riojas MD;  Location:  OR     TONSILLECTOMY       VASCULAR SURGERY         Social History   I have reviewed  this patient's social history and updated it with pertinent information if needed.  Social History     Tobacco Use     Smoking status: Former Smoker     Packs/day: 0.25     Quit date: 2015     Years since quittin.8     Smokeless tobacco: Never Used   Substance Use Topics     Alcohol use: No     Alcohol/week: 0.0 standard drinks     Comment: none     Drug use: No       Family History   I have reviewed this patient's family history and updated it with pertinent information if needed.  Family History   Problem Relation Age of Onset     Other Cancer Father         melanoma     Prostate Cancer Father      Diabetes Father      Other Cancer Brother         melanoma     Other Cancer Brother         melanoma     Other Cancer Brother        Prior to Admission Medications   Prior to Admission Medications   Prescriptions Last Dose Informant Patient Reported? Taking?   aspirin (ASA) 81 MG EC tablet 10/17/2021 at am  No Yes   Sig: Take 1 tablet (81 mg) by mouth daily   atorvastatin (LIPITOR) 40 MG tablet 10/17/2021 at am  No Yes   Sig: Take 1 tablet (40 mg) by mouth daily   fluticasone (FLONASE) 50 MCG/ACT nasal spray 10/17/2021 at am  No Yes   Sig: INSTILL 2 SPRAYS INTO BOTH NOSTRILS DAILY.   folic acid (FOLVITE) 1 MG tablet 10/17/2021 at pm  No Yes   Sig: Take 1 tablet (1 mg) by mouth daily   furosemide (LASIX) 40 MG tablet 10/17/2021 at am  No Yes   Sig: Take 1 tablet (40 mg) by mouth every morning   losartan (COZAAR) 25 MG tablet 10/17/2021 at am  No Yes   Sig: Take 0.5 tablets (12.5 mg) by mouth daily   metoprolol succinate ER (TOPROL-XL) 25 MG 24 hr tablet 10/17/2021 at pm  No Yes   Sig: Take 1 tablet (25 mg) by mouth 2 times daily   omeprazole (PRILOSEC) 40 MG DR capsule 10/17/2021 at am  No Yes   Sig: TAKE 1 CAPSULE BY MOUTH EVERY DAY   spironolactone (ALDACTONE) 25 MG tablet 10/17/2021 at am  No Yes   Sig: Take 0.5 tablets (12.5 mg) by mouth every morning   thiamine (B-1) 100 MG tablet 10/17/2021 at am  No Yes    Sig: Take 1 tablet (100 mg) by mouth daily      Facility-Administered Medications: None     Allergies   Allergies   Allergen Reactions     Codeine Sulfate Nausea     Simvastatin Cramps     Leg cramps     Pcn [Penicillins] Rash       Physical Exam   Vital Signs: Temp: 99.1  F (37.3  C) Temp src: Oral BP: (!) 168/73 Pulse: 104   Resp: 16 SpO2: 99 % O2 Device: None (Room air)    Weight: 80 lbs 0 oz    Constitutional: Awake, alert, cooperative, no apparent cardiopulmonary distress.  Eyes: Conjunctiva and pupils examined and normal.  HEENT: Moist mucous membranes, normal dentition.  Respiratory: Clear to auscultation bilaterally, no crackles or wheezing.  Cardiovascular: Regular rate and rhythm, normal S1 and S2, and no murmur noted. Trace BLE edema.  GI: Soft, non-distended, non-tender, normal bowel sounds.  Lymph/Hematologic: No anterior cervical or supraclavicular adenopathy.  Skin: No rashes, no cyanosis, no edema noted on exposed skin.  Musculoskeletal: No joint swelling, erythema or tenderness. No gross bony abnormalities  Neurologic: Cranial nerves 2-12 grossly intact, normal strength and sensation. FTN with some reach past. HTS normal. Diffusely weak (5-/5).  Psychiatric: Alert, oriented to person, place and time, no obvious anxiety or depression.      Data   Data reviewed today: I reviewed all medications, new labs and imaging results over the last 24 hours. I personally reviewed the EKG tracing showing sinus tach and the head CT image(s) showing no acute pathology.    Recent Labs   Lab 10/18/21  1506   WBC 5.4   HGB 12.5   MCV 92         POTASSIUM 2.5*   CHLORIDE 91*   CO2 31   BUN 9   CR 0.45*   ANIONGAP 14   VICENTE 8.9   GLC 94   ALBUMIN 4.0   PROTTOTAL 7.9   BILITOTAL 1.6*   ALKPHOS 132   ALT 55*   AST 90*   LIPASE 70*     Most Recent 3 CBC's:Recent Labs   Lab Test 10/18/21  1506 02/05/20  0849 03/11/19  1141   WBC 5.4 7.4 6.5   HGB 12.5 12.3 11.9   MCV 92 95 93    242 289     Most Recent  3 BMP's:Recent Labs   Lab Test 10/18/21  1506 02/05/20  0849 03/11/19  1141    134 138   POTASSIUM 2.5* 4.4 4.2   CHLORIDE 91* 99 104   CO2 31 28 28   BUN 9 13 18   CR 0.45* 0.51* 0.52   ANIONGAP 14 7 6   VICENTE 8.9 8.9 9.1   GLC 94 95 99     Most Recent 2 LFT's:Recent Labs   Lab Test 10/18/21  1506 02/05/20  0849   AST 90* 19   ALT 55* 22   ALKPHOS 132 57   BILITOTAL 1.6* 0.3     Recent Results (from the past 24 hour(s))   Head CT w/o contrast    Narrative    CT SCAN OF THE HEAD WITHOUT CONTRAST   10/18/2021 3:37 PM     HISTORY: Head trauma, moderate-severe. Pain after fall.    TECHNIQUE:  Axial images of the head and coronal reformations without  IV contrast material.  Radiation dose for this scan was reduced using  automated exposure control, adjustment of the mA and/or kV according  to patient size, or iterative reconstruction technique.    COMPARISON: None.    FINDINGS: There is some mild cerebral atrophy. There is some low  density in the mira which is probably just artifact. There is some  minimal nonspecific white matter changes without mass effect. There is  no evidence for intracranial hemorrhage, mass effect, acute infarct,  or skull fracture. Visualized paranasal sinuses and mastoid air cells  are clear.      Impression    IMPRESSION: Chronic changes. No evidence for intracranial hemorrhage  or any acute process.   CT Facial Bones without Contrast    Narrative    CT FACIAL BONES WITHOUT CONTRAST 10/18/2021 3:37 PM     HISTORY: Trauma, facial injury. Facial pain after fall. Fever and  chills.    TECHNIQUE: Axial images were obtained through the facial bones without  contrast. Coronal and sagittal reconstructions were also acquired.  Radiation dose for this scan was reduced using automated exposure  control, adjustment of the mA and/or kV according to patient size, or  iterative reconstruction technique..    FINDINGS: The facial bones are intact. Specifically, the bony  mandible, pterygoid plates,  zygomatic arches, nasal bones, and bony  orbits are intact. Paranasal sinuses are clear. Both globes are  normal. Retro-orbital structures are unremarkable.      Impression    IMPRESSION: Negative facial bone CT examination.      decreased strength

## 2023-07-17 NOTE — PROGRESS NOTES
SUBJECTIVE:   Kezia Dixon is a 62 year old female who presents to clinic today for the following health issues:      6 months for reevaluation of therapy for postherpetic neuralgia pain      62-year-old female with history of esophageal cancer, hypertension, postherpetic neuralgia, alcoholism in remission, former smoker, protein calorie malnutrition.  She presents in follow-up regarding management of her postherpetic neuralgia pain.  Since her last visit, her pain levels have improved significantly.  She is currently taking gabapentin, amitriptyline and duloxetine to manage her pain.  She is motivated to reduce her analgesic medication.  She has been following up with her thoracic surgeon.  Lung nodules were noted on her most recent surveillance CT scan of the chest and follow-up is recommended in a 3-4 month interval.  She denies dysphagia, odynophagia.  She continues to have trouble gaining weight.  She reports a good appetite.  She denies nausea or vomiting.    Problem list and histories reviewed & adjusted, as indicated.  Additional history: as documented    Patient Active Problem List   Diagnosis     Esophageal cancer (H)     Benign essential hypertension     Alcoholism (H)     Pancreatic pseudocyst     Impaired fasting glucose     Pap smear with atypical squamous cells, cannot exclude high grade squamous intraepithelial lesion (ASC-H)     ACP (advance care planning)     Protein-calorie malnutrition (H)     Post herpetic neuralgia     Past Surgical History:   Procedure Laterality Date     AAA REPAIR      splenic artery aneurysm embolization     ABDOMEN SURGERY  2/2/2016     COLONOSCOPY  11/26/2013    Procedure: COMBINED COLONOSCOPY, SINGLE BIOPSY/POLYPECTOMY BY BIOPSY;  COLONOSCOPY (MAC);  Surgeon: Montana Rosa MD;  Location:  GI     COLONOSCOPY  11/26/2013     ENT SURGERY       ESOPHAGOGASTRECTOMY N/A 3/22/2016    Procedure: ESOPHAGOGASTRECTOMY;  Surgeon: Alvin Riojas MD;   Patient was previously on Cymbalta during our last visit we did discuss about increasing this medication and she declined at that time.  The medication began causing side effects.  Since then she is weaned herself often stops the medication and her anxiety has increased.  We will be restarting an SSRI for this patient.    Location:  OR     ESOPHAGOSCOPY, GASTROSCOPY, DUODENOSCOPY (EGD), COMBINED N/A 12/22/2015    Procedure: COMBINED ENDOSCOPIC ULTRASOUND, ESOPHAGOSCOPY, GASTROSCOPY, DUODENOSCOPY (EGD), FINE NEEDLE ASPIRATE/BIOPSY;  Surgeon: Danelle Michael MD;  Location:  GI     GASTROSTOMY, INSERT TUBE, COMBINED N/A 2/2/2016    Procedure: COMBINED GASTROSTOMY, INSERT TUBE (OPEN);  Surgeon: Alivn Riojas MD;  Location:  OR     GI SURGERY  12/22/2015     HAND SURGERY      right     INSERT PORT VASCULAR ACCESS N/A 12/28/2015    Procedure: INSERT PORT VASCULAR ACCESS;  Surgeon: Alvin Riojas MD;  Location:  OR     REMOVE PORT VASCULAR ACCESS Left 7/22/2016    Procedure: REMOVE PORT VASCULAR ACCESS;  Surgeon: Alvin Riojas MD;  Location:  OR     TONSILLECTOMY       VASCULAR SURGERY         Social History   Substance Use Topics     Smoking status: Former Smoker     Packs/day: 0.25     Quit date: 12/11/2015     Smokeless tobacco: Never Used     Alcohol use 0.0 oz/week      Comment: occas wine     Family History   Problem Relation Age of Onset     Other Cancer Father      melanoma     Prostate Cancer Father      Diabetes Father      Other Cancer Brother      melanoma     Other Cancer Brother      melanoma     Other Cancer Brother          Current Outpatient Prescriptions   Medication Sig Dispense Refill     amitriptyline (ELAVIL) 50 MG tablet Take 1 tablet (50 mg) by mouth At Bedtime 90 tablet 3     DULoxetine (CYMBALTA) 60 MG EC capsule Take 2 capsules (120 mg) by mouth daily (Patient taking differently: Take 120 mg by mouth daily Taking 60 mg daily) 60 capsule 0     fluticasone (FLONASE) 50 MCG/ACT spray Spray 2 sprays into both nostrils daily 1 Bottle 11     hydrochlorothiazide 12.5 MG TABS tablet Take 1 tablet (12.5 mg) by mouth daily 90 tablet 3     metoprolol (LOPRESSOR) 25 MG tablet Take 1 tablet (25 mg) by mouth 2 times daily 180 tablet 3     omeprazole (PRILOSEC) 40 MG capsule  "Take 1 capsule (40 mg) by mouth daily 90 capsule 3     Allergies   Allergen Reactions     Codeine Sulfate Nausea     Simvastatin Cramps     Leg cramps     Penicillins Rash       Reviewed and updated as needed this visit by clinical staff       Reviewed and updated as needed this visit by Provider         ROS:  Constitutional, HEENT, cardiovascular, pulmonary, gi and gu systems are negative, except as otherwise noted.    OBJECTIVE:     /58 (BP Location: Right arm, Cuff Size: Adult Regular)  Pulse 80  Temp 98.2  F (36.8  C) (Oral)  Ht 5' 1.5\" (1.562 m)  Wt 95 lb 6.4 oz (43.3 kg)  SpO2 97%  BMI 17.73 kg/m2  Body mass index is 17.73 kg/(m^2).  General: This is a thin, otherwise well-appearing middle-aged female in no acute distress.  Cardiovascular: The heart has a regular rate and rhythm.  Pulmonary: The lungs are clear to auscultation bilaterally, breathing is not labored.  Skin: No obvious rashes.  Mental status: Normal mood and affect, well-groomed, normal speech.  Neurological: Alert and oriented to person place and time, cranial nerves II to XII appear grossly intact, normal gait.    Diagnostic Test Results:  none     ASSESSMENT/PLAN:       1. Post herpetic neuralgia  We discussed a tapering strategy to stop taking gabapentin as a first step.  If pain is well controlled when she follows up again in 6 months, consider stopping Elavil.    2. Mild protein-calorie malnutrition (H)  Recommended protein supplements.  In the past, she has not tolerated boost or Ensure, recommended [place] Instant Breakfast once daily.  Monitor weight.    3. Malignant neoplasm of esophagus, unspecified location (H)  Continue follow-up with thoracic surgery    4. Alcoholism (H)  In remission    5. Hypertension   Well controlled      Need for prophylactic vaccination with tetanus-diphtheria (TD)  We will need to update tetanus at subsequent visit    6. Visit for screening mammogram    - MA SCREENING DIGITAL BILAT - Future  " (s+30); Future      She received her first of 2 Shingrix vaccines today      FUTURE APPOINTMENTS:       - Follow-up visit in 6 months for physical with fasting laboratory tests; or sooner as needed pending symptoms    Mustapha Velásquez MD  [facility]

## 2023-10-29 ENCOUNTER — HEALTH MAINTENANCE LETTER (OUTPATIENT)
Age: 68
End: 2023-10-29

## 2023-11-06 ENCOUNTER — TRANSFERRED RECORDS (OUTPATIENT)
Dept: HEALTH INFORMATION MANAGEMENT | Facility: CLINIC | Age: 68
End: 2023-11-06

## 2023-11-06 ENCOUNTER — ANCILLARY PROCEDURE (OUTPATIENT)
Dept: CT IMAGING | Facility: CLINIC | Age: 68
End: 2023-11-06
Attending: THORACIC SURGERY (CARDIOTHORACIC VASCULAR SURGERY)
Payer: MEDICARE

## 2023-11-06 DIAGNOSIS — C34.90 NON-SMALL CELL LUNG CANCER (H): ICD-10-CM

## 2023-11-06 PROCEDURE — 74177 CT ABD & PELVIS W/CONTRAST: CPT

## 2023-11-06 PROCEDURE — 250N000011 HC RX IP 250 OP 636: Mod: JZ | Performed by: THORACIC SURGERY (CARDIOTHORACIC VASCULAR SURGERY)

## 2023-11-06 PROCEDURE — 250N000009 HC RX 250: Performed by: THORACIC SURGERY (CARDIOTHORACIC VASCULAR SURGERY)

## 2023-11-06 RX ORDER — IOPAMIDOL 755 MG/ML
72 INJECTION, SOLUTION INTRAVASCULAR ONCE
Status: COMPLETED | OUTPATIENT
Start: 2023-11-06 | End: 2023-11-06

## 2023-11-06 RX ADMIN — IOPAMIDOL 72 ML: 755 INJECTION, SOLUTION INTRAVENOUS at 13:34

## 2023-11-06 RX ADMIN — SODIUM CHLORIDE 40 ML: 9 INJECTION, SOLUTION INTRAVENOUS at 13:34

## 2023-12-13 ENCOUNTER — TRANSFERRED RECORDS (OUTPATIENT)
Dept: HEALTH INFORMATION MANAGEMENT | Facility: CLINIC | Age: 68
End: 2023-12-13
Payer: MEDICARE

## 2023-12-22 DIAGNOSIS — I10 BENIGN ESSENTIAL HYPERTENSION: ICD-10-CM

## 2023-12-22 RX ORDER — OMEPRAZOLE 40 MG/1
CAPSULE, DELAYED RELEASE ORAL
Qty: 90 CAPSULE | Refills: 2 | Status: SHIPPED | OUTPATIENT
Start: 2023-12-22 | End: 2024-09-16

## 2023-12-27 DIAGNOSIS — G62.9 PERIPHERAL POLYNEUROPATHY: ICD-10-CM

## 2023-12-27 RX ORDER — GABAPENTIN 600 MG/1
TABLET ORAL
Qty: 90 TABLET | Refills: 2 | Status: SHIPPED | OUTPATIENT
Start: 2023-12-27 | End: 2024-06-22

## 2024-01-01 ENCOUNTER — APPOINTMENT (OUTPATIENT)
Dept: CT IMAGING | Facility: CLINIC | Age: 69
DRG: 643 | End: 2024-01-01
Attending: NURSE PRACTITIONER
Payer: MEDICARE

## 2024-01-01 ENCOUNTER — APPOINTMENT (OUTPATIENT)
Dept: GENERAL RADIOLOGY | Facility: CLINIC | Age: 69
End: 2024-01-01
Attending: INTERNAL MEDICINE
Payer: MEDICARE

## 2024-01-01 ENCOUNTER — APPOINTMENT (OUTPATIENT)
Dept: PHYSICAL THERAPY | Facility: CLINIC | Age: 69
DRG: 643 | End: 2024-01-01
Attending: PHYSICIAN ASSISTANT
Payer: MEDICARE

## 2024-01-01 ENCOUNTER — APPOINTMENT (OUTPATIENT)
Dept: MRI IMAGING | Facility: CLINIC | Age: 69
DRG: 643 | End: 2024-01-01
Attending: NURSE PRACTITIONER
Payer: MEDICARE

## 2024-01-01 ENCOUNTER — APPOINTMENT (OUTPATIENT)
Dept: ULTRASOUND IMAGING | Facility: CLINIC | Age: 69
DRG: 643 | End: 2024-01-01
Attending: PHYSICIAN ASSISTANT
Payer: MEDICARE

## 2024-01-01 ENCOUNTER — APPOINTMENT (OUTPATIENT)
Dept: GENERAL RADIOLOGY | Facility: CLINIC | Age: 69
DRG: 643 | End: 2024-01-01
Attending: EMERGENCY MEDICINE
Payer: MEDICARE

## 2024-01-01 ENCOUNTER — APPOINTMENT (OUTPATIENT)
Dept: PHYSICAL THERAPY | Facility: CLINIC | Age: 69
DRG: 643 | End: 2024-01-01
Payer: MEDICARE

## 2024-01-01 ENCOUNTER — APPOINTMENT (OUTPATIENT)
Dept: SPEECH THERAPY | Facility: CLINIC | Age: 69
DRG: 643 | End: 2024-01-01
Attending: INTERNAL MEDICINE
Payer: MEDICARE

## 2024-01-01 ENCOUNTER — APPOINTMENT (OUTPATIENT)
Dept: ULTRASOUND IMAGING | Facility: CLINIC | Age: 69
DRG: 643 | End: 2024-01-01
Attending: NURSE PRACTITIONER
Payer: MEDICARE

## 2024-01-01 ENCOUNTER — APPOINTMENT (OUTPATIENT)
Dept: GENERAL RADIOLOGY | Facility: CLINIC | Age: 69
DRG: 643 | End: 2024-01-01
Attending: INTERNAL MEDICINE
Payer: MEDICARE

## 2024-01-01 ENCOUNTER — APPOINTMENT (OUTPATIENT)
Dept: CARDIOLOGY | Facility: CLINIC | Age: 69
End: 2024-01-01
Attending: NURSE PRACTITIONER
Payer: MEDICARE

## 2024-01-01 ENCOUNTER — APPOINTMENT (OUTPATIENT)
Dept: GENERAL RADIOLOGY | Facility: CLINIC | Age: 69
DRG: 643 | End: 2024-01-01
Attending: NURSE PRACTITIONER
Payer: MEDICARE

## 2024-01-01 PROCEDURE — 93971 EXTREMITY STUDY: CPT | Mod: RT

## 2024-01-01 PROCEDURE — 93306 TTE W/DOPPLER COMPLETE: CPT | Mod: 26 | Performed by: INTERNAL MEDICINE

## 2024-01-01 PROCEDURE — 71045 X-RAY EXAM CHEST 1 VIEW: CPT

## 2024-01-01 PROCEDURE — 70553 MRI BRAIN STEM W/O & W/DYE: CPT | Mod: MG

## 2024-01-01 PROCEDURE — 999N000208 ECHOCARDIOGRAM COMPLETE

## 2024-01-01 PROCEDURE — 70496 CT ANGIOGRAPHY HEAD: CPT | Mod: MA

## 2024-01-01 PROCEDURE — 70450 CT HEAD/BRAIN W/O DYE: CPT | Mod: MA

## 2024-01-01 PROCEDURE — C8929 TTE W OR WO FOL WCON,DOPPLER: HCPCS

## 2024-01-01 PROCEDURE — 93925 LOWER EXTREMITY STUDY: CPT

## 2024-01-01 PROCEDURE — G1010 CDSM STANSON: HCPCS

## 2024-01-03 ENCOUNTER — TRANSFERRED RECORDS (OUTPATIENT)
Dept: HEALTH INFORMATION MANAGEMENT | Facility: CLINIC | Age: 69
End: 2024-01-03
Payer: MEDICARE

## 2024-01-04 ENCOUNTER — OFFICE VISIT (OUTPATIENT)
Dept: FAMILY MEDICINE | Facility: CLINIC | Age: 69
End: 2024-01-04
Payer: MEDICARE

## 2024-01-04 ENCOUNTER — HOSPITAL ENCOUNTER (OUTPATIENT)
Dept: MAMMOGRAPHY | Facility: CLINIC | Age: 69
Discharge: HOME | End: 2024-01-04
Attending: INTERNAL MEDICINE | Admitting: INTERNAL MEDICINE
Payer: MEDICARE

## 2024-01-04 VITALS
BODY MASS INDEX: 15.43 KG/M2 | OXYGEN SATURATION: 100 % | RESPIRATION RATE: 13 BRPM | TEMPERATURE: 96.9 F | HEART RATE: 71 BPM | WEIGHT: 81.7 LBS | HEIGHT: 61 IN | DIASTOLIC BLOOD PRESSURE: 60 MMHG | SYSTOLIC BLOOD PRESSURE: 114 MMHG

## 2024-01-04 DIAGNOSIS — Z01.818 PREOP GENERAL PHYSICAL EXAM: Primary | ICD-10-CM

## 2024-01-04 DIAGNOSIS — Z12.31 VISIT FOR SCREENING MAMMOGRAM: ICD-10-CM

## 2024-01-04 DIAGNOSIS — C34.90 MALIGNANT NEOPLASM OF LUNG, UNSPECIFIED LATERALITY, UNSPECIFIED PART OF LUNG (H): ICD-10-CM

## 2024-01-04 DIAGNOSIS — I10 BENIGN ESSENTIAL HYPERTENSION: ICD-10-CM

## 2024-01-04 PROCEDURE — 77063 BREAST TOMOSYNTHESIS BI: CPT

## 2024-01-04 PROCEDURE — 93000 ELECTROCARDIOGRAM COMPLETE: CPT | Performed by: NURSE PRACTITIONER

## 2024-01-04 PROCEDURE — 99214 OFFICE O/P EST MOD 30 MIN: CPT | Performed by: NURSE PRACTITIONER

## 2024-01-04 RX ORDER — UMECLIDINIUM BROMIDE AND VILANTEROL TRIFENATATE 62.5; 25 UG/1; UG/1
1 POWDER RESPIRATORY (INHALATION)
COMMUNITY

## 2024-01-04 ASSESSMENT — PAIN SCALES - GENERAL: PAINLEVEL: NO PAIN (0)

## 2024-01-04 NOTE — PROGRESS NOTES
[address], [address]  [facility] [address]  Phone: [phone]  Primary Provider: Mustapha Velásquez  Pre-op Performing Provider: CHRISTOPHER BUTLER      PREOPERATIVE EVALUATION:  Today's date: 1/4/2024    Kezia is a 68 year old, presenting for the following:  Pre-Op Exam        Surgical Information:  Surgery/Procedure: Port Placement  Surgery Location: [facility]   Surgeon: Dr Riojas  Surgery Date: 1/9/2024  Time of Surgery: 10:25am   Where patient plans to recover: At home with family  Fax number for surgical facility: Note does not need to be faxed, will be available electronically in Epic.    Assessment & Plan     The proposed surgical procedure is considered INTERMEDIATE risk.    (Z01.818) Preop general physical exam  (primary encounter diagnosis)  Comment: ok for surgery. No concerns today.   Plan: EKG 12-lead complete w/read - Clinics            (C34.90) Malignant neoplasm of lung, unspecified laterality, unspecified part of lung (H)  Comment: plan port placement for chemo. Will also be doing radiation  Plan:     (I10) Benign essential hypertension  Comment: stable. Hold losartan morning of procedure   Plan:     (Z12.31) Visit for screening mammogram  Comment: ordered today   Plan: MA Screen Bilateral w/Tyron, CANCELED: MA         SCREENING DIGITAL BILAT - Future  (s+30)                    - No identified additional risk factors other than previously addressed    Antiplatelet or Anticoagulation Medication Instructions:  Aspirin: start holding it today.     Additional Medication Instructions:  Patient is to take all scheduled medications on the day of surgery EXCEPT for modifications listed below:   - ACE/ARB: HOLD on day of surgery (minimum 11 hours for general anesthesia).   - Diuretics: HOLD on the day of surgery.    RECOMMENDATION:  APPROVAL GIVEN to proceed with proposed procedure, without further diagnostic evaluation.        Subjective       HPI related  to upcoming procedure:   Going for port placement for chemo for lung cancer   Feeling fatigued. Slight SOB at times with more activity.   Has a new condo and is in the process of moving.   Quit smoking about 4 years ago   No current alcohol. Quit June 2022.   Struggles eating          1/4/2024    10:18 AM   Preop Questions   1. Have you ever had a heart attack or stroke? No   2. Have you ever had surgery on your heart or blood vessels, such as a stent placement, a coronary artery bypass, or surgery on an artery in your head, neck, heart, or legs? YES - previous port placement d/t esophageal cancer    3. Do you have chest pain with activity? No   4. Do you have a history of  heart failure? No   5. Do you currently have a cold, bronchitis or symptoms of other infection? No   6. Do you have a cough, shortness of breath, or wheezing? YES - mild with more activity    7. Do you or anyone in your family have previous history of blood clots? No   8. Do you or does anyone in your family have a serious bleeding problem such as prolonged bleeding following surgeries or cuts? No   9. Have you ever had problems with anemia or been told to take iron pills? No   10. Have you had any abnormal blood loss such as black, tarry or bloody stools, or abnormal vaginal bleeding? No   11. Have you ever had a blood transfusion? UNKNOWN -    12. Are you willing to have a blood transfusion if it is medically needed before, during, or after your surgery? Yes   13. Have you or any of your relatives ever had problems with anesthesia? No   14. Do you have sleep apnea, excessive snoring or daytime drowsiness? No   15. Do you have any artifical heart valves or other implanted medical devices like a pacemaker, defibrillator, or continuous glucose monitor? No   16. Do you have artificial joints? No   17. Are you allergic to latex? No     Health Care Directive:  Patient has a Health Care Directive on file      Preoperative Review of :   reviewed  - controlled substances reflected in medication list.      Status of Chronic Conditions:  See problem list for active medical problems.  Problems all longstanding and stable, except as noted/documented.  See ROS for pertinent symptoms related to these conditions.    Review of Systems  Constitutional, neuro, ENT, endocrine, pulmonary, cardiac, gastrointestinal, genitourinary, musculoskeletal, integument and psychiatric systems are negative, except as otherwise noted.    Patient Active Problem List    Diagnosis Date Noted    Cavitary lesion of lung 06/18/2022     Priority: Medium    Hypoglycemia 06/08/2022     Priority: Medium    Infection due to 2019 novel coronavirus 06/08/2022     Priority: Medium    Alcohol dependence in remission (H) 11/08/2021     Priority: Medium    CHF (congestive heart failure) (H) 11/08/2021     Priority: Medium    Diarrhea 10/18/2021     Priority: Medium    Hypokalemia 10/18/2021     Priority: Medium    Nausea 10/18/2021     Priority: Medium    Generalized muscle weakness 10/18/2021     Priority: Medium    Injury of head, initial encounter 10/18/2021     Priority: Medium    Malignant neoplasm of lung, unspecified laterality, unspecified part of lung (H) 08/21/2020     Priority: Medium    Cholelithiasis 03/19/2019     Priority: Medium    Incisional hernia without obstruction or gangrene 03/19/2019     Priority: Medium    Chronic pain syndrome 12/19/2018     Priority: Medium     Patient is followed by Mustapha Velásquez MD for ongoing prescription of pain medication.  All refills should only be approved by this provider, or covering partner.    Medication(s): Hydromorphone 2mg.   Maximum quantity per month: #60  Clinic visit frequency required: Q 3 months     Controlled substance agreement:  Encounter-Level CSA:    There are no encounter-level csa.       Patient-Level CSA:    There are no patient-level csa.     Pain Clinic evaluation in the past: No    DIRE Total Score(s):  No flowsheet data  found.    Last [facility] website verification: Unable to check  (12/19/18 - web site down [place])    [email or web address]        Protein-calorie malnutrition (H24) 07/06/2018     Priority: Medium    Post herpetic neuralgia 07/06/2018     Priority: Medium    Benign essential hypertension 10/14/2016     Priority: Medium    Alcohol use disorder, severe, dependence (H) 10/14/2016     Priority: Medium    Pancreatic pseudocyst 10/14/2016     Priority: Medium    Impaired fasting glucose 10/14/2016     Priority: Medium    Pap smear with atypical squamous cells, cannot exclude high grade squamous intraepithelial lesion (ASC-H) 10/14/2016     Priority: Medium     10/14/16 ASC-H pap.   11/03/16 [place]= ECC, Benign. 1 yr co-test   12/7/17 NIL/+ HR HPV (not 16/18). Plan: colposcopy by 3/7/18  12/19/17 [place]- VIRY 1. Plan: Cotest in 1 yr due by 12/19/18 02/05/19 Patient is lost to pap tracking follow-up.         Esophageal cancer (H) 03/22/2016     Priority: Medium      Past Medical History:   Diagnosis Date    Alcohol use disorder, severe, dependence (H) 10/14/2016    Alcoholism (H) 10/14/2016    Benign essential hypertension 10/14/2016    Carotid artery disease (H)     mile plaque 5/7    Chemical dependency (H)     alcohol    Depression with anxiety     Esophageal cancer (H)     Esophageal cancer (H) 3/22/2016    Esophageal mass     GERD (gastroesophageal reflux disease)     Hx of pancreatitis 2003    Hypercholesteremia     Hyperglycemia     Hyperlipemia     Impaired fasting glucose 10/14/2016    Impaired fasting glucose 10/14/2016    Nocturnal leg cramps     Pancreatic pseudocyst 10/14/2016    Pancreatic pseudocyst 10/14/2016    Pancreatitis     with pseudocyst    Pap smear with atypical squamous cells, cannot exclude high grade squamous intraepithelial lesion (ASC-H) 10/14/16    Colpo impression benign, ECC benign. Cotestin in 1 yr.    Shingles     Vasomotor rhinitis      Past Surgical History:   Procedure Laterality Date     AAA REPAIR      splenic artery aneurysm embolization    ABDOMEN SURGERY  2/2/2016    COLONOSCOPY  11/26/2013    Procedure: COMBINED COLONOSCOPY, SINGLE BIOPSY/POLYPECTOMY BY BIOPSY;  COLONOSCOPY (MAC);  Surgeon: Montana Rosa MD;  Location:  GI    COLONOSCOPY  11/26/2013    COLONOSCOPY N/A 10/4/2018    Procedure: COMBINED COLONOSCOPY, SINGLE OR MULTIPLE BIOPSY/POLYPECTOMY BY BIOPSY;  colonoscopy;  Surgeon: Gio Apodaca MD;  Location:  GI    ENT SURGERY      ESOPHAGOGASTRECTOMY N/A 3/22/2016    Procedure: ESOPHAGOGASTRECTOMY;  Surgeon: Alvin Riojas MD;  Location:  OR    ESOPHAGOSCOPY, GASTROSCOPY, DUODENOSCOPY (EGD), COMBINED N/A 12/22/2015    Procedure: COMBINED ENDOSCOPIC ULTRASOUND, ESOPHAGOSCOPY, GASTROSCOPY, DUODENOSCOPY (EGD), FINE NEEDLE ASPIRATE/BIOPSY;  Surgeon: Danelle Michael MD;  Location:  GI    GASTROSTOMY, INSERT TUBE, COMBINED N/A 2/2/2016    Procedure: COMBINED GASTROSTOMY, INSERT TUBE (OPEN);  Surgeon: Alvin Riojas MD;  Location:  OR    GI SURGERY  12/22/2015    HAND SURGERY      right    HERNIORRHAPHY INCISIONAL (LOCATION) N/A 3/19/2019    Procedure: LAPARSCOPIC  INCISIONAL HERNIA REPAIR WITH MESH, LAPARSCOPIC LYSIS OF ADHENSIONS;  Surgeon: Lamberto Magaña MD;  Location:  OR    INSERT PORT VASCULAR ACCESS N/A 12/28/2015    Procedure: INSERT PORT VASCULAR ACCESS;  Surgeon: Alvin Riojas MD;  Location:  OR    LAPAROSCOPIC CHOLECYSTECTOMY N/A 3/19/2019    Procedure: LAPAROSCOPIC CHOLECYSTECTOMY;  Surgeon: Lamberto Magaña MD;  Location:  OR    LOBECTOMY LUNG Right 10/16/2018    Procedure: LOBECTOMY LUNG;  Surgeon: Alvin Riojas MD;  Location:  OR    REMOVE PORT VASCULAR ACCESS Left 7/22/2016    Procedure: REMOVE PORT VASCULAR ACCESS;  Surgeon: Alvin Riojas MD;  Location:  OR    THORACOTOMY Right 10/16/2018    Procedure: REDO RIGHT THORACTOMY AND RIGHT LOWER LOBECTOMY, PLEURAL LYSIS;   Surgeon: Alvin Riojas MD;  Location: [facility] OR    TONSILLECTOMY      VASCULAR SURGERY       Current Outpatient Medications   Medication Sig Dispense Refill    aspirin (ASA) 81 MG EC tablet 1 tablet every other day      atorvastatin (LIPITOR) 40 MG tablet Take 1 tablet (40 mg) by mouth daily 90 tablet 3    fluticasone (FLONASE) 50 MCG/ACT nasal spray INSTILL 2 SPRAYS INTO BOTH NOSTRILS DAILY. 48 mL 2    folic acid (FOLVITE) 1 MG tablet Take 1 tablet (1 mg) by mouth daily (Patient not taking: Reported on 2023) 90 tablet 3    furosemide (LASIX) 40 MG tablet Take 0.5 tablets (20 mg) by mouth daily as needed (edema or shortness of breath) For worsening shortness of breath, leg swelling or weight gain.      gabapentin (NEURONTIN) 600 MG tablet TAKE ONE (1) TABLET BY MOUTH THREE TIMES DAILY. 90 tablet 2    ibuprofen (ADVIL/MOTRIN) 200 MG tablet Take 400 mg by mouth every 4 hours as needed for mild pain      losartan (COZAAR) 50 MG tablet Take 1 tablet (50 mg) by mouth daily 90 tablet 3    metoprolol succinate ER (TOPROL XL) 50 MG 24 hr tablet Take 1 tablet (50 mg) by mouth every morning 90 tablet 3    omeprazole (PRILOSEC) 40 MG DR capsule TAKE 1 CAPSULE BY MOUTH EVERY DAY 90 capsule 2    spironolactone (ALDACTONE) 25 MG tablet Take 0.5 tablets (12.5 mg) by mouth every morning 45 tablet 3    thiamine (B-1) 100 MG tablet Take 1 tablet (100 mg) by mouth daily (Patient not taking: Reported on 2023) 90 tablet 3       Allergies   Allergen Reactions    Codeine Sulfate Nausea    Simvastatin Cramps     Leg cramps    Pcn [Penicillins] Rash        Social History     Tobacco Use    Smoking status: Former     Packs/day: .25     Types: Cigarettes     Quit date: 2015     Years since quittin.0    Smokeless tobacco: Never   Substance Use Topics    Alcohol use: Not Currently     Comment: 2 to 3 drinks of 2oz of Scotch     Family History   Problem Relation Age of Onset    Depression Mother     Substance Abuse  "Father     Other Cancer Father         melanoma    Prostate Cancer Father     Diabetes Father     Substance Abuse Paternal Grandfather     Other Cancer Brother         melanoma    Substance Abuse Brother     Other Cancer Brother         melanoma    Other Cancer Brother      History   Drug Use No         Objective     /60 (BP Location: Left arm, Patient Position: Sitting, Cuff Size: Adult Small)   Pulse 71   Temp 96.9  F (36.1  C) (Tympanic)   Resp 13   Ht 1.549 m (5' 1\")   Wt 37.1 kg (81 lb 11.2 oz)   SpO2 100%   BMI 15.44 kg/m      Physical Exam    GENERAL APPEARANCE: healthy, alert and no distress. Thin stature.     EYES: EOMI,      RESP: lungs clear to auscultation - no rales, rhonchi or wheezes     CV: regular rates and rhythm, normal S1 S2, no S3 or S4 and no murmur, click or rub     MS: extremities normal- no gross deformities noted, no evidence of inflammation in joints, FROM in all extremities.     SKIN: no suspicious lesions or rashes     NEURO: Normal strength and tone, sensory exam grossly normal, mentation intact and speech normal     PSYCH: mentation appears normal. and affect normal/bright    Recent Labs   Lab Test 06/20/22  0830 06/19/22  1922 06/19/22  0831 06/08/22  2146 05/16/22  1028   HGB 10.6*  --  11.7   < > 11.8     --  196   < > 109*   INR  --   --   --   --  1.00     --  134   < > 133   POTASSIUM 4.5 4.7 3.3*   < > 3.4   CR 0.51*  --  0.50*   < > 0.53   A1C  --   --   --   --  5.8*    < > = values in this interval not displayed.        Diagnostics:  [facility] just did labs 12/20/2023:  Hgb 12.6  Plt 228  Crt 0.63  Potassium 4.3  Sodium 137   Ferritin 25.1    EKG: appears normal, NSR, normal axis, normal intervals, no acute ST/T changes c/w ischemia, no LVH by voltage criteria    Revised Cardiac Risk Index (RCRI):  The patient has the following serious cardiovascular risks for perioperative complications:   - No serious cardiac risks = 0 points     RCRI Interpretation: " 0 points: Class I (very low risk - 0.4% complication rate)         Signed Electronically by: STEVEN Mays CNP  Copy of this evaluation report is provided to requesting physician.

## 2024-01-04 NOTE — PATIENT INSTRUCTIONS
Preparing for Your Surgery  Getting started  A nurse will call you to review your health history and instructions. They will give you an arrival time based on your scheduled surgery time. Please be ready to share:  Your doctor's clinic name and phone number  Your medical, surgical, and anesthesia history  A list of allergies and sensitivities  A list of medicines, including herbal treatments and over-the-counter drugs  Whether the patient has a legal guardian (ask how to send us the papers in advance)  Please tell us if you're pregnant--or if there's any chance you might be pregnant. Some surgeries may injure a fetus (unborn baby), so they require a pregnancy test. Surgeries that are safe for a fetus don't always need a test, and you can choose whether to have one.   If you have a child who's having surgery, please ask for a copy of Preparing for Your Child's Surgery.    Preparing for surgery  Within 10 to 30 days of surgery: Have a pre-op exam (sometimes called an H&P, or History and Physical). This can be done at a clinic or pre-operative center.  If you're having a , you may not need this exam. Talk to your care team.  At your pre-op exam, talk to your care team about all medicines you take. If you need to stop any medicines before surgery, ask when to start taking them again.  We do this for your safety. Many medicines can make you bleed too much during surgery. Some change how well surgery (anesthesia) drugs work.  Call your insurance company to let them know you're having surgery. (If you don't have insurance, call [phone].)  Call your clinic if there's any change in your health. This includes signs of a cold or flu (sore throat, runny nose, cough, rash, fever). It also includes a scrape or scratch near the surgery site.  If you have questions on the day of surgery, call your hospital or surgery center.  Eating and drinking guidelines  For your safety: Unless your surgeon tells you otherwise,  follow the guidelines below.  Eat and drink as usual until 8 hours before you arrive for surgery. After that, no food or milk.  Drink clear liquids until 2 hours before you arrive. These are liquids you can see through, like water, Gatorade, and Propel Water. They also include plain black coffee and tea (no cream or milk), candy, and breath mints. You can spit out gum when you arrive.  If you drink alcohol: Stop drinking it the night before surgery.  If your care team tells you to take medicine on the morning of surgery, it's okay to take it with a sip of water.  Preventing infection  Shower or bathe the night before and morning of your surgery. Follow the instructions your clinic gave you. (If no instructions, use regular soap.)  Don't shave or clip hair near your surgery site. We'll remove the hair if needed.  Don't smoke or vape the morning of surgery. You may chew nicotine gum up to 2 hours before surgery. A nicotine patch is okay.  Note: Some surgeries require you to completely quit smoking and nicotine. Check with your surgeon.  Your care team will make every effort to keep you safe from infection. We will:  Clean our hands often with soap and water (or an alcohol-based hand rub).  Clean the skin at your surgery site with a special soap that kills germs.  Give you a special gown to keep you warm. (Cold raises the risk of infection.)  Wear special hair covers, masks, gowns and gloves during surgery.  Give antibiotic medicine, if prescribed. Not all surgeries need antibiotics.  What to bring on the day of surgery  Photo ID and insurance card  Copy of your health care directive, if you have one  Glasses and hearing aids (bring cases)  You can't wear contacts during surgery  Inhaler and eye drops, if you use them (tell us about these when you arrive)  CPAP machine or breathing device, if you use them  A few personal items, if spending the night  If you have . . .  A pacemaker, ICD (cardiac defibrillator) or other  implant: Bring the ID card.  An implanted stimulator: Bring the remote control.  A legal guardian: Bring a copy of the certified (court-stamped) guardianship papers.  Please remove any jewelry, including body piercings. Leave jewelry and other valuables at home.  If you're going home the day of surgery  You must have a responsible adult drive you home. They should stay with you overnight as well.  If you don't have someone to stay with you, and you aren't safe to go home alone, we may keep you overnight. Insurance often won't pay for this.  After surgery  If it's hard to control your pain or you need more pain medicine, please call your surgeon's office.  Questions?   If you have any questions for your care team, list them here: _________________________________________________________________________________________________________________________________________________________________________ ____________________________________ ____________________________________ ____________________________________  For informational purposes only. Not to replace the advice of your health care provider. Copyright   2003, 2019 [employer]. All rights reserved. Clinically reviewed by Jessica Dela Cruz MD. [employer] 136400 - REV 12/22.    How to Take Your Medication Before Surgery  - HOLD (do not take) losartan or Lasix the morning of surgery   Start holding your aspirin now and restart after surgery

## 2024-01-08 RX ORDER — ACETAMINOPHEN 325 MG/1
325-650 TABLET ORAL EVERY 6 HOURS PRN
COMMUNITY

## 2024-01-09 ENCOUNTER — HOSPITAL ENCOUNTER (OUTPATIENT)
Facility: CLINIC | Age: 69
Discharge: HOME | End: 2024-01-09
Attending: THORACIC SURGERY (CARDIOTHORACIC VASCULAR SURGERY) | Admitting: THORACIC SURGERY (CARDIOTHORACIC VASCULAR SURGERY)
Payer: MEDICARE

## 2024-01-09 ENCOUNTER — ANESTHESIA EVENT (OUTPATIENT)
Dept: SURGERY | Facility: CLINIC | Age: 69
End: 2024-01-09
Payer: MEDICARE

## 2024-01-09 ENCOUNTER — APPOINTMENT (OUTPATIENT)
Dept: GENERAL RADIOLOGY | Facility: CLINIC | Age: 69
End: 2024-01-09
Attending: THORACIC SURGERY (CARDIOTHORACIC VASCULAR SURGERY)
Payer: MEDICARE

## 2024-01-09 ENCOUNTER — ANESTHESIA (OUTPATIENT)
Dept: SURGERY | Facility: CLINIC | Age: 69
End: 2024-01-09
Payer: MEDICARE

## 2024-01-09 VITALS
DIASTOLIC BLOOD PRESSURE: 56 MMHG | OXYGEN SATURATION: 100 % | BODY MASS INDEX: 15.65 KG/M2 | HEIGHT: 61 IN | HEART RATE: 69 BPM | RESPIRATION RATE: 14 BRPM | WEIGHT: 82.9 LBS | SYSTOLIC BLOOD PRESSURE: 126 MMHG | TEMPERATURE: 98 F

## 2024-01-09 LAB — POTASSIUM SERPL-SCNC: 4 MMOL/L (ref 3.4–5.3)

## 2024-01-09 PROCEDURE — 999N000141 HC STATISTIC PRE-PROCEDURE NURSING ASSESSMENT: Performed by: THORACIC SURGERY (CARDIOTHORACIC VASCULAR SURGERY)

## 2024-01-09 PROCEDURE — 272N000001 HC OR GENERAL SUPPLY STERILE: Performed by: THORACIC SURGERY (CARDIOTHORACIC VASCULAR SURGERY)

## 2024-01-09 PROCEDURE — 258N000003 HC RX IP 258 OP 636: Performed by: THORACIC SURGERY (CARDIOTHORACIC VASCULAR SURGERY)

## 2024-01-09 PROCEDURE — C1788 PORT, INDWELLING, IMP: HCPCS | Performed by: THORACIC SURGERY (CARDIOTHORACIC VASCULAR SURGERY)

## 2024-01-09 PROCEDURE — 250N000011 HC RX IP 250 OP 636: Performed by: THORACIC SURGERY (CARDIOTHORACIC VASCULAR SURGERY)

## 2024-01-09 PROCEDURE — 360N000082 HC SURGERY LEVEL 2 W/ FLUORO, PER MIN: Performed by: THORACIC SURGERY (CARDIOTHORACIC VASCULAR SURGERY)

## 2024-01-09 PROCEDURE — 258N000003 HC RX IP 258 OP 636: Performed by: SURGERY

## 2024-01-09 PROCEDURE — 999N000063 XR CHEST PORT 1 VIEW

## 2024-01-09 PROCEDURE — 84132 ASSAY OF SERUM POTASSIUM: CPT | Performed by: SURGERY

## 2024-01-09 PROCEDURE — 250N000009 HC RX 250: Performed by: THORACIC SURGERY (CARDIOTHORACIC VASCULAR SURGERY)

## 2024-01-09 PROCEDURE — 710N000012 HC RECOVERY PHASE 2, PER MINUTE: Performed by: THORACIC SURGERY (CARDIOTHORACIC VASCULAR SURGERY)

## 2024-01-09 PROCEDURE — 250N000011 HC RX IP 250 OP 636: Performed by: SURGERY

## 2024-01-09 PROCEDURE — 710N000009 HC RECOVERY PHASE 1, LEVEL 1, PER MIN: Performed by: THORACIC SURGERY (CARDIOTHORACIC VASCULAR SURGERY)

## 2024-01-09 PROCEDURE — 250N000009 HC RX 250: Performed by: SURGERY

## 2024-01-09 PROCEDURE — 370N000017 HC ANESTHESIA TECHNICAL FEE, PER MIN: Performed by: THORACIC SURGERY (CARDIOTHORACIC VASCULAR SURGERY)

## 2024-01-09 RX ORDER — LIDOCAINE HYDROCHLORIDE 20 MG/ML
INJECTION, SOLUTION INFILTRATION; PERINEURAL PRN
Status: DISCONTINUED | OUTPATIENT
Start: 2024-01-09 | End: 2024-01-09

## 2024-01-09 RX ORDER — SODIUM CHLORIDE, SODIUM LACTATE, POTASSIUM CHLORIDE, CALCIUM CHLORIDE 600; 310; 30; 20 MG/100ML; MG/100ML; MG/100ML; MG/100ML
INJECTION, SOLUTION INTRAVENOUS CONTINUOUS
Status: DISCONTINUED | OUTPATIENT
Start: 2024-01-09 | End: 2024-01-09 | Stop reason: HOSPADM

## 2024-01-09 RX ORDER — ACETAMINOPHEN 325 MG/1
650 TABLET ORAL
Status: DISCONTINUED | OUTPATIENT
Start: 2024-01-09 | End: 2024-01-09 | Stop reason: HOSPADM

## 2024-01-09 RX ORDER — PROPOFOL 10 MG/ML
INJECTION, EMULSION INTRAVENOUS CONTINUOUS PRN
Status: DISCONTINUED | OUTPATIENT
Start: 2024-01-09 | End: 2024-01-09

## 2024-01-09 RX ORDER — LIDOCAINE 40 MG/G
CREAM TOPICAL
Status: DISCONTINUED | OUTPATIENT
Start: 2024-01-09 | End: 2024-01-09 | Stop reason: HOSPADM

## 2024-01-09 RX ORDER — PROPOFOL 10 MG/ML
INJECTION, EMULSION INTRAVENOUS PRN
Status: DISCONTINUED | OUTPATIENT
Start: 2024-01-09 | End: 2024-01-09

## 2024-01-09 RX ORDER — SODIUM CHLORIDE, SODIUM LACTATE, POTASSIUM CHLORIDE, CALCIUM CHLORIDE 600; 310; 30; 20 MG/100ML; MG/100ML; MG/100ML; MG/100ML
INJECTION, SOLUTION INTRAVENOUS CONTINUOUS PRN
Status: DISCONTINUED | OUTPATIENT
Start: 2024-01-09 | End: 2024-01-09

## 2024-01-09 RX ORDER — HEPARIN SODIUM (PORCINE) LOCK FLUSH IV SOLN 100 UNIT/ML 100 UNIT/ML
SOLUTION INTRAVENOUS PRN
Status: DISCONTINUED | OUTPATIENT
Start: 2024-01-09 | End: 2024-01-09 | Stop reason: HOSPADM

## 2024-01-09 RX ORDER — HYDROCODONE BITARTRATE AND ACETAMINOPHEN 5; 325 MG/1; MG/1
1 TABLET ORAL
Status: DISCONTINUED | OUTPATIENT
Start: 2024-01-09 | End: 2024-01-09 | Stop reason: HOSPADM

## 2024-01-09 RX ORDER — FENTANYL CITRATE 50 UG/ML
INJECTION, SOLUTION INTRAMUSCULAR; INTRAVENOUS PRN
Status: DISCONTINUED | OUTPATIENT
Start: 2024-01-09 | End: 2024-01-09

## 2024-01-09 RX ORDER — LIDOCAINE HYDROCHLORIDE 10 MG/ML
INJECTION, SOLUTION INFILTRATION; PERINEURAL PRN
Status: DISCONTINUED | OUTPATIENT
Start: 2024-01-09 | End: 2024-01-09 | Stop reason: HOSPADM

## 2024-01-09 RX ORDER — ONDANSETRON 2 MG/ML
INJECTION INTRAMUSCULAR; INTRAVENOUS PRN
Status: DISCONTINUED | OUTPATIENT
Start: 2024-01-09 | End: 2024-01-09

## 2024-01-09 RX ORDER — CEFAZOLIN SODIUM/WATER 2 G/20 ML
2 SYRINGE (ML) INTRAVENOUS
Status: COMPLETED | OUTPATIENT
Start: 2024-01-09 | End: 2024-01-09

## 2024-01-09 RX ADMIN — PHENYLEPHRINE HYDROCHLORIDE 150 MCG: 10 INJECTION INTRAVENOUS at 10:26

## 2024-01-09 RX ADMIN — PROPOFOL 100 MCG/KG/MIN: 10 INJECTION, EMULSION INTRAVENOUS at 09:55

## 2024-01-09 RX ADMIN — PHENYLEPHRINE HYDROCHLORIDE 100 MCG: 10 INJECTION INTRAVENOUS at 10:15

## 2024-01-09 RX ADMIN — PHENYLEPHRINE HYDROCHLORIDE 100 MCG: 10 INJECTION INTRAVENOUS at 10:12

## 2024-01-09 RX ADMIN — FENTANYL CITRATE 25 MCG: 50 INJECTION INTRAMUSCULAR; INTRAVENOUS at 10:02

## 2024-01-09 RX ADMIN — PHENYLEPHRINE HYDROCHLORIDE 100 MCG: 10 INJECTION INTRAVENOUS at 10:08

## 2024-01-09 RX ADMIN — ONDANSETRON 4 MG: 2 INJECTION INTRAMUSCULAR; INTRAVENOUS at 09:55

## 2024-01-09 RX ADMIN — PROPOFOL 20 MG: 10 INJECTION, EMULSION INTRAVENOUS at 09:58

## 2024-01-09 RX ADMIN — Medication 2 G: at 09:52

## 2024-01-09 RX ADMIN — PROPOFOL 20 MG: 10 INJECTION, EMULSION INTRAVENOUS at 10:05

## 2024-01-09 RX ADMIN — MIDAZOLAM 1 MG: 1 INJECTION INTRAMUSCULAR; INTRAVENOUS at 09:59

## 2024-01-09 RX ADMIN — PROPOFOL 30 MG: 10 INJECTION, EMULSION INTRAVENOUS at 10:01

## 2024-01-09 RX ADMIN — SODIUM CHLORIDE, POTASSIUM CHLORIDE, SODIUM LACTATE AND CALCIUM CHLORIDE: 600; 310; 30; 20 INJECTION, SOLUTION INTRAVENOUS at 09:40

## 2024-01-09 RX ADMIN — MIDAZOLAM 1 MG: 1 INJECTION INTRAMUSCULAR; INTRAVENOUS at 09:52

## 2024-01-09 RX ADMIN — SODIUM CHLORIDE, POTASSIUM CHLORIDE, SODIUM LACTATE AND CALCIUM CHLORIDE: 600; 310; 30; 20 INJECTION, SOLUTION INTRAVENOUS at 09:52

## 2024-01-09 RX ADMIN — LIDOCAINE HYDROCHLORIDE 50 MG: 20 INJECTION, SOLUTION INFILTRATION; PERINEURAL at 09:55

## 2024-01-09 ASSESSMENT — ACTIVITIES OF DAILY LIVING (ADL)
ADLS_ACUITY_SCORE: 35
ADLS_ACUITY_SCORE: 33

## 2024-01-09 ASSESSMENT — COPD QUESTIONNAIRES: COPD: 1

## 2024-01-09 NOTE — ANESTHESIA CARE TRANSFER NOTE
Patient: Kezia Dixon    Procedure: Procedure(s):  SMART PORT PLACEMENT       Diagnosis: Non-small cell lung cancer, unspecified laterality (H) [C34.90]  Malignant neoplasm of esophagus, unspecified location (H) [C15.9]  Diagnosis Additional Information: No value filed.    Anesthesia Type:   MAC     Note:    Oropharynx: oropharynx clear of all foreign objects and spontaneously breathing  Level of Consciousness: drowsy  Oxygen Supplementation: face mask  Level of Supplemental Oxygen (L/min / FiO2): 6  Independent Airway: airway patency satisfactory and stable  Dentition: dentition unchanged  Vital Signs Stable: post-procedure vital signs reviewed and stable  Report to RN Given: handoff report given  Patient transferred to: Phase II    Handoff Report: Identifed the Patient, Identified the Reponsible Provider, Reviewed the pertinent medical history, Discussed the surgical course, Reviewed Intra-OP anesthesia mangement and issues during anesthesia, Set expectations for post-procedure period and Allowed opportunity for questions and acknowledgement of understanding      Vitals:  Vitals Value Taken Time   /45 01/09/24 1033   Temp     Pulse 69 01/09/24 1036   Resp 15 01/09/24 1036   SpO2 98 % 01/09/24 1036   Vitals shown include unfiled device data.    Electronically Signed By: STEVEN Kirkland CRNA  January 9, 2024  10:37 AM

## 2024-01-09 NOTE — DISCHARGE INSTRUCTIONS
Same Day Surgery Discharge Instructions for  Sedation and General Anesthesia     It's not unusual to feel dizzy, light-headed or faint for up to 24 hours after surgery or while taking pain medication.  If you have these symptoms: sit for a few minutes before standing and have someone assist you when you get up to walk or use the bathroom.    You should rest and relax for the next 24 hours. We recommend you make arrangements to have an adult stay with you for at least 24 hours after your discharge.  Avoid hazardous and strenuous activity.    DO NOT DRIVE any vehicle or operate mechanical equipment for 24 hours following the end of your surgery.  Even though you may feel normal, your reactions may be affected by the medication you have received.    Do not drink alcoholic beverages for 24 hours following surgery.     Slowly progress to your regular diet as you feel able. It's not unusual to feel nauseated and/or vomit after receiving anesthesia.  If you develop these symptoms, drink clear liquids (apple juice, ginger ale, broth, 7-up, etc. ) until you feel better.  If your nausea and vomiting persists for 24 hours, please notify your surgeon.      All narcotic pain medications, along with inactivity and anesthesia, can cause constipation. Drinking plenty of liquids and increasing fiber intake will help.    For any questions of a medical nature, call your surgeon.    Do not make important decisions for 24 hours.    If you had general anesthesia, you may have a sore throat for a couple of days related to the breathing tube used during surgery.  You may use Cepacol lozenges to help with this discomfort.  If it worsens or if you develop a fever, contact your surgeon.     If you feel your pain is not well managed with the pain medications prescribed by your surgeon, please contact your surgeon's office to let them know so they can address your concerns.     Discharge Instructions for Port Placement    General  Instructions:  You will be given a card with information about your port.  You should carry this with you in your wallet/billfold. It provides information to clinicians about your port.  You should also carry the card in case your port triggers any security devices.     Activity:  Limit your activity for the first few days after your port is placed    Incision Care:   Unless otherwise directed by your surgeon, the dressing may removed tomorrow.    If a topical skin adhesive was used to close incision, you may shower tomorrow. Do not use soaps, lotions, or ointments on the wound area. Do not scrub the wound. After bathing, pat the wound dry with a soft towel.  Do not scratch, rub, or pick at the strips or film. Do not place tape directly over the strips or film.  Do not apply liquids (such as peroxide), ointments, or creams to the wound while the strips or film are in place.    Call your surgeon if you have:   Redness, swelling or drainage from incision   A fever of 101 F or greater.    Nausea or vomiting.   Pain that is not controlled by medications and/or rest.   Questions or concerns.      **If you have questions or concerns about your procedure,  call Dr. Riojas at [phone]**

## 2024-01-09 NOTE — ANESTHESIA POSTPROCEDURE EVALUATION
Patient: Kezia Dixon    Procedure: Procedure(s):  SMART PORT PLACEMENT       Anesthesia Type:  MAC    Note:  Disposition: Outpatient   Postop Pain Control: Uneventful            Sign Out: Well controlled pain   PONV: No   Neuro/Psych: Uneventful            Sign Out: Acceptable/Baseline neuro status   Airway/Respiratory: Uneventful            Sign Out: Acceptable/Baseline resp. status   CV/Hemodynamics: Uneventful            Sign Out: Acceptable CV status   Other NRE: NONE   DID A NON-ROUTINE EVENT OCCUR? No           Last vitals:  Vitals Value Taken Time   /53 01/09/24 1111   Temp 36  C (96.8  F) 01/09/24 1033   Pulse 68 01/09/24 1111   Resp 18 01/09/24 1111   SpO2 89 % 01/09/24 1111   Vitals shown include unfiled device data.    Electronically Signed By: Celso Denton MD  January 9, 2024  12:30 PM

## 2024-01-09 NOTE — ANESTHESIA PREPROCEDURE EVALUATION
Anesthesia Pre-Procedure Evaluation    Patient: Kezia Dixon   MRN: [id number] : 1955        Procedure : Procedure(s):  PORT PLACEMENT          Past Medical History:   Diagnosis Date    Alcohol use disorder, severe, dependence (H) 10/14/2016    Alcoholism (H) 10/14/2016    Anemia     Benign essential hypertension 10/14/2016    Carotid artery disease (H24)     mile plaque     Chemical dependency (H)     alcohol    COPD (chronic obstructive pulmonary disease) (H)     Coronary artery disease     Depression with anxiety     Esophageal cancer (H) 2016    SQUAMOUS CELL    Esophageal mass     GERD (gastroesophageal reflux disease)     Hx of pancreatitis     Hypercholesteremia     Hyperglycemia     Hyperlipemia     Impaired fasting glucose 10/14/2016    Nocturnal leg cramps     Other chronic pain     Pancreatic pseudocyst 10/14/2016    Pancreatitis     with pseudocyst    Pap smear with atypical squamous cells, cannot exclude high grade squamous intraepithelial lesion (ASC-H) 10/14/2016    Colpo impression benign, ECC benign. Cotestin in 1 yr.    Shingles     Squamous cell carcinoma of right lung (H)     Vasomotor rhinitis       Past Surgical History:   Procedure Laterality Date    AAA REPAIR      splenic artery aneurysm embolization    ABDOMEN SURGERY  2016    COLONOSCOPY  2013    Procedure: COMBINED COLONOSCOPY, SINGLE BIOPSY/POLYPECTOMY BY BIOPSY;  COLONOSCOPY (MAC);  Surgeon: oMntana Rosa MD;  Location:  GI    COLONOSCOPY  2013    COLONOSCOPY N/A 10/4/2018    Procedure: COMBINED COLONOSCOPY, SINGLE OR MULTIPLE BIOPSY/POLYPECTOMY BY BIOPSY;  colonoscopy;  Surgeon: Gio Apodaca MD;  Location:  GI    ENT SURGERY      ESOPHAGOGASTRECTOMY N/A 3/22/2016    Procedure: ESOPHAGOGASTRECTOMY;  Surgeon: Alvin Riojas MD;  Location:  OR    ESOPHAGOSCOPY, GASTROSCOPY, DUODENOSCOPY (EGD), COMBINED N/A 2015    Procedure: COMBINED ENDOSCOPIC ULTRASOUND,  ESOPHAGOSCOPY, GASTROSCOPY, DUODENOSCOPY (EGD), FINE NEEDLE ASPIRATE/BIOPSY;  Surgeon: Danelle Michael MD;  Location:  GI    GASTROSTOMY, INSERT TUBE, COMBINED N/A 2016    Procedure: COMBINED GASTROSTOMY, INSERT TUBE (OPEN);  Surgeon: Alvin Riojas MD;  Location:  OR    GI SURGERY  2015    HAND SURGERY      right    HERNIORRHAPHY INCISIONAL (LOCATION) N/A 3/19/2019    Procedure: LAPARSCOPIC  INCISIONAL HERNIA REPAIR WITH MESH, LAPARSCOPIC LYSIS OF ADHENSIONS;  Surgeon: Lamberto Magaña MD;  Location:  OR    INSERT PORT VASCULAR ACCESS N/A 2015    Procedure: INSERT PORT VASCULAR ACCESS;  Surgeon: Alvin Riojas MD;  Location:  OR    LAPAROSCOPIC CHOLECYSTECTOMY N/A 3/19/2019    Procedure: LAPAROSCOPIC CHOLECYSTECTOMY;  Surgeon: Lamberto Magaña MD;  Location:  OR    LOBECTOMY LUNG Right 10/16/2018    Procedure: LOBECTOMY LUNG;  Surgeon: Alvin Riojas MD;  Location:  OR    REMOVE PORT VASCULAR ACCESS Left 2016    Procedure: REMOVE PORT VASCULAR ACCESS;  Surgeon: Alvin Riojas MD;  Location:  OR    THORACOTOMY Right 10/16/2018    Procedure: REDO RIGHT THORACTOMY AND RIGHT LOWER LOBECTOMY, PLEURAL LYSIS;  Surgeon: Alvin Riojas MD;  Location:  OR    TONSILLECTOMY      VASCULAR SURGERY        Allergies   Allergen Reactions    Codeine Sulfate Nausea    Morphine     Simvastatin Cramps     Leg cramps    Pcn [Penicillins] Rash      Social History     Tobacco Use    Smoking status: Former     Packs/day: .25     Types: Cigarettes     Quit date: 2015     Years since quittin.0    Smokeless tobacco: Never   Substance Use Topics    Alcohol use: Not Currently     Comment: 2 to 3 drinks of 2oz of Scotch      Wt Readings from Last 1 Encounters:   24 37.1 kg (81 lb 11.2 oz)        Anesthesia Evaluation            ROS/MED HX  ENT/Pulmonary:     (+)           allergic rhinitis,               COPD,               Neurologic:       Cardiovascular: Comment: [facility]  [facility]  [address]  [address]  [place] [address]  Phone [phone]  Fax [phone]     Name: MATTEO CASTAÑEDA  MRN: [id number]  : 1955  Study Date: 2022 11:07 AM  Age: 66 yrs  Gender: Female  Patient Location: [facility]  Reason For Study: Chronic systolic congestive heart failure; Pulmonary  Hypertension  Ordering Physician: ASA PHAN  Referring Physician: Mustapha Velásquez  Performed By: Nickie Pope     BSA: 1.3 m2  Height: 61 in  Weight: 86 lb  HR: 77  BP: 117/77 mmHg  ______________________________________________________________________________  Procedure  Complete Echo Adult.     ______________________________________________________________________________  Interpretation Summary     Poor image quality  Hyperdynamic left ventricular function  The visual ejection fraction is 65-70%.  Diastolic Doppler findings (E/E' ratio and/or other parameters) suggest left  ventricular filling pressures are increased.  The left ventricular apex is not well visualized.  There is mild (1+) aortic regurgitation.  ______________________________________________________________________________  Left Ventricle  The left ventricular cavity is small. Hyperdynamic left ventricular function.  The visual ejection fraction is 65-70%. Grade I or early diastolic  dysfunction. Diastolic Doppler findings (E/E' ratio and/or other parameters)  suggest left ventricular filling pressures are increased. The left ventricular  apex is not well visualized.     Right Ventricle  The right ventricle is not well visualized. The right ventricular systolic  function is normal.     Atria  The left atrium is not well visualized. Normal left atrial size. Right atrium  not well visualized.     Mitral Valve  The mitral valve is not well visualized. There is trace to mild  mitral  regurgitation. Normal mitral valve velocity.     Tricuspid Valve  The tricuspid valve is not well visualized. The right ventricular systolic  pressure is elevated at 30.0 mmHg.     Aortic Valve  The aortic valve is not well visualized. There is mild (1+) aortic  regurgitation. No hemodynamically significant valvular aortic stenosis.     Pulmonic Valve  The pulmonic valve is not well visualized. Normal pulmonic valve velocity.     Vessels  Normal size aorta. The inferior vena cava is normal.     Pericardium  There is pericardial thickening and/or a small pericardial effusion.      (+) Dyslipidemia hypertension- Peripheral Vascular Disease-- Carotid Stenosis.  CAD -  - -      CHF     SANCHEZ.                           METS/Exercise Tolerance:     Hematologic:     (+)      anemia,          Musculoskeletal: Comment: Post herpetic neuralgia      GI/Hepatic: Comment: Esophageal cancer    (+) GERD,                   Renal/Genitourinary:       Endo:       Psychiatric/Substance Use:     (+) psychiatric history anxiety and depression alcohol abuse      Infectious Disease:       Malignancy:   (+) Malignancy, History of Lung and GI.    Other:      (+)  , H/O Chronic Pain,            OUTSIDE LABS:  CBC:   Lab Results   Component Value Date    WBC 6.4 06/20/2022    WBC 4.5 06/19/2022    HGB 10.6 (L) 06/20/2022    HGB 11.7 06/19/2022    HCT 32.0 (L) 06/20/2022    HCT 35.4 06/19/2022     06/20/2022     06/19/2022     BMP:   Lab Results   Component Value Date     06/20/2022     06/19/2022    POTASSIUM 4.5 06/20/2022    POTASSIUM 4.7 06/19/2022    CHLORIDE 107 06/20/2022    CHLORIDE 103 06/19/2022    CO2 23 06/20/2022    CO2 23 06/19/2022    BUN 6 (L) 06/20/2022    BUN 8 06/19/2022    CR 0.51 (L) 06/20/2022    CR 0.50 (L) 06/19/2022     (H) 06/20/2022    GLC 91 06/19/2022     COAGS:   Lab Results   Component Value Date    INR 1.00 05/16/2022     POC:   Lab Results   Component Value Date    BGM 98  "10/19/2018     HEPATIC:   Lab Results   Component Value Date    ALBUMIN 2.6 (L) 06/19/2022    PROTTOTAL 6.0 (L) 06/19/2022    ALT 28 06/19/2022    AST 39 06/19/2022    ALKPHOS 119 06/19/2022    BILITOTAL 0.8 06/19/2022    SOFIA 26 06/08/2022     OTHER:   Lab Results   Component Value Date    PH 7.48 (H) 06/08/2022    LACT 1.5 06/18/2022    A1C 5.8 (H) 05/16/2022    VICENTE 8.3 (L) 06/20/2022    PHOS 3.5 06/19/2022    MAG 1.9 06/20/2022    LIPASE 52 (L) 06/08/2022    TSH 2.58 06/17/2022    CRP <2.9 06/18/2022       Anesthesia Plan    ASA Status:  3    NPO Status:  NPO Appropriate    Anesthesia Type: MAC.     - Reason for MAC: immobility needed, straight local not clinically adequate              Consents    Anesthesia Plan(s) and associated risks, benefits, and realistic alternatives discussed. Questions answered and patient/representative(s) expressed understanding.     - Discussed:     - Discussed with:  Patient            Postoperative Care    Pain management: IV analgesics, Multi-modal analgesia.   PONV prophylaxis: Background Propofol Infusion     Comments:               Celso Denton MD    I have reviewed the pertinent notes and labs in the chart from the past 30 days and (re)examined the patient.  Any updates or changes from those notes are reflected in this note.             # Drug Induced Platelet Defect: home medication list includes an antiplatelet medication  # Cachexia: Estimated body mass index is 15.44 kg/m  as calculated from the following:    Height as of 1/4/24: 1.549 m (5' 1\").    Weight as of 1/4/24: 37.1 kg (81 lb 11.2 oz).      "

## 2024-01-09 NOTE — OP NOTE
DATE OF PROCEDURE:  January 9, 2024      SURGEON:  Alvin Riojas MD   FIRST ASSIST: Destiny Kendall PA-C      PREOPERATIVE DIAGNOSIS:  lung cancer      POSTOPERATIVE DIAGNOSIS:  Same       PROCEDURE:  Placement of  implantable port, left subclavian vein.       ANESTHESIA:  Local with lidocaine 1% without epinephrine and sedation.       INDICATIONS:  Patient will undergo chemotherapy and a PowerPort is indicated for venous access.       DESCRIPTION OF PROCEDURE:  The patient was brought to the OR and placed in supine position.  The patient was placed into Trendelenburg.  IV sedation was given.  The neck and upper chest were prepared and draped in the usual fashion using ChloraPrep.  Local anesthesia was performed with lidocaine 1% without epinephrine. The left subclavian vein was punctured easily with a 16-gauge needle on the first attempt.  There was excellent blood return.  A guidewire was advanced through the needle without any resistance.  The needle was removed.  A transverse incision was made along the guidewire.  Subcutaneous pocket was made inferior to the incision.  Hemostasis was verified and was excellent.  An introducer with a peel-away sheath was introduced into the vein over the guidewire.  Guidewire and introducer were then removed.  The catheter was advanced through the peel-away sheath without resistance. The peel-away sheath was removed.  The catheter was placed at 18.5 cm at the skin level.  The catheter was cut and connected to the port.  The port was placed in the subcutaneous pocket.  The port was accessed with a non-coring needle.  There was excellent blood return, PowerPort was finally flushed with 10 mL of solution of heparin flush.  The incision was closed in the usual fashion.  A chest x-ray performed in the operating room and showed the catheter was in excellent position and the PowerPort is ready to be used.           ALVIN RIOJAS MD

## 2024-01-10 ENCOUNTER — ANCILLARY PROCEDURE (OUTPATIENT)
Dept: MRI IMAGING | Facility: CLINIC | Age: 69
End: 2024-01-10
Attending: INTERNAL MEDICINE
Payer: MEDICARE

## 2024-01-10 DIAGNOSIS — C34.31 SQUAMOUS CELL CARCINOMA OF BRONCHUS IN RIGHT LOWER LOBE (H): ICD-10-CM

## 2024-01-10 DIAGNOSIS — C15.5 MALIGNANT NEOPLASM OF LOWER THIRD OF ESOPHAGUS (H): ICD-10-CM

## 2024-01-10 PROCEDURE — 255N000002 HC RX 255 OP 636: Performed by: INTERNAL MEDICINE

## 2024-01-10 PROCEDURE — A9585 GADOBUTROL INJECTION: HCPCS | Performed by: INTERNAL MEDICINE

## 2024-01-10 PROCEDURE — 70553 MRI BRAIN STEM W/O & W/DYE: CPT

## 2024-01-10 PROCEDURE — 250N000011 HC RX IP 250 OP 636: Performed by: INTERNAL MEDICINE

## 2024-01-10 RX ORDER — HEPARIN SODIUM (PORCINE) LOCK FLUSH IV SOLN 100 UNIT/ML 100 UNIT/ML
5 SOLUTION INTRAVENOUS ONCE
Status: COMPLETED | OUTPATIENT
Start: 2024-01-10 | End: 2024-01-10

## 2024-01-10 RX ORDER — GADOBUTROL 604.72 MG/ML
4 INJECTION INTRAVENOUS ONCE
Status: COMPLETED | OUTPATIENT
Start: 2024-01-10 | End: 2024-01-10

## 2024-01-10 RX ADMIN — GADOBUTROL 4 ML: 604.72 INJECTION INTRAVENOUS at 14:14

## 2024-01-10 RX ADMIN — HEPARIN SODIUM (PORCINE) LOCK FLUSH IV SOLN 100 UNIT/ML 5 ML: 100 SOLUTION at 14:16

## 2024-01-11 ENCOUNTER — HOSPITAL ENCOUNTER (OUTPATIENT)
Dept: MAMMOGRAPHY | Facility: CLINIC | Age: 69
Discharge: HOME | End: 2024-01-11
Attending: INTERNAL MEDICINE | Admitting: INTERNAL MEDICINE
Payer: MEDICARE

## 2024-01-11 DIAGNOSIS — R92.8 ABNORMAL MAMMOGRAM: ICD-10-CM

## 2024-01-11 PROCEDURE — 77065 DX MAMMO INCL CAD UNI: CPT | Mod: RT

## 2024-01-12 ENCOUNTER — TRANSFERRED RECORDS (OUTPATIENT)
Dept: HEALTH INFORMATION MANAGEMENT | Facility: CLINIC | Age: 69
End: 2024-01-12
Payer: MEDICARE

## 2024-01-15 ENCOUNTER — TRANSFERRED RECORDS (OUTPATIENT)
Dept: HEALTH INFORMATION MANAGEMENT | Facility: CLINIC | Age: 69
End: 2024-01-15
Payer: MEDICARE

## 2024-02-01 ENCOUNTER — TRANSFERRED RECORDS (OUTPATIENT)
Dept: HEALTH INFORMATION MANAGEMENT | Facility: CLINIC | Age: 69
End: 2024-02-01
Payer: MEDICARE

## 2024-02-06 SDOH — HEALTH STABILITY: PHYSICAL HEALTH: ON AVERAGE, HOW MANY MINUTES DO YOU ENGAGE IN EXERCISE AT THIS LEVEL?: 10 MIN

## 2024-02-06 SDOH — HEALTH STABILITY: PHYSICAL HEALTH: ON AVERAGE, HOW MANY DAYS PER WEEK DO YOU ENGAGE IN MODERATE TO STRENUOUS EXERCISE (LIKE A BRISK WALK)?: 5 DAYS

## 2024-02-06 ASSESSMENT — SOCIAL DETERMINANTS OF HEALTH (SDOH): HOW OFTEN DO YOU GET TOGETHER WITH FRIENDS OR RELATIVES?: MORE THAN THREE TIMES A WEEK

## 2024-02-12 ENCOUNTER — ANCILLARY PROCEDURE (OUTPATIENT)
Dept: GENERAL RADIOLOGY | Facility: CLINIC | Age: 69
End: 2024-02-12
Attending: INTERNAL MEDICINE
Payer: MEDICARE

## 2024-02-12 ENCOUNTER — OFFICE VISIT (OUTPATIENT)
Dept: FAMILY MEDICINE | Facility: CLINIC | Age: 69
End: 2024-02-12
Payer: MEDICARE

## 2024-02-12 VITALS
TEMPERATURE: 97.3 F | OXYGEN SATURATION: 100 % | DIASTOLIC BLOOD PRESSURE: 44 MMHG | SYSTOLIC BLOOD PRESSURE: 116 MMHG | RESPIRATION RATE: 14 BRPM | BODY MASS INDEX: 15.86 KG/M2 | WEIGHT: 84 LBS | HEART RATE: 83 BPM | HEIGHT: 61 IN

## 2024-02-12 DIAGNOSIS — M54.9 ACUTE MIDLINE BACK PAIN, UNSPECIFIED BACK LOCATION: ICD-10-CM

## 2024-02-12 DIAGNOSIS — I50.9 CONGESTIVE HEART FAILURE, UNSPECIFIED HF CHRONICITY, UNSPECIFIED HEART FAILURE TYPE (H): ICD-10-CM

## 2024-02-12 DIAGNOSIS — C15.9 MALIGNANT NEOPLASM OF ESOPHAGUS, UNSPECIFIED LOCATION (H): ICD-10-CM

## 2024-02-12 DIAGNOSIS — I10 BENIGN ESSENTIAL HYPERTENSION: ICD-10-CM

## 2024-02-12 DIAGNOSIS — B02.29 POST HERPETIC NEURALGIA: ICD-10-CM

## 2024-02-12 DIAGNOSIS — Z12.11 SCREEN FOR COLON CANCER: ICD-10-CM

## 2024-02-12 DIAGNOSIS — C34.90 MALIGNANT NEOPLASM OF LUNG, UNSPECIFIED LATERALITY, UNSPECIFIED PART OF LUNG (H): ICD-10-CM

## 2024-02-12 DIAGNOSIS — F10.21 ALCOHOL DEPENDENCE IN REMISSION (H): ICD-10-CM

## 2024-02-12 DIAGNOSIS — Z00.00 ENCOUNTER FOR MEDICARE ANNUAL WELLNESS EXAM: Primary | ICD-10-CM

## 2024-02-12 PROCEDURE — 99213 OFFICE O/P EST LOW 20 MIN: CPT | Mod: 25 | Performed by: INTERNAL MEDICINE

## 2024-02-12 PROCEDURE — 72070 X-RAY EXAM THORAC SPINE 2VWS: CPT | Mod: TC | Performed by: RADIOLOGY

## 2024-02-12 PROCEDURE — 72100 X-RAY EXAM L-S SPINE 2/3 VWS: CPT | Mod: TC | Performed by: RADIOLOGY

## 2024-02-12 PROCEDURE — G0439 PPPS, SUBSEQ VISIT: HCPCS | Performed by: INTERNAL MEDICINE

## 2024-02-12 ASSESSMENT — PAIN SCALES - GENERAL: PAINLEVEL: MILD PAIN (3)

## 2024-02-12 NOTE — PROGRESS NOTES
Preventive Care Visit  [facility]  Mustapha Velásquez MD, Internal Medicine  Feb 12, 2024    Assessment & Plan     Encounter for Medicare annual wellness exam      Malignant neoplasm of lung, unspecified laterality, unspecified part of lung (H)  Continue follow up with [place] [facility] and RT[facility] specialists as directed    Acute midline back pain, unspecified back location  Check for fracture  Consider MRI if pain persists and x-rays are not diagnostic, asked her to reach out if that is the case  - XR Thoracic Spine 2 Views; Future  - XR Lumbar Spine 2/3 Views; Future    Benign essential hypertension  Blood pressure well controlled  She is having labs checked and [facility] and declined labs today     CHF (congestive heart failure) (H)  Seems stable; she rarely uses furosemide on a PRN bases     Alcohol dependence in remission (H)      Malignant neoplasm of esophagus, unspecified location (H)  History of treatment for esophageal cancer     Post herpetic neuralgia  The gabapentin helps this    Screen for colon cancer  We discussed that she has become due for a follow-up colonoscopy for history of colon polyp surveillance, but given that she is actively in treatment for another cancer, she declined a referral for colonoscopy.      No LOS data to display   Time spent by me doing chart review, history and exam, documentation and further activities per the note      Counseling  Appropriate preventive services were discussed with this patient, including applicable screening as appropriate for fall prevention, nutrition, physical activity, Tobacco-use cessation, weight loss and cognition.  Checklist reviewing preventive services available has been given to the patient.  Reviewed patient's diet, addressing concerns and/or questions.   Discussed possible causes of fatigue.       FUTURE APPOINTMENTS:       - Follow-up for annual visit or as needed    Estefania Mast is a 68 year old, presenting for the  following:  Physical (Non fasting)          Health Care Directive  Patient has a Health Care Directive on file  Advance care planning document is on file and is current.    HPI    Since I last saw her she has been diagnosed with a new lung cancer  She is getting chemoradiation through [facility]   She describes back pain present for years  Improves with heat and gabapentin  No radiation down legs or new leg weakness/numbness/bowel bladder incontinence reported          2/6/2024   General Health   How would you rate your overall physical health? (!) POOR   Feel stress (tense, anxious, or unable to sleep) Rather much   (!) STRESS CONCERN      2/6/2024   Nutrition   Diet: Regular (no restrictions)         2/6/2024   Exercise   Days per week of moderate/strenous exercise 5 days   Average minutes spent exercising at this level 10 min         2/6/2024   Social Factors   Frequency of gathering with friends or relatives More than three times a week   Worry food won't last until get money to buy more No   Food not last or not have enough money for food? No   Do you have housing?  Yes   Are you worried about losing your housing? No   Lack of transportation? No   Unable to get utilities (heat,electricity)? No         2/6/2024   Fall Risk   Fallen 2 or more times in the past year? No   Trouble with walking or balance? No          2/6/2024   Activities of Daily Living- Home Safety   Needs help with the following daily activites None of the above   Safety concerns in the home None of the above         2/6/2024   Dental   Dentist two times every year? Yes         2/6/2024   Hearing Screening   Hearing concerns? None of the above         2/6/2024   Driving Risk Screening   Patient/family members have concerns about driving No         2/6/2024   General Alertness/Fatigue Screening   Have you been more tired than usual lately? (!) YES         2/6/2024   Urinary Incontinence Screening   Bothered by leaking urine in past 6 months No           No data to display                        2024   Substance Use   Alcohol more than 3/day or more than 7/wk No   Do you have a current opioid prescription? No   How severe/bad is pain from 1 to 10? /10   Do you use any other substances recreationally? No     Social History     Tobacco Use    Smoking status: Former     Packs/day: 0.25     Years: 40.00     Additional pack years: 0.00     Total pack years: 10.00     Types: Cigarettes     Start date: 1970     Quit date: 2015     Years since quittin.1    Smokeless tobacco: Never   Vaping Use    Vaping Use: Never used   Substance Use Topics    Alcohol use: Not Currently     Comment: 2 to 3 drinks of 2oz of Scotch    Drug use: No            No data to display                     The 10-year ASCVD risk score (Grace LICONA, et al., 2019) is: 8.8%    Values used to calculate the score:      Age: 68 years      Sex: Female      Is Non- : No      Diabetic: No      Tobacco smoker: No      Systolic Blood Pressure: 116 mmHg      Is BP treated: Yes      HDL Cholesterol: 93 mg/dL      Total Cholesterol: 155 mg/dL            Reviewed and updated as needed this visit by Provider                    Past Medical History:   Diagnosis Date    Alcohol use disorder, severe, dependence (H) 10/14/2016    Alcoholism (H) 10/14/2016    Anemia     Benign essential hypertension 10/14/2016    Carotid artery disease (H24)     mile plaque     Chemical dependency (H)     alcohol    COPD (chronic obstructive pulmonary disease) (H)     Coronary artery disease     Depression with anxiety     Esophageal cancer (H) 2016    SQUAMOUS CELL    Esophageal mass     GERD (gastroesophageal reflux disease)     Hx of pancreatitis     Hypercholesteremia     Hyperglycemia     Hyperlipemia     Impaired fasting glucose 10/14/2016    Nocturnal leg cramps     Other chronic pain     Pancreatic pseudocyst 10/14/2016    Pancreatitis     with pseudocyst    Pap smear  with atypical squamous cells, cannot exclude high grade squamous intraepithelial lesion (ASC-H) 10/14/2016    Colpo impression benign, ECC benign. Cotestin in 1 yr.    Shingles     Squamous cell carcinoma of right lung (H)     Vasomotor rhinitis      Past Surgical History:   Procedure Laterality Date    AAA REPAIR      splenic artery aneurysm embolization    ABDOMEN SURGERY  2/2/2016    COLONOSCOPY  11/26/2013    Procedure: COMBINED COLONOSCOPY, SINGLE BIOPSY/POLYPECTOMY BY BIOPSY;  COLONOSCOPY (MAC);  Surgeon: Montana Rosa MD;  Location:  GI    COLONOSCOPY  11/26/2013    COLONOSCOPY N/A 10/4/2018    Procedure: COMBINED COLONOSCOPY, SINGLE OR MULTIPLE BIOPSY/POLYPECTOMY BY BIOPSY;  colonoscopy;  Surgeon: Gio Apodaca MD;  Location:  GI    ENT SURGERY      ESOPHAGOGASTRECTOMY N/A 3/22/2016    Procedure: ESOPHAGOGASTRECTOMY;  Surgeon: Alvin Riojas MD;  Location:  OR    ESOPHAGOSCOPY, GASTROSCOPY, DUODENOSCOPY (EGD), COMBINED N/A 12/22/2015    Procedure: COMBINED ENDOSCOPIC ULTRASOUND, ESOPHAGOSCOPY, GASTROSCOPY, DUODENOSCOPY (EGD), FINE NEEDLE ASPIRATE/BIOPSY;  Surgeon: Danelle Michael MD;  Location:  GI    GASTROSTOMY, INSERT TUBE, COMBINED N/A 2/2/2016    Procedure: COMBINED GASTROSTOMY, INSERT TUBE (OPEN);  Surgeon: Alvin Riojas MD;  Location:  OR    GI SURGERY  12/22/2015    HAND SURGERY      right    HERNIORRHAPHY INCISIONAL (LOCATION) N/A 3/19/2019    Procedure: LAPARSCOPIC  INCISIONAL HERNIA REPAIR WITH MESH, LAPARSCOPIC LYSIS OF ADHENSIONS;  Surgeon: Lamberto Magaña MD;  Location:  OR    INSERT PORT VASCULAR ACCESS N/A 12/28/2015    Procedure: INSERT PORT VASCULAR ACCESS;  Surgeon: Alvin Riojas MD;  Location:  OR    INSERT PORT VASCULAR ACCESS N/A 1/9/2024    Procedure: SMART PORT PLACEMENT;  Surgeon: Alvin Riojas MD;  Location:  OR    LAPAROSCOPIC CHOLECYSTECTOMY N/A 3/19/2019    Procedure: LAPAROSCOPIC  CHOLECYSTECTOMY;  Surgeon: Lamberto Magaña MD;  Location:  OR    LOBECTOMY LUNG Right 10/16/2018    Procedure: LOBECTOMY LUNG;  Surgeon: Alvin Riojas MD;  Location:  OR    REMOVE PORT VASCULAR ACCESS Left 7/22/2016    Procedure: REMOVE PORT VASCULAR ACCESS;  Surgeon: Alvin Riojas MD;  Location:  OR    THORACOTOMY Right 10/16/2018    Procedure: REDO RIGHT THORACTOMY AND RIGHT LOWER LOBECTOMY, PLEURAL LYSIS;  Surgeon: Alvin Riojas MD;  Location:  OR    TONSILLECTOMY      VASCULAR SURGERY       BP Readings from Last 3 Encounters:   02/12/24 116/44   01/09/24 126/56   01/04/24 114/60    Wt Readings from Last 3 Encounters:   02/12/24 38.1 kg (84 lb)   01/09/24 37.6 kg (82 lb 14.4 oz)   01/04/24 37.1 kg (81 lb 11.2 oz)                  Patient Active Problem List   Diagnosis    Esophageal cancer (H)    Benign essential hypertension    Alcohol use disorder, severe, dependence (H)    Pancreatic pseudocyst    Impaired fasting glucose    Pap smear with atypical squamous cells, cannot exclude high grade squamous intraepithelial lesion (ASC-H)    Protein-calorie malnutrition (H24)    Post herpetic neuralgia    Chronic pain syndrome    Cholelithiasis    Incisional hernia without obstruction or gangrene    Malignant neoplasm of lung, unspecified laterality, unspecified part of lung (H)    Diarrhea    Hypokalemia    Nausea    Generalized muscle weakness    Injury of head, initial encounter    Alcohol dependence in remission (H)    CHF (congestive heart failure) (H)    Hypoglycemia    Infection due to 2019 novel coronavirus    Cavitary lesion of lung     Past Surgical History:   Procedure Laterality Date    AAA REPAIR      splenic artery aneurysm embolization    ABDOMEN SURGERY  2/2/2016    COLONOSCOPY  11/26/2013    Procedure: COMBINED COLONOSCOPY, SINGLE BIOPSY/POLYPECTOMY BY BIOPSY;  COLONOSCOPY (MAC);  Surgeon: Montana Rosa MD;  Location:  GI    COLONOSCOPY   11/26/2013    COLONOSCOPY N/A 10/4/2018    Procedure: COMBINED COLONOSCOPY, SINGLE OR MULTIPLE BIOPSY/POLYPECTOMY BY BIOPSY;  colonoscopy;  Surgeon: Gio Apodaca MD;  Location:  GI    ENT SURGERY      ESOPHAGOGASTRECTOMY N/A 3/22/2016    Procedure: ESOPHAGOGASTRECTOMY;  Surgeon: Alvin Riojas MD;  Location:  OR    ESOPHAGOSCOPY, GASTROSCOPY, DUODENOSCOPY (EGD), COMBINED N/A 12/22/2015    Procedure: COMBINED ENDOSCOPIC ULTRASOUND, ESOPHAGOSCOPY, GASTROSCOPY, DUODENOSCOPY (EGD), FINE NEEDLE ASPIRATE/BIOPSY;  Surgeon: Danelle Michael MD;  Location:  GI    GASTROSTOMY, INSERT TUBE, COMBINED N/A 2/2/2016    Procedure: COMBINED GASTROSTOMY, INSERT TUBE (OPEN);  Surgeon: Alvin Riojas MD;  Location:  OR    GI SURGERY  12/22/2015    HAND SURGERY      right    HERNIORRHAPHY INCISIONAL (LOCATION) N/A 3/19/2019    Procedure: LAPARSCOPIC  INCISIONAL HERNIA REPAIR WITH MESH, LAPARSCOPIC LYSIS OF ADHENSIONS;  Surgeon: Lamberto Magaña MD;  Location:  OR    INSERT PORT VASCULAR ACCESS N/A 12/28/2015    Procedure: INSERT PORT VASCULAR ACCESS;  Surgeon: Alvin Riojas MD;  Location:  OR    INSERT PORT VASCULAR ACCESS N/A 1/9/2024    Procedure: SMART PORT PLACEMENT;  Surgeon: Alvin Riojas MD;  Location:  OR    LAPAROSCOPIC CHOLECYSTECTOMY N/A 3/19/2019    Procedure: LAPAROSCOPIC CHOLECYSTECTOMY;  Surgeon: Lamberto Magaña MD;  Location:  OR    LOBECTOMY LUNG Right 10/16/2018    Procedure: LOBECTOMY LUNG;  Surgeon: Alvin Riojas MD;  Location:  OR    REMOVE PORT VASCULAR ACCESS Left 7/22/2016    Procedure: REMOVE PORT VASCULAR ACCESS;  Surgeon: Alvin Riojas MD;  Location:  OR    THORACOTOMY Right 10/16/2018    Procedure: REDO RIGHT THORACTOMY AND RIGHT LOWER LOBECTOMY, PLEURAL LYSIS;  Surgeon: Alvin Riojas MD;  Location:  OR    TONSILLECTOMY      VASCULAR SURGERY         Social History     Tobacco Use     Smoking status: Former     Packs/day: 0.25     Years: 40.00     Additional pack years: 0.00     Total pack years: 10.00     Types: Cigarettes     Start date: 1970     Quit date: 2015     Years since quittin.1    Smokeless tobacco: Never   Substance Use Topics    Alcohol use: Not Currently     Family History   Problem Relation Age of Onset    Depression Mother     Substance Abuse Father     Other Cancer Father         melanoma    Prostate Cancer Father     Diabetes Father     Substance Abuse Paternal Grandfather     Other Cancer Brother         melanoma    Substance Abuse Brother     Other Cancer Brother         melanoma    Other Cancer Brother         melanoma    Other Cancer Brother         melanoma         Current Outpatient Medications   Medication Sig Dispense Refill    acetaminophen (TYLENOL) 325 MG tablet Take 325-650 mg by mouth every 6 hours as needed for mild pain      ANORO ELLIPTA 62.5-25 MCG/ACT oral inhaler Inhale 1 puff into the lungs      aspirin (ASA) 81 MG EC tablet 1 tablet every other day      atorvastatin (LIPITOR) 40 MG tablet Take 1 tablet (40 mg) by mouth daily 90 tablet 3    fluticasone (FLONASE) 50 MCG/ACT nasal spray INSTILL 2 SPRAYS INTO BOTH NOSTRILS DAILY. 48 mL 2    furosemide (LASIX) 40 MG tablet Take 0.5 tablets (20 mg) by mouth daily as needed (edema or shortness of breath) For worsening shortness of breath, leg swelling or weight gain.      gabapentin (NEURONTIN) 600 MG tablet TAKE ONE (1) TABLET BY MOUTH THREE TIMES DAILY. 90 tablet 2    ibuprofen (ADVIL/MOTRIN) 200 MG tablet Take 400 mg by mouth every 4 hours as needed for mild pain      losartan (COZAAR) 50 MG tablet Take 1 tablet (50 mg) by mouth daily 90 tablet 3    metoprolol succinate ER (TOPROL XL) 50 MG 24 hr tablet Take 1 tablet (50 mg) by mouth every morning 90 tablet 3    omeprazole (PRILOSEC) 40 MG DR capsule TAKE 1 CAPSULE BY MOUTH EVERY DAY 90 capsule 2    spironolactone (ALDACTONE) 25 MG tablet Take  "0.5 tablets (12.5 mg) by mouth every morning 45 tablet 3     Allergies   Allergen Reactions    Codeine Sulfate Nausea    Morphine      Pt unsure    Simvastatin Cramps     Leg cramps    Pcn [Penicillins] Rash     Current providers sharing in care for this patient include:  Patient Care Team:  Mustapha Velásquez MD as PCP - General (Internal Medicine)  Mustapha Velásquez MD as Assigned PCP  Linda Liu APRN CNP as Assigned Heart and Vascular Provider    The following health maintenance items are reviewed in Epic and correct as of today:  Health Maintenance   Topic Date Due    HF ACTION PLAN  Never done    ANNUAL REVIEW OF HM ORDERS  Never done    RSV VACCINE (Pregnancy & 60+) (1 - 1-dose 60+ series) Never done    MEDICARE ANNUAL WELLNESS VISIT  02/05/2021    LIPID  02/05/2021    BMP  12/20/2022    URINE DRUG SCREEN  06/17/2023    ALT  06/19/2023    CBC  06/20/2023    DEXA  09/22/2023    COLORECTAL CANCER SCREENING  10/04/2023    MAMMO SCREENING  01/11/2025    FALL RISK ASSESSMENT  02/12/2025    GLUCOSE  06/20/2025    DTAP/TDAP/TD IMMUNIZATION (3 - Td or Tdap) 11/23/2028    ADVANCE CARE PLANNING  02/12/2029    TSH W/FREE T4 REFLEX  Completed    HEPATITIS C SCREENING  Completed    PHQ-2 (once per calendar year)  Completed    INFLUENZA VACCINE  Completed    Pneumococcal Vaccine: 65+ Years  Completed    ZOSTER IMMUNIZATION  Completed    COVID-19 Vaccine  Completed    IPV IMMUNIZATION  Aged Out    HPV IMMUNIZATION  Aged Out    MENINGITIS IMMUNIZATION  Aged Out    RSV MONOCLONAL ANTIBODY  Aged Out    LUNG CANCER SCREENING  Discontinued            Objective    Exam  /44 (BP Location: Left arm, Patient Position: Sitting, Cuff Size: Adult Regular)   Pulse 83   Temp 97.3  F (36.3  C)   Resp 14   Ht 1.549 m (5' 1\")   Wt 38.1 kg (84 lb)   SpO2 100%   BMI 15.87 kg/m     Estimated body mass index is 15.87 kg/m  as calculated from the following:    Height as of this encounter: 1.549 m (5' 1\").    Weight as of this " encounter: 38.1 kg (84 lb).    Physical Exam  GENERAL: alert and no distress  EYES: Eyes grossly normal to inspection, PERRL and conjunctivae and sclerae normal  HENT: ear canals and TM's normal, nose and mouth without ulcers or lesions  NECK: no adenopathy, no asymmetry, masses, or scars  RESP: lungs clear to auscultation - no rales, rhonchi or wheezes  CV: regular rate and rhythm, normal S1 S2, no S3 or S4, no murmur, click or rub, no peripheral edema  ABDOMEN: soft, nontender, no hepatosplenomegaly, no masses and bowel sounds normal  MS: no gross musculoskeletal defects noted, no edema  SKIN: no suspicious lesions or rashes  NEURO: Normal strength and tone, mentation intact and speech normal  PSYCH: mentation appears normal, affect normal/bright         2/12/2024   Mini Cog   Clock Draw Score 2 Normal   3 Item Recall 3 objects recalled   Mini Cog Total Score 5            Signed Electronically by: Mustapha Velásquez MD

## 2024-02-12 NOTE — RESULT ENCOUNTER NOTE
The following letter pertains to your most recent diagnostic tests:    The x-ray shows some curvature of the spine, but no new dangerous problems such as cancerous spread to the bones of the spine or fractures.         Sincerely,    Dr. Velásquez

## 2024-02-12 NOTE — PATIENT INSTRUCTIONS
You should get the RSV (Respiratory Syncytial Virus) vaccine at a pharmacy.         Your Health Risk Assessment indicates you feel you are not in good health    A healthy lifestyle helps keep the body fit and the mind alert. It helps protect you from disease, helps you fight disease, and helps prevent chronic disease (disease that doesn't go away) from getting worse. This is important as you get older and begin to notice twinges in muscles and joints and a decline in the strength and stamina you once took for granted. A healthy lifestyle includes good healthcare, good nutrition, weight control, recreation, and regular exercise. Avoid harmful substances and do what you can to keep safe. Another part of a healthy lifestyle is stay mentally active and socially involved.    Good healthcare   Have a wellness visit every year.   If you have new symptoms, let us know right away. Don't wait until the next checkup.   Take medicines exactly as prescribed and keep your medicines in a safe place. Tell us if your medicine causes problems.   Healthy diet and weight control   Eat 3 or 4 small, nutritious, low-fat, high-fiber meals a day. Include a variety of fruits, vegetables, and whole-grain foods.   Make sure you get enough calcium in your diet. Calcium, vitamin D, and exercise help prevent osteoporosis (bone thinning).   If you live alone, try eating with others when you can. That way you get a good meal and have company while you eat it.   Try to keep a healthy weight. If you eat more calories than your body uses for energy, it will be stored as fat and you will gain weight.     Recreation   Recreation is not limited to sports and team events. It includes any activity that provides relaxation, interest, enjoyment, and exercise. Recreation provides an outlet for physical, mental, and social energy. It can give a sense of worth and achievement. It can help you stay healthy.    Mental Exercise and Social Involvement  Mental and  "emotional health is as important as physical health. Keep in touch with friends and family. Stay as active as possible. Continue to learn and challenge yourself.   Things you can do to stay mentally active are:  Learn something new, like a foreign language or musical instrument.   Play SCRABBLE or do crossword puzzles. If you cannot find people to play these games with you at home, you can play them with others on your computer through the Internet.   Join a games club--anything from card games to chess or checkers or lawn bowling.   Start a new hobby.   Go back to school.   Volunteer.   Read.   Keep up with world events.  Learning About Being Physically Active  What is physical activity?     Being physically active means doing any kind of activity that gets your body moving.  The types of physical activity that can help you get fit and stay healthy include:  Aerobic or \"cardio\" activities. These make your heart beat faster and make you breathe harder, such as brisk walking, riding a bike, or running. They strengthen your heart and lungs and build up your endurance.  Strength training activities. These make your muscles work against, or \"resist,\" something. Examples include lifting weights or doing push-ups. These activities help tone and strengthen your muscles and bones.  Stretches. These let you move your joints and muscles through their full range of motion. Stretching helps you be more flexible.  Reaching a balance between these three types of physical activity is important because each one contributes to your overall fitness.  What are the benefits of being active?  Being active is one of the best things you can do for your health. It helps you to:  Feel stronger and have more energy to do all the things you like to do.  Focus better at school or work.  Feel, think, and sleep better.  Reach and stay at a healthy weight.  Lose fat and build lean muscle.  Lower your risk for serious health problems, including " "diabetes, heart attack, high blood pressure, and some cancers.  Keep your heart, lungs, bones, muscles, and joints strong and healthy.  How can you make being active part of your life?  Start slowly. Make it your long-term goal to get at least 30 minutes of exercise on most days of the week. Walking is a good choice. You also may want to do other activities, such as running, swimming, cycling, or playing tennis or team sports.  Pick activities that you like--ones that make your heart beat faster, your muscles stronger, and your muscles and joints more flexible. If you find more than one thing you like doing, do them all. You don't have to do the same thing every day.  Get your heart pumping every day. Any activity that makes your heart beat faster and keeps it at that rate for a while counts.  Here are some great ways to get your heart beating faster:  Go for a brisk walk, run, or hike.  Go for a swim or bike ride.  Take an online exercise class or dance.  Play a game of touch football, basketball, or soccer.  Play tennis, pickleball, or racquetball.  Climb stairs.  Even some household chores can be aerobic. Just do them at a faster pace. Raking or mowing the lawn, sweeping the garage, and vacuuming and cleaning your home all can help get your heart rate up.  Strengthen your muscles during the week. You don't have to lift heavy weights or grow big, bulky muscles to get stronger. Doing a few simple activities that make your muscles work against, or \"resist,\" something can help you get stronger. Aim for at least twice a week.  For example, you can:  Do push-ups or sit-ups, which use your own body weight as resistance.  Lift weights or dumbbells or use stretch bands at home or in a gym or community center.  Stretch your muscles often. Stretching will help you as you become more active. It can help you stay flexible and loosen tight muscles. It can also help improve your balance and posture and can be a great way to " "relax.  Be sure to stretch the muscles you'll be using when you work out. It's best to warm your muscles slightly before you stretch them. Walk or do some other light aerobic activity for a few minutes. Then start stretching.  When you stretch your muscles:  Do it slowly. Stretching is not about going fast or making sudden movements.  Don't push or bounce during a stretch.  Hold each stretch for at least 15 to 30 seconds, if you can. You should feel a stretch in the muscle, but not pain.  Breathe out as you do the stretch. Then breathe in as you hold the stretch. Don't hold your breath.  If you're worried about how more activity might affect your health, have a checkup before you start. Follow any special advice your doctor gives you for getting a smart start.  Where can you learn more?  Go to https://www.[employer]/patiented  Enter W332 in the search box to learn more about \"Learning About Being Physically Active.\"  Current as of: June 6, 2023               Content Version: 13.8    2784-7360 [employer].   Care instructions adapted under license by your healthcare professional. If you have questions about a medical condition or this instruction, always ask your healthcare professional. [employer] disclaims any warranty or liability for your use of this information.      Eating Healthy Foods: Care Instructions  With every meal, you can make healthy food choices. Try to eat a variety of fruits, vegetables, whole grains, lean proteins, and low-fat dairy products. This can help you get the right balance of nutrients, including vitamins and minerals. Small changes add up over time. You can start by adding one healthy food to your meals each day.    Try to make half your plate fruits and vegetables, one-fourth whole grains, and one-fourth lean proteins. Try including dairy with your meals.   Eat more fruits and vegetables. Try to have them with most meals and snacks.   Foods for healthy " "eating    Fruits    These can be fresh, frozen, canned, or dried.  Try to choose whole fruit rather than fruit juice.  Eat a variety of colors.    Vegetables    These can be fresh, frozen, canned, or dried.  Beans, peas, and lentils count too.    Whole grains    Choose whole-grain breads, cereals, and noodles.  Try brown rice.    Lean proteins    These can include lean meat, poultry, fish, and eggs.  You can also have tofu, beans, peas, lentils, nuts, and seeds.    Dairy    Try milk, yogurt, and cheese.  Choose low-fat or fat-free when you can.  If you need to, use lactose-free milk or fortified plant-based milk products, such as soy milk.    Water    Drink water when you're thirsty.  Limit sugar-sweetened drinks, including soda, fruit drinks, and sports drinks.  Where can you learn more?  Go to https://www.[employer]/patiented  Enter T756 in the search box to learn more about \"Eating Healthy Foods: Care Instructions.\"  Current as of: February 28, 2023               Content Version: 13.8    0697-4274 [employer].   Care instructions adapted under license by your healthcare professional. If you have questions about a medical condition or this instruction, always ask your healthcare professional. [employer] disclaims any warranty or liability for your use of this information.      Nutrition for Older Adults: Care Instructions  Overview     Good nutrition is important at any age. But it is especially important for older adults. Eating a healthy diet helps keep your body strong. And it can help lower your risk for disease.  As you get older, your body needs more of certain nutrients. These include vitamin B12, calcium, and vitamin D. But it may be harder for you to get these and other important nutrients. This could be for many reasons. You may not feel as hungry as you used to. Or you could have problems with your teeth or mouth that make it hard to chew. Or you may not enjoy planning and " preparing meals, especially if you live alone.  Talk with your doctor if you want help getting the most nutrition from what you eat. The doctor may have you work with a dietitian to help you plan meals.  Follow-up care is a key part of your treatment and safety. Be sure to make and go to all appointments, and call your doctor if you are having problems. It's also a good idea to know your test results and keep a list of the medicines you take.  How can you care for yourself at home?  To stay healthy  Eat a variety of foods. The more you vary the foods you eat, the more vitamins, minerals, and other nutrients you get.  Take a multivitamin every day. Choose one with about 100% of the daily value (DV) for vitamins and minerals. Do not take more than 100% of the daily value for any vitamin or mineral unless your doctor tells you to. Talk with your doctor if you are not sure which multivitamin is right for you.  Eat lots of fruits and vegetables. Fresh, frozen, or no-salt canned vegetables and fruits in their own juice or light syrup are good choices.  Include foods that are high in vitamin B12 in your diet. Good choices are fortified breakfast cereal, nonfat or low-fat milk and other dairy products, meat, poultry, fish, and eggs.  Get enough calcium and vitamin D. Good choices include nonfat or low-fat milk, cheese, and yogurt. Other good options are tofu, orange juice with added calcium, and some leafy green vegetables, such as brii greens and kale. If you don't use milk products, talk to your doctor about calcium and vitamin D supplements.  Eat protein foods every day. Good choices include lean meat, fish, poultry, eggs, and cheese. Other good options are cooked beans, peanut butter, and nuts and seeds.  Choose whole grains for half of the grains you eat. Look for 100% whole wheat bread, whole-grain cereals, brown rice, and other whole grains.  If you have constipation  Eat high-fiber foods every day. These include  fruits, vegetables, cooked dried beans, and whole grains.  Drink plenty of fluids. If you have kidney, heart, or liver disease and have to limit fluids, talk with your doctor before you increase the amount of fluids you drink.  Ask your doctor if stool softeners may help keep your bowels regular.  If you have mouth problems that make chewing hard  Pick canned or cooked fruits and vegetables. These are often softer.  Chop or shred meat, poultry, and fish. Add sauce or gravy to the meat to help keep it moist.  Pick other protein foods that are soft. These include cheese, peanut butter, cooked beans, cottage cheese, and eggs.  If you have trouble shopping for yourself  Ask a local food store to deliver groceries to your home.  Contact a volunteer center and ask for help.  Ask a family member or neighbor to help you.  If you have trouble preparing meals  If you are able, take a cooking class.  Use a microwave oven to cook TV dinners and other frozen or prepared foods.  Take part in group meal programs. You can find these through senior citizen programs.  Have meals brought to your home. Your community may offer programs that deliver meals, such as Meals on Wheels.  If your appetite is poor  Try eating smaller amounts of food more often. For example, eat 4 or 5 small meals a day instead of 1 or 2 large meals.  Eat with family and friends. Or take part in group meal programs offered through volunteer programs. Eating with others may help your appetite. And it helps you be more social.  Ask your doctor if your medicines could cause appetite or taste problems. If so, ask about changing medicines.  Add spices and herbs to increase the flavor of food.  If you think you are depressed, ask your doctor for help. Depression can affect your appetite. And it can make it hard to do everyday activities like grocery shopping and making meals. Treatment can help.  When should you call for help?  Watch closely for changes in your  "health, and be sure to contact your doctor if you have any problems.  Where can you learn more?  Go to https://www.[employer].net/patiented  Enter L643 in the search box to learn more about \"Nutrition for Older Adults: Care Instructions.\"  Current as of: February 26, 2023               Content Version: 13.8    7024-9490 [employer].   Care instructions adapted under license by your healthcare professional. If you have questions about a medical condition or this instruction, always ask your healthcare professional. [employer] disclaims any warranty or liability for your use of this information.      Learning About Stress  What is stress?     Stress is your body's response to a hard situation. Your body can have a physical, emotional, or mental response. Stress is a fact of life for most people, and it affects everyone differently. What causes stress for you may not be stressful for someone else.  A lot of things can cause stress. You may feel stress when you go on a job interview, take a test, or run a race. This kind of short-term stress is normal and even useful. It can help you if you need to work hard or react quickly. For example, stress can help you finish an important job on time.  Long-term stress is caused by ongoing stressful situations or events. Examples of long-term stress include long-term health problems, ongoing problems at work, or conflicts in your family. Long-term stress can harm your health.  How does stress affect your health?  When you are stressed, your body responds as though you are in danger. It makes hormones that speed up your heart, make you breathe faster, and give you a burst of energy. This is called the fight-or-flight stress response. If the stress is over quickly, your body goes back to normal and no harm is done.  But if stress happens too often or lasts too long, it can have bad effects. Long-term stress can make you more likely to get sick, and it can " make symptoms of some diseases worse. If you tense up when you are stressed, you may develop neck, shoulder, or low back pain. Stress is linked to high blood pressure and heart disease.  Stress also harms your emotional health. It can make you britton, tense, or depressed. Your relationships may suffer, and you may not do well at work or school.  What can you do to manage stress?  You can try these things to help manage stress:   Do something active. Exercise or activity can help reduce stress. Walking is a great way to get started. Even everyday activities such as housecleaning or yard work can help.  Try yoga or renzo chi. These techniques combine exercise and meditation. You may need some training at first to learn them.  Do something you enjoy. For example, listen to music or go to a movie. Practice your hobby or do volunteer work.  Meditate. This can help you relax, because you are not worrying about what happened before or what may happen in the future.  Do guided imagery. Imagine yourself in any setting that helps you feel calm. You can use online videos, books, or a teacher to guide you.  Do breathing exercises. For example:  From a standing position, bend forward from the waist with your knees slightly bent. Let your arms dangle close to the floor.  Breathe in slowly and deeply as you return to a standing position. Roll up slowly and lift your head last.  Hold your breath for just a few seconds in the standing position.  Breathe out slowly and bend forward from the waist.  Let your feelings out. Talk, laugh, cry, and express anger when you need to. Talking with supportive friends or family, a counselor, or a gen leader about your feelings is a healthy way to relieve stress. Avoid discussing your feelings with people who make you feel worse.  Write. It may help to write about things that are bothering you. This helps you find out how much stress you feel and what is causing it. When you know this, you can find  "better ways to cope.  What can you do to prevent stress?  You might try some of these things to help prevent stress:  Manage your time. This helps you find time to do the things you want and need to do.  Get enough sleep. Your body recovers from the stresses of the day while you are sleeping.  Get support. Your family, friends, and community can make a difference in how you experience stress.  Limit your news feed. Avoid or limit time on social media or news that may make you feel stressed.  Do something active. Exercise or activity can help reduce stress. Walking is a great way to get started.  Where can you learn more?  Go to https://www.[employer].net/patiented  Enter N032 in the search box to learn more about \"Learning About Stress.\"  Current as of: February 26, 2023               Content Version: 13.8    9737-1283 [employer]   Care instructions adapted under license by your healthcare professional. If you have questions about a medical condition or this instruction, always ask your healthcare professional. [employer] disclaims any warranty or liability for your use of this information.      Learning About Sleeping Well  What does sleeping well mean?     Sleeping well means getting enough sleep to feel good and stay healthy. How much sleep is enough varies among people.  The number of hours you sleep and how you feel when you wake up are both important. If you do not feel refreshed, you probably need more sleep. Another sign of not getting enough sleep is feeling tired during the day.  Experts recommend that adults get at least 7 or more hours of sleep per day. Children and older adults need more sleep.  Why is getting enough sleep important?  Getting enough quality sleep is a basic part of good health. When your sleep suffers, your physical health, mood, and your thoughts can suffer too. You may find yourself feeling more grumpy or stressed. Not getting enough sleep also can lead to " "serious problems, including injury, accidents, anxiety, and depression.  What might cause poor sleeping?  Many things can cause sleep problems, including:  Changes to your sleep schedule.  Stress. Stress can be caused by fear about a single event, such as giving a speech. Or you may have ongoing stress, such as worry about work or school.  Depression, anxiety, and other mental or emotional conditions.  Changes in your sleep habits or surroundings. This includes changes that happen where you sleep, such as noise, light, or sleeping in a different bed. It also includes changes in your sleep pattern, such as having jet lag or working a late shift.  Health problems, such as pain, breathing problems, and restless legs syndrome.  Lack of regular exercise.  Using alcohol, nicotine, or caffeine before bed.  How can you help yourself?  Here are some tips that may help you sleep more soundly and wake up feeling more refreshed.  Your sleeping area   Use your bedroom only for sleeping and sex. A bit of light reading may help you fall asleep. But if it doesn't, do your reading elsewhere in the house. Try not to use your TV, computer, smartphone, or tablet while you are in bed.  Be sure your bed is big enough to stretch out comfortably, especially if you have a sleep partner.  Keep your bedroom quiet, dark, and cool. Use curtains, blinds, or a sleep mask to block out light. To block out noise, use earplugs, soothing music, or a \"white noise\" machine.  Your evening and bedtime routine   Create a relaxing bedtime routine. You might want to take a warm shower or bath, or listen to soothing music.  Go to bed at the same time every night. And get up at the same time every morning, even if you feel tired.  What to avoid   Limit caffeine (coffee, tea, caffeinated sodas) during the day, and don't have any for at least 6 hours before bedtime.  Avoid drinking alcohol before bedtime. Alcohol can cause you to wake up more often during the " "night.  Try not to smoke or use tobacco, especially in the evening. Nicotine can keep you awake.  Limit naps during the day, especially close to bedtime.  Avoid lying in bed awake for too long. If you can't fall asleep or if you wake up in the middle of the night and can't get back to sleep within about 20 minutes, get out of bed and go to another room until you feel sleepy.  Avoid taking medicine right before bed that may keep you awake or make you feel hyper or energized. Your doctor can tell you if your medicine may do this and if you can take it earlier in the day.  If you can't sleep   Imagine yourself in a peaceful, pleasant scene. Focus on the details and feelings of being in a place that is relaxing.  Get up and do a quiet or boring activity until you feel sleepy.  Avoid drinking any liquids before going to bed to help prevent waking up often to use the bathroom.  Where can you learn more?  Go to https://www.[employer].net/patiented  Enter J942 in the search box to learn more about \"Learning About Sleeping Well.\"  Current as of: July 11, 2023               Content Version: 13.8    3866-0362 [employer].   Care instructions adapted under license by your healthcare professional. If you have questions about a medical condition or this instruction, always ask your healthcare professional. [employer] disclaims any warranty or liability for your use of this information.      Preventive Care Advice   This is general advice given by our system to help you stay healthy. However, your care team may have specific advice just for you. Please talk to your care team about your preventive care needs.  Nutrition  Eat 5 or more servings of fruits and vegetables each day.  Try wheat bread, brown rice and whole grain pasta (instead of white bread, rice, and pasta).  Get enough calcium and vitamin D. Check the label on foods and aim for 100% of the RDA (recommended daily " allowance).  Lifestyle  Exercise at least 150 minutes each week  (30 minutes a day, 5 days a week).  Do muscle strengthening activities 2 days a week. These help control your weight and prevent disease.  No smoking.  Wear sunscreen to prevent skin cancer.  Have a dental exam and cleaning every 6 months.  Yearly exams  See your health care team every year to talk about:  Any changes in your health.  Any medicines your care team has prescribed.  Preventive care, family planning, and ways to prevent chronic diseases.  Shots (vaccines)   HPV shots (up to age 26), if you've never had them before.  Hepatitis B shots (up to age 59), if you've never had them before.  COVID-19 shot: Get this shot when it's due.  Flu shot: Get a flu shot every year.  Tetanus shot: Get a tetanus shot every 10 years.  Pneumococcal, hepatitis A, and RSV shots: Ask your care team if you need these based on your risk.  Shingles shot (for age 50 and up)  General health tests  Diabetes screening:  Starting at age 35, Get screened for diabetes at least every 3 years.  If you are younger than age 35, ask your care team if you should be screened for diabetes.  Cholesterol test: At age 39, start having a cholesterol test every 5 years, or more often if advised.  Bone density scan (DEXA): At age 50, ask your care team if you should have this scan for osteoporosis (brittle bones).  Hepatitis C: Get tested at least once in your life.  STIs (sexually transmitted infections)  Before age 24: Ask your care team if you should be screened for STIs.  After age 24: Get screened for STIs if you're at risk. You are at risk for STIs (including HIV) if:  You are sexually active with more than one person.  You don't use condoms every time.  You or a partner was diagnosed with a sexually transmitted infection.  If you are at risk for HIV, ask about PrEP medicine to prevent HIV.  Get tested for HIV at least once in your life, whether you are at risk for HIV or  not.  Cancer screening tests  Cervical cancer screening: If you have a cervix, begin getting regular cervical cancer screening tests starting at age 21.  Breast cancer scan (mammogram): If you've ever had breasts, begin having regular mammograms starting at age 40. This is a scan to check for breast cancer.  Colon cancer screening: It is important to start screening for colon cancer at age 45.  Have a colonoscopy test every 10 years (or more often if you're at risk) Or, ask your provider about stool tests like a FIT test every year or Cologuard test every 3 years.  To learn more about your testing options, visit:   https://www.[employer]/178823.pdf.  For help making a decision, visit:   https://bit.[employer]/ay04762.  Prostate cancer screening test: If you have a prostate, ask your care team if a prostate cancer screening test (PSA) at age 55 is right for you.  Lung cancer screening: If you are a current or former smoker ages 50 to 80, ask your care team if ongoing lung cancer screenings are right for you.  For informational purposes only. Not to replace the advice of your health care provider. Copyright   2023 [employer]. All rights reserved. Clinically reviewed by the [facility]. Nuji 261208 - REV 01/24.

## 2024-02-15 ENCOUNTER — TRANSFERRED RECORDS (OUTPATIENT)
Dept: HEALTH INFORMATION MANAGEMENT | Facility: CLINIC | Age: 69
End: 2024-02-15
Payer: MEDICARE

## 2024-02-28 ENCOUNTER — TRANSFERRED RECORDS (OUTPATIENT)
Dept: HEALTH INFORMATION MANAGEMENT | Facility: CLINIC | Age: 69
End: 2024-02-28
Payer: MEDICARE

## 2024-02-29 ENCOUNTER — TRANSFERRED RECORDS (OUTPATIENT)
Dept: HEALTH INFORMATION MANAGEMENT | Facility: CLINIC | Age: 69
End: 2024-02-29
Payer: MEDICARE

## 2024-04-01 ENCOUNTER — HOSPITAL ENCOUNTER (OUTPATIENT)
Dept: CT IMAGING | Facility: CLINIC | Age: 69
Discharge: HOME | End: 2024-04-01
Attending: INTERNAL MEDICINE
Payer: MEDICARE

## 2024-04-01 ENCOUNTER — HOSPITAL ENCOUNTER (OUTPATIENT)
Facility: CLINIC | Age: 69
Discharge: HOME | End: 2024-04-01
Admitting: INTERNAL MEDICINE
Payer: MEDICARE

## 2024-04-01 DIAGNOSIS — C34.31 PRIMARY NON-SMALL CELL CARCINOMA OF LOWER LOBE OF RIGHT LUNG (H): ICD-10-CM

## 2024-04-01 DIAGNOSIS — C15.5 PRIMARY MALIGNANT NEOPLASM OF LOWER THIRD OF ESOPHAGUS (H): ICD-10-CM

## 2024-04-01 LAB
CREAT BLD-MCNC: 0.4 MG/DL (ref 0.5–1)
EGFRCR SERPLBLD CKD-EPI 2021: >60 ML/MIN/1.73M2

## 2024-04-01 PROCEDURE — 250N000009 HC RX 250: Performed by: INTERNAL MEDICINE

## 2024-04-01 PROCEDURE — 82565 ASSAY OF CREATININE: CPT

## 2024-04-01 PROCEDURE — 999N000154 HC STATISTIC RADIOLOGY XRAY, US, CT, MAR, NM

## 2024-04-01 PROCEDURE — 250N000011 HC RX IP 250 OP 636: Performed by: PHYSICIAN ASSISTANT

## 2024-04-01 PROCEDURE — 71260 CT THORAX DX C+: CPT

## 2024-04-01 PROCEDURE — 96374 THER/PROPH/DIAG INJ IV PUSH: CPT

## 2024-04-01 PROCEDURE — 250N000011 HC RX IP 250 OP 636: Performed by: INTERNAL MEDICINE

## 2024-04-01 RX ORDER — HEPARIN SODIUM,PORCINE 10 UNIT/ML
5-10 VIAL (ML) INTRAVENOUS
Status: DISCONTINUED | OUTPATIENT
Start: 2024-04-01 | End: 2024-04-01 | Stop reason: HOSPADM

## 2024-04-01 RX ORDER — HEPARIN SODIUM,PORCINE 10 UNIT/ML
5-10 VIAL (ML) INTRAVENOUS EVERY 24 HOURS
Status: DISCONTINUED | OUTPATIENT
Start: 2024-04-01 | End: 2024-04-01 | Stop reason: HOSPADM

## 2024-04-01 RX ORDER — HEPARIN SODIUM (PORCINE) LOCK FLUSH IV SOLN 100 UNIT/ML 100 UNIT/ML
5-10 SOLUTION INTRAVENOUS
Status: DISCONTINUED | OUTPATIENT
Start: 2024-04-01 | End: 2024-04-01 | Stop reason: HOSPADM

## 2024-04-01 RX ORDER — IOPAMIDOL 755 MG/ML
41 INJECTION, SOLUTION INTRAVASCULAR ONCE
Status: COMPLETED | OUTPATIENT
Start: 2024-04-01 | End: 2024-04-01

## 2024-04-01 RX ADMIN — HEPARIN SODIUM (PORCINE) LOCK FLUSH IV SOLN 100 UNIT/ML 5 ML: 100 SOLUTION at 11:58

## 2024-04-01 RX ADMIN — IOPAMIDOL 41 ML: 755 INJECTION, SOLUTION INTRAVENOUS at 12:03

## 2024-04-01 RX ADMIN — SODIUM CHLORIDE 52 ML: 9 INJECTION, SOLUTION INTRAVENOUS at 12:03

## 2024-04-01 ASSESSMENT — ACTIVITIES OF DAILY LIVING (ADL)
ADLS_ACUITY_SCORE: 37
ADLS_ACUITY_SCORE: 37

## 2024-04-18 ENCOUNTER — TRANSFERRED RECORDS (OUTPATIENT)
Dept: HEALTH INFORMATION MANAGEMENT | Facility: CLINIC | Age: 69
End: 2024-04-18
Payer: MEDICARE

## 2024-05-06 ENCOUNTER — TELEPHONE (OUTPATIENT)
Dept: CARDIOLOGY | Facility: CLINIC | Age: 69
End: 2024-05-06
Payer: MEDICARE

## 2024-05-06 DIAGNOSIS — I10 BENIGN ESSENTIAL HYPERTENSION: ICD-10-CM

## 2024-05-06 RX ORDER — LOSARTAN POTASSIUM 50 MG/1
50 TABLET ORAL DAILY
Qty: 90 TABLET | Refills: 0 | Status: SHIPPED | OUTPATIENT
Start: 2024-05-06 | End: 2024-05-13

## 2024-05-14 ENCOUNTER — OFFICE VISIT (OUTPATIENT)
Dept: URGENT CARE | Facility: URGENT CARE | Age: 69
End: 2024-05-14
Payer: MEDICARE

## 2024-05-14 VITALS
SYSTOLIC BLOOD PRESSURE: 134 MMHG | DIASTOLIC BLOOD PRESSURE: 78 MMHG | WEIGHT: 84 LBS | TEMPERATURE: 98 F | OXYGEN SATURATION: 100 % | RESPIRATION RATE: 16 BRPM | HEART RATE: 65 BPM | BODY MASS INDEX: 15.87 KG/M2

## 2024-05-14 DIAGNOSIS — H61.21 IMPACTED CERUMEN OF RIGHT EAR: ICD-10-CM

## 2024-05-14 DIAGNOSIS — R30.0 DYSURIA: ICD-10-CM

## 2024-05-14 DIAGNOSIS — N30.00 ACUTE CYSTITIS WITHOUT HEMATURIA: Primary | ICD-10-CM

## 2024-05-14 LAB
ALBUMIN UR-MCNC: NEGATIVE MG/DL
APPEARANCE UR: CLEAR
BACTERIA #/AREA URNS HPF: ABNORMAL /HPF
BILIRUB UR QL STRIP: NEGATIVE
COLOR UR AUTO: YELLOW
GLUCOSE UR STRIP-MCNC: NEGATIVE MG/DL
HGB UR QL STRIP: NEGATIVE
KETONES UR STRIP-MCNC: NEGATIVE MG/DL
LEUKOCYTE ESTERASE UR QL STRIP: ABNORMAL
NITRATE UR QL: POSITIVE
PH UR STRIP: 6 [PH] (ref 5–7)
RBC #/AREA URNS AUTO: ABNORMAL /HPF
SP GR UR STRIP: 1.01 (ref 1–1.03)
SQUAMOUS #/AREA URNS AUTO: ABNORMAL /LPF
UROBILINOGEN UR STRIP-ACNC: 1 E.U./DL
WBC #/AREA URNS AUTO: ABNORMAL /HPF

## 2024-05-14 PROCEDURE — 87086 URINE CULTURE/COLONY COUNT: CPT

## 2024-05-14 PROCEDURE — 81001 URINALYSIS AUTO W/SCOPE: CPT

## 2024-05-14 PROCEDURE — 99214 OFFICE O/P EST MOD 30 MIN: CPT | Mod: 25

## 2024-05-14 PROCEDURE — 87186 SC STD MICRODIL/AGAR DIL: CPT

## 2024-05-14 PROCEDURE — 69210 REMOVE IMPACTED EAR WAX UNI: CPT | Mod: RT

## 2024-05-14 PROCEDURE — 87088 URINE BACTERIA CULTURE: CPT

## 2024-05-14 RX ORDER — SULFAMETHOXAZOLE/TRIMETHOPRIM 800-160 MG
1 TABLET ORAL 2 TIMES DAILY
Qty: 6 TABLET | Refills: 0 | Status: SHIPPED | OUTPATIENT
Start: 2024-05-14 | End: 2024-05-17

## 2024-05-14 NOTE — PATIENT INSTRUCTIONS
Can use over the counter debrox to clean ears    Bactrim 1 tablet twice a day for 3 days  Return to urgent care if symptoms persist after treatment or if you develop fevers.

## 2024-05-14 NOTE — RESULT ENCOUNTER NOTE
Results discussed with patient in clinic. States understanding of these results.    Demetrice Santoyo CNP

## 2024-05-14 NOTE — PROGRESS NOTES
Assessment & Plan:      Problem List Items Addressed This Visit    None  Visit Diagnoses       Acute cystitis without hematuria    -  Primary    Relevant Medications    sulfamethoxazole-trimethoprim (BACTRIM DS) 800-160 MG tablet    Dysuria        Relevant Medications    sulfamethoxazole-trimethoprim (BACTRIM DS) 800-160 MG tablet    Other Relevant Orders    UA Macroscopic with reflex to Microscopic and Culture - Clinic Collect (Completed)    Urine Microscopic Exam (Completed)    Urine Culture    Impacted cerumen of right ear              Medical Decision Making    Hearing Loss of Right Ear, improved  Cerumen Impaction of the Right Ear  Right external canal completely obscured by cerumen. Evacuated with curettes with minimal bleeding on inferior and anterior edges of the canal. Hearing improved.  - Advised avoidance of Q-tips  - Follow-up if bleeding persists    Dysuria  Acute Cystitis  UA consistent with acute cystitis, previous UTI treated successfully with Bactrim.   - Bactrim DS BID for 3 days  - Return to clinic instructions provided if symptoms worsening    Patient verbalizes understanding of the treatment regimen and will follow up as needed for recheck and evaluation if symptoms worsen or do not improve. Patient states understanding and agreement with the plan.    Mathew Looney MD  Internal Medicine-Pediatrics, PGY-3     Subjective:      Kezia Dixon is a 68 year old female here for evaluation of hearing loss in right ear.    Progressive fullness and diminished hearing loss over the past few weeks. Recently completed radiation and chemotherapy for lung cancer with [facility] but otherwise no new medications. No tinnitus, vertigo, dizziness, discharge from ears, or trauma. Uses qtips to clean ears regularly. In the past couple days has also noticed incredibly cloudy urine and burning when she urinates. No fevers, chills, or diaphoresis.     The following portions of the patient's history were reviewed  and updated as appropriate: allergies, current medications, and problem list.     Review of Systems  Pertinent items are noted in HPI.  All other systems are negative.    Allergies  Allergies   Allergen Reactions    Codeine Sulfate Nausea    Morphine      Pt unsure    Simvastatin Cramps     Leg cramps    Pcn [Penicillins] Rash       Family History   Problem Relation Age of Onset    Depression Mother     Substance Abuse Father     Other Cancer Father         melanoma    Prostate Cancer Father     Diabetes Father     Substance Abuse Paternal Grandfather     Other Cancer Brother         melanoma    Substance Abuse Brother     Other Cancer Brother         melanoma    Other Cancer Brother         melanoma    Other Cancer Brother         melanoma     Social History     Tobacco Use    Smoking status: Former     Current packs/day: 0.00     Average packs/day: 0.3 packs/day for 45.4 years (11.4 ttl pk-yrs)     Types: Cigarettes     Start date: 1970     Quit date: 2015     Years since quittin.4    Smokeless tobacco: Never   Substance Use Topics    Alcohol use: Not Currently     Objective:      /78   Pulse 65   Temp 98  F (36.7  C)   Resp 16   Wt 38.1 kg (84 lb)   SpO2 100%   BMI 15.87 kg/m    General appearance - alert, well appearing, and in no distress  Mental status - alert, oriented to person, place, and time  Eyes - pupils equal and reactive, extraocular eye movements intact  Ears - left ear normal, right external canal inflamed, ceruminosis noted, cerumen removed  Chest - breathing comfortably on room air  Heart - Appears well perfused  Neurological - alert, oriented, normal speech, no focal findings or movement disorder noted, left ear hearing normal, right ear significantly diminished and return to normal with cerumen removal from canal  Extremities - peripheral pulses normal, no pedal edema, no clubbing or cyanosis  Skin - normal coloration and turgor, no rashes, no suspicious skin lesions  noted     Lab & Imaging Results    Results for orders placed or performed in visit on 05/14/24 (from the past 24 hour(s))   UA Macroscopic with reflex to Microscopic and Culture - Clinic Collect    Specimen: Urine, Clean Catch   Result Value Ref Range    Color Urine Yellow Colorless, Straw, Light Yellow, Yellow    Appearance Urine Clear Clear    Glucose Urine Negative Negative mg/dL    Bilirubin Urine Negative Negative    Ketones Urine Negative Negative mg/dL    Specific Gravity Urine 1.015 1.003 - 1.035    Blood Urine Negative Negative    pH Urine 6.0 5.0 - 7.0    Protein Albumin Urine Negative Negative mg/dL    Urobilinogen Urine 1.0 0.2, 1.0 E.U./dL    Nitrite Urine Positive (A) Negative    Leukocyte Esterase Urine Moderate (A) Negative   Urine Microscopic Exam   Result Value Ref Range    Bacteria Urine Many (A) None Seen /HPF    RBC Urine None Seen 0-2 /HPF /HPF    WBC Urine 10-25 (A) 0-5 /HPF /HPF    Squamous Epithelials Urine Few (A) None Seen /LPF     I personally reviewed these results and discussed findings with the patient.

## 2024-05-16 LAB — BACTERIA UR CULT: ABNORMAL

## 2024-05-28 ENCOUNTER — TRANSFERRED RECORDS (OUTPATIENT)
Dept: HEALTH INFORMATION MANAGEMENT | Facility: CLINIC | Age: 69
End: 2024-05-28
Payer: MEDICARE

## 2024-06-10 ENCOUNTER — TRANSFERRED RECORDS (OUTPATIENT)
Dept: HEALTH INFORMATION MANAGEMENT | Facility: CLINIC | Age: 69
End: 2024-06-10
Payer: MEDICARE

## 2024-06-22 DIAGNOSIS — G62.9 PERIPHERAL POLYNEUROPATHY: ICD-10-CM

## 2024-06-22 RX ORDER — GABAPENTIN 600 MG/1
TABLET ORAL
Qty: 90 TABLET | Refills: 2 | Status: SHIPPED | OUTPATIENT
Start: 2024-06-22

## 2024-07-18 ENCOUNTER — ANCILLARY PROCEDURE (OUTPATIENT)
Dept: CT IMAGING | Facility: CLINIC | Age: 69
End: 2024-07-18
Attending: NURSE PRACTITIONER
Payer: MEDICARE

## 2024-07-18 DIAGNOSIS — C34.31 PRIMARY MALIGNANT NEOPLASM OF RIGHT LOWER LOBE OF LUNG (H): ICD-10-CM

## 2024-07-18 LAB — CREAT BLD-MCNC: <0.2 MG/DL (ref 0.5–1)

## 2024-07-18 PROCEDURE — 82565 ASSAY OF CREATININE: CPT

## 2024-07-18 PROCEDURE — 250N000009 HC RX 250: Performed by: NURSE PRACTITIONER

## 2024-07-18 PROCEDURE — 250N000011 HC RX IP 250 OP 636: Performed by: NURSE PRACTITIONER

## 2024-07-18 PROCEDURE — 71260 CT THORAX DX C+: CPT

## 2024-07-18 RX ORDER — IOPAMIDOL 755 MG/ML
75 INJECTION, SOLUTION INTRAVASCULAR ONCE
Status: COMPLETED | OUTPATIENT
Start: 2024-07-18 | End: 2024-07-18

## 2024-07-18 RX ADMIN — SODIUM CHLORIDE 90 ML: 9 INJECTION, SOLUTION INTRAVENOUS at 08:53

## 2024-07-18 RX ADMIN — IOPAMIDOL 75 ML: 755 INJECTION, SOLUTION INTRAVENOUS at 08:53

## 2024-07-22 ENCOUNTER — TRANSFERRED RECORDS (OUTPATIENT)
Dept: HEALTH INFORMATION MANAGEMENT | Facility: CLINIC | Age: 69
End: 2024-07-22
Payer: MEDICARE

## 2024-08-20 ENCOUNTER — TRANSFERRED RECORDS (OUTPATIENT)
Dept: HEALTH INFORMATION MANAGEMENT | Facility: CLINIC | Age: 69
End: 2024-08-20
Payer: MEDICARE

## 2024-08-21 ENCOUNTER — LAB (OUTPATIENT)
Dept: LAB | Facility: CLINIC | Age: 69
End: 2024-08-21
Payer: MEDICARE

## 2024-08-21 DIAGNOSIS — I25.10 CORONARY ARTERY DISEASE INVOLVING NATIVE CORONARY ARTERY OF NATIVE HEART WITHOUT ANGINA PECTORIS: ICD-10-CM

## 2024-08-21 DIAGNOSIS — E78.5 HYPERLIPIDEMIA LDL GOAL <70: ICD-10-CM

## 2024-08-21 DIAGNOSIS — I50.22 CHRONIC SYSTOLIC CONGESTIVE HEART FAILURE (H): ICD-10-CM

## 2024-08-21 DIAGNOSIS — I10 BENIGN ESSENTIAL HYPERTENSION: ICD-10-CM

## 2024-08-21 LAB
ANION GAP SERPL CALCULATED.3IONS-SCNC: 10 MMOL/L (ref 7–15)
BUN SERPL-MCNC: 10.8 MG/DL (ref 8–23)
CALCIUM SERPL-MCNC: 9.6 MG/DL (ref 8.8–10.4)
CHLORIDE SERPL-SCNC: 98 MMOL/L (ref 98–107)
CHOLEST SERPL-MCNC: 98 MG/DL
CREAT SERPL-MCNC: 0.49 MG/DL (ref 0.51–0.95)
EGFRCR SERPLBLD CKD-EPI 2021: >90 ML/MIN/1.73M2
FASTING STATUS PATIENT QL REPORTED: YES
GLUCOSE SERPL-MCNC: 90 MG/DL (ref 70–99)
HCO3 SERPL-SCNC: 26 MMOL/L (ref 22–29)
HDLC SERPL-MCNC: 63 MG/DL
LDLC SERPL CALC-MCNC: 24 MG/DL
NONHDLC SERPL-MCNC: 35 MG/DL
POTASSIUM SERPL-SCNC: 4.9 MMOL/L (ref 3.4–5.3)
SODIUM SERPL-SCNC: 134 MMOL/L (ref 135–145)
TRIGL SERPL-MCNC: 54 MG/DL

## 2024-08-21 PROCEDURE — 36415 COLL VENOUS BLD VENIPUNCTURE: CPT | Performed by: NURSE PRACTITIONER

## 2024-08-21 PROCEDURE — 80048 BASIC METABOLIC PNL TOTAL CA: CPT | Performed by: NURSE PRACTITIONER

## 2024-08-21 PROCEDURE — 80061 LIPID PANEL: CPT | Performed by: NURSE PRACTITIONER

## 2024-09-03 NOTE — PROGRESS NOTES
HPI and Plan:     I had the pleasure of seeing Ms. Dixon in follow-up at the [facility] today.  She is a very pleasant 68-year-old female who I saw in 2021 after she had been admitted to [facility] from 10/18/2021 through 10/23/2021 with acute hypoxemic respiratory failure that was thought to be cardiac in origin along with possibly community-acquired pneumonia.     The patient's cardiac history dates back to 08/2021 when she was admitted to [facility] from 08/22/2021 through 08/30/2021 with alcohol withdrawal in the context of a 1-1/2 year history of heavy use after her  passed away.  She stopped drinking approximately 3 days prior to admission at the time and presented with nausea, weakness and increased tremors.  She was also noted to be tachycardic and hypertensive.     The patient was treated appropriately for alcohol withdrawal but also was noted to have acute hypoxemic respiratory failure with an elevated BNP.  A transthoracic echocardiogram reported an ejection fraction of approximately 35% with mid to apical wall motion abnormalities and mild mitral regurgitation.  A subsequent Lexiscan stress perfusion study on 08/26/2021 demonstrated a large partially reversible anterior/anteroseptal/septal wall motion perfusion defect.  Her calculated LVEF, however, appeared to have normalized with the reported LVEF of 66%.       The patient was then readmitted to [facility] in October 2021 when she presented with symptoms of weakness, decreased p.o. intake and dyspnea.  It was thought that she may have an element of congestive heart failure and she was again treated with IV diuretics with normalization of her respiratory status.  Interestingly, an echocardiogram obtained during her hospitalization on 10/19/2021 demonstrated normalization of left ventricular systolic function with an estimated LVEF of 55%-60% and moderate tricuspid regurgitation as  well as mild-to-moderate mitral regurgitation.  Moderate pulmonary hypertension was noted.     The patient's other pertinent medical history includes a history of esophageal cancer for which she had undergone esophagogastrectomy by Dr. Riojas in 2016.  She also underwent radiation and chemotherapy at the time and it does not appear that there is evidence of recurrence at this point.     At her last office visit she was doing well overall from a cardiovascular standpoint. I ordered a CT coronary angiogram for definitive evaluation and she was noted to have severe proximal right posterior lateral stenosis with mild mid LAD disease.  An echocardiogram subsequently demonstrated only mild aortic regurgitation and normal left ventricular systolic function.  Given that the lesion described on CT coronary angiography involves a very small caliber vessel, I recommended medical therapy unless she became symptomatic.    She was last seen in our clinic in May 2023 at which point she was doing well from a cardiovascular standpoint.  She was having occasional palpitations but these were brief and self-limiting.  Continued medical therapy was recommended.    Unfortunately since I last saw her she has been diagnosed with lung adenocarcinoma.  He has received chemoradiation and is currently on consolidation immunotherapy.  She receives infusions every other week and currently the duration of therapy is scheduled to be 1 year.    From a cardiovascular standpoint she is minimally symptomatic.  She does experience occasional dyspnea with exertion but the symptoms are self-limiting.  She denies any chest discomfort, palpitations, syncope or presyncope.  She denies any PND orthopnea.  She has been fully compliant with her medications.       PHYSICAL EXAMINATION:  Dictated below.  Her blood pressure was elevated today but it has been within normal limits at her recent oncology visits.     Lipids on 8/21/2024 demonstrated total  cholesterol 98, ratio 63, LDL of 24 triglycerides of 54.    Her last echocardiogram on 1/26/2022 demonstrated hyperdynamic left ventricular systolic function with mild aortic regurgitation.     IMPRESSION:      1.  Moderate stress-induced cardiomyopathy in 08/2021 with subsequent normalization of left ventricular systolic function.  2.  Abnormal stress perfusion study in 08/2021 suggestive of LAD distribution ischemia.  Subsequent CT coronary angiography demonstrated a severe proximal right posterolateral stenosis with mild LAD disease.  4.  History of esophageal cancer status post esophagogastrectomy in 03/2016 as described above.  4.  Recent diagnosis of lung adenocarcinoma.  Currently on consolidation immunotherapy.    PLAN    Ms. Dixon is doing well overall from a cardiovascular standpoint.  There is no evidence of angina or congestive heart failure.  I do think it would be reasonable to reassess left ventricular systolic function given her history of chemoradiation and concurrent immunotherapy.  Assuming the findings are unchanged, I will plan on follow-up in approximately 1 year.    It was a pleasure seeing her today.       CURRENT MEDICATIONS:  Current Outpatient Medications   Medication Sig Dispense Refill    acetaminophen (TYLENOL) 325 MG tablet Take 325-650 mg by mouth every 6 hours as needed for mild pain      ANORO ELLIPTA 62.5-25 MCG/ACT oral inhaler Inhale 1 puff into the lungs      aspirin (ASA) 81 MG EC tablet 1 tablet every other day      atorvastatin (LIPITOR) 40 MG tablet Take 1 tablet (40 mg) by mouth daily 90 tablet 1    fluticasone (FLONASE) 50 MCG/ACT nasal spray INSTILL 2 SPRAYS INTO BOTH NOSTRILS DAILY. 48 mL 2    furosemide (LASIX) 40 MG tablet Take 0.5 tablets (20 mg) by mouth daily as needed (edema or shortness of breath) For worsening shortness of breath, leg swelling or weight gain.      gabapentin (NEURONTIN) 600 MG tablet TAKE ONE (1) TABLET BY MOUTH THREE TIMES DAILY. 90 tablet 2     ibuprofen (ADVIL/MOTRIN) 200 MG tablet Take 400 mg by mouth every 4 hours as needed for mild pain      losartan (COZAAR) 50 MG tablet Take 1 tablet (50 mg) by mouth daily Appointment required for further refills 90 tablet 1    metoprolol succinate ER (TOPROL XL) 50 MG 24 hr tablet Take 1 tablet (50 mg) by mouth every morning 90 tablet 1    omeprazole (PRILOSEC) 40 MG DR capsule TAKE 1 CAPSULE BY MOUTH EVERY DAY 90 capsule 2    spironolactone (ALDACTONE) 25 MG tablet Take 0.5 tablets (12.5 mg) by mouth every morning 45 tablet 1       ALLERGIES     Allergies   Allergen Reactions    Codeine Sulfate Nausea    Morphine      Pt unsure    Simvastatin Cramps     Leg cramps    Pcn [Penicillins] Rash       PAST MEDICAL HISTORY:  Past Medical History:   Diagnosis Date    Alcohol use disorder, severe, dependence (H) 10/14/2016    Alcoholism (H) 10/14/2016    Anemia     Benign essential hypertension 10/14/2016    Carotid artery disease (H24)     mile plaque 5/7    Chemical dependency (H)     alcohol    COPD (chronic obstructive pulmonary disease) (H)     Coronary artery disease     Depression with anxiety     Esophageal cancer (H) 03/22/2016    SQUAMOUS CELL    Esophageal mass     GERD (gastroesophageal reflux disease)     Hx of pancreatitis 2003    Hypercholesteremia     Hyperglycemia     Hyperlipemia     Impaired fasting glucose 10/14/2016    Nocturnal leg cramps     Other chronic pain     Pancreatic pseudocyst 10/14/2016    Pancreatitis     with pseudocyst    Pap smear with atypical squamous cells, cannot exclude high grade squamous intraepithelial lesion (ASC-H) 10/14/2016    Colpo impression benign, ECC benign. Cotestin in 1 yr.    Shingles     Squamous cell carcinoma of right lung (H)     Vasomotor rhinitis        PAST SURGICAL HISTORY:  Past Surgical History:   Procedure Laterality Date    AAA REPAIR      splenic artery aneurysm embolization    ABDOMEN SURGERY  2/2/2016    COLONOSCOPY  11/26/2013    Procedure: COMBINED  COLONOSCOPY, SINGLE BIOPSY/POLYPECTOMY BY BIOPSY;  COLONOSCOPY (MAC);  Surgeon: Montana Rosa MD;  Location:  GI    COLONOSCOPY  11/26/2013    COLONOSCOPY N/A 10/4/2018    Procedure: COMBINED COLONOSCOPY, SINGLE OR MULTIPLE BIOPSY/POLYPECTOMY BY BIOPSY;  colonoscopy;  Surgeon: Gio Apodaca MD;  Location:  GI    ENT SURGERY      ESOPHAGOGASTRECTOMY N/A 3/22/2016    Procedure: ESOPHAGOGASTRECTOMY;  Surgeon: Alvin Riojas MD;  Location:  OR    ESOPHAGOSCOPY, GASTROSCOPY, DUODENOSCOPY (EGD), COMBINED N/A 12/22/2015    Procedure: COMBINED ENDOSCOPIC ULTRASOUND, ESOPHAGOSCOPY, GASTROSCOPY, DUODENOSCOPY (EGD), FINE NEEDLE ASPIRATE/BIOPSY;  Surgeon: Danelle Michael MD;  Location:  GI    GASTROSTOMY, INSERT TUBE, COMBINED N/A 2/2/2016    Procedure: COMBINED GASTROSTOMY, INSERT TUBE (OPEN);  Surgeon: Alvin Riojas MD;  Location:  OR    GI SURGERY  12/22/2015    HAND SURGERY      right    HERNIORRHAPHY INCISIONAL (LOCATION) N/A 3/19/2019    Procedure: LAPARSCOPIC  INCISIONAL HERNIA REPAIR WITH MESH, LAPARSCOPIC LYSIS OF ADHENSIONS;  Surgeon: Lamberto Magaña MD;  Location:  OR    INSERT PORT VASCULAR ACCESS N/A 12/28/2015    Procedure: INSERT PORT VASCULAR ACCESS;  Surgeon: Alvin Riojas MD;  Location:  OR    INSERT PORT VASCULAR ACCESS N/A 1/9/2024    Procedure: SMART PORT PLACEMENT;  Surgeon: Alvin Riojas MD;  Location:  OR    LAPAROSCOPIC CHOLECYSTECTOMY N/A 3/19/2019    Procedure: LAPAROSCOPIC CHOLECYSTECTOMY;  Surgeon: Lamberto Magaña MD;  Location:  OR    LOBECTOMY LUNG Right 10/16/2018    Procedure: LOBECTOMY LUNG;  Surgeon: Alvin Riojas MD;  Location:  OR    REMOVE PORT VASCULAR ACCESS Left 7/22/2016    Procedure: REMOVE PORT VASCULAR ACCESS;  Surgeon: Alvin Riojas MD;  Location:  OR    THORACOTOMY Right 10/16/2018    Procedure: REDO RIGHT THORACTOMY AND RIGHT LOWER LOBECTOMY, PLEURAL  LYSIS;  Surgeon: Alvin Riojas MD;  Location: [facility] OR    TONSILLECTOMY      VASCULAR SURGERY         FAMILY HISTORY:  Family History   Problem Relation Age of Onset    Depression Mother     Substance Abuse Father     Other Cancer Father         melanoma    Prostate Cancer Father     Diabetes Father     Substance Abuse Paternal Grandfather     Other Cancer Brother         melanoma    Substance Abuse Brother     Other Cancer Brother         melanoma    Other Cancer Brother         melanoma    Other Cancer Brother         melanoma       SOCIAL HISTORY:  Social History     Socioeconomic History    Marital status:    Tobacco Use    Smoking status: Former     Current packs/day: 0.00     Average packs/day: 0.3 packs/day for 45.4 years (11.4 ttl pk-yrs)     Types: Cigarettes     Start date: 1970     Quit date: 2015     Years since quittin.7    Smokeless tobacco: Never   Vaping Use    Vaping status: Never Used   Substance and Sexual Activity    Alcohol use: Not Currently    Drug use: No    Sexual activity: Not Currently     Partners: Male     Birth control/protection: Post-menopausal   Other Topics Concern    Parent/sibling w/ CABG, MI or angioplasty before 65F 55M? No     Social Determinants of Health     Financial Resource Strain: Low Risk  (2024)    Financial Resource Strain     Within the past 12 months, have you or your family members you live with been unable to get utilities (heat, electricity) when it was really needed?: No   Food Insecurity: Low Risk  (2024)    Food Insecurity     Within the past 12 months, did you worry that your food would run out before you got money to buy more?: No     Within the past 12 months, did the food you bought just not last and you didn t have money to get more?: No   Transportation Needs: Low Risk  (2024)    Transportation Needs     Within the past 12 months, has lack of transportation kept you from medical appointments, getting your  medicines, non-medical meetings or appointments, work, or from getting things that you need?: No   Physical Activity: Insufficiently Active (2/6/2024)    Exercise Vital Sign     Days of Exercise per Week: 5 days     Minutes of Exercise per Session: 10 min   Stress: Stress Concern Present (2/6/2024)    [employer] [place] [employer] - Occupational Stress Questionnaire     Feeling of Stress : Rather much   Social Connections: Unknown (2/6/2024)    Social Connection and Isolation Panel [NHANES]     Frequency of Social Gatherings with Friends and Family: More than three times a week   Interpersonal Safety: Low Risk  (2/12/2024)    Interpersonal Safety     Do you feel physically and emotionally safe where you currently live?: Yes     Within the past 12 months, have you been hit, slapped, kicked or otherwise physically hurt by someone?: No     Within the past 12 months, have you been humiliated or emotionally abused in other ways by your partner or ex-partner?: No   Housing Stability: Low Risk  (2/6/2024)    Housing Stability     Do you have housing? : Yes     Are you worried about losing your housing?: No       Review of Systems:  Skin:          Eyes:         ENT:         Respiratory:          Cardiovascular:         Gastroenterology:        Genitourinary:         Musculoskeletal:         Neurologic:         Psychiatric:         Heme/Lymph/Imm:         Endocrine:           Physical Exam:  Vitals: There were no vitals taken for this visit.    Constitutional:           Skin:             Head:           Eyes:           Lymph:      ENT:           Neck:           Respiratory:        Clear to auscultation    Cardiac:                Regular rate and rhythm                                           GI:           Extremities and Muscular Skeletal:                 Neurological:           Psych:           CC  Dennis Ring MD  [id number]  [place] [id number]

## 2024-09-04 ENCOUNTER — OFFICE VISIT (OUTPATIENT)
Dept: CARDIOLOGY | Facility: CLINIC | Age: 69
End: 2024-09-04
Payer: MEDICARE

## 2024-09-04 VITALS
SYSTOLIC BLOOD PRESSURE: 169 MMHG | HEIGHT: 61 IN | HEART RATE: 79 BPM | BODY MASS INDEX: 16.24 KG/M2 | OXYGEN SATURATION: 99 % | WEIGHT: 86 LBS | DIASTOLIC BLOOD PRESSURE: 62 MMHG

## 2024-09-04 DIAGNOSIS — E78.5 HYPERLIPIDEMIA LDL GOAL <70: ICD-10-CM

## 2024-09-04 DIAGNOSIS — I50.22 CHRONIC SYSTOLIC CONGESTIVE HEART FAILURE (H): ICD-10-CM

## 2024-09-04 DIAGNOSIS — I25.10 CORONARY ARTERY DISEASE INVOLVING NATIVE CORONARY ARTERY OF NATIVE HEART WITHOUT ANGINA PECTORIS: ICD-10-CM

## 2024-09-04 DIAGNOSIS — I10 BENIGN ESSENTIAL HYPERTENSION: ICD-10-CM

## 2024-09-04 PROCEDURE — 99214 OFFICE O/P EST MOD 30 MIN: CPT | Performed by: INTERNAL MEDICINE

## 2024-09-04 RX ORDER — MULTIVITAMIN
1 TABLET ORAL DAILY
COMMUNITY

## 2024-09-04 RX ORDER — ATORVASTATIN CALCIUM 40 MG/1
40 TABLET, FILM COATED ORAL DAILY
Qty: 90 TABLET | Refills: 3 | Status: SHIPPED | OUTPATIENT
Start: 2024-09-04

## 2024-09-04 RX ORDER — LOSARTAN POTASSIUM 50 MG/1
50 TABLET ORAL DAILY
Qty: 90 TABLET | Refills: 3 | Status: SHIPPED | OUTPATIENT
Start: 2024-09-04

## 2024-09-04 RX ORDER — SPIRONOLACTONE 25 MG/1
12.5 TABLET ORAL EVERY MORNING
Qty: 45 TABLET | Refills: 3 | Status: SHIPPED | OUTPATIENT
Start: 2024-09-04

## 2024-09-04 RX ORDER — PROCHLORPERAZINE MALEATE 10 MG
10 TABLET ORAL EVERY 6 HOURS PRN
COMMUNITY

## 2024-09-04 RX ORDER — ONDANSETRON 4 MG/1
TABLET, FILM COATED ORAL EVERY 8 HOURS PRN
COMMUNITY

## 2024-09-04 RX ORDER — METOPROLOL SUCCINATE 50 MG/1
50 TABLET, EXTENDED RELEASE ORAL EVERY MORNING
Qty: 90 TABLET | Refills: 3 | Status: SHIPPED | OUTPATIENT
Start: 2024-09-04

## 2024-09-04 NOTE — LETTER
9/4/2024    Mustapha Velásquez MD  [address]  [facility] [address]    RE: Kezia Dixon       Dear Colleague,     I had the pleasure of seeing Kezia Dixon in the [facility].  HPI and Plan:     I had the pleasure of seeing Ms. Dixon in follow-up at the [facility] today.  She is a very pleasant 68-year-old female who I saw in 2021 after she had been admitted to [facility] from 10/18/2021 through 10/23/2021 with acute hypoxemic respiratory failure that was thought to be cardiac in origin along with possibly community-acquired pneumonia.     The patient's cardiac history dates back to 08/2021 when she was admitted to [facility] from 08/22/2021 through 08/30/2021 with alcohol withdrawal in the context of a 1-1/2 year history of heavy use after her  passed away.  She stopped drinking approximately 3 days prior to admission at the time and presented with nausea, weakness and increased tremors.  She was also noted to be tachycardic and hypertensive.     The patient was treated appropriately for alcohol withdrawal but also was noted to have acute hypoxemic respiratory failure with an elevated BNP.  A transthoracic echocardiogram reported an ejection fraction of approximately 35% with mid to apical wall motion abnormalities and mild mitral regurgitation.  A subsequent Lexiscan stress perfusion study on 08/26/2021 demonstrated a large partially reversible anterior/anteroseptal/septal wall motion perfusion defect.  Her calculated LVEF, however, appeared to have normalized with the reported LVEF of 66%.       The patient was then readmitted to [facility] in October 2021 when she presented with symptoms of weakness, decreased p.o. intake and dyspnea.  It was thought that she may have an element of congestive heart failure and she was again treated with IV diuretics with normalization of her respiratory status.   Interestingly, an echocardiogram obtained during her hospitalization on 10/19/2021 demonstrated normalization of left ventricular systolic function with an estimated LVEF of 55%-60% and moderate tricuspid regurgitation as well as mild-to-moderate mitral regurgitation.  Moderate pulmonary hypertension was noted.     The patient's other pertinent medical history includes a history of esophageal cancer for which she had undergone esophagogastrectomy by Dr. Riojas in 2016.  She also underwent radiation and chemotherapy at the time and it does not appear that there is evidence of recurrence at this point.     At her last office visit she was doing well overall from a cardiovascular standpoint. I ordered a CT coronary angiogram for definitive evaluation and she was noted to have severe proximal right posterior lateral stenosis with mild mid LAD disease.  An echocardiogram subsequently demonstrated only mild aortic regurgitation and normal left ventricular systolic function.  Given that the lesion described on CT coronary angiography involves a very small caliber vessel, I recommended medical therapy unless she became symptomatic.    She was last seen in our clinic in May 2023 at which point she was doing well from a cardiovascular standpoint.  She was having occasional palpitations but these were brief and self-limiting.  Continued medical therapy was recommended.    Unfortunately since I last saw her she has been diagnosed with lung adenocarcinoma.  He has received chemoradiation and is currently on consolidation immunotherapy.  She receives infusions every other week and currently the duration of therapy is scheduled to be 1 year.    From a cardiovascular standpoint she is minimally symptomatic.  She does experience occasional dyspnea with exertion but the symptoms are self-limiting.  She denies any chest discomfort, palpitations, syncope or presyncope.  She denies any PND orthopnea.  She has been fully compliant with  her medications.       PHYSICAL EXAMINATION:  Dictated below.  Her blood pressure was elevated today but it has been within normal limits at her recent oncology visits.     Lipids on 8/21/2024 demonstrated total cholesterol 98, ratio 63, LDL of 24 triglycerides of 54.    Her last echocardiogram on 1/26/2022 demonstrated hyperdynamic left ventricular systolic function with mild aortic regurgitation.     IMPRESSION:      1.  Moderate stress-induced cardiomyopathy in 08/2021 with subsequent normalization of left ventricular systolic function.  2.  Abnormal stress perfusion study in 08/2021 suggestive of LAD distribution ischemia.  Subsequent CT coronary angiography demonstrated a severe proximal right posterolateral stenosis with mild LAD disease.  4.  History of esophageal cancer status post esophagogastrectomy in 03/2016 as described above.  4.  Recent diagnosis of lung adenocarcinoma.  Currently on consolidation immunotherapy.    PLAN    Ms. Dixon is doing well overall from a cardiovascular standpoint.  There is no evidence of angina or congestive heart failure.  I do think it would be reasonable to reassess left ventricular systolic function given her history of chemoradiation and concurrent immunotherapy.  Assuming the findings are unchanged, I will plan on follow-up in approximately 1 year.    It was a pleasure seeing her today.       CURRENT MEDICATIONS:  Current Outpatient Medications   Medication Sig Dispense Refill     acetaminophen (TYLENOL) 325 MG tablet Take 325-650 mg by mouth every 6 hours as needed for mild pain       ANORO ELLIPTA 62.5-25 MCG/ACT oral inhaler Inhale 1 puff into the lungs       aspirin (ASA) 81 MG EC tablet 1 tablet every other day       atorvastatin (LIPITOR) 40 MG tablet Take 1 tablet (40 mg) by mouth daily 90 tablet 1     fluticasone (FLONASE) 50 MCG/ACT nasal spray INSTILL 2 SPRAYS INTO BOTH NOSTRILS DAILY. 48 mL 2     furosemide (LASIX) 40 MG tablet Take 0.5 tablets (20 mg) by  mouth daily as needed (edema or shortness of breath) For worsening shortness of breath, leg swelling or weight gain.       gabapentin (NEURONTIN) 600 MG tablet TAKE ONE (1) TABLET BY MOUTH THREE TIMES DAILY. 90 tablet 2     ibuprofen (ADVIL/MOTRIN) 200 MG tablet Take 400 mg by mouth every 4 hours as needed for mild pain       losartan (COZAAR) 50 MG tablet Take 1 tablet (50 mg) by mouth daily Appointment required for further refills 90 tablet 1     metoprolol succinate ER (TOPROL XL) 50 MG 24 hr tablet Take 1 tablet (50 mg) by mouth every morning 90 tablet 1     omeprazole (PRILOSEC) 40 MG DR capsule TAKE 1 CAPSULE BY MOUTH EVERY DAY 90 capsule 2     spironolactone (ALDACTONE) 25 MG tablet Take 0.5 tablets (12.5 mg) by mouth every morning 45 tablet 1       ALLERGIES     Allergies   Allergen Reactions     Codeine Sulfate Nausea     Morphine      Pt unsure     Simvastatin Cramps     Leg cramps     Pcn [Penicillins] Rash       PAST MEDICAL HISTORY:  Past Medical History:   Diagnosis Date     Alcohol use disorder, severe, dependence (H) 10/14/2016     Alcoholism (H) 10/14/2016     Anemia      Benign essential hypertension 10/14/2016     Carotid artery disease (H24)     mile plaque 5/7     Chemical dependency (H)     alcohol     COPD (chronic obstructive pulmonary disease) (H)      Coronary artery disease      Depression with anxiety      Esophageal cancer (H) 03/22/2016    SQUAMOUS CELL     Esophageal mass      GERD (gastroesophageal reflux disease)      Hx of pancreatitis 2003     Hypercholesteremia      Hyperglycemia      Hyperlipemia      Impaired fasting glucose 10/14/2016     Nocturnal leg cramps      Other chronic pain      Pancreatic pseudocyst 10/14/2016     Pancreatitis     with pseudocyst     Pap smear with atypical squamous cells, cannot exclude high grade squamous intraepithelial lesion (ASC-H) 10/14/2016    Colpo impression benign, ECC benign. Cotestin in 1 yr.     Shingles      Squamous cell carcinoma of  right lung (H)      Vasomotor rhinitis        PAST SURGICAL HISTORY:  Past Surgical History:   Procedure Laterality Date     AAA REPAIR      splenic artery aneurysm embolization     ABDOMEN SURGERY  2/2/2016     COLONOSCOPY  11/26/2013    Procedure: COMBINED COLONOSCOPY, SINGLE BIOPSY/POLYPECTOMY BY BIOPSY;  COLONOSCOPY (MAC);  Surgeon: Montana Rosa MD;  Location:  GI     COLONOSCOPY  11/26/2013     COLONOSCOPY N/A 10/4/2018    Procedure: COMBINED COLONOSCOPY, SINGLE OR MULTIPLE BIOPSY/POLYPECTOMY BY BIOPSY;  colonoscopy;  Surgeon: Gio Apodaca MD;  Location:  GI     ENT SURGERY       ESOPHAGOGASTRECTOMY N/A 3/22/2016    Procedure: ESOPHAGOGASTRECTOMY;  Surgeon: Alvin Riojas MD;  Location:  OR     ESOPHAGOSCOPY, GASTROSCOPY, DUODENOSCOPY (EGD), COMBINED N/A 12/22/2015    Procedure: COMBINED ENDOSCOPIC ULTRASOUND, ESOPHAGOSCOPY, GASTROSCOPY, DUODENOSCOPY (EGD), FINE NEEDLE ASPIRATE/BIOPSY;  Surgeon: Danelle Michael MD;  Location:  GI     GASTROSTOMY, INSERT TUBE, COMBINED N/A 2/2/2016    Procedure: COMBINED GASTROSTOMY, INSERT TUBE (OPEN);  Surgeon: Alvin Riojas MD;  Location:  OR     GI SURGERY  12/22/2015     HAND SURGERY      right     HERNIORRHAPHY INCISIONAL (LOCATION) N/A 3/19/2019    Procedure: LAPARSCOPIC  INCISIONAL HERNIA REPAIR WITH MESH, LAPARSCOPIC LYSIS OF ADHENSIONS;  Surgeon: Lamberto Magaña MD;  Location:  OR     INSERT PORT VASCULAR ACCESS N/A 12/28/2015    Procedure: INSERT PORT VASCULAR ACCESS;  Surgeon: Alvin Riojas MD;  Location:  OR     INSERT PORT VASCULAR ACCESS N/A 1/9/2024    Procedure: SMART PORT PLACEMENT;  Surgeon: Alvin Riojas MD;  Location:  OR     LAPAROSCOPIC CHOLECYSTECTOMY N/A 3/19/2019    Procedure: LAPAROSCOPIC CHOLECYSTECTOMY;  Surgeon: Lamberto Magaña MD;  Location:  OR     LOBECTOMY LUNG Right 10/16/2018    Procedure: LOBECTOMY LUNG;  Surgeon: Alvin Riojas  MD;  Location: [facility] OR     REMOVE PORT VASCULAR ACCESS Left 2016    Procedure: REMOVE PORT VASCULAR ACCESS;  Surgeon: Alvin Riojas MD;  Location: [facility] OR     THORACOTOMY Right 10/16/2018    Procedure: REDO RIGHT THORACTOMY AND RIGHT LOWER LOBECTOMY, PLEURAL LYSIS;  Surgeon: Alvin Riojas MD;  Location: [facility] OR     TONSILLECTOMY       VASCULAR SURGERY         FAMILY HISTORY:  Family History   Problem Relation Age of Onset     Depression Mother      Substance Abuse Father      Other Cancer Father         melanoma     Prostate Cancer Father      Diabetes Father      Substance Abuse Paternal Grandfather      Other Cancer Brother         melanoma     Substance Abuse Brother      Other Cancer Brother         melanoma     Other Cancer Brother         melanoma     Other Cancer Brother         melanoma       SOCIAL HISTORY:  Social History     Socioeconomic History     Marital status:    Tobacco Use     Smoking status: Former     Current packs/day: 0.00     Average packs/day: 0.3 packs/day for 45.4 years (11.4 ttl pk-yrs)     Types: Cigarettes     Start date: 1970     Quit date: 2015     Years since quittin.7     Smokeless tobacco: Never   Vaping Use     Vaping status: Never Used   Substance and Sexual Activity     Alcohol use: Not Currently     Drug use: No     Sexual activity: Not Currently     Partners: Male     Birth control/protection: Post-menopausal   Other Topics Concern     Parent/sibling w/ CABG, MI or angioplasty before 65F 55M? No     Social Determinants of Health     Financial Resource Strain: Low Risk  (2024)    Financial Resource Strain      Within the past 12 months, have you or your family members you live with been unable to get utilities (heat, electricity) when it was really needed?: No   Food Insecurity: Low Risk  (2024)    Food Insecurity      Within the past 12 months, did you worry that your food would run out before you got money to buy more?: No       Within the past 12 months, did the food you bought just not last and you didn t have money to get more?: No   Transportation Needs: Low Risk  (2/6/2024)    Transportation Needs      Within the past 12 months, has lack of transportation kept you from medical appointments, getting your medicines, non-medical meetings or appointments, work, or from getting things that you need?: No   Physical Activity: Insufficiently Active (2/6/2024)    Exercise Vital Sign      Days of Exercise per Week: 5 days      Minutes of Exercise per Session: 10 min   Stress: Stress Concern Present (2/6/2024)    [employer] - Occupational Stress Questionnaire      Feeling of Stress : Rather much   Social Connections: Unknown (2/6/2024)    Social Connection and Isolation Panel [NHANES]      Frequency of Social Gatherings with Friends and Family: More than three times a week   Interpersonal Safety: Low Risk  (2/12/2024)    Interpersonal Safety      Do you feel physically and emotionally safe where you currently live?: Yes      Within the past 12 months, have you been hit, slapped, kicked or otherwise physically hurt by someone?: No      Within the past 12 months, have you been humiliated or emotionally abused in other ways by your partner or ex-partner?: No   Housing Stability: Low Risk  (2/6/2024)    Housing Stability      Do you have housing? : Yes      Are you worried about losing your housing?: No       Review of Systems:  Skin:          Eyes:         ENT:         Respiratory:          Cardiovascular:         Gastroenterology:        Genitourinary:         Musculoskeletal:         Neurologic:         Psychiatric:         Heme/Lymph/Imm:         Endocrine:           Physical Exam:  Vitals: There were no vitals taken for this visit.    Constitutional:           Skin:             Head:           Eyes:           Lymph:      ENT:           Neck:           Respiratory:        Clear to auscultation    Cardiac:                 Regular rate and rhythm                                           GI:           Extremities and Muscular Skeletal:                 Neurological:           Psych:           CC  Dennis Ring MD  [address]  [place] [address]                  Thank you for allowing me to participate in the care of your patient.      Sincerely,     Dennis Ring MD     [facility]  cc:   Dennis Ring MD  [address]  [place] [address]

## 2024-09-15 DIAGNOSIS — I10 BENIGN ESSENTIAL HYPERTENSION: ICD-10-CM

## 2024-09-16 ENCOUNTER — NURSE TRIAGE (OUTPATIENT)
Dept: FAMILY MEDICINE | Facility: CLINIC | Age: 69
End: 2024-09-16

## 2024-09-16 ENCOUNTER — E-VISIT (OUTPATIENT)
Dept: FAMILY MEDICINE | Facility: CLINIC | Age: 69
End: 2024-09-16
Payer: MEDICARE

## 2024-09-16 DIAGNOSIS — R05.1 ACUTE COUGH: Primary | ICD-10-CM

## 2024-09-16 PROCEDURE — 99207 PR NON-BILLABLE SERV PER CHARTING: CPT | Performed by: INTERNAL MEDICINE

## 2024-09-16 RX ORDER — OMEPRAZOLE 40 MG/1
CAPSULE, DELAYED RELEASE ORAL
Qty: 90 CAPSULE | Refills: 2 | Status: SHIPPED | OUTPATIENT
Start: 2024-09-16

## 2024-09-16 NOTE — TELEPHONE ENCOUNTER
"Nurse Triage SBAR    Is this a 2nd Level Triage? NO    Situation: Patient has had a cough for the whole summer. When she awakes in the morning the cough is worse and she coughs for about an hour. The cough gets so bad she starts to see streaks of blood which she describes as \"only a little\". Patient contacted the clinic to get a nose spray that works a little better. Ipratropium bromide nasal solution (nose spray). She wants it to help with that cough. She took her late husbands dose and found it helps her with her allergies. She states she thinks she does not need a visit     Background: lung cancer, emphasema, allergies     Assessment: rout to PCP    Protocol Recommended Disposition:   See in Office Within 3 Days    Recommendation: Routing to provider      Routed to provider    Does the patient meet one of the following criteria for ADS visit consideration? No    Reason for Disposition   Cough has been present for > 3 weeks    Additional Information   Negative: SEVERE difficulty breathing (e.g., struggling for each breath, speaks in single words)   Negative: Chest pain and difficulty breathing   Negative: Bluish (or gray) lips or face now   Negative: Passed out (i.e., lost consciousness, collapsed and was not responding)   Negative: Shock suspected (e.g., cold/pale/clammy skin, too weak to stand, low BP, rapid pulse)   Negative: Difficult to awaken or acting confused (e.g., disoriented, slurred speech)   Negative: Recent chest injury (i.e., past 24 hours)   Negative: Coughed up blood and large amount (Such as: 'a half cup of blood')   Negative: Sounds like a life-threatening emergency to the triager   Negative: MODERATE difficulty breathing (e.g., speaks in phrases, SOB even at rest, pulse 100-120) and still present when not coughing   Negative: Chest pain   Negative: Unclear to triager if the patient is coughing up blood or vomiting blood   Negative: History of prior 'blood clot' in leg or lungs (i.e., deep vein " "thrombosis, pulmonary embolism)   Negative: History of inherited increased risk of blood clots (e.g., Factor 5 Leiden, Anti-thrombin 3, Protein C or Protein S deficiency, Prothrombin mutation)   Negative: Pregnant or postpartum (from 0 to 6 weeks after delivery)   Negative: Hip or leg fracture (broken bone) in past month (or had cast on leg or ankle in past month)   Negative: Long-distance travel in past month (e.g., car, bus, train, plane; with trip lasting 6 or more hours)   Negative: Has underlying lung disease (e.g., COPD, chronic bronchitis or emphysema) and sputum has turned yellow or green in color   Negative: Coughing up joann-colored sputum   Negative: Coughing up yellow or green sputum and present > 5 days   Negative: Fever present > 3 days (72 hours)   Negative: Taking Coumadin (warfarin) or other strong blood thinner, or known bleeding disorder (e.g., thrombocytopenia)   Negative: Patient wants to be seen   Negative: Nasal discharge and present > 10 days   Negative: MILD difficulty breathing (e.g., minimal/no SOB at rest, SOB with walking, pulse <100) and still present when not coughing (Exception: No change from usual, chronic shortness of breath.)   Negative: Coughed up blood and > 1 tablespoon (15 ml)   Negative: Fever > 103 F (39.4 C)   Negative: Fever > 101 F (38.3 C) and age > 60 years   Negative: Fever > 100.0 F (37.8 C) and diabetes mellitus or weak immune system (e.g., HIV positive, cancer chemo, splenectomy, organ transplant, chronic steroids)   Negative: Bedridden (e.g., CVA, chronic illness, recovering from surgery)   Negative: Patient sounds very sick or weak to the triager    Answer Assessment - Initial Assessment Questions  1. ONSET: \"When did the cough begin?\"       Been going most summer   2. SEVERITY: \"How bad is the cough today?\" \"Did the blood appear after a coughing spell?\"       Calm down   3. SPUTUM: \"Describe the color of your sputum\" (none, dry cough; clear, white, yellow, " "green)      Clear, but after awhile coughs a little bit of bloos  4. HEMOPTYSIS: \"How much blood?\" (flecks, streaks, tablespoons, etc.)      Very little   5. DIFFICULTY BREATHING: \"Are you having difficulty breathing?\" If Yes, ask: \"How bad is it?\" (e.g., mild, moderate, severe)     - MILD: No SOB at rest, mild SOB with walking, speaks normally in sentences, can lie down, no retractions, pulse < 100.     - MODERATE: SOB at rest, SOB with minimal exertion and prefers to sit, cannot lie down flat, speaks in phrases, mild retractions, audible wheezing, pulse 100-120.     - SEVERE: Very SOB at rest, speaks in single words, struggling to breathe, sitting hunched forward, retractions, pulse > 120       SOB with exertion  6. FEVER: \"Do you have a fever?\" If Yes, ask: \"What is your temperature, how was it measured, and when did it start?\"      Fever   7. CARDIAC HISTORY: \"Do you have any history of heart disease?\" (e.g., heart attack, congestive heart failure)       Congestive heart failure   8. LUNG HISTORY: \"Do you have any history of lung disease?\"  (e.g., pulmonary embolus, asthma, emphysema)      Lung cancer and emphysema   9. PE RISK FACTORS: \"Do you have a history of blood clots?\" (or: recent major surgery, recent prolonged travel, bedridden)      no  10. OTHER SYMPTOMS: \"Do you have any other symptoms?\" (e.g., runny nose, wheezing, chest pain)        Runny nose until flonase   11. PREGNANCY: \"Is there any chance you are pregnant?\" \"When was your last menstrual period?\"        no  12. TRAVEL: \"Have you traveled out of the country in the last month?\" (e.g., travel history, exposures)  no    Protocols used: Coughing Up Blood-A-OH    "

## 2024-09-16 NOTE — TELEPHONE ENCOUNTER
Spoke with pt and relayed providers message. Pt stated understanding and was schedule with acute care provider tomorrow.     BIBIANA NicholsonN, RN   [facility]

## 2024-09-16 NOTE — TELEPHONE ENCOUNTER
Given her history, just prescribing a nasal spray without an appointment is not best practice, recommend office visit appointment as soon as possible, UC or ER if symptoms are severe

## 2024-09-16 NOTE — TELEPHONE ENCOUNTER
Patient submitted e-visit for symptoms it is not appropriate for e-visit.  Please call patient and triage her symptoms and help her schedule an appointment as most appropriate.

## 2024-09-17 ENCOUNTER — TRANSFERRED RECORDS (OUTPATIENT)
Dept: HEALTH INFORMATION MANAGEMENT | Facility: CLINIC | Age: 69
End: 2024-09-17

## 2024-09-17 ENCOUNTER — OFFICE VISIT (OUTPATIENT)
Dept: FAMILY MEDICINE | Facility: CLINIC | Age: 69
End: 2024-09-17
Payer: MEDICARE

## 2024-09-17 VITALS
DIASTOLIC BLOOD PRESSURE: 78 MMHG | SYSTOLIC BLOOD PRESSURE: 120 MMHG | WEIGHT: 86.3 LBS | OXYGEN SATURATION: 97 % | HEART RATE: 76 BPM | RESPIRATION RATE: 16 BRPM | BODY MASS INDEX: 16.29 KG/M2 | HEIGHT: 61 IN | TEMPERATURE: 98.3 F

## 2024-09-17 DIAGNOSIS — R05.9 COUGH, UNSPECIFIED TYPE: Primary | ICD-10-CM

## 2024-09-17 PROCEDURE — 99213 OFFICE O/P EST LOW 20 MIN: CPT | Performed by: PHYSICIAN ASSISTANT

## 2024-09-17 RX ORDER — IPRATROPIUM BROMIDE 42 UG/1
2 SPRAY, METERED NASAL 4 TIMES DAILY
Qty: 15 ML | Refills: 1 | Status: SHIPPED | OUTPATIENT
Start: 2024-09-17

## 2024-09-17 ASSESSMENT — PAIN SCALES - GENERAL: PAINLEVEL: NO PAIN (0)

## 2024-09-17 ASSESSMENT — ENCOUNTER SYMPTOMS: COUGH: 1

## 2024-09-17 NOTE — PROGRESS NOTES
"Assessment and Plan:     (R05.9) Cough, unspecified type  (primary encounter diagnosis)  Comment: onset in last 2 months, only notices in the AM, has more congestion and notices PND and cough for about an hour after waking then resolves, no fever/chills has new Dx of adenocarcinoma and is following with Dr. Garcia, gets CT scan every 3 months, she denies chest pain, worsening sob, just saw Dr. Garcia this am and aware of cough, she also just saw her cardiologist and felt to be stable from cardiology standpoint, she used husbands atrovent nasal inhaler which really helped and would like an Rx  Plan: ipratropium (ATROVENT) 0.06 % nasal spray        Keep appt for CT chest as scheduled on 10/10/24    [facility] Same Day Provider         Subjective   Kezia is a 68 year old, presenting for the following health issues:  Cough    Cough    Kezia is here for a cough x 1-2 months  It starts every morning and lasts for about an hour then resolves  She does note increased congestion in the am and suspects symptoms could be due to PND  The cough is productive with clear mucus and at times there are very small streaks of blood in the sputum    She denies chest pain    She does have some shortness of breath with activity which is not new    She has history of lung adenocarcinoma, follows with Dr. Garcia, she saw him this am  She reports that she discussed the cough with him this am    She also has history of esophageal cancer and is s/p esophagogastrectomy in 03/2016     She has a chest CT scheduled for 10/10/24    She denies fever/chills          Objective      /78   Pulse 76   Temp 98.3  F (36.8  C) (Oral)   Resp 16   Ht 1.549 m (5' 1\")   Wt 39.1 kg (86 lb 4.8 oz)   SpO2 97%   BMI 16.31 kg/m        Physical Exam     GENERAL: healthy, alert and no distress  ENT: mmm, op clear   RESP: mildly diminished bilaterally, mild exp wheeze, moving air well, normal WOB  CV: regular rates and " rhythm, normal S1 S2, no S3 or S4 and no murmur, no click or rub   MS: extremities- no gross deformities noted, no edema            Signed Electronically by: Destiny Vega PA-C

## 2024-10-02 ENCOUNTER — HOSPITAL ENCOUNTER (OUTPATIENT)
Dept: CARDIOLOGY | Facility: CLINIC | Age: 69
Discharge: HOME | End: 2024-10-02
Attending: INTERNAL MEDICINE | Admitting: INTERNAL MEDICINE
Payer: MEDICARE

## 2024-10-02 DIAGNOSIS — I25.10 CORONARY ARTERY DISEASE INVOLVING NATIVE CORONARY ARTERY OF NATIVE HEART WITHOUT ANGINA PECTORIS: ICD-10-CM

## 2024-10-02 LAB — LVEF ECHO: NORMAL

## 2024-10-02 PROCEDURE — 93306 TTE W/DOPPLER COMPLETE: CPT | Mod: 26 | Performed by: INTERNAL MEDICINE

## 2024-10-02 PROCEDURE — 93306 TTE W/DOPPLER COMPLETE: CPT

## 2024-10-03 ENCOUNTER — TELEPHONE (OUTPATIENT)
Dept: CARDIOLOGY | Facility: CLINIC | Age: 69
End: 2024-10-03
Payer: MEDICARE

## 2024-10-03 DIAGNOSIS — I34.0 MITRAL VALVE REGURGITATION: ICD-10-CM

## 2024-10-03 DIAGNOSIS — I50.22 CHRONIC SYSTOLIC CONGESTIVE HEART FAILURE (H): Primary | ICD-10-CM

## 2024-10-03 NOTE — TELEPHONE ENCOUNTER
Lets have her keep track of her blood pressure at home.  It was quite elevated during the echocardiogram.  Thanks.    Lets get a repeat echocardiogram in 1 year for reassessment of mitral regurgitation.

## 2024-10-03 NOTE — TELEPHONE ENCOUNTER
Echo 10/2/2024 noted. Ordered post-OV to review LVSF with history of recent cancer therapy.    Per Dr. Ring's dictation 9/4/2024: I do think it would be reasonable to reassess left ventricular systolic function given her history of chemoradiation and concurrent immunotherapy. Assuming the findings are unchanged, I will plan on follow-up in approximately 1 year.     Echo:  There is normal left ventricular wall thickness.  Left ventricular systolic function is normal.  The visual ejection fraction is 65-70%.  No regional wall motion abnormalities noted.  The right ventricle is normal in size and function.  Mild to moderate mitral valve regurgitation in the context of systemic hypertension of 181/81 mmHg.  Normal left atrial size.  On prior study dated 1/2022, trace to mild mitral regurgitation reported.    Will message Dr. Ring to review

## 2024-10-04 NOTE — TELEPHONE ENCOUNTER
Order placed for echo in 1 year, pre-visit with annual return visit    BP at echo  181/81    Attempted to contact patient to discuss echo report, stable with mild to moderate mitral valve regurgitation in the context of systemic hypertension of 181/81 mmHg. Left a message requesting a call back to review Dr. Ring's recommendation.    Left message for patient to call back to Team 2 R.N.s @ [phone]       1200 spoke with patient. She will plan to purchase a BP cuff and monitor BP at home Discussed not buying a wrist unit but to look for the upper arm cuff. Reviewed to check BP 1 hour after morning meds.    Patient to call back in a month with BP update.

## 2024-10-10 ENCOUNTER — ANCILLARY PROCEDURE (OUTPATIENT)
Dept: CT IMAGING | Facility: CLINIC | Age: 69
End: 2024-10-10
Attending: INTERNAL MEDICINE
Payer: MEDICARE

## 2024-10-10 DIAGNOSIS — C34.31 MALIGNANT NEOPLASM OF LOWER LOBE, RIGHT BRONCHUS OR LUNG (H): ICD-10-CM

## 2024-10-10 PROCEDURE — 71260 CT THORAX DX C+: CPT

## 2024-10-10 PROCEDURE — 82565 ASSAY OF CREATININE: CPT

## 2024-10-10 PROCEDURE — 250N000009 HC RX 250: Performed by: INTERNAL MEDICINE

## 2024-10-10 PROCEDURE — 250N000011 HC RX IP 250 OP 636: Performed by: INTERNAL MEDICINE

## 2024-10-10 RX ORDER — HEPARIN SODIUM (PORCINE) LOCK FLUSH IV SOLN 100 UNIT/ML 100 UNIT/ML
5 SOLUTION INTRAVENOUS ONCE
Status: COMPLETED | OUTPATIENT
Start: 2024-10-10 | End: 2024-10-10

## 2024-10-10 RX ORDER — IOPAMIDOL 755 MG/ML
54 INJECTION, SOLUTION INTRAVASCULAR ONCE
Status: COMPLETED | OUTPATIENT
Start: 2024-10-10 | End: 2024-10-10

## 2024-10-10 RX ADMIN — HEPARIN SODIUM (PORCINE) LOCK FLUSH IV SOLN 100 UNIT/ML 5 ML: 100 SOLUTION at 10:19

## 2024-10-10 RX ADMIN — IOPAMIDOL 54 ML: 755 INJECTION, SOLUTION INTRAVENOUS at 10:18

## 2024-10-10 RX ADMIN — SODIUM CHLORIDE 40 ML: 9 INJECTION, SOLUTION INTRAVENOUS at 10:18

## 2024-10-11 LAB
CREAT BLD-MCNC: 0.4 MG/DL (ref 0.5–1)
EGFRCR SERPLBLD CKD-EPI 2021: >60 ML/MIN/1.73M2

## 2024-10-15 ENCOUNTER — TRANSFERRED RECORDS (OUTPATIENT)
Dept: HEALTH INFORMATION MANAGEMENT | Facility: CLINIC | Age: 69
End: 2024-10-15
Payer: MEDICARE

## 2024-11-04 DIAGNOSIS — R05.9 COUGH, UNSPECIFIED TYPE: ICD-10-CM

## 2024-11-04 RX ORDER — IPRATROPIUM BROMIDE 42 UG/1
2 SPRAY, METERED NASAL 4 TIMES DAILY
Qty: 15 ML | Refills: 5 | Status: SHIPPED | OUTPATIENT
Start: 2024-11-04

## 2024-11-05 ENCOUNTER — MYC MEDICAL ADVICE (OUTPATIENT)
Dept: CARDIOLOGY | Facility: CLINIC | Age: 69
End: 2024-11-05
Payer: MEDICARE

## 2024-11-05 NOTE — TELEPHONE ENCOUNTER
[employer] message sent to patient asking for BP update.       Addendum: Pt sent reply as below. [employer] message sent back for more information regarding high readings.     Kezia Lopez [facility] Heart Team 2 (supporting You)8 minutes ago (12:52 PM)     For the most part it has been around 123 to 128 over 70. there have been about 4 days when it was in the 180's over 70.

## 2024-11-06 NOTE — TELEPHONE ENCOUNTER
Oncology update 10/21/2024:      Will update Dr. Ring with patient's home BP readings (new cuff)

## 2024-11-07 NOTE — TELEPHONE ENCOUNTER
OK it looks like it is mostly well controlled. Please have her keep track of it 3 times a week and call us if BP is consistently above 140 mm Hg systolic. Thanks.

## 2024-11-12 ENCOUNTER — TRANSFERRED RECORDS (OUTPATIENT)
Dept: HEALTH INFORMATION MANAGEMENT | Facility: CLINIC | Age: 69
End: 2024-11-12
Payer: MEDICARE

## 2024-11-19 ENCOUNTER — APPOINTMENT (OUTPATIENT)
Dept: CT IMAGING | Facility: CLINIC | Age: 69
DRG: 897 | End: 2024-11-19
Attending: BEHAVIOR TECHNICIAN
Payer: MEDICARE

## 2024-11-19 ENCOUNTER — HOSPITAL ENCOUNTER (INPATIENT)
Facility: CLINIC | Age: 69
DRG: 897 | End: 2024-11-19
Attending: EMERGENCY MEDICINE | Admitting: INTERNAL MEDICINE
Payer: MEDICARE

## 2024-11-19 DIAGNOSIS — F10.920 ALCOHOLIC INTOXICATION WITHOUT COMPLICATION (H): ICD-10-CM

## 2024-11-19 DIAGNOSIS — R06.00 DYSPNEA, UNSPECIFIED TYPE: ICD-10-CM

## 2024-11-19 DIAGNOSIS — F10.20 ALCOHOL USE DISORDER, SEVERE, DEPENDENCE (H): Primary | ICD-10-CM

## 2024-11-19 DIAGNOSIS — J44.9 CHRONIC OBSTRUCTIVE PULMONARY DISEASE, UNSPECIFIED COPD TYPE (H): ICD-10-CM

## 2024-11-19 DIAGNOSIS — E83.42 HYPOMAGNESEMIA: ICD-10-CM

## 2024-11-19 DIAGNOSIS — J43.9 PULMONARY EMPHYSEMA, UNSPECIFIED EMPHYSEMA TYPE (H): ICD-10-CM

## 2024-11-19 DIAGNOSIS — R53.1 GENERALIZED WEAKNESS: ICD-10-CM

## 2024-11-19 LAB
ALBUMIN SERPL BCG-MCNC: 3.3 G/DL (ref 3.5–5.2)
ALP SERPL-CCNC: 165 U/L (ref 40–150)
ALT SERPL W P-5'-P-CCNC: 21 U/L (ref 0–50)
ANION GAP SERPL CALCULATED.3IONS-SCNC: 15 MMOL/L (ref 7–15)
AST SERPL W P-5'-P-CCNC: 52 U/L (ref 0–45)
ATRIAL RATE - MUSE: 85 BPM
BASOPHILS # BLD AUTO: 0 10E3/UL (ref 0–0.2)
BASOPHILS NFR BLD AUTO: 0 %
BILIRUB SERPL-MCNC: 1.2 MG/DL
BUN SERPL-MCNC: 7.7 MG/DL (ref 8–23)
CALCIUM SERPL-MCNC: 8.3 MG/DL (ref 8.8–10.4)
CHLORIDE SERPL-SCNC: 97 MMOL/L (ref 98–107)
CREAT SERPL-MCNC: 0.42 MG/DL (ref 0.51–0.95)
D DIMER PPP FEU-MCNC: 1.59 UG/ML FEU (ref 0–0.5)
DIASTOLIC BLOOD PRESSURE - MUSE: NORMAL MMHG
EGFRCR SERPLBLD CKD-EPI 2021: >90 ML/MIN/1.73M2
EOSINOPHIL # BLD AUTO: 0 10E3/UL (ref 0–0.7)
EOSINOPHIL NFR BLD AUTO: 0 %
ERYTHROCYTE [DISTWIDTH] IN BLOOD BY AUTOMATED COUNT: 14.4 % (ref 10–15)
ETHANOL SERPL-MCNC: 0.18 G/DL
FLUAV RNA SPEC QL NAA+PROBE: NEGATIVE
FLUBV RNA RESP QL NAA+PROBE: NEGATIVE
GLUCOSE SERPL-MCNC: 89 MG/DL (ref 70–99)
HCO3 SERPL-SCNC: 26 MMOL/L (ref 22–29)
HCT VFR BLD AUTO: 35.8 % (ref 35–47)
HGB BLD-MCNC: 12.1 G/DL (ref 11.7–15.7)
IMM GRANULOCYTES # BLD: 0.1 10E3/UL
IMM GRANULOCYTES NFR BLD: 1 %
INTERPRETATION ECG - MUSE: NORMAL
LIPASE SERPL-CCNC: 8 U/L (ref 13–60)
LYMPHOCYTES # BLD AUTO: 0.6 10E3/UL (ref 0.8–5.3)
LYMPHOCYTES NFR BLD AUTO: 7 %
MAGNESIUM SERPL-MCNC: 1.5 MG/DL (ref 1.7–2.3)
MCH RBC QN AUTO: 30.3 PG (ref 26.5–33)
MCHC RBC AUTO-ENTMCNC: 33.8 G/DL (ref 31.5–36.5)
MCV RBC AUTO: 90 FL (ref 78–100)
MONOCYTES # BLD AUTO: 0.6 10E3/UL (ref 0–1.3)
MONOCYTES NFR BLD AUTO: 8 %
NEUTROPHILS # BLD AUTO: 6.7 10E3/UL (ref 1.6–8.3)
NEUTROPHILS NFR BLD AUTO: 84 %
NRBC # BLD AUTO: 0 10E3/UL
NRBC BLD AUTO-RTO: 0 /100
NT-PROBNP SERPL-MCNC: 627 PG/ML (ref 0–900)
P AXIS - MUSE: 67 DEGREES
PLATELET # BLD AUTO: 270 10E3/UL (ref 150–450)
POTASSIUM SERPL-SCNC: 3.6 MMOL/L (ref 3.4–5.3)
PR INTERVAL - MUSE: 138 MS
PROT SERPL-MCNC: 6.8 G/DL (ref 6.4–8.3)
QRS DURATION - MUSE: 70 MS
QT - MUSE: 376 MS
QTC - MUSE: 447 MS
R AXIS - MUSE: 81 DEGREES
RBC # BLD AUTO: 3.99 10E6/UL (ref 3.8–5.2)
RSV RNA SPEC NAA+PROBE: NEGATIVE
SARS-COV-2 RNA RESP QL NAA+PROBE: NEGATIVE
SODIUM SERPL-SCNC: 138 MMOL/L (ref 135–145)
SYSTOLIC BLOOD PRESSURE - MUSE: NORMAL MMHG
T AXIS - MUSE: 58 DEGREES
TROPONIN T SERPL HS-MCNC: 7 NG/L
TROPONIN T SERPL HS-MCNC: <6 NG/L
VENTRICULAR RATE- MUSE: 85 BPM
WBC # BLD AUTO: 8 10E3/UL (ref 4–11)

## 2024-11-19 PROCEDURE — 85049 AUTOMATED PLATELET COUNT: CPT | Performed by: BEHAVIOR TECHNICIAN

## 2024-11-19 PROCEDURE — 85025 COMPLETE CBC W/AUTO DIFF WBC: CPT | Performed by: BEHAVIOR TECHNICIAN

## 2024-11-19 PROCEDURE — G1010 CDSM STANSON: HCPCS

## 2024-11-19 PROCEDURE — 250N000013 HC RX MED GY IP 250 OP 250 PS 637: Performed by: INTERNAL MEDICINE

## 2024-11-19 PROCEDURE — 85379 FIBRIN DEGRADATION QUANT: CPT | Performed by: BEHAVIOR TECHNICIAN

## 2024-11-19 PROCEDURE — 83690 ASSAY OF LIPASE: CPT | Performed by: BEHAVIOR TECHNICIAN

## 2024-11-19 PROCEDURE — 99223 1ST HOSP IP/OBS HIGH 75: CPT | Mod: AI | Performed by: INTERNAL MEDICINE

## 2024-11-19 PROCEDURE — 96365 THER/PROPH/DIAG IV INF INIT: CPT | Mod: 59

## 2024-11-19 PROCEDURE — 250N000012 HC RX MED GY IP 250 OP 636 PS 637: Performed by: INTERNAL MEDICINE

## 2024-11-19 PROCEDURE — 99285 EMERGENCY DEPT VISIT HI MDM: CPT | Mod: 25

## 2024-11-19 PROCEDURE — 87637 SARSCOV2&INF A&B&RSV AMP PRB: CPT | Performed by: BEHAVIOR TECHNICIAN

## 2024-11-19 PROCEDURE — 83880 ASSAY OF NATRIURETIC PEPTIDE: CPT | Performed by: BEHAVIOR TECHNICIAN

## 2024-11-19 PROCEDURE — 93005 ELECTROCARDIOGRAM TRACING: CPT

## 2024-11-19 PROCEDURE — 94640 AIRWAY INHALATION TREATMENT: CPT

## 2024-11-19 PROCEDURE — 250N000013 HC RX MED GY IP 250 OP 250 PS 637: Performed by: BEHAVIOR TECHNICIAN

## 2024-11-19 PROCEDURE — 250N000009 HC RX 250: Performed by: BEHAVIOR TECHNICIAN

## 2024-11-19 PROCEDURE — 120N000001 HC R&B MED SURG/OB

## 2024-11-19 PROCEDURE — 250N000009 HC RX 250: Performed by: EMERGENCY MEDICINE

## 2024-11-19 PROCEDURE — 82077 ASSAY SPEC XCP UR&BREATH IA: CPT | Performed by: BEHAVIOR TECHNICIAN

## 2024-11-19 PROCEDURE — 250N000011 HC RX IP 250 OP 636: Performed by: BEHAVIOR TECHNICIAN

## 2024-11-19 PROCEDURE — 83735 ASSAY OF MAGNESIUM: CPT | Performed by: BEHAVIOR TECHNICIAN

## 2024-11-19 PROCEDURE — HZ2ZZZZ DETOXIFICATION SERVICES FOR SUBSTANCE ABUSE TREATMENT: ICD-10-PCS | Performed by: INTERNAL MEDICINE

## 2024-11-19 PROCEDURE — 84484 ASSAY OF TROPONIN QUANT: CPT | Performed by: BEHAVIOR TECHNICIAN

## 2024-11-19 PROCEDURE — 36415 COLL VENOUS BLD VENIPUNCTURE: CPT | Performed by: BEHAVIOR TECHNICIAN

## 2024-11-19 PROCEDURE — 71275 CT ANGIOGRAPHY CHEST: CPT | Mod: MF

## 2024-11-19 PROCEDURE — 82310 ASSAY OF CALCIUM: CPT | Performed by: BEHAVIOR TECHNICIAN

## 2024-11-19 PROCEDURE — 250N000011 HC RX IP 250 OP 636: Performed by: INTERNAL MEDICINE

## 2024-11-19 PROCEDURE — 82565 ASSAY OF CREATININE: CPT | Performed by: BEHAVIOR TECHNICIAN

## 2024-11-19 PROCEDURE — 80048 BASIC METABOLIC PNL TOTAL CA: CPT | Performed by: BEHAVIOR TECHNICIAN

## 2024-11-19 RX ORDER — PREDNISONE 20 MG/1
40 TABLET ORAL DAILY
Status: DISCONTINUED | OUTPATIENT
Start: 2024-11-19 | End: 2024-11-22 | Stop reason: HOSPADM

## 2024-11-19 RX ORDER — AMOXICILLIN 250 MG
2 CAPSULE ORAL 2 TIMES DAILY PRN
Status: DISCONTINUED | OUTPATIENT
Start: 2024-11-19 | End: 2024-11-22 | Stop reason: HOSPADM

## 2024-11-19 RX ORDER — ONDANSETRON 2 MG/ML
4 INJECTION INTRAMUSCULAR; INTRAVENOUS EVERY 30 MIN PRN
Status: DISCONTINUED | OUTPATIENT
Start: 2024-11-19 | End: 2024-11-19

## 2024-11-19 RX ORDER — MULTIVITAMIN,THERAPEUTIC
1 TABLET ORAL ONCE
Status: COMPLETED | OUTPATIENT
Start: 2024-11-19 | End: 2024-11-19

## 2024-11-19 RX ORDER — DIAZEPAM 5 MG/1
10 TABLET ORAL EVERY 30 MIN PRN
Status: DISCONTINUED | OUTPATIENT
Start: 2024-11-19 | End: 2024-11-22 | Stop reason: HOSPADM

## 2024-11-19 RX ORDER — ONDANSETRON 4 MG/1
4 TABLET, ORALLY DISINTEGRATING ORAL EVERY 6 HOURS PRN
Status: DISCONTINUED | OUTPATIENT
Start: 2024-11-19 | End: 2024-11-22 | Stop reason: HOSPADM

## 2024-11-19 RX ORDER — AMOXICILLIN 250 MG
1 CAPSULE ORAL 2 TIMES DAILY PRN
Status: DISCONTINUED | OUTPATIENT
Start: 2024-11-19 | End: 2024-11-22 | Stop reason: HOSPADM

## 2024-11-19 RX ORDER — IPRATROPIUM BROMIDE AND ALBUTEROL SULFATE 2.5; .5 MG/3ML; MG/3ML
3 SOLUTION RESPIRATORY (INHALATION) ONCE
Status: COMPLETED | OUTPATIENT
Start: 2024-11-19 | End: 2024-11-19

## 2024-11-19 RX ORDER — HEPARIN SODIUM,PORCINE 10 UNIT/ML
5-10 VIAL (ML) INTRAVENOUS EVERY 24 HOURS
Status: DISCONTINUED | OUTPATIENT
Start: 2024-11-19 | End: 2024-11-22 | Stop reason: HOSPADM

## 2024-11-19 RX ORDER — SPIRONOLACTONE 25 MG
12.5 TABLET ORAL EVERY MORNING
Status: DISCONTINUED | OUTPATIENT
Start: 2024-11-20 | End: 2024-11-22 | Stop reason: HOSPADM

## 2024-11-19 RX ORDER — GABAPENTIN 300 MG/1
900 CAPSULE ORAL EVERY 8 HOURS
Status: DISCONTINUED | OUTPATIENT
Start: 2024-11-20 | End: 2024-11-22 | Stop reason: HOSPADM

## 2024-11-19 RX ORDER — ACETAMINOPHEN 325 MG/1
650 TABLET ORAL EVERY 6 HOURS PRN
Status: DISCONTINUED | OUTPATIENT
Start: 2024-11-19 | End: 2024-11-22 | Stop reason: HOSPADM

## 2024-11-19 RX ORDER — ATORVASTATIN CALCIUM 40 MG/1
40 TABLET, FILM COATED ORAL DAILY
Status: DISCONTINUED | OUTPATIENT
Start: 2024-11-20 | End: 2024-11-22 | Stop reason: HOSPADM

## 2024-11-19 RX ORDER — MAGNESIUM SULFATE HEPTAHYDRATE 40 MG/ML
2 INJECTION, SOLUTION INTRAVENOUS ONCE
Status: COMPLETED | OUTPATIENT
Start: 2024-11-19 | End: 2024-11-19

## 2024-11-19 RX ORDER — IOPAMIDOL 755 MG/ML
44 INJECTION, SOLUTION INTRAVASCULAR ONCE
Status: COMPLETED | OUTPATIENT
Start: 2024-11-19 | End: 2024-11-19

## 2024-11-19 RX ORDER — LABETALOL HYDROCHLORIDE 5 MG/ML
10 INJECTION, SOLUTION INTRAVENOUS
Status: DISCONTINUED | OUTPATIENT
Start: 2024-11-19 | End: 2024-11-22 | Stop reason: HOSPADM

## 2024-11-19 RX ORDER — GABAPENTIN 300 MG/1
300 CAPSULE ORAL EVERY 8 HOURS
Status: DISCONTINUED | OUTPATIENT
Start: 2024-11-25 | End: 2024-11-22 | Stop reason: HOSPADM

## 2024-11-19 RX ORDER — PANTOPRAZOLE SODIUM 40 MG/1
40 TABLET, DELAYED RELEASE ORAL
Status: DISCONTINUED | OUTPATIENT
Start: 2024-11-19 | End: 2024-11-20

## 2024-11-19 RX ORDER — HALOPERIDOL 5 MG/ML
2.5-5 INJECTION INTRAMUSCULAR EVERY 6 HOURS PRN
Status: DISCONTINUED | OUTPATIENT
Start: 2024-11-19 | End: 2024-11-22 | Stop reason: HOSPADM

## 2024-11-19 RX ORDER — OLANZAPINE 5 MG/1
5-10 TABLET, ORALLY DISINTEGRATING ORAL EVERY 6 HOURS PRN
Status: DISCONTINUED | OUTPATIENT
Start: 2024-11-19 | End: 2024-11-22 | Stop reason: HOSPADM

## 2024-11-19 RX ORDER — FLUMAZENIL 0.1 MG/ML
0.2 INJECTION, SOLUTION INTRAVENOUS
Status: DISCONTINUED | OUTPATIENT
Start: 2024-11-19 | End: 2024-11-22 | Stop reason: HOSPADM

## 2024-11-19 RX ORDER — PROCHLORPERAZINE MALEATE 5 MG/1
5 TABLET ORAL EVERY 6 HOURS PRN
Status: DISCONTINUED | OUTPATIENT
Start: 2024-11-19 | End: 2024-11-22 | Stop reason: HOSPADM

## 2024-11-19 RX ORDER — FLUTICASONE PROPIONATE 50 MCG
2 SPRAY, SUSPENSION (ML) NASAL DAILY
Status: DISCONTINUED | OUTPATIENT
Start: 2024-11-20 | End: 2024-11-22 | Stop reason: HOSPADM

## 2024-11-19 RX ORDER — HEPARIN SODIUM,PORCINE 10 UNIT/ML
5-10 VIAL (ML) INTRAVENOUS
Status: DISCONTINUED | OUTPATIENT
Start: 2024-11-19 | End: 2024-11-22 | Stop reason: HOSPADM

## 2024-11-19 RX ORDER — DIAZEPAM 10 MG/2ML
5-10 INJECTION, SOLUTION INTRAMUSCULAR; INTRAVENOUS EVERY 30 MIN PRN
Status: DISCONTINUED | OUTPATIENT
Start: 2024-11-19 | End: 2024-11-22 | Stop reason: HOSPADM

## 2024-11-19 RX ORDER — LOSARTAN POTASSIUM 50 MG/1
50 TABLET ORAL DAILY
Status: DISCONTINUED | OUTPATIENT
Start: 2024-11-20 | End: 2024-11-22 | Stop reason: HOSPADM

## 2024-11-19 RX ORDER — LIDOCAINE 40 MG/G
CREAM TOPICAL
Status: DISCONTINUED | OUTPATIENT
Start: 2024-11-19 | End: 2024-11-22 | Stop reason: HOSPADM

## 2024-11-19 RX ORDER — HEPARIN SODIUM (PORCINE) LOCK FLUSH IV SOLN 100 UNIT/ML 100 UNIT/ML
5-10 SOLUTION INTRAVENOUS
Status: DISCONTINUED | OUTPATIENT
Start: 2024-11-19 | End: 2024-11-22 | Stop reason: HOSPADM

## 2024-11-19 RX ORDER — MAGNESIUM SULFATE HEPTAHYDRATE 40 MG/ML
2 INJECTION, SOLUTION INTRAVENOUS ONCE
Status: COMPLETED | OUTPATIENT
Start: 2024-11-19 | End: 2024-11-20

## 2024-11-19 RX ORDER — FOLIC ACID 1 MG/1
1 TABLET ORAL ONCE
Status: COMPLETED | OUTPATIENT
Start: 2024-11-19 | End: 2024-11-19

## 2024-11-19 RX ORDER — GABAPENTIN 100 MG/1
100 CAPSULE ORAL EVERY 8 HOURS
Status: DISCONTINUED | OUTPATIENT
Start: 2024-11-27 | End: 2024-11-22 | Stop reason: HOSPADM

## 2024-11-19 RX ORDER — IPRATROPIUM BROMIDE 42 UG/1
2 SPRAY, METERED NASAL 4 TIMES DAILY
Status: DISCONTINUED | OUTPATIENT
Start: 2024-11-19 | End: 2024-11-22 | Stop reason: HOSPADM

## 2024-11-19 RX ORDER — ONDANSETRON 2 MG/ML
4 INJECTION INTRAMUSCULAR; INTRAVENOUS EVERY 6 HOURS PRN
Status: DISCONTINUED | OUTPATIENT
Start: 2024-11-19 | End: 2024-11-22 | Stop reason: HOSPADM

## 2024-11-19 RX ORDER — MULTIPLE VITAMINS W/ MINERALS TAB 9MG-400MCG
1 TAB ORAL DAILY
Status: DISCONTINUED | OUTPATIENT
Start: 2024-11-20 | End: 2024-11-22 | Stop reason: HOSPADM

## 2024-11-19 RX ORDER — METOPROLOL SUCCINATE 50 MG/1
50 TABLET, EXTENDED RELEASE ORAL EVERY MORNING
Status: DISCONTINUED | OUTPATIENT
Start: 2024-11-20 | End: 2024-11-22 | Stop reason: HOSPADM

## 2024-11-19 RX ORDER — ASPIRIN 81 MG/1
81 TABLET ORAL EVERY OTHER DAY
Status: DISCONTINUED | OUTPATIENT
Start: 2024-11-21 | End: 2024-11-22 | Stop reason: HOSPADM

## 2024-11-19 RX ORDER — ALBUTEROL SULFATE 90 UG/1
2 INHALANT RESPIRATORY (INHALATION)
Status: DISCONTINUED | OUTPATIENT
Start: 2024-11-19 | End: 2024-11-22 | Stop reason: HOSPADM

## 2024-11-19 RX ORDER — CALCIUM CARBONATE 500 MG/1
1000 TABLET, CHEWABLE ORAL 4 TIMES DAILY PRN
Status: DISCONTINUED | OUTPATIENT
Start: 2024-11-19 | End: 2024-11-22 | Stop reason: HOSPADM

## 2024-11-19 RX ORDER — GABAPENTIN 300 MG/1
600 CAPSULE ORAL EVERY 8 HOURS
Status: DISCONTINUED | OUTPATIENT
Start: 2024-11-23 | End: 2024-11-22 | Stop reason: HOSPADM

## 2024-11-19 RX ORDER — MULTIPLE VITAMINS W/ MINERALS TAB 9MG-400MCG
1 TAB ORAL DAILY
Status: DISCONTINUED | OUTPATIENT
Start: 2024-11-20 | End: 2024-11-21

## 2024-11-19 RX ORDER — FOLIC ACID 1 MG/1
1 TABLET ORAL DAILY
Status: DISCONTINUED | OUTPATIENT
Start: 2024-11-20 | End: 2024-11-22 | Stop reason: HOSPADM

## 2024-11-19 RX ADMIN — MAGNESIUM SULFATE HEPTAHYDRATE 2 G: 40 INJECTION, SOLUTION INTRAVENOUS at 16:56

## 2024-11-19 RX ADMIN — ONDANSETRON 4 MG: 2 INJECTION, SOLUTION INTRAMUSCULAR; INTRAVENOUS at 20:01

## 2024-11-19 RX ADMIN — FOLIC ACID 1 MG: 1 TABLET ORAL at 16:54

## 2024-11-19 RX ADMIN — IOPAMIDOL 44 ML: 755 INJECTION, SOLUTION INTRAVENOUS at 16:34

## 2024-11-19 RX ADMIN — PREDNISONE 40 MG: 20 TABLET ORAL at 21:02

## 2024-11-19 RX ADMIN — SODIUM CHLORIDE 74 ML: 9 INJECTION, SOLUTION INTRAVENOUS at 16:34

## 2024-11-19 RX ADMIN — THIAMINE HCL TAB 100 MG 100 MG: 100 TAB at 16:53

## 2024-11-19 RX ADMIN — MULTIVITAMIN TABLET 1 TABLET: TABLET at 16:54

## 2024-11-19 RX ADMIN — HEPARIN SODIUM (PORCINE) LOCK FLUSH IV SOLN 100 UNIT/ML 5 ML: 100 SOLUTION at 21:39

## 2024-11-19 RX ADMIN — ALBUTEROL SULFATE 2 PUFF: 108 INHALANT RESPIRATORY (INHALATION) at 21:08

## 2024-11-19 RX ADMIN — IPRATROPIUM BROMIDE 2 SPRAY: 42 SPRAY NASAL at 21:08

## 2024-11-19 RX ADMIN — PROCHLORPERAZINE MALEATE 5 MG: 5 TABLET ORAL at 21:52

## 2024-11-19 RX ADMIN — IPRATROPIUM BROMIDE AND ALBUTEROL SULFATE 3 ML: .5; 3 SOLUTION RESPIRATORY (INHALATION) at 15:40

## 2024-11-19 ASSESSMENT — LIFESTYLE VARIABLES
TOTAL SCORE: 3
AUDITORY DISTURBANCES: NOT PRESENT
ORIENTATION AND CLOUDING OF SENSORIUM: ORIENTED AND CAN DO SERIAL ADDITIONS
VISUAL DISTURBANCES: NOT PRESENT
HEADACHE, FULLNESS IN HEAD: NOT PRESENT
TREMOR: NO TREMOR
ANXIETY: MILDLY ANXIOUS
PAROXYSMAL SWEATS: NO SWEAT VISIBLE
AGITATION: NORMAL ACTIVITY
NAUSEA AND VOMITING: 2

## 2024-11-19 ASSESSMENT — ACTIVITIES OF DAILY LIVING (ADL)
ADLS_ACUITY_SCORE: 0
CHANGE_IN_FUNCTIONAL_STATUS_SINCE_ONSET_OF_CURRENT_ILLNESS/INJURY: YES
ADLS_ACUITY_SCORE: 0
NUMBER_OF_TIMES_PATIENT_HAS_FALLEN_WITHIN_LAST_SIX_MONTHS: 1
DRESSING/BATHING_DIFFICULTY: NO
TOILETING_ISSUES: NO
WEAR_GLASSES_OR_BLIND: YES
FALL_HISTORY_WITHIN_LAST_SIX_MONTHS: YES
ADLS_ACUITY_SCORE: 0
DIFFICULTY_EATING/SWALLOWING: NO
ADLS_ACUITY_SCORE: 0
DIFFICULTY_COMMUNICATING: NO
ADLS_ACUITY_SCORE: 0
ADLS_ACUITY_SCORE: 0
VISION_MANAGEMENT: GLASSES
CONCENTRATING,_REMEMBERING_OR_MAKING_DECISIONS_DIFFICULTY: NO
WALKING_OR_CLIMBING_STAIRS_DIFFICULTY: YES
ADLS_ACUITY_SCORE: 0
ADLS_ACUITY_SCORE: 0
DOING_ERRANDS_INDEPENDENTLY_DIFFICULTY: NO

## 2024-11-19 NOTE — ED PROVIDER NOTES
Emergency Department Note      History of Present Illness     Chief Complaint   Mental Health Problem (W/ alcohol use) and Generalized Weakness      HPI   Kezia Dixon is a 69 year old female with history of COPD, CAD, esophageal cancer, lung cancer, alcohol use disorder who presents to the ED for evaluation of mental health problem and generalized weakness.  Patient states that she has been in remission for her lung cancer for about a year.  Last week, she was told that her lung cancer returned and that she will need new radiation treatments.  Patient states that this news has been upsetting for her and has caused her to relapse.  She reports drinking about 1/4 cup of vodka 3-4 times a day over the last 3 days.  She has noted progressively worsening shortness of breath over the last few weeks.  She states that this is worse with lying flat. She denies cough, fever, or other upper respiratory concerns.  She denies chest pain.  She also endorses nausea over the last 3 days. No vomiting. No abdominal pain, diarrhea, dysuria, or diarrhea. She reports history of alcohol withdrawal seizure 2 years ago. At the time she went to detox. She states that she has been using alcohol to cope with recent news regarding her cancer. She denies any SI/HI. She states that she is here for concerns of her medical health not her  mental health but is open to talking to someone regarding her mental health.     Independent Historian   None    Review of External Notes   Reviewed [facility] note from 11/12/2024. New upper left lobe nodule. Recommended to get radiation.     Past Medical History     Medical History and Problem List   Past Medical History:   Diagnosis Date    Alcohol use disorder, severe, dependence (H) 10/14/2016    Alcoholism (H) 10/14/2016    Anemia     Benign essential hypertension 10/14/2016    Carotid artery disease (H)     Chemical dependency (H)     COPD (chronic obstructive pulmonary disease) (H)      Coronary artery disease     Depression with anxiety     Esophageal cancer (H) 03/22/2016    Esophageal mass     GERD (gastroesophageal reflux disease)     Hx of pancreatitis 2003    Hypercholesteremia     Hyperglycemia     Hyperlipemia     Impaired fasting glucose 10/14/2016    Nocturnal leg cramps     Other chronic pain     Pancreatic pseudocyst 10/14/2016    Pancreatitis     Pap smear with atypical squamous cells, cannot exclude high grade squamous intraepithelial lesion (ASC-H) 10/14/2016    Shingles     Squamous cell carcinoma of right lung (H)     Vasomotor rhinitis        Medications   No current outpatient medications on file.      Surgical History   Past Surgical History:   Procedure Laterality Date    AAA REPAIR      splenic artery aneurysm embolization    ABDOMEN SURGERY  2/2/2016    COLONOSCOPY  11/26/2013    Procedure: COMBINED COLONOSCOPY, SINGLE BIOPSY/POLYPECTOMY BY BIOPSY;  COLONOSCOPY (MAC);  Surgeon: Montana Rosa MD;  Location: [facility]    COLONOSCOPY  11/26/2013    COLONOSCOPY N/A 10/4/2018    Procedure: COMBINED COLONOSCOPY, SINGLE OR MULTIPLE BIOPSY/POLYPECTOMY BY BIOPSY;  colonoscopy;  Surgeon: Gio Apodaca MD;  Location:  GI    ENT SURGERY      ESOPHAGOGASTRECTOMY N/A 3/22/2016    Procedure: ESOPHAGOGASTRECTOMY;  Surgeon: Alvin Rioajs MD;  Location: [facility]    ESOPHAGOSCOPY, GASTROSCOPY, DUODENOSCOPY (EGD), COMBINED N/A 12/22/2015    Procedure: COMBINED ENDOSCOPIC ULTRASOUND, ESOPHAGOSCOPY, GASTROSCOPY, DUODENOSCOPY (EGD), FINE NEEDLE ASPIRATE/BIOPSY;  Surgeon: Danelle Michael MD;  Location:  GI    GASTROSTOMY, INSERT TUBE, COMBINED N/A 2/2/2016    Procedure: COMBINED GASTROSTOMY, INSERT TUBE (OPEN);  Surgeon: Alvin Riojas MD;  Location:  OR    GI SURGERY  12/22/2015    HAND SURGERY      right    HERNIORRHAPHY INCISIONAL (LOCATION) N/A 3/19/2019    Procedure: LAPARSCOPIC  INCISIONAL HERNIA REPAIR WITH MESH, LAPARSCOPIC LYSIS OF ADHENSIONS;   "Surgeon: Lamberto Magaña MD;  Location:  OR    INSERT PORT VASCULAR ACCESS N/A 12/28/2015    Procedure: INSERT PORT VASCULAR ACCESS;  Surgeon: Alvin Riojas MD;  Location:  OR    INSERT PORT VASCULAR ACCESS N/A 1/9/2024    Procedure: SMART PORT PLACEMENT;  Surgeon: Alvin Riojas MD;  Location:  OR    LAPAROSCOPIC CHOLECYSTECTOMY N/A 3/19/2019    Procedure: LAPAROSCOPIC CHOLECYSTECTOMY;  Surgeon: Lamberto Magaña MD;  Location:  OR    LOBECTOMY LUNG Right 10/16/2018    Procedure: LOBECTOMY LUNG;  Surgeon: Alvin Riojas MD;  Location:  OR    REMOVE PORT VASCULAR ACCESS Left 7/22/2016    Procedure: REMOVE PORT VASCULAR ACCESS;  Surgeon: Alvin Riojas MD;  Location:  OR    THORACOTOMY Right 10/16/2018    Procedure: REDO RIGHT THORACTOMY AND RIGHT LOWER LOBECTOMY, PLEURAL LYSIS;  Surgeon: Alvin Riojas MD;  Location:  OR    TONSILLECTOMY      VASCULAR SURGERY         Physical Exam     Patient Vitals for the past 24 hrs:   BP Temp Temp src Pulse Resp SpO2 Height Weight   11/19/24 2021 (!) 152/78 -- -- -- -- 95 % -- --   11/19/24 1927 (!) 181/78 98.9  F (37.2  C) Oral 96 18 95 % -- --   11/19/24 1839 -- -- -- 95 13 97 % -- --   11/19/24 1830 (!) 146/78 -- -- 91 23 -- -- --   11/19/24 1803 -- -- -- -- -- -- 1.549 m (5' 1\") 39.1 kg (86 lb 4.8 oz)   11/19/24 1800 (!) 143/57 -- -- 92 -- -- -- --   11/19/24 1759 -- -- -- 91 27 96 % -- --   11/19/24 1730 136/56 -- -- 85 -- -- -- --   11/19/24 1648 -- -- -- 92 27 99 % -- --   11/19/24 1551 -- -- -- 90 19 100 % -- --   11/19/24 1531 (!) 166/72 -- -- 90 -- -- -- --   11/19/24 1521 -- -- -- 91 19 97 % -- --   11/19/24 1511 -- -- -- 90 27 95 % -- --   11/19/24 1501 (!) 191/93 -- -- 90 21 96 % -- --   11/19/24 1443 -- -- -- 89 27 94 % -- --   11/19/24 1438 -- -- -- 92 28 96 % -- --   11/19/24 1430 (!) 177/74 -- -- 91 18 95 % -- --   11/19/24 1414 -- 98.7  F (37.1  C) -- 89 23 97 % -- --   11/19/24 1405 " (!) 172/76 -- -- 97 16 94 % -- --     Physical Exam  Physical Exam:  General: lying comfortably on hospital bed  Head: normocephalic, atraumatic  Eyes: PERRLA, EOMI  Ears: External ears appear normal.   Nose: no signs of bleeding   Throat: moist mucous membranes  Neck: No JVD  CV: regular rate and rhythm  Pulm: lungs clear to ausculation bilaterally, normal respiratory effort, normal chest expansion with breathing   Abdomen: soft, non-tender, non-distended  MSK: No midline tenderness  Ext: normal range of motion of all extremities. No gross deformities  Skin: warm, dry, no rashes  Neuro: alert and oriented  Psych: Appropriate mood. Cooperative      Diagnostics     Lab Results   Labs Ordered and Resulted from Time of ED Arrival to Time of ED Departure   COMPREHENSIVE METABOLIC PANEL - Abnormal       Result Value    Sodium 138      Potassium 3.6      Carbon Dioxide (CO2) 26      Anion Gap 15      Urea Nitrogen 7.7 (*)     Creatinine 0.42 (*)     GFR Estimate >90      Calcium 8.3 (*)     Chloride 97 (*)     Glucose 89      Alkaline Phosphatase 165 (*)     AST 52 (*)     ALT 21      Protein Total 6.8      Albumin 3.3 (*)     Bilirubin Total 1.2     LIPASE - Abnormal    Lipase 8 (*)    MAGNESIUM - Abnormal    Magnesium 1.5 (*)    ETHYL ALCOHOL LEVEL - Abnormal    Alcohol ethyl 0.18 (*)    D DIMER QUANTITATIVE - Abnormal    D-Dimer Quantitative 1.59 (*)    CBC WITH PLATELETS AND DIFFERENTIAL - Abnormal    WBC Count 8.0      RBC Count 3.99      Hemoglobin 12.1      Hematocrit 35.8      MCV 90      MCH 30.3      MCHC 33.8      RDW 14.4      Platelet Count 270      % Neutrophils 84      % Lymphocytes 7      % Monocytes 8      % Eosinophils 0      % Basophils 0      % Immature Granulocytes 1      NRBCs per 100 WBC 0      Absolute Neutrophils 6.7      Absolute Lymphocytes 0.6 (*)     Absolute Monocytes 0.6      Absolute Eosinophils 0.0      Absolute Basophils 0.0      Absolute Immature Granulocytes 0.1      Absolute NRBCs 0.0      TROPONIN T, HIGH SENSITIVITY - Normal    Troponin T, High Sensitivity 7     NT PROBNP INPATIENT - Normal    N terminal Pro BNP Inpatient 627     INFLUENZA A/B, RSV AND SARS-COV2 PCR - Normal    Influenza A PCR Negative      Influenza B PCR Negative      RSV PCR Negative      SARS CoV2 PCR Negative     TROPONIN T, HIGH SENSITIVITY - Normal    Troponin T, High Sensitivity <6         Imaging   CT Chest Pulmonary Embolism w Contrast   Final Result   IMPRESSION:   1.  No significant changes since 10/10/2024.      2.  No PE, dissection, aneurysmal dilatation, or findings suggestive of right heart strain.      3.  Stable areas of pulmonary nodularity and chronic-appearing right pleural effusion since 10/10/2024.          EKG   ECG taken at 14:44, ECG read at 14:50  Normal sinus rhythm   Rate 85 bpm. HI interval 138 ms. QRS duration 70 ms. QT/QTc 376/447 ms. P-R-T axes 67 81 58.    Independent Interpretation   None    ED Course      Medications Administered   Medications   acetaminophen (TYLENOL) tablet 650 mg (has no administration in time range)   umeclidinium-vilanterol (ANORO ELLIPTA) 62.5-25 MCG/ACT oral inhaler 1 puff (has no administration in time range)   aspirin EC tablet 81 mg (has no administration in time range)   atorvastatin (LIPITOR) tablet 40 mg (has no administration in time range)   fluticasone (FLONASE) 50 MCG/ACT spray 2 spray (has no administration in time range)   ipratropium (ATROVENT) 0.06 % spray 2 spray (2 sprays Both Nostrils $Given 11/19/24 2108)   losartan (COZAAR) tablet 50 mg (has no administration in time range)   metoprolol succinate ER (TOPROL XL) 24 hr tablet 50 mg (has no administration in time range)   multivitamin w/minerals (THERA-VIT-M) tablet 1 tablet (has no administration in time range)   spironolactone (ALDACTONE) half-tab 12.5 mg (has no administration in time range)   OLANZapine zydis (zyPREXA) ODT tab 5-10 mg (has no administration in time range)     Or   haloperidol lactate  (HALDOL) injection 2.5-5 mg (has no administration in time range)   flumazenil (ROMAZICON) injection 0.2 mg (has no administration in time range)   melatonin tablet 5 mg (has no administration in time range)   gabapentin (NEURONTIN) capsule 900 mg (has no administration in time range)   gabapentin (NEURONTIN) capsule 600 mg (has no administration in time range)   gabapentin (NEURONTIN) capsule 300 mg (has no administration in time range)   gabapentin (NEURONTIN) capsule 100 mg (has no administration in time range)   diazepam (VALIUM) tablet 10 mg (has no administration in time range)     Or   diazepam (VALIUM) injection 5-10 mg (has no administration in time range)   thiamine (B-1) tablet 100 mg ( Oral Canceled Entry 11/19/24 2033)   folic acid (FOLVITE) tablet 1 mg (has no administration in time range)   multivitamin w/minerals (THERA-VIT-M) tablet 1 tablet (has no administration in time range)   albuterol (PROVENTIL HFA/VENTOLIN HFA) inhaler (2 puffs Inhalation $Given 11/19/24 2108)   lidocaine 1 % 0.1-1 mL (has no administration in time range)   lidocaine (LMX4) cream (has no administration in time range)   sodium chloride (PF) 0.9% PF flush 3 mL (3 mLs Intracatheter Not Given 11/19/24 2139)   sodium chloride (PF) 0.9% PF flush 3 mL (has no administration in time range)   senna-docusate (SENOKOT-S/PERICOLACE) 8.6-50 MG per tablet 1 tablet (has no administration in time range)     Or   senna-docusate (SENOKOT-S/PERICOLACE) 8.6-50 MG per tablet 2 tablet (has no administration in time range)   calcium carbonate (TUMS) chewable tablet 1,000 mg (has no administration in time range)   ondansetron (ZOFRAN ODT) ODT tab 4 mg ( Oral See Alternative 11/19/24 2001)     Or   ondansetron (ZOFRAN) injection 4 mg (4 mg Intravenous $Given 11/19/24 2001)   prochlorperazine (COMPAZINE) injection 5 mg ( Intravenous See Alternative 11/19/24 2152)     Or   prochlorperazine (COMPAZINE) tablet 5 mg (5 mg Oral $Given 11/19/24 1595)    pantoprazole (PROTONIX) EC tablet 40 mg (40 mg Oral Not Given 11/19/24 2104)   predniSONE (DELTASONE) tablet 40 mg (40 mg Oral $Given 11/19/24 2102)   sodium chloride (PF) 0.9% PF flush 10-20 mL (has no administration in time range)   sodium chloride (PF) 0.9% PF flush 10-20 mL (has no administration in time range)   sodium chloride (PF) 0.9% PF flush 10-20 mL (10 mLs Intracatheter $Given 11/19/24 2138)   heparin lock flush 10 unit/mL injection 5-10 mL (has no administration in time range)   heparin lock flush 10 unit/mL injection 5-10 mL ( Intracatheter Not Given 11/19/24 2139)   heparin lock flush 100 unit/mL injection 5-10 mL (5 mLs Intracatheter $Given 11/19/24 2139)   labetalol (NORMODYNE/TRANDATE) injection 10 mg (has no administration in time range)   magnesium sulfate 2 g in 50 mL sterile water intermittent infusion (has no administration in time range)   ipratropium - albuterol 0.5 mg/2.5 mg/3 mL (DUONEB) neb solution 3 mL (3 mLs Nebulization $Given 11/19/24 1540)   magnesium sulfate 2 g in 50 mL sterile water intermittent infusion (0 g Intravenous Stopped 11/19/24 1753)   iopamidol (ISOVUE-370) solution 44 mL (44 mLs Intravenous $Given 11/19/24 1634)   sodium chloride 0.9 % bag 100mL (74 mLs Intravenous $Given 11/19/24 1634)   multivitamin, therapeutic (THERA-VIT) tablet 1 tablet (1 tablet Oral $Given 11/19/24 1654)   folic acid (FOLVITE) tablet 1 mg (1 mg Oral $Given 11/19/24 1654)   thiamine (B-1) tablet 100 mg (100 mg Oral $Given 11/19/24 1653)       Procedures   Procedures     Discussion of Management   Admitting Hospitalist, Dr. Skaggs    ED Course   ED Course as of 11/19/24 2342 Tue Nov 19, 2024   2154 I evaluated patient and obtained history.   1656 Reassessed patient.   1745 Discussed Dr. Skaggs, hospitalist. Accepted patient for admission.       Additional Documentation  None    Medical Decision Making / Diagnosis     CMS Diagnoses: None    MIPS   CT for PE was ordered because the patient had an  abnormal d-dimer.    JERE   Kezia Dixon is a 69 year old female who presents to the ED for evaluation of alcohol intoxication, nausea, shortness of breath, and generalized weakness.  Patient reports that she relapsed with alcohol 3 days ago after getting the news that she now has a new lung mass that needs radiation after being in remission from lung cancer for a year.  She also endorses nausea over the last 3 days.  No vomiting.  No abdominal pain.  She states that she feels weak and is unable to care for herself at home.  She does state that she is using alcohol to help cope with the recent upsetting news.  She denies any suicidal or homicidal ideations.  See further HPI details above.  The above workup was undertaken.  Broad differential was considered including alcohol intoxication, withdrawal, ACS, PE, pneumonia, CHF, electrolyte abnormality, dehydration, pancreatitis, gastroenteritis.  On exam, patient is well-appearing.  Her vitals are reassuring.  Abdominal exam is benign without focal tenderness or peritonitic signs.  She does have decreased breath sounds on the left concerning for emphysema flareup.  She states that she has not been able to use her asthma inhaler.  She was given a DuoNeb.  Patient does not PERC negative therefore D-dimer was obtained.  D-dimer is elevated therefore CT was done.  CT does not show evidence of acute pulmonary embolism, pneumonia, pleural effusion, or aortic dissection.  It does show pulmonary nodularity and chronic appearing right pleural effusion.  EKG shows normal sinus rhythm.  No ischemic changes.  No dysrhythmia.  Labs are notable for hypomagnesemia, mildly elevated AST, and low albumin.  Magnesium was repleted.  This is likely secondary to alcohol use.  Alcohol level is 0.18.  No signs of alcohol withdrawal.  Given concerns for emphysema, nausea and generalized weakness, I did offer patient admission for further evaluation and electrolyte repletion.  She states  that she cannot care for herself at home due to ongoing weakness and would like to come into the hospital.  Discussed with hospitalist who accepted patient for admission.    Disposition   The patient was admitted to the hospital.     Diagnosis     ICD-10-CM    1. Alcoholic intoxication without complication (H)  F10.920       2. Hypomagnesemia  E83.42       3. Pulmonary emphysema, unspecified emphysema type (H)  J43.9       4. Dyspnea, unspecified type  R06.00       5. Generalized weakness  R53.1            Discharge Medications   Current Discharge Medication List            JANEY Lara Kausar, PA-C  11/19/24 9873

## 2024-11-19 NOTE — ED TRIAGE NOTES
Kezia Dixon is a 69 year old female with a history significant for lung cancer who presents via EMS for evaluation of Weakness. Pt notes increased weakness since yesterday. Pt notes mechanical fall Saturday 11/17. Pt states today she is here for concern of weakness.   Per EMS:  Pt notes increased frustrating and drinking vodka. EMS called from son for [employer]. Pt has history significant for difficulty eating and keeping food down.       Medications: 4mg zofran PO  Blood Glucose: 107  Vitals:        HR: 90s        BP: 190s        SPO2: 96% RA           Assessment:  Neuro: A&Ox4    Per Patient:  Pt notes last drink was 1100. Patient drinks 3-4 mixed drinks per day of vodka.  Patient notes nausea. Patient denies pain and vomiting. Pt notes history of alcohol abuse and withdrawal.

## 2024-11-19 NOTE — ED PROVIDER NOTES
ED ATTENDING PHYSICIAN NOTE:   I evaluated this patient in conjunction with Matt Jacobson. I have participated in the care of the patient and personally performed key elements of the history, exam, and medical decision making.     Kezia Dixon is a 69 year old female with h/o lung cancer s/p chemo/radiation, esophageal cancer who presents with weakness, depression, dyspnea, nausea. Pt was told last week that her lung cancer has recurred and as a results have felt more depressed and has been drinking alcohol after being sober for a few years. She has been drinking vodka primarily. Having some nausea and recent increased exertional dyspnea. No fever or chills. Has baseline cough. No abdominal pain, dysuria or hematuria or diarrhea. Pt feeling very weak.      EXAM:   VS: Reviewed per above  HENT: Mucous membranes moist  EYES: sclera anicteric  CV: Rate as noted,  regular rhythm.   RESP: Effort normal. Decreased breath sounds worse on the left.   GI: no tenderness/rebound/guarding, not distended.  NEURO: Alert, moving all extremities  MSK: No deformity of the extremities  SKIN: Warm and dry    Results:    CT Chest Pulmonary Embolism w Contrast   Final Result   IMPRESSION:   1.  No significant changes since 10/10/2024.      2.  No PE, dissection, aneurysmal dilatation, or findings suggestive of right heart strain.      3.  Stable areas of pulmonary nodularity and chronic-appearing right pleural effusion since 10/10/2024.            Labs Ordered and Resulted from Time of ED Arrival to Time of ED Departure   COMPREHENSIVE METABOLIC PANEL - Abnormal       Result Value    Sodium 138      Potassium 3.6      Carbon Dioxide (CO2) 26      Anion Gap 15      Urea Nitrogen 7.7 (*)     Creatinine 0.42 (*)     GFR Estimate >90      Calcium 8.3 (*)     Chloride 97 (*)     Glucose 89      Alkaline Phosphatase 165 (*)     AST 52 (*)     ALT 21      Protein Total 6.8      Albumin 3.3 (*)     Bilirubin Total 1.2     LIPASE - Abnormal     Lipase 8 (*)    MAGNESIUM - Abnormal    Magnesium 1.5 (*)    ETHYL ALCOHOL LEVEL - Abnormal    Alcohol ethyl 0.18 (*)    D DIMER QUANTITATIVE - Abnormal    D-Dimer Quantitative 1.59 (*)    CBC WITH PLATELETS AND DIFFERENTIAL - Abnormal    WBC Count 8.0      RBC Count 3.99      Hemoglobin 12.1      Hematocrit 35.8      MCV 90      MCH 30.3      MCHC 33.8      RDW 14.4      Platelet Count 270      % Neutrophils 84      % Lymphocytes 7      % Monocytes 8      % Eosinophils 0      % Basophils 0      % Immature Granulocytes 1      NRBCs per 100 WBC 0      Absolute Neutrophils 6.7      Absolute Lymphocytes 0.6 (*)     Absolute Monocytes 0.6      Absolute Eosinophils 0.0      Absolute Basophils 0.0      Absolute Immature Granulocytes 0.1      Absolute NRBCs 0.0     TROPONIN T, HIGH SENSITIVITY - Normal    Troponin T, High Sensitivity 7     NT PROBNP INPATIENT - Normal    N terminal Pro BNP Inpatient 627     INFLUENZA A/B, RSV AND SARS-COV2 PCR - Normal    Influenza A PCR Negative      Influenza B PCR Negative      RSV PCR Negative      SARS CoV2 PCR Negative     TROPONIN T, HIGH SENSITIVITY          MEDICAL DECISION MAKING/ASSESSMENT AND PLAN:   Patient presents to the ER for evaluation of progressive weakness, exertional dyspnea, relapse of alcohol use.  Vital signs reassuring.  On exam she does have decreased breath sounds, worse on the left.  No respiratory distress.  She was given a neb treatment with concern for flare of emphysema.  CT chest does not show PE or pneumothorax or infiltrate or CHF or other acute pathology.  Labs do reveal some hypomagnesemia and alcohol intoxication.  She was given electrolyte replacement.  Due to profound weakness and lingering respiratory symptoms, she was admitted for further cares.    DIAGNOSIS:   Final diagnoses:   Alcoholic intoxication without complication (H)   Hypomagnesemia   Pulmonary emphysema, unspecified emphysema type (H)   Dyspnea, unspecified type   Generalized  weakness          DISPOSITION:   admitted         Rob Rivas MD  11/19/24 9171

## 2024-11-20 ENCOUNTER — TRANSFERRED RECORDS (OUTPATIENT)
Dept: HEALTH INFORMATION MANAGEMENT | Facility: CLINIC | Age: 69
End: 2024-11-20

## 2024-11-20 ENCOUNTER — APPOINTMENT (OUTPATIENT)
Dept: SPEECH THERAPY | Facility: CLINIC | Age: 69
DRG: 897 | End: 2024-11-20
Attending: STUDENT IN AN ORGANIZED HEALTH CARE EDUCATION/TRAINING PROGRAM
Payer: MEDICARE

## 2024-11-20 ENCOUNTER — APPOINTMENT (OUTPATIENT)
Dept: PHYSICAL THERAPY | Facility: CLINIC | Age: 69
DRG: 897 | End: 2024-11-20
Attending: INTERNAL MEDICINE
Payer: MEDICARE

## 2024-11-20 LAB
ANION GAP SERPL CALCULATED.3IONS-SCNC: 19 MMOL/L (ref 7–15)
BUN SERPL-MCNC: 11.4 MG/DL (ref 8–23)
CALCIUM SERPL-MCNC: 8.8 MG/DL (ref 8.8–10.4)
CHLORIDE SERPL-SCNC: 96 MMOL/L (ref 98–107)
CREAT SERPL-MCNC: 0.51 MG/DL (ref 0.51–0.95)
EGFRCR SERPLBLD CKD-EPI 2021: >90 ML/MIN/1.73M2
ERYTHROCYTE [DISTWIDTH] IN BLOOD BY AUTOMATED COUNT: 14.6 % (ref 10–15)
GLUCOSE SERPL-MCNC: 137 MG/DL (ref 70–99)
HCO3 SERPL-SCNC: 22 MMOL/L (ref 22–29)
HCT VFR BLD AUTO: 36.4 % (ref 35–47)
HGB BLD-MCNC: 12.6 G/DL (ref 11.7–15.7)
MAGNESIUM SERPL-MCNC: 2.7 MG/DL (ref 1.7–2.3)
MAGNESIUM SERPL-MCNC: 2.8 MG/DL (ref 1.7–2.3)
MCH RBC QN AUTO: 31.5 PG (ref 26.5–33)
MCHC RBC AUTO-ENTMCNC: 34.6 G/DL (ref 31.5–36.5)
MCV RBC AUTO: 91 FL (ref 78–100)
PLATELET # BLD AUTO: 270 10E3/UL (ref 150–450)
POTASSIUM SERPL-SCNC: 4.3 MMOL/L (ref 3.4–5.3)
RBC # BLD AUTO: 4 10E6/UL (ref 3.8–5.2)
SODIUM SERPL-SCNC: 137 MMOL/L (ref 135–145)
WBC # BLD AUTO: 7 10E3/UL (ref 4–11)

## 2024-11-20 PROCEDURE — 83735 ASSAY OF MAGNESIUM: CPT | Performed by: INTERNAL MEDICINE

## 2024-11-20 PROCEDURE — 250N000009 HC RX 250: Performed by: INTERNAL MEDICINE

## 2024-11-20 PROCEDURE — 120N000001 HC R&B MED SURG/OB

## 2024-11-20 PROCEDURE — 82310 ASSAY OF CALCIUM: CPT | Performed by: INTERNAL MEDICINE

## 2024-11-20 PROCEDURE — 97530 THERAPEUTIC ACTIVITIES: CPT | Mod: GP

## 2024-11-20 PROCEDURE — 97161 PT EVAL LOW COMPLEX 20 MIN: CPT | Mod: GP

## 2024-11-20 PROCEDURE — 85018 HEMOGLOBIN: CPT | Performed by: INTERNAL MEDICINE

## 2024-11-20 PROCEDURE — 92610 EVALUATE SWALLOWING FUNCTION: CPT | Mod: GN

## 2024-11-20 PROCEDURE — 250N000011 HC RX IP 250 OP 636: Performed by: INTERNAL MEDICINE

## 2024-11-20 PROCEDURE — 250N000013 HC RX MED GY IP 250 OP 250 PS 637: Performed by: INTERNAL MEDICINE

## 2024-11-20 PROCEDURE — 250N000012 HC RX MED GY IP 250 OP 636 PS 637: Performed by: INTERNAL MEDICINE

## 2024-11-20 PROCEDURE — 80048 BASIC METABOLIC PNL TOTAL CA: CPT | Performed by: INTERNAL MEDICINE

## 2024-11-20 PROCEDURE — 82374 ASSAY BLOOD CARBON DIOXIDE: CPT | Performed by: INTERNAL MEDICINE

## 2024-11-20 PROCEDURE — 99233 SBSQ HOSP IP/OBS HIGH 50: CPT | Performed by: INTERNAL MEDICINE

## 2024-11-20 RX ORDER — ENOXAPARIN SODIUM 100 MG/ML
30 INJECTION SUBCUTANEOUS EVERY 24 HOURS
Status: DISCONTINUED | OUTPATIENT
Start: 2024-11-20 | End: 2024-11-22 | Stop reason: HOSPADM

## 2024-11-20 RX ADMIN — Medication 5 ML: at 18:22

## 2024-11-20 RX ADMIN — THIAMINE HCL TAB 100 MG 100 MG: 100 TAB at 08:04

## 2024-11-20 RX ADMIN — ENOXAPARIN SODIUM 30 MG: 30 INJECTION SUBCUTANEOUS at 18:07

## 2024-11-20 RX ADMIN — SPIRONOLACTONE 12.5 MG: 25 TABLET ORAL at 08:03

## 2024-11-20 RX ADMIN — ALBUTEROL SULFATE 2 PUFF: 108 INHALANT RESPIRATORY (INHALATION) at 11:17

## 2024-11-20 RX ADMIN — DIAZEPAM 10 MG: 5 TABLET ORAL at 00:33

## 2024-11-20 RX ADMIN — Medication 5 ML: at 04:02

## 2024-11-20 RX ADMIN — LABETALOL HYDROCHLORIDE 10 MG: 5 INJECTION INTRAVENOUS at 00:24

## 2024-11-20 RX ADMIN — Medication 5 ML: at 06:35

## 2024-11-20 RX ADMIN — GABAPENTIN 900 MG: 300 CAPSULE ORAL at 18:07

## 2024-11-20 RX ADMIN — METOPROLOL SUCCINATE 50 MG: 50 TABLET, EXTENDED RELEASE ORAL at 08:04

## 2024-11-20 RX ADMIN — ONDANSETRON 4 MG: 2 INJECTION, SOLUTION INTRAMUSCULAR; INTRAVENOUS at 13:46

## 2024-11-20 RX ADMIN — IPRATROPIUM BROMIDE 2 SPRAY: 42 SPRAY NASAL at 08:19

## 2024-11-20 RX ADMIN — ALBUTEROL SULFATE 2 PUFF: 108 INHALANT RESPIRATORY (INHALATION) at 14:00

## 2024-11-20 RX ADMIN — Medication 1 TABLET: at 08:04

## 2024-11-20 RX ADMIN — PREDNISONE 40 MG: 20 TABLET ORAL at 08:03

## 2024-11-20 RX ADMIN — PROCHLORPERAZINE EDISYLATE 5 MG: 5 INJECTION INTRAMUSCULAR; INTRAVENOUS at 18:20

## 2024-11-20 RX ADMIN — PROCHLORPERAZINE EDISYLATE 5 MG: 5 INJECTION INTRAMUSCULAR; INTRAVENOUS at 10:34

## 2024-11-20 RX ADMIN — IPRATROPIUM BROMIDE 2 SPRAY: 42 SPRAY NASAL at 18:07

## 2024-11-20 RX ADMIN — GABAPENTIN 900 MG: 300 CAPSULE ORAL at 11:23

## 2024-11-20 RX ADMIN — FOLIC ACID 1 MG: 1 TABLET ORAL at 08:03

## 2024-11-20 RX ADMIN — FLUTICASONE PROPIONATE 2 SPRAY: 50 SPRAY, METERED NASAL at 08:18

## 2024-11-20 RX ADMIN — IPRATROPIUM BROMIDE 2 SPRAY: 42 SPRAY NASAL at 21:43

## 2024-11-20 RX ADMIN — MAGNESIUM SULFATE HEPTAHYDRATE 2 G: 40 INJECTION, SOLUTION INTRAVENOUS at 00:31

## 2024-11-20 RX ADMIN — UMECLIDINIUM BROMIDE AND VILANTEROL TRIFENATATE 1 PUFF: 62.5; 25 POWDER RESPIRATORY (INHALATION) at 08:18

## 2024-11-20 RX ADMIN — ALBUTEROL SULFATE 2 PUFF: 108 INHALANT RESPIRATORY (INHALATION) at 18:07

## 2024-11-20 RX ADMIN — ALBUTEROL SULFATE 2 PUFF: 108 INHALANT RESPIRATORY (INHALATION) at 01:07

## 2024-11-20 RX ADMIN — IPRATROPIUM BROMIDE 2 SPRAY: 42 SPRAY NASAL at 13:35

## 2024-11-20 RX ADMIN — Medication 5 ML: at 01:45

## 2024-11-20 RX ADMIN — ONDANSETRON 4 MG: 2 INJECTION, SOLUTION INTRAMUSCULAR; INTRAVENOUS at 07:56

## 2024-11-20 RX ADMIN — ATORVASTATIN CALCIUM 40 MG: 40 TABLET, FILM COATED ORAL at 08:04

## 2024-11-20 RX ADMIN — LOSARTAN POTASSIUM 50 MG: 50 TABLET, FILM COATED ORAL at 08:03

## 2024-11-20 RX ADMIN — ALBUTEROL SULFATE 2 PUFF: 108 INHALANT RESPIRATORY (INHALATION) at 06:37

## 2024-11-20 RX ADMIN — PANTOPRAZOLE SODIUM 40 MG: 40 INJECTION, POWDER, FOR SOLUTION INTRAVENOUS at 10:28

## 2024-11-20 NOTE — DISCHARGE INSTRUCTIONS
Dear Ms. Dixon,    It was a pleasure talking with you on the phone. I have created a list of different substance use disorder (TAWNY) treatment programs and sober support groups for you to look over. If you have questions, my phone number is [phone].    Thanks,  Corrie     Sober Support Groups:    [facility] ([facility]): [facility] connects people seeking recovery to resources that help foster and sustain long-term recovery. Whether you are seeking resources for treatment, transportation, housing, job training, education, health care or other pathways to recovery, [facility] is a great place to start. Phone: [phone]. www.minnesotaInternational Battery (Great listing of all types of recovery and non-recovery related resources)     [facility]: https://www.Pearls [employer]dom [employer]/ (Online meetings, support groups, resources)     Alcoholics Anonymous: 4-800-ALCOHOL  HTTP://WWW.AA.ORG/  AA [place] (116-863-4176 or http://aaNew Wind.org)  AA [place] (652-686-9010 or www.aastpaul.org)     Narcotics Anonymous: 445.559.8213  www.GroxisinEniram.us.     Refuge Recovery: https://www.refugerecOthera Pharmaceuticals.org/    Celebrate Recovery: https://www.celebraterecOthera Pharmaceuticals.com/what-we-offer/find-a-cr-meeting     [facility] through [facility]:  https://www.[facility] Center[facility]iate/next-steps/find-support/addiction-recovery/     The [facility]:  Offers a wide range of different sober support groups and a Sunday Service.  [address] [place] [address]   https://www.[facility].org/#gsc.tab=0    Medicare friendly treatment programs:    [facility]: (self pay) (inpatient & IOP  Phone: [phone]  Fax: [phone]  Email: info@[email or web address]  Address:  [address] [address]  https://www.[facility].org/     [place]: (inpatient)  [facility] Inpatient CD, [address]  [address] [place] [address]  Phone: [phone]  Fax: [phone]  email:  [email or web address]    [facility] IOP: In-person  only  Coordinator: Margaret Oates  Phone: [phone]  email: [email or web address]  https://ealthOrinda.org/specialties/Substance-Use             [facility] OP Program/Harm Reduction: T,TH 12:30pm-3:30pm (Accepts Medicare)             [facility] Co-occurring IOP: M,W, TH 9am-12pm (Accepts Medicare)             [facility] OP: ?M,T,W 12:00pm-2:00pm (Accepts Medicare)    [facility]:  Call [phone] to request a virtual addiction therapy appointment.  [email or web address]

## 2024-11-20 NOTE — ED NOTES
[facility]  ED Nurse Handoff Report    ED Chief complaint: Mental Health Problem (W/ alcohol use) and Generalized Weakness      ED Diagnosis:   Final diagnoses:   Alcoholic intoxication without complication (H)   Hypomagnesemia   Pulmonary emphysema, unspecified emphysema type (H)   Dyspnea, unspecified type   Generalized weakness       Code Status: Provider to assess    Allergies:   Allergies   Allergen Reactions    Codeine Sulfate Nausea    Morphine      Pt unsure    Simvastatin Cramps     Leg cramps    Pcn [Penicillins] Rash       Patient Story: Kezia Dixon is a 69 year old female with a history significant for alcohol abuse who presents for evaluation of weakness. Pt notes increased weakness since yesterday. Of note patient notes increased alcohol use including 3-4 mixed drinks of vodka per day. Pt notes mechanical fall Saturday 11/17.   Focused Assessment:    Constitutional: Female with son at bedside  Respiratory: Regular rate and depth. Shortness of breath noted on exam.   Cardiac: NSR   Neurologic: A&Ox4      Treatments and/or interventions provided:   Medications   ondansetron (ZOFRAN) injection 4 mg (has no administration in time range)   ipratropium - albuterol 0.5 mg/2.5 mg/3 mL (DUONEB) neb solution 3 mL (3 mLs Nebulization $Given 11/19/24 1540)   magnesium sulfate 2 g in 50 mL sterile water intermittent infusion (0 g Intravenous Stopped 11/19/24 1753)   iopamidol (ISOVUE-370) solution 44 mL (44 mLs Intravenous $Given 11/19/24 1634)   sodium chloride 0.9 % bag 100mL (74 mLs Intravenous $Given 11/19/24 1634)   multivitamin, therapeutic (THERA-VIT) tablet 1 tablet (1 tablet Oral $Given 11/19/24 1654)   folic acid (FOLVITE) tablet 1 mg (1 mg Oral $Given 11/19/24 1654)   thiamine (B-1) tablet 100 mg (100 mg Oral $Given 11/19/24 1653)       Labs Ordered and Resulted from Time of ED Arrival to Time of ED Departure   COMPREHENSIVE METABOLIC PANEL - Abnormal       Result Value    Sodium  138      Potassium 3.6      Carbon Dioxide (CO2) 26      Anion Gap 15      Urea Nitrogen 7.7 (*)     Creatinine 0.42 (*)     GFR Estimate >90      Calcium 8.3 (*)     Chloride 97 (*)     Glucose 89      Alkaline Phosphatase 165 (*)     AST 52 (*)     ALT 21      Protein Total 6.8      Albumin 3.3 (*)     Bilirubin Total 1.2     LIPASE - Abnormal    Lipase 8 (*)    MAGNESIUM - Abnormal    Magnesium 1.5 (*)    ETHYL ALCOHOL LEVEL - Abnormal    Alcohol ethyl 0.18 (*)    D DIMER QUANTITATIVE - Abnormal    D-Dimer Quantitative 1.59 (*)    CBC WITH PLATELETS AND DIFFERENTIAL - Abnormal    WBC Count 8.0      RBC Count 3.99      Hemoglobin 12.1      Hematocrit 35.8      MCV 90      MCH 30.3      MCHC 33.8      RDW 14.4      Platelet Count 270      % Neutrophils 84      % Lymphocytes 7      % Monocytes 8      % Eosinophils 0      % Basophils 0      % Immature Granulocytes 1      NRBCs per 100 WBC 0      Absolute Neutrophils 6.7      Absolute Lymphocytes 0.6 (*)     Absolute Monocytes 0.6      Absolute Eosinophils 0.0      Absolute Basophils 0.0      Absolute Immature Granulocytes 0.1      Absolute NRBCs 0.0     TROPONIN T, HIGH SENSITIVITY - Normal    Troponin T, High Sensitivity 7     NT PROBNP INPATIENT - Normal    N terminal Pro BNP Inpatient 627     INFLUENZA A/B, RSV AND SARS-COV2 PCR - Normal    Influenza A PCR Negative      Influenza B PCR Negative      RSV PCR Negative      SARS CoV2 PCR Negative     TROPONIN T, HIGH SENSITIVITY     CT Chest Pulmonary Embolism w Contrast   Final Result   IMPRESSION:   1.  No significant changes since 10/10/2024.      2.  No PE, dissection, aneurysmal dilatation, or findings suggestive of right heart strain.      3.  Stable areas of pulmonary nodularity and chronic-appearing right pleural effusion since 10/10/2024.          Patient's response to treatments and/or interventions: Pt tolerated interventions well. Pt noted increased jitteriness following nebulizer, pt notes feeling of  "claustrophobia with nebulizer.     To be done/followed up on inpatient unit:  Per provider order    Does this patient have any cognitive concerns?:  N/A    Activity level - Baseline/Home:  Stand with Assist  Activity Level - Current:   Stand with Assist    Patient's Preferred language: English   Needed?: No    Isolation: None  Infection: Not Applicable  Patient tested for COVID 19 prior to admission: NO  Bariatric?: No    Vital Signs:   Vitals:    11/19/24 1730 11/19/24 1759 11/19/24 1800 11/19/24 1803   BP: 136/56  (!) 143/57    Pulse: 85 91 92    Resp:  27     Temp:       SpO2:  96%     Weight:    39.1 kg (86 lb 4.8 oz)   Height:    1.549 m (5' 1\")       Cardiac Rhythm:Cardiac Rhythm: Normal sinus rhythm    Family Comments: Son at bedside, watching jeopardy. Calm and cooperative with cares.   OBS brochure/video discussed/provided to patient/family: N/A    For the majority of the shift this patient's behavior was Green.   Behavioral interventions performed were N/A.    ED NURSE PHONE NUMBER: [phone]         "

## 2024-11-20 NOTE — PLAN OF CARE
Goal Outcome Evaluation:       Summary:  alcohol withdrawal, SOB, history lung and esophageal cancer, anxiety, depression  DATE & TIME:  11/19/24-11/20/14 1636-6114  Cognitive Concerns/ Orientation : Aox4, anxious   BEHAVIOR & AGGRESSION TOOL COLOR: green  CIWA SCORE: 10, valium given, 5, 5   ABNL VS/O2: VSS RA, hypertensive (SBP in 200's, PRN labetalol given- effective, returned to SBP in 160's)  MOBILITY: A1 GBW  PAIN MANAGMENT: Mild headache and nausea  DIET: regular, not able to tolerate more than small sips; pills crushed in apple sauce; poor appetite related to esophageal cancer stomach resection   BOWEL/BLADDER: continent, up to commode   ABNL LAB/BG: mag replaced, recheck was 2.8, redraw in am  DRAIN/DEVICES: L portacath hep locked  TELEMETRY RHYTHM: SR  SKIN: scattered scabs, flakiness  TESTS/PROCEDURES:   D/C DAY/GOALS/PLACE:   OTHER IMPORTANT INFO: pt, chem dep, social work, SLP consults. Pt having trouble swallowing pills, even crushed in applesauce, refused medications and MD notified

## 2024-11-20 NOTE — PROVIDER NOTIFICATION
MD Notification    Notified Person: MD    Notified Person Name: Doretha    Notification Date/Time: 0047 11/20/24    Notification Interaction: Vocera    Purpose of Notification:  FYI pt /89. PRN labetalol given.    Orders Received: acknowledged    Comments:

## 2024-11-20 NOTE — PROGRESS NOTES
"Clinical Swallow Evaluation (CSE):     11/20/24 0830   Appointment Info   Signing Clinician's Name / Credentials (SLP) Kim Marti MS CCC-SLP   General Information   Onset of Illness/Injury or Date of Surgery 11/19/24   Referring Physician Estiven Coyne MD   Patient/Family Therapy Goal Statement (SLP) To get better   Pertinent History of Current Problem   Per provider \"Kezia Dixon is a 69 year old female admitted on 11/19/2024. 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, history of left lung non-small cell cancer underwent chemoradiation and immunotherapy treatments was followed with serial CT scans and recent CT scan done on October 10 showed the left lung nodule is progressing.\" \"She is getting admitted with alcohol intoxication with a high risk of alcohol withdrawal, shortness of breath secondary to COPD and underlying progressive left lung cancer, generalized weakness, nausea most likely secondary to alcoholic gastritis\"     SLP for swallow evaluation given difficulty swallowing pills.     General Observations Pt alert, pleasant, upright with RN just finished meds, breakfast meal present but limited overall PO given reduced appetite and nausea.   Pain Assessment   Patient Currently in Pain No   Type of Evaluation   Type of Evaluation Swallow Evaluation   Oral Motor   Oral Musculature generally intact   Structural Abnormalities none present   Mucosal Quality good   Dentition (Oral Motor)   Dentition (Oral Motor) adequate dentition   Facial Symmetry (Oral Motor)   Facial Symmetry (Oral Motor) WNL   Lip Function (Oral Motor)   Lip Range of Motion (Oral Motor) WNL   Lip Strength (Oral Motor) WNL   Tongue Function (Oral Motor)   Tongue Strength (Oral Motor) WNL "   Tongue ROM (Oral Motor) WNL   Jaw Function (Oral Motor)   Jaw Function (Oral Motor) WNL   Cough/Swallow/Gag Reflex (Oral Motor)   Soft Palate/Velum (Oral Motor) WNL   Volitional Throat Clear/Cough (Oral Motor) WNL   Volitional Swallow (Oral Motor) WNL   Vocal Quality/Secretion Management (Oral Motor)   Vocal Quality (Oral Motor) WNL   Secretion Management (Oral Motor) WNL   General Swallowing Observations   Past History of Dysphagia None per EMR. Pt denies any difficulty swallowing food or liquids, just feels pills stick   Respiratory Support room air   Current Diet/Method of Nutritional Intake (General Swallowing Observations, NIS) regular diet;thin liquids (level 0)   Swallowing Evaluation Clinical swallow evaluation   Clinical Swallow Evaluation   Feeding Assistance no assistance needed   Clinical Swallow Evaluation Textures Trialed thin liquids;solid foods   Clinical Swallow Eval: Thin Liquid Texture Trial   Mode of Presentation, Thin Liquids straw;self-fed   Volume of Liquid or Food Presented ~ 2 oz   Oral Phase of Swallow WFL   Pharyngeal Phase of Swallow intact   Diagnostic Statement no overt clinical signs/sx aspiration noted   Clinical Swallow Evaluation: Solid Food Texture Trial   Mode of Presentation self-fed   Volume Presented 1 saltine cracker   Oral Phase WFL   Pharyngeal Phase intact   Diagnostic Statement no overt clinical signs/sx aspiration noted   Swallowing Recommendations   Diet Consistency Recommendations regular diet;thin liquids (level 0)   Supervision Level for Intake patient independent   Medication Administration Recommendations, Swallowing (SLP) per pt preference: RN is asking for liquids meds for larger pills, otherwise tolerating crushed pills and/or smaller pills whole   Instrumental Assessment Recommendations instrumental evaluation not recommended at this time   Clinical Impression   Criteria for Skilled Therapeutic Interventions Met (SLP Eval) Evaluation only   SLP Diagnosis no  appreciable oropharyngeal dysphagia; suspected esophageal dysfunction/ esophageal pill dysphagia   Risks & Benefits of therapy have been explained evaluation/treatment results reviewed;care plan/treatment goals reviewed;risks/benefits reviewed;current/potential barriers reviewed;participants voiced agreement with care plan;participants included;patient   Clinical Impression Comments   Clinical swallow evaluation completed - no appreciated oropharyngeal dysphagia deficits. Oromotor exam WFL, WFL labial seal/oral containment, mastication, oral clearance. No percievable oral holding or delay. Notable laryngeal elevation to palpation. No overt clinical signs/sx aspiration noted. Chronic pill dysphagia, likely 2/2 pt's esophageal hx. Pt and RN established medication POC already (see above). Trained pt in general esophageal clearance/anti-reflux precautions and she verbalized understanding.     SLP Total Evaluation Time   Eval: oral/pharyngeal swallow function, clinical swallow Minutes (66458) 10   SLP Discharge Planning   SLP Plan eval only   SLP Discharge Recommendation home   SLP Rationale for DC Rec pt at baseline   SLP Brief overview of current status  regular/thin when upright, remain upright for 30-60 minutes after PO   SLP Time and Intention   Total Session Time (sum of timed and untimed services) 10

## 2024-11-20 NOTE — PROGRESS NOTES
.RECEIVING UNIT ED HANDOFF REVIEW    ED Nurse Handoff Report was reviewed by: Iman Ellis RN on November 19, 2024 at 6:23 PM

## 2024-11-20 NOTE — PROGRESS NOTES
[facility]  [address] [place], [place] [address] [address]      11/20/2024      Kezia Dixon  [address]  [place] [address]      Dear Ms. Dixon,    It was a pleasure talking with you on the phone. I have created a list of different substance use disorder (TAWNY) treatment programs and sober support groups for you to look over. If you have questions, my phone number is [phone].    Thanks,  Corrie      Sober Support Groups:  [place] [facility] ([facility]): [facility] connects people seeking recovery to resources that help foster and sustain long-term recovery. Whether you are seeking resources for treatment, transportation, housing, job training, education, health care or other pathways to recovery, [facility] is a great place to start. Phone: [phone]. www.minnesotaBusiness Combined (Great listing of all types of recovery and non-recovery related resources)      [facility]: https://www.Joturl.org/ (Online meetings, support groups, resources)      Alcoholics Anonymous: 1-800-ALCOHOL  HTTP://WWW.AA.ORG/  AA [place] (374-539-7222 or http://aaminneaNextreme Thermal Solutions.org)  AA [place] (320-016-5797 or www.aastpaul.org)      Narcotics Anonymous: 822.696.4916  www.naminnesota.us.     Refuge Recovery: https://www.refugerecAppEnsurey.org/    Celebrate Recovery: https://www.On The Net YetebraterecBubbl.com/what-we-offer/find-a-cr-meeting    [facility] [address] through [facility]:  https://www.Triton/next-steps/find-support/addiction-recovery/    [facility]:  Offers a wide range of different sober support groups and a Sunday Service.  [address] [place] [address]    https://www.[facility].org/#gsc.tab=0    Medicare friendly treatment programs:  [facility] [place]: (self pay) (inpatient & IOP)  Phone: [phone]  Fax: [phone]  Email: info@[employer]  Address:  [address] [address]  https://www.[facility].org/    [place]: (inpatient)   [facility]  Inpatient CD, [address]  [address] [place] [address]  Phone: [phone]  Fax: [phone]  email:  [email or web address]    [facility] IOP: In-person only  Coordinator: Margaret Oates  Phone: [phone]  email: [email or web address]  https://[facility].org/specialties/Substance-Use   [facility] OP Program/Harm Reduction: T,TH 12:30pm-3:30pm (Accepts Medicare)   [facility] Co-occurring IOP: M,W, TH 9am-12pm (Accepts Medicare)   [facility]: ?M,T,W 12:00pm-2:00pm (Accepts Medicare)    [facility]:  Call [phone] to request a virtual addiction therapy appointment.  [email or web address]        Sharon Newell MA [facility]  CD Evaluation Counselor  [phone]    (Emailed to pt today)     left normal/right normal/+1 bilaterally

## 2024-11-20 NOTE — PHARMACY-ADMISSION MEDICATION HISTORY
Pharmacist Admission Medication History    Admission medication history is complete. The information provided in this note is only as accurate as the sources available at the time of the update.    Information Source(s): Patient via in-person    Pertinent Information: None    Changes made to PTA medication list:  Added: None  Deleted: Tylenol  Changed: None    Allergies reviewed with patient and updates made in EHR: yes    Medication History Completed By: Deepthi Thomas RPH 11/19/2024 8:07 PM    PTA Med List   Medication Sig Last Dose/Taking    ANORO ELLIPTA 62.5-25 MCG/ACT oral inhaler Inhale 1 puff into the lungs 11/19/2024 Morning    aspirin (ASA) 81 MG EC tablet 1 tablet every other day 11/19/2024 Morning    atorvastatin (LIPITOR) 40 MG tablet Take 1 tablet (40 mg) by mouth daily. 11/19/2024 Morning    fluticasone (FLONASE) 50 MCG/ACT nasal spray INSTILL 2 SPRAYS INTO BOTH NOSTRILS DAILY. 11/19/2024 Morning    gabapentin (NEURONTIN) 600 MG tablet TAKE ONE (1) TABLET BY MOUTH THREE TIMES DAILY. (Patient taking differently: daily.) 11/19/2024 Morning    ibuprofen (ADVIL/MOTRIN) 200 MG tablet Take 400 mg by mouth every 4 hours as needed for mild pain Past Week    ipratropium (ATROVENT) 0.06 % nasal spray Spray 2 sprays into both nostrils 4 times daily. Past Week    losartan (COZAAR) 50 MG tablet Take 1 tablet (50 mg) by mouth daily. Appointment required for further refills 11/19/2024 Morning    metoprolol succinate ER (TOPROL XL) 50 MG 24 hr tablet Take 1 tablet (50 mg) by mouth every morning. 11/19/2024 Morning    multivitamin w/minerals (MULTI-VITAMIN) tablet Take 1 tablet by mouth daily. 11/18/2024 Morning    omeprazole (PRILOSEC) 40 MG DR capsule TAKE 1 CAPSULE BY MOUTH EVERY DAY 11/19/2024 Morning    ondansetron (ZOFRAN) 4 MG tablet Take by mouth every 8 hours as needed for nausea. 11/18/2024 Noon    prochlorperazine (COMPAZINE) 10 MG tablet Take 10 mg by mouth every 6 hours as needed for nausea or vomiting.  Past Week    spironolactone (ALDACTONE) 25 MG tablet Take 0.5 tablets (12.5 mg) by mouth every morning. 11/19/2024 Morning    UNABLE TO FIND Immuno therapy infusion every other week  Durvalumab (Imfinzi) 11/12/2024

## 2024-11-20 NOTE — PLAN OF CARE
Summary:  alcohol withdrawal, SOB, history lung and esophageal cancer, anxiety, depression  DATE & TIME:  11/191900-2330    Cognitive Concerns/ Orientation : Aox4, anxious   BEHAVIOR & AGGRESSION TOOL COLOR: green  CIWA SCORE: 6   ABNL VS/O2: VSS RA, hypertensive (PRN available)  MOBILITY: A1 GBW  PAIN MANAGMENT: denying pain except nausea  DIET: regular, not able to tolerate more than small sips; pills crushed in apple sauce; poor appetite related to esophageal cancer stomach resection; zofran x1 compazine x1 little relief  BOWEL/BLADDER: continent, up to commode   ABNL LAB/BG: creat .42, mag 1.5 (replacement in), elevated liver enzymes, d-dimer 1.59  DRAIN/DEVICES: portacath hep locked  TELEMETRY RHYTHM: SR  SKIN: scattered scabs, flakiness  TESTS/PROCEDURES:   D/C DAY/GOALS/PLACE:   OTHER IMPORTANT INFO: pt, chem dep, social work consults

## 2024-11-20 NOTE — H&P
[facility]    History and Physical - Hospitalist Service       Date of Admission:  11/19/2024    Assessment & Plan      Kezia Dixon is a 69 year old female admitted on 11/19/2024. 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  She had a outpatient PET scan recently last week  was told that she will need radiation for the recurrent left lung cancer.  She felt very frustrated about this and started drinking alcohol.  She has history of alcohol dependence and underwent inpatient treatment at [place] in 2022.  She has been sober for 2 years. she started drinking about a 1/4 cup of vodka 3-4 times a day for the last 3 days.  She also complains of shortness of breath which has been progressive over the last few weeks..  She felt very weak and had a mechanical fall over her cat on Saturday today being Tuesday.  Denies any head injury or syncope.  She had some bruising over her left hip denies any pain currently and was able to walk on it.  She complains of generalized weakness today.  She also complains of nausea over the last 3 days.    Blood alcohol level elevated at 0.18.  D-dimer elevated  at 1.59.    CT scan of the chest PE protocol done showed no PE , multiple lung nodules are stable since October 10 the CT.  On April loculated effusion seen, centrilobular emphysema in both lungs prior postoperative changes of the right lung    She is getting admitted with alcohol intoxication with a high risk of alcohol withdrawal, shortness of breath  secondary to COPD and underlying progressive left lung cancer, generalized weakness, nausea most likely secondary to alcoholic gastritis    Alcoholic intoxication;  Risk for alcohol withdrawal  History of alcohol dependence sober for 2 years;  Admitted to the hospital under inpatient status  WA protocol with as needed Valium ordered  Monitor her on telemetry  Multivitamin thiamine and folic acid supplementation  Chemical dependency consultation will be requested  Patient refused to see psychiatrist in the hospital      Progressive shortness of breath multifactorial most likely secondary to acute COPD exacerbation and underlying progressive left lung cancer;  CT chest done on 11/19/2024 showed no evidence of pneumonia  It showed underlying COPD and lung nodules  Continue PTA Anoro Ellipta  Albuterol inhaler 2 puffs every 4 hours while awake ordered  Patient refuses nebs as they made her feel claustrophobic  Start prednisone 40 mg p.o. daily x 5 days    History of recurrent non-small cell left upper lobe lung cancer in October 2024  History of squamous cell cancer of the right lung status post lobectomy  History of esophageal cancer status post chemoradiation and esophagectomy in remission  Patient is followed by Dr. Garcia as outpatient with [facility]  Patient was diagnosed with a left lung cancer in January 2024  Patient received chemoradiation and immunotherapy  Disease has been closely followed with serial CT scans  CT scan of chest done in October 10th  showed increased size of the right upper lobe cavitary lesion.  A left upper lobe irregular nodule measuring 14 x 10 mm is stable since 7/18/2024 but has gradually increased in size since 12/6/2023 also is concerning for neoplasm  Slight decrease size and number of the left upper lobe tree-in-bud nodules but new linear opacities in the left lower lobe.  Findings may be due to waxing and waning infectious/inflammatory process..  Chronic small right  pleural effusion    Patient underwent  PET scan outpatient which is not available currently for my review  She was told last week about the recurrence of the left lung cancer  Which made her go back into drinking alcohol  Outpatient follow-up with [facility]    History of stress-induced cardiomyopathy  History of nonobstructive coronary artery disease  Continue PTA Toprol-XL 50 mg daily, losartan 50 mg p.o. daily, spironolactone 12.5 mg p.o. every morning  Patient is also on as needed Lasix 20 mg daily as needed but she uses it very rarely  Continue baby aspirin and statin    History of GERD;  Nausea most likely secondary to alcoholic gastritis    Hold PTA omeprazole  Start Protonix while in hospital    Generalized weakness;  Most likely secondary to alcohol use and underlying COPD and malignancy    PT, social work consultations will be requested for safe discharge disposition            Diet:  Regular diet as tolerated  DVT Prophylaxis: Pneumatic Compression Devices  Jaeger Catheter: Not present  Lines: PRESENT      Port a Cath 01/09/24 Single Lumen Left Chest wall-Site Assessment: WDL      Cardiac Monitoring: None  Code Status:  Discussed with the patient she wants to be DNR/DNI     Clinically Significant Risk Factors Present on Admission          # Hypochloremia: Lowest Cl = 97 mmol/L in last 2 days, will monitor as appropriate  # Hypocalcemia: Lowest Ca = 8.3 mg/dL in last 2 days, will monitor and replace as appropriate   # Hypomagnesemia: Lowest Mg = 1.5 mg/dL in last 2 days, will replace as needed   # Hypoalbuminemia: Lowest albumin = 3.3 g/dL at 11/19/2024  3:39 PM, will monitor as appropriate   # Drug Induced Platelet Defect: home medication list includes an antiplatelet medication   # Hypertension: Noted on problem list  # Chronic heart failure with preserved ejection fraction: heart failure noted on problem list and last echo with EF >50%          # Cachexia: Estimated body mass index is 16.31 kg/m   "as calculated from the following:    Height as of this encounter: 1.549 m (5' 1\").    Weight as of this encounter: 39.1 kg (86 lb 4.8 oz).       # COPD: noted on problem list        Disposition Plan         Medically Ready for Discharge: Anticipated in 2-4 Days           Connie Skaggs MD  Hospitalist Service  [facility]  Securely message with [employer] (more info)  Text page via [employer] Paging/Directory     ______________________________________________________________________    Chief Complaint   History of alcohol dependence with relapse, recent diagnosis of recurrence of left lung cancer, generalized weakness, nausea    History is obtained from the patient, electronic health record, and emergency department [facility] provider Ms. 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  She had a outpatient PET scan recently last week  was told that she will need radiation for the recurrent left lung cancer.  She felt very frustrated about this and started drinking alcohol.  She has history of alcohol dependence and underwent inpatient treatment at [place] in 2022.  She has been sober for 2 years. she started drinking about a 1/4 cup of vodka 3-4 times a day for the last 3 days.  She also complains of shortness of breath which has been progressive over the last few weeks..  She felt very weak and had a mechanical fall over her cat on Saturday today being Tuesday.  Denies any head " injury or syncope.  She had some bruising over her left hip denies any pain currently and was able to walk on it.  She complains of generalized weakness today.  She also complains of nausea over the last 3 days.  She denies any chest pain or chest pressure.  She denies any suicidal ideation.  She called her son today who called EMS for wellness check.  She was evaluated by Ms. Matt Jacobson PA-C in the emergency department    In the ED her vital signs showed temperature 98.7  F afebrile pulse rate of 92 blood pressure 172/76 respirate of 16 she is satting 94% on room air    Her labs showed CBC showed WC count of 8 hemoglobin 12.1 platelet count of 2 70K.  D-dimer mildly mildly elevated at 1.59.  Alcohol level was at 0.18.  Magnesium low at 1.5.  Lipase is normal at 8.  CMP showed sodium normal at 138 potassium 3.6 bicarb 26 anion gap of 15 BUN 7.7 creatinine 0.42 all her LFTs are normal except alk phos mildly elevated at 165 AST mildly elevated at 52 Mague low at 3.3  Influenza RSV and COVID-19 testing returned negative troponin negative at 7 and remained normal at 6 on repeat  BNP normal at 627  CT chest PE with contrast was done and it showed no significant changes since 10/10/2024.  Again seen are significant findings of centrilobular emphysema in both lungs.  Small right pleural effusion with the peripheral linear enhancement of this may be loculated/chronic in nature.  Numerous areas of pulmonary nodularity show no significant changes.  Prior postoperative changes of the right lung.  Mediastinal stable since 10/10/2024.  No pathological size lymph nodes.  Central catheter remains in good position.  Scattered surgical clips.  Normal variant aberrant right subclavian artery.  Coronary artery calcification minimal no acute upper abdominal abnormalities.  Moderate thoracolumbar spinal curvature with some scattered hypertrophic changes.    Patient was given oral thiamine, multivitamin, folic acid 1 mg.  1 DuoNeb was  given and patient told me that she did not feel well felt very claustrophobic with the neb treatments.  He requested not to order nebs going forward.        Request to hospitalization was made for alcohol intoxication with possible withdrawal, shortness of breath multifactorial secondary to underlying COPD, recurrent left lung cancer.  Generalized weakness, nausea secondary to alcoholic gastritis      Past Medical History    Past Medical History:   Diagnosis Date    Alcohol use disorder, severe, dependence (H) 10/14/2016    Alcoholism (H) 10/14/2016    Anemia     Benign essential hypertension 10/14/2016    Carotid artery disease (H)     mile plaque 5/7    Chemical dependency (H)     alcohol    COPD (chronic obstructive pulmonary disease) (H)     Coronary artery disease     Depression with anxiety     Esophageal cancer (H) 03/22/2016    SQUAMOUS CELL    Esophageal mass     GERD (gastroesophageal reflux disease)     Hx of pancreatitis 2003    Hypercholesteremia     Hyperglycemia     Hyperlipemia     Impaired fasting glucose 10/14/2016    Nocturnal leg cramps     Other chronic pain     Pancreatic pseudocyst 10/14/2016    Pancreatitis     with pseudocyst    Pap smear with atypical squamous cells, cannot exclude high grade squamous intraepithelial lesion (ASC-H) 10/14/2016    Colpo impression benign, ECC benign. Cotestin in 1 yr.    Shingles     Squamous cell carcinoma of right lung (H)     Vasomotor rhinitis    Moderate stress-induced cardiomyopathy EF 30% in 08/2021 with subsequent normalization of left ventricular systolic function.  2.  Abnormal stress perfusion study in 08/2021 suggestive of LAD distribution ischemia.  Subsequent CT coronary angiography demonstrated a severe proximal right posterolateral stenosis with mild LAD disease.    Past Surgical History   Past Surgical History:   Procedure Laterality Date    AAA REPAIR      splenic artery aneurysm embolization    ABDOMEN SURGERY  2/2/2016    COLONOSCOPY   11/26/2013    Procedure: COMBINED COLONOSCOPY, SINGLE BIOPSY/POLYPECTOMY BY BIOPSY;  COLONOSCOPY (MAC);  Surgeon: Montana Rosa MD;  Location:  GI    COLONOSCOPY  11/26/2013    COLONOSCOPY N/A 10/4/2018    Procedure: COMBINED COLONOSCOPY, SINGLE OR MULTIPLE BIOPSY/POLYPECTOMY BY BIOPSY;  colonoscopy;  Surgeon: Gio Apodaca MD;  Location:  GI    ENT SURGERY      ESOPHAGOGASTRECTOMY N/A 3/22/2016    Procedure: ESOPHAGOGASTRECTOMY;  Surgeon: Alvin Riojas MD;  Location:  OR    ESOPHAGOSCOPY, GASTROSCOPY, DUODENOSCOPY (EGD), COMBINED N/A 12/22/2015    Procedure: COMBINED ENDOSCOPIC ULTRASOUND, ESOPHAGOSCOPY, GASTROSCOPY, DUODENOSCOPY (EGD), FINE NEEDLE ASPIRATE/BIOPSY;  Surgeon: Danelle Michael MD;  Location:  GI    GASTROSTOMY, INSERT TUBE, COMBINED N/A 2/2/2016    Procedure: COMBINED GASTROSTOMY, INSERT TUBE (OPEN);  Surgeon: Alvin Riojas MD;  Location:  OR    GI SURGERY  12/22/2015    HAND SURGERY      right    HERNIORRHAPHY INCISIONAL (LOCATION) N/A 3/19/2019    Procedure: LAPARSCOPIC  INCISIONAL HERNIA REPAIR WITH MESH, LAPARSCOPIC LYSIS OF ADHENSIONS;  Surgeon: Lamberto Magaña MD;  Location:  OR    INSERT PORT VASCULAR ACCESS N/A 12/28/2015    Procedure: INSERT PORT VASCULAR ACCESS;  Surgeon: Alvin Riojas MD;  Location:  OR    INSERT PORT VASCULAR ACCESS N/A 1/9/2024    Procedure: SMART PORT PLACEMENT;  Surgeon: Alvin Riojas MD;  Location:  OR    LAPAROSCOPIC CHOLECYSTECTOMY N/A 3/19/2019    Procedure: LAPAROSCOPIC CHOLECYSTECTOMY;  Surgeon: Lamberto Magaña MD;  Location:  OR    LOBECTOMY LUNG Right 10/16/2018    Procedure: LOBECTOMY LUNG;  Surgeon: Alvin Riojas MD;  Location:  OR    REMOVE PORT VASCULAR ACCESS Left 7/22/2016    Procedure: REMOVE PORT VASCULAR ACCESS;  Surgeon: Alvin Riojas MD;  Location:  OR    THORACOTOMY Right 10/16/2018    Procedure: REDO RIGHT THORACTOMY AND  RIGHT LOWER LOBECTOMY, PLEURAL LYSIS;  Surgeon: Alvin Riojas MD;  Location: [facility] OR    TONSILLECTOMY      VASCULAR SURGERY         Prior to Admission Medications   Prior to Admission Medications   Prescriptions Last Dose Informant Patient Reported? Taking?   ANORO ELLIPTA 62.5-25 MCG/ACT oral inhaler   Yes No   Sig: Inhale 1 puff into the lungs   UNABLE TO FIND   Yes No   Sig: Immuno therapy infusion every other week   acetaminophen (TYLENOL) 325 MG tablet   Yes No   Sig: Take 325-650 mg by mouth every 6 hours as needed for mild pain   aspirin (ASA) 81 MG EC tablet   Yes No   Si tablet every other day   atorvastatin (LIPITOR) 40 MG tablet   No No   Sig: Take 1 tablet (40 mg) by mouth daily.   fluticasone (FLONASE) 50 MCG/ACT nasal spray  Self No No   Sig: INSTILL 2 SPRAYS INTO BOTH NOSTRILS DAILY.   furosemide (LASIX) 40 MG tablet   No No   Sig: Take 0.5 tablets (20 mg) by mouth daily as needed (edema or shortness of breath) For worsening shortness of breath, leg swelling or weight gain.   gabapentin (NEURONTIN) 600 MG tablet   No No   Sig: TAKE ONE (1) TABLET BY MOUTH THREE TIMES DAILY.   Patient taking differently: daily.   ibuprofen (ADVIL/MOTRIN) 200 MG tablet  Self Yes No   Sig: Take 400 mg by mouth every 4 hours as needed for mild pain   ipratropium (ATROVENT) 0.06 % nasal spray   No No   Sig: Spray 2 sprays into both nostrils 4 times daily.   losartan (COZAAR) 50 MG tablet   No No   Sig: Take 1 tablet (50 mg) by mouth daily. Appointment required for further refills   metoprolol succinate ER (TOPROL XL) 50 MG 24 hr tablet   No No   Sig: Take 1 tablet (50 mg) by mouth every morning.   multivitamin w/minerals (MULTI-VITAMIN) tablet   Yes No   Sig: Take 1 tablet by mouth daily.   omeprazole (PRILOSEC) 40 MG DR capsule   No No   Sig: TAKE 1 CAPSULE BY MOUTH EVERY DAY   ondansetron (ZOFRAN) 4 MG tablet   Yes No   Sig: Take by mouth every 8 hours as needed for nausea.   prochlorperazine (COMPAZINE) 10  MG tablet   Yes No   Sig: Take 10 mg by mouth every 6 hours as needed for nausea or vomiting.   spironolactone (ALDACTONE) 25 MG tablet   No No   Sig: Take 0.5 tablets (12.5 mg) by mouth every morning.      Facility-Administered Medications: None        Review of Systems    The 10 point Review of Systems is negative other than noted in the HPI or here.     Social History   I have reviewed this patient's social history and updated it with pertinent information if needed.  Social History     Tobacco Use    Smoking status: Former     Current packs/day: 0.00     Average packs/day: 0.3 packs/day for 45.4 years (11.4 ttl pk-yrs)     Types: Cigarettes     Start date: 1970     Quit date: 2015     Years since quittin.9    Smokeless tobacco: Never   Vaping Use    Vaping status: Never Used   Substance Use Topics    Alcohol use: Not Currently    Drug use: No         Family History   I have reviewed this patient's family history and updated it with pertinent information if needed.  Family History   Problem Relation Age of Onset    Depression Mother     Substance Abuse Father     Other Cancer Father         melanoma    Prostate Cancer Father     Diabetes Father     Substance Abuse Paternal Grandfather     Other Cancer Brother         melanoma    Substance Abuse Brother     Other Cancer Brother         melanoma    Other Cancer Brother         melanoma    Other Cancer Brother         melanoma         Allergies   Allergies   Allergen Reactions    Codeine Sulfate Nausea    Morphine      Pt unsure    Simvastatin Cramps     Leg cramps    Pcn [Penicillins] Rash        Physical Exam   Vital Signs: Temp: 98.7  F (37.1  C)   BP: (!) 143/57 Pulse: 92   Resp: 27 SpO2: 96 %      Weight: 86 lbs 4.8 oz    General Appearance: Frail, chronically ill looking female sitting comfortably in bed, not in acute distress  Eyes: Extraocular eye movements are intact, no scleral icterus or conjunctival injection noted  HEENT: Head is  atraumatic normocephalic neck is supple cheeks are flushed  Respiratory: Diminished air entry bilaterally with occasional wheezing heard on auscultation  Cardiovascular: Normal rate rhythm regular, no murmurs  GI: Soft, nontender nondistended bowel sounds positive no guarding rigidity or rebound noted  Skin: Warm and dry  Musculoskeletal: No clubbing cyanosis or edema  Neurologic: No focal weakness  Psychiatric: Mood and affect are normal    Medical Decision Making       85 MINUTES SPENT BY ME on the date of service doing chart review, history, exam, documentation & further activities per the note.      Data     I have personally reviewed the following data over the past 24 hrs:    8.0  \   12.1   / 270     138 97 (L) 7.7 (L) /  89   3.6 26 0.42 (L) \     ALT: 21 AST: 52 (H) AP: 165 (H) TBILI: 1.2   ALB: 3.3 (L) TOT PROTEIN: 6.8 LIPASE: 8 (L)     Trop: <6 BNP: 627     INR:  N/A PTT:  N/A   D-dimer:  1.59 (H) Fibrinogen:  N/A       Imaging results reviewed over the past 24 hrs:   Recent Results (from the past 24 hours)   CT Chest Pulmonary Embolism w Contrast    Narrative    EXAM: CT CHEST PULMONARY EMBOLISM W CONTRAST  LOCATION: [facility]  DATE: 11/19/2024    INDICATION: History of lung cancer, worsening shortness of breath.  COMPARISON: CT of the chest 10/10/2024.  TECHNIQUE: CT chest pulmonary angiogram during arterial phase injection of IV contrast. Multiplanar reformats and MIP reconstructions were performed. Dose reduction techniques were used.   CONTRAST: 44 mL Isovue 370.    FINDINGS:  ANGIOGRAM CHEST: Pulmonary arteries are normal caliber and negative for pulmonary emboli. Thoracic aorta is negative for dissection. No CT evidence of right heart strain.    LUNGS AND PLEURA: Stable since 10/10/2024. Again seen are significant findings of centrilobular emphysema in both lungs. Small right pleural effusion with peripheral linear enhancement; this may be loculated/chronic in nature. The  numerous areas of   pulmonary nodularity show no significant changes. Prior postoperative changes of the right lung.    MEDIASTINUM/AXILLAE: Stable since 10/10/2024. No pathologic-sized lymph nodes. Central catheter remains in good position. Scattered surgical clips. Normal variant aberrant right subclavian artery.    CORONARY ARTERY CALCIFICATION: Minimal.    UPPER ABDOMEN: No acute abnormalities.    MUSCULOSKELETAL: Moderate thoracolumbar spinal curvature with some scattered hypertrophic changes.      Impression    IMPRESSION:  1.  No significant changes since 10/10/2024.    2.  No PE, dissection, aneurysmal dilatation, or findings suggestive of right heart strain.    3.  Stable areas of pulmonary nodularity and chronic-appearing right pleural effusion since 10/10/2024.

## 2024-11-20 NOTE — PROGRESS NOTES
.Admission    Patient arrives to room 627 via cart from ED.  Care plan note: yes    Inpatient nursing criteria listed below were met:    Did you put disposition on whiteboard and in sticky note: Yes  Full skin assessment done (add LDA if skin issue present). Initials of 2nd RN :Yes SE  Isolation education started/completed NA  Patient allergies verified with patient: Yes  Fall Risk? (Care plan updated, Education given and documented) Yes  Primary Care Plan initiated: Yes  Home medications documented in belongings flowsheet: Yes  Patient belongings documented in belongings flowsheet: Yes  Reminder note (belongings/ medications) placed in discharge instructions:No  Admission profile/ required documentation complete: No  If patient is a 72 hour hold/Commitment are belongings removed from room and locked up? NA

## 2024-11-20 NOTE — PROGRESS NOTES
"   11/20/24 1100   Appointment Info   Signing Clinician's Name / Credentials (PT) Marie Brizuela, PT, DPT   Living Environment   People in Home alone   Current Living Arrangements condominium   Home Accessibility stairs to enter home   Number of Stairs, Main Entrance 3   Stair Railings, Main Entrance railings safe and in good condition   Transportation Anticipated family or friend will provide;car, drives self   Self-Care   Usual Activity Tolerance good   Current Activity Tolerance poor   Equipment Currently Used at Home none   Fall history within last six months yes   Number of times patient has fallen within last six months 1   Activity/Exercise/Self-Care Comment Pt reports she's independent at baseline without a device and has been able to take care of herself at home. Has a son who can support her as needed. She likes to golf in the summers   General Information   Onset of Illness/Injury or Date of Surgery 11/19/24   Referring Physician Connie Skaggs MD   Patient/Family Therapy Goals Statement (PT) to get better   Pertinent History of Current Problem (include personal factors and/or comorbidities that impact the POC) \"Progressive shortness of breath multifactorial most likely secondary to acute COPD exacerbation and underlying progressive left lung cancer; Alcoholic intoxication;  Risk for alcohol withdrawal  History of alcohol dependence sober for 2 years\"   Existing Precautions/Restrictions fall   Weight-Bearing Status - LLE full weight-bearing   Weight-Bearing Status - RLE full weight-bearing   Cognition   Affect/Mental Status (Cognition) WFL   Pain Assessment   Patient Currently in Pain No   Integumentary/Edema   Integumentary/Edema no deficits were identifed   Posture    Posture Forward head position   Range of Motion (ROM)   Range of Motion ROM is WFL   Strength (Manual Muscle Testing)   Strength Comments generally against gravity; pt reports she feels weak   Bed Mobility   Comment, (Bed Mobility) close " SBA supine to seated EOB   Transfers   Comment, (Transfers) limited by feeling shaky; CGA with FWW   Gait/Stairs (Locomotion)   Comment, (Gait/Stairs) unable/pt declined to try yet   Balance   Balance Comments good seated balance, fair standing balance with FWW   Sensory Examination   Sensory Perception Comments neuropathy in BLE at baseline, numbness   Clinical Impression   Criteria for Skilled Therapeutic Intervention Yes, treatment indicated   PT Diagnosis (PT) impaired functional mobility   Influenced by the following impairments decreased strength, balance, and activity tolerance   Functional limitations due to impairments bed mobility, transfers, gait, stairs   Clinical Presentation (PT Evaluation Complexity) stable   Clinical Presentation Rationale clinical judgement   Clinical Decision Making (Complexity) low complexity   Planned Therapy Interventions (PT) balance training;bed mobility training;gait training;home exercise program;neuromuscular re-education;patient/family education;stair training;strengthening;transfer training   Risk & Benefits of therapy have been explained evaluation/treatment results reviewed;care plan/treatment goals reviewed;risks/benefits reviewed;current/potential barriers reviewed;participants voiced agreement with care plan;participants included;patient   PT Total Evaluation Time   PT Eval, Low Complexity Minutes (31514) 8   Physical Therapy Goals   PT Frequency Daily   PT Predicted Duration/Target Date for Goal Attainment 11/27/24   PT Goals Bed Mobility;Transfers;Gait;Stairs   PT: Bed Mobility Independent   PT: Transfers Modified independent;Sit to/from stand;Bed to/from chair;Assistive device   PT: Gait Modified independent;Assistive device;150 feet   PT: Stairs Modified independent;3 stairs   Interventions   Interventions Quick Adds Therapeutic Activity   Therapeutic Activity   Therapeutic Activities: dynamic activities to improve functional performance Minutes (84502) 10    Symptoms Noted During/After Treatment Fatigue   Treatment Detail/Skilled Intervention Supine to seated EOB with SBA, feeling shaky but agreeable to try PT. Once EOB, good balance and tolerated okay. Sit to stand with FWW and CGA, cuing for strategy. Standing marching with FWW CGA, pt slightly unsteady, feels shaky. Took a few side steps to HOB, sat with CGA. Declined to try ambulation d/t feeling too shaky. Laid down SBA, set up comfortable.   PT Discharge Planning   PT Plan continue mobility   PT Discharge Recommendation (DC Rec) other (see comments);Transitional Care Facility   PT Rationale for DC Rec Pending progress. When feeling better pt will likely be able to return home with support from son as needed. Currently pt is not tolerating mobility well enough to ambulate. Will reassess daily   PT Brief overview of current status Goals of therapy will be to address safe mobility and make recs for d/c to next level of care. Pt and RN will continue to follow all falls risk precautions as documented by RN staff while hospitalized.   Physical Therapy Time and Intention   Timed Code Treatment Minutes 10   Total Session Time (sum of timed and untimed services) 18

## 2024-11-20 NOTE — PROGRESS NOTES
[facility]    Medicine Progress Note - Hospitalist Service    Date of Admission:  11/19/2024    Assessment & Plan   Kezia Dixon is a 69 year old female  admitted on 11/19/2024.   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, history of left lung non-small cell cancer underwent chemoradiation and immunotherapy treatments hold started binge drinking after she was told she has recurrence of left lung cancer and needs radiation treatment due to frustration started binge drinking and presents with generalized weakness, nausea, shortness of breath and mechanical fall at home     She was admitted with alcohol intoxication with a high risk of alcohol withdrawal, shortness of breath secondary to COPD and underlying progressive left lung cancer, generalized weakness, nausea most likely secondary to alcoholic gastritis     Alcoholic intoxication;  Risk for alcohol withdrawal  History of alcohol dependence sober for 2 years;  Continue to monitor CIWA protocol with as needed Valium ordered  Monitor her on telemetry  Multivitamin thiamine and folic acid supplementation  Chemical dependency consulted  Patient refused to see psychiatrist in the hospital      Progressive shortness of breath multifactorial most likely secondary to acute COPD exacerbation and underlying progressive left lung cancer;  CT chest done on 11/19/2024 showed no evidence of pneumonia  It showed underlying COPD and lung nodules  Continue PTA Anoro Ellipta  Albuterol inhaler 2 puffs every 4 hours while awake ordered  Patient refuses nebs as they made her feel claustrophobic  Start prednisone 40 mg p.o. daily x 5 days, patient reports feeling better this morning with respect to her dyspnea since  she was started on steroids     History of recurrent non-small cell left upper lobe lung cancer in October 2024  History of squamous cell cancer of the right lung status post lobectomy  History of esophageal cancer status post chemoradiation and esophagectomy in remission  Patient is followed by Dr. Garcia as outpatient with [facility]  Patient was diagnosed with a left lung cancer in January 2024  Patient received chemoradiation and immunotherapy  Disease has been closely followed with serial CT scans  CT scan of chest done in October 10th  showed increased size of the right upper lobe cavitary lesion.  A left upper lobe irregular nodule measuring 14 x 10 mm is stable since 7/18/2024 but has gradually increased in size since 12/6/2023 also is concerning for neoplasm  Slight decrease size and number of the left upper lobe tree-in-bud nodules but new linear opacities in the left lower lobe.  Findings may be due to waxing and waning infectious/inflammatory process..  Chronic small right pleural effusion     Patient underwent  PET scan outpatient  after which She was told last week about the recurrence of the left lung cancer, which made her go back into drinking alcohol  Outpatient follow-up with [facility]     History of stress-induced cardiomyopathy  History of nonobstructive coronary artery disease  Continue PTA Toprol-XL 50 mg daily, losartan 50 mg p.o. daily, spironolactone 12.5 mg p.o. every morning  Patient is also on as needed Lasix 20 mg daily as needed but she uses it very rarely  Continue baby aspirin and statin     History of GERD;  Nausea most likely secondary to alcoholic gastritis     Hold PTA omeprazole  Continue IV Protonix   -Symptomatic treatment     Generalized weakness;  Most likely secondary to alcohol use and underlying COPD and malignancy     PT, social work consultations will be requested for safe discharge disposition       Diet: Combination Diet Regular Diet  "Adult  Snacks/Supplements Adult: Magic Cup; Between Meals    DVT Prophylaxis: Will start enoxaparin (Lovenox) SQ for DVT prophylaxis  Jaeger Catheter: Not present  Lines: PRESENT      Port a Cath 01/09/24 Single Lumen Left Chest wall-Site Assessment: WDL      Cardiac Monitoring: ACTIVE order. Indication: Tachyarrhythmias, acute (48 hours)  Code Status: No CPR- Do NOT Intubate      Clinically Significant Risk Factors Present on Admission          # Hypochloremia: Lowest Cl = 96 mmol/L in last 2 days, will monitor as appropriate  # Hypocalcemia: Lowest Ca = 8.3 mg/dL in last 2 days, will monitor and replace as appropriate   # Hypomagnesemia: Lowest Mg = 1.5 mg/dL in last 2 days, will replace as needed  # Anion Gap Metabolic Acidosis: Highest Anion Gap = 19 mmol/L in last 2 days, will monitor and treat as appropriate  # Hypoalbuminemia: Lowest albumin = 3.3 g/dL at 11/19/2024  3:39 PM, will monitor as appropriate   # Drug Induced Platelet Defect: home medication list includes an antiplatelet medication   # Hypertension: Noted on problem list  # Chronic heart failure with preserved ejection fraction: heart failure noted on problem list and last echo with EF >50%          # Cachexia: Estimated body mass index is 16.31 kg/m  as calculated from the following:    Height as of this encounter: 1.549 m (5' 1\").    Weight as of this encounter: 39.1 kg (86 lb 4.8 oz).       # COPD: noted on problem list        Social Drivers of Health    Tobacco Use: Medium Risk (9/17/2024)    Patient History     Smoking Tobacco Use: Former     Smokeless Tobacco Use: Never   Physical Activity: Insufficiently Active (2/6/2024)    Exercise Vital Sign     Days of Exercise per Week: 5 days     Minutes of Exercise per Session: 10 min   Stress: Stress Concern Present (2/6/2024)    [employer] - Occupational Stress Questionnaire     Feeling of Stress : Rather much   Social Connections: Unknown (2/6/2024)    Social Connection " and Isolation Panel [NHANES]     Frequency of Social Gatherings with Friends and Family: More than three times a week          Disposition Plan     Medically Ready for Discharge: Anticipated in 2-4 Days             Cheri Garcia MD  Hospitalist Service  [facility]  Securely message with Framed Data (more info)  Text page via [facility] Paging/Directory   ______________________________________________________________________    Interval History   Care assumed, chart reviewed.  Patient reports ongoing nausea.  Antiemetics seems to be helping all the but not much.  Wondering when she can feel better.  Reports shortness of breath has improved.  No new nursing concerns.    Physical Exam   Vital Signs: Temp: 98.2  F (36.8  C) Temp src: Oral BP: (!) 154/64 Pulse: 91   Resp: 17 SpO2: 99 % O2 Device: None (Room air)    Weight: 86 lbs 4.8 oz    Exam:  Constitutional: Awake, alert and no distress. Appears comfortable  Head: Normocephalic. No masses, lesions, tenderness or abnormalities  ENMT: ENT exam normal, no neck nodes or sinus tenderness  Cardiovascular: RRR.  no murmurs, no rubs or JVD  Respiratory:normal WOB,b/l equal air entry, no wheezes or crackles   Gastrointestinal: Abdomen soft, non-tender. BS normal. No masses, organomegaly  : Deferred  Extremities :no edema , no clubbing or cyanosis        Medical Decision Making       52 MINUTES SPENT BY ME on the date of service doing chart review, history, exam, documentation & further activities per the note.      Data     I have personally reviewed the following data over the past 24 hrs:    7.0  \   12.6   / 270     137 96 (L) 11.4 /  137 (H)   4.3 22 0.51 \     ALT: 21 AST: 52 (H) AP: 165 (H) TBILI: 1.2   ALB: 3.3 (L) TOT PROTEIN: 6.8 LIPASE: 8 (L)     Trop: <6 BNP: 627     INR:  N/A PTT:  N/A   D-dimer:  1.59 (H) Fibrinogen:  N/A       Imaging results reviewed over the past 24 hrs:   Recent Results (from the past 24 hours)   CT Chest Pulmonary Embolism  w Contrast    Narrative    EXAM: CT CHEST PULMONARY EMBOLISM W CONTRAST  LOCATION: [facility]  DATE: 11/19/2024    INDICATION: History of lung cancer, worsening shortness of breath.  COMPARISON: CT of the chest 10/10/2024.  TECHNIQUE: CT chest pulmonary angiogram during arterial phase injection of IV contrast. Multiplanar reformats and MIP reconstructions were performed. Dose reduction techniques were used.   CONTRAST: 44 mL Isovue 370.    FINDINGS:  ANGIOGRAM CHEST: Pulmonary arteries are normal caliber and negative for pulmonary emboli. Thoracic aorta is negative for dissection. No CT evidence of right heart strain.    LUNGS AND PLEURA: Stable since 10/10/2024. Again seen are significant findings of centrilobular emphysema in both lungs. Small right pleural effusion with peripheral linear enhancement; this may be loculated/chronic in nature. The numerous areas of   pulmonary nodularity show no significant changes. Prior postoperative changes of the right lung.    MEDIASTINUM/AXILLAE: Stable since 10/10/2024. No pathologic-sized lymph nodes. Central catheter remains in good position. Scattered surgical clips. Normal variant aberrant right subclavian artery.    CORONARY ARTERY CALCIFICATION: Minimal.    UPPER ABDOMEN: No acute abnormalities.    MUSCULOSKELETAL: Moderate thoracolumbar spinal curvature with some scattered hypertrophic changes.      Impression    IMPRESSION:  1.  No significant changes since 10/10/2024.    2.  No PE, dissection, aneurysmal dilatation, or findings suggestive of right heart strain.    3.  Stable areas of pulmonary nodularity and chronic-appearing right pleural effusion since 10/10/2024.     Recent Labs   Lab 11/20/24  0634 11/19/24  1539   WBC 7.0 8.0   HGB 12.6 12.1   MCV 91 90    270    138   POTASSIUM 4.3 3.6   CHLORIDE 96* 97*   CO2 22 26   BUN 11.4 7.7*   CR 0.51 0.42*   ANIONGAP 19* 15   VICENTE 8.8 8.3*   * 89   ALBUMIN  --  3.3*    PROTTOTAL  --  6.8   BILITOTAL  --  1.2   ALKPHOS  --  165*   ALT  --  21   AST  --  52*   LIPASE  --  8*

## 2024-11-20 NOTE — CONSULTS
11/20/2024  I spoke with pt who was not interested in participating in a TAWNY CA with a referral to either inpatient or outpatient TAWNY treatment at this time. She stated she has attended treatment before. She was open to TAWNY resources. I emailed the below information to her today. If pt changes their mind, they can call [facility] at [phone] for an Outpatient TAWNY Assessment upon discharge from the hospital.    Sober Support Groups:  [facility] ([facility]): [facility] connects people seeking recovery to resources that help foster and sustain long-term recovery. Whether you are seeking resources for treatment, transportation, housing, job training, education, health care or other pathways to recovery, [facility] is a great place to start. Phone: [phone]. www.[employer]Gen (Great listing of all types of recovery and non-recovery related resources)      [facility]: https://www.Frenzoo.org/ (Online meetings, support groups, resources)      Alcoholics Anonymous: 1-800-ALCOHOL  HTTP://WWW.AA.ORG/  AA [place] (322-414-4762 or http://aaJumpStart Wireless CorporationneaOpenSpirit.org)  AA [place] (736-357-2203 or www.aasau.org)      Narcotics Anonymous: 531.656.2813  www.naminDailyDigital.us.     [facility] Recovery: https://www.refugerecSetJam.org/    [facility] Recovery: https://www.celebraterecSetJam.com/what-we-offer/find-a-cr-meeting    [facility]:  https://www.[employer].[facility]/next-steps/find-support/addiction-recovery/    [facility]:  Offers a wide range of different sober support groups and a Sunday Service.  [address] [place] [address]    https://www.[facility].org/#gsc.tab=0    Medicare friendly treatment programs:  [facility]: (self pay) (inpatient & IOP)  Phone: [phone]  Fax: [phone]  Email: info@Front App.Bevii  Address:  [facility] [address] [place] [address]  https://www.theretreat.org/    [place]: (inpatient)   [facility] Inpatient CD, [address]  [address]  [place] [address]  Phone: [phone]  Fax: [phone]  email:  [email or web address]    [facility] IOP: In-person only  Coordinator: Margaret Oates  Phone: [phone]  email: [email or web address]  https://[facility].org/specialties/Substance-Use   [facility] OP Program/Harm Reduction: T,TH 12:30pm-3:30pm (Accepts Medicare)   [facility] Co-occurring IOP: M,W, TH 9am-12pm (Accepts Medicare)   [facility] OP: ?M,T,W 12:00pm-2:00pm (Accepts Medicare)    [facility]:  Call [phone] to request a virtual addiction therapy appointment.  [email or web address]      Sharon Newell MA [facility]  TAWNY Evaluation Counselor  [phone]  [email or web address]

## 2024-11-20 NOTE — PROVIDER NOTIFICATION
MD Notification    Notified Person: MD    Notified Person Name: Doretha    Notification Date/Time:0412 11/20/24    Notification Interaction: Vocera    Purpose of Notification: pt reusing gabapentin, states she is unable to swallow pills of that size.     Orders Received: acknowledged, SLP consult    Comments:

## 2024-11-21 ENCOUNTER — APPOINTMENT (OUTPATIENT)
Dept: PHYSICAL THERAPY | Facility: CLINIC | Age: 69
DRG: 897 | End: 2024-11-21
Payer: MEDICARE

## 2024-11-21 VITALS
OXYGEN SATURATION: 96 % | BODY MASS INDEX: 15.48 KG/M2 | TEMPERATURE: 98.2 F | HEART RATE: 93 BPM | WEIGHT: 82.01 LBS | HEIGHT: 61 IN | DIASTOLIC BLOOD PRESSURE: 63 MMHG | RESPIRATION RATE: 16 BRPM | SYSTOLIC BLOOD PRESSURE: 132 MMHG

## 2024-11-21 LAB — MAGNESIUM SERPL-MCNC: 2 MG/DL (ref 1.7–2.3)

## 2024-11-21 PROCEDURE — 83735 ASSAY OF MAGNESIUM: CPT | Performed by: INTERNAL MEDICINE

## 2024-11-21 PROCEDURE — 250N000011 HC RX IP 250 OP 636: Performed by: INTERNAL MEDICINE

## 2024-11-21 PROCEDURE — 250N000013 HC RX MED GY IP 250 OP 250 PS 637: Performed by: INTERNAL MEDICINE

## 2024-11-21 PROCEDURE — 250N000009 HC RX 250: Performed by: INTERNAL MEDICINE

## 2024-11-21 PROCEDURE — 99232 SBSQ HOSP IP/OBS MODERATE 35: CPT | Performed by: INTERNAL MEDICINE

## 2024-11-21 PROCEDURE — 97116 GAIT TRAINING THERAPY: CPT | Mod: GP

## 2024-11-21 PROCEDURE — 97110 THERAPEUTIC EXERCISES: CPT | Mod: GP

## 2024-11-21 PROCEDURE — 250N000012 HC RX MED GY IP 250 OP 636 PS 637: Performed by: INTERNAL MEDICINE

## 2024-11-21 PROCEDURE — 120N000001 HC R&B MED SURG/OB

## 2024-11-21 RX ADMIN — PREDNISONE 40 MG: 20 TABLET ORAL at 09:41

## 2024-11-21 RX ADMIN — GABAPENTIN 900 MG: 300 CAPSULE ORAL at 04:06

## 2024-11-21 RX ADMIN — Medication 5 ML: at 18:40

## 2024-11-21 RX ADMIN — IPRATROPIUM BROMIDE 2 SPRAY: 42 SPRAY NASAL at 21:23

## 2024-11-21 RX ADMIN — ALBUTEROL SULFATE 2 PUFF: 108 INHALANT RESPIRATORY (INHALATION) at 14:15

## 2024-11-21 RX ADMIN — ONDANSETRON 4 MG: 4 TABLET, ORALLY DISINTEGRATING ORAL at 21:23

## 2024-11-21 RX ADMIN — THIAMINE HCL TAB 100 MG 100 MG: 100 TAB at 09:42

## 2024-11-21 RX ADMIN — GABAPENTIN 900 MG: 300 CAPSULE ORAL at 18:34

## 2024-11-21 RX ADMIN — Medication 1 TABLET: at 09:44

## 2024-11-21 RX ADMIN — ALBUTEROL SULFATE 2 PUFF: 108 INHALANT RESPIRATORY (INHALATION) at 09:32

## 2024-11-21 RX ADMIN — LOSARTAN POTASSIUM 50 MG: 50 TABLET, FILM COATED ORAL at 09:42

## 2024-11-21 RX ADMIN — ASPIRIN 81 MG: 81 TABLET, COATED ORAL at 09:42

## 2024-11-21 RX ADMIN — ALBUTEROL SULFATE 2 PUFF: 108 INHALANT RESPIRATORY (INHALATION) at 18:36

## 2024-11-21 RX ADMIN — ENOXAPARIN SODIUM 30 MG: 30 INJECTION SUBCUTANEOUS at 16:53

## 2024-11-21 RX ADMIN — IPRATROPIUM BROMIDE 2 SPRAY: 42 SPRAY NASAL at 18:36

## 2024-11-21 RX ADMIN — Medication 1 TABLET: at 09:42

## 2024-11-21 RX ADMIN — ACETAMINOPHEN 650 MG: 325 TABLET, FILM COATED ORAL at 21:23

## 2024-11-21 RX ADMIN — GABAPENTIN 900 MG: 300 CAPSULE ORAL at 11:24

## 2024-11-21 RX ADMIN — PROCHLORPERAZINE MALEATE 5 MG: 5 TABLET ORAL at 16:53

## 2024-11-21 RX ADMIN — PROCHLORPERAZINE MALEATE 5 MG: 5 TABLET ORAL at 04:07

## 2024-11-21 RX ADMIN — IPRATROPIUM BROMIDE 2 SPRAY: 42 SPRAY NASAL at 14:14

## 2024-11-21 RX ADMIN — UMECLIDINIUM BROMIDE AND VILANTEROL TRIFENATATE 1 PUFF: 62.5; 25 POWDER RESPIRATORY (INHALATION) at 09:34

## 2024-11-21 RX ADMIN — ONDANSETRON 4 MG: 4 TABLET, ORALLY DISINTEGRATING ORAL at 12:44

## 2024-11-21 RX ADMIN — IPRATROPIUM BROMIDE 2 SPRAY: 42 SPRAY NASAL at 09:33

## 2024-11-21 RX ADMIN — ATORVASTATIN CALCIUM 40 MG: 40 TABLET, FILM COATED ORAL at 09:42

## 2024-11-21 RX ADMIN — FLUTICASONE PROPIONATE 2 SPRAY: 50 SPRAY, METERED NASAL at 09:33

## 2024-11-21 RX ADMIN — SPIRONOLACTONE 12.5 MG: 25 TABLET ORAL at 09:42

## 2024-11-21 RX ADMIN — METOPROLOL SUCCINATE 50 MG: 50 TABLET, EXTENDED RELEASE ORAL at 09:41

## 2024-11-21 RX ADMIN — PANTOPRAZOLE SODIUM 40 MG: 40 INJECTION, POWDER, FOR SOLUTION INTRAVENOUS at 09:36

## 2024-11-21 RX ADMIN — FOLIC ACID 1 MG: 1 TABLET ORAL at 09:42

## 2024-11-21 NOTE — PLAN OF CARE
Goal Outcome Evaluation:      Plan of Care Reviewed With: patient    Overall Patient Progress: improving     SUMMARY: ETOH, SOB, history lung and esophageal cancer, anxiety, depression    DATE & TIME: 11/20/2024 - 11/21/2024 [id number]      Cognitive Concerns/ Orientation: A & O x4   BEHAVIOR & AGGRESSION TOOL COLOR: Green  CIWA SCORE: 0, 0, 2  ABNL VS/O2: VSS on RA  MOBILITY: Assist x1 GB/W  PAIN MANAGMENT: Denied pain this shift   DIET: Regular   BOWEL/BLADDER: Continent of bowel and bladder; Up to BSC   ABNL LAB/BG: Mg 2.7- recheck placed for AM draw   DRAIN/DEVICES: L portacath hep locked  TELEMETRY RHYTHM: NSR  SKIN: Scattered scabs, dry/flaky skin   TESTS/PROCEDURES: None this shift   D/C DATE: Pending improvement   OTHER IMPORTANT INFO: PT, chem dep, social work, SLP consults

## 2024-11-21 NOTE — PLAN OF CARE
Goal Outcome Evaluation:                      SUMMARY: ETOH, SOB, history lung and esophageal cancer, anxiety, depression    DATE & TIME:11/21/2024 1430  Cognitive Concerns/ Orientation: A & O x4   BEHAVIOR & AGGRESSION TOOL COLOR: Green  CIWA SCORE: 0,& 0  ABNL VS/O2: VSS on RA  MOBILITY: Assist x1 GB/W, walking in halls more today.  PAIN MANAGMENT: Denied pain this shift   DIET: Regular, eating better today and has only required zofran x1  BOWEL/BLADDER: Continent of bowel and bladder; Up to BR  ABNL LAB/BG: Mg 2.0  DRAIN/DEVICES: L portacath hep locked  TELEMETRY RHYTHM: NSR  SKIN: Scattered scabs, dry/flaky skin   TESTS/PROCEDURES: None this shift   D/C DATE: Pending improvement, pt not interested in psych or CD eval.   OTHER IMPORTANT INFO: PT, chem dep, social work, SLP consults.

## 2024-11-21 NOTE — PROGRESS NOTES
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, history of left lung non-small cell cancer underwent chemoradiation and immunotherapy treatments hold started binge drinking after she was told she has recurrence of left lung cancer and needs radiation treatment due to frustration started binge drinking and presents with generalized weakness, nausea, shortness of breath and mechanical fall at home     She was admitted with alcohol intoxication with a high risk of alcohol withdrawal, shortness of breath secondary to COPD and underlying progressive left lung cancer, generalized weakness, nausea most likely secondary to alcoholic gastritis     Nausea  -Nausea likely due to alcoholic gastritis which seems to be improving with IV PPI and symptomatic treatment  -Continue symptomatic treatment, advance diet as tolerated    Generalized weakness  -Likely from alcohol intoxication/alcohol withdrawal  -PT OT evaluated the patient and recommending TCU.  Patient reports feeling slightly better today compared to yesterday  -Continue ambulation while in the hospital, PT to monitor daily    Alcoholic intoxication;  Risk for alcohol withdrawal  History of alcohol dependence sober for 2 years;  Alcohol level 0.18 on admission, as mentioned above patient started binge drinking due to recurrence of her lung cancer as above  -Continue to monitor with CIWA protocol with as needed Valium ordered, scoring 0-2 over the last 24 hours on CIWA  -Continue to monitor her on telemetry  Multivitamin thiamine and folic acid  supplementation  Chemical dependency evaluated the patient  Patient refused to see psychiatrist in the hospital      Progressive shortness of breath multifactorial most likely secondary to acute COPD exacerbation and underlying progressive left lung cancer;  CT chest done on 11/19/2024 showed no evidence of pneumonia  It showed underlying COPD and lung nodules  Continue PTA Anoro Ellipta  Albuterol inhaler 2 puffs every 4 hours while awake ordered  Patient refuses nebs as they make her feel claustrophobic  Continue prednisone 40 mg p.o. daily x 5 days, seems to be gradually improving    History of recurrent non-small cell left upper lobe lung cancer in October 2024  History of squamous cell cancer of the right lung status post lobectomy  History of esophageal cancer status post chemoradiation and esophagectomy in remission  Patient is followed by Dr. Garcia as outpatient with [facility]  Patient was diagnosed with a left lung cancer in January 2024  Patient received chemoradiation and immunotherapy  Disease has been closely followed with serial CT scans  CT scan of chest done in October 10th  showed increased size of the right upper lobe cavitary lesion.  A left upper lobe irregular nodule measuring 14 x 10 mm is stable since 7/18/2024 but has gradually increased in size since 12/6/2023 also is concerning for neoplasm  Slight decrease size and number of the left upper lobe tree-in-bud nodules but new linear opacities in the left lower lobe.  Findings may be due to waxing and waning infectious/inflammatory process..  Chronic small right pleural effusion     Patient underwent  PET scan outpatient  after which She was told last week about the recurrence of the left lung cancer, which made her go back into drinking alcohol  Outpatient follow-up with [facility]     History of stress-induced cardiomyopathy  History of nonobstructive coronary artery disease  Continue PTA Toprol-XL 50 mg daily, losartan  "50 mg p.o. daily, spironolactone 12.5 mg p.o. every morning  Patient is also on as needed Lasix 20 mg daily as needed but she uses it very rarely  Continue baby aspirin and statin     History of GERD;    PTA on omeprazole, currently getting IV Protonix as above     Diet: Combination Diet Regular Diet Adult  Snacks/Supplements Adult: Magic Cup; Between Meals    DVT Prophylaxis: Enoxaparin (Lovenox) SQ  Jaeger Catheter: Not present  Lines: PRESENT      Port a Cath 01/09/24 Single Lumen Left Chest wall-Site Assessment: WDL      Cardiac Monitoring: ACTIVE order. Indication: Tachyarrhythmias, acute (48 hours)  Code Status: No CPR- Do NOT Intubate      Clinically Significant Risk Factors          # Hypochloremia: Lowest Cl = 96 mmol/L in last 2 days, will monitor as appropriate  # Hypocalcemia: Lowest Ca = 8.3 mg/dL in last 2 days, will monitor and replace as appropriate   # Hypomagnesemia: Lowest Mg = 1.5 mg/dL in last 2 days, will replace as needed  # Anion Gap Metabolic Acidosis: Highest Anion Gap = 19 mmol/L in last 2 days, will monitor and treat as appropriate  # Hypoalbuminemia: Lowest albumin = 3.3 g/dL at 11/19/2024  3:39 PM, will monitor as appropriate     # Hypertension: Noted on problem list  # Chronic heart failure with preserved ejection fraction: heart failure noted on problem list and last echo with EF >50%           # Cachexia: Estimated body mass index is 15.5 kg/m  as calculated from the following:    Height as of this encounter: 1.549 m (5' 1\").    Weight as of this encounter: 37.2 kg (82 lb 0.2 oz)., PRESENT ON ADMISSION     # COPD: noted on problem list        Social Drivers of Health    Tobacco Use: Medium Risk (9/17/2024)    Patient History     Smoking Tobacco Use: Former     Smokeless Tobacco Use: Never   Physical Activity: Insufficiently Active (2/6/2024)    Exercise Vital Sign     Days of Exercise per Week: 5 days     Minutes of Exercise per Session: 10 min   Stress: Stress Concern Present " (2/6/2024)    [employer] - Occupational Stress Questionnaire     Feeling of Stress : Rather much   Social Connections: Unknown (2/6/2024)    Social Connection and Isolation Panel [NHANES]     Frequency of Social Gatherings with Friends and Family: More than three times a week          Disposition Plan     Medically Ready for Discharge: Anticipated Tomorrow if continues to improve             Cheri Garcia MD  Hospitalist Service  [facility]  Securely message with [employer] (more info)  Text page via [employer] Paging/Directory   ______________________________________________________________________    Interval History   Feels better today with less nausea and feels like she will be able to tolerate oral intake.  Still feels weak but better compared to yesterday.  Hoping to be able to walk on the hallways.  No other nursing concerns.    Physical Exam   Vital Signs: Temp: 98  F (36.7  C) Temp src: Oral BP: (!) 147/62 Pulse: 78   Resp: 18 SpO2: 97 % O2 Device: None (Room air)    Weight: 82 lbs .18 oz    Exam:  Constitutional: Awake, alert and no distress. Appears comfortable  Head: Normocephalic. No masses, lesions, tenderness or abnormalities  ENMT: ENT exam normal, no neck nodes or sinus tenderness  Cardiovascular: RRR.  no murmurs, no rubs or JVD  Respiratory:normal WOB,b/l equal air entry, no wheezes or crackles   Gastrointestinal: Abdomen soft, non-tender. BS normal. No masses, organomegaly  : Deferred  Extremities :no edema , no clubbing or cyanosis        Medical Decision Making       42 MINUTES SPENT BY ME on the date of service doing chart review, history, exam, documentation & further activities per the note.      Data         Imaging results reviewed over the past 24 hrs:   No results found for this or any previous visit (from the past 24 hours).  Recent Labs   Lab 11/20/24  0634 11/19/24  1539   WBC 7.0 8.0   HGB 12.6 12.1   MCV 91 90    270     138   POTASSIUM 4.3 3.6   CHLORIDE 96* 97*   CO2 22 26   BUN 11.4 7.7*   CR 0.51 0.42*   ANIONGAP 19* 15   VICENTE 8.8 8.3*   * 89   ALBUMIN  --  3.3*   PROTTOTAL  --  6.8   BILITOTAL  --  1.2   ALKPHOS  --  165*   ALT  --  21   AST  --  52*   LIPASE  --  8*

## 2024-11-21 NOTE — PLAN OF CARE
Goal Outcome Evaluation:                       Summary:  alcohol withdrawal, SOB, history lung and esophageal cancer, anxiety, depression  DATE & TIME:  11/20/14 1600  Cognitive Concerns/ Orientation : Aox4  BEHAVIOR & AGGRESSION TOOL COLOR: green  CIWA SCORE:  5, 1 and 1                                                                                     ABNL VS/O2: BP mildly elevated, other VSS RA,   MOBILITY: A1 GBW, gen weak.  PAIN MANAGMENT: Nausea all day but by 1900 pt states it is doing better. Pt has been getting zofran and compazine IV alternating.  DIET: regular, pt trying to eat howerver, pills crushed in apple sauce; poor appetite related to esophageal cancer stomach resection.   BOWEL/BLADDER: continent, up to commode   ABNL LAB/BG: mag replaced, recheck was 2.7.  DRAIN/DEVICES: L portacath hep locked  TELEMETRY RHYTHM: SR, occ Sinus Tach.  SKIN: scattered scabs, flakiness  TESTS/PROCEDURES: NA  D/C DAY/GOALS/PLACE: Anticipate 2-4 days when nutrician and status improves.  OTHER IMPORTANT INFO: PT, chem dep, social work, SLP consults. Pt having trouble swallowing pills, crushed in applesauce.

## 2024-11-22 ENCOUNTER — APPOINTMENT (OUTPATIENT)
Dept: PHYSICAL THERAPY | Facility: CLINIC | Age: 69
DRG: 897 | End: 2024-11-22
Payer: MEDICARE

## 2024-11-22 VITALS
BODY MASS INDEX: 15.48 KG/M2 | DIASTOLIC BLOOD PRESSURE: 82 MMHG | SYSTOLIC BLOOD PRESSURE: 150 MMHG | HEIGHT: 61 IN | HEART RATE: 80 BPM | TEMPERATURE: 98.3 F | OXYGEN SATURATION: 98 % | RESPIRATION RATE: 16 BRPM | WEIGHT: 82.01 LBS

## 2024-11-22 LAB — MAGNESIUM SERPL-MCNC: 1.8 MG/DL (ref 1.7–2.3)

## 2024-11-22 PROCEDURE — 250N000011 HC RX IP 250 OP 636: Performed by: INTERNAL MEDICINE

## 2024-11-22 PROCEDURE — 97116 GAIT TRAINING THERAPY: CPT | Mod: GP

## 2024-11-22 PROCEDURE — 83735 ASSAY OF MAGNESIUM: CPT | Performed by: INTERNAL MEDICINE

## 2024-11-22 PROCEDURE — 250N000009 HC RX 250: Performed by: INTERNAL MEDICINE

## 2024-11-22 PROCEDURE — 97530 THERAPEUTIC ACTIVITIES: CPT | Mod: GP

## 2024-11-22 PROCEDURE — 250N000012 HC RX MED GY IP 250 OP 636 PS 637: Performed by: INTERNAL MEDICINE

## 2024-11-22 PROCEDURE — 250N000013 HC RX MED GY IP 250 OP 250 PS 637: Performed by: INTERNAL MEDICINE

## 2024-11-22 PROCEDURE — 99239 HOSP IP/OBS DSCHRG MGMT >30: CPT | Performed by: INTERNAL MEDICINE

## 2024-11-22 RX ORDER — PREDNISONE 20 MG/1
20 TABLET ORAL DAILY
Qty: 2 TABLET | Refills: 0 | Status: SHIPPED | OUTPATIENT
Start: 2024-11-23

## 2024-11-22 RX ADMIN — ALBUTEROL SULFATE 2 PUFF: 108 INHALANT RESPIRATORY (INHALATION) at 11:44

## 2024-11-22 RX ADMIN — UMECLIDINIUM BROMIDE AND VILANTEROL TRIFENATATE 1 PUFF: 62.5; 25 POWDER RESPIRATORY (INHALATION) at 08:24

## 2024-11-22 RX ADMIN — HEPARIN SODIUM (PORCINE) LOCK FLUSH IV SOLN 100 UNIT/ML 5 ML: 100 SOLUTION at 09:34

## 2024-11-22 RX ADMIN — PREDNISONE 40 MG: 20 TABLET ORAL at 08:23

## 2024-11-22 RX ADMIN — METOPROLOL SUCCINATE 50 MG: 50 TABLET, EXTENDED RELEASE ORAL at 08:23

## 2024-11-22 RX ADMIN — PROCHLORPERAZINE MALEATE 5 MG: 5 TABLET ORAL at 08:31

## 2024-11-22 RX ADMIN — ONDANSETRON 4 MG: 4 TABLET, ORALLY DISINTEGRATING ORAL at 02:50

## 2024-11-22 RX ADMIN — ATORVASTATIN CALCIUM 40 MG: 40 TABLET, FILM COATED ORAL at 08:23

## 2024-11-22 RX ADMIN — THIAMINE HCL TAB 100 MG 100 MG: 100 TAB at 08:23

## 2024-11-22 RX ADMIN — GABAPENTIN 900 MG: 300 CAPSULE ORAL at 11:44

## 2024-11-22 RX ADMIN — ACETAMINOPHEN 650 MG: 325 TABLET, FILM COATED ORAL at 08:31

## 2024-11-22 RX ADMIN — SPIRONOLACTONE 12.5 MG: 25 TABLET ORAL at 08:23

## 2024-11-22 RX ADMIN — FOLIC ACID 1 MG: 1 TABLET ORAL at 08:24

## 2024-11-22 RX ADMIN — PANTOPRAZOLE SODIUM 40 MG: 40 INJECTION, POWDER, FOR SOLUTION INTRAVENOUS at 08:24

## 2024-11-22 RX ADMIN — FLUTICASONE PROPIONATE 2 SPRAY: 50 SPRAY, METERED NASAL at 08:24

## 2024-11-22 RX ADMIN — IPRATROPIUM BROMIDE 2 SPRAY: 42 SPRAY NASAL at 08:25

## 2024-11-22 RX ADMIN — Medication 1 TABLET: at 08:23

## 2024-11-22 RX ADMIN — ONDANSETRON 4 MG: 4 TABLET, ORALLY DISINTEGRATING ORAL at 13:00

## 2024-11-22 RX ADMIN — IPRATROPIUM BROMIDE 2 SPRAY: 42 SPRAY NASAL at 13:00

## 2024-11-22 RX ADMIN — GABAPENTIN 900 MG: 300 CAPSULE ORAL at 02:48

## 2024-11-22 RX ADMIN — LOSARTAN POTASSIUM 50 MG: 50 TABLET, FILM COATED ORAL at 08:23

## 2024-11-22 RX ADMIN — Medication 5 ML: at 08:32

## 2024-11-22 ASSESSMENT — ACTIVITIES OF DAILY LIVING (ADL)
ADLS_ACUITY_SCORE: 0

## 2024-11-22 NOTE — CONSULTS
Care Management Initial Consult    General Information  Assessment completed with: Patient,    Type of CM/SW Visit: Initial Assessment    Primary Care Provider verified and updated as needed: Yes   Readmission within the last 30 days:        Reason for Consult: discharge planning  Advance Care Planning: Advance Care Planning Reviewed: present on chart          Communication Assessment  Patient's communication style: spoken language (English or Bilingual)    Hearing Difficulty or Deaf: no   Wear Glasses or Blind: yes    Cognitive  Cognitive/Neuro/Behavioral: WDL  Level of Consciousness: alert  Arousal Level: arouses to touch/gentle shaking, arouses to voice, other (see comments) (patient awake and eating breakfast at first check in at 0730, has maintained wakefulness thus far)  Orientation: oriented x 4     Best Language: 0 - No aphasia  Speech: clear, logical    Living Environment:   People in home: alone     Current living Arrangements: apartment      Able to return to prior arrangements: yes       Family/Social Support:  Care provided by: self  Provides care for: no one  Marital Status:   Support system: Children          Description of Support System: Involved, Supportive    Support Assessment: Adequate social supports, Adequate family and caregiver support    Current Resources:   Patient receiving home care services: No        Community Resources: None  Equipment currently used at home: none  Supplies currently used at home:      Employment/Financial:  Employment Status:          Financial Concerns:             Does the patient's insurance plan have a 3 day qualifying hospital stay waiver?  No    Lifestyle & Psychosocial Needs:  Social Drivers of Health     Food Insecurity: Low Risk  (11/19/2024)    Food Insecurity     Within the past 12 months, did you worry that your food would run out before you got money to buy more?: No     Within the past 12 months, did the food you bought just not last and you  didn t have money to get more?: No   Depression: Not at risk (1/4/2024)    PHQ-2     PHQ-2 Score: 2   Housing Stability: Low Risk  (11/19/2024)    Housing Stability     Do you have housing? : Yes     Are you worried about losing your housing?: No   Tobacco Use: Medium Risk (9/17/2024)    Patient History     Smoking Tobacco Use: Former     Smokeless Tobacco Use: Never     Passive Exposure: Not on file   Financial Resource Strain: Low Risk  (11/19/2024)    Financial Resource Strain     Within the past 12 months, have you or your family members you live with been unable to get utilities (heat, electricity) when it was really needed?: No   Alcohol Use: Not At Risk (11/8/2021)    AUDIT-C     Frequency of Alcohol Consumption: Never     Average Number of Drinks: Patient declined     Frequency of Binge Drinking: Never   Transportation Needs: Low Risk  (11/19/2024)    Transportation Needs     Within the past 12 months, has lack of transportation kept you from medical appointments, getting your medicines, non-medical meetings or appointments, work, or from getting things that you need?: No   Physical Activity: Insufficiently Active (2/6/2024)    Exercise Vital Sign     Days of Exercise per Week: 5 days     Minutes of Exercise per Session: 10 min   Interpersonal Safety: Low Risk  (11/20/2024)    Interpersonal Safety     Do you feel physically and emotionally safe where you currently live?: Yes     Within the past 12 months, have you been hit, slapped, kicked or otherwise physically hurt by someone?: No     Within the past 12 months, have you been humiliated or emotionally abused in other ways by your partner or ex-partner?: No   Stress: Stress Concern Present (2/6/2024)    [employer] - Occupational Stress Questionnaire     Feeling of Stress : Rather much   Social Connections: Unknown (2/6/2024)    Social Connection and Isolation Panel [NHANES]     Frequency of Communication with Friends and  Family: Not on file     Frequency of Social Gatherings with Friends and Family: More than three times a week     Attends Zoroastrian Services: Not on file     Active Member of Clubs or Organizations: Not on file     Attends Club or Organization Meetings: Not on file     Marital Status: Not on file   Health Literacy: Not on file       Functional Status:  Prior to admission patient needed assistance:              Mental Health Status:          Chemical Dependency Status:                Values/Beliefs:  Spiritual, Cultural Beliefs, Zoroastrian Practices, Values that affect care:                 Discussed  Partnership in Safe Discharge Planning  document with patient/family: No    Additional Information:  CM/SW consulted for discharge planning/disposition. SW completed brief chart review. Per history and physical, patient is a 69 year old female admitted on 11/19/2024. 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  She had a outpatient PET scan recently last week  was told that she will need radiation for the recurrent left lung cancer.  She felt very frustrated about this and started drinking alcohol. Patient also has been feeling weak and had a fall over her cat last weekend.     SW met with patient at bedside to introduce self and role and discuss discharge planning. Patient lives alone in an apartment. Address verified. PCP also verified. Therapy is recommending patient can go home with outpatient therapy. Patient is in agreement with this and feels comfortable going home.  She has a son that lives close that can come stay with her if needed. She thinks therapy is getting her a walker for discharge. She feels this would be helpful. Patient also stated she is comfortable setting up her own PCP appt for next week.     No further discharge needs at this time.     Next Steps: patient is discharging home with outpatient therapy. No CM needs.     Quyen Hawkins LGSW

## 2024-11-22 NOTE — DISCHARGE SUMMARY
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    Weight as of this encounter: 37.2 kg (82 lb 0.2 oz).       Follow-ups Needed After Discharge   Follow-up Appointments       Follow-up and recommended labs and tests       Follow up with primary care provider, Mustapha Velásquez, within 7 days for hospital follow- up.    Follow-up with your oncologist and radiation oncology as previously scheduled                Unresulted Labs Ordered in the Past 30 Days of this Admission       No orders found from 10/20/2024 to 11/20/2024.          Discharge Disposition   Discharged to home  Condition at discharge: Stable    Hospital Course   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, history of left lung non-small " cell cancer underwent chemoradiation and immunotherapy treatments hold started binge drinking after she was told she has recurrence of left lung cancer and needs radiation treatment due to frustration started binge drinking and presents with generalized weakness, nausea, shortness of breath and mechanical fall at home     She was admitted with alcohol intoxication with a high risk of alcohol withdrawal, shortness of breath secondary to COPD and underlying progressive left lung cancer, generalized weakness, nausea most likely secondary to alcoholic gastritis     Nausea  -Nausea likely due to alcoholic gastritis which improved with symptomatic treatment and patient tolerated diet prior to discharge    Generalized weakness  -Likely from alcohol intoxication/alcohol withdrawal  -PT OT evaluated the patient and initially recommended TCU but during hospitalization she continued to improve and PT recommended home with assist/outpatient therapy    Alcoholic intoxication;  Risk for alcohol withdrawal  History of alcohol dependence sober for 2 years;  Alcohol level 0.18 on admission, as mentioned above patient started binge drinking due to recurrence of her lung cancer as above  -Scored 0-2 over the last 48 hours on CIWA  Chemical dependency evaluated the patient  Patient refused to see psychiatrist in the hospital  -Encouraged not to drink alcohol      Progressive shortness of breath multifactorial most likely secondary to acute COPD exacerbation and underlying progressive left lung cancer;  CT chest done on 11/19/2024 showed no evidence of pneumonia  It showed underlying COPD and lung nodules  Continue PTA Anoro Ellipta  Albuterol inhaler 2 puffs every 4 hours while awake ordered  Patient refuses nebs as they make her feel claustrophobic  Improved with oral steroids, discharged on prednisone to complete 5 days of total steroids    History of recurrent non-small cell left upper lobe lung cancer in October 2024  History of  squamous cell cancer of the right lung status post lobectomy  History of esophageal cancer status post chemoradiation and esophagectomy in remission  Patient is followed by Dr. Garcia as outpatient with [facility]  Patient was diagnosed with a left lung cancer in January 2024  Patient received chemoradiation and immunotherapy  Disease has been closely followed with serial CT scans  CT scan of chest done in October 10th  showed increased size of the right upper lobe cavitary lesion.  A left upper lobe irregular nodule measuring 14 x 10 mm is stable since 7/18/2024 but has gradually increased in size since 12/6/2023 also is concerning for neoplasm  Slight decrease size and number of the left upper lobe tree-in-bud nodules but new linear opacities in the left lower lobe.  Findings may be due to waxing and waning infectious/inflammatory process..  Chronic small right pleural effusion     Patient underwent  PET scan outpatient  after which She was told last week about the recurrence of the left lung cancer, which made her go back into drinking alcohol  Outpatient follow-up with [place] [facility]     History of stress-induced cardiomyopathy  History of nonobstructive coronary artery disease  Continue PTA Toprol-XL 50 mg daily, losartan 50 mg p.o. daily, spironolactone 12.5 mg p.o. every morning  Patient is also on as needed Lasix 20 mg daily as needed but she uses it very rarely  Continue baby aspirin and statin     History of GERD;    PTA on omeprazole, currently getting IV Protonix as above    Consultations This Hospital Stay   PHYSICAL THERAPY ADULT IP CONSULT  CARE MANAGEMENT / SOCIAL WORK IP CONSULT  CHEMICAL DEPENDENCY IP CONSULT  CARE MANAGEMENT / SOCIAL WORK IP CONSULT  SPEECH LANGUAGE PATH ADULT IP CONSULT    Code Status   No CPR- Do NOT Intubate    Time Spent on this Encounter   ICheri MD, personally saw the patient today and spent greater than 30 minutes discharging this patient.        Cheri Garcia MD  [place] [address] [facility]  [address] [place] [address]  Phone: [phone]  ______________________________________________________________________    Physical Exam   Vital Signs: Temp: 98.3  F (36.8  C) Temp src: Oral BP: (!) 150/82 Pulse: 80   Resp: 16 SpO2: 98 % O2 Device: None (Room air)    Weight: 82 lbs .18 oz  Exam:  Constitutional: Awake, alert and no distress. Appears comfortable  Head: Normocephalic. No masses, lesions, tenderness or abnormalities  ENMT: ENT exam normal, no neck nodes or sinus tenderness  Cardiovascular: RRR.  No murmurs, no rubs or JVD  Respiratory: Normal WOB,b/l equal air entry, no wheezes or crackles   Gastrointestinal: Abdomen soft, non-tender. BS normal. No masses, organomegaly  : Deferred  Extremities : No edema , no clubbing or cyanosis         Primary Care Physician   Mustapha Velásquez    Discharge Orders      Primary Care - Care Coordination Referral      Physical Therapy  Referral      Reason for your hospital stay    Nausea/vomiting and generalized weakness likely secondary to alcohol intoxication     Follow-up and recommended labs and tests     Follow up with primary care provider, Mustapha Velásquez, within 7 days for hospital follow- up.    Follow-up with your oncologist and radiation oncology as previously scheduled     Activity    Your activity upon discharge: activity as tolerated     Monitor and record    Blood pressure: daily and readings to PCP for any medication adjustment.     Malachi Order for DME - ONLY FOR DME     Diet    Follow this diet upon discharge: Current Diet:Orders Placed This Encounter      Snacks/Supplements Adult: Magic Cup; Between Meals      Combination Diet Regular Diet Adult       Significant Results and Procedures   Results for orders placed or performed during the hospital encounter of 11/19/24   CT Chest Pulmonary Embolism w Contrast    Narrative    EXAM: CT CHEST PULMONARY  EMBOLISM W CONTRAST  LOCATION: [facility]  DATE: 11/19/2024    INDICATION: History of lung cancer, worsening shortness of breath.  COMPARISON: CT of the chest 10/10/2024.  TECHNIQUE: CT chest pulmonary angiogram during arterial phase injection of IV contrast. Multiplanar reformats and MIP reconstructions were performed. Dose reduction techniques were used.   CONTRAST: 44 mL Isovue 370.    FINDINGS:  ANGIOGRAM CHEST: Pulmonary arteries are normal caliber and negative for pulmonary emboli. Thoracic aorta is negative for dissection. No CT evidence of right heart strain.    LUNGS AND PLEURA: Stable since 10/10/2024. Again seen are significant findings of centrilobular emphysema in both lungs. Small right pleural effusion with peripheral linear enhancement; this may be loculated/chronic in nature. The numerous areas of   pulmonary nodularity show no significant changes. Prior postoperative changes of the right lung.    MEDIASTINUM/AXILLAE: Stable since 10/10/2024. No pathologic-sized lymph nodes. Central catheter remains in good position. Scattered surgical clips. Normal variant aberrant right subclavian artery.    CORONARY ARTERY CALCIFICATION: Minimal.    UPPER ABDOMEN: No acute abnormalities.    MUSCULOSKELETAL: Moderate thoracolumbar spinal curvature with some scattered hypertrophic changes.      Impression    IMPRESSION:  1.  No significant changes since 10/10/2024.    2.  No PE, dissection, aneurysmal dilatation, or findings suggestive of right heart strain.    3.  Stable areas of pulmonary nodularity and chronic-appearing right pleural effusion since 10/10/2024.       Discharge Medications   Current Discharge Medication List        CONTINUE these medications which have NOT CHANGED    Details   ANORO ELLIPTA 62.5-25 MCG/ACT oral inhaler Inhale 1 puff into the lungs      aspirin (ASA) 81 MG EC tablet 1 tablet every other day    Associated Diagnoses: Cardiomyopathy, unspecified type (H)       atorvastatin (LIPITOR) 40 MG tablet Take 1 tablet (40 mg) by mouth daily.  Qty: 90 tablet, Refills: 3    Associated Diagnoses: Hyperlipidemia LDL goal <70      fluticasone (FLONASE) 50 MCG/ACT nasal spray INSTILL 2 SPRAYS INTO BOTH NOSTRILS DAILY.  Qty: 48 mL, Refills: 2    Associated Diagnoses: Chronic rhinitis      gabapentin (NEURONTIN) 600 MG tablet TAKE ONE (1) TABLET BY MOUTH THREE TIMES DAILY.  Qty: 90 tablet, Refills: 2    Associated Diagnoses: Peripheral polyneuropathy      ipratropium (ATROVENT) 0.06 % nasal spray Spray 2 sprays into both nostrils 4 times daily.  Qty: 15 mL, Refills: 5    Associated Diagnoses: Cough, unspecified type      losartan (COZAAR) 50 MG tablet Take 1 tablet (50 mg) by mouth daily. Appointment required for further refills  Qty: 90 tablet, Refills: 3    Associated Diagnoses: Benign essential hypertension      metoprolol succinate ER (TOPROL XL) 50 MG 24 hr tablet Take 1 tablet (50 mg) by mouth every morning.  Qty: 90 tablet, Refills: 3    Associated Diagnoses: Benign essential hypertension; Coronary artery disease involving native coronary artery of native heart without angina pectoris      multivitamin w/minerals (MULTI-VITAMIN) tablet Take 1 tablet by mouth daily.      omeprazole (PRILOSEC) 40 MG DR capsule TAKE 1 CAPSULE BY MOUTH EVERY DAY  Qty: 90 capsule, Refills: 2    Associated Diagnoses: Benign essential hypertension      ondansetron (ZOFRAN) 4 MG tablet Take by mouth every 8 hours as needed for nausea.      prochlorperazine (COMPAZINE) 10 MG tablet Take 10 mg by mouth every 6 hours as needed for nausea or vomiting.      spironolactone (ALDACTONE) 25 MG tablet Take 0.5 tablets (12.5 mg) by mouth every morning.  Qty: 45 tablet, Refills: 3    Associated Diagnoses: Chronic systolic congestive heart failure (H)      UNABLE TO FIND Immuno therapy infusion every other week  Durvalumab (Imfinzi)      furosemide (LASIX) 40 MG tablet Take 0.5 tablets (20 mg) by mouth daily as  needed (edema or shortness of breath) For worsening shortness of breath, leg swelling or weight gain.    Associated Diagnoses: Chronic systolic congestive heart failure (H)           STOP taking these medications       acetaminophen (TYLENOL) 325 MG tablet Comments:   Reason for Stopping:         ibuprofen (ADVIL/MOTRIN) 200 MG tablet Comments:   Reason for Stopping:             Allergies   Allergies   Allergen Reactions    Codeine Sulfate Nausea    Morphine      Pt unsure    Simvastatin Cramps     Leg cramps    Pcn [Penicillins] Rash

## 2024-11-22 NOTE — PLAN OF CARE
Physical Therapy Discharge Summary     Reason for therapy discharge:    Discharged to home with outpatient therapy.     Progress towards therapy goal(s). See goals on Care Plan in [facility] electronic health record for goal details.  Goals partially met.  Barriers to achieving goals:   discharge from facility.     Therapy recommendation(s):    Continued therapy is recommended.  Rationale/Recommendations:  Patient would benefit from Outpatient PT in order to increase strength, activity tolerance and independence with mobility.

## 2024-11-22 NOTE — PLAN OF CARE
Goal Outcome Evaluation:      Plan of Care Reviewed With: patient    Overall Patient Progress: improving       SUMMARY: ETOH, SOB, history lung and esophageal cancer, anxiety, depression    DATE & TIME:11/21/2024 - 11/22/2024 [id number]    Cognitive Concerns/ Orientation: A & O x4   BEHAVIOR & AGGRESSION TOOL COLOR: Green  CIWA SCORE: 0, 1, 0, 0  ABNL VS/O2: VSS on RA  MOBILITY: Assist x1 GB/W  PAIN MANAGMENT: C/o 3/10 headache- PRN Tylenol given x1   DIET: Regular  BOWEL/BLADDER: Continent of bowel and bladder; Up to BR  ABNL LAB/BG: Mg 1.8 - recheck placed for 11/23 AM draw   DRAIN/DEVICES: L portacath hep locked  TELEMETRY RHYTHM: NSR  SKIN: Scattered scabs, dry/flaky skin   TESTS/PROCEDURES: None this shift   D/C DATE: Pending improvement, pt not interested in psych or CD eval.     OTHER IMPORTANT INFO: PT, chem dep, social work, SLP consults.    Intermittent nausea - PRN Zofran given x2

## 2024-11-22 NOTE — PLAN OF CARE
Goal Outcome Evaluation:  Discharge    Patient discharged to home with family  Care plan note: Patient alert/oriented X4, up with sba/walker. Lungs diminished, on RA. Vss, denies pain. De-accessed Left port. Went over discharge instructions/medications & follow up appointments. No questions & read back.    Listed belongings gathered and given to patient (including from security/pharmacy). Yes  Care Plan and Patient education resolved: Yes  Prescriptions if needed, hard copies sent with patient  NA  Medication Bin checked and emptied on discharge Yes  SW/care coordinator/charge RN aware of discharge: Yes

## 2024-11-25 ENCOUNTER — PATIENT OUTREACH (OUTPATIENT)
Dept: CARE COORDINATION | Facility: CLINIC | Age: 69
End: 2024-11-25
Payer: MEDICARE

## 2024-11-25 NOTE — LETTER
[facility] CARE COORDINATION  [address]  [place] [address]    November 25, 2024    Kezia Dixon  [address]   [place] [address]      Dear Kezia,    I am a clinic care coordinator who works with Mustapha Velásquez MD with the [facility]. I wanted to introduce myself and provide you with my contact information for you to be able to call me with any questions or concerns. Below is a description of clinic care coordination and how I can further assist you.       The clinic care coordination team is made up of a registered nurse, , financial resource worker and community health worker who understand the health care system. The goal of clinic care coordination is to help you manage your health and improve access to the health care system. Our team works alongside your provider to assist you in determining your health and social needs. We can help you obtain health care and community resources, providing you with necessary information and education. We can work with you through any barriers and develop a care plan that helps coordinate and strengthen the communication between you and your care team.  Our services are voluntary and are offered without charge to you personally.    Please feel free to contact me with any questions or concerns regarding care coordination and what we can offer.      We are focused on providing you with the highest-quality healthcare experience possible.    Sincerely,     Justa King, [facility]  Clinic Care Coordinator  [facility]  [phone]

## 2024-11-25 NOTE — PROGRESS NOTES
Clinic Care Coordination Contact  [facility]/Voicemail    Clinical Data: Care Coordinator Outreach    Outreach Documentation Number of Outreach Attempt   11/25/2024   9:44 AM 1       Unable to leave a message due to: phone rang and then disconnected x 2.        Plan: Care Coordinator will send care coordination introduction letter with care coordinator contact information and explanation of care coordination services via [employer]. Care Coordinator will try to reach patient again in 1-2 business days.    Justa King,  [facility]  Clinic Care Coordinator  [facility]  [phone]  [email or web address]

## 2024-11-26 ENCOUNTER — TRANSFERRED RECORDS (OUTPATIENT)
Dept: HEALTH INFORMATION MANAGEMENT | Facility: CLINIC | Age: 69
End: 2024-11-26
Payer: MEDICARE

## 2024-11-26 NOTE — PROGRESS NOTES
Clinic Care Coordination Contact  [facility]/Voicemail    Clinical Data: Care Coordinator Outreach    Outreach Documentation Number of Outreach Attempt   11/25/2024   9:44 AM 1   11/26/2024   3:59 PM 2       Left message on patient's voicemail with call back information and requested return call.      Plan: Care Coordinator sent care coordination introduction letter on 11/25/24 via [employer]. Care Coordinator will do no further outreaches at this time.    Justa King,  [facility]  Clinic Care Coordinator  [facility]  [phone]  [email or web address]

## 2024-12-06 ENCOUNTER — THERAPY VISIT (OUTPATIENT)
Dept: PHYSICAL THERAPY | Facility: CLINIC | Age: 69
End: 2024-12-06
Attending: INTERNAL MEDICINE
Payer: MEDICARE

## 2024-12-06 DIAGNOSIS — F10.20 ALCOHOL USE DISORDER, SEVERE, DEPENDENCE (H): ICD-10-CM

## 2024-12-06 PROCEDURE — 97161 PT EVAL LOW COMPLEX 20 MIN: CPT | Mod: GP

## 2024-12-06 PROCEDURE — 97110 THERAPEUTIC EXERCISES: CPT | Mod: GP

## 2024-12-06 NOTE — PROGRESS NOTES
PHYSICAL THERAPY EVALUATION  Type of Visit: Evaluation        Fall Risk Screen:  Fall screen completed by: PT  Have you fallen 2 or more times in the past year?: No  Have you fallen and had an injury in the past year?: No  Is patient a fall risk?: Yes; Department fall risk interventions implemented    Subjective   Per chart review: History of stress-induced cardiomyopathy with normalization of EF on recent echo, nonobstructive coronary artery disease, COPD, esophageal cancer status post chemoradiation, esophagectomy currently in remission, history of right lung squamous cell cancer status post lobectomy, history of left lung non-small cell cancer underwent chemoradiation and immunotherapy treatments hold started binge drinking after she was told she has recurrence of left lung cancer and needs radiation treatment due to frustration started binge drinking and presents with generalized weakness, nausea, shortness of breath and mechanical fall at home.   Kezia shares that she is feeling weak since being in the hospital, has needed a 2WW since. Shares that she would like to get rid of the walker and be able to walk independently. Currently walking about 600 feet at home, is not able to stand that long- will get pain along scar from past surgery (surgery was 8 years ago, and then had shingles in the same spot).   Her son is present for the eval, and shares that Kezia has been experiencing neuropathy in B feet and up to the knee on the L side- has tingling and is taking gabapentin to help. He also notes that Kezia has swelling in her feet, she notes that it is going down with a dieuretic. Also noted that Kezia has difficulty with nutrition, she shares that she does not feel hungry, will get nauseous. Kezia states that she gets pain in her stomach when she has too much sugar or fat. Is drinking ensure.   Will be starting radiation on Monday- doing 5 targeted sessions as well as immunotherapy. In the past, radiation had made her  very tired.     Has a cat that can occasionally be a tripping jolanta         Presenting condition or subjective complaint: (Patient-Rptd) weakness after hospital stay  Date of onset: 11/22/24    Relevant medical history: (Patient-Rptd) Arthritis; Cancer; COPD; Dizziness; Heart problems; High blood pressure; Radiation treatment; Unexplained weight loss; Vision problems   Dates & types of surgery:      Prior diagnostic imaging/testing results: (Patient-Rptd) Other (Patient-Rptd) no   Prior therapy history for the same diagnosis, illness or injury: (Patient-Rptd) No      Living Environment  Social support: (Patient-Rptd) Alone   Type of home: (Patient-Rptd) Apartment/condo   Stairs to enter the home: (Patient-Rptd) No       Ramp: (Patient-Rptd) Yes   Stairs inside the home: (Patient-Rptd) No       Help at home: (Patient-Rptd) None  Equipment owned: (Patient-Rptd) Walker with wheels     Employment: (Patient-Rptd) No    Hobbies/Interests: (Patient-Rptd) reading,knitting,socializing    Patient goals for therapy: (Patient-Rptd) stand and walk without aid (walker)    Pain assessment: Reports no pain, just general discomfort      Objective   Cognitive Status Examination  Level of Consciousness: Alert  Follows Commands and Answers Questions: 100% of the time, Follows multi step instructions  Personal Safety and Judgement: Intact  Memory: Intact    STRENGTH: Demonstrates general LE weakness with B 3+/5 hip flexor strength, 4-/5 throughout    TRANSFERS: Independent with B UE use    GAIT: Ambulates at decreased gait speed with 2WW and WBOS    BALANCE: Impaired static and dynamic balance     SPECIAL TESTS  10 Meter Walk Test (Comfortable)  0.47m/s with use of 2WW   5 Times Sit-to-Stand (5TSTS)  19.85s with B UEs      Modified CTSIB Conditions (sec) Cond 1: 30s   Cond 2: 30s  Cond 4: 30s with moderate instaiblity   Cond 5 : 5.3s      SENSATION: Neuropathy in B LE, R foot, up to knee on L side     REFLEXES: NT, will assess further  as needed.     Assessment & Plan   CLINICAL IMPRESSIONS  Medical Diagnosis: Alcohol use disorder, severe, dependence (H) (F10.20)    Treatment Diagnosis: Force production deficit   Impression/Assessment: Patient reports LE weakness secondary to recent hospitalization.  The following significant findings have been identified: Decreased strength, Impaired balance, Impaired gait, Impaired muscle performance, Decreased activity tolerance, and Impaired posture. These impairments interfere with their ability to perform self care tasks, recreational activities, household chores, household mobility, and community mobility as compared to previous level of function. Testing as reported above indicate decreased safety and balance with functional mobility. This patient will benefit from skilled physical therapy services to address these concerns.      Clinical Decision Making (Complexity):  Clinical Presentation: Evolving/Changing  Clinical Presentation Rationale: based on medical and personal factors listed in PT evaluation  Clinical Decision Making (Complexity): Moderate complexity    PLAN OF CARE  Treatment Interventions:  Interventions: Gait Training, Manual Therapy, Neuromuscular Re-education, Therapeutic Activity, Therapeutic Exercise, Self-Care/Home Management, Standardized Testing    Long Term Goals     PT Goal 1  Goal Identifier: HEP  Goal Description: Pt will be able to demonstrate HEP activities without need for cues from provider to demonstrate understanding and independence with HEP.  Rationale: to maximize safety and independence with performance of ADLs and functional tasks  Target Date: 02/21/25  PT Goal 2  Goal Identifier: LE strength  Goal Description: Pt will be able to perform 5xSTS in 12 seconds or less to demonstrate appropriate LE strength for community dwelling adults.  Rationale: to maximize safety and independence with performance of ADLs and functional tasks  Target Date: 02/21/25  PT Goal 3  Goal  Identifier: FGA  Goal Description: Pt will score 23 or more on FGA to decrease risk of falls, improving patient safety with functional mobility.  Rationale: to maximize safety and independence with performance of ADLs and functional tasks  Target Date: 02/21/25      Frequency of Treatment: 1x/week  Duration of Treatment: 12 weeks    Recommended Referrals to Other Professionals:  None at this time  Education Assessment:   Learner/Method: Patient;Listening;Demonstration;Pictures/Video    Risks and benefits of evaluation/treatment have been explained.   Patient/Family/caregiver agrees with Plan of Care.     Evaluation Time:     PT Eval, Low Complexity Minutes (56327): 19       Signing Clinician: Maribel Corona, PT        [facility]                                                                                   OUTPATIENT PHYSICAL THERAPY      PLAN OF TREATMENT FOR OUTPATIENT REHABILITATION   Patient's Last Name, First Name, PANCHOALEXANDER DixonKezia  Amadou YOB: 1955   Provider's Name   [facility]   Medical Record No.  [id number]     Onset Date: 11/22/24  Start of Care Date: 12/06/24     Medical Diagnosis:  Alcohol use disorder, severe, dependence (H) (F10.20)    PT Treatment Diagnosis:  Force production deficit Plan of Treatment  Frequency/Duration: 1x/week/ 12 weeks    Certification date from 12/06/24 to 02/21/25         See note for plan of treatment details and functional goals     Maribel Corona, PT                         I CERTIFY THE NEED FOR THESE SERVICES FURNISHED UNDER        THIS PLAN OF TREATMENT AND WHILE UNDER MY CARE     (Physician attestation of this document indicates review and certification of the therapy plan).              Referring Provider:  Cheri Garcia    Initial Assessment  See Epic Evaluation- Start of Care Date: 12/06/24

## 2024-12-08 ENCOUNTER — APPOINTMENT (OUTPATIENT)
Dept: CT IMAGING | Facility: CLINIC | Age: 69
End: 2024-12-08
Attending: EMERGENCY MEDICINE
Payer: MEDICARE

## 2024-12-08 ENCOUNTER — HOSPITAL ENCOUNTER (INPATIENT)
Facility: CLINIC | Age: 69
End: 2024-12-08
Attending: EMERGENCY MEDICINE | Admitting: HOSPITALIST
Payer: MEDICARE

## 2024-12-08 DIAGNOSIS — D64.9 ANEMIA, UNSPECIFIED TYPE: ICD-10-CM

## 2024-12-08 DIAGNOSIS — E83.42 HYPOMAGNESEMIA: ICD-10-CM

## 2024-12-08 DIAGNOSIS — I50.9 CHRONIC CONGESTIVE HEART FAILURE, UNSPECIFIED HEART FAILURE TYPE (H): Primary | ICD-10-CM

## 2024-12-08 DIAGNOSIS — R53.1 WEAKNESS: ICD-10-CM

## 2024-12-08 DIAGNOSIS — E87.6 HYPOKALEMIA: ICD-10-CM

## 2024-12-08 DIAGNOSIS — E87.1 HYPONATREMIA: ICD-10-CM

## 2024-12-08 DIAGNOSIS — R73.9 HYPERGLYCEMIA: ICD-10-CM

## 2024-12-08 LAB
ALBUMIN SERPL BCG-MCNC: 2.9 G/DL (ref 3.5–5.2)
ALP SERPL-CCNC: 90 U/L (ref 40–150)
ALT SERPL W P-5'-P-CCNC: 9 U/L (ref 0–50)
ANION GAP SERPL CALCULATED.3IONS-SCNC: 12 MMOL/L (ref 7–15)
ANION GAP SERPL CALCULATED.3IONS-SCNC: 13 MMOL/L (ref 7–15)
ANION GAP SERPL CALCULATED.3IONS-SCNC: 14 MMOL/L (ref 7–15)
AST SERPL W P-5'-P-CCNC: 18 U/L (ref 0–45)
ATRIAL RATE - MUSE: 88 BPM
BASOPHILS # BLD AUTO: 0 10E3/UL (ref 0–0.2)
BASOPHILS NFR BLD AUTO: 0 %
BILIRUB SERPL-MCNC: 0.7 MG/DL
BUN SERPL-MCNC: 5.9 MG/DL (ref 8–23)
BUN SERPL-MCNC: 6.6 MG/DL (ref 8–23)
BUN SERPL-MCNC: 7.5 MG/DL (ref 8–23)
CALCIUM SERPL-MCNC: 8.2 MG/DL (ref 8.8–10.4)
CALCIUM SERPL-MCNC: 8.3 MG/DL (ref 8.8–10.4)
CALCIUM SERPL-MCNC: 8.3 MG/DL (ref 8.8–10.4)
CHLORIDE SERPL-SCNC: 86 MMOL/L (ref 98–107)
CHLORIDE SERPL-SCNC: 91 MMOL/L (ref 98–107)
CHLORIDE SERPL-SCNC: 91 MMOL/L (ref 98–107)
CREAT SERPL-MCNC: 0.32 MG/DL (ref 0.51–0.95)
CREAT SERPL-MCNC: 0.41 MG/DL (ref 0.51–0.95)
CREAT SERPL-MCNC: 0.42 MG/DL (ref 0.51–0.95)
DIASTOLIC BLOOD PRESSURE - MUSE: NORMAL MMHG
EGFRCR SERPLBLD CKD-EPI 2021: >90 ML/MIN/1.73M2
EOSINOPHIL # BLD AUTO: 0 10E3/UL (ref 0–0.7)
EOSINOPHIL NFR BLD AUTO: 0 %
ERYTHROCYTE [DISTWIDTH] IN BLOOD BY AUTOMATED COUNT: 13.6 % (ref 10–15)
ETHANOL SERPL-MCNC: <0.01 G/DL
FERRITIN SERPL-MCNC: 196 NG/ML (ref 11–328)
FLUAV RNA SPEC QL NAA+PROBE: NEGATIVE
FLUBV RNA RESP QL NAA+PROBE: NEGATIVE
GLUCOSE SERPL-MCNC: 101 MG/DL (ref 70–99)
GLUCOSE SERPL-MCNC: 106 MG/DL (ref 70–99)
GLUCOSE SERPL-MCNC: 93 MG/DL (ref 70–99)
HCO3 SERPL-SCNC: 24 MMOL/L (ref 22–29)
HCO3 SERPL-SCNC: 25 MMOL/L (ref 22–29)
HCO3 SERPL-SCNC: 29 MMOL/L (ref 22–29)
HCT VFR BLD AUTO: 27.4 % (ref 35–47)
HGB BLD-MCNC: 9 G/DL (ref 11.7–15.7)
IMM GRANULOCYTES # BLD: 0 10E3/UL
IMM GRANULOCYTES NFR BLD: 0 %
INTERPRETATION ECG - MUSE: NORMAL
IRON BINDING CAPACITY (ROCHE): 191 UG/DL (ref 240–430)
IRON SATN MFR SERPL: 8 % (ref 15–46)
IRON SERPL-MCNC: 15 UG/DL (ref 37–145)
LIPASE SERPL-CCNC: 7 U/L (ref 13–60)
LYMPHOCYTES # BLD AUTO: 0.4 10E3/UL (ref 0.8–5.3)
LYMPHOCYTES NFR BLD AUTO: 4 %
MAGNESIUM SERPL-MCNC: 1.6 MG/DL (ref 1.7–2.3)
MAGNESIUM SERPL-MCNC: 2.2 MG/DL (ref 1.7–2.3)
MCH RBC QN AUTO: 28.9 PG (ref 26.5–33)
MCHC RBC AUTO-ENTMCNC: 32.8 G/DL (ref 31.5–36.5)
MCV RBC AUTO: 88 FL (ref 78–100)
MONOCYTES # BLD AUTO: 1 10E3/UL (ref 0–1.3)
MONOCYTES NFR BLD AUTO: 10 %
NEUTROPHILS # BLD AUTO: 8.4 10E3/UL (ref 1.6–8.3)
NEUTROPHILS NFR BLD AUTO: 85 %
NRBC # BLD AUTO: 0 10E3/UL
NRBC BLD AUTO-RTO: 0 /100
OSMOLALITY SERPL: 272 MMOL/KG (ref 280–301)
P AXIS - MUSE: 66 DEGREES
PHOSPHATE SERPL-MCNC: 2.6 MG/DL (ref 2.5–4.5)
PLATELET # BLD AUTO: 393 10E3/UL (ref 150–450)
POTASSIUM SERPL-SCNC: 2.7 MMOL/L (ref 3.4–5.3)
POTASSIUM SERPL-SCNC: 3.4 MMOL/L (ref 3.4–5.3)
POTASSIUM SERPL-SCNC: 3.8 MMOL/L (ref 3.4–5.3)
POTASSIUM SERPL-SCNC: 3.8 MMOL/L (ref 3.4–5.3)
PR INTERVAL - MUSE: 130 MS
PROCALCITONIN SERPL IA-MCNC: 0.14 NG/ML
PROT SERPL-MCNC: 6.4 G/DL (ref 6.4–8.3)
QRS DURATION - MUSE: 138 MS
QT - MUSE: 364 MS
QTC - MUSE: 440 MS
R AXIS - MUSE: 83 DEGREES
RBC # BLD AUTO: 3.11 10E6/UL (ref 3.8–5.2)
RSV RNA SPEC NAA+PROBE: NEGATIVE
SARS-COV-2 RNA RESP QL NAA+PROBE: NEGATIVE
SODIUM SERPL-SCNC: 127 MMOL/L (ref 135–145)
SODIUM SERPL-SCNC: 129 MMOL/L (ref 135–145)
SODIUM SERPL-SCNC: 129 MMOL/L (ref 135–145)
SYSTOLIC BLOOD PRESSURE - MUSE: NORMAL MMHG
T AXIS - MUSE: 70 DEGREES
TRANSFERRIN SERPL-MCNC: 155 MG/DL (ref 200–360)
TROPONIN T SERPL HS-MCNC: 7 NG/L
TROPONIN T SERPL HS-MCNC: 7 NG/L
TSH SERPL DL<=0.005 MIU/L-ACNC: 1.38 UIU/ML (ref 0.3–4.2)
VENTRICULAR RATE- MUSE: 88 BPM
VIT B12 SERPL-MCNC: 590 PG/ML (ref 232–1245)
WBC # BLD AUTO: 9.8 10E3/UL (ref 4–11)

## 2024-12-08 PROCEDURE — 83550 IRON BINDING TEST: CPT | Performed by: PHYSICIAN ASSISTANT

## 2024-12-08 PROCEDURE — 120N000001 HC R&B MED SURG/OB

## 2024-12-08 PROCEDURE — 96368 THER/DIAG CONCURRENT INF: CPT | Mod: 59

## 2024-12-08 PROCEDURE — 250N000009 HC RX 250: Performed by: EMERGENCY MEDICINE

## 2024-12-08 PROCEDURE — 96361 HYDRATE IV INFUSION ADD-ON: CPT | Mod: 59

## 2024-12-08 PROCEDURE — 258N000003 HC RX IP 258 OP 636: Performed by: EMERGENCY MEDICINE

## 2024-12-08 PROCEDURE — 99285 EMERGENCY DEPT VISIT HI MDM: CPT | Mod: 25

## 2024-12-08 PROCEDURE — 36415 COLL VENOUS BLD VENIPUNCTURE: CPT | Performed by: EMERGENCY MEDICINE

## 2024-12-08 PROCEDURE — 258N000003 HC RX IP 258 OP 636: Performed by: PHYSICIAN ASSISTANT

## 2024-12-08 PROCEDURE — 83735 ASSAY OF MAGNESIUM: CPT | Performed by: EMERGENCY MEDICINE

## 2024-12-08 PROCEDURE — 84466 ASSAY OF TRANSFERRIN: CPT | Performed by: PHYSICIAN ASSISTANT

## 2024-12-08 PROCEDURE — 250N000013 HC RX MED GY IP 250 OP 250 PS 637: Performed by: EMERGENCY MEDICINE

## 2024-12-08 PROCEDURE — 83735 ASSAY OF MAGNESIUM: CPT | Performed by: PHYSICIAN ASSISTANT

## 2024-12-08 PROCEDURE — 250N000011 HC RX IP 250 OP 636: Performed by: EMERGENCY MEDICINE

## 2024-12-08 PROCEDURE — 250N000013 HC RX MED GY IP 250 OP 250 PS 637: Performed by: PHYSICIAN ASSISTANT

## 2024-12-08 PROCEDURE — 93005 ELECTROCARDIOGRAM TRACING: CPT

## 2024-12-08 PROCEDURE — 250N000013 HC RX MED GY IP 250 OP 250 PS 637: Performed by: INTERNAL MEDICINE

## 2024-12-08 PROCEDURE — 84145 PROCALCITONIN (PCT): CPT | Performed by: PHYSICIAN ASSISTANT

## 2024-12-08 PROCEDURE — 250N000011 HC RX IP 250 OP 636: Performed by: PHYSICIAN ASSISTANT

## 2024-12-08 PROCEDURE — 74177 CT ABD & PELVIS W/CONTRAST: CPT | Mod: MA

## 2024-12-08 PROCEDURE — 96375 TX/PRO/DX INJ NEW DRUG ADDON: CPT | Mod: 59

## 2024-12-08 PROCEDURE — 84484 ASSAY OF TROPONIN QUANT: CPT | Performed by: PHYSICIAN ASSISTANT

## 2024-12-08 PROCEDURE — 83540 ASSAY OF IRON: CPT | Performed by: PHYSICIAN ASSISTANT

## 2024-12-08 PROCEDURE — 96365 THER/PROPH/DIAG IV INF INIT: CPT | Mod: 59

## 2024-12-08 PROCEDURE — 250N000009 HC RX 250: Performed by: PHYSICIAN ASSISTANT

## 2024-12-08 PROCEDURE — 82310 ASSAY OF CALCIUM: CPT | Performed by: PHYSICIAN ASSISTANT

## 2024-12-08 PROCEDURE — 85025 COMPLETE CBC W/AUTO DIFF WBC: CPT | Performed by: EMERGENCY MEDICINE

## 2024-12-08 PROCEDURE — 82607 VITAMIN B-12: CPT | Performed by: PHYSICIAN ASSISTANT

## 2024-12-08 PROCEDURE — 82728 ASSAY OF FERRITIN: CPT | Performed by: PHYSICIAN ASSISTANT

## 2024-12-08 PROCEDURE — 82040 ASSAY OF SERUM ALBUMIN: CPT | Performed by: EMERGENCY MEDICINE

## 2024-12-08 PROCEDURE — 99223 1ST HOSP IP/OBS HIGH 75: CPT | Performed by: PHYSICIAN ASSISTANT

## 2024-12-08 PROCEDURE — 83930 ASSAY OF BLOOD OSMOLALITY: CPT | Performed by: PHYSICIAN ASSISTANT

## 2024-12-08 PROCEDURE — 87637 SARSCOV2&INF A&B&RSV AMP PRB: CPT | Performed by: EMERGENCY MEDICINE

## 2024-12-08 PROCEDURE — 82746 ASSAY OF FOLIC ACID SERUM: CPT | Performed by: PHYSICIAN ASSISTANT

## 2024-12-08 PROCEDURE — 80048 BASIC METABOLIC PNL TOTAL CA: CPT | Performed by: PHYSICIAN ASSISTANT

## 2024-12-08 PROCEDURE — 82077 ASSAY SPEC XCP UR&BREATH IA: CPT | Performed by: EMERGENCY MEDICINE

## 2024-12-08 PROCEDURE — 83690 ASSAY OF LIPASE: CPT | Performed by: EMERGENCY MEDICINE

## 2024-12-08 PROCEDURE — 84484 ASSAY OF TROPONIN QUANT: CPT | Performed by: EMERGENCY MEDICINE

## 2024-12-08 PROCEDURE — 84443 ASSAY THYROID STIM HORMONE: CPT | Performed by: PHYSICIAN ASSISTANT

## 2024-12-08 PROCEDURE — 84100 ASSAY OF PHOSPHORUS: CPT | Performed by: PHYSICIAN ASSISTANT

## 2024-12-08 RX ORDER — NALOXONE HYDROCHLORIDE 0.4 MG/ML
0.4 INJECTION, SOLUTION INTRAMUSCULAR; INTRAVENOUS; SUBCUTANEOUS
Status: DISCONTINUED | OUTPATIENT
Start: 2024-12-08 | End: 2024-12-19

## 2024-12-08 RX ORDER — ONDANSETRON 4 MG/1
4 TABLET, ORALLY DISINTEGRATING ORAL EVERY 6 HOURS PRN
Status: DISCONTINUED | OUTPATIENT
Start: 2024-12-08 | End: 2024-12-19

## 2024-12-08 RX ORDER — ACETAMINOPHEN 650 MG/1
650 SUPPOSITORY RECTAL EVERY 4 HOURS PRN
Status: DISCONTINUED | OUTPATIENT
Start: 2024-12-08 | End: 2024-12-19

## 2024-12-08 RX ORDER — AMOXICILLIN 250 MG
1 CAPSULE ORAL 2 TIMES DAILY
Status: DISCONTINUED | OUTPATIENT
Start: 2024-12-08 | End: 2024-12-19

## 2024-12-08 RX ORDER — NALOXONE HYDROCHLORIDE 0.4 MG/ML
0.2 INJECTION, SOLUTION INTRAMUSCULAR; INTRAVENOUS; SUBCUTANEOUS
Status: DISCONTINUED | OUTPATIENT
Start: 2024-12-08 | End: 2024-12-19

## 2024-12-08 RX ORDER — ONDANSETRON 2 MG/ML
4 INJECTION INTRAMUSCULAR; INTRAVENOUS EVERY 6 HOURS PRN
Status: DISCONTINUED | OUTPATIENT
Start: 2024-12-08 | End: 2024-12-19

## 2024-12-08 RX ORDER — FLUTICASONE PROPIONATE 50 MCG
2 SPRAY, SUSPENSION (ML) NASAL DAILY
Status: DISCONTINUED | OUTPATIENT
Start: 2024-12-08 | End: 2024-12-19

## 2024-12-08 RX ORDER — IPRATROPIUM BROMIDE 42 UG/1
2 SPRAY, METERED NASAL 4 TIMES DAILY
Status: DISCONTINUED | OUTPATIENT
Start: 2024-12-08 | End: 2024-12-19

## 2024-12-08 RX ORDER — LOSARTAN POTASSIUM 50 MG/1
50 TABLET ORAL DAILY
Status: DISCONTINUED | OUTPATIENT
Start: 2024-12-09 | End: 2024-12-19

## 2024-12-08 RX ORDER — AMOXICILLIN 250 MG
2 CAPSULE ORAL 2 TIMES DAILY PRN
Status: DISCONTINUED | OUTPATIENT
Start: 2024-12-08 | End: 2024-12-19

## 2024-12-08 RX ORDER — BISACODYL 10 MG
10 SUPPOSITORY, RECTAL RECTAL DAILY PRN
Status: DISCONTINUED | OUTPATIENT
Start: 2024-12-08 | End: 2024-12-19

## 2024-12-08 RX ORDER — HYDROMORPHONE HCL IN WATER/PF 6 MG/30 ML
0.2 PATIENT CONTROLLED ANALGESIA SYRINGE INTRAVENOUS
Status: DISCONTINUED | OUTPATIENT
Start: 2024-12-08 | End: 2024-12-19

## 2024-12-08 RX ORDER — PANTOPRAZOLE SODIUM 40 MG/1
40 TABLET, DELAYED RELEASE ORAL
Status: DISCONTINUED | OUTPATIENT
Start: 2024-12-09 | End: 2024-12-08

## 2024-12-08 RX ORDER — OXYCODONE HYDROCHLORIDE 5 MG/1
5 TABLET ORAL EVERY 4 HOURS PRN
Status: DISCONTINUED | OUTPATIENT
Start: 2024-12-08 | End: 2024-12-19

## 2024-12-08 RX ORDER — ASPIRIN 81 MG/1
81 TABLET ORAL EVERY OTHER DAY
Status: DISCONTINUED | OUTPATIENT
Start: 2024-12-10 | End: 2024-12-19

## 2024-12-08 RX ORDER — BENZONATATE 100 MG/1
100 CAPSULE ORAL 3 TIMES DAILY PRN
Status: DISCONTINUED | OUTPATIENT
Start: 2024-12-08 | End: 2024-12-13

## 2024-12-08 RX ORDER — AMOXICILLIN 250 MG
2 CAPSULE ORAL 2 TIMES DAILY
Status: DISCONTINUED | OUTPATIENT
Start: 2024-12-08 | End: 2024-12-19

## 2024-12-08 RX ORDER — HYDRALAZINE HYDROCHLORIDE 10 MG/1
10 TABLET, FILM COATED ORAL EVERY 4 HOURS PRN
Status: DISCONTINUED | OUTPATIENT
Start: 2024-12-08 | End: 2024-12-19

## 2024-12-08 RX ORDER — GABAPENTIN 600 MG/1
600 TABLET ORAL 3 TIMES DAILY
Status: DISCONTINUED | OUTPATIENT
Start: 2024-12-08 | End: 2024-12-09

## 2024-12-08 RX ORDER — ONDANSETRON 2 MG/ML
4 INJECTION INTRAMUSCULAR; INTRAVENOUS ONCE
Status: COMPLETED | OUTPATIENT
Start: 2024-12-08 | End: 2024-12-08

## 2024-12-08 RX ORDER — METOPROLOL SUCCINATE 50 MG/1
50 TABLET, EXTENDED RELEASE ORAL EVERY MORNING
Status: DISCONTINUED | OUTPATIENT
Start: 2024-12-09 | End: 2024-12-16

## 2024-12-08 RX ORDER — AMOXICILLIN 250 MG
1 CAPSULE ORAL 2 TIMES DAILY PRN
Status: DISCONTINUED | OUTPATIENT
Start: 2024-12-08 | End: 2024-12-19

## 2024-12-08 RX ORDER — SPIRONOLACTONE 25 MG
12.5 TABLET ORAL EVERY MORNING
Status: DISCONTINUED | OUTPATIENT
Start: 2024-12-09 | End: 2024-12-19

## 2024-12-08 RX ORDER — PROCHLORPERAZINE MALEATE 5 MG/1
5 TABLET ORAL EVERY 6 HOURS PRN
Status: DISCONTINUED | OUTPATIENT
Start: 2024-12-08 | End: 2024-12-19

## 2024-12-08 RX ORDER — POTASSIUM CHLORIDE 7.45 MG/ML
10 INJECTION INTRAVENOUS ONCE
Status: COMPLETED | OUTPATIENT
Start: 2024-12-08 | End: 2024-12-08

## 2024-12-08 RX ORDER — METOCLOPRAMIDE HYDROCHLORIDE 5 MG/ML
10 INJECTION INTRAMUSCULAR; INTRAVENOUS ONCE
Status: COMPLETED | OUTPATIENT
Start: 2024-12-08 | End: 2024-12-08

## 2024-12-08 RX ORDER — HYDRALAZINE HYDROCHLORIDE 20 MG/ML
10 INJECTION INTRAMUSCULAR; INTRAVENOUS EVERY 4 HOURS PRN
Status: DISCONTINUED | OUTPATIENT
Start: 2024-12-08 | End: 2024-12-19

## 2024-12-08 RX ORDER — IPRATROPIUM BROMIDE AND ALBUTEROL SULFATE 2.5; .5 MG/3ML; MG/3ML
3 SOLUTION RESPIRATORY (INHALATION) EVERY 4 HOURS PRN
Status: DISCONTINUED | OUTPATIENT
Start: 2024-12-08 | End: 2024-12-19

## 2024-12-08 RX ORDER — ATORVASTATIN CALCIUM 40 MG/1
40 TABLET, FILM COATED ORAL DAILY
Status: DISCONTINUED | OUTPATIENT
Start: 2024-12-09 | End: 2024-12-19

## 2024-12-08 RX ORDER — MAGNESIUM SULFATE HEPTAHYDRATE 40 MG/ML
2 INJECTION, SOLUTION INTRAVENOUS ONCE
Status: COMPLETED | OUTPATIENT
Start: 2024-12-08 | End: 2024-12-08

## 2024-12-08 RX ORDER — POLYETHYLENE GLYCOL 3350 17 G/17G
17 POWDER, FOR SOLUTION ORAL 2 TIMES DAILY PRN
Status: DISCONTINUED | OUTPATIENT
Start: 2024-12-08 | End: 2024-12-19

## 2024-12-08 RX ORDER — ALBUTEROL SULFATE 90 UG/1
2 INHALANT RESPIRATORY (INHALATION) EVERY 6 HOURS PRN
Status: DISCONTINUED | OUTPATIENT
Start: 2024-12-08 | End: 2024-12-19

## 2024-12-08 RX ORDER — LIDOCAINE 40 MG/G
CREAM TOPICAL
Status: DISCONTINUED | OUTPATIENT
Start: 2024-12-08 | End: 2024-12-19

## 2024-12-08 RX ORDER — SODIUM CHLORIDE, SODIUM LACTATE, POTASSIUM CHLORIDE, CALCIUM CHLORIDE 600; 310; 30; 20 MG/100ML; MG/100ML; MG/100ML; MG/100ML
INJECTION, SOLUTION INTRAVENOUS CONTINUOUS
Status: DISCONTINUED | OUTPATIENT
Start: 2024-12-08 | End: 2024-12-09

## 2024-12-08 RX ORDER — GUAIFENESIN 600 MG/1
600 TABLET, EXTENDED RELEASE ORAL 2 TIMES DAILY
Status: DISCONTINUED | OUTPATIENT
Start: 2024-12-08 | End: 2024-12-19

## 2024-12-08 RX ORDER — POTASSIUM CHLORIDE 1.5 G/1.58G
40 POWDER, FOR SOLUTION ORAL ONCE
Status: COMPLETED | OUTPATIENT
Start: 2024-12-08 | End: 2024-12-08

## 2024-12-08 RX ORDER — ALBUTEROL SULFATE 90 UG/1
2 INHALANT RESPIRATORY (INHALATION) EVERY 6 HOURS PRN
COMMUNITY

## 2024-12-08 RX ORDER — FLUTICASONE PROPIONATE 50 MCG
2 SPRAY, SUSPENSION (ML) NASAL DAILY
Status: DISCONTINUED | OUTPATIENT
Start: 2024-12-09 | End: 2024-12-08

## 2024-12-08 RX ORDER — IOPAMIDOL 755 MG/ML
41 INJECTION, SOLUTION INTRAVASCULAR ONCE
Status: COMPLETED | OUTPATIENT
Start: 2024-12-08 | End: 2024-12-08

## 2024-12-08 RX ORDER — ACETAMINOPHEN 325 MG/1
650 TABLET ORAL EVERY 4 HOURS PRN
Status: DISCONTINUED | OUTPATIENT
Start: 2024-12-08 | End: 2024-12-19

## 2024-12-08 RX ADMIN — SODIUM CHLORIDE, POTASSIUM CHLORIDE, SODIUM LACTATE AND CALCIUM CHLORIDE: 600; 310; 30; 20 INJECTION, SOLUTION INTRAVENOUS at 22:07

## 2024-12-08 RX ADMIN — SODIUM CHLORIDE 1000 ML: 9 INJECTION, SOLUTION INTRAVENOUS at 12:43

## 2024-12-08 RX ADMIN — SODIUM CHLORIDE 60 ML: 9 INJECTION, SOLUTION INTRAVENOUS at 13:09

## 2024-12-08 RX ADMIN — GABAPENTIN 600 MG: 600 TABLET, FILM COATED ORAL at 22:03

## 2024-12-08 RX ADMIN — GUAIFENESIN 600 MG: 600 TABLET, EXTENDED RELEASE ORAL at 22:04

## 2024-12-08 RX ADMIN — IOPAMIDOL 41 ML: 755 INJECTION, SOLUTION INTRAVENOUS at 13:09

## 2024-12-08 RX ADMIN — BENZONATATE 100 MG: 100 CAPSULE ORAL at 22:04

## 2024-12-08 RX ADMIN — IPRATROPIUM BROMIDE 2 SPRAY: 42 SPRAY NASAL at 18:33

## 2024-12-08 RX ADMIN — IRON SUCROSE 300 MG: 20 INJECTION, SOLUTION INTRAVENOUS at 20:02

## 2024-12-08 RX ADMIN — GABAPENTIN 600 MG: 600 TABLET, FILM COATED ORAL at 18:33

## 2024-12-08 RX ADMIN — POTASSIUM CHLORIDE 10 MEQ: 7.46 INJECTION, SOLUTION INTRAVENOUS at 13:43

## 2024-12-08 RX ADMIN — METOCLOPRAMIDE 10 MG: 5 INJECTION, SOLUTION INTRAMUSCULAR; INTRAVENOUS at 12:49

## 2024-12-08 RX ADMIN — ONDANSETRON 4 MG: 4 TABLET, ORALLY DISINTEGRATING ORAL at 22:12

## 2024-12-08 RX ADMIN — FLUTICASONE PROPIONATE 2 SPRAY: 50 SPRAY, METERED NASAL at 20:02

## 2024-12-08 RX ADMIN — POTASSIUM CHLORIDE 40 MEQ: 1.5 POWDER, FOR SOLUTION ORAL at 16:14

## 2024-12-08 RX ADMIN — PANTOPRAZOLE SODIUM 40 MG: 40 INJECTION, POWDER, FOR SOLUTION INTRAVENOUS at 13:44

## 2024-12-08 RX ADMIN — ONDANSETRON 4 MG: 2 INJECTION, SOLUTION INTRAMUSCULAR; INTRAVENOUS at 13:50

## 2024-12-08 RX ADMIN — MAGNESIUM SULFATE HEPTAHYDRATE 2 G: 40 INJECTION, SOLUTION INTRAVENOUS at 13:43

## 2024-12-08 RX ADMIN — GUAIFENESIN 600 MG: 600 TABLET, EXTENDED RELEASE ORAL at 16:14

## 2024-12-08 ASSESSMENT — ACTIVITIES OF DAILY LIVING (ADL)
ADLS_ACUITY_SCORE: 62
ADLS_ACUITY_SCORE: 62
ADLS_ACUITY_SCORE: 59
ADLS_ACUITY_SCORE: 62
ADLS_ACUITY_SCORE: 59

## 2024-12-08 NOTE — PROGRESS NOTES
RECEIVING UNIT ED HANDOFF REVIEW    ED Nurse Handoff Report was reviewed by: Andrea R Holstein, RN on December 8, 2024 at 4:35 PM      Room 2407

## 2024-12-08 NOTE — ED NOTES
"[facility]  ED Nurse Handoff Report    ED Chief complaint: Nausea and Generalized Weakness      ED Diagnosis:   Final diagnoses:   Hyponatremia   Weakness   Hypokalemia   Hypomagnesemia   Anemia, unspecified type       Code Status: not addressed     Allergies:   Allergies   Allergen Reactions    Codeine Sulfate Nausea    Morphine      Pt unsure    Simvastatin Cramps     Leg cramps    Pcn [Penicillins] Rash       Patient Story: Patient is undergoing immunotherapy for lung cancer. For the last 3 days has been feeling nauseous and has not been able to eat, she states this has caused generalized weakness. She also reports a discomfort \"underneath the ribcage\" especially just after she eats.      Focused Assessment:  non productive freq cough, nausea, not eating , electrolytes off, replacing potassium and mag,     Treatments and/or interventions provided:   Potassium magnesium , 1 liter NS, reglan, zofran,protonix   Patient's response to treatments and/or interventions:     To be done/followed up on inpatient unit:  unknown      Does this patient have any cognitive concerns?:  none     Activity level - Baseline/Home:  Unknown  Activity Level - Current:   Unknown    Patient's Preferred language: English   Needed?: No    Isolation: None  Infection: Not Applicable  Patient tested for COVID 19 prior to admission: YES  Bariatric?: No    Vital Signs:   Vitals:    12/08/24 1329 12/08/24 1339 12/08/24 1349 12/08/24 1359   BP: (!) 152/65   (!) 143/70   Pulse: 95   95   Resp:       Temp:       TempSrc:       SpO2: 90% 96% 99% 97%       Cardiac Rhythm:     Was the PSS-3 completed:   Yes  What interventions are required if any?               Family Comments: son here   OBS brochure/video discussed/provided to patient/family:               Name of person given brochure if not patient:               Relationship to patient:     For the majority of the shift this patient's behavior was .   Behavioral " interventions performed were .    ED NURSE PHONE NUMBER: [phone]

## 2024-12-08 NOTE — PHARMACY-ADMISSION MEDICATION HISTORY
Pharmacist Admission Medication History    Admission medication history is complete. The information provided in this note is only as accurate as the sources available at the time of the update.    Information Source(s): Patient and CareEverywhere/SureScripts via in-person    Pertinent Information: Of note, patient reports receiving last durvalumab therapy on 11/26, and radiation start was planned to begin this upcoming week.    Changes made to PTA medication list:  Added: Albuterol  Deleted: Prednisone  Changed: Ondansetron (4 mg q8h to 8 mg q8h), furosemide (20 mg every day prn to 20-40 mg every day prn)    Allergies reviewed with patient and updates made in EHR: yes    Medication History Completed By: Ar Conk[facility] 12/8/2024 3:26 PM    PTA Med List   Medication Sig Last Dose/Taking    albuterol (PROAIR HFA/PROVENTIL HFA/VENTOLIN HFA) 108 (90 Base) MCG/ACT inhaler Inhale 2 puffs into the lungs every 6 hours as needed for shortness of breath, wheezing or cough. Past Month    ANORO ELLIPTA 62.5-25 MCG/ACT oral inhaler Inhale 1 puff into the lungs daily. 12/8/2024 Morning    aspirin (ASA) 81 MG EC tablet 1 tablet every other day 12/8/2024 Morning    atorvastatin (LIPITOR) 40 MG tablet Take 1 tablet (40 mg) by mouth daily. 12/8/2024 Morning    durvalumab Inject into the vein every 14 days. 11/26/2024    fluticasone (FLONASE) 50 MCG/ACT nasal spray INSTILL 2 SPRAYS INTO BOTH NOSTRILS DAILY. 12/8/2024 Morning    furosemide (LASIX) 40 MG tablet Take 0.5 tablets (20 mg) by mouth daily as needed (edema or shortness of breath) For worsening shortness of breath, leg swelling or weight gain. (Patient taking differently: Take 20-40 mg by mouth daily as needed (edema or shortness of breath). For worsening shortness of breath, leg swelling or weight gain.) 12/5/2024    gabapentin (NEURONTIN) 600 MG tablet TAKE ONE (1) TABLET BY MOUTH THREE TIMES DAILY. 12/8/2024 Morning    ipratropium (ATROVENT) 0.06 % nasal spray Spray 2  sprays into both nostrils 4 times daily. 12/8/2024 Morning    losartan (COZAAR) 50 MG tablet Take 1 tablet (50 mg) by mouth daily. Appointment required for further refills 12/8/2024 Morning    metoprolol succinate ER (TOPROL XL) 50 MG 24 hr tablet Take 1 tablet (50 mg) by mouth every morning. 12/8/2024 Morning    multivitamin w/minerals (MULTI-VITAMIN) tablet Take 1 tablet by mouth daily. 12/8/2024 Morning    omeprazole (PRILOSEC) 40 MG DR capsule TAKE 1 CAPSULE BY MOUTH EVERY DAY 12/8/2024 Morning    ondansetron (ZOFRAN) 4 MG tablet Take 8 mg by mouth every 8 hours as needed for nausea. Past Week    prochlorperazine (COMPAZINE) 10 MG tablet Take 10 mg by mouth every 6 hours as needed for nausea or vomiting. Past Week    spironolactone (ALDACTONE) 25 MG tablet Take 0.5 tablets (12.5 mg) by mouth every morning. 12/8/2024 Morning

## 2024-12-08 NOTE — ED TRIAGE NOTES
"Patient is undergoing immunotherapy for lung cancer. For the last 3 days has been feeling nauseous and has not been able to eat, she states this has caused generalized weakness. She also reports a discomfort \"underneath the ribcage\" especially just after she eats.     Triage Assessment (Adult)       Row Name 12/08/24 1110          Triage Assessment    Airway WDL WDL        Respiratory WDL    Respiratory WDL WDL        Skin Circulation/Temperature WDL    Skin Circulation/Temperature WDL WDL        Cardiac WDL    Cardiac WDL WDL        Peripheral/Neurovascular WDL    Peripheral Neurovascular WDL WDL        Cognitive/Neuro/Behavioral WDL    Cognitive/Neuro/Behavioral WDL X  generalized weakness                     "

## 2024-12-08 NOTE — H&P
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, history of left lung non-small cell cancer underwent chemoradiation and immunotherapy treatments hold started binge drinking after she was told she has recurrence of left lung cancer and needs radiation treatment due to frustration started binge drinking and presents with generalized weakness, nausea, shortness of breath and mechanical fall at home     She was admitted with alcohol intoxication with a high risk of alcohol withdrawal, shortness of breath secondary to COPD and underlying progressive left lung cancer, generalized weakness, nausea most likely secondary to alcoholic gastritis     Electrolyte disturbances - hyponatremia, hypokalemia, hypomagnesemia  Acute on chronic nausea, poor oral intake  Chronic abdominal pain  Presented with progressive generalized  weakness, physical deconditioning, acute on chronic nausea, and poor oral intake x 3 to 4 days. Chronic nausea and abdominal pain for many years however significantly more deconditioned and weak since her last hospitalization 2 to 3 weeks ago.  Over the past 3 to 4 days she has anorexia and extremely poor appetite, unable to take even more than 1 or 2 bites of food, increase in nausea.  Abdominal pain is relatively stable. Workup in the ED revealed several electrolyte abnormalities including Na+ 127, K+ 2.7, Cl- 86, Mg++ 1.6. Corrected CA with alb is WNL. Creatinine stable at 0.42. No leukocytosis. Hemoglobin is low at 9.0 however was 12.6 on 11/20 during previous hospital stay.  Patient denies any acute bleeding, blood to urine or stool.  Troponin 7, 7.  Procalcitonin low at 0.14.  TSH normal.  COVID, influenza, RSV all negative.  EKG NSR, QTc is 440 ms.  CT chest abdomen and pelvis revealed no acute processes.  CXR without acute pathology, see imaging for details. Due to electrolyte derangement and poor oral intake she was given 1 L of normal saline in the ED and potassium, magnesium replaced, given antiemetics.   -Inpatient status  -Check and replete lytes  -Add on Phos and replete if low  -IV PPI  -Sodium Q 6 hours  -Trend labs  -Hold additional IV fluids until sodium recheck after the 1L NS given in the  ED  -ADAT, CLD to Regular  -Ensure Clear + Gelatein for now, patient agreeable  -RD consultation for poor oral intake, malnutrition  -[facility] consultation, unclear if worsening symptoms are related to immunotherapy? Is to start XRT 12/9/24 for lung cancer, unclear when she can reschedule this. Of note may also need TCU which could complicate her cancer treatment but she would benefit from increasing strength as she is quite deconditioned at this point.  -Check sodium studies  -Cardiac monitoring until lytes correct  -Monitor lytes closely due to risk of refeeding syndrome in malnourished cancer patient   -Trend  basic labs    Recent right leg swelling  Reports has lasix at home for when she has LE edema, but not for over a year. Noted leg swelling last week PTA and took 40mg furosemide daily x3 days and swelling resolved. None noted on admission. No chest pain. Chronic shortness of breath due to COPD.  Denies any chest pain. No ongoing swelling or pain to the right leg.    -Check venous duplex to rule out DVT  -Monitor closely  -If any DVT, would pursue CT PE scan and start on heparin gtt while monitoring hemoglobin    New normocytic anemia  Presented with drop in Hgb to 9.0, previously 12.6 last hospitalization on 11/20/24. No active bleeding identified. Appears dry not overloaded, doubt dilutional. Worsening generalized weakness and poor oral intake likely anemia is contributing.  -Iron studies, ferritin, transferrin, B12, folate - TSAT low, remainder of labs pending, give Venofer x2  -[facility] consult  -Trend  -Monitor closely for any bleeding  -Ok to continue ASA 81mg for now and monitor, avoiding chemical DVT ppx for now while monitoring     Generalized weakness  Physical deconditioning  Recent admission with gen weakness Nov 2024, at that time had been drinking EtOH and felt likely from alcohol intoxication/alcohol withdrawal. Discharged to home with outpatient PT and walker. Reports no new falls but has been very deconditioned and difficulty managing at home. Anemia, lytes disturbances likely contributing to weakness as well as cancer and immunotherapy.  -PT, OT evaluations, suspect she needs TCU at this point  -SW/CC to assist in discharge planning  -Treatment of other medical issues as noted      Chronic progressive shortness of breath   Multifactorial most likely secondary to COPD and underlying progressive left lung cancer. Recent CT chest 11/19/2024 showed no evidence of pneumonia. CXR on this admission without acute pneumonia. Known COPD and lung nodules.   -Monitor pulseox, no hypoxia on admission or increased  WOB  -Continue PTA Anoro Ellipta  -Duonebs PRN if tolerated, hx of feeling they make her claustrophobic, will add SHAUNA inhaler in case she prefers     Recurrent non-small cell left upper lobe lung cancer in October 2024, on immunotherapy, plans to start radiation  History of squamous cell cancer of the right lung status post lobectomy  History of esophageal cancer status post chemoradiation and esophagectomy in remission  Follows with Dr. Garcia as outpatient with [facility]. Patient was diagnosed with a left lung cancer in January 2024, received chemoradiation and immunotherapy. Disease has been closely followed with serial CT scans and 10/10/24 CT scan showed increased size of the right upper lobe cavitary lesion.  A left upper lobe irregular nodule measuring 14 x 10 mm is stable since 7/18/2024 but has gradually increased in size since 12/6/2023 also is concerning for neoplasm. Slight decrease size and number of the left upper lobe tree-in-bud nodules but new linear opacities in the left lower lobe.  Findings may be due to waxing and waning infectious/inflammatory process.  Chronic small right pleural effusion.  Reports has been in Immunotherapy, last dose 11/25. Was to start radiation on 12/9/24.  -[facility] consultation as above, unclear if symptoms related to immunotherapy, holding radiation while hospitalized, will need assessment of new anemia, and when/if patient is candidate to continue treatment      History of GERD  PTA omeprazole  -IV Protonix Q 24 hours until able to take PO      History of stress-induced cardiomyopathy  History of nonobstructive coronary artery disease  PTA regimen: Toprol-XL 50 mg daily, losartan 50 mg p.o. daily, spironolactone 12.5 mg p.o. every morning, ASA 81mg every other day. Patient is also on as needed Lasix 20 mg daily as needed but she uses it very rarely. She notes she took 40mg of furosemide x3 days in a row, 2 days prior to admission, likely contributing to  electrolyte disturbances.  -Continue PTA baby aspirin every other day while monitoring Hgb  -Hold PTA statin for observation status, resume on discharge  -Continue PTA BB/ARB when verified by pharmacy  -HOLD PTA diuretics in setting of electrolyte disturbances, spironolactone and PRN furosemide    Recent Alcoholic intoxication  Risk for alcohol withdrawal  History of alcohol dependence sober for 2 years;  Alcohol level 0.18 on Nov 2024 admission, patient started binge drinking due to recurrence of her lung cancer as above. She was evaluated by chem dep and declined psychiatry. Her and her son report complete EtOH cessation since discharge.   -Encouraged continued complete EtOH cessation        Diet: Snacks/Supplements Adult: Ensure Clear; With Meals  Snacks/Supplements Adult: Gelatein Plus; With Meals  Clear Liquid Diet    DVT Prophylaxis: Pneumatic Compression Devices and Ambulate every shift  Jaeger Catheter: Not present  Lines: PRESENT             Cardiac Monitoring: ACTIVE order. Indication: Electrolyte Imbalance (24 hours)- Magnesium <1.3 mg/ml; Potassium < =2.8 or > 5.5 mg/ml  Code Status: No CPR- Do NOT Intubate -d/w patient on admission, consistent with prior code status as well    Clinically Significant Risk Factors Present on Admission        # Hypokalemia: Lowest K = 2.7 mmol/L in last 2 days, will replace as needed  # Hyponatremia: Lowest Na = 127 mmol/L in last 2 days, will monitor as appropriate  # Hypochloremia: Lowest Cl = 86 mmol/L in last 2 days, will monitor as appropriate    # Hypomagnesemia: Lowest Mg = 1.6 mg/dL in last 2 days, will replace as needed   # Hypoalbuminemia: Lowest albumin = 2.9 g/dL at 12/8/2024 12:29 PM, will monitor as appropriate   # Drug Induced Platelet Defect: home medication list includes an antiplatelet medication   # Hypertension: Noted on problem list    # Chronic heart failure with preserved ejection fraction: heart failure noted on problem list and last echo with EF  ">50%     # Anemia: based on hgb <11       # Cachexia: Estimated body mass index is 16.06 kg/m  as calculated from the following:    Height as of 11/19/24: 1.549 m (5' 1\").    Weight as of this encounter: 38.6 kg (85 lb).       # COPD: noted on problem list        Disposition Plan     Medically Ready for Discharge: Anticipated in 2-4 Days         The patient's care was discussed with the Attending Physician, Dr. Caraballo, Bedside Nurse, Patient, Patient's Family, and ED Physician Dr. Brizuela .    Vielka Martínez PA-C  Hospitalist Service  [facility]  Securely message with [employer] (more info)  Text page via [facility] Paging/Directory     ______________________________________________________________________    Chief Complaint   Generalized weakness, nausea, poor oral intake    History is obtained from the patient, electronic health record, emergency department physician, and patient's son    History of Present Illness   Kezia Dixon is a 69 year old female with past medical history significant for stress-induced cardiomyopathy with normalization of EF on recent echo, nonobstructive coronary artery disease, COPD, history of esophageal cancer status post esophagectomy and chemoradiation in remission, 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  She reports chronic nausea and abdominal pain for many years however significantly more deconditioned and weak since her last hospitalization 2 to 3 weeks ago.  Over the past 3 to 4 days she has anorexia and extremely poor appetite, unable to take even more than 1 or 2 bites of food, increase in nausea.  Abdominal pain is relatively stable.  She has got chronic shortness of breath due " to COPD.  Denies any chest pain.  Notes she had right lower extremity edema last week in which she took 40 mg of Lasix 3 days in a row with resolution.  No ongoing swelling or pain to the right leg.      In the emergency department she was hemodynamically stable, no hypoxia, stable blood pressure, afebrile.  Workup revealed several electrolyte abnormalities including hyponatremia sodium 127, potassium 2.7, chloride 86, calcium 8.3, magnesium 1.6, albumin 2.9.  Creatinine stable at 0.42.  Normal WBC count.  Hemoglobin is low at 9.0 however was 12.6 on 11/20 during previous hospital stay.  Patient denies any acute bleeding, blood to urine or stool.  Troponin 7, 7.  Procalcitonin low at 0.14.  TSH normal 1.38.  COVID, influenza, RSV all negative.  EKG normal sinus rhythm, QTc is 440 ms.  CT chest abdomen and pelvis revealed no acute processes.  Chest x-ray with opacities of right midlung not significantly changed from prior which may be due to evolving posttreatment pneumonitis or infection.  No acute infiltrates.  Due to electrolyte derangement she was given 1 L of normal saline in the ED and electrolytes replaced magnesium sulfate, potassium chloride.  She was also given Reglan and ondansetron for antiemetics.    Past Medical History    Past Medical History:   Diagnosis Date    Alcohol use disorder, severe, dependence (H) 10/14/2016    Alcoholism (H) 10/14/2016    Anemia     Benign essential hypertension 10/14/2016    Carotid artery disease (H)     mile plaque 5/7    Chemical dependency (H)     alcohol    COPD (chronic obstructive pulmonary disease) (H)     Coronary artery disease     Depression with anxiety     Esophageal cancer (H) 03/22/2016    SQUAMOUS CELL    Esophageal mass     GERD (gastroesophageal reflux disease)     Hx of pancreatitis 2003    Hypercholesteremia     Hyperglycemia     Hyperlipemia     Impaired fasting glucose 10/14/2016    Nocturnal leg cramps     Other chronic pain     Pancreatic pseudocyst  10/14/2016    Pancreatitis     with pseudocyst    Pap smear with atypical squamous cells, cannot exclude high grade squamous intraepithelial lesion (ASC-H) 10/14/2016    Colpo impression benign, ECC benign. Cotestin in 1 yr.    Shingles     Squamous cell carcinoma of right lung (H)     Vasomotor rhinitis        Past Surgical History   Past Surgical History:   Procedure Laterality Date    AAA REPAIR      splenic artery aneurysm embolization    ABDOMEN SURGERY  2/2/2016    COLONOSCOPY  11/26/2013    Procedure: COMBINED COLONOSCOPY, SINGLE BIOPSY/POLYPECTOMY BY BIOPSY;  COLONOSCOPY (MAC);  Surgeon: Montana Rosa MD;  Location:  GI    COLONOSCOPY  11/26/2013    COLONOSCOPY N/A 10/4/2018    Procedure: COMBINED COLONOSCOPY, SINGLE OR MULTIPLE BIOPSY/POLYPECTOMY BY BIOPSY;  colonoscopy;  Surgeon: Gio Apodaca MD;  Location:  GI    ENT SURGERY      ESOPHAGOGASTRECTOMY N/A 3/22/2016    Procedure: ESOPHAGOGASTRECTOMY;  Surgeon: Alvin Riojas MD;  Location:  OR    ESOPHAGOSCOPY, GASTROSCOPY, DUODENOSCOPY (EGD), COMBINED N/A 12/22/2015    Procedure: COMBINED ENDOSCOPIC ULTRASOUND, ESOPHAGOSCOPY, GASTROSCOPY, DUODENOSCOPY (EGD), FINE NEEDLE ASPIRATE/BIOPSY;  Surgeon: Danelle iMchael MD;  Location:  GI    GASTROSTOMY, INSERT TUBE, COMBINED N/A 2/2/2016    Procedure: COMBINED GASTROSTOMY, INSERT TUBE (OPEN);  Surgeon: Alvin Riojas MD;  Location: [facility]    GI SURGERY  12/22/2015    HAND SURGERY      right    HERNIORRHAPHY INCISIONAL (LOCATION) N/A 3/19/2019    Procedure: LAPARSCOPIC  INCISIONAL HERNIA REPAIR WITH MESH, LAPARSCOPIC LYSIS OF ADHENSIONS;  Surgeon: Lamberto Magaña MD;  Location:  OR    INSERT PORT VASCULAR ACCESS N/A 12/28/2015    Procedure: INSERT PORT VASCULAR ACCESS;  Surgeon: Alvin Riojas MD;  Location:  OR    INSERT PORT VASCULAR ACCESS N/A 1/9/2024    Procedure: SMART PORT PLACEMENT;  Surgeon: Alvin Riojas MD;  Location:  [facility] OR    LAPAROSCOPIC CHOLECYSTECTOMY N/A 3/19/2019    Procedure: LAPAROSCOPIC CHOLECYSTECTOMY;  Surgeon: Lamberto Magaña MD;  Location: [facility] OR    LOBECTOMY LUNG Right 10/16/2018    Procedure: LOBECTOMY LUNG;  Surgeon: Alvin Riojas MD;  Location: [facility] OR    REMOVE PORT VASCULAR ACCESS Left 2016    Procedure: REMOVE PORT VASCULAR ACCESS;  Surgeon: Alvin Riojas MD;  Location: [facility] OR    THORACOTOMY Right 10/16/2018    Procedure: REDO RIGHT THORACTOMY AND RIGHT LOWER LOBECTOMY, PLEURAL LYSIS;  Surgeon: Alvin Riojas MD;  Location: [facility] OR    TONSILLECTOMY      VASCULAR SURGERY         Prior to Admission Medications   Prior to Admission Medications   Prescriptions Last Dose Informant Patient Reported? Taking?   ANORO ELLIPTA 62.5-25 MCG/ACT oral inhaler 2024 Morning  Yes Yes   Sig: Inhale 1 puff into the lungs daily.   albuterol (PROAIR HFA/PROVENTIL HFA/VENTOLIN HFA) 108 (90 Base) MCG/ACT inhaler Past Month  Yes Yes   Sig: Inhale 2 puffs into the lungs every 6 hours as needed for shortness of breath, wheezing or cough.   aspirin (ASA) 81 MG EC tablet 2024 Morning  Yes Yes   Si tablet every other day   atorvastatin (LIPITOR) 40 MG tablet 2024 Morning  No Yes   Sig: Take 1 tablet (40 mg) by mouth daily.   durvalumab 2024  Yes Yes   Sig: Inject into the vein every 14 days.   fluticasone (FLONASE) 50 MCG/ACT nasal spray 2024 Morning Self No Yes   Sig: INSTILL 2 SPRAYS INTO BOTH NOSTRILS DAILY.   furosemide (LASIX) 40 MG tablet 2024  No Yes   Sig: Take 0.5 tablets (20 mg) by mouth daily as needed (edema or shortness of breath) For worsening shortness of breath, leg swelling or weight gain.   Patient taking differently: Take 20-40 mg by mouth daily as needed (edema or shortness of breath). For worsening shortness of breath, leg swelling or weight gain.   gabapentin (NEURONTIN) 600 MG tablet 2024 Morning  No Yes   Sig: TAKE ONE (1) TABLET BY  MOUTH THREE TIMES DAILY.   ipratropium (ATROVENT) 0.06 % nasal spray 2024 Morning  No Yes   Sig: Spray 2 sprays into both nostrils 4 times daily.   losartan (COZAAR) 50 MG tablet 2024 Morning  No Yes   Sig: Take 1 tablet (50 mg) by mouth daily. Appointment required for further refills   metoprolol succinate ER (TOPROL XL) 50 MG 24 hr tablet 2024 Morning  No Yes   Sig: Take 1 tablet (50 mg) by mouth every morning.   multivitamin w/minerals (MULTI-VITAMIN) tablet 2024 Morning  Yes Yes   Sig: Take 1 tablet by mouth daily.   omeprazole (PRILOSEC) 40 MG DR capsule 2024 Morning  No Yes   Sig: TAKE 1 CAPSULE BY MOUTH EVERY DAY   ondansetron (ZOFRAN) 4 MG tablet Past Week  Yes Yes   Sig: Take 8 mg by mouth every 8 hours as needed for nausea.   prochlorperazine (COMPAZINE) 10 MG tablet Past Week  Yes Yes   Sig: Take 10 mg by mouth every 6 hours as needed for nausea or vomiting.   spironolactone (ALDACTONE) 25 MG tablet 2024 Morning  No Yes   Sig: Take 0.5 tablets (12.5 mg) by mouth every morning.      Facility-Administered Medications: None        Review of Systems    The 10 point Review of Systems is negative other than noted in the HPI or here.     Social History   I have reviewed this patient's social history and updated it with pertinent information if needed.  Social History     Tobacco Use    Smoking status: Former     Current packs/day: 0.00     Average packs/day: 0.3 packs/day for 45.4 years (11.4 ttl pk-yrs)     Types: Cigarettes     Start date: 1970     Quit date: 2015     Years since quittin.0    Smokeless tobacco: Never   Vaping Use    Vaping status: Never Used   Substance Use Topics    Alcohol use: Not Currently    Drug use: No         Allergies   Allergies   Allergen Reactions    Codeine Sulfate Nausea    Simvastatin Cramps     Leg cramps    Morphine      Pt unsure - pt does not believe this is an allergy of hers    Pcn [Penicillins] Rash        Physical Exam   Vital  Signs: Temp: 97.7  F (36.5  C) Temp src: Oral BP: (!) 154/57 Pulse: 103   Resp: 15 SpO2: 97 % O2 Device: None (Room air)    Weight: 85 lbs 0 oz    Physical Exam    General: Awake, alert, thin cachectic woman. Looks comfortable sitting up in bed. No acute distress.  HEENT: Normocephalic, atraumatic. Extraocular movements intact.   Respiratory: Clear to auscultation bilaterally, no rales, wheezing, or rhonchi.  Cardiovascular: Regular rate and rhythm, +S1 and S2, no murmur auscultated. No peripheral edema.   Gastrointestinal: Soft, non-tender, non-distended. Bowel sounds present.  Skin: Warm, dry. No obvious rashes or lesions on exposed skin. Dorsalis pedis pulses palpable bilaterally.  Musculoskeletal: No joint swelling, erythema or tenderness. Moves all extremities equally. Loss of subcutaneous fat and wasting of temples.   Neurologic: AAO x3. Cranial nerves 2-12 grossly intact, normal strength and sensation.  Psychiatric: Appropriate mood and affect. No obvious anxiety or depression.      Medical Decision Making       >75 MINUTES SPENT BY ME on the date of service doing chart review, history, exam, documentation & further activities per the note.      Data     I have personally reviewed the following data over the past 24 hrs:    9.8  \   9.0 (L)   / 393     127 (L) 86 (L) 6.6 (L) /  101 (H)   2.7 (L) 29 0.42 (L) \     ALT: 9 AST: 18 AP: 90 TBILI: 0.7   ALB: 2.9 (L) TOT PROTEIN: 6.4 LIPASE: 7 (L)     Trop: 7 BNP: N/A     TSH: 1.38 T4: N/A A1C: N/A     Procal: 0.14 CRP: N/A Lactic Acid: N/A         Imaging results reviewed over the past 24 hrs:   Recent Results (from the past 24 hours)   CT Abdomen Pelvis w Contrast    Narrative    EXAM: CT ABDOMEN AND PELVIS WITH CONTRAST  LOCATION: [facility]  DATE: 12/08/2024    INDICATION: Upper abdominal pain, chronic nausea, concern for bowel obstruction vs metastatic disease.  COMPARISON: 10/22/2024.  TECHNIQUE: CT scan of the abdomen and pelvis was  performed following injection of IV contrast. Multiplanar reformats were obtained. Dose reduction techniques were used.  CONTRAST: 41 mL Isovue 370.    FINDINGS:   LOWER CHEST: Gastric pull-through changes. Trace residual fluid on the right. Emphysema. No definite acute infiltrates.    HEPATOBILIARY: No significant mass or bile duct dilatation. No calcified gallstones.     PANCREAS: No significant mass, duct dilatation, or inflammatory change.    SPLEEN: Normal size.    ADRENAL GLANDS: No significant nodules.    KIDNEYS/BLADDER: No significant mass, stone, or hydronephrosis.    BOWEL: No obstruction or inflammatory change. Normal appendix. No diverticulitis.    LYMPH NODES: No lymphadenopathy.    VASCULATURE: There are extensive atherosclerotic changes of the visualized aorta and its branches. There is no evidence of aortic dissection or aneurysm.    PELVIC ORGANS: No pelvic masses.    MUSCULOSKELETAL: No frankly destructive bony lesions. Scoliosis.      Impression    IMPRESSION:   1.  No acute process demonstrated.       XR Chest Port 1 View    Narrative    EXAM: XR CHEST PORT 1 VIEW  LOCATION: [facility]  DATE: 12/8/2024    INDICATION: Cough, eval for PNA  COMPARISON: 1/9/2024. 11/19/2024.      Impression    IMPRESSION: Posttreatment changes in the right lung including a right lower lobectomy. Opacities in the right midlung are not significantly changed from 11/19/2024 and may be due to evolving posttreatment pneumonitis or infection. No acute infiltrates on   the left. Normal heart size and pulmonary vascularity. Infusion port tip in the SVC.

## 2024-12-08 NOTE — ED PROVIDER NOTES
Emergency Department Note      History of Present Illness     Chief Complaint   Nausea and Generalized Weakness      [facility]   Kezia Dixon is a 69 year old female with a history of esophageal cancer, lung cancer, CHF, hypertension, COPD, and alcohol use disorder, presenting to the emergency department for evaluation of nausea and generalized weakness. The patient reports her nausea has been ongoing  since having an esophageal resection 8 years ago. She notes she is able to swallow, but her nausea causes a severely decreased appetite.She reports taking Compazine at 0800 this morning and Zofran at 1000 this morning, however, she was so nauseous she was unable to brush her teeth. She also notes abdominal pain for the past 8 years since her esophageal resection. Her son states that she has been on a downward trajectory over the past 3-4 days, and has eaten very little. She denies any recent vomiting, dysuria, polyuria, diarrhea, sick contacts, fevers, sore throat, or new cough. She denies any alcohol use since her hospital admission in November of this year. She denies any recent falls or injuries. She notes her most recent immunotherapy appointment was 2 weeks ago with Dr. Garcia with [facility], and indicates she is being treated with Imfinzi every other week. She denies any history of bowel obstructions. The patient's son notes the patient has a history of a partial gastrectomy and cholecystectomy in addition to her esophageal resection.        Independent Historian   The patient's son as detailed above in the HPI.    Review of External Notes   I reviewed the patient's discharge summary from 11/22/24, when she was admitted to the hospital for nausea and weakness secondary to alcohol intoxication. I also noted the patient has a history of lung squamous cell cancer, recurrent non-small cell cancer of the lung, and esophageal cancer. I noted the patient has had a prior esophagectomy. I also noted the patient has  undergone chemotherapy and radiation treatment for her cancer in her left lung.     Past Medical History     Medical History and Problem List   Alcohol use disorder  Anemia   Hypertension  Coronary artery disease  COPD   Coronary artery disease  Depression  Esophageal cancer  GERD  Hyperlipidemia  Shingles   Lung cancer    Medications   Anoro ellipta  ASA  Lipitor  Flonase  Lasix  Neurontin  Atrovent  Cozaar  Toprol XL  Prilosec  Zofran  Deltasone   Compazine   Aldactone     Surgical History   AAA repair  Colonoscopy  ENT surgery   Esophagogastrectomy   EGD combined  Gastrostomy, insert tube  Hand surgery   Herniorrhaphy incisional  Insert port vascular access   Laparoscopic cholecystectomy   Lobectomy lung   Remove port vascular access  Thoracotomy   Tonsillectomy   Vascular surgery     Physical Exam     Patient Vitals for the past 24 hrs:   BP Temp Temp src Pulse Resp SpO2   12/08/24 1500 127/49 -- -- 92 25 97 %   12/08/24 1429 124/55 -- -- 94 -- 95 %   12/08/24 1359 (!) 143/70 -- -- 95 -- 97 %   12/08/24 1349 -- -- -- -- -- 99 %   12/08/24 1339 -- -- -- -- -- 96 %   12/08/24 1329 (!) 152/65 -- -- 95 -- 90 %   12/08/24 1259 (!) 147/49 -- -- 89 -- 98 %   12/08/24 1249 (!) 143/49 -- -- -- -- 98 %   12/08/24 1109 122/71 98.1  F (36.7  C) Oral 94 16 96 %     Physical Exam  General: Alert, appears well-developed and well-nourished. Cooperative.     In mild distress  HEENT:  Head:  Atraumatic  Ears:  External ears are normal  Mouth/Throat:  Oropharynx is without erythema or exudate and mucous membranes are dry.   Eyes:   Conjunctivae normal and EOM are normal. No scleral icterus.  CV:  Normal rate, regular rhythm, normal heart sounds and radial pulses are 2+ and symmetric.  No murmur.  Resp:  Breath sounds are clear bilaterally    Non-labored, no retractions or accessory muscle use  GI:  Abdomen is soft, no distension, epigastric and RUQ tenderness. No rebound or guarding.  No CVA tenderness bilaterally  MS:  Normal  range of motion. No edema.    Back atraumatic.    No midline cervical, thoracic, or lumbar tenderness  Skin:  Warm and dry.  No rash or lesions noted.  Neuro:   Alert. Globally weak.  GCS: 15  Psych: Normal mood and affect.    Diagnostics     Lab Results   Labs Ordered and Resulted from Time of ED Arrival to Time of ED Departure   COMPREHENSIVE METABOLIC PANEL - Abnormal       Result Value    Sodium 127 (*)     Potassium 2.7 (*)     Carbon Dioxide (CO2) 29      Anion Gap 12      Urea Nitrogen 6.6 (*)     Creatinine 0.42 (*)     GFR Estimate >90      Calcium 8.3 (*)     Chloride 86 (*)     Glucose 101 (*)     Alkaline Phosphatase 90      AST 18      ALT 9      Protein Total 6.4      Albumin 2.9 (*)     Bilirubin Total 0.7     MAGNESIUM - Abnormal    Magnesium 1.6 (*)    LIPASE - Abnormal    Lipase 7 (*)    CBC WITH PLATELETS AND DIFFERENTIAL - Abnormal    WBC Count 9.8      RBC Count 3.11 (*)     Hemoglobin 9.0 (*)     Hematocrit 27.4 (*)     MCV 88      MCH 28.9      MCHC 32.8      RDW 13.6      Platelet Count 393      % Neutrophils 85      % Lymphocytes 4      % Monocytes 10      % Eosinophils 0      % Basophils 0      % Immature Granulocytes 0      NRBCs per 100 WBC 0      Absolute Neutrophils 8.4 (*)     Absolute Lymphocytes 0.4 (*)     Absolute Monocytes 1.0      Absolute Eosinophils 0.0      Absolute Basophils 0.0      Absolute Immature Granulocytes 0.0      Absolute NRBCs 0.0     IRON AND IRON BINDING CAPACITY - Abnormal    Iron 15 (*)     Iron Binding Capacity 191 (*)     Iron Sat Index 8 (*)    ETHYL ALCOHOL LEVEL - Normal    Alcohol ethyl <0.01     TROPONIN T, HIGH SENSITIVITY - Normal    Troponin T, High Sensitivity 7     INFLUENZA A/B, RSV AND SARS-COV2 PCR - Normal    Influenza A PCR Negative      Influenza B PCR Negative      RSV PCR Negative      SARS CoV2 PCR Negative     TROPONIN T, HIGH SENSITIVITY - Normal    Troponin T, High Sensitivity 7     TSH WITH FREE T4 REFLEX - Normal    TSH 1.38      ROUTINE UA WITH MICROSCOPIC REFLEX TO CULTURE   FERRITIN   TRANSFERRIN   VITAMIN B12   OSMOLALITY, RANDOM URINE   SODIUM RANDOM URINE   PROCALCITONIN       Imaging   XR Chest Port 1 View   Final Result   IMPRESSION: Posttreatment changes in the right lung including a right lower lobectomy. Opacities in the right midlung are not significantly changed from 11/19/2024 and may be due to evolving posttreatment pneumonitis or infection. No acute infiltrates on    the left. Normal heart size and pulmonary vascularity. Infusion port tip in the SVC.      CT Abdomen Pelvis w Contrast   Final Result   IMPRESSION:    1.  No acute process demonstrated.                EKG   ECG results from 12/08/24   EKG 12-lead, tracing only     Value    Systolic Blood Pressure     Diastolic Blood Pressure     Ventricular Rate 88    Atrial Rate 88    NH Interval 130    QRS Duration 138        QTc 440    P Axis 66    R AXIS 83    T Axis 70    Interpretation ECG      Sinus rhythm  Non-specific intra-ventricular conduction block  Abnormal ECG  When compared with ECG of 19-Nov-2024 14:44,  No significant change was found  Read at 1219 by Earle Brizuela MD        Independent Interpretation   None    ED Course      Medications Administered   Medications   pantoprazole (PROTONIX) IV push injection 40 mg (40 mg Intravenous $Given 12/8/24 1344)   benzonatate (TESSALON) capsule 100 mg (has no administration in time range)   guaiFENesin (MUCINEX) 12 hr tablet 600 mg (has no administration in time range)   sodium chloride 0.9% BOLUS 1,000 mL (0 mLs Intravenous Stopped 12/8/24 1441)   metoclopramide (REGLAN) injection 10 mg (10 mg Intravenous $Given 12/8/24 1249)   Saline (60 mLs As instructed $Given 12/8/24 1309)   iopamidol (ISOVUE-370) solution 41 mL (41 mLs Intravenous $Given 12/8/24 1309)   potassium chloride 10 mEq in 100 mL sterile water infusion (0 mEq Intravenous Stopped 12/8/24 1441)   magnesium sulfate 2 g in 50 mL sterile water  intermittent infusion (0 g Intravenous Stopped 12/8/24 1442)   ondansetron (ZOFRAN) injection 4 mg (4 mg Intravenous $Given 12/8/24 1350)       Procedures   Procedures     Discussion of Management   Admitting Hospitalist, Vielka Martínez PA-C, on behalf of Dr. Caraballo     ED Course   ED Course as of 12/08/24 1541   Sun Dec 08, 2024   1140 I obtained history and examined the patient as noted above.    1335 I spoke to Carine Martínez PA-C, of the hospitalist team, who accepted the patient for admission to the hospital on behalf of Dr. Caraballo.    1343 I spoke to the patient and her son regarding the patient's plan of care.        Additional Documentation  None    Medical Decision Making / Diagnosis     CMS Diagnoses: None    MIPS       None    MDM   Kezia Dixon is a 69 year old female with a complex past medical history pertinent for lung cancer currently undergoing immunotherapy who presents with 3 days of worsening nausea and anorexia.  Patient had a recent hospitalization in November due to alcohol abuse in the setting of chronic comorbidities.  She denies any recent alcohol ingestion since that last admission.  She unfortunately was found to have multiple electrolyte abnormalities with hyponatremia, hypokalemia, hypomagnesemia, and hypocalcemia.  Patient had anemia as well which is significantly changed from her prior hemoglobin values.  She denies any hematemesis or blood in stool.  Low suspicion for GI bleed.  Suspect anemia could be related to chronic disease although will need further workup on inpatient.  Influenza, RSV, COVID is negative.  Patient had a chest x-ray which showed posttreatment changes in the right lung including a right lower lobectomy.  There is opacities in the right midlung that are not changed comparison with November.  CT abdomen pelvis was obtained given epigastric discomfort and thankfully no acute intra-abdominal processes detected.  Patient case was discussed with the hospitalist  service who agreed to admission for further management of electrolyte abnormality in the setting of chronic nausea and anorexia symptoms.    Disposition   The patient was admitted to the hospital.     Diagnosis     ICD-10-CM    1. Hyponatremia  E87.1       2. Weakness  R53.1       3. Hypokalemia  E87.6       4. Hypomagnesemia  E83.42       5. Anemia, unspecified type  D64.9            Discharge Medications   New Prescriptions    No medications on file         Scribe Disclosure:  I, Wilfred Washburn, am serving as a scribe at 12:51 PM on 12/8/2024 to document services personally performed by Earle Brizuela MD based on my observations and the provider's statements to me.        Earle Brizuela MD  12/08/24 [id number]

## 2024-12-09 LAB
ALBUMIN SERPL BCG-MCNC: 2.7 G/DL (ref 3.5–5.2)
ALP SERPL-CCNC: 82 U/L (ref 40–150)
ALT SERPL W P-5'-P-CCNC: 9 U/L (ref 0–50)
ANION GAP SERPL CALCULATED.3IONS-SCNC: 14 MMOL/L (ref 7–15)
AST SERPL W P-5'-P-CCNC: 19 U/L (ref 0–45)
BILIRUB SERPL-MCNC: 0.6 MG/DL
BUN SERPL-MCNC: 9.2 MG/DL (ref 8–23)
CALCIUM SERPL-MCNC: 8.3 MG/DL (ref 8.8–10.4)
CHLORIDE SERPL-SCNC: 91 MMOL/L (ref 98–107)
CREAT SERPL-MCNC: 0.45 MG/DL (ref 0.51–0.95)
DAT POLY: NEGATIVE
EGFRCR SERPLBLD CKD-EPI 2021: >90 ML/MIN/1.73M2
ERYTHROCYTE [DISTWIDTH] IN BLOOD BY AUTOMATED COUNT: 13.5 % (ref 10–15)
EST. AVERAGE GLUCOSE BLD GHB EST-MCNC: 160 MG/DL
FOLATE SERPL-MCNC: 16.7 NG/ML (ref 4.6–34.8)
GLUCOSE SERPL-MCNC: 91 MG/DL (ref 70–99)
HAPTOGLOB SERPL-MCNC: 481 MG/DL (ref 30–200)
HBA1C MFR BLD: 7.2 %
HCO3 SERPL-SCNC: 24 MMOL/L (ref 22–29)
HCT VFR BLD AUTO: 27.4 % (ref 35–47)
HGB BLD-MCNC: 9.4 G/DL (ref 11.7–15.7)
LDH SERPL L TO P-CCNC: 147 U/L (ref 0–250)
MAGNESIUM SERPL-MCNC: 2.1 MG/DL (ref 1.7–2.3)
MCH RBC QN AUTO: 30.4 PG (ref 26.5–33)
MCHC RBC AUTO-ENTMCNC: 34.3 G/DL (ref 31.5–36.5)
MCV RBC AUTO: 89 FL (ref 78–100)
PHOSPHATE SERPL-MCNC: 2.9 MG/DL (ref 2.5–4.5)
PLATELET # BLD AUTO: 405 10E3/UL (ref 150–450)
POTASSIUM SERPL-SCNC: 3.5 MMOL/L (ref 3.4–5.3)
PROT SERPL-MCNC: 6 G/DL (ref 6.4–8.3)
RBC # BLD AUTO: 3.09 10E6/UL (ref 3.8–5.2)
RETICS # AUTO: 0.04 10E6/UL (ref 0.03–0.1)
RETICS/RBC NFR AUTO: 1.4 % (ref 0.5–2)
SODIUM SERPL-SCNC: 125 MMOL/L (ref 135–145)
SODIUM SERPL-SCNC: 129 MMOL/L (ref 135–145)
SODIUM SERPL-SCNC: 129 MMOL/L (ref 135–145)
SPECIMEN EXP DATE BLD: NORMAL
WBC # BLD AUTO: 10.7 10E3/UL (ref 4–11)

## 2024-12-09 PROCEDURE — 84100 ASSAY OF PHOSPHORUS: CPT | Performed by: PHYSICIAN ASSISTANT

## 2024-12-09 PROCEDURE — 250N000013 HC RX MED GY IP 250 OP 250 PS 637: Performed by: HOSPITALIST

## 2024-12-09 PROCEDURE — 83615 LACTATE (LD) (LDH) ENZYME: CPT | Performed by: INTERNAL MEDICINE

## 2024-12-09 PROCEDURE — 99233 SBSQ HOSP IP/OBS HIGH 50: CPT | Performed by: INTERNAL MEDICINE

## 2024-12-09 PROCEDURE — 97530 THERAPEUTIC ACTIVITIES: CPT | Mod: GP | Performed by: PHYSICAL THERAPIST

## 2024-12-09 PROCEDURE — 83036 HEMOGLOBIN GLYCOSYLATED A1C: CPT

## 2024-12-09 PROCEDURE — 250N000009 HC RX 250: Performed by: PHYSICIAN ASSISTANT

## 2024-12-09 PROCEDURE — 250N000013 HC RX MED GY IP 250 OP 250 PS 637: Performed by: PHYSICIAN ASSISTANT

## 2024-12-09 PROCEDURE — 86880 COOMBS TEST DIRECT: CPT | Performed by: HOSPITALIST

## 2024-12-09 PROCEDURE — 82947 ASSAY GLUCOSE BLOOD QUANT: CPT | Performed by: PHYSICIAN ASSISTANT

## 2024-12-09 PROCEDURE — 97161 PT EVAL LOW COMPLEX 20 MIN: CPT | Mod: GP | Performed by: PHYSICAL THERAPIST

## 2024-12-09 PROCEDURE — 97116 GAIT TRAINING THERAPY: CPT | Mod: GP | Performed by: PHYSICAL THERAPIST

## 2024-12-09 PROCEDURE — 250N000011 HC RX IP 250 OP 636: Performed by: PHYSICIAN ASSISTANT

## 2024-12-09 PROCEDURE — 120N000001 HC R&B MED SURG/OB

## 2024-12-09 PROCEDURE — 83010 ASSAY OF HAPTOGLOBIN QUANT: CPT | Performed by: INTERNAL MEDICINE

## 2024-12-09 PROCEDURE — 85045 AUTOMATED RETICULOCYTE COUNT: CPT | Performed by: INTERNAL MEDICINE

## 2024-12-09 PROCEDURE — 82310 ASSAY OF CALCIUM: CPT | Performed by: PHYSICIAN ASSISTANT

## 2024-12-09 PROCEDURE — 85018 HEMOGLOBIN: CPT | Performed by: PHYSICIAN ASSISTANT

## 2024-12-09 PROCEDURE — 250N000013 HC RX MED GY IP 250 OP 250 PS 637: Performed by: INTERNAL MEDICINE

## 2024-12-09 PROCEDURE — 83735 ASSAY OF MAGNESIUM: CPT | Performed by: PHYSICIAN ASSISTANT

## 2024-12-09 PROCEDURE — 258N000003 HC RX IP 258 OP 636: Performed by: PHYSICIAN ASSISTANT

## 2024-12-09 PROCEDURE — 80051 ELECTROLYTE PANEL: CPT | Performed by: PHYSICIAN ASSISTANT

## 2024-12-09 PROCEDURE — 36591 DRAW BLOOD OFF VENOUS DEVICE: CPT | Performed by: PHYSICIAN ASSISTANT

## 2024-12-09 RX ORDER — MULTIPLE VITAMINS W/ MINERALS TAB 9MG-400MCG
1 TAB ORAL DAILY
Status: DISCONTINUED | OUTPATIENT
Start: 2024-12-09 | End: 2024-12-19

## 2024-12-09 RX ORDER — GABAPENTIN 300 MG/1
600 CAPSULE ORAL 3 TIMES DAILY
Status: DISCONTINUED | OUTPATIENT
Start: 2024-12-09 | End: 2024-12-19

## 2024-12-09 RX ADMIN — IRON SUCROSE 300 MG: 20 INJECTION, SOLUTION INTRAVENOUS at 09:28

## 2024-12-09 RX ADMIN — GABAPENTIN 600 MG: 600 TABLET, FILM COATED ORAL at 09:27

## 2024-12-09 RX ADMIN — IPRATROPIUM BROMIDE 2 SPRAY: 42 SPRAY NASAL at 14:25

## 2024-12-09 RX ADMIN — SENNOSIDES AND DOCUSATE SODIUM 1 TABLET: 8.6; 5 TABLET ORAL at 09:27

## 2024-12-09 RX ADMIN — SENNOSIDES AND DOCUSATE SODIUM 1 TABLET: 8.6; 5 TABLET ORAL at 21:03

## 2024-12-09 RX ADMIN — FLUTICASONE PROPIONATE 2 SPRAY: 50 SPRAY, METERED NASAL at 09:28

## 2024-12-09 RX ADMIN — GUAIFENESIN 600 MG: 600 TABLET, EXTENDED RELEASE ORAL at 09:27

## 2024-12-09 RX ADMIN — GUAIFENESIN 600 MG: 600 TABLET, EXTENDED RELEASE ORAL at 21:03

## 2024-12-09 RX ADMIN — UMECLIDINIUM BROMIDE AND VILANTEROL TRIFENATATE 1 PUFF: 62.5; 25 POWDER RESPIRATORY (INHALATION) at 09:29

## 2024-12-09 RX ADMIN — SODIUM CHLORIDE, POTASSIUM CHLORIDE, SODIUM LACTATE AND CALCIUM CHLORIDE: 600; 310; 30; 20 INJECTION, SOLUTION INTRAVENOUS at 09:25

## 2024-12-09 RX ADMIN — GABAPENTIN 600 MG: 300 CAPSULE ORAL at 21:03

## 2024-12-09 RX ADMIN — IPRATROPIUM BROMIDE 2 SPRAY: 42 SPRAY NASAL at 17:44

## 2024-12-09 RX ADMIN — Medication 1 TABLET: at 14:25

## 2024-12-09 RX ADMIN — ONDANSETRON 4 MG: 4 TABLET, ORALLY DISINTEGRATING ORAL at 07:09

## 2024-12-09 RX ADMIN — METOPROLOL SUCCINATE 50 MG: 50 TABLET, EXTENDED RELEASE ORAL at 09:27

## 2024-12-09 RX ADMIN — ONDANSETRON 4 MG: 2 INJECTION, SOLUTION INTRAMUSCULAR; INTRAVENOUS at 18:47

## 2024-12-09 RX ADMIN — ATORVASTATIN CALCIUM 40 MG: 40 TABLET, FILM COATED ORAL at 09:27

## 2024-12-09 RX ADMIN — GABAPENTIN 600 MG: 300 CAPSULE ORAL at 17:43

## 2024-12-09 RX ADMIN — LOSARTAN POTASSIUM 50 MG: 50 TABLET, FILM COATED ORAL at 09:28

## 2024-12-09 RX ADMIN — PANTOPRAZOLE SODIUM 40 MG: 40 INJECTION, POWDER, FOR SOLUTION INTRAVENOUS at 14:25

## 2024-12-09 RX ADMIN — IPRATROPIUM BROMIDE 2 SPRAY: 42 SPRAY NASAL at 21:07

## 2024-12-09 RX ADMIN — BENZONATATE 100 MG: 100 CAPSULE ORAL at 09:27

## 2024-12-09 RX ADMIN — IPRATROPIUM BROMIDE 2 SPRAY: 42 SPRAY NASAL at 09:28

## 2024-12-09 ASSESSMENT — ACTIVITIES OF DAILY LIVING (ADL)
ADLS_ACUITY_SCORE: 65
ADLS_ACUITY_SCORE: 63
ADLS_ACUITY_SCORE: 62
ADLS_ACUITY_SCORE: 62
ADLS_ACUITY_SCORE: 63
ADLS_ACUITY_SCORE: 62
ADLS_ACUITY_SCORE: 63
ADLS_ACUITY_SCORE: 63
ADLS_ACUITY_SCORE: 62
DEPENDENT_IADLS:: INDEPENDENT
ADLS_ACUITY_SCORE: 63
ADLS_ACUITY_SCORE: 63
ADLS_ACUITY_SCORE: 62

## 2024-12-09 NOTE — CONSULTS
"CLINICAL NUTRITION SERVICES  -  ASSESSMENT NOTE    Recommendations Ordered by Registered Dietitian (RD):   Continue Ensure Clear w/ meals  Modify Gel+ to SF Gelatein (orange flavor)  MVI/M w/ cosign --> takes at home + inadequate po intakes  Encouraged po intakes to support nutritional needs   Malnutrition:   % Weight Loss:  None noted  % Intake:  </= 50% for >/= 5 days (severe malnutrition)  Subcutaneous Fat Loss:  Orbital region mild depletion  Muscle Loss:  Temporal region moderate depletion, Anterior thigh region moderate depletion, and Posterior calf region moderate depletion  Fluid Retention:  None noted    Malnutrition Diagnosis: Moderate malnutrition  In Context of:  Acute illness or injury (on chronic)     REASON FOR ASSESSMENT  Kezia Dixon is a 69 year old female seen by Registered Dietitian for Provider Order - \"poor oral intake, malnutrition, setting of cancer and nausea\".     Per H&P, patient presents with progressive generalized weakness, physical deconditioning, acute on chronic nausea, and poor oral intake x 3 to 4 days, she was admitted with electrolyte disturbances, new anemia, nausea, and unable to take PO.     PMH: stress-induced cardiomyopathy with normalization of EF on recent echo, nonobstructive coronary artery disease, COPD, history of esophageal cancer status post esophagectomy and chemoradiation in remission, history of right lung squamous cell cancer status post lobectomy, history of left lung non-small cell lung cancer status post radiation immunotherapy, currently on immunotherapy     NUTRITION HISTORY  Information obtain from patient. Typical food/fluid intake is a couple small meals and 1-2 snacks per day. Takes Ensure Clear at home every 3-4 days. In typical day, she will eat 1/2 bagel or eggs for breakfast, yogurt/pudding/Ensure Clear for snack, and dinner brought to her by her son. Patient reports the last solid food she ate was 4 bites of chicken enchiladas.     Supplements: " multivitamin daily at home  Appetite: very poor, no intakes 4-5 days  Chewing/Swallowing: no concerns  Food Allergies/Intolerances: lactose intolerance    CURRENT NUTRITION ORDERS  Diet Order:     Clear Liquid   Supplements: Ensure Clear (mixed berry) + Gelatein Plus with meals     Current Intake/Tolerance:  No intakes documented. Patient reports taking a couple sips of her Ensure and bites of Gel+. Does not like pineapple flavor Gel+ and requests orange Gel (SF) instead.     NUTRITION FOCUSED PHYSICAL ASSESSMENT FOR DIAGNOSING MALNUTRITION)  Yes              Observed:    Muscle wasting (refer to documentation in Malnutrition section) and Subcutaneous fat loss (refer to documentation in Malnutrition section)    Obtained from Chart/Interdisciplinary Team:  No pressure injuries or fluid accumulation noted    ANTHROPOMETRICS  Height: Data Unavailable  Weight: 85 lbs 0 oz  Body mass index is 16.06 kg/m .  Weight Status:  Underweight BMI <18.5  IBW: 47.7 kg (105 lb)  % IBW: 81%  Weight History: Stable    Wt Readings from Last 10 Encounters:   12/08/24 38.6 kg (85 lb)   11/20/24 37.2 kg (82 lb 0.2 oz)   09/17/24 39.1 kg (86 lb 4.8 oz)   09/04/24 39 kg (86 lb)   05/14/24 38.1 kg (84 lb)   02/12/24 38.1 kg (84 lb)   01/09/24 37.6 kg (82 lb 14.4 oz)   01/04/24 37.1 kg (81 lb 11.2 oz)   05/02/23 38.3 kg (84 lb 8 oz)   08/09/22 40.7 kg (89 lb 12.8 oz)     LABS  Na 129 (L) Cr 0.45 (L) Ca 8.3 (L)    MEDICATIONS  Iron sucrose IV, Senokot    ASSESSED NUTRITION NEEDS PER APPROVED PRACTICE GUIDELINES:    Dosing Weight: 38.6 kg (admit)  Estimated Energy Needs: 8791-2350+ kcals (30-35+ Kcal/Kg)  Justification: underweight  Estimated Protein Needs: 39-58 grams protein (1.0-1.5 g pro/Kg)  Justification: preservation of lean body mass  Estimated Fluid Needs: 7736-3631+ mL (1 mL/Kcal)  Justification: maintenance or per MD recommendation    NUTRITION DIAGNOSIS:  Inadequate oral intake related to poor appetite, nausea as evidenced by no po  intakes for the past 4-5 days, suspect inadequate intake at baseline per report    NUTRITION INTERVENTIONS  Recommendations / Nutrition Prescription  Continue Ensure Clear w/ meals  Modify Gel+ to SF Gelatein (orange flavor)  MVI/M w/ cosign --> takes at home + inadequate po intakes  Encouraged po intakes to support nutritional needs    Implementation  Nutrition education: No education needs assessed at this time  Medical Food Supplement - modified and continue  Multivitamin/Mineral - ordered w/ cosign     Nutrition Goals  PO intakes >/= 50% meals TID (or equivalent via supplements)    MONITORING AND EVALUATION:  Progress towards goals will be monitored and evaluated per protocol and Practice Guidelines    Jeannine Pang RD, LD

## 2024-12-09 NOTE — CONSULTS
[facility]    Hematology / Oncology Consultation     Date of Admission:  12/8/2024    Assessment & Plan     1.  Multiple lung nodules both lungs, at least 4 of them described to have increased in size or positive on PET scan, right upper lobe nodule biopsy positive for adenocarcinoma, NGS negative, PD-L1 TPS score 0%, left upper lobe nodule biopsy showing atypical cells but suspicious for adenocarcinoma.  Started on chemoradiotherapy with weekly carboplatin and Taxol on January 18, 2024, Taxol dose reduced based on pre-existing neuropathy, the dose was reduced slightly again due to borderline neutropenia with her fourth cycle.  Completed February 28, 2024 and achieved stable disease on the CT scan from April 2024.  Started on consolidation durvalumab immunotherapy on May 28, 2024.  CT scan in July shows minimal increase in micronodular densities but overall stable, CT in October showed further increase in the right upper lobe nodule and indeterminate liver lesion in the right hepatic lobe.  Left upper lung lesion consistent with carcinoma planning to start radiation this week 5 session over 2 weeks    2.  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    3.  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    4.  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  I would like to rule out anemia associated with auto hemolytic process for immunotherapy    5.  Electrolyte abnormalities hyponatremia hypokalemia and hypomagnesemia with nausea and weakness.  Management included internal medicine team.    Discussion recommendations:    Will obtain hemolysis workup including RAFAEL  Hold immunotherapy tomorrow for now, until we sort out potential adverse effects associated with  immunotherapy, hyponatremia is associated with checkpoint inhibitors, if overall health does not improve, we will reconsider the role of immunotherapy in her treatment plan.  Radiation was notified to decide on starting localized radiation to the left upper lung lesion.        I appreciate the opportunity to continue to participate in the care of Kezia Dixon, our team will follow while hospitalized        Jean Garcia MD    Code Status    No CPR- Do NOT Intubate    Reason for Consult   Reason for consult: Lung cancer, anemia    Primary Care Physician   Mustapha Velásquez    Chief Complaint   Nausea and weakness    Medical records reviewed, history obtained and patient examined    History of Present Illness   Kezia Dixon is a 69 year old female who presents with non-small cell lung cancer as detailed below presented to hospital with nausea and weakness.  She denies bleeding no melena hematochezia.  She was supposed to start radiation to lung lesion today for 5 sessions over 2 weeks and to continue immunotherapy scheduled on December 10.  In the hospital she was found to have electrolyte abnormality and hemoglobin 9.5.  Iron levels consistent with chronic illness and the ferritin was 196 argues against iron deficiency.  B12 normal 590 and folic acid normal 16.7.  She has been on durvalumab maintenance therapy.  Last hemoglobin office was 10.4 on November 26     PET/CT was obtained on October 22 which showed enlargement of peripherally FDG avid cavitary lesion in the posterior right lung inflammatory versus infectious cannot rule out recurrent neoplasm.  Suspicion for left upper lobe primary lung carcinoma.  I discussed PET scan imaging with Dr. Cowan, the left upper lung lesion appears to be new neoplasm and is easily amenable for radiation SRS therapy.  The right cavitary lesion is an area which overlaps with prior radiation to the lung and esophagus cancer and did not recommend radiation to that lesion.   One of the other possibility is posttreatment changes inflammatory versus infectious.    I reviewed the PET scan images with Dr. Riojas, the changes on the right lung by the cavitary lesions are indeterminate and not easy to biopsy due to the nature of the distorted area and the central location, she is not a candidate for wedge or segment resection on the right, if surgery is indicated she will need pneumonectomy which will not be tolerated by her.  The left upper lesion is consistent with malignancy.  His recommendation is to radiate the left upper lesion SRS and I talked with Dr. Cowan who agrees on that strategy.  In regard to the right lung changes contrast CT chest in 3 months from the PET/CT will be reasonable.      Oncological history:  Office note from October 15, 2024  Kezia is a 68-year-old woman with history of esophageal cancer and squamous cell lung cancer as detailed below:    1.  Squamous cell carcinoma of the esophagus status post induction chemotherapy (carboplatin/paclitaxel) and radiation therapy, followed by esophagectomy in 2015.    2.  Squamous cell carcinoma of the lung status post thoracotomy and right lower lobectomy in October 2018 for 7 mm grade 2 squamous cell carcinoma of the right lower lobe.    She has been followed by thoracic oncology with scans and had lung nodules.  CT scan CAP November 6, 2023 shows a large irregular cavitary nodule 2.3 x 1.5 cm posterior right upper lobe previously 1.9 x 0.9 cm.  Several adjacent right upper lobe nodules again noted.  1 of these is 1.1 x 0.8 cm increased from previously 0.9 x 0.7 cm.  There are other stable adjacent right upper lobe nodules.  Left upper lobe irregular nodule 1.3 x 0.9 cm compared to 0.9 x 0.6 cm previously.  Adjacent nodularity also noted in the region.  There are other stable nodules.  Stable small right pleural fluid and right pleural thickening.  The CT chest was compared to April 10, 2023.    PET/CT November 10, 2023 shows  irregular cavitary nodule in the right upper lobe 2.2 x 1.6 cm demonstrate hypermetabolic activity along its medial and inferior aspects which extends to the minor fissure SUV max 9.6.  Additional hypermetabolic 1.3 x 0.7 cm right upper lobe nodule SUV max 10.7 which is superior to the described nodule.  FDG avid 0.6 x 0.5 cm nodule in the medial left upper lobe with SUV max of 2.2.  Minimal FDG uptake associated with irregular nodule in the left lateral upper lobe 1.4 x 1.4 cm SUV max of 0.9.    Underwent EBUS December 6, 2023 with biopsies:  Right upper lobe nodule EBUS non-small cell carcinoma favor adenocarcinoma.  Left upper lobe lung EBUS atypical cells suspicious for adenocarcinoma.  Lymph nodes stations 4L, 4R, 11 R interlobar negative for malignant cells.  PD-L1 tumor proportion score 0%, NGS lung panel negative    Case was discussed in thoracic oncology tumor conference, MRI of the brain was suggested and was negative for metastatic disease, chemoradiation was recommended for her right upper lung lesion and watchful waiting for the left lung lesion based on atypical cells on biopsy.  Patient has mild sensory neuropathy without painful component or interference with daily function and weekly carboplatin and Taxol was chosen along with radiation, the initial dose of Taxol was reduced based on the neuropathy baseline.  Next generation sequencing panel was negative.  PDL TPS score was 0%    Our team has discussed the approach regarding her left lung lesion and the decision was to hold off treatment to avoid extended radiation exposure between both lungs.  The left lung lesion will be monitored with subsequent scan, there is a possibility that the lesion may shrink with the treatment of chemotherapy and surgical resection as an option for wedge or limited resection otherwise stereotactic radiation can be considered, additional tissue diagnosis may be needed if surgery is not decided.    She was started on  radiation along with weekly carboplatin and Taxol reduced dose on January 18, 2024.  Completed chemotherapy February 29, 2024.    CT scan of chest April 1, 2024 right upper lobe nodules have decreased in size slightly.  The largest cavitary nodule in the right upper lobe connects to a small right upper lobe bronchus.  Left upper lobe nodule is slightly increased currently 1.3 cm compared to 1.1 cm previously.    Based on the lack of progression she was started on durvalumab immunotherapy consolidation on May 28, 2024.    CT July 18, 2024 shows new micronodule opacities at the left upper lobe extending to the lingula could be infection or inflammation.  Cavitary lesion in the right upper lobe is stable in size.  Additional satellite nodules are stable in size.  Stable prominent indeterminate left upper lobe nodule.  Interval decrease in consolidation on an interstitial thickening surrounding the cavitary lesion.  Few nodules at the left upper lobe are slightly more prominent.  Stable small right pulmonary and fluid.  New identified but small inferior mediastinal lymph nodes are technically indeterminate.    CT chest October 10, 2024 shows increased size of the right upper lobe cavitary lesion with surrounding consolidative opacities may be due to evolving postradiation changes.  The size is 3.2 x 2.1 cm compared to 2.4 x 1.7 cm.  Few satellite nodules stable.  Left upper lobe irregular nodule 14 x 10 mm stable since July.  But gradually increased in size compared to December concerning for neoplasm.  Indeterminate small enhancing focus in the posterior right hepatic lobe 1.7 x 1.0 cm and benign perfusional abnormality.  Interval History  She is here today for follow-up and to continue with durvalumab.  She had a CT scan last week and here to review the results.  She has been anxious about the results she read on the Internet and her blood pressure today is higher.  She has high blood pressure and has been working  with her cardiologist and the plan is to regroup in 3 weeks with her cardiologist to review her readings.  She does take her blood pressure medications regularly.  No increasing shortness of breath or cough.  She requests a refill on Compazine which she uses for intermittent nausea associated with treatment.  CBC from today is normal and her chemistry panel and thyroid test have been normal.  CT was reviewed showing some increase in her right upper lung nodule as detailed above          Past Medical History   I have reviewed this patient's medical history and updated it with pertinent information if needed.   Past Medical History:   Diagnosis Date    Alcohol use disorder, severe, dependence (H) 10/14/2016    Alcoholism (H) 10/14/2016    Anemia     Benign essential hypertension 10/14/2016    Carotid artery disease (H)     mile plaque 5/7    Chemical dependency (H)     alcohol    COPD (chronic obstructive pulmonary disease) (H)     Coronary artery disease     Depression with anxiety     Esophageal cancer (H) 03/22/2016    SQUAMOUS CELL    Esophageal mass     GERD (gastroesophageal reflux disease)     Hx of pancreatitis 2003    Hypercholesteremia     Hyperglycemia     Hyperlipemia     Impaired fasting glucose 10/14/2016    Nocturnal leg cramps     Other chronic pain     Pancreatic pseudocyst 10/14/2016    Pancreatitis     with pseudocyst    Pap smear with atypical squamous cells, cannot exclude high grade squamous intraepithelial lesion (ASC-H) 10/14/2016    Colpo impression benign, ECC benign. Cotestin in 1 yr.    Shingles     Squamous cell carcinoma of right lung (H)     Vasomotor rhinitis        Past Surgical History   I have reviewed this patient's surgical history and updated it with pertinent information if needed.  Past Surgical History:   Procedure Laterality Date    AAA REPAIR      splenic artery aneurysm embolization    ABDOMEN SURGERY  2/2/2016    COLONOSCOPY  11/26/2013    Procedure: COMBINED COLONOSCOPY,  SINGLE BIOPSY/POLYPECTOMY BY BIOPSY;  COLONOSCOPY (MAC);  Surgeon: Montana Rosa MD;  Location:  GI    COLONOSCOPY  11/26/2013    COLONOSCOPY N/A 10/4/2018    Procedure: COMBINED COLONOSCOPY, SINGLE OR MULTIPLE BIOPSY/POLYPECTOMY BY BIOPSY;  colonoscopy;  Surgeon: Gio Apodaca MD;  Location:  GI    ENT SURGERY      ESOPHAGOGASTRECTOMY N/A 3/22/2016    Procedure: ESOPHAGOGASTRECTOMY;  Surgeon: Alvin Riojas MD;  Location:  OR    ESOPHAGOSCOPY, GASTROSCOPY, DUODENOSCOPY (EGD), COMBINED N/A 12/22/2015    Procedure: COMBINED ENDOSCOPIC ULTRASOUND, ESOPHAGOSCOPY, GASTROSCOPY, DUODENOSCOPY (EGD), FINE NEEDLE ASPIRATE/BIOPSY;  Surgeon: Danelle Michael MD;  Location:  GI    GASTROSTOMY, INSERT TUBE, COMBINED N/A 2/2/2016    Procedure: COMBINED GASTROSTOMY, INSERT TUBE (OPEN);  Surgeon: Alvin Riojas MD;  Location:  OR    GI SURGERY  12/22/2015    HAND SURGERY      right    HERNIORRHAPHY INCISIONAL (LOCATION) N/A 3/19/2019    Procedure: LAPARSCOPIC  INCISIONAL HERNIA REPAIR WITH MESH, LAPARSCOPIC LYSIS OF ADHENSIONS;  Surgeon: Lamberto Magaña MD;  Location:  OR    INSERT PORT VASCULAR ACCESS N/A 12/28/2015    Procedure: INSERT PORT VASCULAR ACCESS;  Surgeon: Alvin Riojas MD;  Location:  OR    INSERT PORT VASCULAR ACCESS N/A 1/9/2024    Procedure: SMART PORT PLACEMENT;  Surgeon: Alvin Riojas MD;  Location:  OR    LAPAROSCOPIC CHOLECYSTECTOMY N/A 3/19/2019    Procedure: LAPAROSCOPIC CHOLECYSTECTOMY;  Surgeon: Lamberto Magaña MD;  Location:  OR    LOBECTOMY LUNG Right 10/16/2018    Procedure: LOBECTOMY LUNG;  Surgeon: Alvin Riojas MD;  Location:  OR    REMOVE PORT VASCULAR ACCESS Left 7/22/2016    Procedure: REMOVE PORT VASCULAR ACCESS;  Surgeon: Alvin Riojas MD;  Location:  OR    THORACOTOMY Right 10/16/2018    Procedure: REDO RIGHT THORACTOMY AND RIGHT LOWER LOBECTOMY, PLEURAL LYSIS;   Surgeon: Alvin Riojas MD;  Location: [facility] OR    TONSILLECTOMY      VASCULAR SURGERY         Prior to Admission Medications   Prior to Admission Medications   Prescriptions Last Dose Informant Patient Reported? Taking?   ANORO ELLIPTA 62.5-25 MCG/ACT oral inhaler 2024 Morning  Yes Yes   Sig: Inhale 1 puff into the lungs daily.   albuterol (PROAIR HFA/PROVENTIL HFA/VENTOLIN HFA) 108 (90 Base) MCG/ACT inhaler Past Month  Yes Yes   Sig: Inhale 2 puffs into the lungs every 6 hours as needed for shortness of breath, wheezing or cough.   aspirin (ASA) 81 MG EC tablet 2024 Morning  Yes Yes   Si tablet every other day   atorvastatin (LIPITOR) 40 MG tablet 2024 Morning  No Yes   Sig: Take 1 tablet (40 mg) by mouth daily.   durvalumab 2024  Yes Yes   Sig: Inject into the vein every 14 days.   fluticasone (FLONASE) 50 MCG/ACT nasal spray 2024 Morning Self No Yes   Sig: INSTILL 2 SPRAYS INTO BOTH NOSTRILS DAILY.   furosemide (LASIX) 40 MG tablet 2024  No Yes   Sig: Take 0.5 tablets (20 mg) by mouth daily as needed (edema or shortness of breath) For worsening shortness of breath, leg swelling or weight gain.   Patient taking differently: Take 20-40 mg by mouth daily as needed (edema or shortness of breath). For worsening shortness of breath, leg swelling or weight gain.   gabapentin (NEURONTIN) 600 MG tablet 2024 Morning  No Yes   Sig: TAKE ONE (1) TABLET BY MOUTH THREE TIMES DAILY.   ipratropium (ATROVENT) 0.06 % nasal spray 2024 Morning  No Yes   Sig: Spray 2 sprays into both nostrils 4 times daily.   losartan (COZAAR) 50 MG tablet 2024 Morning  No Yes   Sig: Take 1 tablet (50 mg) by mouth daily. Appointment required for further refills   metoprolol succinate ER (TOPROL XL) 50 MG 24 hr tablet 2024 Morning  No Yes   Sig: Take 1 tablet (50 mg) by mouth every morning.   multivitamin w/minerals (MULTI-VITAMIN) tablet 2024 Morning  Yes Yes   Sig: Take 1 tablet by  mouth daily.   omeprazole (PRILOSEC) 40 MG DR capsule 12/8/2024 Morning  No Yes   Sig: TAKE 1 CAPSULE BY MOUTH EVERY DAY   ondansetron (ZOFRAN) 4 MG tablet Past Week  Yes Yes   Sig: Take 8 mg by mouth every 8 hours as needed for nausea.   prochlorperazine (COMPAZINE) 10 MG tablet Past Week  Yes Yes   Sig: Take 10 mg by mouth every 6 hours as needed for nausea or vomiting.   spironolactone (ALDACTONE) 25 MG tablet 12/8/2024 Morning  No Yes   Sig: Take 0.5 tablets (12.5 mg) by mouth every morning.      Facility-Administered Medications: None     Allergies   Allergies   Allergen Reactions    Codeine Sulfate Nausea    Simvastatin Cramps     Leg cramps    Morphine      Pt unsure - pt does not believe this is an allergy of hers    Pcn [Penicillins] Rash       Social History   I have reviewed this patient's social history and updated it with pertinent information if needed. Kezia Dixon  reports that she quit smoking about 9 years ago. Her smoking use included cigarettes. She started smoking about 54 years ago. She has a 11.4 pack-year smoking history. She has never used smokeless tobacco. She reports that she does not currently use alcohol. She reports that she does not use drugs.    Family History   I have reviewed this patient's family history and updated it with pertinent information if needed.   Family History   Problem Relation Age of Onset    Depression Mother     Substance Abuse Father     Other Cancer Father         melanoma    Prostate Cancer Father     Diabetes Father     Substance Abuse Paternal Grandfather     Other Cancer Brother         melanoma    Substance Abuse Brother     Other Cancer Brother         melanoma    Other Cancer Brother         melanoma    Other Cancer Brother         melanoma       Review of Systems   The 10 point Review of Systems is negative other than noted in the HPI     Physical Exam   Temp: 98.5  F (36.9  C) Temp src: Oral BP: 113/42 Pulse: 97   Resp: 16 SpO2: 94 % O2 Device:  None (Room air)    Vital Signs with Ranges  Temp:  [97.7  F (36.5  C)-98.5  F (36.9  C)] 98.5  F (36.9  C)  Pulse:  [] 97  Resp:  [13-25] 16  BP: (113-154)/(42-72) 113/42  SpO2:  [90 %-99 %] 94 %  85 lbs 0 oz    No acute distress.  HEENT: Normocephalic atraumatic sclera anicteric  Neck: Supple no JVD lymphadenopathy.  Lungs: No respiratory distress no wheezing.  Heart: Regular.  Abdomen: Soft nontender no organomegaly.  Extremities: No cyanosis or edema.  Neurological: Nonfocal.  Skin: Limited exam no petechia or ecchymosis.    Data   Results for orders placed or performed during the hospital encounter of 12/08/24 (from the past 24 hours)   EKG 12-lead, tracing only   Result Value Ref Range    Systolic Blood Pressure  mmHg    Diastolic Blood Pressure  mmHg    Ventricular Rate 88 BPM    Atrial Rate 88 BPM    TX Interval 130 ms    QRS Duration 138 ms     ms    QTc 440 ms    P Axis 66 degrees    R AXIS 83 degrees    T Axis 70 degrees    Interpretation ECG       Sinus rhythm  Non-specific intra-ventricular conduction block  Abnormal ECG  When compared with ECG of 19-Nov-2024 14:44,  No significant change was found  Confirmed by GENERATED REPORT, COMPUTER ([id number]),  Gloria Maynard ([id number]) on 12/8/2024 8:08:41 PM     CBC with Platelets & Differential    Narrative    The following orders were created for panel order CBC with Platelets & Differential.  Procedure                               Abnormality         Status                     ---------                               -----------         ------                     CBC with platelets and d...[[id number]]  Abnormal            Final result               RBC and Platelet Morphology[[id number]]                                                   Please view results for these tests on the individual orders.   Comprehensive metabolic panel   Result Value Ref Range    Sodium 127 (L) 135 - 145 mmol/L    Potassium 2.7 (L) 3.4 - 5.3 mmol/L    Carbon Dioxide  (CO2) 29 22 - 29 mmol/L    Anion Gap 12 7 - 15 mmol/L    Urea Nitrogen 6.6 (L) 8.0 - 23.0 mg/dL    Creatinine 0.42 (L) 0.51 - 0.95 mg/dL    GFR Estimate >90 >60 mL/min/1.73m2    Calcium 8.3 (L) 8.8 - 10.4 mg/dL    Chloride 86 (L) 98 - 107 mmol/L    Glucose 101 (H) 70 - 99 mg/dL    Alkaline Phosphatase 90 40 - 150 U/L    AST 18 0 - 45 U/L    ALT 9 0 - 50 U/L    Protein Total 6.4 6.4 - 8.3 g/dL    Albumin 2.9 (L) 3.5 - 5.2 g/dL    Bilirubin Total 0.7 <=1.2 mg/dL   Ethyl Alcohol Level   Result Value Ref Range    Alcohol ethyl <0.01 <=0.01 g/dL   Magnesium   Result Value Ref Range    Magnesium 1.6 (L) 1.7 - 2.3 mg/dL   Lipase   Result Value Ref Range    Lipase 7 (L) 13 - 60 U/L   Troponin T, High Sensitivity   Result Value Ref Range    Troponin T, High Sensitivity 7 <=14 ng/L   Influenza A/B, RSV and SARS-CoV2 PCR (COVID-19) Nasopharyngeal    Specimen: Nasopharyngeal; Swab   Result Value Ref Range    Influenza A PCR Negative Negative    Influenza B PCR Negative Negative    RSV PCR Negative Negative    SARS CoV2 PCR Negative Negative    Narrative    Testing was performed using the Xpert Xpress CoV2/Flu/RSV Assay on the [employer] GeneXpert Instrument. This test should be ordered for the detection of SARS-CoV2, influenza, and RSV viruses in individuals with signs and symptoms of respiratory tract infection. This test is for in vitro diagnostic use under the [place] FDA for laboratories certified under CLIA to perform high or moderate complexity testing. This test has been [place] FDA cleared. A negative result does not rule out the presence of PCR inhibitors in the specimen or target RNA in concentration below the limit of detection for the assay. If only one viral target is positive but coinfection with multiple targets is suspected, the sample should be re-tested with another FDA cleared, approved, or authorized test, if coninfection would change clinical management. This test was validated by the [facility].  These laboratories are certified under the Clinical Laboratory Improvement Amendments of 1988 (CLIA-88) as qualified to perfom high complexity laboratory testing.   CBC with platelets and differential   Result Value Ref Range    WBC Count 9.8 4.0 - 11.0 10e3/uL    RBC Count 3.11 (L) 3.80 - 5.20 10e6/uL    Hemoglobin 9.0 (L) 11.7 - 15.7 g/dL    Hematocrit 27.4 (L) 35.0 - 47.0 %    MCV 88 78 - 100 fL    MCH 28.9 26.5 - 33.0 pg    MCHC 32.8 31.5 - 36.5 g/dL    RDW 13.6 10.0 - 15.0 %    Platelet Count 393 150 - 450 10e3/uL    % Neutrophils 85 %    % Lymphocytes 4 %    % Monocytes 10 %    % Eosinophils 0 %    % Basophils 0 %    % Immature Granulocytes 0 %    NRBCs per 100 WBC 0 <1 /100    Absolute Neutrophils 8.4 (H) 1.6 - 8.3 10e3/uL    Absolute Lymphocytes 0.4 (L) 0.8 - 5.3 10e3/uL    Absolute Monocytes 1.0 0.0 - 1.3 10e3/uL    Absolute Eosinophils 0.0 0.0 - 0.7 10e3/uL    Absolute Basophils 0.0 0.0 - 0.2 10e3/uL    Absolute Immature Granulocytes 0.0 <=0.4 10e3/uL    Absolute NRBCs 0.0 10e3/uL   Iron and iron binding capacity   Result Value Ref Range    Iron 15 (L) 37 - 145 ug/dL    Iron Binding Capacity 191 (L) 240 - 430 ug/dL    Iron Sat Index 8 (L) 15 - 46 %   Ferritin   Result Value Ref Range    Ferritin 196 11 - 328 ng/mL   Transferrin   Result Value Ref Range    Transferrin 155.0 (L) 200.0 - 360.0 mg/dL   Vitamin B12   Result Value Ref Range    Vitamin B12 590 232 - 1,245 pg/mL   CT Abdomen Pelvis w Contrast    Narrative    EXAM: CT ABDOMEN AND PELVIS WITH CONTRAST  LOCATION: [facility]  DATE: 12/08/2024    INDICATION: Upper abdominal pain, chronic nausea, concern for bowel obstruction vs metastatic disease.  COMPARISON: 10/22/2024.  TECHNIQUE: CT scan of the abdomen and pelvis was performed following injection of IV contrast. Multiplanar reformats were obtained. Dose reduction techniques were used.  CONTRAST: 41 mL Isovue 370.    FINDINGS:   LOWER CHEST: Gastric pull-through changes.  Trace residual fluid on the right. Emphysema. No definite acute infiltrates.    HEPATOBILIARY: No significant mass or bile duct dilatation. No calcified gallstones.     PANCREAS: No significant mass, duct dilatation, or inflammatory change.    SPLEEN: Normal size.    ADRENAL GLANDS: No significant nodules.    KIDNEYS/BLADDER: No significant mass, stone, or hydronephrosis.    BOWEL: No obstruction or inflammatory change. Normal appendix. No diverticulitis.    LYMPH NODES: No lymphadenopathy.    VASCULATURE: There are extensive atherosclerotic changes of the visualized aorta and its branches. There is no evidence of aortic dissection or aneurysm.    PELVIC ORGANS: No pelvic masses.    MUSCULOSKELETAL: No frankly destructive bony lesions. Scoliosis.      Impression    IMPRESSION:   1.  No acute process demonstrated.       XR Chest Port 1 View    Narrative    EXAM: XR CHEST PORT 1 VIEW  LOCATION: [facility]  DATE: 12/8/2024    INDICATION: Cough, eval for PNA  COMPARISON: 1/9/2024. 11/19/2024.      Impression    IMPRESSION: Posttreatment changes in the right lung including a right lower lobectomy. Opacities in the right midlung are not significantly changed from 11/19/2024 and may be due to evolving posttreatment pneumonitis or infection. No acute infiltrates on   the left. Normal heart size and pulmonary vascularity. Infusion port tip in the SVC.   Troponin T, High Sensitivity   Result Value Ref Range    Troponin T, High Sensitivity 7 <=14 ng/L   TSH with free T4 reflex   Result Value Ref Range    TSH 1.38 0.30 - 4.20 uIU/mL   Procalcitonin   Result Value Ref Range    Procalcitonin 0.14 <0.50 ng/mL   Basic metabolic panel   Result Value Ref Range    Sodium 129 (L) 135 - 145 mmol/L    Potassium 3.4 3.4 - 5.3 mmol/L    Chloride 91 (L) 98 - 107 mmol/L    Carbon Dioxide (CO2) 24 22 - 29 mmol/L    Anion Gap 14 7 - 15 mmol/L    Urea Nitrogen 5.9 (L) 8.0 - 23.0 mg/dL    Creatinine 0.41 (L) 0.51 - 0.95  mg/dL    GFR Estimate >90 >60 mL/min/1.73m2    Calcium 8.2 (L) 8.8 - 10.4 mg/dL    Glucose 93 70 - 99 mg/dL   US Lower Extremity Venous Duplex Right    Narrative    EXAM: US LOWER EXTREMITY VENOUS DUPLEX RIGHT  LOCATION: [facility]  DATE: 12/8/2024    INDICATION: Right leg swelling.  COMPARISON: None.  TECHNIQUE: Venous Duplex ultrasound of the right lower extremity with and without compression, augmentation and duplex. Color flow and spectral Doppler with waveform analysis performed.    FINDINGS: Exam includes the common femoral, femoral, popliteal, and contralateral common femoral veins as well as segmentally visualized deep calf veins and greater saphenous vein.     RIGHT: No deep vein thrombosis. No superficial thrombophlebitis. No popliteal cyst.      Impression    IMPRESSION:  1.  No deep venous thrombosis in the right lower extremity.   Osmolality   Result Value Ref Range    Osmolality Blood 272 (L) 280 - 301 mmol/kg    Narrative    Greater than 385 mmol/kg relates to stupor in hyperglycemia   Greater than 400 mmol/kg can relate to seizures   Greater than 420 mmol/kg can be lethal    Serum Osmalar Gap:   Normal <10   Larger suggest unmeasured substances present in serum (ethanol, methanol, isopropanol, mannitol, ethylene glycol).   Potassium   Result Value Ref Range    Potassium 3.8 3.4 - 5.3 mmol/L   Basic metabolic panel   Result Value Ref Range    Sodium 129 (L) 135 - 145 mmol/L    Potassium 3.8 3.4 - 5.3 mmol/L    Chloride 91 (L) 98 - 107 mmol/L    Carbon Dioxide (CO2) 25 22 - 29 mmol/L    Anion Gap 13 7 - 15 mmol/L    Urea Nitrogen 7.5 (L) 8.0 - 23.0 mg/dL    Creatinine 0.32 (L) 0.51 - 0.95 mg/dL    GFR Estimate >90 >60 mL/min/1.73m2    Calcium 8.3 (L) 8.8 - 10.4 mg/dL    Glucose 106 (H) 70 - 99 mg/dL   Magnesium   Result Value Ref Range    Magnesium 2.2 1.7 - 2.3 mg/dL   Phosphorus   Result Value Ref Range    Phosphorus 2.6 2.5 - 4.5 mg/dL   Folate   Result Value Ref Range     Folic Acid 16.7 4.6 - 34.8 ng/mL   Sodium   Result Value Ref Range    Sodium 129 (L) 135 - 145 mmol/L   Comprehensive metabolic panel   Result Value Ref Range    Sodium 129 (L) 135 - 145 mmol/L    Potassium 3.5 3.4 - 5.3 mmol/L    Carbon Dioxide (CO2) 24 22 - 29 mmol/L    Anion Gap 14 7 - 15 mmol/L    Urea Nitrogen 9.2 8.0 - 23.0 mg/dL    Creatinine 0.45 (L) 0.51 - 0.95 mg/dL    GFR Estimate >90 >60 mL/min/1.73m2    Calcium 8.3 (L) 8.8 - 10.4 mg/dL    Chloride 91 (L) 98 - 107 mmol/L    Glucose 91 70 - 99 mg/dL    Alkaline Phosphatase 82 40 - 150 U/L    AST 19 0 - 45 U/L    ALT 9 0 - 50 U/L    Protein Total 6.0 (L) 6.4 - 8.3 g/dL    Albumin 2.7 (L) 3.5 - 5.2 g/dL    Bilirubin Total 0.6 <=1.2 mg/dL   CBC with platelets   Result Value Ref Range    WBC Count 10.7 4.0 - 11.0 10e3/uL    RBC Count 3.09 (L) 3.80 - 5.20 10e6/uL    Hemoglobin 9.4 (L) 11.7 - 15.7 g/dL    Hematocrit 27.4 (L) 35.0 - 47.0 %    MCV 89 78 - 100 fL    MCH 30.4 26.5 - 33.0 pg    MCHC 34.3 31.5 - 36.5 g/dL    RDW 13.5 10.0 - 15.0 %    Platelet Count 405 150 - 450 10e3/uL   Magnesium Lab   Result Value Ref Range    Magnesium 2.1 1.7 - 2.3 mg/dL   Phosphorus Lab   Result Value Ref Range    Phosphorus 2.9 2.5 - 4.5 mg/dL   Hemoglobin A1c   Result Value Ref Range    Estimated Average Glucose 160 (H) <117 mg/dL    Hemoglobin A1C 7.2 (H) <5.7 %

## 2024-12-09 NOTE — PLAN OF CARE
Goal Outcome Evaluation:      Plan of Care Reviewed With: patient          Outcome Evaluation: Home with [facility]    Leeanna Bravo RN, BSN, Care Coordinator

## 2024-12-09 NOTE — PROGRESS NOTES
"12/8/24; 5439-2421    A&O X4; A1 gb/walker to BR; US venous duplex results pending; back \"discomfort\" managed with scheduled Gabapentin; pan electrolyte protocols.  "

## 2024-12-09 NOTE — PLAN OF CARE
Diagnosis:Admitted for generalized weakness, new anemia, nausea, unable to take PO, electrolyte imbalances  Mental Status:A/Ox4  Activity/dangle: SBA  Diet: Clears. Not tolerating. PRN Zofran x2  Pain: Denies  Jaeger/Voiding:Cont. Needs U/A  Tele/Restraints/Iso:Tele NSR  02/LDA:VSS on RA  Other Info: K, Mag, Phos protocols,  L chest port, congested cough, PRN Tessalon x1

## 2024-12-09 NOTE — PROGRESS NOTES
[facility]    Medicine Progress Note - Hospitalist Service    Date of Admission:  12/8/2024    Assessment & Plan     Kezia Dixon is a 69 year old female with history bilateral lung adenocarcinoma, currently on immunotherapy with plan to start SBRT of JULIEN nodule, previous esophageal cancer s/p esophagectomy and chemoradiation 92016), s/p lobectomy for right lung squamous cell cancer 92018), COPD, heavy tobacco use in past, resolved stress-induced cardiomyopathy, nonobstructive CAD, alcohol use disorder, who presented on 12/8/2024 with generalized weakness, physical deconditioning, acute on chronic nausea, anorexia.  Workup showed dehydration, multiple electrolyte abnormalities, new anemia.    Workup negative for infection.  CT A/P showed process.  Chest x-ray stable from before.  No new infiltrate.  Lower extremity ultrasound negative for DVT.    Acute worsening of chronic nausea  Anorexia  Chronic abdominal pain  -Has chronic nausea and abdominal pain for several years.  Reported significant anorexia and poor intake for 3-4 days before admission.  Abdominal pain stable  -No evidence of infection.  Afebrile, no leukocytosis, COVID-19/influenza/RSV negative.  TSH normal  -CT A/P and chest x-ray negative for any new acute findings.  -Improving with supportive care.  Nausea is improved.   -Advance to full liquid diet.  Continue Ensure, multivitamin  -Consult nutritional services  -Continue IV PPI and IV fluid    Multiple electrolyte abnormalities  Hyponatremia  Hypokalemia   Hypomagnesemia  -Admission labs showed sodium 127, potassium 2.7, magnesium 1.6.  Electrolytes replaced on admission  -Is on as needed Lasix.  Took 40 mg p.o. Lasix for 3 consecutive days, 2 days before admission which contributed to electrolyte abnormalities  -Baseline sodium 134-140.  Sodium 127 on admission, now improved to 129  -Potassium was 2.7 on admission.  Now improved to 3.5 with replacement  -Continue to  monitor and replace as needed  -Continue LR at 75 mL/h.      Generalized weakness  Physical deconditioning  -Has been more weak and deconditioned since recent hospitalization 2-3 weeks ago  -PT/OT evaluation.   consult for discharge planning    Acute normocytic anemia  -Hemoglobin was 12.6 on 11/20, now down to 9.  Patient denied any obvious bleeding.  -[place] [facility] consulted.  Undergoing evaluation for hemolytic anemia.  Bilirubin normal on admission  -Iron studies showed low iron saturation of 8%, low iron binding capacity, normal vitamin B12 at 519, normal TSH, reticulocyte count 1.4  -Receiving IV iron      Multiple bilateral pulmonary nodules due to adenocarcinoma, 2023  -S/p bronchoscopy with bronchial ultrasound-guided biopsy.  Pathology from right upper lobe nodule was positive for small cell lung cancer (adenocarcinoma).  Biopsy from left upper lobe nodule showed atypical cells.  This was suspicious but not definitive for adenocarcinoma.  Lymph nodes were negative.  -S/p chemoradiation (of RUL) completed 2/2024  -Started on consolidation durvalumab immunotherapy 5/2024.  Last dose was on 11/25/2024  -Most recent CT scans 10/2024 showed further increase in RUL and JULIEN nodules and indeterminate right hepatic lobe lesion.  There is plan to start stereotactic body radiotherapy (SBRT) to the JULIEN nodule on 12/9. 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    Chronic progressive shortness of breath  COPD  -Progressive shortness of breath is likely multifactorial-due to underlying COPD, lung cancer  -No evidence of pneumonia on admission  chest x-ray  -Continue Mucinex tablet, Anoro     GERD  -Continue PPI    Resolved stress induced cardiomyopathy  PTA-Toprol-XL 50 mg daily, losartan 50 mg daily, spironolactone 12.5 mg daily, as needed Lasix   -most recent echo 10/2/2024 showed normal LVEF of 65-70%, no WMA, normal RV size and function, mild to moderate mitral regurgitation  -Continue Toprol-XL and losartan  -Hold spironolactone and Lasix    Nonobstructive coronary artery disease  PTA-aspirin 81 mg every other day  -Continue aspirin and atorvastatin    Alcohol use disorder  -Apparently has been sober for 2 years.  Recently had relapse of alcohol intake when she found out about worsening lung cancer.  She was assessed by chemical dependency.  She declined psychiatric consultation.  -Admission send reported has not been drinking any alcohol since her hospital discharge      Diabetes mellitus type 2, new diagnosis  -Previously had prediabetes.  A1c on admission 7.2  -Could consider starting on metformin at discharge.  Else can follow-up with PCP for monitoring       Right lower extremity swelling  -Ultrasound negative for DVT.             Diet: Snacks/Supplements Adult: Ensure Clear; With Meals  Snacks/Supplements Adult: Gelatein 20 (sugar-free); With Meals  Full Liquid Diet    DVT Prophylaxis: Pneumatic Compression Devices  Jaeger Catheter: Not present  Lines: PRESENT      Port a Cath 01/09/24 Single Lumen Left Chest wall-Site Assessment: WDL      Cardiac Monitoring: ACTIVE order. Indication: Electrolyte Imbalance (24 hours)- Magnesium <1.3 mg/ml; Potassium < =2.8 or > 5.5 mg/ml  Code Status: No CPR- Do NOT Intubate      Clinically Significant Risk Factors Present on Admission        # Hypokalemia: Lowest K = 2.7 mmol/L in last 2 days, will replace as needed  # Hyponatremia: Lowest Na = 127 mmol/L in last 2 days, will monitor as appropriate  # Hypochloremia: Lowest Cl = 86 mmol/L in last 2 days, will monitor as appropriate    # Hypomagnesemia: Lowest Mg  "= 1.6 mg/dL in last 2 days, will replace as needed   # Hypoalbuminemia: Lowest albumin = 2.7 g/dL at 12/9/2024  6:56 AM, will monitor as appropriate   # Drug Induced Platelet Defect: home medication list includes an antiplatelet medication   # Hypertension: Noted on problem list  # Chronic heart failure with preserved ejection fraction: heart failure noted on problem list and last echo with EF >50%     # Anemia: based on hgb <11      # DMII: A1C = 7.2 % (Ref range: <5.7 %) within past 6 months    # Cachexia: Estimated body mass index is 16.06 kg/m  as calculated from the following:    Height as of 11/19/24: 1.549 m (5' 1\").    Weight as of this encounter: 38.6 kg (85 lb).    # Moderate Malnutrition: based on nutrition assessment     # COPD: noted on problem list        Social Drivers of Health    Tobacco Use: Medium Risk (9/17/2024)    Patient History     Smoking Tobacco Use: Former     Smokeless Tobacco Use: Never   Physical Activity: Insufficiently Active (2/6/2024)    Exercise Vital Sign     Days of Exercise per Week: 5 days     Minutes of Exercise per Session: 10 min   Stress: Stress Concern Present (2/6/2024)    [employer] - Occupational Stress Questionnaire     Feeling of Stress : Rather much   Social Connections: Unknown (2/6/2024)    Social Connection and Isolation Panel [NHANES]     Frequency of Social Gatherings with Friends and Family: More than three times a week          Disposition Plan         Medically Ready for Discharge: Anticipated Tomorrow             Eloise Fisher MD  Hospitalist Service  [facility]  Securely message with [email or web address] (more info)  Text page via [employer] Paging/Directory   ______________________________________________________________________    Interval History     Patient reports she feels better today.  Nausea has improved.  Wants to advance diet  Vital signs are stable   reports her strength is better  Denies any significant " pain    Physical Exam   Vital Signs: Temp: 97.5  F (36.4  C) Temp src: Oral BP: 118/42 Pulse: 87   Resp: 16 SpO2: 99 % O2 Device: None (Room air)    Weight: 85 lbs 0 oz    Constitutional - awake and alert, in no acute distress  Cardiovascular - regular rate and rhythm, no murmurs, no edema  Pulmonary -breath sounds diminished bilaterally, no wheezing or rhonchi   GI - abdomen is soft, nontender, nondistended  Integumentary - skin is warm and dry, no rashes or ulcers  Neurological - awake, alert and oriented x3.  Moving all 4 extremities, normal speech, no focal deficits    Medical Decision Making       50 MINUTES SPENT BY ME on the date of service doing chart review, history, exam, documentation & further activities per the note.      Data     I have personally reviewed the following data over the past 24 hrs:    10.7  \   9.4 (L)   / 405     129 (L); 129 (L) 91 (L) 9.2 /  91   3.5 24 0.45 (L) \     ALT: 9 AST: 19 AP: 82 TBILI: 0.6   ALB: 2.7 (L) TOT PROTEIN: 6.0 (L) LIPASE: 7 (L)     Trop: 7 BNP: N/A     TSH: 1.38 T4: N/A A1C: 7.2 (H)     Procal: 0.14 CRP: N/A Lactic Acid: N/A       Ferritin:  196 % Retic:  1.4 LDH:  147       Imaging results reviewed over the past 24 hrs:   Recent Results (from the past 24 hours)   CT Abdomen Pelvis w Contrast    Narrative    EXAM: CT ABDOMEN AND PELVIS WITH CONTRAST  LOCATION: [facility]  DATE: 12/08/2024    INDICATION: Upper abdominal pain, chronic nausea, concern for bowel obstruction vs metastatic disease.  COMPARISON: 10/22/2024.  TECHNIQUE: CT scan of the abdomen and pelvis was performed following injection of IV contrast. Multiplanar reformats were obtained. Dose reduction techniques were used.  CONTRAST: 41 mL Isovue 370.    FINDINGS:   LOWER CHEST: Gastric pull-through changes. Trace residual fluid on the right. Emphysema. No definite acute infiltrates.    HEPATOBILIARY: No significant mass or bile duct dilatation. No calcified gallstones.      PANCREAS: No significant mass, duct dilatation, or inflammatory change.    SPLEEN: Normal size.    ADRENAL GLANDS: No significant nodules.    KIDNEYS/BLADDER: No significant mass, stone, or hydronephrosis.    BOWEL: No obstruction or inflammatory change. Normal appendix. No diverticulitis.    LYMPH NODES: No lymphadenopathy.    VASCULATURE: There are extensive atherosclerotic changes of the visualized aorta and its branches. There is no evidence of aortic dissection or aneurysm.    PELVIC ORGANS: No pelvic masses.    MUSCULOSKELETAL: No frankly destructive bony lesions. Scoliosis.      Impression    IMPRESSION:   1.  No acute process demonstrated.       XR Chest Port 1 View    Narrative    EXAM: XR CHEST PORT 1 VIEW  LOCATION: [facility]  DATE: 12/8/2024    INDICATION: Cough, eval for PNA  COMPARISON: 1/9/2024. 11/19/2024.      Impression    IMPRESSION: Posttreatment changes in the right lung including a right lower lobectomy. Opacities in the right midlung are not significantly changed from 11/19/2024 and may be due to evolving posttreatment pneumonitis or infection. No acute infiltrates on   the left. Normal heart size and pulmonary vascularity. Infusion port tip in the SVC.   US Lower Extremity Venous Duplex Right    Narrative    EXAM: US LOWER EXTREMITY VENOUS DUPLEX RIGHT  LOCATION: [facility]  DATE: 12/8/2024    INDICATION: Right leg swelling.  COMPARISON: None.  TECHNIQUE: Venous Duplex ultrasound of the right lower extremity with and without compression, augmentation and duplex. Color flow and spectral Doppler with waveform analysis performed.    FINDINGS: Exam includes the common femoral, femoral, popliteal, and contralateral common femoral veins as well as segmentally visualized deep calf veins and greater saphenous vein.     RIGHT: No deep vein thrombosis. No superficial thrombophlebitis. No popliteal cyst.      Impression    IMPRESSION:  1.  No deep  venous thrombosis in the right lower extremity.

## 2024-12-09 NOTE — RESULT ENCOUNTER NOTE
The following letter pertains to your most recent diagnostic tests:    Your hemoglobin A1c test which averages your blood sugars over the last 3 months returned at 7.2 which indicates a new diagnosis of diabetes.       I don't know if this has anything to do with immunotherapy.    I think if you need immunotherapy to help your cancer, this finding should not be a reason to stop immunotherapy.    When you get out of the hospital, we should schedule an appointment to discuss how to manage your blood sugars.           Sincerely,    Dr. Velásquez

## 2024-12-09 NOTE — CONSULTS
Care Management Initial Consult    General Information  Assessment completed with: Kezia Parsons  Type of CM/SW Visit: Initial Assessment    Primary Care Provider verified and updated as needed: Yes   Readmission within the last 30 days: previous discharge plan unsuccessful   Return Category: Exacerbation of disease  Reason for Consult: discharge planning  Advance Care Planning: Advance Care Planning Reviewed: present on chart          Communication Assessment  Patient's communication style: spoken language (English or Bilingual)             Cognitive  Cognitive/Neuro/Behavioral: WDL                      Living Environment:   People in home: alone     Current living Arrangements: apartment      Able to return to prior arrangements: yes       Family/Social Support:  Care provided by: self  Provides care for: no one  Marital Status:   Support system: Children          Description of Support System: Supportive, Involved         Current Resources:   Patient receiving home care services: No        Community Resources: None  Equipment currently used at home: walker, rolling (when outside of condo)  Supplies currently used at home: None    Employment/Financial:  Employment Status: retired        Financial Concerns: none   Referral to Financial Worker: No       Does the patient's insurance plan have a 3 day qualifying hospital stay waiver?  No    Lifestyle & Psychosocial Needs:  Social Drivers of Health     Food Insecurity: Low Risk  (11/19/2024)    Food Insecurity     Within the past 12 months, did you worry that your food would run out before you got money to buy more?: No     Within the past 12 months, did the food you bought just not last and you didn t have money to get more?: No   Depression: Not at risk (1/4/2024)    PHQ-2     PHQ-2 Score: 2   Housing Stability: Low Risk  (11/19/2024)    Housing Stability     Do you have housing? : Yes     Are you worried about losing your housing?: No   Tobacco Use: Medium  Risk (9/17/2024)    Patient History     Smoking Tobacco Use: Former     Smokeless Tobacco Use: Never     Passive Exposure: Not on file   Financial Resource Strain: Low Risk  (11/19/2024)    Financial Resource Strain     Within the past 12 months, have you or your family members you live with been unable to get utilities (heat, electricity) when it was really needed?: No   Alcohol Use: Not At Risk (11/8/2021)    AUDIT-C     Frequency of Alcohol Consumption: Never     Average Number of Drinks: Patient declined     Frequency of Binge Drinking: Never   Transportation Needs: Low Risk  (11/19/2024)    Transportation Needs     Within the past 12 months, has lack of transportation kept you from medical appointments, getting your medicines, non-medical meetings or appointments, work, or from getting things that you need?: No   Physical Activity: Insufficiently Active (2/6/2024)    Exercise Vital Sign     Days of Exercise per Week: 5 days     Minutes of Exercise per Session: 10 min   Interpersonal Safety: Low Risk  (11/20/2024)    Interpersonal Safety     Do you feel physically and emotionally safe where you currently live?: Yes     Within the past 12 months, have you been hit, slapped, kicked or otherwise physically hurt by someone?: No     Within the past 12 months, have you been humiliated or emotionally abused in other ways by your partner or ex-partner?: No   Stress: Stress Concern Present (2/6/2024)    [employer] [place] [employer] - Occupational Stress Questionnaire     Feeling of Stress : Rather much   Social Connections: Unknown (2/6/2024)    Social Connection and Isolation Panel [NHANES]     Frequency of Communication with Friends and Family: Not on file     Frequency of Social Gatherings with Friends and Family: More than three times a week     Attends [employer] Services: Not on file     Active Member of Clubs or Organizations: Not on file     Attends Club or Organization Meetings: Not on file      Marital Status: Not on file   Health Literacy: Not on file       Functional Status:  Prior to admission patient needed assistance:   Dependent ADLs:: Independent  Dependent IADLs:: Independent       Mental Health Status:          Chemical Dependency Status:                Values/Beliefs:  Spiritual, Cultural Beliefs, Yarsanism Practices, Values that affect care: no               Discussed  Partnership in Safe Discharge Planning  document with patient/family: No    Additional Information:  Consulted for elevated risk of readmission and discharge planning, writer met with Pt at bedside and introduced self and role in discharge planning. Pt lives in condo, alone.  Per Pt prior to admit, Pt was independent with all ADL's but uses a walker out of her condo and was still driving. Pt denies any financial concerns at this time. Pt wasn't receiving any [facility] or Critical access hospital services. Discussed that therapy is recommending Home  with [facility] PT/OT and RN . Son will transport Pt at discharge.        Inpatient Care Coordinator will continue to follow for discharge needs.      Next Steps: Find accepting [facility] agency.         Leeanna Bravo RN, BSN, Care Coordinator

## 2024-12-09 NOTE — PROGRESS NOTES
"   12/09/24 0814   Appointment Info   Signing Clinician's Name / Credentials (PT) Cristy Chopra DPT   Living Environment   People in Home alone   Current Living Arrangements condominium   Home Accessibility no concerns  (Ramp enter/exit)   Transportation Anticipated car, drives self;family or friend will provide   Living Environment Comments Pt's son provides transportation as needed, assist with grocery shopping, cooking. Pt receives assist for cleaning every 3 weeks.   Self-Care   Usual Activity Tolerance good   Current Activity Tolerance moderate   Equipment Currently Used at Home walker, rolling  (Outside of the condo)   Fall history within last six months yes   Number of times patient has fallen within last six months 1   Activity/Exercise/Self-Care Comment Pt reports independence at baseline, use of walker outside of condo   General Information   Onset of Illness/Injury or Date of Surgery 12/08/24   Referring Physician Mathew Caraballo MD   Patient/Family Therapy Goals Statement (PT) Pt is hopeful to discharge home, pt is agreeable to home PT.   Pertinent History of Current Problem (include personal factors and/or comorbidities that impact the POC) 68 y/o female, per chart: \"presented with progressive generalized weakness, physical deconditioning, acute on chronic nausea, and poor oral intake x 3 to 4 days, she was admitted with electrolyte disturbances, new anemia, nausea, and unable to take PO\". PMH including  stress-induced cardiomyopathy with normalization of EF on recent echo, nonobstructive coronary artery disease, COPD, history of esophageal cancer status post esophagectomy and chemoradiation in remission, history of right lung squamous cell cancer status post lobectomy, history of left lung non-small cell lung cancer status post radiation immunotherapy, currently on immunotherapy.   Existing Precautions/Restrictions fall   General Observations Pt in supine upon arrival of therapist.   Cognition " "  Affect/Mental Status (Cognition) WFL   Orientation Status (Cognition) oriented x 3   Follows Commands (Cognition) WFL   Pain Assessment   Patient Currently in Pain No   Integumentary/Edema   Integumentary/Edema Comments Noted no pitting edema in B LEs.   Posture    Posture Comments Noted forward head and shoulder posture sitting EOB and standing at FWW.   Range of Motion (ROM)   ROM Comment B LEs WFL.   Strength (Manual Muscle Testing)   Strength Comments Not formally assessed, noted at least 3/5 grossly with mobility. Pt reports LEs feeling \"weak\" with mobility.   Bed Mobility   Comment, (Bed Mobility) Supine-sit with SBA.   Transfers   Comment, (Transfers) Sit <> stand with no AD and Eder.   Gait/Stairs (Locomotion)   Comment, (Gait/Stairs) Pt amb 20' with no AD and Eder progressing to CGA with UE support on IV pole.   Balance   Balance Comments Noted good seated balance, fair standing/dynamic balance, improves with UE support on IV pole.   Sensory Examination   Sensory Perception Comments Numbness tingling in B LEs, R side in toes, L LE up to knee.   Clinical Impression   Criteria for Skilled Therapeutic Intervention Yes, treatment indicated   PT Diagnosis (PT) Difficulty with functional mobility.   Influenced by the following impairments Generalized weakness, Decreased activity tolerance, Impaired balance   Functional limitations due to impairments Limited functional mobility requiring AD and assist of 1.   Clinical Presentation (PT Evaluation Complexity) stable   Clinical Presentation Rationale Based on PMH, current presentation, and social support.   Clinical Decision Making (Complexity) low complexity   Planned Therapy Interventions (PT) balance training;bed mobility training;gait training;ROM (range of motion);strengthening;transfer training   Risk & Benefits of therapy have been explained patient   PT Total Evaluation Time   PT Edi Low Complexity Minutes (69743) 10   Physical Therapy Goals   PT " Frequency Daily   PT Predicted Duration/Target Date for Goal Attainment 12/13/24   PT: Bed Mobility Modified independent;Supine to/from sit   PT: Transfers Modified independent;Sit to/from stand;Assistive device   PT: Gait Modified independent;Rolling walker;100 feet   Therapeutic Procedure/Exercise   Ther. Procedure: strength, endurance, ROM, flexibillity Minutes (38816) 2   Symptoms Noted During/After Treatment fatigue   Treatment Detail/Skilled Intervention PT: Pt completed seated strengthening/ROM exercises x 10 reps including heel/toe raises and LAQ. Cues provided for decreased speed to improve control of movement.   Therapeutic Activity   Therapeutic Activities: dynamic activities to improve functional performance Minutes (19647) 8   Symptoms Noted During/After Treatment Fatigue   Treatment Detail/Skilled Intervention PT: Pt in supine upon arrival of therapist, agreeable to participate in session. Pt performed sit <> stand from EOB without AD and Eder, cues provided for upright posture upon standing-noted pt had difficulty gaining balance without UE support. Noted improved balance with sit <> stand with use of FWW, CGA, cues provided for hand placement and upright posture upon standing. Pt sitting up in chair at end of session, chair alarm on and needs within reach. Discussed with pt TCU vs home with home PT. Pt is eager to return home and agreeable to Home PT. Pt reports her son will be able to assist as needed.   Gait Training   Gait Training Minutes (93076) 8   Symptoms Noted During/After Treatment (Gait Training) fatigue   Treatment Detail/Skilled Intervention PT: Pt amb 100' with FWW and CGA, cues provided for keeping FWW close to body and upright posture. Encouraged pt to continue to mobilize with nursing staff throughout the day.   Distance in Feet 100'   PT Discharge Planning   PT Plan Progress transfers and gait, trial FWW vs no AD   PT Discharge Recommendation (DC Rec) home with assist;home with home  care physical therapy;Leaving home requires significant assistance;Leaving home requires significant taxing effort   PT Rationale for DC Rec Pt is currently requiring CGA with mobility with use of FWW. Pt reports her son is able to provide assist at discharge. Anticipate with progression of mobility with nursing staff and continued PT to will be able to safely discharge home with assist of son and home PT. If pt's son is unable to provide assist at discharge, pt would benefit from TCU.   PT Brief overview of current status CGA with use of FWW   Physical Therapy Time and Intention   Timed Code Treatment Minutes 18   Total Session Time (sum of timed and untimed services) 28

## 2024-12-10 ENCOUNTER — APPOINTMENT (OUTPATIENT)
Dept: OCCUPATIONAL THERAPY | Facility: CLINIC | Age: 69
End: 2024-12-10
Attending: PHYSICIAN ASSISTANT
Payer: MEDICARE

## 2024-12-10 LAB
MAGNESIUM SERPL-MCNC: 1.8 MG/DL (ref 1.7–2.3)
PHOSPHATE SERPL-MCNC: 2.4 MG/DL (ref 2.5–4.5)
POTASSIUM SERPL-SCNC: 3.3 MMOL/L (ref 3.4–5.3)
POTASSIUM SERPL-SCNC: 4.4 MMOL/L (ref 3.4–5.3)
SODIUM SERPL-SCNC: 129 MMOL/L (ref 135–145)

## 2024-12-10 PROCEDURE — 97530 THERAPEUTIC ACTIVITIES: CPT | Mod: GP | Performed by: PHYSICAL THERAPIST

## 2024-12-10 PROCEDURE — 97116 GAIT TRAINING THERAPY: CPT | Mod: GP | Performed by: PHYSICAL THERAPIST

## 2024-12-10 PROCEDURE — 120N000001 HC R&B MED SURG/OB

## 2024-12-10 PROCEDURE — 84100 ASSAY OF PHOSPHORUS: CPT | Performed by: INTERNAL MEDICINE

## 2024-12-10 PROCEDURE — 36415 COLL VENOUS BLD VENIPUNCTURE: CPT | Performed by: INTERNAL MEDICINE

## 2024-12-10 PROCEDURE — 97165 OT EVAL LOW COMPLEX 30 MIN: CPT | Mod: GO | Performed by: OCCUPATIONAL THERAPIST

## 2024-12-10 PROCEDURE — 250N000013 HC RX MED GY IP 250 OP 250 PS 637: Performed by: PHYSICIAN ASSISTANT

## 2024-12-10 PROCEDURE — 84295 ASSAY OF SERUM SODIUM: CPT | Performed by: INTERNAL MEDICINE

## 2024-12-10 PROCEDURE — 84132 ASSAY OF SERUM POTASSIUM: CPT | Performed by: INTERNAL MEDICINE

## 2024-12-10 PROCEDURE — 97535 SELF CARE MNGMENT TRAINING: CPT | Mod: GO | Performed by: OCCUPATIONAL THERAPIST

## 2024-12-10 PROCEDURE — 250N000013 HC RX MED GY IP 250 OP 250 PS 637: Performed by: INTERNAL MEDICINE

## 2024-12-10 PROCEDURE — 99232 SBSQ HOSP IP/OBS MODERATE 35: CPT | Performed by: INTERNAL MEDICINE

## 2024-12-10 PROCEDURE — 83735 ASSAY OF MAGNESIUM: CPT | Performed by: INTERNAL MEDICINE

## 2024-12-10 PROCEDURE — 250N000013 HC RX MED GY IP 250 OP 250 PS 637: Performed by: HOSPITALIST

## 2024-12-10 PROCEDURE — 250N000011 HC RX IP 250 OP 636: Performed by: PHYSICIAN ASSISTANT

## 2024-12-10 RX ORDER — POTASSIUM CHLORIDE 1500 MG/1
20 TABLET, EXTENDED RELEASE ORAL ONCE
Status: COMPLETED | OUTPATIENT
Start: 2024-12-10 | End: 2024-12-10

## 2024-12-10 RX ORDER — PANTOPRAZOLE SODIUM 40 MG/1
40 TABLET, DELAYED RELEASE ORAL
Status: DISCONTINUED | OUTPATIENT
Start: 2024-12-11 | End: 2024-12-18

## 2024-12-10 RX ADMIN — GABAPENTIN 600 MG: 300 CAPSULE ORAL at 10:16

## 2024-12-10 RX ADMIN — IPRATROPIUM BROMIDE 2 SPRAY: 42 SPRAY NASAL at 14:41

## 2024-12-10 RX ADMIN — GABAPENTIN 600 MG: 300 CAPSULE ORAL at 17:43

## 2024-12-10 RX ADMIN — IPRATROPIUM BROMIDE 2 SPRAY: 42 SPRAY NASAL at 10:12

## 2024-12-10 RX ADMIN — UMECLIDINIUM BROMIDE AND VILANTEROL TRIFENATATE 1 PUFF: 62.5; 25 POWDER RESPIRATORY (INHALATION) at 10:13

## 2024-12-10 RX ADMIN — BENZONATATE 100 MG: 100 CAPSULE ORAL at 00:37

## 2024-12-10 RX ADMIN — FLUTICASONE PROPIONATE 2 SPRAY: 50 SPRAY, METERED NASAL at 10:12

## 2024-12-10 RX ADMIN — BENZONATATE 100 MG: 100 CAPSULE ORAL at 21:14

## 2024-12-10 RX ADMIN — Medication 1 PACKET: at 14:39

## 2024-12-10 RX ADMIN — GUAIFENESIN 600 MG: 600 TABLET, EXTENDED RELEASE ORAL at 10:23

## 2024-12-10 RX ADMIN — POTASSIUM CHLORIDE 20 MEQ: 1500 TABLET, EXTENDED RELEASE ORAL at 10:15

## 2024-12-10 RX ADMIN — METOPROLOL SUCCINATE 50 MG: 50 TABLET, EXTENDED RELEASE ORAL at 10:15

## 2024-12-10 RX ADMIN — Medication 1 PACKET: at 17:44

## 2024-12-10 RX ADMIN — ASPIRIN 81 MG: 81 TABLET, COATED ORAL at 10:16

## 2024-12-10 RX ADMIN — SENNOSIDES AND DOCUSATE SODIUM 1 TABLET: 8.6; 5 TABLET ORAL at 10:15

## 2024-12-10 RX ADMIN — GUAIFENESIN 600 MG: 600 TABLET, EXTENDED RELEASE ORAL at 21:07

## 2024-12-10 RX ADMIN — GABAPENTIN 600 MG: 300 CAPSULE ORAL at 21:07

## 2024-12-10 RX ADMIN — Medication 1 TABLET: at 10:15

## 2024-12-10 RX ADMIN — ONDANSETRON 4 MG: 4 TABLET, ORALLY DISINTEGRATING ORAL at 00:32

## 2024-12-10 RX ADMIN — ATORVASTATIN CALCIUM 40 MG: 40 TABLET, FILM COATED ORAL at 10:15

## 2024-12-10 RX ADMIN — IPRATROPIUM BROMIDE 2 SPRAY: 42 SPRAY NASAL at 17:42

## 2024-12-10 RX ADMIN — ONDANSETRON 4 MG: 4 TABLET, ORALLY DISINTEGRATING ORAL at 10:23

## 2024-12-10 RX ADMIN — BENZONATATE 100 MG: 100 CAPSULE ORAL at 10:23

## 2024-12-10 RX ADMIN — ONDANSETRON 4 MG: 4 TABLET, ORALLY DISINTEGRATING ORAL at 18:42

## 2024-12-10 RX ADMIN — Medication 1 PACKET: at 10:13

## 2024-12-10 RX ADMIN — LOSARTAN POTASSIUM 50 MG: 50 TABLET, FILM COATED ORAL at 10:15

## 2024-12-10 RX ADMIN — IPRATROPIUM BROMIDE 2 SPRAY: 42 SPRAY NASAL at 21:08

## 2024-12-10 ASSESSMENT — ACTIVITIES OF DAILY LIVING (ADL)
ADLS_ACUITY_SCORE: 61
ADLS_ACUITY_SCORE: 65
ADLS_ACUITY_SCORE: 61
ADLS_ACUITY_SCORE: 65
ADLS_ACUITY_SCORE: 61
ADLS_ACUITY_SCORE: 61
ADLS_ACUITY_SCORE: 65
ADLS_ACUITY_SCORE: 61
ADLS_ACUITY_SCORE: 65
PREVIOUS_RESPONSIBILITIES: MEDICATION MANAGEMENT;LAUNDRY
ADLS_ACUITY_SCORE: 65
ADLS_ACUITY_SCORE: 64
ADLS_ACUITY_SCORE: 65
ADLS_ACUITY_SCORE: 61
ADLS_ACUITY_SCORE: 65
ADLS_ACUITY_SCORE: 61
ADLS_ACUITY_SCORE: 61

## 2024-12-10 NOTE — PLAN OF CARE
Goal Outcome Evaluation:      Plan of Care Reviewed With: patient    Overall Patient Progress: improvingOverall Patient Progress: improving     Shift: [id number]; 12/09-10/2024  Summary: Chronic Nausea; Poor PO intake; Electrolyte Disturbance    Orientation: AO x4  Pain: Denied   Vitals/Tele: VSS RA   IV Access/drains: R Chest Port SL  Diet: Regular   Mobility: A1 GBW  GI/: Continent   Wound/Skin: Redness Blanchable Sacrum/Coccyx; Cracked on heels; Scab Right Shin; Pale   Consults: HEMONC/PT/Nutrition /SW  Discharge Plan: TBD but home with C      See Flow sheets for assessment

## 2024-12-10 NOTE — PROGRESS NOTES
12/10/24 1340   Appointment Info   Signing Clinician's Name / Credentials (OT) Clarisse Host, OTR/L   Living Environment   People in Home alone   Current Living Arrangements condominium   Home Accessibility no concerns   Living Environment Comments Walk in shower w/ grab bars.   Self-Care   Usual Activity Tolerance good   Current Activity Tolerance moderate   Equipment Currently Used at Home walker, rolling;grab bar, tub/shower;raised toilet seat   Fall history within last six months yes   Number of times patient has fallen within last six months 1   Activity/Exercise/Self-Care Comment Independent w/ ADLs. Uses 4WW outside condo. Has  every 3 weeks. Son assists w/ transportation, shopping, cooking.   Instrumental Activities of Daily Living (IADL)   Previous Responsibilities medication management;laundry   General Information   Onset of Illness/Injury or Date of Surgery 12/08/24   Referring Physician Vielka Martínez PA-C   Additional Occupational Profile Info/Pertinent History of Current Problem Kezia Dixon is a 69 year old female with history bilateral lung adenocarcinoma, currently on immunotherapy with plan to start SBRT of JULIEN nodule, previous esophageal cancer s/p esophagectomy and chemoradiation 92016), s/p lobectomy for right lung squamous cell cancer 92018), COPD, heavy tobacco use in past, resolved stress-induced cardiomyopathy, nonobstructive CAD, alcohol use disorder, who presented on 12/8/2024 with generalized weakness, physical deconditioning, acute on chronic nausea, anorexia.  Workup showed dehydration, multiple electrolyte abnormalities, new anemia.   Existing Precautions/Restrictions fall   Cognitive Status Examination   Orientation Status orientation to person, place and time   Affect/Mental Status (Cognitive) flat/blunted affect   Pain Assessment   Patient Currently in Pain No   Strength Comprehensive (MMT)   Comment, General Manual Muscle Testing (MMT) Assessment  generalized weakness   Bed Mobility   Bed Mobility supine-sit   Supine-Sit [place] (Bed Mobility) supervision   Transfers   Transfers sit-stand transfer   Sit-Stand Transfer   Sit-Stand [place] (Transfers) contact guard   Assistive Device (Sit-Stand Transfers) walker, front-wheeled   Activities of Daily Living   BADL Assessment/Intervention lower body dressing;grooming   Lower Body Dressing Assessment/Training   [place] Level (Lower Body Dressing) supervision   Grooming Assessment/Training   [place] Level (Grooming) contact guard assist   Clinical Impression   Criteria for Skilled Therapeutic Interventions Met (OT) Yes, treatment indicated   OT Diagnosis decreased ADL independence   OT Problem List-Impairments impacting ADL problems related to;activity tolerance impaired;mobility;strength   Assessment of Occupational Performance 1-3 Performance Deficits   Identified Performance Deficits dressing, grooming, functional mobility   Planned Therapy Interventions (OT) ADL retraining   Clinical Decision Making Complexity (OT) problem focused assessment/low complexity   Risk & Benefits of therapy have been explained patient   OT Total Evaluation Time   OT Eval, Low Complexity Minutes (00748) 10   OT Goals   Therapy Frequency (OT) One time eval and treatment   OT Predicted Duration/Target Date for Goal Attainment 12/10/24   OT Goals Lower Body Dressing;Hygiene/Grooming   OT: Hygiene/Grooming supervision/stand-by assist;Goal Met   OT: Lower Body Dressing Supervision/stand-by assist;Goal Met   Self-Care/Home Management   Self-Care/Home Mgmt/ADL, Compensatory, Meal Prep Minutes (20794) 10   Symptoms Noted During/After Treatment (Meal Preparation/Planning Training) fatigue   Treatment Detail/Skilled Intervention Pt supine in bed, agreeable to session. Pt ed on safe transfer technique w/ FWW, performed bed transfer w/ SBA using FWW. Pt required x1 VC for safe FWW advancement in bathroom, pt receptive and  performed x1 G/H task sinkside w/ SBA after cueing. Pt ambulated x125 ft in sharma w/ SBA and FWW to increase activity rosibel in prep for ADLs. Ed on EC and activity promotion to increase strength, over activity rosibel. Pt receptive and verbalized understanding. Returned to supine w/ SBA. Ed on d/c recommendations, pt receptive and agreeable. Pt left w/ all needs at end of session, call light in reach.   OT Discharge Planning   OT Discharge Recommendation (DC Rec) home with assist;home with home care occupational therapy   OT Rationale for DC Rec Pt below baseline. Recommend HHOT for home safety eval upon d/c. IP OT goals met-no further needs while hospitalized.   OT Brief overview of current status Goals of therapy will be to address safe mobility and make recs for d/c to next level of care. Pt and RN will continue to follow all falls risk precautions as documented by RN staff while hospitalized   OT Equipment Needed at Discharge shower chair   Total Session Time   Timed Code Treatment Minutes 10   Total Session Time (sum of timed and untimed services) 20

## 2024-12-10 NOTE — PROGRESS NOTES
[facility]    Medicine Progress Note - Hospitalist Service    Date of Admission:  12/8/2024    Assessment & Plan     Kezia Dixon is a 69 year old female with history bilateral lung adenocarcinoma, currently on immunotherapy with plan to start SBRT of JULIEN nodule, previous esophageal cancer s/p esophagectomy and chemoradiation 92016), s/p lobectomy for right lung squamous cell cancer 92018), COPD, heavy tobacco use in past, resolved stress-induced cardiomyopathy, nonobstructive CAD, alcohol use disorder, who presented on 12/8/2024 with generalized weakness, physical deconditioning, acute on chronic nausea, anorexia.  Workup showed dehydration, multiple electrolyte abnormalities, new anemia.    Workup negative for infection.  CT A/P showed process.  Chest x-ray stable from before.  No new infiltrate.  Lower extremity ultrasound negative for DVT.    Acute worsening of chronic nausea  Anorexia  Chronic abdominal pain  Moderate malnutrition due to acute and chronic illness  -Has chronic nausea and abdominal pain for several years.  Reported significant anorexia and poor intake for 3-4 days before admission.  Abdominal pain stable  -No evidence of infection.  Afebrile, no leukocytosis, COVID-19/influenza/RSV negative.  TSH normal  -CT A/P and chest x-ray negative for any new acute findings.  -Improving with supportive care.  Nausea is improved.  Now tolerating regular diet.  -Continue Ensure, multivitamin  -Nutritional services consulted.  Continue nutrition supplements as per RD  -Received IV fluids on admission, discontinued on 12/9 PM.    Multiple electrolyte abnormalities  Hyponatremia  Hypokalemia   Hypomagnesemia  Hypophosphatemia  -Admission labs showed sodium 127, potassium 2.7, magnesium 1.6.  Electrolytes replaced on admission  -Is on as needed Lasix.  Took 40 mg p.o. Lasix for 3 consecutive days, 2 days before admission which contributed to electrolyte abnormalities  -Baseline  sodium 134-140.  Sodium 127 on admission, now improved to 129.  Stable  -Potassium was 2.7 on admission.  Replaced.  Low again on 12/10.  Continue replacement   -Continue to monitor and replace as needed  -Discussed with patient to avoid Lasix in future.  She can use compression stockings or leg elevation for edema    Generalized weakness  Physical deconditioning  -Has been more weak and deconditioned since recent hospitalization 2-3 weeks ago  -PT/OT consulted-PT recommended home with home care PT.   following for discharge planning    Acute normocytic anemia  -Hemoglobin was 12.6 on 11/20, now down to 9.4.  Stable since admission.  Patient denied any obvious bleeding.  -[place] [facility] consulted.  Labs do not indicate hemolysis-normal bilirubin, normal LDH, elevated haptoglobin  -Iron studies showed low iron saturation of 8%, low iron binding capacity, normal vitamin B12 at 519, normal TSH, reticulocyte count 1.4  -Received IV iron on 12/9      Multiple bilateral pulmonary nodules due to adenocarcinoma, 2023  -S/p bronchoscopy with bronchial ultrasound-guided biopsy.  Pathology from right upper lobe nodule was positive for small cell lung cancer (adenocarcinoma).  Biopsy from left upper lobe nodule showed atypical cells.  This was suspicious but not definitive for adenocarcinoma.  Lymph nodes were negative.  -S/p chemoradiation (of RUL) completed 2/2024  -Started on consolidation durvalumab immunotherapy 5/2024.  Last dose was on 11/25/2024  -Most recent CT scans 10/2024 showed further increase in RUL and JULIEN nodules and indeterminate right hepatic lobe lesion.  There was plan to start stereotactic body radiotherapy (SBRT) to the JULIEN nodule on 12/9. 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    Chronic progressive shortness of breath  COPD  -Progressive shortness of breath is likely multifactorial-due to underlying COPD, lung cancer  -No evidence of pneumonia on admission chest x-ray  -Continue Mucinex tablet, Anoro     GERD  -Continue PPI    Resolved stress induced cardiomyopathy  PTA-Toprol-XL 50 mg daily, losartan 50 mg daily, spironolactone 12.5 mg daily, as needed Lasix   -most recent echo 10/2/2024 showed normal LVEF of 65-70%, no WMA, normal RV size and function, mild to moderate mitral regurgitation  -Continue Toprol-XL and losartan  -Hold spironolactone and Lasix    Nonobstructive coronary artery disease  PTA-aspirin 81 mg every other day  -Continue aspirin and atorvastatin    Alcohol use disorder  -Apparently has been sober for 2 years.  Recently had relapse of alcohol intake when she found out about worsening lung cancer.  She was assessed by chemical dependency.  She declined psychiatric consultation.  -Admission send reported has not been drinking any alcohol since her hospital discharge      Diabetes mellitus type 2, new diagnosis  -Previously had prediabetes.  A1c on admission 7.2  -Could consider starting on metformin at discharge.  Else can follow-up with PCP for monitoring       Right lower extremity swelling  -Ultrasound negative for DVT.  -Recommend patient use compression stockings and leg elevation for edema.  Avoid Lasix and spironolactone             Diet: Snacks/Supplements Adult: Ensure Clear; With Meals  Snacks/Supplements Adult: Gelatein 20 (sugar-free); With Meals  Regular Diet Adult    DVT Prophylaxis: Pneumatic Compression Devices  Jaeger Catheter: Not present  Lines: PRESENT      Port a Cath 01/09/24 Single Lumen Left Chest wall-Site Assessment: WDL      Cardiac Monitoring: ACTIVE order. Indication: Electrolyte  "Imbalance (24 hours)- Magnesium <1.3 mg/ml; Potassium < =2.8 or > 5.5 mg/ml  Code Status: No CPR- Do NOT Intubate      Clinically Significant Risk Factors        # Hypokalemia: Lowest K = 2.7 mmol/L in last 2 days, will replace as needed  # Hyponatremia: Lowest Na = 125 mmol/L in last 2 days, will monitor as appropriate  # Hypochloremia: Lowest Cl = 86 mmol/L in last 2 days, will monitor as appropriate    # Hypomagnesemia: Lowest Mg = 1.6 mg/dL in last 2 days, will replace as needed   # Hypoalbuminemia: Lowest albumin = 2.7 g/dL at 12/9/2024  6:56 AM, will monitor as appropriate     # Hypertension: Noted on problem list    # Chronic heart failure with preserved ejection fraction: heart failure noted on problem list and last echo with EF >50%          # DMII: A1C = 7.2 % (Ref range: <5.7 %) within past 6 months, PRESENT ON ADMISSION  # Cachexia: Estimated body mass index is 16.44 kg/m  as calculated from the following:    Height as of 11/19/24: 1.549 m (5' 1\").    Weight as of this encounter: 39.5 kg (87 lb)., PRESENT ON ADMISSION  # Moderate Malnutrition: based on nutrition assessment, PRESENT ON ADMISSION   # Financial/Environmental Concerns: none  # COPD: noted on problem list        Social Drivers of Health    Tobacco Use: Medium Risk (9/17/2024)    Patient History     Smoking Tobacco Use: Former     Smokeless Tobacco Use: Never   Physical Activity: Insufficiently Active (2/6/2024)    Exercise Vital Sign     Days of Exercise per Week: 5 days     Minutes of Exercise per Session: 10 min   Stress: Stress Concern Present (2/6/2024)    [employer] [place] [employer] - Occupational Stress Questionnaire     Feeling of Stress : Rather much   Social Connections: Unknown (2/6/2024)    Social Connection and Isolation Panel [NHANES]     Frequency of Social Gatherings with Friends and Family: More than three times a week          Disposition Plan         Medically Ready for Discharge: Anticipated Tomorrow       "       Eloise Fisher MD  Hospitalist Service  [facility]  Securely message with [employer] (more info)  Text page via [employer] Paging/Directory   ______________________________________________________________________    Interval History     Patient reports she feels better than before.  Tolerating some diet.  Nausea improved.    Still needs electrolyte replacement   No chest pain or shortness of breath   Vital signs are stable       Physical Exam   Vital Signs: Temp: 97.8  F (36.6  C) Temp src: Oral BP: 133/54 Pulse: 91   Resp: 18 SpO2: 95 % O2 Device: None (Room air)    Weight: 87 lbs 0 oz    Constitutional - awake and alert, in no acute distress  Integumentary - skin is warm and dry, no rashes or ulcers  Neurological - awake, alert and oriented x3.  Moving all 4 extremities, normal speech, no focal deficits    Medical Decision Making       40 MINUTES SPENT BY ME on the date of service doing chart review, history, exam, documentation & further activities per the note.      Data     I have personally reviewed the following data over the past 24 hrs:    N/A  \   N/A   / N/A     129 (L) N/A N/A /  N/A   3.3 (L) N/A N/A \       Imaging results reviewed over the past 24 hrs:   No results found for this or any previous visit (from the past 24 hours).

## 2024-12-10 NOTE — PROGRESS NOTES
[facility]    Hematology / Oncology     Date of Admission:  12/8/2024    Assessment & Plan     1.  Multiple lung nodules both lungs, at least 4 of them described to have increased in size or positive on PET scan, right upper lobe nodule biopsy positive for adenocarcinoma, NGS negative, PD-L1 TPS score 0%, left upper lobe nodule biopsy showing atypical cells but suspicious for adenocarcinoma.  Started on chemoradiotherapy with weekly carboplatin and Taxol on January 18, 2024, Taxol dose reduced based on pre-existing neuropathy, the dose was reduced slightly again due to borderline neutropenia with her fourth cycle.  Completed February 28, 2024 and achieved stable disease on the CT scan from April 2024.  Started on consolidation durvalumab immunotherapy on May 28, 2024.  CT scan in July shows minimal increase in micronodular densities but overall stable, CT in October showed further increase in the right upper lobe nodule and indeterminate liver lesion in the right hepatic lobe.  Left upper lung lesion consistent with carcinoma planning to start radiation this week 5 session over 2 weeks    2.  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    3.  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    4.  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  I would like to rule out anemia associated with auto hemolytic process for immunotherapy    5.  Electrolyte abnormalities hyponatremia hypokalemia and hypomagnesemia with nausea and weakness.  Management included internal medicine team.    Plan:    Anemia is not associated with autoimmune checkpoint inhibitor related anemia with negative RAFAEL normal LDH and haptoglobin not being low.    Hold immunotherapy tomorrow for The  next few weeks until significant improvement in her overall condition.    Hyponatremia is associated with checkpoint inhibitors, if overall health does not improve, we will reconsider the role of immunotherapy in her treatment plan.  Radiation was notified to decide on starting localized radiation to the left upper lung lesion.  Discussed with Dr. Cowan today, if she has improvement in her condition may start as early as tomorrow, she is currently having more cough and not feeling well.    In regard to her cough, recent chest x-ray shows evolving infiltrate, she is treated currently with COPD regimen, consider adding antibiotic, will defer to hospitalist.        Jean Garcia MD    Code Status    No CPR- Do NOT Intubate    Reason for Consult   Reason for consult: Lung cancer, anemia    Primary Care Physician   Mustapha Velásquez    Chief Complaint   Nausea and weakness    Medical records reviewed, history obtained and patient examined    History of Present Illness   Kezia Dixon is a 69 year old female who presents with non-small cell lung cancer as detailed below presented to hospital with nausea and weakness.  She denies bleeding no melena hematochezia.  She was supposed to start radiation to lung lesion today for 5 sessions over 2 weeks and to continue immunotherapy scheduled on December 10.  In the hospital she was found to have electrolyte abnormality and hemoglobin 9.5.  Iron levels consistent with chronic illness and the ferritin was 196 argues against iron deficiency.  B12 normal 590 and folic acid normal 16.7.  She has been on durvalumab maintenance therapy.  Last hemoglobin office was 10.4 on November 26     PET/CT was obtained on October 22 which showed enlargement of peripherally FDG avid cavitary lesion in the posterior right lung inflammatory versus infectious cannot rule out recurrent neoplasm.  Suspicion for left upper lobe primary lung carcinoma.  I discussed PET scan imaging with   Chapo, the left upper lung lesion appears to be new neoplasm and is easily amenable for radiation SRS therapy.  The right cavitary lesion is an area which overlaps with prior radiation to the lung and esophagus cancer and did not recommend radiation to that lesion.  One of the other possibility is posttreatment changes inflammatory versus infectious.    I reviewed the PET scan images with Dr. Riojas, the changes on the right lung by the cavitary lesions are indeterminate and not easy to biopsy due to the nature of the distorted area and the central location, she is not a candidate for wedge or segment resection on the right, if surgery is indicated she will need pneumonectomy which will not be tolerated by her.  The left upper lesion is consistent with malignancy.  His recommendation is to radiate the left upper lesion SRS and I talked with Dr. Cowan who agrees on that strategy.  In regard to the right lung changes contrast CT chest in 3 months from the PET/CT will be reasonable.    Interval history December 10, 2024.  She feels better except has increase in the cough which started several days ago.  She has great to greenish sputum.  She was diagnosed with COPD by pulmonary about a year ago and she uses inhalers regularly.  Magnesium is normal and she has low potassium 3.3 and sodium 125      Oncological history:  Office note from October 15, 2024  Kezia is a 68-year-old woman with history of esophageal cancer and squamous cell lung cancer as detailed below:    1.  Squamous cell carcinoma of the esophagus status post induction chemotherapy (carboplatin/paclitaxel) and radiation therapy, followed by esophagectomy in 2015.    2.  Squamous cell carcinoma of the lung status post thoracotomy and right lower lobectomy in October 2018 for 7 mm grade 2 squamous cell carcinoma of the right lower lobe.    She has been followed by thoracic oncology with scans and had lung nodules.  CT scan CAP November 6, 2023 shows a large  irregular cavitary nodule 2.3 x 1.5 cm posterior right upper lobe previously 1.9 x 0.9 cm.  Several adjacent right upper lobe nodules again noted.  1 of these is 1.1 x 0.8 cm increased from previously 0.9 x 0.7 cm.  There are other stable adjacent right upper lobe nodules.  Left upper lobe irregular nodule 1.3 x 0.9 cm compared to 0.9 x 0.6 cm previously.  Adjacent nodularity also noted in the region.  There are other stable nodules.  Stable small right pleural fluid and right pleural thickening.  The CT chest was compared to April 10, 2023.    PET/CT November 10, 2023 shows irregular cavitary nodule in the right upper lobe 2.2 x 1.6 cm demonstrate hypermetabolic activity along its medial and inferior aspects which extends to the minor fissure SUV max 9.6.  Additional hypermetabolic 1.3 x 0.7 cm right upper lobe nodule SUV max 10.7 which is superior to the described nodule.  FDG avid 0.6 x 0.5 cm nodule in the medial left upper lobe with SUV max of 2.2.  Minimal FDG uptake associated with irregular nodule in the left lateral upper lobe 1.4 x 1.4 cm SUV max of 0.9.    Underwent EBUS December 6, 2023 with biopsies:  Right upper lobe nodule EBUS non-small cell carcinoma favor adenocarcinoma.  Left upper lobe lung EBUS atypical cells suspicious for adenocarcinoma.  Lymph nodes stations 4L, 4R, 11 R interlobar negative for malignant cells.  PD-L1 tumor proportion score 0%, NGS lung panel negative    Case was discussed in thoracic oncology tumor conference, MRI of the brain was suggested and was negative for metastatic disease, chemoradiation was recommended for her right upper lung lesion and watchful waiting for the left lung lesion based on atypical cells on biopsy.  Patient has mild sensory neuropathy without painful component or interference with daily function and weekly carboplatin and Taxol was chosen along with radiation, the initial dose of Taxol was reduced based on the neuropathy baseline.  Next generation  sequencing panel was negative.  PDL TPS score was 0%    Our team has discussed the approach regarding her left lung lesion and the decision was to hold off treatment to avoid extended radiation exposure between both lungs.  The left lung lesion will be monitored with subsequent scan, there is a possibility that the lesion may shrink with the treatment of chemotherapy and surgical resection as an option for wedge or limited resection otherwise stereotactic radiation can be considered, additional tissue diagnosis may be needed if surgery is not decided.    She was started on radiation along with weekly carboplatin and Taxol reduced dose on January 18, 2024.  Completed chemotherapy February 29, 2024.    CT scan of chest April 1, 2024 right upper lobe nodules have decreased in size slightly.  The largest cavitary nodule in the right upper lobe connects to a small right upper lobe bronchus.  Left upper lobe nodule is slightly increased currently 1.3 cm compared to 1.1 cm previously.    Based on the lack of progression she was started on durvalumab immunotherapy consolidation on May 28, 2024.    CT July 18, 2024 shows new micronodule opacities at the left upper lobe extending to the lingula could be infection or inflammation.  Cavitary lesion in the right upper lobe is stable in size.  Additional satellite nodules are stable in size.  Stable prominent indeterminate left upper lobe nodule.  Interval decrease in consolidation on an interstitial thickening surrounding the cavitary lesion.  Few nodules at the left upper lobe are slightly more prominent.  Stable small right pulmonary and fluid.  New identified but small inferior mediastinal lymph nodes are technically indeterminate.    CT chest October 10, 2024 shows increased size of the right upper lobe cavitary lesion with surrounding consolidative opacities may be due to evolving postradiation changes.  The size is 3.2 x 2.1 cm compared to 2.4 x 1.7 cm.  Few satellite  nodules stable.  Left upper lobe irregular nodule 14 x 10 mm stable since July.  But gradually increased in size compared to December concerning for neoplasm.  Indeterminate small enhancing focus in the posterior right hepatic lobe 1.7 x 1.0 cm and benign perfusional abnormality.  Interval History  She is here today for follow-up and to continue with durvalumab.  She had a CT scan last week and here to review the results.  She has been anxious about the results she read on the Internet and her blood pressure today is higher.  She has high blood pressure and has been working with her cardiologist and the plan is to regroup in 3 weeks with her cardiologist to review her readings.  She does take her blood pressure medications regularly.  No increasing shortness of breath or cough.  She requests a refill on Compazine which she uses for intermittent nausea associated with treatment.  CBC from today is normal and her chemistry panel and thyroid test have been normal.  CT was reviewed showing some increase in her right upper lung nodule as detailed above          Past Medical History   I have reviewed this patient's medical history and updated it with pertinent information if needed.   Past Medical History:   Diagnosis Date    Alcohol use disorder, severe, dependence (H) 10/14/2016    Alcoholism (H) 10/14/2016    Anemia     Benign essential hypertension 10/14/2016    Carotid artery disease (H)     mile plaque 5/7    Chemical dependency (H)     alcohol    COPD (chronic obstructive pulmonary disease) (H)     Coronary artery disease     Depression with anxiety     Esophageal cancer (H) 03/22/2016    SQUAMOUS CELL    Esophageal mass     GERD (gastroesophageal reflux disease)     Hx of pancreatitis 2003    Hypercholesteremia     Hyperglycemia     Hyperlipemia     Impaired fasting glucose 10/14/2016    Nocturnal leg cramps     Other chronic pain     Pancreatic pseudocyst 10/14/2016    Pancreatitis     with pseudocyst    Pap  smear with atypical squamous cells, cannot exclude high grade squamous intraepithelial lesion (ASC-H) 10/14/2016    Colpo impression benign, ECC benign. Cotestin in 1 yr.    Shingles     Squamous cell carcinoma of right lung (H)     Vasomotor rhinitis        Past Surgical History   I have reviewed this patient's surgical history and updated it with pertinent information if needed.  Past Surgical History:   Procedure Laterality Date    AAA REPAIR      splenic artery aneurysm embolization    ABDOMEN SURGERY  2/2/2016    COLONOSCOPY  11/26/2013    Procedure: COMBINED COLONOSCOPY, SINGLE BIOPSY/POLYPECTOMY BY BIOPSY;  COLONOSCOPY (MAC);  Surgeon: Montana Rosa MD;  Location:  GI    COLONOSCOPY  11/26/2013    COLONOSCOPY N/A 10/4/2018    Procedure: COMBINED COLONOSCOPY, SINGLE OR MULTIPLE BIOPSY/POLYPECTOMY BY BIOPSY;  colonoscopy;  Surgeon: Gio Apodaca MD;  Location:  GI    ENT SURGERY      ESOPHAGOGASTRECTOMY N/A 3/22/2016    Procedure: ESOPHAGOGASTRECTOMY;  Surgeon: Alvin Riojas MD;  Location:  OR    ESOPHAGOSCOPY, GASTROSCOPY, DUODENOSCOPY (EGD), COMBINED N/A 12/22/2015    Procedure: COMBINED ENDOSCOPIC ULTRASOUND, ESOPHAGOSCOPY, GASTROSCOPY, DUODENOSCOPY (EGD), FINE NEEDLE ASPIRATE/BIOPSY;  Surgeon: Danelle Michael MD;  Location:  GI    GASTROSTOMY, INSERT TUBE, COMBINED N/A 2/2/2016    Procedure: COMBINED GASTROSTOMY, INSERT TUBE (OPEN);  Surgeon: Alvin Riojas MD;  Location:  OR    GI SURGERY  12/22/2015    HAND SURGERY      right    HERNIORRHAPHY INCISIONAL (LOCATION) N/A 3/19/2019    Procedure: LAPARSCOPIC  INCISIONAL HERNIA REPAIR WITH MESH, LAPARSCOPIC LYSIS OF ADHENSIONS;  Surgeon: Lamberto Magaña MD;  Location:  OR    INSERT PORT VASCULAR ACCESS N/A 12/28/2015    Procedure: INSERT PORT VASCULAR ACCESS;  Surgeon: Alvin Riojas MD;  Location:  OR    INSERT PORT VASCULAR ACCESS N/A 1/9/2024    Procedure: SMART PORT PLACEMENT;   Surgeon: Alvin Riojas MD;  Location: [facility] OR    LAPAROSCOPIC CHOLECYSTECTOMY N/A 3/19/2019    Procedure: LAPAROSCOPIC CHOLECYSTECTOMY;  Surgeon: Lamberto Magaña MD;  Location: [facility] OR    LOBECTOMY LUNG Right 10/16/2018    Procedure: LOBECTOMY LUNG;  Surgeon: Alvin Riojas MD;  Location: [facility] OR    REMOVE PORT VASCULAR ACCESS Left 2016    Procedure: REMOVE PORT VASCULAR ACCESS;  Surgeon: Alvin Riojas MD;  Location: [facility] OR    THORACOTOMY Right 10/16/2018    Procedure: REDO RIGHT THORACTOMY AND RIGHT LOWER LOBECTOMY, PLEURAL LYSIS;  Surgeon: Alvin Riojas MD;  Location: [facility] OR    TONSILLECTOMY      VASCULAR SURGERY         Prior to Admission Medications   Prior to Admission Medications   Prescriptions Last Dose Informant Patient Reported? Taking?   ANORO ELLIPTA 62.5-25 MCG/ACT oral inhaler 2024 Morning  Yes Yes   Sig: Inhale 1 puff into the lungs daily.   albuterol (PROAIR HFA/PROVENTIL HFA/VENTOLIN HFA) 108 (90 Base) MCG/ACT inhaler Past Month  Yes Yes   Sig: Inhale 2 puffs into the lungs every 6 hours as needed for shortness of breath, wheezing or cough.   aspirin (ASA) 81 MG EC tablet 2024 Morning  Yes Yes   Si tablet every other day   atorvastatin (LIPITOR) 40 MG tablet 2024 Morning  No Yes   Sig: Take 1 tablet (40 mg) by mouth daily.   durvalumab 2024  Yes Yes   Sig: Inject into the vein every 14 days.   fluticasone (FLONASE) 50 MCG/ACT nasal spray 2024 Morning Self No Yes   Sig: INSTILL 2 SPRAYS INTO BOTH NOSTRILS DAILY.   furosemide (LASIX) 40 MG tablet 2024  No Yes   Sig: Take 0.5 tablets (20 mg) by mouth daily as needed (edema or shortness of breath) For worsening shortness of breath, leg swelling or weight gain.   Patient taking differently: Take 20-40 mg by mouth daily as needed (edema or shortness of breath). For worsening shortness of breath, leg swelling or weight gain.   gabapentin (NEURONTIN) 600 MG tablet 2024  Morning  No Yes   Sig: TAKE ONE (1) TABLET BY MOUTH THREE TIMES DAILY.   ipratropium (ATROVENT) 0.06 % nasal spray 12/8/2024 Morning  No Yes   Sig: Spray 2 sprays into both nostrils 4 times daily.   losartan (COZAAR) 50 MG tablet 12/8/2024 Morning  No Yes   Sig: Take 1 tablet (50 mg) by mouth daily. Appointment required for further refills   metoprolol succinate ER (TOPROL XL) 50 MG 24 hr tablet 12/8/2024 Morning  No Yes   Sig: Take 1 tablet (50 mg) by mouth every morning.   multivitamin w/minerals (MULTI-VITAMIN) tablet 12/8/2024 Morning  Yes Yes   Sig: Take 1 tablet by mouth daily.   omeprazole (PRILOSEC) 40 MG DR capsule 12/8/2024 Morning  No Yes   Sig: TAKE 1 CAPSULE BY MOUTH EVERY DAY   ondansetron (ZOFRAN) 4 MG tablet Past Week  Yes Yes   Sig: Take 8 mg by mouth every 8 hours as needed for nausea.   prochlorperazine (COMPAZINE) 10 MG tablet Past Week  Yes Yes   Sig: Take 10 mg by mouth every 6 hours as needed for nausea or vomiting.   spironolactone (ALDACTONE) 25 MG tablet 12/8/2024 Morning  No Yes   Sig: Take 0.5 tablets (12.5 mg) by mouth every morning.      Facility-Administered Medications: None     Allergies   Allergies   Allergen Reactions    Codeine Sulfate Nausea    Simvastatin Cramps     Leg cramps    Morphine      Pt unsure - pt does not believe this is an allergy of hers    Pcn [Penicillins] Rash       Social History   I have reviewed this patient's social history and updated it with pertinent information if needed. Kezia Tobar Zack  reports that she quit smoking about 9 years ago. Her smoking use included cigarettes. She started smoking about 54 years ago. She has a 11.4 pack-year smoking history. She has never used smokeless tobacco. She reports that she does not currently use alcohol. She reports that she does not use drugs.    Family History   I have reviewed this patient's family history and updated it with pertinent information if needed.   Family History   Problem Relation Age of Onset     Depression Mother     Substance Abuse Father     Other Cancer Father         melanoma    Prostate Cancer Father     Diabetes Father     Substance Abuse Paternal Grandfather     Other Cancer Brother         melanoma    Substance Abuse Brother     Other Cancer Brother         melanoma    Other Cancer Brother         melanoma    Other Cancer Brother         melanoma       Review of Systems   The 10 point Review of Systems is negative other than noted in the HPI     Physical Exam   Temp: 99  F (37.2  C) Temp src: Oral BP: (!) 140/69 Pulse: 112   Resp: 18 SpO2: 92 % O2 Device: None (Room air)    Vital Signs with Ranges  Temp:  [97.6  F (36.4  C)-99  F (37.2  C)] 99  F (37.2  C)  Pulse:  [] 112  Resp:  [16-18] 18  BP: (112-141)/(50-82) 140/69  SpO2:  [91 %-98 %] 92 %  87 lbs 0 oz    No acute distress.  HEENT: Normocephalic atraumatic sclera anicteric  Neck: Supple no JVD lymphadenopathy.  Lungs: Episodes of cough.  Extremities: No cyanosis or edema.  Neurological: Nonfocal.  Skin: Limited exam no petechia or ecchymosis.    Data   Results for orders placed or performed during the hospital encounter of 12/08/24 (from the past 24 hours)   Sodium   Result Value Ref Range    Sodium 125 (L) 135 - 145 mmol/L   Potassium   Result Value Ref Range    Potassium 3.3 (L) 3.4 - 5.3 mmol/L   Magnesium   Result Value Ref Range    Magnesium 1.8 1.7 - 2.3 mg/dL   Phosphorus   Result Value Ref Range    Phosphorus 2.4 (L) 2.5 - 4.5 mg/dL   Sodium   Result Value Ref Range    Sodium 129 (L) 135 - 145 mmol/L

## 2024-12-10 NOTE — PLAN OF CARE
Addended by: ASPEN JOLLY on: 11/8/2023 09:07 AM     Modules accepted: Orders     Goal Outcome Evaluation:      Plan of Care Reviewed With: patient    Overall Patient Progress: improvingOverall Patient Progress: improving    Outcome Evaluation: Patient will D/C home w/HHC    Shift: [id number] 12/9/2024  Summary: Chronic Nausea; Poor PO intake; Electrolyte Disturbance    Orientation: AO x4  Pain: Denied   Vitals/Tele: VSS RA   IV Access/drains: R Chest Port infusing LR   Diet: Regular   Mobility: A1 GBW  GI/: Continent   Wound/Skin: Redness Blanchable Sacrum/Coccyx; Cracked on heels; Scab Right Shin; Pale   Consults: HEMONC/PT/Nutrition /SW  Discharge Plan: TBD but home with C      See Flow sheets for assessment

## 2024-12-11 ENCOUNTER — APPOINTMENT (OUTPATIENT)
Dept: GENERAL RADIOLOGY | Facility: CLINIC | Age: 69
End: 2024-12-11
Attending: INTERNAL MEDICINE
Payer: MEDICARE

## 2024-12-11 LAB
ANION GAP SERPL CALCULATED.3IONS-SCNC: 11 MMOL/L (ref 7–15)
BUN SERPL-MCNC: 8.9 MG/DL (ref 8–23)
CALCIUM SERPL-MCNC: 8.7 MG/DL (ref 8.8–10.4)
CHLORIDE SERPL-SCNC: 92 MMOL/L (ref 98–107)
CREAT SERPL-MCNC: 0.45 MG/DL (ref 0.51–0.95)
CRP SERPL-MCNC: 186.3 MG/L
EGFRCR SERPLBLD CKD-EPI 2021: >90 ML/MIN/1.73M2
ERYTHROCYTE [DISTWIDTH] IN BLOOD BY AUTOMATED COUNT: 13.8 % (ref 10–15)
GLUCOSE SERPL-MCNC: 124 MG/DL (ref 70–99)
HCO3 SERPL-SCNC: 27 MMOL/L (ref 22–29)
HCT VFR BLD AUTO: 27.3 % (ref 35–47)
HGB BLD-MCNC: 9.3 G/DL (ref 11.7–15.7)
MAGNESIUM SERPL-MCNC: 1.7 MG/DL (ref 1.7–2.3)
MCH RBC QN AUTO: 29.9 PG (ref 26.5–33)
MCHC RBC AUTO-ENTMCNC: 34.1 G/DL (ref 31.5–36.5)
MCV RBC AUTO: 88 FL (ref 78–100)
PHOSPHATE SERPL-MCNC: 3 MG/DL (ref 2.5–4.5)
PLATELET # BLD AUTO: 383 10E3/UL (ref 150–450)
POTASSIUM SERPL-SCNC: 4 MMOL/L (ref 3.4–5.3)
PROCALCITONIN SERPL IA-MCNC: 0.25 NG/ML
RBC # BLD AUTO: 3.11 10E6/UL (ref 3.8–5.2)
SODIUM SERPL-SCNC: 130 MMOL/L (ref 135–145)
WBC # BLD AUTO: 6.1 10E3/UL (ref 4–11)

## 2024-12-11 PROCEDURE — 86140 C-REACTIVE PROTEIN: CPT | Performed by: INTERNAL MEDICINE

## 2024-12-11 PROCEDURE — 36415 COLL VENOUS BLD VENIPUNCTURE: CPT | Performed by: HOSPITALIST

## 2024-12-11 PROCEDURE — 120N000001 HC R&B MED SURG/OB

## 2024-12-11 PROCEDURE — 250N000013 HC RX MED GY IP 250 OP 250 PS 637: Performed by: PHYSICIAN ASSISTANT

## 2024-12-11 PROCEDURE — 250N000011 HC RX IP 250 OP 636: Mod: JZ | Performed by: INTERNAL MEDICINE

## 2024-12-11 PROCEDURE — 84100 ASSAY OF PHOSPHORUS: CPT | Performed by: HOSPITALIST

## 2024-12-11 PROCEDURE — 250N000011 HC RX IP 250 OP 636: Performed by: PHYSICIAN ASSISTANT

## 2024-12-11 PROCEDURE — 99232 SBSQ HOSP IP/OBS MODERATE 35: CPT | Performed by: INTERNAL MEDICINE

## 2024-12-11 PROCEDURE — 250N000013 HC RX MED GY IP 250 OP 250 PS 637: Performed by: HOSPITALIST

## 2024-12-11 PROCEDURE — 84145 PROCALCITONIN (PCT): CPT | Performed by: INTERNAL MEDICINE

## 2024-12-11 PROCEDURE — 85027 COMPLETE CBC AUTOMATED: CPT | Performed by: HOSPITALIST

## 2024-12-11 PROCEDURE — 83735 ASSAY OF MAGNESIUM: CPT | Performed by: HOSPITALIST

## 2024-12-11 PROCEDURE — 250N000013 HC RX MED GY IP 250 OP 250 PS 637: Performed by: INTERNAL MEDICINE

## 2024-12-11 PROCEDURE — 250N000011 HC RX IP 250 OP 636: Performed by: STUDENT IN AN ORGANIZED HEALTH CARE EDUCATION/TRAINING PROGRAM

## 2024-12-11 PROCEDURE — 80048 BASIC METABOLIC PNL TOTAL CA: CPT | Performed by: HOSPITALIST

## 2024-12-11 PROCEDURE — 71046 X-RAY EXAM CHEST 2 VIEWS: CPT

## 2024-12-11 RX ORDER — HEPARIN SODIUM (PORCINE) LOCK FLUSH IV SOLN 100 UNIT/ML 100 UNIT/ML
5-10 SOLUTION INTRAVENOUS
Status: DISCONTINUED | OUTPATIENT
Start: 2024-12-11 | End: 2024-12-19

## 2024-12-11 RX ORDER — AZITHROMYCIN 250 MG/1
250 TABLET, FILM COATED ORAL DAILY
Status: COMPLETED | OUTPATIENT
Start: 2024-12-12 | End: 2024-12-15

## 2024-12-11 RX ORDER — HEPARIN SODIUM,PORCINE 10 UNIT/ML
5-10 VIAL (ML) INTRAVENOUS
Status: DISCONTINUED | OUTPATIENT
Start: 2024-12-11 | End: 2024-12-19

## 2024-12-11 RX ORDER — HEPARIN SODIUM,PORCINE 10 UNIT/ML
5-10 VIAL (ML) INTRAVENOUS EVERY 24 HOURS
Status: DISCONTINUED | OUTPATIENT
Start: 2024-12-11 | End: 2024-12-19

## 2024-12-11 RX ORDER — CEFTRIAXONE 2 G/1
2 INJECTION, POWDER, FOR SOLUTION INTRAMUSCULAR; INTRAVENOUS EVERY 24 HOURS
Status: DISCONTINUED | OUTPATIENT
Start: 2024-12-11 | End: 2024-12-13

## 2024-12-11 RX ORDER — AZITHROMYCIN 250 MG/1
500 TABLET, FILM COATED ORAL ONCE
Status: COMPLETED | OUTPATIENT
Start: 2024-12-11 | End: 2024-12-11

## 2024-12-11 RX ADMIN — IPRATROPIUM BROMIDE 2 SPRAY: 42 SPRAY NASAL at 20:16

## 2024-12-11 RX ADMIN — IPRATROPIUM BROMIDE 2 SPRAY: 42 SPRAY NASAL at 10:42

## 2024-12-11 RX ADMIN — SENNOSIDES AND DOCUSATE SODIUM 2 TABLET: 50; 8.6 TABLET ORAL at 10:33

## 2024-12-11 RX ADMIN — IPRATROPIUM BROMIDE 2 SPRAY: 42 SPRAY NASAL at 13:49

## 2024-12-11 RX ADMIN — Medication 5 ML: at 15:19

## 2024-12-11 RX ADMIN — GABAPENTIN 600 MG: 300 CAPSULE ORAL at 21:19

## 2024-12-11 RX ADMIN — BENZONATATE 100 MG: 100 CAPSULE ORAL at 21:21

## 2024-12-11 RX ADMIN — UMECLIDINIUM BROMIDE AND VILANTEROL TRIFENATATE 1 PUFF: 62.5; 25 POWDER RESPIRATORY (INHALATION) at 10:42

## 2024-12-11 RX ADMIN — METOPROLOL SUCCINATE 50 MG: 50 TABLET, EXTENDED RELEASE ORAL at 10:32

## 2024-12-11 RX ADMIN — Medication 10 ML: at 12:07

## 2024-12-11 RX ADMIN — GABAPENTIN 600 MG: 300 CAPSULE ORAL at 10:31

## 2024-12-11 RX ADMIN — ATORVASTATIN CALCIUM 40 MG: 40 TABLET, FILM COATED ORAL at 10:32

## 2024-12-11 RX ADMIN — GUAIFENESIN 600 MG: 600 TABLET, EXTENDED RELEASE ORAL at 20:15

## 2024-12-11 RX ADMIN — ONDANSETRON 4 MG: 4 TABLET, ORALLY DISINTEGRATING ORAL at 20:15

## 2024-12-11 RX ADMIN — FLUTICASONE PROPIONATE 2 SPRAY: 50 SPRAY, METERED NASAL at 10:42

## 2024-12-11 RX ADMIN — BENZONATATE 100 MG: 100 CAPSULE ORAL at 06:34

## 2024-12-11 RX ADMIN — ALTEPLASE 2 MG: 2.2 INJECTION, POWDER, LYOPHILIZED, FOR SOLUTION INTRAVENOUS at 14:01

## 2024-12-11 RX ADMIN — CEFTRIAXONE SODIUM 2 G: 2 INJECTION, POWDER, FOR SOLUTION INTRAMUSCULAR; INTRAVENOUS at 14:39

## 2024-12-11 RX ADMIN — Medication 1 TABLET: at 10:32

## 2024-12-11 RX ADMIN — GABAPENTIN 600 MG: 300 CAPSULE ORAL at 15:19

## 2024-12-11 RX ADMIN — PANTOPRAZOLE SODIUM 40 MG: 40 TABLET, DELAYED RELEASE ORAL at 06:34

## 2024-12-11 RX ADMIN — ONDANSETRON 4 MG: 4 TABLET, ORALLY DISINTEGRATING ORAL at 02:51

## 2024-12-11 RX ADMIN — PROCHLORPERAZINE MALEATE 5 MG: 5 TABLET ORAL at 08:32

## 2024-12-11 RX ADMIN — LOSARTAN POTASSIUM 50 MG: 50 TABLET, FILM COATED ORAL at 10:39

## 2024-12-11 RX ADMIN — AZITHROMYCIN DIHYDRATE 500 MG: 250 TABLET ORAL at 10:39

## 2024-12-11 RX ADMIN — GUAIFENESIN 600 MG: 600 TABLET, EXTENDED RELEASE ORAL at 10:54

## 2024-12-11 RX ADMIN — Medication 5 ML: at 06:00

## 2024-12-11 ASSESSMENT — ACTIVITIES OF DAILY LIVING (ADL)
ADLS_ACUITY_SCORE: 62
ADLS_ACUITY_SCORE: 63
ADLS_ACUITY_SCORE: 62
ADLS_ACUITY_SCORE: 62
ADLS_ACUITY_SCORE: 63
ADLS_ACUITY_SCORE: 62
ADLS_ACUITY_SCORE: 62
ADLS_ACUITY_SCORE: 63
ADLS_ACUITY_SCORE: 62
ADLS_ACUITY_SCORE: 63
ADLS_ACUITY_SCORE: 62
ADLS_ACUITY_SCORE: 61
ADLS_ACUITY_SCORE: 63
ADLS_ACUITY_SCORE: 62
ADLS_ACUITY_SCORE: 63
ADLS_ACUITY_SCORE: 63
ADLS_ACUITY_SCORE: 62

## 2024-12-11 NOTE — PROGRESS NOTES
[facility]    Hematology / Oncology     Date of Admission:  12/8/2024    Assessment & Plan     1.  Multiple lung nodules both lungs, at least 4 of them described to have increased in size or positive on PET scan, right upper lobe nodule biopsy positive for adenocarcinoma, NGS negative, PD-L1 TPS score 0%, left upper lobe nodule biopsy showing atypical cells but suspicious for adenocarcinoma.  Started on chemoradiotherapy with weekly carboplatin and Taxol on January 18, 2024, Taxol dose reduced based on pre-existing neuropathy, the dose was reduced slightly again due to borderline neutropenia with her fourth cycle.  Completed February 28, 2024 and achieved stable disease on the CT scan from April 2024.  Started on consolidation durvalumab immunotherapy on May 28, 2024.  CT scan in July shows minimal increase in micronodular densities but overall stable, CT in October showed further increase in the right upper lobe nodule and indeterminate liver lesion in the right hepatic lobe.  Left upper lung lesion consistent with carcinoma planning to start radiation this week 5 session over 2 weeks    2.  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    3.  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    4.  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  I would like to rule out anemia associated with auto hemolytic process for immunotherapy    5.  Electrolyte abnormalities hyponatremia hypokalemia and hypomagnesemia with nausea and weakness.  Management included internal medicine team.    Plan:    Anemia is not associated with autoimmune checkpoint inhibitor related anemia with negative RAFAEL normal LDH and haptoglobin not being low.    Hold immunotherapy tomorrow for The  next few weeks until significant improvement in her overall condition.    Hyponatremia can be associated with checkpoint inhibitors, if overall health does not improve, we will reconsider the role of immunotherapy in her treatment plan, her disease burden is not highly and holding treatment for a while as an option especially if she is going to have stereotactic radiation to the left upper lobe lesion.    Radiation was notified to decide on starting localized radiation to the left upper lung lesion.  Discussed with Dr. Cowan on December 10, if she has improvement in her condition may start while she is in the hospital.    In regard to her cough, recent chest x-ray shows evolving infiltrate, she is treated currently with COPD regimen, Tessalon and guaifenesin added to which is helping with her cough, consider adding antibiotic, will defer to hospitalist.        Jean Garcia MD    Code Status    No CPR- Do NOT Intubate    Reason for Consult   Reason for consult: Lung cancer, anemia    Primary Care Physician   Mustapha Velásquez    Chief Complaint   Nausea and weakness    Medical records reviewed, history obtained and patient examined    History of Present Illness   Kezia Dixon is a 69 year old female who presents with non-small cell lung cancer as detailed below presented to hospital with nausea and weakness.  She denies bleeding no melena hematochezia.  She was supposed to start radiation to lung lesion today for 5 sessions over 2 weeks and to continue immunotherapy scheduled on December 10.  In the hospital she was found to have electrolyte abnormality and hemoglobin 9.5.  Iron levels consistent with chronic illness and the ferritin was 196 argues against iron deficiency.  B12 normal 590 and folic acid normal 16.7.  She has been on durvalumab maintenance therapy.  Last hemoglobin office was 10.4 on November 26     PET/CT was obtained on October 22 which showed enlargement of peripherally FDG avid  cavitary lesion in the posterior right lung inflammatory versus infectious cannot rule out recurrent neoplasm.  Suspicion for left upper lobe primary lung carcinoma.  I discussed PET scan imaging with Dr. Cowan, the left upper lung lesion appears to be new neoplasm and is easily amenable for radiation SRS therapy.  The right cavitary lesion is an area which overlaps with prior radiation to the lung and esophagus cancer and did not recommend radiation to that lesion.  One of the other possibility is posttreatment changes inflammatory versus infectious.    I reviewed the PET scan images with Dr. Riojas, the changes on the right lung by the cavitary lesions are indeterminate and not easy to biopsy due to the nature of the distorted area and the central location, she is not a candidate for wedge or segment resection on the right, if surgery is indicated she will need pneumonectomy which will not be tolerated by her.  The left upper lesion is consistent with malignancy.  His recommendation is to radiate the left upper lesion SRS and I talked with Dr. Cowan who agrees on that strategy.  In regard to the right lung changes contrast CT chest in 3 months from the PET/CT will be reasonable.    Interval history December 10, 2024.  She feels better except has increase in the cough which started several days ago.  She has great to greenish sputum.  She was diagnosed with COPD by pulmonary about a year ago and she uses inhalers regularly.  Magnesium is normal and she has low potassium 3.3 and sodium 125    December 11, 2024:  Cough improved on Tessalon and Guaifenesin.  She feels better overall but continues to feel weak.  Potassium 4.4 and last sodium 129 and magnesium 1.8.      Oncological history:  Office note from October 15, 2024  Kezia is a 68-year-old woman with history of esophageal cancer and squamous cell lung cancer as detailed below:    1.  Squamous cell carcinoma of the esophagus status post induction chemotherapy  (carboplatin/paclitaxel) and radiation therapy, followed by esophagectomy in 2015.    2.  Squamous cell carcinoma of the lung status post thoracotomy and right lower lobectomy in October 2018 for 7 mm grade 2 squamous cell carcinoma of the right lower lobe.    She has been followed by thoracic oncology with scans and had lung nodules.  CT scan CAP November 6, 2023 shows a large irregular cavitary nodule 2.3 x 1.5 cm posterior right upper lobe previously 1.9 x 0.9 cm.  Several adjacent right upper lobe nodules again noted.  1 of these is 1.1 x 0.8 cm increased from previously 0.9 x 0.7 cm.  There are other stable adjacent right upper lobe nodules.  Left upper lobe irregular nodule 1.3 x 0.9 cm compared to 0.9 x 0.6 cm previously.  Adjacent nodularity also noted in the region.  There are other stable nodules.  Stable small right pleural fluid and right pleural thickening.  The CT chest was compared to April 10, 2023.    PET/CT November 10, 2023 shows irregular cavitary nodule in the right upper lobe 2.2 x 1.6 cm demonstrate hypermetabolic activity along its medial and inferior aspects which extends to the minor fissure SUV max 9.6.  Additional hypermetabolic 1.3 x 0.7 cm right upper lobe nodule SUV max 10.7 which is superior to the described nodule.  FDG avid 0.6 x 0.5 cm nodule in the medial left upper lobe with SUV max of 2.2.  Minimal FDG uptake associated with irregular nodule in the left lateral upper lobe 1.4 x 1.4 cm SUV max of 0.9.    Underwent EBUS December 6, 2023 with biopsies:  Right upper lobe nodule EBUS non-small cell carcinoma favor adenocarcinoma.  Left upper lobe lung EBUS atypical cells suspicious for adenocarcinoma.  Lymph nodes stations 4L, 4R, 11 R interlobar negative for malignant cells.  PD-L1 tumor proportion score 0%, NGS lung panel negative    Case was discussed in thoracic oncology tumor conference, MRI of the brain was suggested and was negative for metastatic disease, chemoradiation was  recommended for her right upper lung lesion and watchful waiting for the left lung lesion based on atypical cells on biopsy.  Patient has mild sensory neuropathy without painful component or interference with daily function and weekly carboplatin and Taxol was chosen along with radiation, the initial dose of Taxol was reduced based on the neuropathy baseline.  Next generation sequencing panel was negative.  PDL TPS score was 0%    Our team has discussed the approach regarding her left lung lesion and the decision was to hold off treatment to avoid extended radiation exposure between both lungs.  The left lung lesion will be monitored with subsequent scan, there is a possibility that the lesion may shrink with the treatment of chemotherapy and surgical resection as an option for wedge or limited resection otherwise stereotactic radiation can be considered, additional tissue diagnosis may be needed if surgery is not decided.    She was started on radiation along with weekly carboplatin and Taxol reduced dose on January 18, 2024.  Completed chemotherapy February 29, 2024.    CT scan of chest April 1, 2024 right upper lobe nodules have decreased in size slightly.  The largest cavitary nodule in the right upper lobe connects to a small right upper lobe bronchus.  Left upper lobe nodule is slightly increased currently 1.3 cm compared to 1.1 cm previously.    Based on the lack of progression she was started on durvalumab immunotherapy consolidation on May 28, 2024.    CT July 18, 2024 shows new micronodule opacities at the left upper lobe extending to the lingula could be infection or inflammation.  Cavitary lesion in the right upper lobe is stable in size.  Additional satellite nodules are stable in size.  Stable prominent indeterminate left upper lobe nodule.  Interval decrease in consolidation on an interstitial thickening surrounding the cavitary lesion.  Few nodules at the left upper lobe are slightly more prominent.   Stable small right pulmonary and fluid.  New identified but small inferior mediastinal lymph nodes are technically indeterminate.    CT chest October 10, 2024 shows increased size of the right upper lobe cavitary lesion with surrounding consolidative opacities may be due to evolving postradiation changes.  The size is 3.2 x 2.1 cm compared to 2.4 x 1.7 cm.  Few satellite nodules stable.  Left upper lobe irregular nodule 14 x 10 mm stable since July.  But gradually increased in size compared to December concerning for neoplasm.  Indeterminate small enhancing focus in the posterior right hepatic lobe 1.7 x 1.0 cm and benign perfusional abnormality.  Interval History  She is here today for follow-up and to continue with durvalumab.  She had a CT scan last week and here to review the results.  She has been anxious about the results she read on the Internet and her blood pressure today is higher.  She has high blood pressure and has been working with her cardiologist and the plan is to regroup in 3 weeks with her cardiologist to review her readings.  She does take her blood pressure medications regularly.  No increasing shortness of breath or cough.  She requests a refill on Compazine which she uses for intermittent nausea associated with treatment.  CBC from today is normal and her chemistry panel and thyroid test have been normal.  CT was reviewed showing some increase in her right upper lung nodule as detailed above          Past Medical History   I have reviewed this patient's medical history and updated it with pertinent information if needed.   Past Medical History:   Diagnosis Date    Alcohol use disorder, severe, dependence (H) 10/14/2016    Alcoholism (H) 10/14/2016    Anemia     Benign essential hypertension 10/14/2016    Carotid artery disease (H)     mile plaque 5/7    Chemical dependency (H)     alcohol    COPD (chronic obstructive pulmonary disease) (H)     Coronary artery disease     Depression with  anxiety     Esophageal cancer (H) 03/22/2016    SQUAMOUS CELL    Esophageal mass     GERD (gastroesophageal reflux disease)     Hx of pancreatitis 2003    Hypercholesteremia     Hyperglycemia     Hyperlipemia     Impaired fasting glucose 10/14/2016    Nocturnal leg cramps     Other chronic pain     Pancreatic pseudocyst 10/14/2016    Pancreatitis     with pseudocyst    Pap smear with atypical squamous cells, cannot exclude high grade squamous intraepithelial lesion (ASC-H) 10/14/2016    Colpo impression benign, ECC benign. Cotestin in 1 yr.    Shingles     Squamous cell carcinoma of right lung (H)     Vasomotor rhinitis        Past Surgical History   I have reviewed this patient's surgical history and updated it with pertinent information if needed.  Past Surgical History:   Procedure Laterality Date    AAA REPAIR      splenic artery aneurysm embolization    ABDOMEN SURGERY  2/2/2016    COLONOSCOPY  11/26/2013    Procedure: COMBINED COLONOSCOPY, SINGLE BIOPSY/POLYPECTOMY BY BIOPSY;  COLONOSCOPY (MAC);  Surgeon: Montana Rosa MD;  Location:  GI    COLONOSCOPY  11/26/2013    COLONOSCOPY N/A 10/4/2018    Procedure: COMBINED COLONOSCOPY, SINGLE OR MULTIPLE BIOPSY/POLYPECTOMY BY BIOPSY;  colonoscopy;  Surgeon: Gio Apodaca MD;  Location:  GI    ENT SURGERY      ESOPHAGOGASTRECTOMY N/A 3/22/2016    Procedure: ESOPHAGOGASTRECTOMY;  Surgeon: Alvin Riojas MD;  Location: [facility]    ESOPHAGOSCOPY, GASTROSCOPY, DUODENOSCOPY (EGD), COMBINED N/A 12/22/2015    Procedure: COMBINED ENDOSCOPIC ULTRASOUND, ESOPHAGOSCOPY, GASTROSCOPY, DUODENOSCOPY (EGD), FINE NEEDLE ASPIRATE/BIOPSY;  Surgeon: Danelle Michael MD;  Location:  GI    GASTROSTOMY, INSERT TUBE, COMBINED N/A 2/2/2016    Procedure: COMBINED GASTROSTOMY, INSERT TUBE (OPEN);  Surgeon: Alvin Riojas MD;  Location:  OR    GI SURGERY  12/22/2015    HAND SURGERY      right    HERNIORRHAPHY INCISIONAL (LOCATION) N/A 3/19/2019     Procedure: LAPARSCOPIC  INCISIONAL HERNIA REPAIR WITH MESH, LAPARSCOPIC LYSIS OF ADHENSIONS;  Surgeon: Lamberto Magaña MD;  Location:  OR    INSERT PORT VASCULAR ACCESS N/A 2015    Procedure: INSERT PORT VASCULAR ACCESS;  Surgeon: Alvin Riojas MD;  Location:  OR    INSERT PORT VASCULAR ACCESS N/A 2024    Procedure: SMART PORT PLACEMENT;  Surgeon: Alvin Riojas MD;  Location:  OR    LAPAROSCOPIC CHOLECYSTECTOMY N/A 3/19/2019    Procedure: LAPAROSCOPIC CHOLECYSTECTOMY;  Surgeon: Lamberto Magaña MD;  Location:  OR    LOBECTOMY LUNG Right 10/16/2018    Procedure: LOBECTOMY LUNG;  Surgeon: Alvin Riojas MD;  Location:  OR    REMOVE PORT VASCULAR ACCESS Left 2016    Procedure: REMOVE PORT VASCULAR ACCESS;  Surgeon: Alvin Riojas MD;  Location:  OR    THORACOTOMY Right 10/16/2018    Procedure: REDO RIGHT THORACTOMY AND RIGHT LOWER LOBECTOMY, PLEURAL LYSIS;  Surgeon: Alvin Riojas MD;  Location:  OR    TONSILLECTOMY      VASCULAR SURGERY         Prior to Admission Medications   Prior to Admission Medications   Prescriptions Last Dose Informant Patient Reported? Taking?   ANORO ELLIPTA 62.5-25 MCG/ACT oral inhaler 2024 Morning  Yes Yes   Sig: Inhale 1 puff into the lungs daily.   albuterol (PROAIR HFA/PROVENTIL HFA/VENTOLIN HFA) 108 (90 Base) MCG/ACT inhaler Past Month  Yes Yes   Sig: Inhale 2 puffs into the lungs every 6 hours as needed for shortness of breath, wheezing or cough.   aspirin (ASA) 81 MG EC tablet 2024 Morning  Yes Yes   Si tablet every other day   atorvastatin (LIPITOR) 40 MG tablet 2024 Morning  No Yes   Sig: Take 1 tablet (40 mg) by mouth daily.   durvalumab 2024  Yes Yes   Sig: Inject into the vein every 14 days.   fluticasone (FLONASE) 50 MCG/ACT nasal spray 2024 Morning Self No Yes   Sig: INSTILL 2 SPRAYS INTO BOTH NOSTRILS DAILY.   furosemide (LASIX) 40 MG tablet  12/5/2024  No Yes   Sig: Take 0.5 tablets (20 mg) by mouth daily as needed (edema or shortness of breath) For worsening shortness of breath, leg swelling or weight gain.   Patient taking differently: Take 20-40 mg by mouth daily as needed (edema or shortness of breath). For worsening shortness of breath, leg swelling or weight gain.   gabapentin (NEURONTIN) 600 MG tablet 12/8/2024 Morning  No Yes   Sig: TAKE ONE (1) TABLET BY MOUTH THREE TIMES DAILY.   ipratropium (ATROVENT) 0.06 % nasal spray 12/8/2024 Morning  No Yes   Sig: Spray 2 sprays into both nostrils 4 times daily.   losartan (COZAAR) 50 MG tablet 12/8/2024 Morning  No Yes   Sig: Take 1 tablet (50 mg) by mouth daily. Appointment required for further refills   metoprolol succinate ER (TOPROL XL) 50 MG 24 hr tablet 12/8/2024 Morning  No Yes   Sig: Take 1 tablet (50 mg) by mouth every morning.   multivitamin w/minerals (MULTI-VITAMIN) tablet 12/8/2024 Morning  Yes Yes   Sig: Take 1 tablet by mouth daily.   omeprazole (PRILOSEC) 40 MG DR capsule 12/8/2024 Morning  No Yes   Sig: TAKE 1 CAPSULE BY MOUTH EVERY DAY   ondansetron (ZOFRAN) 4 MG tablet Past Week  Yes Yes   Sig: Take 8 mg by mouth every 8 hours as needed for nausea.   prochlorperazine (COMPAZINE) 10 MG tablet Past Week  Yes Yes   Sig: Take 10 mg by mouth every 6 hours as needed for nausea or vomiting.   spironolactone (ALDACTONE) 25 MG tablet 12/8/2024 Morning  No Yes   Sig: Take 0.5 tablets (12.5 mg) by mouth every morning.      Facility-Administered Medications: None     Allergies   Allergies   Allergen Reactions    Codeine Sulfate Nausea    Simvastatin Cramps     Leg cramps    Morphine      Pt unsure - pt does not believe this is an allergy of hers    Pcn [Penicillins] Rash       Social History   I have reviewed this patient's social history and updated it with pertinent information if needed. Kezia Tobar Zack  reports that she quit smoking about 9 years ago. Her smoking use included cigarettes.  She started smoking about 54 years ago. She has a 11.4 pack-year smoking history. She has never used smokeless tobacco. She reports that she does not currently use alcohol. She reports that she does not use drugs.    Family History   I have reviewed this patient's family history and updated it with pertinent information if needed.   Family History   Problem Relation Age of Onset    Depression Mother     Substance Abuse Father     Other Cancer Father         melanoma    Prostate Cancer Father     Diabetes Father     Substance Abuse Paternal Grandfather     Other Cancer Brother         melanoma    Substance Abuse Brother     Other Cancer Brother         melanoma    Other Cancer Brother         melanoma    Other Cancer Brother         melanoma       Review of Systems   The 10 point Review of Systems is negative other than noted in the HPI     Physical Exam   Temp: 98.3  F (36.8  C) Temp src: Oral BP: (!) 166/72 Pulse: 104   Resp: 18 SpO2: 93 % O2 Device: None (Room air)    Vital Signs with Ranges  Temp:  [97.8  F (36.6  C)-99  F (37.2  C)] 98.3  F (36.8  C)  Pulse:  [] 104  Resp:  [18] 18  BP: (129-166)/(54-77) 166/72  SpO2:  [92 %-95 %] 93 %  87 lbs 1.31 oz    No acute distress.  Good oxygen without supplement  HEENT: Normocephalic atraumatic sclera anicteric  Neck: Supple no JVD lymphadenopathy.  Lungs: Less cough today.  Extremities: No cyanosis or edema.  Neurological: Nonfocal.  Skin: Limited exam no petechia or ecchymosis.    Data   Results for orders placed or performed during the hospital encounter of 12/08/24 (from the past 24 hours)   Potassium   Result Value Ref Range    Potassium 4.4 3.4 - 5.3 mmol/L

## 2024-12-11 NOTE — PLAN OF CARE
Goal Outcome Evaluation:    A&O4. VSS on RA. Ax1 GBW. Intermittent nausea zofran given x1. Tessalon given x1 for cough. Denied pain. L chest port heparin locked, no blood return.

## 2024-12-11 NOTE — PROGRESS NOTES
[facility]    Medicine Progress Note - Hospitalist Service    Date of Admission:  12/8/2024    Assessment & Plan     Kezia Dixon is a 69 year old female with history bilateral lung adenocarcinoma, currently on immunotherapy with plan to start SBRT of JULIEN nodule, previous esophageal cancer s/p esophagectomy and chemoradiation 92016), s/p lobectomy for right lung squamous cell cancer 92018), COPD, heavy tobacco use in past, resolved stress-induced cardiomyopathy, nonobstructive CAD, alcohol use disorder, who presented on 12/8/2024 with generalized weakness, physical deconditioning, acute on chronic nausea, anorexia.  Workup showed dehydration, multiple electrolyte abnormalities, new anemia.    Workup negative for infection.  CT A/P showed process.  Chest x-ray stable from before.  No new infiltrate.  Lower extremity ultrasound negative for DVT.    Acute worsening of chronic nausea  Anorexia  Chronic abdominal pain  Moderate malnutrition due to acute and chronic illness  -Has chronic nausea and abdominal pain for several years.  Reported significant anorexia and poor intake for 3-4 days before admission.  Abdominal pain stable  -No evidence of infection.  Afebrile, no leukocytosis, COVID-19/influenza/RSV negative.  TSH normal  -CT A/P and chest x-ray negative for any new acute findings.  -Improving with supportive care.  Nausea is improved.  Now tolerating regular diet though some nausea persists  -Continue Ensure, multivitamin  -Nutritional services consulted.  Continue nutrition supplements as per RD  -Received IV fluids on admission, discontinued on 12/9 PM.    Multiple electrolyte abnormalities  Hyponatremia  Hypokalemia   Hypomagnesemia  Hypophosphatemia  -Admission labs showed sodium 127, potassium 2.7, magnesium 1.6.  Electrolytes replaced on admission  -Is on as needed Lasix.  Took 40 mg p.o. Lasix for 3 consecutive days, 2 days before admission which contributed to electrolyte  abnormalities  -Baseline sodium 134-140.  Sodium 127 on admission, now improved to 130.  Stable  -Potassium was 2.7 on admission.  Replaced.    -Continue to monitor and replace as needed  -Discussed with patient to avoid Lasix in future.  She can use compression stockings or leg elevation for edema    Probable community-acquired pneumonia, organism unspecified  -Patient reported increased cough with productive sputum, afebrile, no leukocytosis, procalcitonin borderline at 0.25, CRP elevated at 186  -Chest x-ray on admission showed opacities in the right midlung, not significantly changed from before.  -Due to her symptoms and presentation, concern for pneumonia.  -Repeat chest x-ray pending  -Start on IV ceftriaxone and azithromycin    Generalized weakness  Physical deconditioning  -Has been more weak and deconditioned since recent hospitalization 2-3 weeks ago  -PT/OT consulted-PT recommended home with home care PT.   following for discharge planning    Acute normocytic anemia  -Hemoglobin was 12.6 on 11/20, now down to 9.4.  Stable since admission.  Patient denied any obvious bleeding.  -[place] [facility] consulted.  Labs do not indicate hemolysis-normal bilirubin, normal LDH, elevated haptoglobin  -Iron studies showed low iron saturation of 8%, low iron binding capacity, normal vitamin B12 at 519, normal TSH, reticulocyte count 1.4  -Received IV iron on 12/9      Multiple bilateral pulmonary nodules due to adenocarcinoma, 2023  -S/p bronchoscopy with bronchial ultrasound-guided biopsy.  Pathology from right upper lobe nodule was positive for small cell lung cancer (adenocarcinoma).  Biopsy from left upper lobe nodule showed atypical cells.  This was suspicious but not definitive for adenocarcinoma.  Lymph nodes were negative.  -S/p chemoradiation (of RUL) completed 2/2024  -Started on consolidation durvalumab immunotherapy 5/2024.  Last dose was on 11/25/2024  -Most recent CT scans 10/2024 showed  further increase in RUL and JULIEN nodules and indeterminate right hepatic lobe lesion.  There was plan to start stereotactic body radiotherapy (SBRT) to the JULIEN nodule on 12/9. 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    Chronic progressive shortness of breath  COPD  -Progressive shortness of breath is likely multifactorial-due to underlying COPD, lung cancer  -No evidence of pneumonia on admission chest x-ray  -Continue Mucinex tablet, Anoro     GERD  -Continue PPI    Resolved stress induced cardiomyopathy  PTA-Toprol-XL 50 mg daily, losartan 50 mg daily, spironolactone 12.5 mg daily, as needed Lasix   -most recent echo 10/2/2024 showed normal LVEF of 65-70%, no WMA, normal RV size and function, mild to moderate mitral regurgitation  -Continue Toprol-XL and losartan  -Hold spironolactone and Lasix    Nonobstructive coronary artery disease  PTA-aspirin 81 mg every other day  -Continue aspirin and atorvastatin    Alcohol use disorder  -Apparently has been sober for 2 years.  Recently had relapse of alcohol intake when she found out about worsening lung cancer.  She was assessed by chemical dependency.  She declined psychiatric consultation.  -Admission send reported has not been drinking any alcohol since her hospital discharge      Diabetes mellitus type 2, new diagnosis  -Previously had prediabetes.  A1c on admission 7.2  -Could consider starting on metformin at discharge.  Else can follow-up with PCP for monitoring       Right lower extremity swelling  -Ultrasound negative for DVT.  -Recommend patient use compression stockings and leg elevation for edema.   "Avoid Lasix and spironolactone             Diet: Snacks/Supplements Adult: Ensure Clear; With Meals  Snacks/Supplements Adult: Gelatein 20 (sugar-free); With Meals  Regular Diet Adult    DVT Prophylaxis: Pneumatic Compression Devices  Jaeger Catheter: Not present  Lines: PRESENT      Port a Cath 01/09/24 Single Lumen Left Chest wall-Site Assessment: WDL      Cardiac Monitoring: None  Code Status: No CPR- Do NOT Intubate      Clinically Significant Risk Factors        # Hypokalemia: Lowest K = 3.3 mmol/L in last 2 days, will replace as needed  # Hyponatremia: Lowest Na = 125 mmol/L in last 2 days, will monitor as appropriate  # Hypochloremia: Lowest Cl = 92 mmol/L in last 2 days, will monitor as appropriate      # Hypoalbuminemia: Lowest albumin = 2.7 g/dL at 12/9/2024  6:56 AM, will monitor as appropriate     # Hypertension: Noted on problem list    # Chronic heart failure with preserved ejection fraction: heart failure noted on problem list and last echo with EF >50%          # DMII: A1C = 7.2 % (Ref range: <5.7 %) within past 6 months, PRESENT ON ADMISSION  # Cachexia: Estimated body mass index is 16.45 kg/m  as calculated from the following:    Height as of 11/19/24: 1.549 m (5' 1\").    Weight as of this encounter: 39.5 kg (87 lb 1.3 oz)., PRESENT ON ADMISSION  # Moderate Malnutrition: based on nutrition assessment, PRESENT ON ADMISSION   # Financial/Environmental Concerns: none  # COPD: noted on problem list        Social Drivers of Health    Tobacco Use: Medium Risk (9/17/2024)    Patient History     Smoking Tobacco Use: Former     Smokeless Tobacco Use: Never   Physical Activity: Insufficiently Active (2/6/2024)    Exercise Vital Sign     Days of Exercise per Week: 5 days     Minutes of Exercise per Session: 10 min   Stress: Stress Concern Present (2/6/2024)    [place] of [employer]ational [employer] - Occupational Stress Questionnaire     Feeling of Stress : Rather much   Social Connections: Unknown " (2/6/2024)    Social Connection and Isolation Panel [NHANES]     Frequency of Social Gatherings with Friends and Family: More than three times a week          Disposition Plan         Medically Ready for Discharge: Anticipated in 2-4 Days             Eloise Fisher MD  Hospitalist Service  [facility]  Securely message with [employer] (more info)  Text page via [place] Paging/Directory   ______________________________________________________________________    Interval History     Patient reports she had significant nausea this morning.    Son present in room.  Patient and son expressed some concern about starting radiation therapy in the hospital.    They will decide and let us know.    Vital signs are stable       Physical Exam   Vital Signs: Temp: 98.3  F (36.8  C) Temp src: Oral BP: (!) 147/66 Pulse: 103   Resp: 18 SpO2: 93 % O2 Device: None (Room air)    Weight: 87 lbs 1.31 oz    Constitutional - awake and alert, in no acute distress  Cardiovascular-regular rate and rhythm  Lungs-breath sounds diminished bilaterally, no wheezing or rhonchi  Integumentary - skin is warm and dry, no rashes or ulcers  Neurological - awake, alert and oriented x3.  Moving all 4 extremities, normal speech, no focal deficits    Medical Decision Making       45 MINUTES SPENT BY ME on the date of service doing chart review, history, exam, documentation & further activities per the note.      Data     I have personally reviewed the following data over the past 24 hrs:    6.1  \   9.3 (L)   / 383     130 (L) 92 (L) 8.9 /  124 (H)   4.0 27 0.45 (L) \     Procal: 0.25 CRP: 186.30 (H) Lactic Acid: N/A         Imaging results reviewed over the past 24 hrs:   No results found for this or any previous visit (from the past 24 hours).

## 2024-12-11 NOTE — PLAN OF CARE
Goal Outcome Evaluation:    Pt A&O x4. VSS on RA. Pt up A1, GB, and walker to bathroom. Tea colored urin noted. Pt denies any chest pain. SOB noted on exertion. CMS intact except baseline numbness and tingling in deuce Lower extremities. PORT noted to not have blood return today. Altiplace used and now blood return noted. Antibiotic started. Mag, Phos, and Pot all WNL so no replacement needed. Pt reported some nausea, compazine given with complete relief. Will continue to monitor pt.

## 2024-12-11 NOTE — PROGRESS NOTES
Pt transferred to station 88 at 1630. All belonging including medication were sent. Report given to 88 nurse.

## 2024-12-11 NOTE — PLAN OF CARE
Goal Outcome Evaluation:         Shift: 12/10/2024 [id number]  Summary: Chronic Nausea; Poor PO intake; Electrolyte Disturbance  Orientation: AO x4  Pain: Denies  Vitals/Tele: VSS on RA. Tele: NSR   Labs: K 4.4, Mg 1.8, Phos 2.4; rechecks tomorrow AM,  IV Access/drains: L Chest Port SL, no blood return.  Diet: Regular; poor intake, intermittent nausea. Prn zofran x2.  Mobility: Ax1 GBW  GI/: Continent b/b  Wound/Skin: Redness blanchable to sacrum/coccyx - mepilex in place; cracked heels; scabs to R shin  Consults: HEMONC/PT/Nutrition /SW  Discharge Plan: TBD but home with [facility]

## 2024-12-11 NOTE — PROGRESS NOTES
"Provider Phillip paged \"Pt's port was sitting SL without heparin for a while since admission. It is now hep locked but does not have blood return, do we want a vascular consult for this?\"   "

## 2024-12-12 ENCOUNTER — APPOINTMENT (OUTPATIENT)
Dept: PHYSICAL THERAPY | Facility: CLINIC | Age: 69
End: 2024-12-12
Payer: MEDICARE

## 2024-12-12 LAB
CRP SERPL-MCNC: 203.58 MG/L
FLUAV RNA SPEC QL NAA+PROBE: NEGATIVE
FLUBV RNA RESP QL NAA+PROBE: NEGATIVE
MAGNESIUM SERPL-MCNC: 1.7 MG/DL (ref 1.7–2.3)
PHOSPHATE SERPL-MCNC: 3.1 MG/DL (ref 2.5–4.5)
POTASSIUM SERPL-SCNC: 3.9 MMOL/L (ref 3.4–5.3)
RSV RNA SPEC NAA+PROBE: NEGATIVE
SARS-COV-2 RNA RESP QL NAA+PROBE: NEGATIVE

## 2024-12-12 PROCEDURE — 250N000013 HC RX MED GY IP 250 OP 250 PS 637: Performed by: INTERNAL MEDICINE

## 2024-12-12 PROCEDURE — 120N000001 HC R&B MED SURG/OB

## 2024-12-12 PROCEDURE — 84132 ASSAY OF SERUM POTASSIUM: CPT | Performed by: INTERNAL MEDICINE

## 2024-12-12 PROCEDURE — 250N000011 HC RX IP 250 OP 636: Mod: JZ | Performed by: INTERNAL MEDICINE

## 2024-12-12 PROCEDURE — 84100 ASSAY OF PHOSPHORUS: CPT | Performed by: INTERNAL MEDICINE

## 2024-12-12 PROCEDURE — 250N000011 HC RX IP 250 OP 636: Performed by: STUDENT IN AN ORGANIZED HEALTH CARE EDUCATION/TRAINING PROGRAM

## 2024-12-12 PROCEDURE — 97110 THERAPEUTIC EXERCISES: CPT | Mod: GP | Performed by: PHYSICAL THERAPIST

## 2024-12-12 PROCEDURE — 97116 GAIT TRAINING THERAPY: CPT | Mod: GP | Performed by: PHYSICAL THERAPIST

## 2024-12-12 PROCEDURE — 250N000013 HC RX MED GY IP 250 OP 250 PS 637: Performed by: HOSPITALIST

## 2024-12-12 PROCEDURE — 86140 C-REACTIVE PROTEIN: CPT | Performed by: INTERNAL MEDICINE

## 2024-12-12 PROCEDURE — 99232 SBSQ HOSP IP/OBS MODERATE 35: CPT | Performed by: INTERNAL MEDICINE

## 2024-12-12 PROCEDURE — 250N000009 HC RX 250: Performed by: HOSPITALIST

## 2024-12-12 PROCEDURE — 83735 ASSAY OF MAGNESIUM: CPT | Performed by: INTERNAL MEDICINE

## 2024-12-12 PROCEDURE — 250N000013 HC RX MED GY IP 250 OP 250 PS 637: Performed by: PHYSICIAN ASSISTANT

## 2024-12-12 PROCEDURE — 87637 SARSCOV2&INF A&B&RSV AMP PRB: CPT | Performed by: INTERNAL MEDICINE

## 2024-12-12 RX ORDER — WATER 10 ML/10ML
INJECTION INTRAMUSCULAR; INTRAVENOUS; SUBCUTANEOUS
Status: COMPLETED
Start: 2024-12-12 | End: 2024-12-12

## 2024-12-12 RX ADMIN — GUAIFENESIN 600 MG: 600 TABLET, EXTENDED RELEASE ORAL at 20:47

## 2024-12-12 RX ADMIN — BENZONATATE 100 MG: 100 CAPSULE ORAL at 17:02

## 2024-12-12 RX ADMIN — BENZONATATE 100 MG: 100 CAPSULE ORAL at 05:15

## 2024-12-12 RX ADMIN — CEFTRIAXONE SODIUM 2 G: 2 INJECTION, POWDER, FOR SOLUTION INTRAMUSCULAR; INTRAVENOUS at 10:01

## 2024-12-12 RX ADMIN — Medication 1 TABLET: at 07:22

## 2024-12-12 RX ADMIN — GABAPENTIN 600 MG: 300 CAPSULE ORAL at 07:23

## 2024-12-12 RX ADMIN — LOSARTAN POTASSIUM 50 MG: 50 TABLET, FILM COATED ORAL at 07:23

## 2024-12-12 RX ADMIN — IPRATROPIUM BROMIDE 2 SPRAY: 42 SPRAY NASAL at 20:47

## 2024-12-12 RX ADMIN — PANTOPRAZOLE SODIUM 40 MG: 40 TABLET, DELAYED RELEASE ORAL at 07:22

## 2024-12-12 RX ADMIN — GUAIFENESIN 600 MG: 600 TABLET, EXTENDED RELEASE ORAL at 07:23

## 2024-12-12 RX ADMIN — METOPROLOL SUCCINATE 50 MG: 50 TABLET, EXTENDED RELEASE ORAL at 07:22

## 2024-12-12 RX ADMIN — Medication 5 ML: at 13:05

## 2024-12-12 RX ADMIN — WATER 10 ML: 1 INJECTION INTRAMUSCULAR; INTRAVENOUS; SUBCUTANEOUS at 10:48

## 2024-12-12 RX ADMIN — ACETAMINOPHEN 650 MG: 325 TABLET, FILM COATED ORAL at 05:13

## 2024-12-12 RX ADMIN — BENZONATATE 100 MG: 100 CAPSULE ORAL at 23:50

## 2024-12-12 RX ADMIN — ACETAMINOPHEN 650 MG: 325 TABLET, FILM COATED ORAL at 17:00

## 2024-12-12 RX ADMIN — ALTEPLASE 2 MG: 2.2 INJECTION, POWDER, LYOPHILIZED, FOR SOLUTION INTRAVENOUS at 10:48

## 2024-12-12 RX ADMIN — AZITHROMYCIN DIHYDRATE 250 MG: 250 TABLET ORAL at 10:11

## 2024-12-12 RX ADMIN — ATORVASTATIN CALCIUM 40 MG: 40 TABLET, FILM COATED ORAL at 07:22

## 2024-12-12 RX ADMIN — ACETAMINOPHEN 650 MG: 325 TABLET, FILM COATED ORAL at 10:02

## 2024-12-12 RX ADMIN — ALTEPLASE 2 MG: 2.2 INJECTION, POWDER, LYOPHILIZED, FOR SOLUTION INTRAVENOUS at 11:39

## 2024-12-12 RX ADMIN — GABAPENTIN 600 MG: 300 CAPSULE ORAL at 20:47

## 2024-12-12 RX ADMIN — FLUTICASONE PROPIONATE 2 SPRAY: 50 SPRAY, METERED NASAL at 07:24

## 2024-12-12 RX ADMIN — UMECLIDINIUM BROMIDE AND VILANTEROL TRIFENATATE 1 PUFF: 62.5; 25 POWDER RESPIRATORY (INHALATION) at 07:25

## 2024-12-12 RX ADMIN — Medication 5 ML: at 05:21

## 2024-12-12 RX ADMIN — ASPIRIN 81 MG: 81 TABLET, COATED ORAL at 07:22

## 2024-12-12 RX ADMIN — IPRATROPIUM BROMIDE 2 SPRAY: 42 SPRAY NASAL at 07:25

## 2024-12-12 RX ADMIN — GABAPENTIN 600 MG: 300 CAPSULE ORAL at 17:01

## 2024-12-12 ASSESSMENT — ACTIVITIES OF DAILY LIVING (ADL)
ADLS_ACUITY_SCORE: 62

## 2024-12-12 NOTE — PROGRESS NOTES
[facility]    Medicine Progress Note - Hospitalist Service    Date of Admission:  12/8/2024    Assessment & Plan     Kezia Dixon is a 69 year old female with history bilateral lung adenocarcinoma, currently on immunotherapy with plan to start SBRT of JULIEN nodule, previous esophageal cancer s/p esophagectomy and chemoradiation 92016), s/p lobectomy for right lung squamous cell cancer 92018), COPD, heavy tobacco use in past, resolved stress-induced cardiomyopathy, nonobstructive CAD, alcohol use disorder, who presented on 12/8/2024 with generalized weakness, physical deconditioning, acute on chronic nausea, anorexia.  Workup showed dehydration, multiple electrolyte abnormalities, new anemia.    Workup negative for infection.  CT A/P showed process.  Chest x-ray stable from before.  No new infiltrate.  Lower extremity ultrasound negative for DVT.    Acute worsening of chronic nausea  Anorexia  Chronic abdominal pain  Moderate malnutrition due to acute and chronic illness  -Has chronic nausea and abdominal pain for several years.  Reported significant anorexia and poor intake for 3-4 days before admission.  Abdominal pain stable  -No evidence of infection.  Afebrile, no leukocytosis, COVID-19/influenza/RSV negative.  TSH normal  -CT A/P and CXR negative for any new acute findings.  -Improving with supportive care.  Nausea is improved.  Now tolerating regular diet   -Continue Ensure, multivitamin  -Nutritional services consulted.  Continue nutrition supplements as per RD  -Received IV fluids on admission, discontinued on 12/9 PM.    Multiple electrolyte abnormalities  Hyponatremia  Hypokalemia   Hypomagnesemia  Hypophosphatemia  -Admission labs showed sodium 127, potassium 2.7, magnesium 1.6.  Electrolytes replaced on admission  -Is on as needed Lasix.  Took 40 mg p.o. Lasix for 3 consecutive days, 2 days before admission which contributed to electrolyte abnormalities  -Baseline sodium  134-140.  Sodium 127 on admission, now improved to 130.  Stable  -Potassium was 2.7 on admission.  Replaced.    -Continue to monitor and replace as needed  -Discussed with patient to avoid Lasix in future.  She can use compression stockings or leg elevation for edema    Probable community-acquired pneumonia, organism unspecified  -Patient reported increased cough with productive sputum, afebrile, no leukocytosis, procalcitonin borderline at 0.25, CRP elevated 186 -->203  -Chest x-ray on admission showed opacities in the right midlung, not significantly changed from before.  Follow-up chest x-ray 12/11 showed no acute infiltrate  -Due to her symptoms and presentation, concern for pneumonia.  -Started on IV ceftriaxone and azithromycin on 12/11, will continue  -Check for COVID-19/influenza/RSV    Generalized weakness  Physical deconditioning  -Has been more weak and deconditioned since recent hospitalization 2-3 weeks ago  -PT/OT consulted-PT recommended home with home care PT.   following for discharge planning    Acute normocytic anemia  -Hemoglobin was 12.6 on 11/20, now down to 9.4.  Stable since admission.  Patient denied any obvious bleeding.  -[place] [facility] consulted.  Labs do not indicate hemolysis-normal bilirubin, normal LDH, elevated haptoglobin  -Iron studies showed low iron saturation of 8%, low iron binding capacity, normal vitamin B12 at 519, normal TSH, reticulocyte count 1.4  -Received IV iron on 12/9      Multiple bilateral pulmonary nodules due to adenocarcinoma, 2023  -S/p bronchoscopy with bronchial ultrasound-guided biopsy.  Pathology from right upper lobe nodule was positive for small cell lung cancer (adenocarcinoma).  Biopsy from left upper lobe nodule showed atypical cells.  This was suspicious but not definitive for adenocarcinoma.  Lymph nodes were negative.  -S/p chemoradiation (of RUL) completed 2/2024  -Started on consolidation durvalumab immunotherapy 5/2024.  Last  dose was on 11/25/2024  -Most recent CT scans 10/2024 showed further increase in RUL and JULIEN nodules and indeterminate right hepatic lobe lesion.  There was plan to start stereotactic body radiotherapy (SBRT) to the JULIEN nodule on 12/9. 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    Chronic progressive shortness of breath  COPD  -Progressive shortness of breath is likely multifactorial-due to underlying COPD, lung cancer  -No evidence of pneumonia on admission chest x-ray  -Continue Mucinex tablet, Anoro     GERD  -Continue PPI    Resolved stress induced cardiomyopathy  PTA-Toprol-XL 50 mg daily, losartan 50 mg daily, spironolactone 12.5 mg daily, as needed Lasix   -most recent echo 10/2/2024 showed normal LVEF of 65-70%, no WMA, normal RV size and function, mild to moderate mitral regurgitation  -Continue Toprol-XL and losartan  -Hold spironolactone and Lasix    Nonobstructive coronary artery disease  PTA-aspirin 81 mg every other day  -Continue aspirin and atorvastatin    Alcohol use disorder  -Apparently has been sober for 2 years.  Recently had relapse of alcohol intake when she found out about worsening lung cancer.  She was assessed by chemical dependency.  She declined psychiatric consultation.  -Admission send reported has not been drinking any alcohol since her hospital discharge      Diabetes mellitus type 2, new diagnosis  -Previously had prediabetes.  A1c on admission 7.2  -Could consider starting on metformin at discharge.  Else can follow-up with PCP for monitoring       Right lower extremity swelling  -Ultrasound negative for DVT.  -Recommend  "patient use compression stockings and leg elevation for edema.  Avoid Lasix and spironolactone             Diet: Snacks/Supplements Adult: Ensure Clear; With Meals  Snacks/Supplements Adult: Gelatein 20 (sugar-free); With Meals  Regular Diet Adult    DVT Prophylaxis: Pneumatic Compression Devices  Jaeger Catheter: Not present  Lines: PRESENT      Port a Cath 01/09/24 Single Lumen Left Chest wall-Site Assessment: WDL      Cardiac Monitoring: None  Code Status: No CPR- Do NOT Intubate      Clinically Significant Risk Factors         # Hyponatremia: Lowest Na = 130 mmol/L in last 2 days, will monitor as appropriate  # Hypochloremia: Lowest Cl = 92 mmol/L in last 2 days, will monitor as appropriate      # Hypoalbuminemia: Lowest albumin = 2.7 g/dL at 12/9/2024  6:56 AM, will monitor as appropriate     # Hypertension: Noted on problem list    # Chronic heart failure with preserved ejection fraction: heart failure noted on problem list and last echo with EF >50%          # DMII: A1C = 7.2 % (Ref range: <5.7 %) within past 6 months, PRESENT ON ADMISSION  # Cachexia: Estimated body mass index is 16.63 kg/m  as calculated from the following:    Height as of 11/19/24: 1.549 m (5' 1\").    Weight as of this encounter: 39.9 kg (88 lb)., PRESENT ON ADMISSION  # Moderate Malnutrition: based on nutrition assessment, PRESENT ON ADMISSION   # Financial/Environmental Concerns: none  # COPD: noted on problem list        Social Drivers of Health    Tobacco Use: Medium Risk (9/17/2024)    Patient History     Smoking Tobacco Use: Former     Smokeless Tobacco Use: Never   Physical Activity: Insufficiently Active (2/6/2024)    Exercise Vital Sign     Days of Exercise per Week: 5 days     Minutes of Exercise per Session: 10 min   Stress: Stress Concern Present (2/6/2024)    Citizen of [place] of Occupational Health - Occupational Stress Questionnaire     Feeling of Stress : Rather much   Social Connections: Unknown (2/6/2024)    Social " Connection and Isolation Panel [NHANES]     Frequency of Social Gatherings with Friends and Family: More than three times a week          Disposition Plan         Medically Ready for Discharge: Anticipated in 2-4 Days             Eloise Fisher MD  Hospitalist Service  [facility]  Securely message with [employer] (more info)  Text page via [place] [employer] Paging/Directory   ______________________________________________________________________    Interval History     Patient reports she feels better than before.  Nausea has improved.  Eating better than before.    Afebrile.  Stable vital signs.        Physical Exam   Vital Signs: Temp: 98.9  F (37.2  C) Temp src: Oral BP: 139/62 Pulse: 101   Resp: 18 SpO2: 95 % O2 Device: None (Room air)    Weight: 88 lbs 0 oz    Constitutional - awake and alert, in no acute distress  Cardiovascular-regular rate and rhythm  Lungs-breath sounds diminished bilaterally, no wheezing or rhonchi  Integumentary - skin is warm and dry, no rashes or ulcers  Neurological - awake, alert and oriented x3.  Moving all 4 extremities, normal speech, no focal deficits    Medical Decision Making       45 MINUTES SPENT BY ME on the date of service doing chart review, history, exam, documentation & further activities per the note.      Data     I have personally reviewed the following data over the past 24 hrs:    N/A  \   N/A   / N/A     N/A N/A N/A /  N/A   3.9 N/A N/A \     Procal: N/A CRP: 203.58 (H) Lactic Acid: N/A         Imaging results reviewed over the past 24 hrs:   No results found for this or any previous visit (from the past 24 hours).

## 2024-12-12 NOTE — PLAN OF CARE
Orientation: Aox4, flat affect  Aggression Stop Light: green  Activity: Al/GB/walker  Diet/BS Checks: Reg  IV Access/Drains: Port is HL, very positional  Pain Management: denies  Abnormal VS/Results: On protocols  Bowel/Bladder: Continent  Skin/Wounds: Blanchable redness to coccyx  Consults: Hem/onc, PT, nutrition  D/C Disposition: home with HC when medically cleared

## 2024-12-12 NOTE — PROGRESS NOTES
Consult populated for elevated readmission risk. Patient is already being followed by Care Transitions team for discharge planning.       Cinthia Skinner RN, BSN, ACM   Care Transitions Specialist  [facility]  Care Transitions Specialist  [address] [place]. [address]  [email or web address]  Office: [phone] Fax: [phone]  [facility]

## 2024-12-12 NOTE — PROGRESS NOTES
[facility]    Hematology / Oncology     Date of Admission:  12/8/2024    Assessment & Plan     1.  Multiple lung nodules both lungs, at least 4 of them described to have increased in size or positive on PET scan, right upper lobe nodule biopsy positive for adenocarcinoma, NGS negative, PD-L1 TPS score 0%, left upper lobe nodule biopsy showing atypical cells but suspicious for adenocarcinoma.  Started on chemoradiotherapy with weekly carboplatin and Taxol on January 18, 2024, Taxol dose reduced based on pre-existing neuropathy, the dose was reduced slightly again due to borderline neutropenia with her fourth cycle.  Completed February 28, 2024 and achieved stable disease on the CT scan from April 2024.  Started on consolidation durvalumab immunotherapy on May 28, 2024.  CT scan in July shows minimal increase in micronodular densities but overall stable, CT in October showed further increase in the right upper lobe nodule and indeterminate liver lesion in the right hepatic lobe.  Left upper lung lesion consistent with carcinoma planning to start radiation this week 5 session over 2 weeks    2.  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    3.  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    4.  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  I would like to rule out anemia associated with auto hemolytic process for immunotherapy    5.  Electrolyte abnormalities hyponatremia hypokalemia and hypomagnesemia with nausea and weakness.  Management included internal medicine team.    6.  High CRP and cough with expectoration, probable pneumonia started on antibiotic.     Plan:    Anemia is not associated with autoimmune checkpoint inhibitor related anemia with  negative RAFAEL normal LDH and haptoglobin not being low.    Hold immunotherapy tomorrow for The next few weeks until significant improvement in her overall condition.      Hyponatremia can be associated with checkpoint inhibitors, if overall health does not improve, we will reconsider the role of immunotherapy in her treatment plan, her disease burden is not highly and holding treatment for a while as an option especially if she is going to have stereotactic radiation to the left upper lobe lesion.    Radiation was notified to decide on starting localized radiation to the left upper lung lesion.  Discussed with Dr. Cowan on December 10, if she has improvement in her condition may start while she is in the hospital.  Otherwise she can start radiation in the next week or so as an outpatient.     I will arrange follow-up in our office in 3 weeks, after the holidays to discuss the role of immunotherapy.        Jean Garcia MD    Code Status    No CPR- Do NOT Intubate    Reason for Consult   Reason for consult: Lung cancer, anemia    Primary Care Physician   Mustapha Velásquez    Chief Complaint   Nausea and weakness    Medical records reviewed, history obtained and patient examined    History of Present Illness   Kezia Dixon is a 69 year old female who presents with non-small cell lung cancer as detailed below presented to hospital with nausea and weakness.  She denies bleeding no melena hematochezia.  She was supposed to start radiation to lung lesion today for 5 sessions over 2 weeks and to continue immunotherapy scheduled on December 10.  In the hospital she was found to have electrolyte abnormality and hemoglobin 9.5.  Iron levels consistent with chronic illness and the ferritin was 196 argues against iron deficiency.  B12 normal 590 and folic acid normal 16.7.  She has been on durvalumab maintenance therapy.  Last hemoglobin office was 10.4 on November 26     PET/CT was obtained on October 22 which  showed enlargement of peripherally FDG avid cavitary lesion in the posterior right lung inflammatory versus infectious cannot rule out recurrent neoplasm.  Suspicion for left upper lobe primary lung carcinoma.  I discussed PET scan imaging with Dr. Cowan, the left upper lung lesion appears to be new neoplasm and is easily amenable for radiation SRS therapy.  The right cavitary lesion is an area which overlaps with prior radiation to the lung and esophagus cancer and did not recommend radiation to that lesion.  One of the other possibility is posttreatment changes inflammatory versus infectious.    I reviewed the PET scan images with Dr. Riojas, the changes on the right lung by the cavitary lesions are indeterminate and not easy to biopsy due to the nature of the distorted area and the central location, she is not a candidate for wedge or segment resection on the right, if surgery is indicated she will need pneumonectomy which will not be tolerated by her.  The left upper lesion is consistent with malignancy.  His recommendation is to radiate the left upper lesion SRS and I talked with Dr. Cowan who agrees on that strategy.  In regard to the right lung changes contrast CT chest in 3 months from the PET/CT will be reasonable.    Interval history December 10, 2024.  She feels better except has increase in the cough which started several days ago.  She has great to greenish sputum.  She was diagnosed with COPD by pulmonary about a year ago and she uses inhalers regularly.  Magnesium is normal and she has low potassium 3.3 and sodium 125    December 11, 2024:  Cough improved on Tessalon and Guaifenesin.  She feels better overall but continues to feel weak.  Potassium 4.4 and last sodium 129 and magnesium 1.8.    December 12, 2024:  She was started on antibiotic IV ceftriaxone and azithromycin for probable community-acquired pneumonia, CRP was high.  Potassium 3.9 magnesium 1.7 phosphorus 3.1.  Hemoglobin 9.3 stable  white count and platelets normal.  She feels better overall and believes that discharge is possibly tomorrow.  Oncological history:  Office note from October 15, 2024  Kezia is a 68-year-old woman with history of esophageal cancer and squamous cell lung cancer as detailed below:    1.  Squamous cell carcinoma of the esophagus status post induction chemotherapy (carboplatin/paclitaxel) and radiation therapy, followed by esophagectomy in 2015.    2.  Squamous cell carcinoma of the lung status post thoracotomy and right lower lobectomy in October 2018 for 7 mm grade 2 squamous cell carcinoma of the right lower lobe.    She has been followed by thoracic oncology with scans and had lung nodules.  CT scan CAP November 6, 2023 shows a large irregular cavitary nodule 2.3 x 1.5 cm posterior right upper lobe previously 1.9 x 0.9 cm.  Several adjacent right upper lobe nodules again noted.  1 of these is 1.1 x 0.8 cm increased from previously 0.9 x 0.7 cm.  There are other stable adjacent right upper lobe nodules.  Left upper lobe irregular nodule 1.3 x 0.9 cm compared to 0.9 x 0.6 cm previously.  Adjacent nodularity also noted in the region.  There are other stable nodules.  Stable small right pleural fluid and right pleural thickening.  The CT chest was compared to April 10, 2023.    PET/CT November 10, 2023 shows irregular cavitary nodule in the right upper lobe 2.2 x 1.6 cm demonstrate hypermetabolic activity along its medial and inferior aspects which extends to the minor fissure SUV max 9.6.  Additional hypermetabolic 1.3 x 0.7 cm right upper lobe nodule SUV max 10.7 which is superior to the described nodule.  FDG avid 0.6 x 0.5 cm nodule in the medial left upper lobe with SUV max of 2.2.  Minimal FDG uptake associated with irregular nodule in the left lateral upper lobe 1.4 x 1.4 cm SUV max of 0.9.    Underwent EBUS December 6, 2023 with biopsies:  Right upper lobe nodule EBUS non-small cell carcinoma favor  adenocarcinoma.  Left upper lobe lung EBUS atypical cells suspicious for adenocarcinoma.  Lymph nodes stations 4L, 4R, 11 R interlobar negative for malignant cells.  PD-L1 tumor proportion score 0%, NGS lung panel negative    Case was discussed in thoracic oncology tumor conference, MRI of the brain was suggested and was negative for metastatic disease, chemoradiation was recommended for her right upper lung lesion and watchful waiting for the left lung lesion based on atypical cells on biopsy.  Patient has mild sensory neuropathy without painful component or interference with daily function and weekly carboplatin and Taxol was chosen along with radiation, the initial dose of Taxol was reduced based on the neuropathy baseline.  Next generation sequencing panel was negative.  PDL TPS score was 0%    Our team has discussed the approach regarding her left lung lesion and the decision was to hold off treatment to avoid extended radiation exposure between both lungs.  The left lung lesion will be monitored with subsequent scan, there is a possibility that the lesion may shrink with the treatment of chemotherapy and surgical resection as an option for wedge or limited resection otherwise stereotactic radiation can be considered, additional tissue diagnosis may be needed if surgery is not decided.    She was started on radiation along with weekly carboplatin and Taxol reduced dose on January 18, 2024.  Completed chemotherapy February 29, 2024.    CT scan of chest April 1, 2024 right upper lobe nodules have decreased in size slightly.  The largest cavitary nodule in the right upper lobe connects to a small right upper lobe bronchus.  Left upper lobe nodule is slightly increased currently 1.3 cm compared to 1.1 cm previously.    Based on the lack of progression she was started on durvalumab immunotherapy consolidation on May 28, 2024.    CT July 18, 2024 shows new micronodule opacities at the left upper lobe extending to the  lingula could be infection or inflammation.  Cavitary lesion in the right upper lobe is stable in size.  Additional satellite nodules are stable in size.  Stable prominent indeterminate left upper lobe nodule.  Interval decrease in consolidation on an interstitial thickening surrounding the cavitary lesion.  Few nodules at the left upper lobe are slightly more prominent.  Stable small right pulmonary and fluid.  New identified but small inferior mediastinal lymph nodes are technically indeterminate.    CT chest October 10, 2024 shows increased size of the right upper lobe cavitary lesion with surrounding consolidative opacities may be due to evolving postradiation changes.  The size is 3.2 x 2.1 cm compared to 2.4 x 1.7 cm.  Few satellite nodules stable.  Left upper lobe irregular nodule 14 x 10 mm stable since July.  But gradually increased in size compared to December concerning for neoplasm.  Indeterminate small enhancing focus in the posterior right hepatic lobe 1.7 x 1.0 cm and benign perfusional abnormality.  Interval History  She is here today for follow-up and to continue with durvalumab.  She had a CT scan last week and here to review the results.  She has been anxious about the results she read on the Internet and her blood pressure today is higher.  She has high blood pressure and has been working with her cardiologist and the plan is to regroup in 3 weeks with her cardiologist to review her readings.  She does take her blood pressure medications regularly.  No increasing shortness of breath or cough.  She requests a refill on Compazine which she uses for intermittent nausea associated with treatment.  CBC from today is normal and her chemistry panel and thyroid test have been normal.  CT was reviewed showing some increase in her right upper lung nodule as detailed above          Past Medical History   I have reviewed this patient's medical history and updated it with pertinent information if needed.    Past Medical History:   Diagnosis Date    Alcohol use disorder, severe, dependence (H) 10/14/2016    Alcoholism (H) 10/14/2016    Anemia     Benign essential hypertension 10/14/2016    Carotid artery disease (H)     mile plaque 5/7    Chemical dependency (H)     alcohol    COPD (chronic obstructive pulmonary disease) (H)     Coronary artery disease     Depression with anxiety     Esophageal cancer (H) 03/22/2016    SQUAMOUS CELL    Esophageal mass     GERD (gastroesophageal reflux disease)     Hx of pancreatitis 2003    Hypercholesteremia     Hyperglycemia     Hyperlipemia     Impaired fasting glucose 10/14/2016    Nocturnal leg cramps     Other chronic pain     Pancreatic pseudocyst 10/14/2016    Pancreatitis     with pseudocyst    Pap smear with atypical squamous cells, cannot exclude high grade squamous intraepithelial lesion (ASC-H) 10/14/2016    Colpo impression benign, ECC benign. Cotestin in 1 yr.    Shingles     Squamous cell carcinoma of right lung (H)     Vasomotor rhinitis        Past Surgical History   I have reviewed this patient's surgical history and updated it with pertinent information if needed.  Past Surgical History:   Procedure Laterality Date    AAA REPAIR      splenic artery aneurysm embolization    ABDOMEN SURGERY  2/2/2016    COLONOSCOPY  11/26/2013    Procedure: COMBINED COLONOSCOPY, SINGLE BIOPSY/POLYPECTOMY BY BIOPSY;  COLONOSCOPY (MAC);  Surgeon: Montana Rosa MD;  Location:  GI    COLONOSCOPY  11/26/2013    COLONOSCOPY N/A 10/4/2018    Procedure: COMBINED COLONOSCOPY, SINGLE OR MULTIPLE BIOPSY/POLYPECTOMY BY BIOPSY;  colonoscopy;  Surgeon: Gio Apodaca MD;  Location:  GI    ENT SURGERY      ESOPHAGOGASTRECTOMY N/A 3/22/2016    Procedure: ESOPHAGOGASTRECTOMY;  Surgeon: Alvin Riojas MD;  Location:  OR    ESOPHAGOSCOPY, GASTROSCOPY, DUODENOSCOPY (EGD), COMBINED N/A 12/22/2015    Procedure: COMBINED ENDOSCOPIC ULTRASOUND, ESOPHAGOSCOPY, GASTROSCOPY,  DUODENOSCOPY (EGD), FINE NEEDLE ASPIRATE/BIOPSY;  Surgeon: Danelle Michael MD;  Location:  GI    GASTROSTOMY, INSERT TUBE, COMBINED N/A 2016    Procedure: COMBINED GASTROSTOMY, INSERT TUBE (OPEN);  Surgeon: Alvin Riojas MD;  Location:  OR    GI SURGERY  2015    HAND SURGERY      right    HERNIORRHAPHY INCISIONAL (LOCATION) N/A 3/19/2019    Procedure: LAPARSCOPIC  INCISIONAL HERNIA REPAIR WITH MESH, LAPARSCOPIC LYSIS OF ADHENSIONS;  Surgeon: Lamberto Magaña MD;  Location:  OR    INSERT PORT VASCULAR ACCESS N/A 2015    Procedure: INSERT PORT VASCULAR ACCESS;  Surgeon: Alvin Riojas MD;  Location:  OR    INSERT PORT VASCULAR ACCESS N/A 2024    Procedure: SMART PORT PLACEMENT;  Surgeon: Alvin Riojas MD;  Location:  OR    LAPAROSCOPIC CHOLECYSTECTOMY N/A 3/19/2019    Procedure: LAPAROSCOPIC CHOLECYSTECTOMY;  Surgeon: Lamberto Magaña MD;  Location:  OR    LOBECTOMY LUNG Right 10/16/2018    Procedure: LOBECTOMY LUNG;  Surgeon: Alvin Riojas MD;  Location:  OR    REMOVE PORT VASCULAR ACCESS Left 2016    Procedure: REMOVE PORT VASCULAR ACCESS;  Surgeon: Alvin Riojas MD;  Location:  OR    THORACOTOMY Right 10/16/2018    Procedure: REDO RIGHT THORACTOMY AND RIGHT LOWER LOBECTOMY, PLEURAL LYSIS;  Surgeon: Alvin Riojas MD;  Location:  OR    TONSILLECTOMY      VASCULAR SURGERY         Prior to Admission Medications   Prior to Admission Medications   Prescriptions Last Dose Informant Patient Reported? Taking?   ANORO ELLIPTA 62.5-25 MCG/ACT oral inhaler 2024 Morning  Yes Yes   Sig: Inhale 1 puff into the lungs daily.   albuterol (PROAIR HFA/PROVENTIL HFA/VENTOLIN HFA) 108 (90 Base) MCG/ACT inhaler Past Month  Yes Yes   Sig: Inhale 2 puffs into the lungs every 6 hours as needed for shortness of breath, wheezing or cough.   aspirin (ASA) 81 MG EC tablet 2024 Morning  Yes Yes   Si  tablet every other day   atorvastatin (LIPITOR) 40 MG tablet 12/8/2024 Morning  No Yes   Sig: Take 1 tablet (40 mg) by mouth daily.   durvalumab 11/26/2024  Yes Yes   Sig: Inject into the vein every 14 days.   fluticasone (FLONASE) 50 MCG/ACT nasal spray 12/8/2024 Morning Self No Yes   Sig: INSTILL 2 SPRAYS INTO BOTH NOSTRILS DAILY.   furosemide (LASIX) 40 MG tablet 12/5/2024  No Yes   Sig: Take 0.5 tablets (20 mg) by mouth daily as needed (edema or shortness of breath) For worsening shortness of breath, leg swelling or weight gain.   Patient taking differently: Take 20-40 mg by mouth daily as needed (edema or shortness of breath). For worsening shortness of breath, leg swelling or weight gain.   gabapentin (NEURONTIN) 600 MG tablet 12/8/2024 Morning  No Yes   Sig: TAKE ONE (1) TABLET BY MOUTH THREE TIMES DAILY.   ipratropium (ATROVENT) 0.06 % nasal spray 12/8/2024 Morning  No Yes   Sig: Spray 2 sprays into both nostrils 4 times daily.   losartan (COZAAR) 50 MG tablet 12/8/2024 Morning  No Yes   Sig: Take 1 tablet (50 mg) by mouth daily. Appointment required for further refills   metoprolol succinate ER (TOPROL XL) 50 MG 24 hr tablet 12/8/2024 Morning  No Yes   Sig: Take 1 tablet (50 mg) by mouth every morning.   multivitamin w/minerals (MULTI-VITAMIN) tablet 12/8/2024 Morning  Yes Yes   Sig: Take 1 tablet by mouth daily.   omeprazole (PRILOSEC) 40 MG DR capsule 12/8/2024 Morning  No Yes   Sig: TAKE 1 CAPSULE BY MOUTH EVERY DAY   ondansetron (ZOFRAN) 4 MG tablet Past Week  Yes Yes   Sig: Take 8 mg by mouth every 8 hours as needed for nausea.   prochlorperazine (COMPAZINE) 10 MG tablet Past Week  Yes Yes   Sig: Take 10 mg by mouth every 6 hours as needed for nausea or vomiting.   spironolactone (ALDACTONE) 25 MG tablet 12/8/2024 Morning  No Yes   Sig: Take 0.5 tablets (12.5 mg) by mouth every morning.      Facility-Administered Medications: None     Allergies   Allergies   Allergen Reactions    Codeine Sulfate Nausea     Simvastatin Cramps     Leg cramps    Morphine      Pt unsure - pt does not believe this is an allergy of hers    Pcn [Penicillins] Rash       Social History   I have reviewed this patient's social history and updated it with pertinent information if needed. Kezia Dixon  reports that she quit smoking about 9 years ago. Her smoking use included cigarettes. She started smoking about 54 years ago. She has a 11.4 pack-year smoking history. She has never used smokeless tobacco. She reports that she does not currently use alcohol. She reports that she does not use drugs.    Family History   I have reviewed this patient's family history and updated it with pertinent information if needed.   Family History   Problem Relation Age of Onset    Depression Mother     Substance Abuse Father     Other Cancer Father         melanoma    Prostate Cancer Father     Diabetes Father     Substance Abuse Paternal Grandfather     Other Cancer Brother         melanoma    Substance Abuse Brother     Other Cancer Brother         melanoma    Other Cancer Brother         melanoma    Other Cancer Brother         melanoma       Review of Systems   The 10 point Review of Systems is negative other than noted in the HPI     Physical Exam   Temp: 98.9  F (37.2  C) Temp src: Oral BP: 139/62 Pulse: 101   Resp: 18 SpO2: 95 % O2 Device: None (Room air)    Vital Signs with Ranges  Temp:  [98.2  F (36.8  C)-99.2  F (37.3  C)] 98.9  F (37.2  C)  Pulse:  [] 101  Resp:  [16-18] 18  BP: (121-146)/(52-64) 139/62  SpO2:  [90 %-95 %] 95 %  88 lbs 0 oz    No acute distress.  Good oxygen without supplement  HEENT: Normocephalic atraumatic sclera anicteric  Neck: Supple no JVD lymphadenopathy.  Lungs: Less cough today.  Extremities: No cyanosis or edema.  Neurological: Nonfocal.  Skin: Limited exam no petechia or ecchymosis.    Data   Results for orders placed or performed during the hospital encounter of 12/08/24 (from the past 24 hours)   Magnesium    Result Value Ref Range    Magnesium 1.7 1.7 - 2.3 mg/dL   Phosphorus   Result Value Ref Range    Phosphorus 3.1 2.5 - 4.5 mg/dL   Potassium   Result Value Ref Range    Potassium 3.9 3.4 - 5.3 mmol/L   CRP inflammation   Result Value Ref Range    CRP Inflammation 203.58 (H) <5.00 mg/L

## 2024-12-12 NOTE — PLAN OF CARE
Goal Outcome Evaluation:         12/11 0700-1930    Orientation: A&Ox4  Aggression Stop Light: Green  Activity: A1 w/GB+W  Diet/BS Checks: Regular  Tele: N/A  IV Access/Drains: L Port HL  Pain Management: Denies  Abnormal VS/Results: VSS on RA;  -Na 130  -K 4.0  -Phos 3.0  -Mag 1.7  Bowel/Bladder: Continent of B/B  Skin/Wounds: Blanchable redness to coccyx  Consults: Hem/Onc, PT, Nutrition  D/C Disposition: Home with home care when medically ready  Other Info:     -Pt transferred to unit approx 1700

## 2024-12-13 VITALS
SYSTOLIC BLOOD PRESSURE: 141 MMHG | OXYGEN SATURATION: 90 % | WEIGHT: 88 LBS | RESPIRATION RATE: 16 BRPM | BODY MASS INDEX: 16.63 KG/M2 | TEMPERATURE: 97.6 F | HEART RATE: 86 BPM | DIASTOLIC BLOOD PRESSURE: 86 MMHG

## 2024-12-13 LAB
CRP SERPL-MCNC: 206.46 MG/L
HOLD SPECIMEN: NORMAL
MAGNESIUM SERPL-MCNC: 1.9 MG/DL (ref 1.7–2.3)
PHOSPHATE SERPL-MCNC: 3.7 MG/DL (ref 2.5–4.5)
POTASSIUM SERPL-SCNC: 3.6 MMOL/L (ref 3.4–5.3)

## 2024-12-13 PROCEDURE — 250N000011 HC RX IP 250 OP 636: Performed by: INTERNAL MEDICINE

## 2024-12-13 PROCEDURE — 86140 C-REACTIVE PROTEIN: CPT | Performed by: INTERNAL MEDICINE

## 2024-12-13 PROCEDURE — 120N000001 HC R&B MED SURG/OB

## 2024-12-13 PROCEDURE — 84132 ASSAY OF SERUM POTASSIUM: CPT | Performed by: INTERNAL MEDICINE

## 2024-12-13 PROCEDURE — 36415 COLL VENOUS BLD VENIPUNCTURE: CPT | Performed by: INTERNAL MEDICINE

## 2024-12-13 PROCEDURE — 250N000012 HC RX MED GY IP 250 OP 636 PS 637: Performed by: INTERNAL MEDICINE

## 2024-12-13 PROCEDURE — 83735 ASSAY OF MAGNESIUM: CPT | Performed by: INTERNAL MEDICINE

## 2024-12-13 PROCEDURE — 84100 ASSAY OF PHOSPHORUS: CPT | Performed by: INTERNAL MEDICINE

## 2024-12-13 PROCEDURE — 250N000013 HC RX MED GY IP 250 OP 250 PS 637: Performed by: PHYSICIAN ASSISTANT

## 2024-12-13 PROCEDURE — 250N000011 HC RX IP 250 OP 636: Performed by: STUDENT IN AN ORGANIZED HEALTH CARE EDUCATION/TRAINING PROGRAM

## 2024-12-13 PROCEDURE — 250N000013 HC RX MED GY IP 250 OP 250 PS 637: Performed by: HOSPITALIST

## 2024-12-13 PROCEDURE — 250N000013 HC RX MED GY IP 250 OP 250 PS 637: Performed by: INTERNAL MEDICINE

## 2024-12-13 PROCEDURE — 99232 SBSQ HOSP IP/OBS MODERATE 35: CPT | Performed by: INTERNAL MEDICINE

## 2024-12-13 RX ORDER — BENZONATATE 100 MG/1
200 CAPSULE ORAL 3 TIMES DAILY
Status: DISCONTINUED | OUTPATIENT
Start: 2024-12-13 | End: 2024-12-19

## 2024-12-13 RX ADMIN — Medication 5 ML: at 10:34

## 2024-12-13 RX ADMIN — ATORVASTATIN CALCIUM 40 MG: 40 TABLET, FILM COATED ORAL at 08:23

## 2024-12-13 RX ADMIN — LOSARTAN POTASSIUM 50 MG: 50 TABLET, FILM COATED ORAL at 08:23

## 2024-12-13 RX ADMIN — PREDNISONE 30 MG: 20 TABLET ORAL at 15:18

## 2024-12-13 RX ADMIN — IPRATROPIUM BROMIDE 2 SPRAY: 42 SPRAY NASAL at 15:21

## 2024-12-13 RX ADMIN — GABAPENTIN 600 MG: 300 CAPSULE ORAL at 15:18

## 2024-12-13 RX ADMIN — FLUTICASONE PROPIONATE 2 SPRAY: 50 SPRAY, METERED NASAL at 08:22

## 2024-12-13 RX ADMIN — GUAIFENESIN 600 MG: 600 TABLET, EXTENDED RELEASE ORAL at 08:23

## 2024-12-13 RX ADMIN — IPRATROPIUM BROMIDE 2 SPRAY: 42 SPRAY NASAL at 08:22

## 2024-12-13 RX ADMIN — GUAIFENESIN 600 MG: 600 TABLET, EXTENDED RELEASE ORAL at 20:21

## 2024-12-13 RX ADMIN — PROCHLORPERAZINE MALEATE 5 MG: 5 TABLET ORAL at 10:12

## 2024-12-13 RX ADMIN — GABAPENTIN 600 MG: 300 CAPSULE ORAL at 08:23

## 2024-12-13 RX ADMIN — PANTOPRAZOLE SODIUM 40 MG: 40 TABLET, DELAYED RELEASE ORAL at 08:23

## 2024-12-13 RX ADMIN — Medication 1 TABLET: at 08:23

## 2024-12-13 RX ADMIN — AZITHROMYCIN DIHYDRATE 250 MG: 250 TABLET ORAL at 08:23

## 2024-12-13 RX ADMIN — BENZONATATE 200 MG: 100 CAPSULE ORAL at 22:10

## 2024-12-13 RX ADMIN — CEFTRIAXONE SODIUM 2 G: 2 INJECTION, POWDER, FOR SOLUTION INTRAMUSCULAR; INTRAVENOUS at 09:54

## 2024-12-13 RX ADMIN — UMECLIDINIUM BROMIDE AND VILANTEROL TRIFENATATE 1 PUFF: 62.5; 25 POWDER RESPIRATORY (INHALATION) at 08:22

## 2024-12-13 RX ADMIN — GABAPENTIN 600 MG: 300 CAPSULE ORAL at 22:10

## 2024-12-13 RX ADMIN — METOPROLOL SUCCINATE 50 MG: 50 TABLET, EXTENDED RELEASE ORAL at 08:23

## 2024-12-13 RX ADMIN — BENZONATATE 100 MG: 100 CAPSULE ORAL at 04:58

## 2024-12-13 RX ADMIN — IPRATROPIUM BROMIDE 2 SPRAY: 42 SPRAY NASAL at 20:22

## 2024-12-13 RX ADMIN — BENZONATATE 200 MG: 100 CAPSULE ORAL at 15:19

## 2024-12-13 RX ADMIN — IPRATROPIUM BROMIDE 2 SPRAY: 42 SPRAY NASAL at 12:42

## 2024-12-13 ASSESSMENT — ACTIVITIES OF DAILY LIVING (ADL)
ADLS_ACUITY_SCORE: 62
ADLS_ACUITY_SCORE: 62
ADLS_ACUITY_SCORE: 65
ADLS_ACUITY_SCORE: 62
ADLS_ACUITY_SCORE: 62
ADLS_ACUITY_SCORE: 65
ADLS_ACUITY_SCORE: 62
ADLS_ACUITY_SCORE: 65
ADLS_ACUITY_SCORE: 65
ADLS_ACUITY_SCORE: 62
ADLS_ACUITY_SCORE: 65
ADLS_ACUITY_SCORE: 65
ADLS_ACUITY_SCORE: 62
ADLS_ACUITY_SCORE: 65
ADLS_ACUITY_SCORE: 62

## 2024-12-13 NOTE — PROGRESS NOTES
Chart Check:    I stopped by to visit briefly with Kezia. No new recommendations. She can keep the 1/7/2025 appointment that she already has scheduled- 1:15 pm labs followed by visit with Mikel HARRIS CNP for possible treatment.    Manny HARRIS CNP  [facility]  [phone] (office)

## 2024-12-13 NOTE — PLAN OF CARE
Goal Outcome Evaluation:         12/12 0700-1930    Orientation: A&Ox4, flat affect  Aggression Stop Light: Green  Activity: A1 w/GB+W, took 3 walks this shift  Diet/BS Checks: Regular, poor appetite   Tele: N/A  IV Access/Drains: R Port HL, poor blood return, alteplase given x2 without results  Pain Management: PRN Tylenol given for headache   Abnormal VS/Results: VSS on RA;  -.58  Bowel/Bladder: Continent of B/B; low urine output  Skin/Wounds: Blanchable redness to coccyx  Consults: Hem/Onc, PT, Nutrition  D/C Disposition: Possibly tomorrow 12/13  Other Info:     -No plans for radiation this stay, will follow up outpatient

## 2024-12-13 NOTE — PLAN OF CARE
[id number]  Orientation: AOx4, flat affect  Aggression Stop Light: green  Activity: A1/W  Diet/BS Checks: Reg appetite, intake is slowly improving  Tele:  NA  IV Access/Drains: R port HL, no blood return despite alteplase given on days  Pain Management: PRN tylenol available for HA  Abnormal VS/Results: VSS  Bowel/Bladder: Continent of B/B  Skin/Wounds: Blanchable redness to coccyx  Consults: hem/onc, nutrition  D/C Disposition: possibly home today  Other Info: Biggest complaint is her cough, taking scheduled mecinex and PRN tessalon

## 2024-12-13 NOTE — PROGRESS NOTES
[facility]    Medicine Progress Note - Hospitalist Service    Date of Admission:  12/8/2024    Assessment & Plan     Kezia Dixon is a 69 year old female with history bilateral lung adenocarcinoma, currently on immunotherapy with plan to start SBRT of JULIEN nodule, previous esophageal cancer s/p esophagectomy and chemoradiation 92016), s/p lobectomy for right lung squamous cell cancer 92018), COPD, heavy tobacco use in past, resolved stress-induced cardiomyopathy, nonobstructive CAD, alcohol use disorder, who presented on 12/8/2024 with generalized weakness, physical deconditioning, acute on chronic nausea, anorexia.  Workup showed dehydration, multiple electrolyte abnormalities, new anemia.    Workup negative for infection.  CT A/P showed process.  Chest x-ray stable from before.  No new infiltrate.  Lower extremity ultrasound negative for DVT.    Acute worsening of chronic nausea  Anorexia  Chronic abdominal pain  Moderate malnutrition due to acute and chronic illness  -Has chronic nausea and abdominal pain for several years.  Reported significant anorexia and poor intake for 3-4 days before admission.  Abdominal pain stable  -No evidence of infection.  Afebrile, no leukocytosis, COVID-19/influenza/RSV negative.  TSH normal  -CT A/P and CXR negative for any new acute findings.  -Improving with supportive care.  Nausea is improved.  Now tolerating regular diet   -Continue Ensure, multivitamin  -Nutritional services consulted.  Continue nutrition supplements as per RD  -Received IV fluids on admission, discontinued on 12/9 PM.    Multiple electrolyte abnormalities  Hyponatremia  Hypokalemia   Hypomagnesemia  Hypophosphatemia  -Admission labs showed sodium 127, potassium 2.7, magnesium 1.6.  Electrolytes replaced on admission  -Is on as needed Lasix.  Took 40 mg p.o. Lasix for 3 consecutive days, 2 days before admission which contributed to electrolyte abnormalities  -Baseline sodium  134-140.  Sodium 127 on admission, now improved to 130.  Stable  -Potassium was 2.7 on admission.  Replaced.    -Continue to monitor and replace as needed  -Discussed with patient to avoid Lasix in future.  She can use compression stockings or leg elevation for edema    Probable community-acquired pneumonia, organism unspecified  -Patient reported increased cough with productive sputum, afebrile, no leukocytosis, procalcitonin borderline at 0.25, CRP elevated 186 -->203-->206  -COVID-19/influenza/RSV negative  -Chest x-ray on admission showed opacities in the right midlung, not significantly changed from before.  Follow-up chest x-ray 12/11 showed no acute infiltrate  -Due to her symptoms and presentation, concern for pneumonia.  -Started on IV ceftriaxone and azithromycin on 12/11.  Will discontinue IV ceftriaxone on 12/13.  Continue azithromycin to complete course      Generalized weakness  Physical deconditioning  -Has been more weak and deconditioned since recent hospitalization 2-3 weeks ago  -PT/OT consulted-PT recommended home with home care PT.   following for discharge planning    Acute normocytic anemia  -Hemoglobin was 12.6 on 11/20, now down to 9.4.  Stable since admission.  Patient denied any obvious bleeding.  -[place] [facility] consulted.  Labs do not indicate hemolysis-normal bilirubin, normal LDH, elevated haptoglobin  -Iron studies showed low iron saturation of 8%, low iron binding capacity, normal vitamin B12 at 519, normal TSH, reticulocyte count 1.4  -Received IV iron on 12/9      Multiple bilateral pulmonary nodules due to adenocarcinoma, 2023  -S/p bronchoscopy with bronchial ultrasound-guided biopsy.  Pathology from right upper lobe nodule was positive for small cell lung cancer (adenocarcinoma).  Biopsy from left upper lobe nodule showed atypical cells.  This was suspicious but not definitive for adenocarcinoma.  Lymph nodes were negative.  -S/p chemoradiation (of RUL)  completed 2/2024  -Started on consolidation durvalumab immunotherapy 5/2024.  Last dose was on 11/25/2024  -Most recent CT scans 10/2024 showed further increase in RUL and JULIEN nodules and indeterminate right hepatic lobe lesion.  There was plan to start stereotactic body radiotherapy (SBRT) to the JULIEN nodule on 12/9. 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    Chronic progressive shortness of breath  COPD  -Progressive shortness of breath is likely multifactorial-due to underlying COPD, lung cancer  -No evidence of pneumonia on admission chest x-ray  -Continue Mucinex tablet, Anoro  -Start on Tessalon Perles 3 times daily, prednisone 30 mg daily for 5 days     GERD  -Continue PPI    Resolved stress induced cardiomyopathy  PTA-Toprol-XL 50 mg daily, losartan 50 mg daily, spironolactone 12.5 mg daily, as needed Lasix   -most recent echo 10/2/2024 showed normal LVEF of 65-70%, no WMA, normal RV size and function, mild to moderate mitral regurgitation  -Continue Toprol-XL and losartan  -Hold spironolactone and Lasix    Nonobstructive coronary artery disease  PTA-aspirin 81 mg every other day  -Continue aspirin and atorvastatin    Alcohol use disorder  -Apparently has been sober for 2 years.  Recently had relapse of alcohol intake when she found out about worsening lung cancer.  She was assessed by chemical dependency.  She declined psychiatric consultation.  -Admission send reported has not been drinking any alcohol since her hospital discharge      Diabetes mellitus type 2, new diagnosis  -Previously had prediabetes.  A1c on admission 7.2  -Could consider  "starting on metformin at discharge.  Else can follow-up with PCP for monitoring       Right lower extremity swelling  -Ultrasound negative for DVT.  -Recommend patient use compression stockings and leg elevation for edema.  Avoid Lasix and spironolactone             Diet: Snacks/Supplements Adult: Ensure Clear; With Meals  Snacks/Supplements Adult: Gelatein 20 (sugar-free); With Meals  Regular Diet Adult    DVT Prophylaxis: Pneumatic Compression Devices  Jaeger Catheter: Not present  Lines: PRESENT      Port a Cath 01/09/24 Single Lumen Left Chest wall-Site Assessment: WDL except      Cardiac Monitoring: None  Code Status: No CPR- Do NOT Intubate      Clinically Significant Risk Factors               # Hypoalbuminemia: Lowest albumin = 2.7 g/dL at 12/9/2024  6:56 AM, will monitor as appropriate     # Hypertension: Noted on problem list    # Chronic heart failure with preserved ejection fraction: heart failure noted on problem list and last echo with EF >50%          # DMII: A1C = 7.2 % (Ref range: <5.7 %) within past 6 months   # Cachexia: Estimated body mass index is 16.97 kg/m  as calculated from the following:    Height as of 11/19/24: 1.549 m (5' 1\").    Weight as of this encounter: 40.7 kg (89 lb 12.8 oz).   # Moderate Malnutrition: based on nutrition assessment    # Financial/Environmental Concerns: none  # COPD: noted on problem list        Social Drivers of Health    Tobacco Use: Medium Risk (9/17/2024)    Patient History     Smoking Tobacco Use: Former     Smokeless Tobacco Use: Never   Physical Activity: Insufficiently Active (2/6/2024)    Exercise Vital Sign     Days of Exercise per Week: 5 days     Minutes of Exercise per Session: 10 min   Stress: Stress Concern Present (2/6/2024)    [employer] [place] [employer] - Occupational Stress Questionnaire     Feeling of Stress : Rather much   Social Connections: Unknown (2/6/2024)    Social Connection and Isolation Panel [NHANES]     Frequency of " Social Gatherings with Friends and Family: More than three times a week          Disposition Plan         Medically Ready for Discharge: Anticipated Tomorrow.  Home with home care             Eloise Fisher MD  Hospitalist Service  [facility]  Securely message with [employer] (more info)  Text page via [employer] Paging/Directory   ______________________________________________________________________    Interval History     Patient reports she does not feel well.  Reports nausea, generalized weakness.    Reports she had a rough night with frequent cough   Afebrile.  Stable vital signs.        Physical Exam   Vital Signs: Temp: 97.7  F (36.5  C) Temp src: Oral BP: 120/50 Pulse: 105   Resp: 16 SpO2: 94 % O2 Device: None (Room air)    Weight: 89 lbs 12.8 oz    Constitutional - awake and alert, in no acute distress  Cardiovascular-regular rate and rhythm  Lungs-breath sounds diminished bilaterally, no wheezing or rhonchi  Integumentary - skin is warm and dry, no rashes or ulcers  Neurological - awake, alert and oriented x3.  Moving all 4 extremities, normal speech, no focal deficits    Medical Decision Making       45 MINUTES SPENT BY ME on the date of service doing chart review, history, exam, documentation & further activities per the note.      Data     I have personally reviewed the following data over the past 24 hrs:    N/A  \   N/A   / N/A     N/A N/A N/A /  N/A   3.6 N/A N/A \     Procal: N/A CRP: 206.46 (H) Lactic Acid: N/A         Imaging results reviewed over the past 24 hrs:   No results found for this or any previous visit (from the past 24 hours).

## 2024-12-13 NOTE — CONSULTS
"Care Management Discharge Note    Discharge Date: 12/12/2024       Discharge Disposition: Home, Home Care    Discharge Services: Home Care RN/PT through [facility]     Discharge DME: None    Discharge Transportation: car, drives self, family or friend will provide    Private pay costs discussed:  n/a    Does the patient's insurance plan have a 3 day qualifying hospital stay waiver?  No    PAS Confirmation Code:    Patient/family educated on Medicare website which has current facility and service quality ratings: no    Education Provided on the Discharge Plan:  yes  Persons Notified of Discharge Plans: Patient, Bedside RN  Patient/Family in Agreement with the Plan: yes    Handoff Referral Completed: Yes, [facility] PCP: Internal handoff referral completed    Additional Information:  Met with the patient at bedside. Possible discharge to home with homecare through [facility] RN/PT/OT.   Patient has been ambulating but endorses \"weakness.\" Patient noted she has someone that can stay with her for a few days in addition to the homecare.  Patient aware homecare will call within 48 start of care.  Patient noted she has radiation therapy on hold and will be following up with [facility] in a few weeks.  Patient had no further questions at this time.  Care Transitions will continue to follow for further discharge planning needs as identified.        Cinthia Skinner RN, BSN, ACM   Care Transitions Specialist  [facility]  Care Transitions Specialist  [address] [place]. [address]  [email or web address]  Office: [phone] Fax: [phone]  [facility]               "

## 2024-12-14 LAB
ALBUMIN UR-MCNC: 20 MG/DL
ANION GAP SERPL CALCULATED.3IONS-SCNC: 11 MMOL/L (ref 7–15)
ANION GAP SERPL CALCULATED.3IONS-SCNC: 12 MMOL/L (ref 7–15)
APPEARANCE UR: ABNORMAL
BILIRUB UR QL STRIP: NEGATIVE
BUN SERPL-MCNC: 27 MG/DL (ref 8–23)
BUN SERPL-MCNC: 29.1 MG/DL (ref 8–23)
CALCIUM SERPL-MCNC: 7.9 MG/DL (ref 8.8–10.4)
CALCIUM SERPL-MCNC: 8 MG/DL (ref 8.8–10.4)
CHLORIDE SERPL-SCNC: 83 MMOL/L (ref 98–107)
CHLORIDE SERPL-SCNC: 85 MMOL/L (ref 98–107)
COLOR UR AUTO: YELLOW
CREAT SERPL-MCNC: 1.03 MG/DL (ref 0.51–0.95)
CREAT SERPL-MCNC: 1.18 MG/DL (ref 0.51–0.95)
CREAT UR-MCNC: 92.7 MG/DL
CRP SERPL-MCNC: 129.63 MG/L
EGFRCR SERPLBLD CKD-EPI 2021: 49 ML/MIN/1.73M2
EGFRCR SERPLBLD CKD-EPI 2021: 58 ML/MIN/1.73M2
FRACT EXCRET NA UR+SERPL-RTO: NORMAL %
GLUCOSE SERPL-MCNC: 166 MG/DL (ref 70–99)
GLUCOSE SERPL-MCNC: 263 MG/DL (ref 70–99)
GLUCOSE UR STRIP-MCNC: NEGATIVE MG/DL
HCO3 SERPL-SCNC: 23 MMOL/L (ref 22–29)
HCO3 SERPL-SCNC: 23 MMOL/L (ref 22–29)
HGB UR QL STRIP: NEGATIVE
KETONES UR STRIP-MCNC: NEGATIVE MG/DL
LEUKOCYTE ESTERASE UR QL STRIP: NEGATIVE
MAGNESIUM SERPL-MCNC: 1.9 MG/DL (ref 1.7–2.3)
MUCOUS THREADS #/AREA URNS LPF: PRESENT /LPF
NITRATE UR QL: NEGATIVE
OSMOLALITY SERPL: 266 MMOL/KG (ref 280–301)
OSMOLALITY UR: 270 MMOL/KG (ref 100–1200)
PH UR STRIP: 5 [PH] (ref 5–7)
PHOSPHATE SERPL-MCNC: 4.9 MG/DL (ref 2.5–4.5)
POTASSIUM SERPL-SCNC: 3.9 MMOL/L (ref 3.4–5.3)
POTASSIUM SERPL-SCNC: 3.9 MMOL/L (ref 3.4–5.3)
POTASSIUM SERPL-SCNC: 4 MMOL/L (ref 3.4–5.3)
RBC URINE: 7 /HPF
SODIUM SERPL-SCNC: 118 MMOL/L (ref 135–145)
SODIUM SERPL-SCNC: 119 MMOL/L (ref 135–145)
SODIUM UR-SCNC: <20 MMOL/L
SP GR UR STRIP: 1.02 (ref 1–1.03)
SQUAMOUS EPITHELIAL: <1 /HPF
TSH SERPL DL<=0.005 MIU/L-ACNC: 3.72 UIU/ML (ref 0.3–4.2)
UROBILINOGEN UR STRIP-MCNC: NORMAL MG/DL
WBC URINE: 4 /HPF

## 2024-12-14 PROCEDURE — 83935 ASSAY OF URINE OSMOLALITY: CPT | Performed by: INTERNAL MEDICINE

## 2024-12-14 PROCEDURE — 81001 URINALYSIS AUTO W/SCOPE: CPT | Performed by: INTERNAL MEDICINE

## 2024-12-14 PROCEDURE — 250N000012 HC RX MED GY IP 250 OP 636 PS 637: Performed by: INTERNAL MEDICINE

## 2024-12-14 PROCEDURE — 250N000013 HC RX MED GY IP 250 OP 250 PS 637: Performed by: INTERNAL MEDICINE

## 2024-12-14 PROCEDURE — 84300 ASSAY OF URINE SODIUM: CPT | Performed by: INTERNAL MEDICINE

## 2024-12-14 PROCEDURE — 80048 BASIC METABOLIC PNL TOTAL CA: CPT | Performed by: INTERNAL MEDICINE

## 2024-12-14 PROCEDURE — 250N000011 HC RX IP 250 OP 636: Performed by: INTERNAL MEDICINE

## 2024-12-14 PROCEDURE — 84443 ASSAY THYROID STIM HORMONE: CPT | Performed by: INTERNAL MEDICINE

## 2024-12-14 PROCEDURE — 83930 ASSAY OF BLOOD OSMOLALITY: CPT | Performed by: INTERNAL MEDICINE

## 2024-12-14 PROCEDURE — 99233 SBSQ HOSP IP/OBS HIGH 50: CPT | Performed by: INTERNAL MEDICINE

## 2024-12-14 PROCEDURE — 250N000013 HC RX MED GY IP 250 OP 250 PS 637: Performed by: PHYSICIAN ASSISTANT

## 2024-12-14 PROCEDURE — 120N000001 HC R&B MED SURG/OB

## 2024-12-14 PROCEDURE — 83735 ASSAY OF MAGNESIUM: CPT | Performed by: INTERNAL MEDICINE

## 2024-12-14 PROCEDURE — 250N000013 HC RX MED GY IP 250 OP 250 PS 637: Performed by: HOSPITALIST

## 2024-12-14 PROCEDURE — 84132 ASSAY OF SERUM POTASSIUM: CPT | Performed by: INTERNAL MEDICINE

## 2024-12-14 PROCEDURE — 86140 C-REACTIVE PROTEIN: CPT | Performed by: INTERNAL MEDICINE

## 2024-12-14 PROCEDURE — 84100 ASSAY OF PHOSPHORUS: CPT | Performed by: INTERNAL MEDICINE

## 2024-12-14 PROCEDURE — 250N000011 HC RX IP 250 OP 636: Performed by: STUDENT IN AN ORGANIZED HEALTH CARE EDUCATION/TRAINING PROGRAM

## 2024-12-14 PROCEDURE — 258N000003 HC RX IP 258 OP 636: Performed by: INTERNAL MEDICINE

## 2024-12-14 PROCEDURE — 36415 COLL VENOUS BLD VENIPUNCTURE: CPT | Performed by: INTERNAL MEDICINE

## 2024-12-14 RX ORDER — 3% SODIUM CHLORIDE 3 G/100ML
INJECTION, SOLUTION INTRAVENOUS ONCE
Status: COMPLETED | OUTPATIENT
Start: 2024-12-14 | End: 2024-12-14

## 2024-12-14 RX ORDER — SODIUM CHLORIDE 9 MG/ML
INJECTION, SOLUTION INTRAVENOUS CONTINUOUS
Status: ACTIVE | OUTPATIENT
Start: 2024-12-14 | End: 2024-12-14

## 2024-12-14 RX ORDER — 3% SODIUM CHLORIDE 3 G/100ML
INJECTION, SOLUTION INTRAVENOUS CONTINUOUS
Status: CANCELLED | OUTPATIENT
Start: 2024-12-14

## 2024-12-14 RX ADMIN — LOSARTAN POTASSIUM 50 MG: 50 TABLET, FILM COATED ORAL at 08:24

## 2024-12-14 RX ADMIN — BENZONATATE 200 MG: 100 CAPSULE ORAL at 22:21

## 2024-12-14 RX ADMIN — ASPIRIN 81 MG: 81 TABLET, COATED ORAL at 08:23

## 2024-12-14 RX ADMIN — IPRATROPIUM BROMIDE 2 SPRAY: 42 SPRAY NASAL at 12:23

## 2024-12-14 RX ADMIN — PREDNISONE 30 MG: 20 TABLET ORAL at 08:24

## 2024-12-14 RX ADMIN — GUAIFENESIN 600 MG: 600 TABLET, EXTENDED RELEASE ORAL at 08:24

## 2024-12-14 RX ADMIN — GABAPENTIN 600 MG: 300 CAPSULE ORAL at 08:22

## 2024-12-14 RX ADMIN — PANTOPRAZOLE SODIUM 40 MG: 40 TABLET, DELAYED RELEASE ORAL at 08:24

## 2024-12-14 RX ADMIN — UMECLIDINIUM BROMIDE AND VILANTEROL TRIFENATATE 1 PUFF: 62.5; 25 POWDER RESPIRATORY (INHALATION) at 08:26

## 2024-12-14 RX ADMIN — METOPROLOL SUCCINATE 50 MG: 50 TABLET, EXTENDED RELEASE ORAL at 08:23

## 2024-12-14 RX ADMIN — ACETAMINOPHEN 650 MG: 325 TABLET, FILM COATED ORAL at 16:57

## 2024-12-14 RX ADMIN — ATORVASTATIN CALCIUM 40 MG: 40 TABLET, FILM COATED ORAL at 08:24

## 2024-12-14 RX ADMIN — FLUTICASONE PROPIONATE 2 SPRAY: 50 SPRAY, METERED NASAL at 08:27

## 2024-12-14 RX ADMIN — ACETAMINOPHEN 650 MG: 325 TABLET, FILM COATED ORAL at 12:22

## 2024-12-14 RX ADMIN — BENZONATATE 200 MG: 100 CAPSULE ORAL at 08:24

## 2024-12-14 RX ADMIN — GABAPENTIN 600 MG: 300 CAPSULE ORAL at 22:20

## 2024-12-14 RX ADMIN — GUAIFENESIN 600 MG: 600 TABLET, EXTENDED RELEASE ORAL at 20:11

## 2024-12-14 RX ADMIN — BENZONATATE 200 MG: 100 CAPSULE ORAL at 16:42

## 2024-12-14 RX ADMIN — Medication 1 TABLET: at 08:24

## 2024-12-14 RX ADMIN — IPRATROPIUM BROMIDE 2 SPRAY: 42 SPRAY NASAL at 16:43

## 2024-12-14 RX ADMIN — SODIUM CHLORIDE: 3 INJECTION, SOLUTION INTRAVENOUS at 22:53

## 2024-12-14 RX ADMIN — IPRATROPIUM BROMIDE 2 SPRAY: 42 SPRAY NASAL at 08:26

## 2024-12-14 RX ADMIN — IPRATROPIUM BROMIDE 2 SPRAY: 42 SPRAY NASAL at 20:06

## 2024-12-14 RX ADMIN — SODIUM CHLORIDE: 9 INJECTION, SOLUTION INTRAVENOUS at 13:19

## 2024-12-14 RX ADMIN — GABAPENTIN 600 MG: 300 CAPSULE ORAL at 16:42

## 2024-12-14 RX ADMIN — Medication 5 ML: at 06:11

## 2024-12-14 RX ADMIN — AZITHROMYCIN DIHYDRATE 250 MG: 250 TABLET ORAL at 08:23

## 2024-12-14 ASSESSMENT — ACTIVITIES OF DAILY LIVING (ADL)
ADLS_ACUITY_SCORE: 62

## 2024-12-14 NOTE — PLAN OF CARE
Goal Outcome Evaluation:        Orientation: AOx4, flat affect  Aggression Stop Light: green  Activity: A1GBW; Ambulated outside hallway x2. Pt c/o of generalized weakness.   Diet/BS Checks: Regular diet; minimal appetite  Tele:  NA  IV Access/Drains: R port HL. Little blood return noted.   Pain Management: denies pain  Abnormal VS/Results: VSS on RA; low diastolic. Respirations between 22-28.   Bowel/Bladder: Continent of B/B; No urine output this shift. Bladder scan 510 @ 1800 Asymptomatic.   Skin/Wounds: Blanchable redness to coccyx; slight mottling on legs.   Consults: hem/onc, nutrition,   D/C Disposition: 12/14 with home cares.   Other Info:   -Seen by respiratory today re: increased respirations.   - c/o dry cough.

## 2024-12-14 NOTE — PLAN OF CARE
Goal Outcome Evaluation:  Orientation: AOx4  Aggression Stop Light: green  Activity: A1GBW;   Diet/BS Checks: Regular diet  Tele:  NA  IV Access/Drains: R port HL. No blood return  Pain Management: denies pain  Abnormal VS/Results: VSS on RA  Bowel/Bladder: Continent of B/B; Small urine output this shift. Bladder scan 486,544. Straight cath @ 0343 700 ml.   Skin/Wounds: Blanchable redness to coccyx; slight mottling on legs.   Consults: hem/onc, nutrition,   D/C Disposition: 12/14 with home cares.   Other Info:   Takes pills one at a time w/ water. Larger pills cut in half.

## 2024-12-14 NOTE — PROGRESS NOTES
Chart check for Dr. Garcia. Labs reviewed. Nonew recommendations. Recommend follow up as already scheduled on 1/7/2025 for possible treatment

## 2024-12-15 LAB
ANION GAP SERPL CALCULATED.3IONS-SCNC: 13 MMOL/L (ref 7–15)
ANION GAP SERPL CALCULATED.3IONS-SCNC: 13 MMOL/L (ref 7–15)
BUN SERPL-MCNC: 30.9 MG/DL (ref 8–23)
BUN SERPL-MCNC: 31.6 MG/DL (ref 8–23)
CALCIUM SERPL-MCNC: 7.7 MG/DL (ref 8.8–10.4)
CALCIUM SERPL-MCNC: 7.8 MG/DL (ref 8.8–10.4)
CHLORIDE SERPL-SCNC: 89 MMOL/L (ref 98–107)
CHLORIDE SERPL-SCNC: 92 MMOL/L (ref 98–107)
CREAT SERPL-MCNC: 1.1 MG/DL (ref 0.51–0.95)
CREAT SERPL-MCNC: 1.17 MG/DL (ref 0.51–0.95)
DAT POLY: NEGATIVE
EGFRCR SERPLBLD CKD-EPI 2021: 50 ML/MIN/1.73M2
EGFRCR SERPLBLD CKD-EPI 2021: 53 ML/MIN/1.73M2
ERYTHROCYTE [DISTWIDTH] IN BLOOD BY AUTOMATED COUNT: 14 % (ref 10–15)
ERYTHROCYTE [DISTWIDTH] IN BLOOD BY AUTOMATED COUNT: 14.3 % (ref 10–15)
GLUCOSE BLDC GLUCOMTR-MCNC: 196 MG/DL (ref 70–99)
GLUCOSE SERPL-MCNC: 175 MG/DL (ref 70–99)
GLUCOSE SERPL-MCNC: 179 MG/DL (ref 70–99)
HAPTOGLOB SERPL-MCNC: 479 MG/DL (ref 30–200)
HCO3 SERPL-SCNC: 20 MMOL/L (ref 22–29)
HCO3 SERPL-SCNC: 21 MMOL/L (ref 22–29)
HCT VFR BLD AUTO: 28.1 % (ref 35–47)
HCT VFR BLD AUTO: 28.1 % (ref 35–47)
HGB BLD-MCNC: 9.7 G/DL (ref 11.7–15.7)
HGB BLD-MCNC: 9.7 G/DL (ref 11.7–15.7)
HOLD SPECIMEN: NORMAL
INR PPP: 1.06 (ref 0.85–1.15)
LACTATE SERPL-SCNC: 0.9 MMOL/L (ref 0.7–2)
LDH SERPL L TO P-CCNC: 193 U/L (ref 0–250)
MAGNESIUM SERPL-MCNC: 2 MG/DL (ref 1.7–2.3)
MCH RBC QN AUTO: 29.8 PG (ref 26.5–33)
MCH RBC QN AUTO: 30.1 PG (ref 26.5–33)
MCHC RBC AUTO-ENTMCNC: 34.5 G/DL (ref 31.5–36.5)
MCHC RBC AUTO-ENTMCNC: 34.5 G/DL (ref 31.5–36.5)
MCV RBC AUTO: 87 FL (ref 78–100)
MCV RBC AUTO: 87 FL (ref 78–100)
PHOSPHATE SERPL-MCNC: 4.8 MG/DL (ref 2.5–4.5)
PLATELET # BLD AUTO: 453 10E3/UL (ref 150–450)
PLATELET # BLD AUTO: 461 10E3/UL (ref 150–450)
POTASSIUM SERPL-SCNC: 3.6 MMOL/L (ref 3.4–5.3)
POTASSIUM SERPL-SCNC: 3.7 MMOL/L (ref 3.4–5.3)
RBC # BLD AUTO: 3.22 10E6/UL (ref 3.8–5.2)
RBC # BLD AUTO: 3.25 10E6/UL (ref 3.8–5.2)
RETICS # AUTO: 0.1 10E6/UL (ref 0.03–0.1)
RETICS/RBC NFR AUTO: 3 % (ref 0.5–2)
SODIUM SERPL-SCNC: 118 MMOL/L (ref 135–145)
SODIUM SERPL-SCNC: 120 MMOL/L (ref 135–145)
SODIUM SERPL-SCNC: 123 MMOL/L (ref 135–145)
SODIUM SERPL-SCNC: 123 MMOL/L (ref 135–145)
SODIUM SERPL-SCNC: 124 MMOL/L (ref 135–145)
SODIUM SERPL-SCNC: 124 MMOL/L (ref 135–145)
SODIUM SERPL-SCNC: 125 MMOL/L (ref 135–145)
SODIUM SERPL-SCNC: 125 MMOL/L (ref 135–145)
SODIUM SERPL-SCNC: 126 MMOL/L (ref 135–145)
SPECIMEN EXP DATE BLD: NORMAL
TROPONIN T SERPL HS-MCNC: 10 NG/L
WBC # BLD AUTO: 20.6 10E3/UL (ref 4–11)
WBC # BLD AUTO: 21.3 10E3/UL (ref 4–11)

## 2024-12-15 PROCEDURE — 250N000013 HC RX MED GY IP 250 OP 250 PS 637: Performed by: INTERNAL MEDICINE

## 2024-12-15 PROCEDURE — 93010 ELECTROCARDIOGRAM REPORT: CPT | Mod: XU | Performed by: INTERNAL MEDICINE

## 2024-12-15 PROCEDURE — 82310 ASSAY OF CALCIUM: CPT | Performed by: NURSE PRACTITIONER

## 2024-12-15 PROCEDURE — 80048 BASIC METABOLIC PNL TOTAL CA: CPT | Performed by: INTERNAL MEDICINE

## 2024-12-15 PROCEDURE — 120N000001 HC R&B MED SURG/OB

## 2024-12-15 PROCEDURE — 250N000011 HC RX IP 250 OP 636: Performed by: NURSE PRACTITIONER

## 2024-12-15 PROCEDURE — 84295 ASSAY OF SERUM SODIUM: CPT | Performed by: INTERNAL MEDICINE

## 2024-12-15 PROCEDURE — 250N000011 HC RX IP 250 OP 636: Performed by: INTERNAL MEDICINE

## 2024-12-15 PROCEDURE — 99207 PR APP CREDIT; MD BILLING SHARED VISIT: CPT | Performed by: INTERNAL MEDICINE

## 2024-12-15 PROCEDURE — 87040 BLOOD CULTURE FOR BACTERIA: CPT | Performed by: NURSE PRACTITIONER

## 2024-12-15 PROCEDURE — 99291 CRITICAL CARE FIRST HOUR: CPT | Performed by: NURSE PRACTITIONER

## 2024-12-15 PROCEDURE — 86880 COOMBS TEST DIRECT: CPT | Performed by: INTERNAL MEDICINE

## 2024-12-15 PROCEDURE — 83615 LACTATE (LD) (LDH) ENZYME: CPT | Performed by: INTERNAL MEDICINE

## 2024-12-15 PROCEDURE — 250N000013 HC RX MED GY IP 250 OP 250 PS 637: Performed by: PHYSICIAN ASSISTANT

## 2024-12-15 PROCEDURE — 250N000012 HC RX MED GY IP 250 OP 636 PS 637: Performed by: INTERNAL MEDICINE

## 2024-12-15 PROCEDURE — 85027 COMPLETE CBC AUTOMATED: CPT | Performed by: NURSE PRACTITIONER

## 2024-12-15 PROCEDURE — 250N000013 HC RX MED GY IP 250 OP 250 PS 637: Performed by: HOSPITALIST

## 2024-12-15 PROCEDURE — 85610 PROTHROMBIN TIME: CPT | Performed by: HOSPITALIST

## 2024-12-15 PROCEDURE — 250N000009 HC RX 250: Performed by: NURSE PRACTITIONER

## 2024-12-15 PROCEDURE — 250N000011 HC RX IP 250 OP 636: Performed by: PHYSICIAN ASSISTANT

## 2024-12-15 PROCEDURE — 85045 AUTOMATED RETICULOCYTE COUNT: CPT | Performed by: INTERNAL MEDICINE

## 2024-12-15 PROCEDURE — 93005 ELECTROCARDIOGRAM TRACING: CPT

## 2024-12-15 PROCEDURE — 83605 ASSAY OF LACTIC ACID: CPT | Performed by: NURSE PRACTITIONER

## 2024-12-15 PROCEDURE — 99291 CRITICAL CARE FIRST HOUR: CPT | Mod: FS | Performed by: PSYCHIATRY & NEUROLOGY

## 2024-12-15 PROCEDURE — 83735 ASSAY OF MAGNESIUM: CPT | Performed by: INTERNAL MEDICINE

## 2024-12-15 PROCEDURE — 84100 ASSAY OF PHOSPHORUS: CPT | Performed by: INTERNAL MEDICINE

## 2024-12-15 PROCEDURE — 85014 HEMATOCRIT: CPT | Performed by: INTERNAL MEDICINE

## 2024-12-15 PROCEDURE — 36415 COLL VENOUS BLD VENIPUNCTURE: CPT | Performed by: NURSE PRACTITIONER

## 2024-12-15 PROCEDURE — 99222 1ST HOSP IP/OBS MODERATE 55: CPT | Performed by: INTERNAL MEDICINE

## 2024-12-15 PROCEDURE — 84484 ASSAY OF TROPONIN QUANT: CPT | Performed by: NURSE PRACTITIONER

## 2024-12-15 PROCEDURE — 83010 ASSAY OF HAPTOGLOBIN QUANT: CPT | Performed by: INTERNAL MEDICINE

## 2024-12-15 PROCEDURE — 258N000003 HC RX IP 258 OP 636: Performed by: INTERNAL MEDICINE

## 2024-12-15 RX ORDER — IOPAMIDOL 755 MG/ML
117 INJECTION, SOLUTION INTRAVASCULAR ONCE
Status: COMPLETED | OUTPATIENT
Start: 2024-12-15 | End: 2024-12-15

## 2024-12-15 RX ORDER — SODIUM CHLORIDE 9 MG/ML
INJECTION, SOLUTION INTRAVENOUS CONTINUOUS
Status: DISCONTINUED | OUTPATIENT
Start: 2024-12-15 | End: 2024-12-18

## 2024-12-15 RX ORDER — CEFEPIME HYDROCHLORIDE 2 G/1
2 INJECTION, POWDER, FOR SOLUTION INTRAVENOUS EVERY 12 HOURS
Status: DISCONTINUED | OUTPATIENT
Start: 2024-12-15 | End: 2024-12-16

## 2024-12-15 RX ADMIN — SODIUM CHLORIDE 100 ML: 9 INJECTION, SOLUTION INTRAVENOUS at 08:52

## 2024-12-15 RX ADMIN — GUAIFENESIN 600 MG: 600 TABLET, EXTENDED RELEASE ORAL at 18:43

## 2024-12-15 RX ADMIN — IPRATROPIUM BROMIDE 2 SPRAY: 42 SPRAY NASAL at 18:43

## 2024-12-15 RX ADMIN — CEFEPIME 2 G: 2 INJECTION, POWDER, FOR SOLUTION INTRAVENOUS at 22:10

## 2024-12-15 RX ADMIN — Medication 1 TABLET: at 12:03

## 2024-12-15 RX ADMIN — GABAPENTIN 600 MG: 300 CAPSULE ORAL at 16:10

## 2024-12-15 RX ADMIN — PREDNISONE 30 MG: 20 TABLET ORAL at 11:59

## 2024-12-15 RX ADMIN — ATORVASTATIN CALCIUM 40 MG: 40 TABLET, FILM COATED ORAL at 12:03

## 2024-12-15 RX ADMIN — ONDANSETRON 4 MG: 4 TABLET, ORALLY DISINTEGRATING ORAL at 12:00

## 2024-12-15 RX ADMIN — SENNOSIDES AND DOCUSATE SODIUM 1 TABLET: 8.6; 5 TABLET ORAL at 20:50

## 2024-12-15 RX ADMIN — AZITHROMYCIN DIHYDRATE 250 MG: 250 TABLET ORAL at 12:03

## 2024-12-15 RX ADMIN — FLUTICASONE PROPIONATE 2 SPRAY: 50 SPRAY, METERED NASAL at 12:05

## 2024-12-15 RX ADMIN — GABAPENTIN 600 MG: 300 CAPSULE ORAL at 11:59

## 2024-12-15 RX ADMIN — IPRATROPIUM BROMIDE 2 SPRAY: 42 SPRAY NASAL at 16:12

## 2024-12-15 RX ADMIN — IOPAMIDOL 117 ML: 755 INJECTION, SOLUTION INTRAVENOUS at 08:52

## 2024-12-15 RX ADMIN — ACETAMINOPHEN 650 MG: 325 TABLET, FILM COATED ORAL at 12:03

## 2024-12-15 RX ADMIN — ACETAMINOPHEN 650 MG: 325 TABLET, FILM COATED ORAL at 20:49

## 2024-12-15 RX ADMIN — BENZONATATE 200 MG: 100 CAPSULE ORAL at 12:03

## 2024-12-15 RX ADMIN — LOSARTAN POTASSIUM 50 MG: 50 TABLET, FILM COATED ORAL at 12:00

## 2024-12-15 RX ADMIN — SODIUM CHLORIDE: 9 INJECTION, SOLUTION INTRAVENOUS at 22:46

## 2024-12-15 RX ADMIN — UMECLIDINIUM BROMIDE AND VILANTEROL TRIFENATATE 1 PUFF: 62.5; 25 POWDER RESPIRATORY (INHALATION) at 12:05

## 2024-12-15 RX ADMIN — IPRATROPIUM BROMIDE 2 SPRAY: 42 SPRAY NASAL at 12:06

## 2024-12-15 RX ADMIN — CEFEPIME 2 G: 2 INJECTION, POWDER, FOR SOLUTION INTRAVENOUS at 12:10

## 2024-12-15 RX ADMIN — PANTOPRAZOLE SODIUM 40 MG: 40 TABLET, DELAYED RELEASE ORAL at 12:00

## 2024-12-15 RX ADMIN — BENZONATATE 200 MG: 100 CAPSULE ORAL at 16:11

## 2024-12-15 RX ADMIN — GABAPENTIN 600 MG: 300 CAPSULE ORAL at 21:51

## 2024-12-15 RX ADMIN — GUAIFENESIN 600 MG: 600 TABLET, EXTENDED RELEASE ORAL at 12:03

## 2024-12-15 RX ADMIN — BENZONATATE 200 MG: 100 CAPSULE ORAL at 21:51

## 2024-12-15 RX ADMIN — SENNOSIDES AND DOCUSATE SODIUM 1 TABLET: 8.6; 5 TABLET ORAL at 11:59

## 2024-12-15 RX ADMIN — SODIUM CHLORIDE: 9 INJECTION, SOLUTION INTRAVENOUS at 12:06

## 2024-12-15 RX ADMIN — ACETAMINOPHEN 650 MG: 325 TABLET, FILM COATED ORAL at 16:10

## 2024-12-15 ASSESSMENT — ACTIVITIES OF DAILY LIVING (ADL)
ADLS_ACUITY_SCORE: 66
ADLS_ACUITY_SCORE: 62
ADLS_ACUITY_SCORE: 66
ADLS_ACUITY_SCORE: 62
ADLS_ACUITY_SCORE: 66
ADLS_ACUITY_SCORE: 62
ADLS_ACUITY_SCORE: 66
ADLS_ACUITY_SCORE: 62
ADLS_ACUITY_SCORE: 66
ADLS_ACUITY_SCORE: 62
ADLS_ACUITY_SCORE: 62
ADLS_ACUITY_SCORE: 66
ADLS_ACUITY_SCORE: 62

## 2024-12-15 NOTE — SIGNIFICANT EVENT
Notified of downtrending Na from 119 to 118. Will administer 3% NS 50cc bolus x1. Repeat Na @ 0000    Peter Moon DO  [facility]

## 2024-12-15 NOTE — PROGRESS NOTES
DATE/TIME OF CALL RECEIVED FROM LAB:  12/14/24 at 8:49 PM   LAB TEST:  Sodium  LAB VALUE:  118  PROVIDER NOTIFIED?: Yes  PROVIDER NAME: Nash Moon   DATE/TIME LAB VALUE REPORTED TO PROVIDER: 12/14/2024 8:50 PM  MECHANISM OF PROVIDER NOTIFICATION: Page  PROVIDER RESPONSE:

## 2024-12-15 NOTE — PLAN OF CARE
Goal Outcome Evaluation:      Orientation: AOx4  Aggression Stop Light: green  Activity: A1GBW; not OOB this shift.    Diet/BS Checks: Regular diet. Minimal appetite.   Tele:  NA  IV Access/Drains: L Port infusing NS @ 100 mL/hr with intermittent ABX   Pain Management: Tylenol given 2x   Abnormal VS/Results: VSS on RA ex low body temp and HTN. Sodium 126  Bowel/Bladder: Continent of B/B; No Urine output this shift.    Skin/Wounds: Blanchable redness to coccyx, scattered bruising, pale warm, dry skin. New bruising and swelling on left hand. Mottling to BLE  Consults: hem/onc, nutrition, nephrology  D/C Disposition: Pending  Other Info:   -difficulty swallowing medications. Takes pills 1 at a time with a lot of water.   -increased confusion/ lethargy

## 2024-12-15 NOTE — CODE/RAPID RESPONSE
[facility]    RRT Note  12/15/2024   Time Called: 0841    RRT called for word finding difficulty, confusion    Code Status: No CPR- Do NOT Intubate      ASSESSMENT & PLAN  Encephalopathy suspect 2/2 sepsis    Considered endocarditis with possible micro-emboli in L hand  Possible microvascular dysfunction   Concern for ischemic limb in RLE  Kezia Dixon is a 69 year old female with history bilateral lung adenocarcinoma, currently on immunotherapy with plan to start SBRT of JULIEN nodule, previous esophageal cancer s/p esophagectomy and chemoradiation 92016), s/p lobectomy for right lung squamous cell cancer 92018), COPD, heavy tobacco use in past, resolved stress-induced cardiomyopathy, nonobstructive CAD, alcohol use disorder, who presented on 12/8/2024 with generalized weakness, physical deconditioning, acute on chronic nausea, anorexia. Work up thus far has revealed multiple electrolyte abnormalities, including hyponatremia, hypomagnesemia, hypophosphatemia, JESSICA, suspected CAP, acute normocytic anemia. Lower extremity US negative for DVT.    I was paged to the bedside to evaluate Kezia Dixon for an acute episode of confusion with slurred speech. Per RN, patient was last seen normal around 0810 this morning, prompting call for RRT.     Upon my arrival, the patient was lying flat in bed, in no acute distress. She is lethargic and confused. She is unable to tell me where she is or the date. She is aware of self only. Blood glucose 196. Per bedside RN, patient has been becoming more confused with worsening weakness over the las 48 hours. Labs reviewed, noted improvement in patient's sodium from 118 to 123. WBC 6.1 on 12/11 to 20.6 this AM, however receiving prednisone 30 mg daily (started on 12/13) for cough. Receiving azithromycin PO. Received ceftriaxone 12/11 to 12/13 for suspected pneumonia (discontinued on 12/13 as no infiltrate on CXR). A code stroke was called and subsequently  de-escalated after no large vessel occlusion was found. Patient was found to have mottling in her bilateral lower extremities with absent DP pulse in RLE. Small bruising with circumferential swelling in index finger MCP. Skin is cool to touch, has been hypothermic overnight (95.6 to 96.4). Abdomen soft and non-tender. Lung sounds diminished bilaterally, no wheezing or rhonchi. RRR.     Temp: (!) 96.4  F (35.8  C) Temp src: Axillary BP: 114/73 Pulse: 90   Resp: 24 SpO2: 98 % O2 Device: None (Room air)      PLAN:   - Blood glucose: 196  - Code stroke called and subsequently de-escalated, negative for any large vessel occlusion   - STAT CBC, BMP, PT/INR, lactic acid, blood cultures x2  - Ultrasound bilateral lower extremities   - TTE  - CXR      At the conclusion of this RRT patient was hemodynamically stable and will remain on current unit    Discussed with and defer further cares to attending hospitalist, Dr. Fisher.     [facility]EV[facility]  Securely message with the Vocera Web Console (learn more here)  Text page via [employer] Paging/Directory        Physical Exam   Vital Signs with Ranges:  Temp:  [95.6  F (35.3  C)-97.1  F (36.2  C)] 96.4  F (35.8  C)  Pulse:  [80-90] 90  Resp:  [14-24] 24  BP: ()/(39-73) 114/73  SpO2:  [92 %-100 %] 98 %  I/O last 3 completed shifts:  In: -   Out: 70 [Urine:70]    Physical Exam  Vitals and nursing note reviewed.   Constitutional:       Appearance: She is cachectic. She is ill-appearing.   HENT:      Mouth/Throat:      Mouth: Mucous membranes are moist.   Eyes:      Pupils: Pupils are equal, round, and reactive to light.   Cardiovascular:      Rate and Rhythm: Normal rate and regular rhythm.      Pulses:           Dorsalis pedis pulses are 0 on the right side and 1+ on the left side.      Heart sounds: No murmur heard.  Pulmonary:      Effort: Pulmonary effort is normal.      Breath sounds: Decreased air movement present.   Abdominal:       General: There is no distension.      Palpations: Abdomen is soft.      Tenderness: There is no abdominal tenderness.   Musculoskeletal:      Right lower leg: No edema.      Left lower leg: No edema.   Skin:     General: Skin is dry.      Capillary Refill: Capillary refill takes 2 to 3 seconds.      Coloration: Skin is pale.      Findings: Bruising present.      Comments: Small bruises to left hand   Neurological:      Mental Status: She is disoriented.      Sensory: Sensory deficit present.           Data     IMAGING: (X-ray/CT/MRI)   Recent Results (from the past 24 hours)   CT Head w/o Contrast    Narrative    EXAM: CT HEAD W/O CONTRAST, CTA HEAD NECK W CONTRAST  LOCATION: [facility]  DATE: 12/15/2024    INDICATION: Code Stroke to evaluate for potential thrombolysis and thrombectomy. PLEASE READ IMMEDIATELY  COMPARISON: MRI of the brain with and without contrast 1/10/2024.  CONTRAST: 67 mL Isovue 370  TECHNIQUE: Head and neck CT angiogram with IV contrast. Noncontrast head CT followed by axial helical CT images of the head and neck vessels obtained during the arterial phase of intravenous contrast administration. Axial 2D reconstructed images and   multiplanar 3D MIP reconstructed images of the head and neck vessels were performed by the technologist. Dose reduction techniques were used. All stenosis measurements made according to NASCET criteria unless otherwise specified.    FINDINGS:   NONCONTRAST HEAD CT:   INTRACRANIAL CONTENTS: No acute intracranial hemorrhage. No extra-axial fluid collection. No mass effect or midline shift. Chronic infarct centrally within the mira. Mild presumed chronic small vessel ischemic changes. Mild to moderate generalized volume   loss. No hydrocephalus.     VISUALIZED ORBITS/SINUSES/MASTOIDS: No intraorbital abnormality. No paranasal sinus mucosal disease. No middle ear or mastoid effusion.    BONES/SOFT TISSUES: No acute abnormality.    HEAD  CTA:  ANTERIOR CIRCULATION: No stenosis/occlusion, aneurysm, or high flow vascular malformation. Standard [place] of [place] anatomy.    POSTERIOR CIRCULATION: No stenosis/occlusion, aneurysm, or high flow vascular malformation. Balanced vertebral arteries supply a normal basilar artery.     DURAL VENOUS SINUSES: Expected enhancement of the major dural venous sinuses.    NECK CTA:  RIGHT CAROTID: Vascular calcifications involving the right carotid bifurcation and proximal right ICA with 30% stenosis.    LEFT CAROTID: Vascular calcifications involving the left carotid bifurcation proximal left ICA with 10-20% stenosis.    VERTEBRAL ARTERIES: No focal stenosis or dissection. Dominant left and smaller right vertebral arteries.    AORTIC ARCH: Vascular calcifications involving the intrathoracic aorta. Classic aortic arch anatomy without significant stenosis of the proximal great vessels.    NONVASCULAR STRUCTURES:  The 1.3 cm irregularly marginated subsolid opacity left upper lobe anteriorly (series 7 image 54) is unchanged compared with prior CTs chest and remains indeterminate. Groundglass density opacity in the posterior aspect of the   left upper lobe has developed in the interval. Consolidation visualized right lung posteriorly appears unchanged.      Impression    IMPRESSION:   HEAD CT:  1.  No CT evidence for acute intracranial process.  2.  Brain atrophy and presumed chronic microvascular ischemic changes as above.    HEAD CTA:   1.  No significant stenosis, aneurysm, or high flow vascular malformation identified.  2.  Variant [place] of [place] anatomy as above.    NECK CTA:  1.  No significant stenosis of the bilateral internal carotid arteries.  2.  Interval development of groundglass density opacity in the posterior aspect of the left upper lung lobe, compatible with pneumonia in appropriate clinical setting.    Noncontrast CT imaging findings were discussed with Dr. Bautista of the clinical service at 9:02 AM.  CTA imaging findings were discussed with Sarah Bautista of the clinical service at 9:12 AM on 12/15/2024.   CTA Head Neck with Contrast    Narrative    EXAM: CT HEAD W/O CONTRAST, CTA HEAD NECK W CONTRAST  LOCATION: [facility]  DATE: 12/15/2024    INDICATION: Code Stroke to evaluate for potential thrombolysis and thrombectomy. PLEASE READ IMMEDIATELY  COMPARISON: MRI of the brain with and without contrast 1/10/2024.  CONTRAST: 67 mL Isovue 370  TECHNIQUE: Head and neck CT angiogram with IV contrast. Noncontrast head CT followed by axial helical CT images of the head and neck vessels obtained during the arterial phase of intravenous contrast administration. Axial 2D reconstructed images and   multiplanar 3D MIP reconstructed images of the head and neck vessels were performed by the technologist. Dose reduction techniques were used. All stenosis measurements made according to NASCET criteria unless otherwise specified.    FINDINGS:   NONCONTRAST HEAD CT:   INTRACRANIAL CONTENTS: No acute intracranial hemorrhage. No extra-axial fluid collection. No mass effect or midline shift. Chronic infarct centrally within the mira. Mild presumed chronic small vessel ischemic changes. Mild to moderate generalized volume   loss. No hydrocephalus.     VISUALIZED ORBITS/SINUSES/MASTOIDS: No intraorbital abnormality. No paranasal sinus mucosal disease. No middle ear or mastoid effusion.    BONES/SOFT TISSUES: No acute abnormality.    HEAD CTA:  ANTERIOR CIRCULATION: No stenosis/occlusion, aneurysm, or high flow vascular malformation. Standard [place] of Van anatomy.    POSTERIOR CIRCULATION: No stenosis/occlusion, aneurysm, or high flow vascular malformation. Balanced vertebral arteries supply a normal basilar artery.     DURAL VENOUS SINUSES: Expected enhancement of the major dural venous sinuses.    NECK CTA:  RIGHT CAROTID: Vascular calcifications involving the right carotid bifurcation and proximal  right ICA with 30% stenosis.    LEFT CAROTID: Vascular calcifications involving the left carotid bifurcation proximal left ICA with 10-20% stenosis.    VERTEBRAL ARTERIES: No focal stenosis or dissection. Dominant left and smaller right vertebral arteries.    AORTIC ARCH: Vascular calcifications involving the intrathoracic aorta. Classic aortic arch anatomy without significant stenosis of the proximal great vessels.    NONVASCULAR STRUCTURES:  The 1.3 cm irregularly marginated subsolid opacity left upper lobe anteriorly (series 7 image 54) is unchanged compared with prior CTs chest and remains indeterminate. Groundglass density opacity in the posterior aspect of the   left upper lobe has developed in the interval. Consolidation visualized right lung posteriorly appears unchanged.      Impression    IMPRESSION:   HEAD CT:  1.  No CT evidence for acute intracranial process.  2.  Brain atrophy and presumed chronic microvascular ischemic changes as above.    HEAD CTA:   1.  No significant stenosis, aneurysm, or high flow vascular malformation identified.  2.  Variant [place] of Van anatomy as above.    NECK CTA:  1.  No significant stenosis of the bilateral internal carotid arteries.  2.  Interval development of groundglass density opacity in the posterior aspect of the left upper lung lobe, compatible with pneumonia in appropriate clinical setting.    Noncontrast CT imaging findings were discussed with Dr. Bautista of the clinical service at 9:02 AM. CTA imaging findings were discussed with Sarah Bautista of the clinical service at 9:12 AM on 12/15/2024.       CBC with Diff:  Recent Labs   Lab Test 12/15/24  0854 06/08/22  2146 05/16/22  1028   WBC 21.3*   < > 3.3*   HGB 9.7*   < > 11.8   MCV 87   < > 100   *   < > 109*   INR  --   --  1.00    < > = values in this interval not displayed.          Comprehensive Metabolic Panel:  Recent Labs   Lab 12/15/24  0854 12/15/24  0832 12/15/24  0545 12/09/24  1909  12/09/24  0656   *  125*  --  123*  123*   < > 129*  129*   POTASSIUM 3.7  --  3.6   < > 3.5   CHLORIDE 92*  --  89*   < > 91*   CO2 20*  --  21*   < > 24   ANIONGAP 13  --  13   < > 14   *   < > 179*   < > 91   BUN 30.9*  --  31.6*   < > 9.2   CR 1.10*  --  1.17*   < > 0.45*   GFRESTIMATED 53*  --  50*   < > >90   VICENTE 7.7*  --  7.8*   < > 8.3*   MAG  --   --  2.0   < > 2.1   PHOS  --   --  4.8*   < > 2.9   PROTTOTAL  --   --   --   --  6.0*   ALBUMIN  --   --   --   --  2.7*   BILITOTAL  --   --   --   --  0.6   ALKPHOS  --   --   --   --  82   AST  --   --   --   --  19   ALT  --   --   --   --  9    < > = values in this interval not displayed.         Time Spent on this Encounter     Medical Decision Making                CRITICAL CARE TIME  I spent 60 minutes of critical care time on the unit/floor managing the care of Kezia Dixon. Upon evaluation, this patient had a high probability of imminent or life-threatening deterioration due to suspected sepsis, possible limb ischemia which required my direct attention, intervention, and personal management. 100% of my time was spent at the bedside counseling the patient and/or coordinating care regarding services listed in this note.

## 2024-12-15 NOTE — PLAN OF CARE
Goal Outcome Evaluation:     Orientation: AOx4  Aggression Stop Light: green  Activity: A1GBW; not OOB this shift. Pt states she is feeling weak.   Diet/BS Checks: Regular diet  Tele:  NA  IV Access/Drains: R port infusing NS @ 75 mL. No blood return.   Pain Management: PRN Tylenol given 2x for HA.   Abnormal VS/Results: VSS on RA; Sodium 119  Bowel/Bladder: Continent of B/B; No Urine output this shift. Bladder scan for 58 and 105  Skin/Wounds: Blanchable redness to coccyx, scattered bruising, pale warm, dry skin.   Consults: hem/onc, nutrition,   D/C Disposition: 12/14 with home cares.   Other Info:   Takes pills one at a time w/ water. Larger pills cut in half.

## 2024-12-15 NOTE — PLAN OF CARE
Goal Outcome Evaluation:  Orientation: AOx4  Aggression Stop Light: green  Activity: A1GBW; not OOB this shift.    Diet/BS Checks: Regular diet  Tele:  NA  IV Access/Drains: NS 3 % infusing, Blood return noted.   Pain Management: Denies   Abnormal VS/Results: VSS on RA ex low temp-md aware. Critical lab sodium 118, q 4 sodium checks  Bowel/Bladder: Continent of B/B; No Urine output this shift. Bladder scan for 82, 0, 79. overnight. Straight cathed 70 ml for urine sample.  Skin/Wounds: Blanchable redness to coccyx, scattered bruising, pale warm, dry skin.   Consults: hem/onc, nutrition,   D/C Disposition: Pending  Other Info:   Takes pills one at a time w/ water. Larger pills cut in half. Pt c/o left hand pain and tingling/numbness overnight.

## 2024-12-15 NOTE — CONSULTS
"[facility]     Stroke Code Note          History of Present Illness     Chief Complaint: Nausea and Generalized Weakness      Kezia Dixon is a 69 year old  female who is currently admitted since 12/8/2024 with generalized weakness/physical deconditioning, acute on chronic nausea/anorexia.  Treated for dehydration.  Had multiple electrolyte abnormalities and new anemia.  Had lower extremity ultrasound that was negative for DVT.  Per RN she has had worsening lethargy over the past couple days.  Overnight she had hyponatremia 118, was given 500 mL bolus 3% NS.  Repeat sodium this a.m. 123.  Next repeat pending.      Overnight reportedly her speech was at baseline, unclear time of overnight assessment LKW.  At 0810 patient felt \"foggy\" and had illogical speech with some confusion talking about a TV program, shortly later noted to have decreased sensation to right lower extremity.  Stroke code called.    On this provider's exam there is no appreciated dysphagia or dysarthria.  Sensations are intact bilaterally.  Some discoloration and petechiae bilateral upper and lower extremities noted.         Past Medical History     Stroke risk factors: Bilateral lung adenocarcinoma on immunotherapy with plan to start SBRT of JULIEN nodule, history esophageal cancer s/p esophagectomy/chemoradiation (2016), history right lung squamous cell carcinoma s/p lobectomy (2018), COPD, history of heavy tobacco use, resolved stress-induced cardiomyopathy, nonobstructive CAD, alcohol use disorder.  On PTA atorvastatin 40 mg daily    Preadmission antithrombotic regimen: ASA 81 mg daily    Modified Achille Score (Pre-morbid)  2 - Slight disability.  Able to look after own affairs without assistance, but unable to carry out all previous activities.                   Assessment and Plan       1.  Transient generalized confusion versus word finding difficulty, transient decreased sensation RLE, evaluate for " "stroke.  2. Purple discoloration to BLE R>L, no appreciated swelling. Swelling to L wrist. Rule out DVT  3. Petechiae scattered bilateral upper and lower extremities, rule out endocarditis     Intravenous Thrombolysis  Not given due to:   - minor/isolated/quickly resolving symptoms  - unclear or unfavorable risk-benefit profile for extended window thrombolysis beyond the conventional 4.5 hour time window     Endovascular Treatment  Not initiated due to absence of proximal vessel occlusion     Plan:  -rule out DVT  -MRI brain w/wo contrast  -TTE to evaluate for endocarditis   -monitor sodium closely, hold hypertonic overcorrection, avoid overcorrection.    -evaluate for infectious/metabolic/toxic etiologies, especially in setting of hypothermia, soft blood pressures     ___________________________________________________________________    Sarah Bautista PA-C  Vascular Neurology    To page me or covering stroke neurology team member, click here: [facility]  Choose \"On Call\" tab at top, then select \"NEUROLOGY/ALL SITES\" from middle drop-down box, press Enter, then look for \"stroke\" or \"telestroke\" for your site.  ___________________________________________________________________        Imaging/Labs   (personally reviewed)    HEAD CT:  1.  No CT evidence for acute intracranial process.  2.  Brain atrophy and presumed chronic microvascular ischemic changes as above.     HEAD CTA:   1.  No significant stenosis, aneurysm, or high flow vascular malformation identified.  2.  Variant [place] of Van anatomy as above.     NECK CTA:  1.  No significant stenosis of the bilateral internal carotid arteries.  2.  Interval development of groundglass density opacity in the posterior aspect of the left upper lung lobe, compatible with pneumonia in appropriate clinical setting.  CT Perfusion head: canceled due to no LVO         Physical Examination     BP: (!) 157/64   Pulse: 89   Resp: 16   Temp: (!) 96.4  F (35.8  C)   Temp src: " Axillary   SpO2: 95 %   O2 Device: None (Room air)   Weight: 41.5 kg (91 lb 9.6 oz)    Wt Readings from Last 2 Encounters:   12/14/24 41.5 kg (91 lb 9.6 oz)   11/20/24 37.2 kg (82 lb 0.2 oz)       General Exam  General:  patient lying in bed without any acute distress    HEENT:  normocephalic/atraumatic  Pulmonary:  no respiratory distress  Extremities:  purple discoloration to BLE R>L, pulses weak but palpable  Skin:  some scattered petechiae to BUE and BLE     Neuro Exam  Mental Status:  alert, oriented x 2, not to time (says November), follows commands, speech clear and fluent, naming and repetition normal  Cranial Nerves:  visual fields intact, PERRL, EOMI with normal smooth pursuit, facial sensation intact and symmetric, facial movements symmetric, hearing not formally tested but intact to conversation, no dysarthria, tongue protrusion midline  Motor:  normal muscle tone and bulk, no abnormal movements, able to move all limbs spontaneously, strength 4/5 throughout upper and lower extremities, no pronator drift, has some generalized weakness  Reflexes:  toes down-going  Sensory:  light touch sensation intact and symmetric throughout upper and lower extremities, no extinction on double simultaneous stimulation   Coordination:  normal finger-to-nose and heel-to-shin bilaterally without dysmetria (has a difficult time with BLE heel to shin due to generalized weakness  Station/Gait:  deferred        Stroke Scales       NIHSS  1a. Level of Consciousness 0-->Alert, keenly responsive   1b. LOC Questions (S) 1-->Answers one question correctly (November)   1c. LOC Commands 0-->Performs both tasks correctly   2.   Best Gaze 0-->Normal   3.   Visual 0-->No visual loss   4.   Facial Palsy 0-->Normal symmetrical movements   5a. Motor Arm, Left 0-->No drift, limb holds 90 (or 45) degrees for full 10 secs   5b. Motor Arm, Right 0-->No drift, limb holds 90 (or 45) degrees for full 10 secs   6a. Motor Leg, Left 0-->No drift, leg  holds 30 degree position for full 5 secs   6b. Motor Leg, right 0-->No drift, leg holds 30 degree position for full 5 secs   7.   Limb Ataxia 0-->Absent   8.   Sensory 0-->Normal, no sensory loss   9.   Best Language 0-->No aphasia, normal   10. Dysarthria 0-->Normal   11. Extinction and Inattention  0-->No abnormality   Total 1 (12/15/24 0920)            Labs     CBC  Lab Results   Component Value Date    HGB 9.7 (L) 12/15/2024    HCT 28.1 (L) 12/15/2024    WBC 20.6 (H) 12/15/2024     (H) 12/15/2024       BMP  Lab Results   Component Value Date     (L) 12/15/2024     (L) 12/15/2024    POTASSIUM 3.6 12/15/2024    CHLORIDE 89 (L) 12/15/2024    CO2 21 (L) 12/15/2024    BUN 31.6 (H) 12/15/2024    CR 1.17 (H) 12/15/2024     (H) 12/15/2024    VICENTE 7.8 (L) 12/15/2024       INR  INR   Date Value Ref Range Status   05/16/2022 1.00 0.85 - 1.15 Final   10/16/2018 1.01 0.86 - 1.14 Final   03/25/2016 1.12 0.86 - 1.14 Final       Data   Stroke Code Data  (for stroke code without tele)  Stroke code activated 12/15/24  0841   First stroke provider response 12/15/24  0844   Last known normal        Time of discovery (or onset of symptoms) 12/15/24  0810   Head CT read by Stroke Neuro Provider 12/15/24  0857   Was stroke code de-escalated? Yes  12/15/24  0913        Clinically Significant Risk Factors         # Hyponatremia: Lowest Na = 118 mmol/L in last 2 days, will monitor as appropriate  # Hypochloremia: Lowest Cl = 83 mmol/L in last 2 days, will monitor as appropriate      # Hypoalbuminemia: Lowest albumin = 2.7 g/dL at 12/9/2024  6:56 AM, will monitor as appropriate    # Acute Kidney Injury, unspecified: based on a >150% or 0.3 mg/dL increase in last creatinine compared to past 90 day average, will monitor renal function  # Hypertension: Noted on problem list  # Chronic heart failure with preserved ejection fraction: heart failure noted on problem list and last echo with EF >50%          # DMII: A1C =  "7.2 % (Ref range: <5.7 %) within past 6 months   # Cachexia: Estimated body mass index is 17.31 kg/m  as calculated from the following:    Height as of 11/19/24: 1.549 m (5' 1\").    Weight as of this encounter: 41.5 kg (91 lb 9.6 oz).   # Moderate Malnutrition: based on nutrition assessment    # Financial/Environmental Concerns: none  # COPD: noted on problem list         Time Spent on this Encounter   Billing: I personally examined and evaluated the patient today. At the time of my evaluation and management the patient was critical condition today due to stroke code. I personally managed review of chart, medical record, meds, imaging, history, exam, and discussion with attending regarding plan and documentation. I spent a total of 60 minutes providing critical care services, evaluating the patient, directing care and reviewing laboratory values and radiologic reports.           "

## 2024-12-15 NOTE — PROGRESS NOTES
MD Notification    Notified Person: MD    Notified Person Name: Eloise Fisher MD    Notification Date/Time: 12/15 @ 0731 and 4066    Notification Interaction: Vocera     Purpose of Notification:  sodium came back at 123 @ 545. The 3% sodium chloride is about to run out. Did you want her back on NS? Also, night shift reported increase in mottling on her legs, weakness, low body temps, no urine output and new numbness in her left hand.    @[id number] Son would like updates - currently in room    Orders Received:     Restart NS  Will call son     Comments:

## 2024-12-15 NOTE — PROGRESS NOTES
MD Notification    Notified Person: MD    Notified Person Name: Eloise Fisher    Notification Date/Time: 12/14 @ 10:21 & 1654    Notification Interaction: Macy     Purpose of Notification: no urine output and Sodium level 119    Orders Received: 12:21pm: Starting NS - recheck sodium in 4-6hrs     Comments:

## 2024-12-15 NOTE — CONSULTS
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  ATN with poor oral intake, losartan.  At risk for worsening JESSICA with contrast this morning, ARB use  Urine sodium less than 20 suggest sodium avid state and at least some tubular function left.    3 hyponatremia-at risk for SIADH with lung carcinoma but this is diagnosis of exclusion.  Current presentation along with low urinary sodium suggest hypovolemic component.  Severe JESSICA contributing as well  -Status post 3% saline overnight with appropriate response.    4 anemia in malignancy    5 metabolic encephalopathy-noted to be confused.  Fairly oriented on my evaluation.  Likely related to hyponatremia.  CTA negative for    Recommendation-  -continue with normal saline at 100 cc an hour  -P.o. free water fluid restriction of 1500 ml  -Encourage solute/protein intake  -Avoid further use of contrast until renal function improves to baseline  -Hold losartan  -Repeat sodium check in 6 hours.  Hold IV fluid if serum sodium is 128 or more  -Bladder scan and straight cath as needed.  Strict I's/O      Thank you for the consult. Will continue to follow along with you .        Ady Adams MD  [facility] Nephrology   [phone]        REASON FOR CONSULT: JESSICA, hyponatremia    HISTORY OF PRESENT ILLNESS:  Kezia Dixon is a 69 year old female with adenocarcinoma of lungs with progression on recent CT scan and plans to start radiation therapy,   Recent issues with alcohol binging, and presented with poor oral intake, nausea electrolyte abnormalities including sodium of 127 and  potassium of 2.7  Creatinine of 0.4 at baseline and on admission  Intermittent mild hyponatremia going back to 2018.  Sodium 127 on admission and 130 in 12/11.   No labs until 12/14 which showed severe JESSICA with more than doubling of creatinine, sodium down to 118.  Unclear inciting event.  Last contrast use was on 12/8.  No documented hypotension.  Possibly poor oral intake.  Patient was on losartan       12/11/24 10:18 12/14/24 11:24 12/14/24 19:57 12/15/24 00:18 12/15/24 02:13 12/15/24 05:45 12/15/24 08:54 12/15/24 10:36   Sodium 130 (L) 119 (LL) 118 (LL) 118 (LL) 120 (L) 123 (L)  123 (L) 125 (L)  125 (L) 126 (L)   Creatinine 0.45 (L) 1.03 (H) 1.18 (H)   1.17 (H) 1.10 (H)      Urine output is trending down.  She does not void spontaneously.  Last straight cath for 70 cc over ?  24 hours.  On top of that, she received contrast with CTA this morning  when a code stroke was called with confusion.  She received 3% saline at 50 cc an hour overnight.  Stopped this morning.  Sodium improved to 126 on last check and holding off 3%  Urine sodium of less than 20 and urine osmolality 270    On eval, she is lethargic but arousable.  Oriented to place, date of birth, year, hospital      PAST MEDICAL HISTORY:  Reviewed with patient on 12/15/2024  and is as listed in HPI.       MEDICATIONS:  PTA Meds  Prior to Admission medications    Medication Sig Last Dose Taking? Auth Provider Long Term End Date   albuterol (PROAIR HFA/PROVENTIL HFA/VENTOLIN HFA) 108 (90 Base) MCG/ACT inhaler Inhale 2 puffs into the lungs every 6 hours as needed for shortness of breath, wheezing or cough. Past Month Yes Unknown, Entered By History     ANORO ELLIPTA 62.5-25 MCG/ACT oral inhaler Inhale 1 puff into the lungs daily. 12/8/2024 Morning Yes Reported, Patient     aspirin (ASA) 81 MG EC tablet 1 tablet every other day 12/8/2024 Morning Yes Linda Liu APRN CNP     atorvastatin (LIPITOR) 40 MG tablet Take 1 tablet (40 mg) by mouth daily. 12/8/2024  Morning Yes Dennis Ring MD Yes    durvalumab Inject into the vein every 14 days. 11/26/2024 Yes Unknown, Entered By History     fluticasone (FLONASE) 50 MCG/ACT nasal spray INSTILL 2 SPRAYS INTO BOTH NOSTRILS DAILY. 12/8/2024 Morning Yes Mustapha Velásquez MD     furosemide (LASIX) 40 MG tablet Take 0.5 tablets (20 mg) by mouth daily as needed (edema or shortness of breath) For worsening shortness of breath, leg swelling or weight gain.  Patient taking differently: Take 20-40 mg by mouth daily as needed (edema or shortness of breath). For worsening shortness of breath, leg swelling or weight gain. 12/5/2024 Yes Mathew Caraballo MD Yes    gabapentin (NEURONTIN) 600 MG tablet TAKE ONE (1) TABLET BY MOUTH THREE TIMES DAILY. 12/8/2024 Morning Yes Manny Calles MD Yes    ipratropium (ATROVENT) 0.06 % nasal spray Spray 2 sprays into both nostrils 4 times daily. 12/8/2024 Morning Yes Destiny Barboza PA-C     losartan (COZAAR) 50 MG tablet Take 1 tablet (50 mg) by mouth daily. Appointment required for further refills 12/8/2024 Morning Yes Dennis Ring MD Yes    metoprolol succinate ER (TOPROL XL) 50 MG 24 hr tablet Take 1 tablet (50 mg) by mouth every morning. 12/8/2024 Morning Yes Dennis Ring MD Yes    multivitamin w/minerals (MULTI-VITAMIN) tablet Take 1 tablet by mouth daily. 12/8/2024 Morning Yes Reported, Patient     omeprazole (PRILOSEC) 40 MG DR capsule TAKE 1 CAPSULE BY MOUTH EVERY DAY 12/8/2024 Morning Yes Mustapha Velásquez MD     ondansetron (ZOFRAN) 4 MG tablet Take 8 mg by mouth every 8 hours as needed for nausea. Past Week Yes Reported, Patient     prochlorperazine (COMPAZINE) 10 MG tablet Take 10 mg by mouth every 6 hours as needed for nausea or vomiting. Past Week Yes Reported, Patient     spironolactone (ALDACTONE) 25 MG tablet Take 0.5 tablets (12.5 mg) by mouth every morning. 12/8/2024 Morning Yes Dennis iRng MD Yes       Current Meds  Current  Facility-Administered Medications   Medication Dose Route Frequency Provider Last Rate Last Admin    aspirin EC tablet 81 mg  81 mg Oral Every Other Day Vielka Martínez PA-C   81 mg at 12/14/24 0823    atorvastatin (LIPITOR) tablet 40 mg  40 mg Oral Daily Vielka Martínez PA-C   40 mg at 12/15/24 1203    benzonatate (TESSALON) capsule 200 mg  200 mg Oral TID Eloise Fisher MD   200 mg at 12/15/24 1203    ceFEPIme (MAXIPIME) 2 g vial to attach to  mL bag for ADULTS or NS 50 mL bag for PEDS  2 g Intravenous Q12H Eloise Fisher MD   2 g at 12/15/24 1210    fluticasone (FLONASE) 50 MCG/ACT spray 2 spray  2 spray Both Nostrils Daily Linda Lang MD   2 spray at 12/15/24 1205    gabapentin (NEURONTIN) capsule 600 mg  600 mg Oral TID Mathew Caraballo MD   600 mg at 12/15/24 1159    guaiFENesin (MUCINEX) 12 hr tablet 600 mg  600 mg Oral BID Vielka Martínez PA-C   600 mg at 12/15/24 1203    heparin lock flush 10 unit/mL injection 5-10 mL  5-10 mL Intracatheter Q24H Estiven Coyne MD   5 mL at 12/14/24 0611    heparin lock flush 100 unit/mL injection 5-10 mL  5-10 mL Intracatheter Q28 Days Estiven Coyne MD        ipratropium (ATROVENT) 0.06 % spray 2 spray  2 spray Both Nostrils 4x Daily Vielka Martínez PA-C   2 spray at 12/15/24 1206    [Held by provider] losartan (COZAAR) tablet 50 mg  50 mg Oral Daily Vielka Martínez PA-C   50 mg at 12/15/24 1200    [Held by provider] metoprolol succinate ER (TOPROL XL) 24 hr tablet 50 mg  50 mg Oral QAM Vielka Martínez PA-C   50 mg at 12/14/24 0823    multivitamin w/minerals (THERA-VIT-M) tablet 1 tablet  1 tablet Oral Daily Vielka Martínez PA-C   1 tablet at 12/15/24 1203    pantoprazole (PROTONIX) EC tablet 40 mg  40 mg Oral [facility] AC Eloise Fisher MD   40 mg at 12/15/24 1200    predniSONE (DELTASONE) tablet 30 mg  30 mg Oral Daily Eloise Fisher MD   30 mg at 12/15/24 1159    senna-docusate  (SENOKOT-S/PERICOLACE) 8.6-50 MG per tablet 1 tablet  1 tablet Oral BID Vielka Martínez PA-C   1 tablet at 12/15/24 1159    Or    senna-docusate (SENOKOT-S/PERICOLACE) 8.6-50 MG per tablet 2 tablet  2 tablet Oral BID Vielka Martínez PA-C   2 tablet at 24 1033    sodium chloride (PF) 0.9% PF flush 10-20 mL  10-20 mL Intracatheter Q28 Days Estiven Coyne MD        [Held by provider] spironolactone (ALDACTONE) half-tab 12.5 mg  12.5 mg Oral QAM Vielka Martínez PA-C        umeclidinium-vilanterol (ANORO ELLIPTA) 62.5-25 MCG/ACT oral inhaler 1 puff  1 puff Inhalation Daily Vielka Martínez PA-C   1 puff at 12/15/24 1205     Infusion Meds  Current Facility-Administered Medications   Medication Dose Route Frequency Provider Last Rate Last Admin    sodium chloride 0.9 % infusion   Intravenous Continuous Eloise Fisher  mL/hr at 12/15/24 1206 New Bag at 12/15/24 1206       ALLERGIES:    Allergies   Allergen Reactions    Codeine Sulfate Nausea    Simvastatin Cramps     Leg cramps    Morphine      Pt unsure - pt does not believe this is an allergy of hers    Pcn [Penicillins] Rash       REVIEW OF SYSTEMS:  A comprehensive of systems was negative except as noted above.    SOCIAL HISTORY:   Reviewed with patient on 12/15/2024     FAMILY MEDICAL HISTORY:   Family History   Problem Relation Age of Onset    Depression Mother     Substance Abuse Father     Other Cancer Father         melanoma    Prostate Cancer Father     Diabetes Father     Substance Abuse Paternal Grandfather     Other Cancer Brother         melanoma    Substance Abuse Brother     Other Cancer Brother         melanoma    Other Cancer Brother         melanoma    Other Cancer Brother         melanoma     Reviewed with patient on 12/15/2024     PHYSICAL EXAM:   Temp  Av.7  F (36.5  C)  Min: 95.6  F (35.3  C)  Max: 99.2  F (37.3  C)      Pulse  Av.4  Min: 71  Max: 112 Resp  Av.7  Min: 13  Max:  28  SpO2  Av.9 %  Min: 90 %  Max: 100 %       /50   Pulse 92   Temp (!) 96.4  F (35.8  C) (Axillary)   Resp 24   Wt 41.5 kg (91 lb 9.6 oz)   SpO2 98%   BMI 17.31 kg/m        Admit Weight: 38.6 kg (85 lb)     GENERAL APPEARANCE: Lethargic  EYES: no scleral icterus, pupils equal  HENT: NC/AT,  mouth  without ulcers or lesions  Lymphatics: no cervical or supraclavicular LAD  Endo: no moon facies, no goiter  Pulmonary: Clear anteriorly  CV: regular rhythm, normal rate, no rub   - JVP -  GI: soft, nontender,   MS: no evidence of inflammation in joints  : no sanchez  SKIN: no rash, warm, dry, no cyanosis    LABS:   CMP  Recent Labs   Lab 12/15/24  1036 12/15/24  0854 12/15/24  0832 12/15/24  0545 12/15/24  0213 12/15/24  0018 24  1957 24  1124 24  1059 24  0521 24  1909 24  0656   * 125*  125*  --  123*  123* 120*   < > 118* 119*  --   --    < > 129*  129*   POTASSIUM  --  3.7  --  3.6  --   --  3.9 4.0  3.9 3.6 3.9   < > 3.5   CHLORIDE  --  92*  --  89*  --   --  83* 85*  --   --    < > 91*   CO2  --  20*  --  21*  --   --  23 23  --   --    < > 24   ANIONGAP  --  13  --  13  --   --  12 11  --   --    < > 14   GLC  --  175* 196* 179*  --   --  263* 166*  --   --    < > 91   BUN  --  30.9*  --  31.6*  --   --  29.1* 27.0*  --   --    < > 9.2   CR  --  1.10*  --  1.17*  --   --  1.18* 1.03*  --   --    < > 0.45*   GFRESTIMATED  --  53*  --  50*  --   --  49* 58*  --   --    < > >90   VICENTE  --  7.7*  --  7.8*  --   --  7.9* 8.0*  --   --    < > 8.3*   MAG  --   --   --  2.0  --   --   --  1.9 1.9 1.7   < > 2.1   PHOS  --   --   --  4.8*  --   --   --  4.9* 3.7 3.1   < > 2.9   PROTTOTAL  --   --   --   --   --   --   --   --   --   --   --  6.0*   ALBUMIN  --   --   --   --   --   --   --   --   --   --   --  2.7*   BILITOTAL  --   --   --   --   --   --   --   --   --   --   --  0.6   ALKPHOS  --   --   --   --   --   --   --   --   --   --   --  82   AST  --    --   --   --   --   --   --   --   --   --   --  19   ALT  --   --   --   --   --   --   --   --   --   --   --  9    < > = values in this interval not displayed.     CBC  Recent Labs   Lab 12/15/24  0854 12/15/24  0545 12/11/24  1018 12/09/24  0656   HGB 9.7* 9.7* 9.3* 9.4*   WBC 21.3* 20.6* 6.1 10.7   RBC 3.22* 3.25* 3.11* 3.09*   HCT 28.1* 28.1* 27.3* 27.4*   MCV 87 87 88 89   MCH 30.1 29.8 29.9 30.4   MCHC 34.5 34.5 34.1 34.3   RDW 14.3 14.0 13.8 13.5   * 461* 383 405     INR  Recent Labs   Lab 12/15/24  1036   INR 1.06     ABGNo lab results found in last 7 days.   URINE STUDIES  Recent Labs   Lab Test 12/14/24  2206 05/14/24  1449 06/17/22  2350 10/18/21  1729   COLOR Yellow Yellow Yellow Yellow   APPEARANCE Slightly Cloudy* Clear Slightly Cloudy* Clear   URINEGLC Negative Negative Negative 50*   URINEBILI Negative Negative Negative Negative   URINEKETONE Negative Negative 60* 10*   SG 1.022 1.015 1.019 1.016   UBLD Negative Negative Negative Negative   URINEPH 5.0 6.0 5.5 6.5   PROTEIN 20* Negative 20* 20*   UROBILINOGEN  --  1.0  --   --    NITRITE Negative Positive* Negative Negative   LEUKEST Negative Moderate* Large* Small*   RBCU 7* None Seen 4* 0   WBCU 4 10-25* 24* 7*     No lab results found.  PTH  No lab results found.  IRON STUDIES  Recent Labs   Lab Test 12/08/24  1229 10/18/18  0811   IRON 15* 39   * 243   IRONSAT 8* 16   PRERNA 196  --        IMAGING:  Personally reviewed the images and findings are as listed in HPI     Ady Adams MD

## 2024-12-15 NOTE — PROGRESS NOTES
[facility] Progress Note          Assessment and Plan:   Ms. Kezia Dixon is a 69 year old woman who was admitted on 12/8/2024 with progressive weakness, anemia and nausea  1.  Adenocarcinoma involving the RUL along with multiple lung nodules both lungs,   - PET/ CT showed increase/uptake in size of at least 4 nodules  - right upper lobe nodule biopsy positive for adenocarcinoma, NGS negative, PD-L1 TPS score 0%, left upper lobe nodule biopsy showing atypical cells but suspicious for adenocarcinoma.    - Started on chemoradiotherapy with weekly carboplatin and paclitaxel on 01/18/2024  - paclitaxel dose reduced based on pre-existing neuropathy with further slight reduction due to borderline neutropenia with her fourth cycle.    - Treatment completed on 02/28/2024  - stable disease on the CT scan from April 2024.    - Started consolidation durvalumab immunotherapy on 05/28/2024.    - CT in July shows minimal increase in micronodular densities but overall stable  - CT in October showed further increase in the right upper lobe nodule and indeterminate liver lesion in the right hepatic lobe.  - Left upper lung lesion was felt consistent with carcinoma with plan to start radiation this week 5 session over 2 weeks  -further treatment will need to be on hold until her performance status improves     2.  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     3.  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     4.  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    -TSH normal  - note previous discussion about excluding hemolytic anemia. Chart does not reflect that these labs were sent. Orders placed to assess for this possibility     5.  Hyponatremia with decrease in sodium  overnight. She received treatment with hypertonic saline with slight improvement in sodium this morning     6.  Change in speech with abrupt onset and now apparently resolved  -reviewed imaging findings with her and her son  -she and her son feel her speech is back to normal although her son feels she is speaking more slowly  -basis for the change in speech is unclear but could represent developing pneumonia in [place]  -agree with plan for echocardiogram    7.  Decreased urine production  - bladder obstruction excluded based on imaging and creatinine is stable  - clinical picture most consistent with volume depletion. Agree with continued IV fluids    8. History of alcohol  use disorder with recent relapse             Interval History:     She relates fatigue. No complaints of pain or dyspnea              Medications:     Current Facility-Administered Medications   Medication Dose Route Frequency Provider Last Rate Last Admin    aspirin EC tablet 81 mg  81 mg Oral Every Other Day Vielka Martínez PA-C   81 mg at 12/14/24 0823    atorvastatin (LIPITOR) tablet 40 mg  40 mg Oral Daily Vielka Martínez PA-C   40 mg at 12/14/24 0824    azithromycin (ZITHROMAX) tablet 250 mg  250 mg Oral Daily Eloise Fisher MD   250 mg at 12/14/24 0823    benzonatate (TESSALON) capsule 200 mg  200 mg Oral TID Eloise Fisher MD   200 mg at 12/14/24 2221    fluticasone (FLONASE) 50 MCG/ACT spray 2 spray  2 spray Both Nostrils Daily Linda Lang MD   2 spray at 12/14/24 0827    gabapentin (NEURONTIN) capsule 600 mg  600 mg Oral TID Mathew Caraballo MD   600 mg at 12/14/24 2220    guaiFENesin (MUCINEX) 12 hr tablet 600 mg  600 mg Oral BID Vielka Martínez PA-C   600 mg at 12/14/24 2011    heparin lock flush 10 unit/mL injection 5-10 mL  5-10 mL Intracatheter Q24H Estiven Coyne MD   5 mL at 12/14/24 0611    heparin lock flush 100 unit/mL injection 5-10 mL  5-10 mL Intracatheter Q28 Days Estiven Coyne  MD        ipratropium (ATROVENT) 0.06 % spray 2 spray  2 spray Both Nostrils 4x Daily Vielka Martínez PA-C   2 spray at 12/14/24 2006    losartan (COZAAR) tablet 50 mg  50 mg Oral Daily Vielka Martínez PA-C   50 mg at 12/14/24 0824    metoprolol succinate ER (TOPROL XL) 24 hr tablet 50 mg  50 mg Oral QAM Vielka Martínez PA-C   50 mg at 12/14/24 0823    multivitamin w/minerals (THERA-VIT-M) tablet 1 tablet  1 tablet Oral Daily Vielka Martínez PA-C   1 tablet at 12/14/24 0824    pantoprazole (PROTONIX) EC tablet 40 mg  40 mg Oral QAM AC Eloise Fisher MD   40 mg at 12/14/24 0824    predniSONE (DELTASONE) tablet 30 mg  30 mg Oral Daily Eloise Fisher MD   30 mg at 12/14/24 0824    senna-docusate (SENOKOT-S/PERICOLACE) 8.6-50 MG per tablet 1 tablet  1 tablet Oral BID Vielka Martínez PA-C   1 tablet at 12/10/24 1015    Or    senna-docusate (SENOKOT-S/PERICOLACE) 8.6-50 MG per tablet 2 tablet  2 tablet Oral BID Vielka Martínez PA-C   2 tablet at 12/11/24 1033    sodium chloride (PF) 0.9% PF flush 10-20 mL  10-20 mL Intracatheter Q28 Days Estiven Coyne MD        [Held by provider] spironolactone (ALDACTONE) half-tab 12.5 mg  12.5 mg Oral QAM Vielka Martínez PA-C        umeclidinium-vilanterol (ANORO ELLIPTA) 62.5-25 MCG/ACT oral inhaler 1 puff  1 puff Inhalation Daily Viekla Martínez PA-C   1 puff at 12/14/24 0826     Current Facility-Administered Medications   Medication Dose Route Frequency Provider Last Rate Last Admin    acetaminophen (TYLENOL) tablet 650 mg  650 mg Oral Q4H PRN Vielka Martínez PA-C   650 mg at 12/14/24 1657    Or    acetaminophen (TYLENOL) Suppository 650 mg  650 mg Rectal Q4H PRN Vielka Martínez PA-C        albuterol (PROVENTIL HFA/VENTOLIN HFA) inhaler  2 puff Inhalation Q6H PRN Vielka Martínez PA-C        bisacodyl (DULCOLAX) suppository 10 mg  10 mg Rectal Daily PRN Tanya  Vielka Aguilar PA-C        heparin lock flush 10 unit/mL injection 5-10 mL  5-10 mL Intracatheter Q1H PRN Estiven Coyne MD   5 mL at 12/12/24 1305    hydrALAZINE (APRESOLINE) tablet 10 mg  10 mg Oral Q4H PRN Vielka Martínez PA-C        Or    hydrALAZINE (APRESOLINE) injection 10 mg  10 mg Intravenous Q4H PRN Vielka Martínez PA-C        HYDROmorphone (DILAUDID) injection 0.2 mg  0.2 mg Intravenous Q2H PRN Vielka Martínez PA-C        ipratropium - albuterol 0.5 mg/2.5 mg/3 mL (DUONEB) neb solution 3 mL  3 mL Nebulization Q4H PRN Vielka Martínez PA-C        lidocaine (LMX4) cream   Topical Q1H PRN Vielka Martínez PA-C        lidocaine 1 % 0.1-1 mL  0.1-1 mL Other Q1H PRN Vielka Martínze PA-C        naloxone (NARCAN) injection 0.2 mg  0.2 mg Intravenous Q2 Min PRN Mathew Caraballo MD        Or    naloxone (NARCAN) injection 0.4 mg  0.4 mg Intravenous Q2 Min PRN Mathew Caraballo MD        Or    naloxone (NARCAN) injection 0.2 mg  0.2 mg Intramuscular Q2 Min PRN Mathew Caraballo MD        Or    naloxone (NARCAN) injection 0.4 mg  0.4 mg Intramuscular Q2 Min PRN Mathew Caraballo MD        ondansetron (ZOFRAN ODT) ODT tab 4 mg  4 mg Oral Q6H PRN Vielka Martínez PA-C   4 mg at 12/11/24 2015    Or    ondansetron (ZOFRAN) injection 4 mg  4 mg Intravenous Q6H PRN Vielka Martínez PA-C   4 mg at 12/09/24 1847    oxyCODONE (ROXICODONE) tablet 5 mg  5 mg Oral Q4H PRN Vielka Martínez PA-C        oxyCODONE IR (ROXICODONE) half-tab 2.5 mg  2.5 mg Oral Q4H PRN Vielka Martínez PA-C        polyethylene glycol (MIRALAX) Packet 17 g  17 g Oral BID PRN Vielka Martínez PA-C        prochlorperazine (COMPAZINE) injection 5 mg  5 mg Intravenous Q6H PRN Vielka Martínez PA-C        Or    prochlorperazine (COMPAZINE) tablet 5 mg  5 mg Oral Q6H PRN Vielka Martínez PA-C   5 mg at 12/13/24 1012    senna-docusate  (SENOKOT-S/PERICOLACE) 8.6-50 MG per tablet 1 tablet  1 tablet Oral BID PRN Vielka Martínez PA-C        Or    senna-docusate (SENOKOT-S/PERICOLACE) 8.6-50 MG per tablet 2 tablet  2 tablet Oral BID PRN Vielka Martínez PA-C        sodium chloride (PF) 0.9% PF flush 10-20 mL  10-20 mL Intracatheter q1 min prn Estiven Coyne MD        sodium chloride (PF) 0.9% PF flush 10-20 mL  10-20 mL Intracatheter Q1H PRN Estiven Coyne MD        sodium chloride (PF) 0.9% PF flush 3 mL  3 mL Intracatheter q1 min prn Vielka Martínez PA-C                      Physical Exam:   Blood pressure (!) 157/64, pulse 89, temperature (!) 96.4  F (35.8  C), temperature source Axillary, resp. rate 16, weight 41.5 kg (91 lb 9.6 oz), SpO2 95%, not currently breastfeeding.  Wt Readings from Last 4 Encounters:   24 41.5 kg (91 lb 9.6 oz)   24 37.2 kg (82 lb 0.2 oz)   24 39.1 kg (86 lb 4.8 oz)   24 39 kg (86 lb)         Vital Sign Ranges  Temperature Temp  Av.4  F (35.8  C)  Min: 95.6  F (35.3  C)  Max: 97.1  F (36.2  C)   Blood pressure Systolic (24hrs), Av , Min:127 , Max:157        Diastolic (24hrs), Av, Min:53, Max:66      Pulse Pulse  Av  Min: 82  Max: 89   Respirations Resp  Av  Min: 14  Max: 18   Pulse oximetry SpO2  Av.7 %  Min: 92 %  Max: 100 %         Intake/Output Summary (Last 24 hours) at 12/15/2024 0913  Last data filed at 2024 2211  Gross per 24 hour   Intake --   Output 70 ml   Net -70 ml       Constitutional: Awake, alert, cooperative, no apparent distress     Lungs: Decreased breath sounds bilaterally   Cardiovascular: Regular rate and rhythm, normal S1 and S2, and no murmur noted   Abdomen: Quiet bowel sounds, soft, non-distended, non-tender   Skin: No significant upper or lower extremity edema   Other: She appears tired but comfortable. Speech is slow          Data:   Laboratory:  CMP  Recent Labs   Lab 12/15/24  0832 12/15/24  6326  12/15/24  0213 12/15/24  0018 12/14/24  1957 12/14/24  1124 12/13/24  1059 12/12/24  0521 12/11/24  1018 12/09/24  1909 12/09/24  0656 12/08/24  1444 12/08/24  1229   NA  --  123*  123* 120* 118* 118* 119*  --   --  130*   < > 129*  129*   < > 127*   POTASSIUM  --  3.6  --   --  3.9 4.0  3.9 3.6 3.9 4.0   < > 3.5   < > 2.7*   CHLORIDE  --  89*  --   --  83* 85*  --   --  92*  --  91*   < > 86*   CO2  --  21*  --   --  23 23  --   --  27  --  24   < > 29   ANIONGAP  --  13  --   --  12 11  --   --  11  --  14   < > 12   * 179*  --   --  263* 166*  --   --  124*  --  91   < > 101*   BUN  --  31.6*  --   --  29.1* 27.0*  --   --  8.9  --  9.2   < > 6.6*   CR  --  1.17*  --   --  1.18* 1.03*  --   --  0.45*  --  0.45*   < > 0.42*   GFRESTIMATED  --  50*  --   --  49* 58*  --   --  >90  --  >90   < > >90   VICENTE  --  7.8*  --   --  7.9* 8.0*  --   --  8.7*  --  8.3*   < > 8.3*   MAG  --  2.0  --   --   --  1.9 1.9 1.7 1.7   < > 2.1   < > 1.6*   PHOS  --  4.8*  --   --   --  4.9* 3.7 3.1 3.0   < > 2.9   < >  --    PROTTOTAL  --   --   --   --   --   --   --   --   --   --  6.0*  --  6.4   ALBUMIN  --   --   --   --   --   --   --   --   --   --  2.7*  --  2.9*   BILITOTAL  --   --   --   --   --   --   --   --   --   --  0.6  --  0.7   ALKPHOS  --   --   --   --   --   --   --   --   --   --  82  --  90   AST  --   --   --   --   --   --   --   --   --   --  19  --  18   ALT  --   --   --   --   --   --   --   --   --   --  9  --  9    < > = values in this interval not displayed.     CBC  Recent Labs   Lab 12/15/24  0545 12/11/24  1018 12/09/24  0656 12/08/24  1229   WBC 20.6* 6.1 10.7 9.8   RBC 3.25* 3.11* 3.09* 3.11*   HGB 9.7* 9.3* 9.4* 9.0*   HCT 28.1* 27.3* 27.4* 27.4*   MCV 87 88 89 88   MCH 29.8 29.9 30.4 28.9   MCHC 34.5 34.1 34.3 32.8   RDW 14.0 13.8 13.5 13.6   * 383 405 393     INRNo lab results found in last 7 days.    Imaging data:  US Lower Extremity Venous Duplex Right [[id number]] Collected:  12/08/24 1741   Order Status: Completed Updated: 12/08/24 2056   Narrative:     EXAM: US LOWER EXTREMITY VENOUS DUPLEX RIGHT  LOCATION: [facility]  DATE: 12/8/2024    INDICATION: Right leg swelling.  COMPARISON: None.  TECHNIQUE: Venous Duplex ultrasound of the right lower extremity with and without compression, augmentation and duplex. Color flow and spectral Doppler with waveform analysis performed.    FINDINGS: Exam includes the common femoral, femoral, popliteal, and contralateral common femoral veins as well as segmentally visualized deep calf veins and greater saphenous vein.    RIGHT: No deep vein thrombosis. No superficial thrombophlebitis. No popliteal cyst.   Impression:     IMPRESSION:  1.  No deep venous thrombosis in the right lower extremity.   XR Chest Port 1 View [[id number]] Collected: 12/08/24 1430   Order Status: Completed Updated: 12/08/24 1446   Narrative:     EXAM: XR CHEST PORT 1 VIEW  LOCATION: [facility]  DATE: 12/8/2024    INDICATION: Cough, eval for PNA  COMPARISON: 1/9/2024. 11/19/2024.   Impression:     IMPRESSION: Posttreatment changes in the right lung including a right lower lobectomy. Opacities in the right midlung are not significantly changed from 11/19/2024 and may be due to evolving posttreatment pneumonitis or infection. No acute infiltrates on   the left. Normal heart size and pulmonary vascularity. Infusion port tip in the SVC.   CT Abdomen Pelvis w Contrast [[id number]] Collected: 12/08/24 1322   Order Status: Completed Updated: 12/08/24 1358   Narrative:     EXAM: CT ABDOMEN AND PELVIS WITH CONTRAST  LOCATION: [facility]  DATE: 12/08/2024    INDICATION: Upper abdominal pain, chronic nausea, concern for bowel obstruction vs metastatic disease.  COMPARISON: 10/22/2024.  TECHNIQUE: CT scan of the abdomen and pelvis was performed following injection of IV contrast. Multiplanar reformats were obtained.  Dose reduction techniques were used.  CONTRAST: 41 mL Isovue 370.    FINDINGS:  LOWER CHEST: Gastric pull-through changes. Trace residual fluid on the right. Emphysema. No definite acute infiltrates.    HEPATOBILIARY: No significant mass or bile duct dilatation. No calcified gallstones.    PANCREAS: No significant mass, duct dilatation, or inflammatory change.    SPLEEN: Normal size.    ADRENAL GLANDS: No significant nodules.    KIDNEYS/BLADDER: No significant mass, stone, or hydronephrosis.    BOWEL: No obstruction or inflammatory change. Normal appendix. No diverticulitis.    LYMPH NODES: No lymphadenopathy.    VASCULATURE: There are extensive atherosclerotic changes of the visualized aorta and its branches. There is no evidence of aortic dissection or aneurysm.    PELVIC ORGANS: No pelvic masses.    MUSCULOSKELETAL: No frankly destructive bony lesions. Scoliosis.   Impression:     IMPRESSION:  1.  No acute process demonstrated.       Domingo Sebastian MD

## 2024-12-15 NOTE — PROGRESS NOTES
MD Notification    Notified Person: MD    Notified Person Name: Rodriguez Rubin     Notification Date/Time: 0047 12/15/2024    Notification Interaction: Vocpenelope    Purpose of Notification:  Pt Pt is here for weakness, electrolyte abnormalities, and anemia. She had a critical lab value of sodium 118 earlier which orders have already been placed. She now has a temp of 95.9F axillary., 146/60 BP, HR 79, RR14, 96 % on RA. I put warm blankets on her and turned up the thermostat, just wanted to FYI because of her low sodium.    Orders Received:    Comments:

## 2024-12-15 NOTE — PROGRESS NOTES
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  Workup showed dehydration, multiple electrolyte abnormalities, new anemia.    Workup negative for infection.  CT A/P showed process.  Chest x-ray stable from before.  No new infiltrate.  Lower extremity ultrasound negative for DVT.    Acute worsening of chronic nausea  Anorexia  Chronic abdominal pain  Moderate malnutrition due to acute and chronic illness  -Has chronic nausea and abdominal pain for several years.  Reported significant anorexia and poor intake for 3-4 days before admission.  Abdominal pain stable  -No evidence of infection.  Afebrile, no leukocytosis, COVID-19/influenza/RSV negative.  TSH normal  -CT A/P and CXR negative for any new acute findings.  -Improving with supportive care.  Nausea improved.  Now tolerating regular diet   -Continue Ensure, multivitamin  -Nutritional services consulted.  Continue nutrition supplements as per RD  -Received IV fluids on admission, discontinued on 12/9 PM.    Multiple electrolyte abnormalities    Hyponatremia  -Took 40 mg p.o. Lasix for 3 consecutive days prior to admission which contributed to electrolyte abnormalities  -Baseline sodium 134-140.  Sodium 127 on admission, now improved to 130 on 12/11  -labs 12/14 showed decline in Na to 119 along with oliguria and JESSICA  -will start on NS at 100 ml/h and recheck labs in 6  hours  -may need hypertonic saline if fails to improve  -Urine and serum osmolality, urine sodium      Hypokalemia   Hypomagnesemia  Hypophosphatemia  -Admission labs showed potassium 2.7, magnesium 1.6.    -Electrolytes replaced on admission  -Continue to monitor and replace as needed  -Discussed with patient to avoid Lasix in future.  She can use compression stockings or leg elevation for edema    Acute kidney injury with anuria, 12/14/2024  -on 12/14 RN reported no significant urine output since early am. Labs showed JESSICA, Cr 0.45 -->1.03  -start on NS at 100 ml per hour. Monitor renal function and urine output  -admission CT showed no evidence of obstruction  -check UA, check FeNa,     Chronic progressive shortness of breath  COPD  -Progressive shortness of breath is likely multifactorial-due to underlying COPD, lung cancer  -Continue Mucinex tablet, Anoro  -CRP elevated at 206. Started on prednisone 30 mg daily for 5 days on 12/13 for COPD. CRP improved to 129 on 12/14. Cough improved  -continue Tessalon Perles 3 times daily, prednisone 30 mg daily for 5 days      Probable community-acquired pneumonia, organism unspecified  -Patient reported increased cough with productive sputum, afebrile, no leukocytosis, procalcitonin borderline at 0.25, CRP elevated 186 -->203-->206  -COVID-19/influenza/RSV negative  -Chest x-ray on admission showed opacities in the right midlung, not significantly changed from before.  Follow-up chest x-ray 12/11 showed no acute infiltrate  -Due to her symptoms and presentation, concern for pneumonia.  -Started on IV ceftriaxone and azithromycin on 12/11.  IV ceftriaxone discontinued on 12/13 as no infiltrate on CXR. Continue azithromycin to complete course      Generalized weakness  Physical deconditioning  -Has been more weak and deconditioned since recent hospitalization 2-3 weeks ago  -PT/OT consulted-PT recommended home with home care PT.   following for discharge  planning    Acute normocytic anemia  -Hemoglobin was 12.6 on 11/20, now down to 9.4.  Stable since admission.  Patient denied any obvious bleeding.  -[place] [facility] consulted.  Labs do not indicate hemolysis-normal bilirubin, normal LDH, elevated haptoglobin  -Iron studies showed low iron saturation of 8%, low iron binding capacity, normal vitamin B12 at 519, normal TSH, reticulocyte count 1.4  -Received IV iron on 12/9      Multiple bilateral pulmonary nodules due to adenocarcinoma, 2023  -S/p bronchoscopy with bronchial ultrasound-guided biopsy.  Pathology from right upper lobe nodule was positive for small cell lung cancer (adenocarcinoma).  Biopsy from left upper lobe nodule showed atypical cells.  This was suspicious but not definitive for adenocarcinoma.  Lymph nodes were negative.  -S/p chemoradiation (of RUL) completed 2/2024  -Started on consolidation durvalumab immunotherapy 5/2024.  Last dose was on 11/25/2024  -Most recent CT scans 10/2024 showed further increase in RUL and JULIEN nodules and indeterminate right hepatic lobe lesion.  There was plan to start stereotactic body radiotherapy (SBRT) to the JULIEN nodule on 12/9. 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  Recently had relapse of alcohol intake when she found out about worsening lung cancer.  She was assessed by chemical dependency.  She declined psychiatric consultation.  -has not been drinking any alcohol since her hospital discharge      Diabetes mellitus type 2, new diagnosis  -Previously had prediabetes.  A1c on admission 7.2  -Could consider starting on metformin at discharge.  Else can follow-up with PCP for monitoring       Right lower extremity swelling  -Ultrasound negative for DVT.  -Recommend patient use compression stockings and leg elevation for edema.  Avoid Lasix and spironolactone             Diet: Snacks/Supplements Adult: Ensure Clear; With Meals  Snacks/Supplements Adult: Gelatein 20 (sugar-free); With Meals  Regular Diet Adult    DVT Prophylaxis: Pneumatic Compression Devices  Jaeger Catheter: Not present  Lines: PRESENT      Port a Cath 01/09/24 Single Lumen Left Chest wall-Site Assessment: WDL      Cardiac Monitoring: None  Code Status: No CPR- Do NOT Intubate      Clinically Significant Risk Factors         # Hyponatremia: Lowest Na = 118 mmol/L in last 2 days, will monitor as appropriate  # Hypochloremia: Lowest Cl = 83 mmol/L in last 2 days, will monitor as appropriate      # Hypoalbuminemia: Lowest albumin = 2.7 g/dL at 12/9/2024  6:56 AM, will monitor as appropriate    # Acute Kidney Injury, unspecified: based on a >150% or 0.3 mg/dL increase in last creatinine compared to past 90 day average, will monitor renal function  # Hypertension: Noted on problem list    # Chronic heart failure with preserved ejection fraction: heart failure noted on problem list and last echo with EF >50%          # DMII: A1C = 7.2 % (Ref range: <5.7 %) within past 6 months   # Cachexia: Estimated body mass index is 17.31 kg/m  as calculated from the following:    Height as  "of 11/19/24: 1.549 m (5' 1\").    Weight as of this encounter: 41.5 kg (91 lb 9.6 oz).   # Moderate Malnutrition: based on nutrition assessment    # Financial/Environmental Concerns: none  # COPD: noted on problem list        Social Drivers of Health    Tobacco Use: Medium Risk (9/17/2024)    Patient History     Smoking Tobacco Use: Former     Smokeless Tobacco Use: Never   Physical Activity: Insufficiently Active (2/6/2024)    Exercise Vital Sign     Days of Exercise per Week: 5 days     Minutes of Exercise per Session: 10 min   Stress: Stress Concern Present (2/6/2024)    [employer] [place] [employer] - Occupational Stress Questionnaire     Feeling of Stress : Rather much   Social Connections: Unknown (2/6/2024)    Social Connection and Isolation Panel [NHANES]     Frequency of Social Gatherings with Friends and Family: More than three times a week          Disposition Plan         Medically Ready for Discharge: Anticipated in 2-4 Days.  Home with home care             Eloise Fisher MD  Hospitalist Service  [facility]  Securely message with [employer] (more info)  Text page via [employer] Paging/Directory   ______________________________________________________________________    Interval History     Patient reports her nausea and breathing has improved  RN reported no urine output since 3 am  Labs showed new hyponatremia and JESSICA  Afebrile.  Stable vital signs.        Physical Exam   Vital Signs: Temp: 97.1  F (36.2  C) Temp src: Oral BP: 130/53 Pulse: 82   Resp: 16 SpO2: 95 % O2 Device: None (Room air)    Weight: 91 lbs 9.6 oz    Constitutional - awake and alert, in no acute distress  Cardiovascular-regular rate and rhythm  Lungs-breath sounds diminished bilaterally, no wheezing or rhonchi  Integumentary - skin is warm and dry, no rashes or ulcers  Neurological - awake, alert and oriented x3.  Moving all 4 extremities, normal speech, no focal deficits    Medical Decision Making "       52 MINUTES SPENT BY ME on the date of service doing chart review, history, exam, documentation & further activities per the note.      Data     I have personally reviewed the following data over the past 24 hrs:    N/A  \   N/A   / N/A     118 (LL) 83 (L) 29.1 (H) /  263 (H)   3.9 23 1.18 (H) \     Procal: N/A CRP: 129.63 (H) Lactic Acid: N/A         Imaging results reviewed over the past 24 hrs:   No results found for this or any previous visit (from the past 24 hours).

## 2024-12-16 LAB
AMMONIA PLAS-SCNC: 24 UMOL/L (ref 11–51)
ANION GAP SERPL CALCULATED.3IONS-SCNC: 14 MMOL/L (ref 7–15)
ATRIAL RATE - MUSE: 86 BPM
BASE EXCESS BLDV CALC-SCNC: -6.1 MMOL/L (ref -3–3)
BUN SERPL-MCNC: 35.3 MG/DL (ref 8–23)
CALCIUM SERPL-MCNC: 8 MG/DL (ref 8.8–10.4)
CHLORIDE SERPL-SCNC: 99 MMOL/L (ref 98–107)
CK SERPL-CCNC: 201 U/L (ref 26–192)
CREAT SERPL-MCNC: 1 MG/DL (ref 0.51–0.95)
CRP SERPL-MCNC: 67.81 MG/L
DIASTOLIC BLOOD PRESSURE - MUSE: NORMAL MMHG
EGFRCR SERPLBLD CKD-EPI 2021: 60 ML/MIN/1.73M2
ERYTHROCYTE [DISTWIDTH] IN BLOOD BY AUTOMATED COUNT: 14.5 % (ref 10–15)
GLUCOSE BLDC GLUCOMTR-MCNC: 181 MG/DL (ref 70–99)
GLUCOSE SERPL-MCNC: 146 MG/DL (ref 70–99)
HCO3 BLDV-SCNC: 20 MMOL/L (ref 21–28)
HCO3 SERPL-SCNC: 18 MMOL/L (ref 22–29)
HCT VFR BLD AUTO: 28 % (ref 35–47)
HGB BLD-MCNC: 9.6 G/DL (ref 11.7–15.7)
INTERPRETATION ECG - MUSE: NORMAL
LACTATE SERPL-SCNC: 0.8 MMOL/L (ref 0.7–2)
LVEF ECHO: NORMAL
MAGNESIUM SERPL-MCNC: 2 MG/DL (ref 1.7–2.3)
MCH RBC QN AUTO: 30.5 PG (ref 26.5–33)
MCHC RBC AUTO-ENTMCNC: 34.3 G/DL (ref 31.5–36.5)
MCV RBC AUTO: 89 FL (ref 78–100)
O2/TOTAL GAS SETTING VFR VENT: 4 %
OXYHGB MFR BLDV: 64 % (ref 70–75)
P AXIS - MUSE: 61 DEGREES
PCO2 BLDV: 41 MM HG (ref 40–50)
PH BLDV: 7.3 [PH] (ref 7.32–7.43)
PHOSPHATE SERPL-MCNC: 4.6 MG/DL (ref 2.5–4.5)
PLATELET # BLD AUTO: 465 10E3/UL (ref 150–450)
PO2 BLDV: 40 MM HG (ref 25–47)
POTASSIUM SERPL-SCNC: 3.8 MMOL/L (ref 3.4–5.3)
PR INTERVAL - MUSE: 134 MS
QRS DURATION - MUSE: 82 MS
QT - MUSE: 364 MS
QTC - MUSE: 435 MS
R AXIS - MUSE: 85 DEGREES
RBC # BLD AUTO: 3.15 10E6/UL (ref 3.8–5.2)
SAO2 % BLDV: 65.4 % (ref 70–75)
SODIUM SERPL-SCNC: 131 MMOL/L (ref 135–145)
SODIUM SERPL-SCNC: 131 MMOL/L (ref 135–145)
SYSTOLIC BLOOD PRESSURE - MUSE: NORMAL MMHG
T AXIS - MUSE: 30 DEGREES
VENTRICULAR RATE- MUSE: 86 BPM
WBC # BLD AUTO: 27.5 10E3/UL (ref 4–11)

## 2024-12-16 PROCEDURE — 99207 PR APP CREDIT; MD BILLING SHARED VISIT: CPT | Performed by: NURSE PRACTITIONER

## 2024-12-16 PROCEDURE — 97116 GAIT TRAINING THERAPY: CPT | Mod: GP | Performed by: PHYSICAL THERAPIST

## 2024-12-16 PROCEDURE — 255N000002 HC RX 255 OP 636: Performed by: NURSE PRACTITIONER

## 2024-12-16 PROCEDURE — 84100 ASSAY OF PHOSPHORUS: CPT | Performed by: INTERNAL MEDICINE

## 2024-12-16 PROCEDURE — 258N000003 HC RX IP 258 OP 636: Performed by: INTERNAL MEDICINE

## 2024-12-16 PROCEDURE — 82550 ASSAY OF CK (CPK): CPT | Performed by: NURSE PRACTITIONER

## 2024-12-16 PROCEDURE — 99232 SBSQ HOSP IP/OBS MODERATE 35: CPT | Performed by: INTERNAL MEDICINE

## 2024-12-16 PROCEDURE — 250N000013 HC RX MED GY IP 250 OP 250 PS 637: Performed by: PHYSICIAN ASSISTANT

## 2024-12-16 PROCEDURE — 250N000013 HC RX MED GY IP 250 OP 250 PS 637: Performed by: INTERNAL MEDICINE

## 2024-12-16 PROCEDURE — 80048 BASIC METABOLIC PNL TOTAL CA: CPT | Performed by: INTERNAL MEDICINE

## 2024-12-16 PROCEDURE — 250N000013 HC RX MED GY IP 250 OP 250 PS 637: Performed by: HOSPITALIST

## 2024-12-16 PROCEDURE — 84295 ASSAY OF SERUM SODIUM: CPT | Performed by: INTERNAL MEDICINE

## 2024-12-16 PROCEDURE — 99233 SBSQ HOSP IP/OBS HIGH 50: CPT | Performed by: INTERNAL MEDICINE

## 2024-12-16 PROCEDURE — 120N000001 HC R&B MED SURG/OB

## 2024-12-16 PROCEDURE — 83605 ASSAY OF LACTIC ACID: CPT | Performed by: NURSE PRACTITIONER

## 2024-12-16 PROCEDURE — 250N000013 HC RX MED GY IP 250 OP 250 PS 637: Performed by: NURSE PRACTITIONER

## 2024-12-16 PROCEDURE — 36415 COLL VENOUS BLD VENIPUNCTURE: CPT | Performed by: NURSE PRACTITIONER

## 2024-12-16 PROCEDURE — 250N000011 HC RX IP 250 OP 636: Performed by: INTERNAL MEDICINE

## 2024-12-16 PROCEDURE — 86140 C-REACTIVE PROTEIN: CPT | Performed by: INTERNAL MEDICINE

## 2024-12-16 PROCEDURE — 85018 HEMOGLOBIN: CPT | Performed by: INTERNAL MEDICINE

## 2024-12-16 PROCEDURE — 36415 COLL VENOUS BLD VENIPUNCTURE: CPT | Performed by: INTERNAL MEDICINE

## 2024-12-16 PROCEDURE — 83735 ASSAY OF MAGNESIUM: CPT | Performed by: INTERNAL MEDICINE

## 2024-12-16 PROCEDURE — 97530 THERAPEUTIC ACTIVITIES: CPT | Mod: GP | Performed by: PHYSICAL THERAPIST

## 2024-12-16 PROCEDURE — 82805 BLOOD GASES W/O2 SATURATION: CPT | Performed by: NURSE PRACTITIONER

## 2024-12-16 PROCEDURE — 99418 PROLNG IP/OBS E/M EA 15 MIN: CPT | Performed by: INTERNAL MEDICINE

## 2024-12-16 PROCEDURE — 92526 ORAL FUNCTION THERAPY: CPT | Mod: GN | Performed by: SPEECH-LANGUAGE PATHOLOGIST

## 2024-12-16 PROCEDURE — 82140 ASSAY OF AMMONIA: CPT | Performed by: NURSE PRACTITIONER

## 2024-12-16 PROCEDURE — 250N000012 HC RX MED GY IP 250 OP 636 PS 637: Performed by: INTERNAL MEDICINE

## 2024-12-16 PROCEDURE — 92610 EVALUATE SWALLOWING FUNCTION: CPT | Mod: GN | Performed by: SPEECH-LANGUAGE PATHOLOGIST

## 2024-12-16 RX ORDER — LORAZEPAM 0.5 MG/1
0.25 TABLET ORAL
Status: COMPLETED | OUTPATIENT
Start: 2024-12-16 | End: 2024-12-16

## 2024-12-16 RX ORDER — METOPROLOL SUCCINATE 25 MG/1
25 TABLET, EXTENDED RELEASE ORAL EVERY MORNING
Status: DISCONTINUED | OUTPATIENT
Start: 2024-12-16 | End: 2024-12-18

## 2024-12-16 RX ORDER — CEFTRIAXONE 1 G/1
1 INJECTION, POWDER, FOR SOLUTION INTRAMUSCULAR; INTRAVENOUS EVERY 24 HOURS
Status: DISCONTINUED | OUTPATIENT
Start: 2024-12-16 | End: 2024-12-17

## 2024-12-16 RX ORDER — METHYLPREDNISOLONE SODIUM SUCCINATE 125 MG/2ML
125 INJECTION INTRAMUSCULAR; INTRAVENOUS ONCE
Status: DISCONTINUED | OUTPATIENT
Start: 2024-12-16 | End: 2024-12-16

## 2024-12-16 RX ADMIN — GABAPENTIN 600 MG: 300 CAPSULE ORAL at 08:15

## 2024-12-16 RX ADMIN — GUAIFENESIN 600 MG: 600 TABLET, EXTENDED RELEASE ORAL at 08:15

## 2024-12-16 RX ADMIN — BENZONATATE 200 MG: 100 CAPSULE ORAL at 08:15

## 2024-12-16 RX ADMIN — PREDNISONE 30 MG: 20 TABLET ORAL at 08:15

## 2024-12-16 RX ADMIN — GUAIFENESIN 600 MG: 600 TABLET, EXTENDED RELEASE ORAL at 20:44

## 2024-12-16 RX ADMIN — PERFLUTREN 10 ML: 6.52 INJECTION, SUSPENSION INTRAVENOUS at 10:05

## 2024-12-16 RX ADMIN — METOPROLOL SUCCINATE 25 MG: 25 TABLET, EXTENDED RELEASE ORAL at 11:34

## 2024-12-16 RX ADMIN — SENNOSIDES AND DOCUSATE SODIUM 1 TABLET: 8.6; 5 TABLET ORAL at 08:23

## 2024-12-16 RX ADMIN — CEFTRIAXONE SODIUM 1 G: 1 INJECTION, POWDER, FOR SOLUTION INTRAMUSCULAR; INTRAVENOUS at 11:24

## 2024-12-16 RX ADMIN — SODIUM CHLORIDE: 9 INJECTION, SOLUTION INTRAVENOUS at 08:40

## 2024-12-16 RX ADMIN — ATORVASTATIN CALCIUM 40 MG: 40 TABLET, FILM COATED ORAL at 08:15

## 2024-12-16 RX ADMIN — BENZONATATE 200 MG: 100 CAPSULE ORAL at 22:21

## 2024-12-16 RX ADMIN — GABAPENTIN 600 MG: 300 CAPSULE ORAL at 16:34

## 2024-12-16 RX ADMIN — IPRATROPIUM BROMIDE 2 SPRAY: 42 SPRAY NASAL at 16:38

## 2024-12-16 RX ADMIN — IPRATROPIUM BROMIDE 2 SPRAY: 42 SPRAY NASAL at 11:25

## 2024-12-16 RX ADMIN — FLUTICASONE PROPIONATE 2 SPRAY: 50 SPRAY, METERED NASAL at 08:16

## 2024-12-16 RX ADMIN — IPRATROPIUM BROMIDE 2 SPRAY: 42 SPRAY NASAL at 08:16

## 2024-12-16 RX ADMIN — UMECLIDINIUM BROMIDE AND VILANTEROL TRIFENATATE 1 PUFF: 62.5; 25 POWDER RESPIRATORY (INHALATION) at 08:17

## 2024-12-16 RX ADMIN — LORAZEPAM 0.25 MG: 0.5 TABLET ORAL at 23:21

## 2024-12-16 RX ADMIN — BENZONATATE 200 MG: 100 CAPSULE ORAL at 16:34

## 2024-12-16 RX ADMIN — IPRATROPIUM BROMIDE 2 SPRAY: 42 SPRAY NASAL at 20:44

## 2024-12-16 RX ADMIN — GABAPENTIN 600 MG: 300 CAPSULE ORAL at 22:20

## 2024-12-16 RX ADMIN — PANTOPRAZOLE SODIUM 40 MG: 40 TABLET, DELAYED RELEASE ORAL at 08:15

## 2024-12-16 RX ADMIN — Medication 1 TABLET: at 08:15

## 2024-12-16 RX ADMIN — ASPIRIN 81 MG: 81 TABLET, COATED ORAL at 08:15

## 2024-12-16 RX ADMIN — SENNOSIDES AND DOCUSATE SODIUM 1 TABLET: 8.6; 5 TABLET ORAL at 22:19

## 2024-12-16 ASSESSMENT — ACTIVITIES OF DAILY LIVING (ADL)
ADLS_ACUITY_SCORE: 65
ADLS_ACUITY_SCORE: 67
ADLS_ACUITY_SCORE: 67
ADLS_ACUITY_SCORE: 65
ADLS_ACUITY_SCORE: 65
ADLS_ACUITY_SCORE: 66
ADLS_ACUITY_SCORE: 67
ADLS_ACUITY_SCORE: 65
ADLS_ACUITY_SCORE: 65
ADLS_ACUITY_SCORE: 69
ADLS_ACUITY_SCORE: 66
ADLS_ACUITY_SCORE: 66
ADLS_ACUITY_SCORE: 65
ADLS_ACUITY_SCORE: 67
ADLS_ACUITY_SCORE: 67
ADLS_ACUITY_SCORE: 66
ADLS_ACUITY_SCORE: 65
ADLS_ACUITY_SCORE: 66
ADLS_ACUITY_SCORE: 65
ADLS_ACUITY_SCORE: 66

## 2024-12-16 NOTE — PROGRESS NOTES
[facility]    Hematology / Oncology     Date of Admission:  12/8/2024    Assessment & Plan     1.  Multiple lung nodules both lungs, at least 4 of them described to have increased in size or positive on PET scan, right upper lobe nodule biopsy positive for adenocarcinoma, NGS negative, PD-L1 TPS score 0%, left upper lobe nodule biopsy showing atypical cells but suspicious for adenocarcinoma.  Started on chemoradiotherapy with weekly carboplatin and Taxol on January 18, 2024, Taxol dose reduced based on pre-existing neuropathy, the dose was reduced slightly again due to borderline neutropenia with her fourth cycle.  Completed February 28, 2024 and achieved stable disease on the CT scan from April 2024.  Started on consolidation durvalumab immunotherapy on May 28, 2024.  CT scan in July shows minimal increase in micronodular densities but overall stable, CT in October showed further increase in the right upper lobe nodule and indeterminate liver lesion in the right hepatic lobe.  Left upper lung lesion consistent with carcinoma planning to start radiation this week 5 session over 2 weeks    2.  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    3.  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    4.  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  I would like to rule out anemia associated with auto hemolytic process for immunotherapy    5.  Electrolyte abnormalities hyponatremia hypokalemia and hypomagnesemia with nausea and weakness.  Management included internal medicine team.    6.  High CRP and cough with expectoration, probable pneumonia started on antibiotic.     Plan:    Anemia is not associated with autoimmune checkpoint inhibitor related anemia with  negative RAFAEL normal LDH and haptoglobin not being low.    Hold immunotherapy for the next few weeks until significant improvement in her overall condition.      Hyponatremia can be associated with checkpoint inhibitors, she is seen currently by nephrology.  If overall health does not improve, we will reconsider the role of immunotherapy in her treatment plan, her disease burden is not highly and holding treatment for a while as an option especially if she is going to have stereotactic radiation to the left upper lobe lesion.    Radiation was notified to decide on starting localized radiation to the left upper lung lesion.  Discussed with Dr. Cowan on December 10, if she has improvement in her condition may start while she is in the hospital.  Otherwise she can start radiation in the next week or so as an outpatient.     I will arrange follow-up in our office in 3 weeks, depending on her health and performance status will discuss resuming versus keep immunotherapy on hold.        Jean Garcia MD    Code Status    No CPR- Do NOT Intubate    Reason for Consult   Reason for consult: Lung cancer, anemia    Primary Care Physician   Mustapha Velásquez    Chief Complaint   Nausea and weakness    Medical records reviewed, history obtained and patient examined    History of Present Illness   Kezia Dixon is a 69 year old female who presents with non-small cell lung cancer as detailed below presented to hospital with nausea and weakness.  She denies bleeding no melena hematochezia.  She was supposed to start radiation to lung lesion today for 5 sessions over 2 weeks and to continue immunotherapy scheduled on December 10.  In the hospital she was found to have electrolyte abnormality and hemoglobin 9.5.  Iron levels consistent with chronic illness and the ferritin was 196 argues against iron deficiency.  B12 normal 590 and folic acid normal 16.7.  She has been on durvalumab maintenance therapy.  Last hemoglobin  office was 10.4 on November 26     PET/CT was obtained on October 22 which showed enlargement of peripherally FDG avid cavitary lesion in the posterior right lung inflammatory versus infectious cannot rule out recurrent neoplasm.  Suspicion for left upper lobe primary lung carcinoma.  I discussed PET scan imaging with Dr. Cowan, the left upper lung lesion appears to be new neoplasm and is easily amenable for radiation SRS therapy.  The right cavitary lesion is an area which overlaps with prior radiation to the lung and esophagus cancer and did not recommend radiation to that lesion.  One of the other possibility is posttreatment changes inflammatory versus infectious.    I reviewed the PET scan images with Dr. Riojas, the changes on the right lung by the cavitary lesions are indeterminate and not easy to biopsy due to the nature of the distorted area and the central location, she is not a candidate for wedge or segment resection on the right, if surgery is indicated she will need pneumonectomy which will not be tolerated by her.  The left upper lesion is consistent with malignancy.  His recommendation is to radiate the left upper lesion SRS and I talked with Dr. Cowan who agrees on that strategy.  In regard to the right lung changes contrast CT chest in 3 months from the PET/CT will be reasonable.    Interval history December 10, 2024.  She feels better except has increase in the cough which started several days ago.  She has great to greenish sputum.  She was diagnosed with COPD by pulmonary about a year ago and she uses inhalers regularly.  Magnesium is normal and she has low potassium 3.3 and sodium 125    December 11, 2024:  Cough improved on Tessalon and Guaifenesin.  She feels better overall but continues to feel weak.  Potassium 4.4 and last sodium 129 and magnesium 1.8.    December 12, 2024:  She was started on antibiotic IV ceftriaxone and azithromycin for probable community-acquired pneumonia, CRP was  high.  Potassium 3.9 magnesium 1.7 phosphorus 3.1.  Hemoglobin 9.3 stable white count and platelets normal.      December 16, 2024:  She feels weaker this week, she was seen by nephrology for JESSICA and hyponatremia.  Her cough improved after treated with antibiotics for pneumonia.  Dietitian service working with the patient for nutrition.  Code stroke was called on December 15 and CT head was obtained showing:  HEAD CT:  1.  No CT evidence for acute intracranial process.  2.  Brain atrophy and presumed chronic microvascular ischemic changes as above.     HEAD CTA:   1.  No significant stenosis, aneurysm, or high flow vascular malformation identified.  2.  Variant [place] anatomy as above.     NECK CTA:  1.  No significant stenosis of the bilateral internal carotid arteries.  2.  Interval development of groundglass density opacity in the posterior aspect of the left upper lung lobe, compatible with pneumonia in appropriate clinical setting.     Noncontrast CT imaging findings were discussed with Dr. Bautista of the clinical service at 9:02 AM. CTA imaging findings were discussed with Sarah Bautista of the clinical service at 9:12 AM on 12/15/2024.      Oncological history:  Office note from October 15, 2024  Kezia is a 68-year-old woman with history of esophageal cancer and squamous cell lung cancer as detailed below:    1.  Squamous cell carcinoma of the esophagus status post induction chemotherapy (carboplatin/paclitaxel) and radiation therapy, followed by esophagectomy in 2015.    2.  Squamous cell carcinoma of the lung status post thoracotomy and right lower lobectomy in October 2018 for 7 mm grade 2 squamous cell carcinoma of the right lower lobe.    She has been followed by thoracic oncology with scans and had lung nodules.  CT scan CAP November 6, 2023 shows a large irregular cavitary nodule 2.3 x 1.5 cm posterior right upper lobe previously 1.9 x 0.9 cm.  Several adjacent right upper lobe nodules again  noted.  1 of these is 1.1 x 0.8 cm increased from previously 0.9 x 0.7 cm.  There are other stable adjacent right upper lobe nodules.  Left upper lobe irregular nodule 1.3 x 0.9 cm compared to 0.9 x 0.6 cm previously.  Adjacent nodularity also noted in the region.  There are other stable nodules.  Stable small right pleural fluid and right pleural thickening.  The CT chest was compared to April 10, 2023.    PET/CT November 10, 2023 shows irregular cavitary nodule in the right upper lobe 2.2 x 1.6 cm demonstrate hypermetabolic activity along its medial and inferior aspects which extends to the minor fissure SUV max 9.6.  Additional hypermetabolic 1.3 x 0.7 cm right upper lobe nodule SUV max 10.7 which is superior to the described nodule.  FDG avid 0.6 x 0.5 cm nodule in the medial left upper lobe with SUV max of 2.2.  Minimal FDG uptake associated with irregular nodule in the left lateral upper lobe 1.4 x 1.4 cm SUV max of 0.9.    Underwent EBUS December 6, 2023 with biopsies:  Right upper lobe nodule EBUS non-small cell carcinoma favor adenocarcinoma.  Left upper lobe lung EBUS atypical cells suspicious for adenocarcinoma.  Lymph nodes stations 4L, 4R, 11 R interlobar negative for malignant cells.  PD-L1 tumor proportion score 0%, NGS lung panel negative    Case was discussed in thoracic oncology tumor conference, MRI of the brain was suggested and was negative for metastatic disease, chemoradiation was recommended for her right upper lung lesion and watchful waiting for the left lung lesion based on atypical cells on biopsy.  Patient has mild sensory neuropathy without painful component or interference with daily function and weekly carboplatin and Taxol was chosen along with radiation, the initial dose of Taxol was reduced based on the neuropathy baseline.  Next generation sequencing panel was negative.  PDL TPS score was 0%    Our team has discussed the approach regarding her left lung lesion and the decision was  to hold off treatment to avoid extended radiation exposure between both lungs.  The left lung lesion will be monitored with subsequent scan, there is a possibility that the lesion may shrink with the treatment of chemotherapy and surgical resection as an option for wedge or limited resection otherwise stereotactic radiation can be considered, additional tissue diagnosis may be needed if surgery is not decided.    She was started on radiation along with weekly carboplatin and Taxol reduced dose on January 18, 2024.  Completed chemotherapy February 29, 2024.    CT scan of chest April 1, 2024 right upper lobe nodules have decreased in size slightly.  The largest cavitary nodule in the right upper lobe connects to a small right upper lobe bronchus.  Left upper lobe nodule is slightly increased currently 1.3 cm compared to 1.1 cm previously.    Based on the lack of progression she was started on durvalumab immunotherapy consolidation on May 28, 2024.    CT July 18, 2024 shows new micronodule opacities at the left upper lobe extending to the lingula could be infection or inflammation.  Cavitary lesion in the right upper lobe is stable in size.  Additional satellite nodules are stable in size.  Stable prominent indeterminate left upper lobe nodule.  Interval decrease in consolidation on an interstitial thickening surrounding the cavitary lesion.  Few nodules at the left upper lobe are slightly more prominent.  Stable small right pulmonary and fluid.  New identified but small inferior mediastinal lymph nodes are technically indeterminate.    CT chest October 10, 2024 shows increased size of the right upper lobe cavitary lesion with surrounding consolidative opacities may be due to evolving postradiation changes.  The size is 3.2 x 2.1 cm compared to 2.4 x 1.7 cm.  Few satellite nodules stable.  Left upper lobe irregular nodule 14 x 10 mm stable since July.  But gradually increased in size compared to December concerning for  neoplasm.  Indeterminate small enhancing focus in the posterior right hepatic lobe 1.7 x 1.0 cm and benign perfusional abnormality.  Interval History  She is here today for follow-up and to continue with durvalumab.  She had a CT scan last week and here to review the results.  She has been anxious about the results she read on the Internet and her blood pressure today is higher.  She has high blood pressure and has been working with her cardiologist and the plan is to regroup in 3 weeks with her cardiologist to review her readings.  She does take her blood pressure medications regularly.  No increasing shortness of breath or cough.  She requests a refill on Compazine which she uses for intermittent nausea associated with treatment.  CBC from today is normal and her chemistry panel and thyroid test have been normal.  CT was reviewed showing some increase in her right upper lung nodule as detailed above          Past Medical History   I have reviewed this patient's medical history and updated it with pertinent information if needed.   Past Medical History:   Diagnosis Date    Alcohol use disorder, severe, dependence (H) 10/14/2016    Alcoholism (H) 10/14/2016    Anemia     Benign essential hypertension 10/14/2016    Carotid artery disease (H)     mile plaque 5/7    Chemical dependency (H)     alcohol    COPD (chronic obstructive pulmonary disease) (H)     Coronary artery disease     Depression with anxiety     Esophageal cancer (H) 03/22/2016    SQUAMOUS CELL    Esophageal mass     GERD (gastroesophageal reflux disease)     Hx of pancreatitis 2003    Hypercholesteremia     Hyperglycemia     Hyperlipemia     Impaired fasting glucose 10/14/2016    Nocturnal leg cramps     Other chronic pain     Pancreatic pseudocyst 10/14/2016    Pancreatitis     with pseudocyst    Pap smear with atypical squamous cells, cannot exclude high grade squamous intraepithelial lesion (ASC-H) 10/14/2016    Colpo impression benign, ECC benign.  Cotestin in 1 yr.    Shingles     Squamous cell carcinoma of right lung (H)     Vasomotor rhinitis        Past Surgical History   I have reviewed this patient's surgical history and updated it with pertinent information if needed.  Past Surgical History:   Procedure Laterality Date    AAA REPAIR      splenic artery aneurysm embolization    ABDOMEN SURGERY  2/2/2016    COLONOSCOPY  11/26/2013    Procedure: COMBINED COLONOSCOPY, SINGLE BIOPSY/POLYPECTOMY BY BIOPSY;  COLONOSCOPY (MAC);  Surgeon: Montana Rosa MD;  Location:  GI    COLONOSCOPY  11/26/2013    COLONOSCOPY N/A 10/4/2018    Procedure: COMBINED COLONOSCOPY, SINGLE OR MULTIPLE BIOPSY/POLYPECTOMY BY BIOPSY;  colonoscopy;  Surgeon: Gio Apodaca MD;  Location:  GI    ENT SURGERY      ESOPHAGOGASTRECTOMY N/A 3/22/2016    Procedure: ESOPHAGOGASTRECTOMY;  Surgeon: Alvin Riojas MD;  Location:  OR    ESOPHAGOSCOPY, GASTROSCOPY, DUODENOSCOPY (EGD), COMBINED N/A 12/22/2015    Procedure: COMBINED ENDOSCOPIC ULTRASOUND, ESOPHAGOSCOPY, GASTROSCOPY, DUODENOSCOPY (EGD), FINE NEEDLE ASPIRATE/BIOPSY;  Surgeon: Danelle Michael MD;  Location:  GI    GASTROSTOMY, INSERT TUBE, COMBINED N/A 2/2/2016    Procedure: COMBINED GASTROSTOMY, INSERT TUBE (OPEN);  Surgeon: Alvin Riojas MD;  Location:  OR    GI SURGERY  12/22/2015    HAND SURGERY      right    HERNIORRHAPHY INCISIONAL (LOCATION) N/A 3/19/2019    Procedure: LAPARSCOPIC  INCISIONAL HERNIA REPAIR WITH MESH, LAPARSCOPIC LYSIS OF ADHENSIONS;  Surgeon: Lamberto Magaña MD;  Location:  OR    INSERT PORT VASCULAR ACCESS N/A 12/28/2015    Procedure: INSERT PORT VASCULAR ACCESS;  Surgeon: Alvin Riojas MD;  Location:  OR    INSERT PORT VASCULAR ACCESS N/A 1/9/2024    Procedure: SMART PORT PLACEMENT;  Surgeon: Alvin Riojas MD;  Location:  OR    LAPAROSCOPIC CHOLECYSTECTOMY N/A 3/19/2019    Procedure: LAPAROSCOPIC CHOLECYSTECTOMY;   Surgeon: Lamberto Magaña MD;  Location: [facility] OR    LOBECTOMY LUNG Right 10/16/2018    Procedure: LOBECTOMY LUNG;  Surgeon: Alvin Riojas MD;  Location: [facility] OR    REMOVE PORT VASCULAR ACCESS Left 2016    Procedure: REMOVE PORT VASCULAR ACCESS;  Surgeon: Alvin Riojas MD;  Location: [facility] OR    THORACOTOMY Right 10/16/2018    Procedure: REDO RIGHT THORACTOMY AND RIGHT LOWER LOBECTOMY, PLEURAL LYSIS;  Surgeon: Alvin Riojas MD;  Location: [facility] OR    TONSILLECTOMY      VASCULAR SURGERY         Prior to Admission Medications   Prior to Admission Medications   Prescriptions Last Dose Informant Patient Reported? Taking?   ANORO ELLIPTA 62.5-25 MCG/ACT oral inhaler 2024 Morning  Yes Yes   Sig: Inhale 1 puff into the lungs daily.   albuterol (PROAIR HFA/PROVENTIL HFA/VENTOLIN HFA) 108 (90 Base) MCG/ACT inhaler Past Month  Yes Yes   Sig: Inhale 2 puffs into the lungs every 6 hours as needed for shortness of breath, wheezing or cough.   aspirin (ASA) 81 MG EC tablet 2024 Morning  Yes Yes   Si tablet every other day   atorvastatin (LIPITOR) 40 MG tablet 2024 Morning  No Yes   Sig: Take 1 tablet (40 mg) by mouth daily.   durvalumab 2024  Yes Yes   Sig: Inject into the vein every 14 days.   fluticasone (FLONASE) 50 MCG/ACT nasal spray 2024 Morning Self No Yes   Sig: INSTILL 2 SPRAYS INTO BOTH NOSTRILS DAILY.   furosemide (LASIX) 40 MG tablet 2024  No Yes   Sig: Take 0.5 tablets (20 mg) by mouth daily as needed (edema or shortness of breath) For worsening shortness of breath, leg swelling or weight gain.   Patient taking differently: Take 20-40 mg by mouth daily as needed (edema or shortness of breath). For worsening shortness of breath, leg swelling or weight gain.   gabapentin (NEURONTIN) 600 MG tablet 2024 Morning  No Yes   Sig: TAKE ONE (1) TABLET BY MOUTH THREE TIMES DAILY.   ipratropium (ATROVENT) 0.06 % nasal spray 2024 Morning  No Yes   Sig:  Spray 2 sprays into both nostrils 4 times daily.   losartan (COZAAR) 50 MG tablet 12/8/2024 Morning  No Yes   Sig: Take 1 tablet (50 mg) by mouth daily. Appointment required for further refills   metoprolol succinate ER (TOPROL XL) 50 MG 24 hr tablet 12/8/2024 Morning  No Yes   Sig: Take 1 tablet (50 mg) by mouth every morning.   multivitamin w/minerals (MULTI-VITAMIN) tablet 12/8/2024 Morning  Yes Yes   Sig: Take 1 tablet by mouth daily.   omeprazole (PRILOSEC) 40 MG DR capsule 12/8/2024 Morning  No Yes   Sig: TAKE 1 CAPSULE BY MOUTH EVERY DAY   ondansetron (ZOFRAN) 4 MG tablet Past Week  Yes Yes   Sig: Take 8 mg by mouth every 8 hours as needed for nausea.   prochlorperazine (COMPAZINE) 10 MG tablet Past Week  Yes Yes   Sig: Take 10 mg by mouth every 6 hours as needed for nausea or vomiting.   spironolactone (ALDACTONE) 25 MG tablet 12/8/2024 Morning  No Yes   Sig: Take 0.5 tablets (12.5 mg) by mouth every morning.      Facility-Administered Medications: None     Allergies   Allergies   Allergen Reactions    Codeine Sulfate Nausea    Simvastatin Cramps     Leg cramps    Morphine      Pt unsure - pt does not believe this is an allergy of hers    Pcn [Penicillins] Rash       Social History   I have reviewed this patient's social history and updated it with pertinent information if needed. Kezia Dixon  reports that she quit smoking about 9 years ago. Her smoking use included cigarettes. She started smoking about 54 years ago. She has a 11.4 pack-year smoking history. She has never used smokeless tobacco. She reports that she does not currently use alcohol. She reports that she does not use drugs.    Family History   I have reviewed this patient's family history and updated it with pertinent information if needed.   Family History   Problem Relation Age of Onset    Depression Mother     Substance Abuse Father     Other Cancer Father         melanoma    Prostate Cancer Father     Diabetes Father     Substance  Abuse Paternal Grandfather     Other Cancer Brother         melanoma    Substance Abuse Brother     Other Cancer Brother         melanoma    Other Cancer Brother         melanoma    Other Cancer Brother         melanoma       Review of Systems   The 10 point Review of Systems is negative other than noted in the HPI     Physical Exam   Temp: 97.2  F (36.2  C) Temp src: Axillary BP: (!) 155/69 Pulse: 103   Resp: 16 SpO2: 96 % O2 Device: Nasal cannula Oxygen Delivery: 2 LPM  Vital Signs with Ranges  Temp:  [96.5  F (35.8  C)-97.2  F (36.2  C)] 97.2  F (36.2  C)  Pulse:  [] 103  Resp:  [12-16] 16  BP: (151-175)/(61-84) 155/69  SpO2:  [90 %-96 %] 96 %  91 lbs 9.6 oz    No acute distress.    HEENT: Normocephalic atraumatic sclera anicteric  Neck: Supple no JVD lymphadenopathy.  Lungs: Less cough today.  Extremities: Legs edema more on the left.  Neurological: Nonfocal, generalized weakness.  Skin: Limited exam no petechia or ecchymosis.    Data   Results for orders placed or performed during the hospital encounter of 12/08/24 (from the past 24 hours)   EKG 12-lead, tracing only   Result Value Ref Range    Systolic Blood Pressure  mmHg    Diastolic Blood Pressure  mmHg    Ventricular Rate 86 BPM    Atrial Rate 86 BPM    MN Interval 134 ms    QRS Duration 82 ms     ms    QTc 435 ms    P Axis 61 degrees    R AXIS 85 degrees    T Axis 30 degrees    Interpretation ECG       Sinus rhythm  Normal ECG  When compared with ECG of 08-Dec-2024 11:45,  QRS duration has decreased     Sodium   Result Value Ref Range    Sodium 124 (L) 135 - 145 mmol/L   Sodium   Result Value Ref Range    Sodium 124 (L) 135 - 145 mmol/L   Magnesium   Result Value Ref Range    Magnesium 2.0 1.7 - 2.3 mg/dL   Phosphorus   Result Value Ref Range    Phosphorus 4.6 (H) 2.5 - 4.5 mg/dL   CBC with platelets   Result Value Ref Range    WBC Count 27.5 (H) 4.0 - 11.0 10e3/uL    RBC Count 3.15 (L) 3.80 - 5.20 10e6/uL    Hemoglobin 9.6 (L) 11.7 - 15.7  g/dL    Hematocrit 28.0 (L) 35.0 - 47.0 %    MCV 89 78 - 100 fL    MCH 30.5 26.5 - 33.0 pg    MCHC 34.3 31.5 - 36.5 g/dL    RDW 14.5 10.0 - 15.0 %    Platelet Count 465 (H) 150 - 450 10e3/uL   Basic metabolic panel   Result Value Ref Range    Sodium 131 (L) 135 - 145 mmol/L    Potassium 3.8 3.4 - 5.3 mmol/L    Chloride 99 98 - 107 mmol/L    Carbon Dioxide (CO2) 18 (L) 22 - 29 mmol/L    Anion Gap 14 7 - 15 mmol/L    Urea Nitrogen 35.3 (H) 8.0 - 23.0 mg/dL    Creatinine 1.00 (H) 0.51 - 0.95 mg/dL    GFR Estimate 60 (L) >60 mL/min/1.73m2    Calcium 8.0 (L) 8.8 - 10.4 mg/dL    Glucose 146 (H) 70 - 99 mg/dL   CRP inflammation   Result Value Ref Range    CRP Inflammation 67.81 (H) <5.00 mg/L   Echocardiogram Complete   Result Value Ref Range    LVEF  >70%     Narrative    [id number]  [id number]  [id number]  [id number]^ANN^CRISTIAN     [facility]  Echocardiography Laboratory  [address] [place] [address]     Name: MATTEO CASTAÑEDA  MRN: [id number]  : 1955  Study Date: 2024 09:27 AM  Age: 69 yrs  Gender: Female  Patient Location: [facility]  Reason For Study: Endocarditis  Ordering Physician: CRISTIAN JUAREZ  Referring Physician: Mustapha Velásquez MD  Performed By: Faye Cain     BSA: 1.4 m2  Height: 61 in  Weight: 91 lb  HR: 98  BP: 157/64 mmHg  ______________________________________________________________________________  Procedure  Complete Echo Adult. Definity (NDC #78076-730) given intravenously.  ______________________________________________________________________________  Interpretation Summary     The left ventricle is normal in size.  Hyperdynamic left ventricular function  The visual ejection fraction is >70%.  Normal left ventricular wall motion  There is mild (1+) aortic regurgitation.  Technically difficult, suboptimal study. Valves were not seen well enough to  exclude vegetations.  Since the previous study, no obvious changes are  present.  ______________________________________________________________________________  Left Ventricle  The left ventricle is normal in size. There is normal left ventricular wall  thickness. Hyperdynamic left ventricular function. The visual ejection  fraction is >70%. Diastolic Doppler findings (E/E' ratio and/or other  parameters) suggest left ventricular filling pressures are indeterminate.  Normal left ventricular wall motion.     Right Ventricle  The right ventricle is normal in structure, function and size.     Atria  Normal left atrial size. Right atrial size is normal. There is no atrial shunt  seen.     Mitral Valve  The mitral valve is not well visualized. There is trace mitral regurgitation.     Tricuspid Valve  The tricuspid valve is not well visualized. There is trace tricuspid  regurgitation. Right ventricular systolic pressure could not be approximated  due to inadequate tricuspid regurgitation. IVC diameter <2.1 cm collapsing  >50% with sniff suggests a normal RA pressure of 3 mmHg.     Aortic Valve  The aortic valve is not well visualized. There is mild (1+) aortic  regurgitation. No aortic stenosis is present.     Pulmonic Valve  The pulmonic valve is not well seen, but is grossly normal. There is trace  pulmonic valvular regurgitation.     Vessels  Normal size aorta. The inferior vena cava was normal in size with preserved  respiratory variability.     Pericardium  There is no pericardial effusion.     ______________________________________________________________________________  MMode/2D Measurements & Calculations  IVSd: 0.70 cm  LVIDd: 3.5 cm  LVIDs: 2.3 cm  LVPWd: 0.70 cm  FS: 35.6 %  LV mass(C)d: 62.8 grams  LV mass(C)dI: 46.5 grams/m2     Ao root diam: 3.5 cm  LA dimension: 2.9 cm  LA/Ao: 0.82  LVOT diam: 1.9 cm  LVOT area: 2.8 cm2  Ao root diam index Ht(cm/m): 2.3  Ao root diam index BSA (cm/m2): 2.6  RWT: 0.40     Doppler Measurements & Calculations  MV E max richa: 73.6 cm/sec  MV A max  joseluis: 105.3 cm/sec  MV E/A: 0.70  MV dec time: 0.15 sec  PA acc time: 0.11 sec  E/E' avg: 10.9  Lateral E/e': 8.6  Medial E/e': 13.2  RV S Joseluis: 11.9 cm/sec     ______________________________________________________________________________  Report approved by: Rodriguez Soto MD on 12/16/2024 11:43 AM

## 2024-12-16 NOTE — PROGRESS NOTES
[facility]    Medicine Progress Note - Hospitalist Service    Date of Admission:  12/8/2024    Assessment & Plan     Kezia Dixon is a 69 year old female with history bilateral lung adenocarcinoma, on immunotherapy with plan to start SBRT of JULIEN nodule, previous esophageal cancer s/p esophagectomy and chemoradiation (9/2016), s/p lobectomy for right lung squamous cell cancer (9/2018), COPD, heavy tobacco use in past, resolved stress-induced cardiomyopathy, nonobstructive CAD, alcohol use disorder, who presented on 12/8/2024 with generalized weakness, physical deconditioning, acute on chronic nausea, anorexia.  Workup showed dehydration, multiple electrolyte abnormalities, new anemia.      Acute worsening of chronic nausea  Anorexia  Chronic abdominal pain  Moderate malnutrition due to acute and chronic illness  -Has chronic nausea and abdominal pain for several years.  Reported significant anorexia and poor intake for 3-4 days before admission.  Abdominal pain stable  -Afebrile, no leukocytosis, COVID-19/influenza/RSV negative.  TSH normal  -CT A/P and CXR on admission negative for any new acute findings.  -Improved with supportive care.  Nausea improved.  Was tolerating some diet.  -Consult speech therapy on 12/16 due to concern of dysphagia  -Continue Ensure, multivitamin and diet as able  -Nutritional services consulted.  Continue nutrition supplements as per RD      Multiple electrolyte abnormalities    Hyponatremia - likely due to hypovolemia. May have some component of SIADH  -Took 40 mg p.o. Lasix for 3 consecutive days prior to admission which contributed to electrolyte abnormalities  -Baseline sodium 134-140.  Sodium 127 on admission, improved to 130 on 12/11  -labs 12/14 showed decline in Na to 118 along with oliguria and JESSICA. Did not improve with NS. Improved to 126 with 3% saline.  Hypertonic saline discontinued on 12/15 am and patient switched to  ml/h.  Sodium now  improved to 131 on 12/16  -Urine osm 270, serum osm 266, Urine Na < 20, TSH normal  -nephrology following  -free water restriction of 1500 ml. Encourage solute/protein intake  -monitor Na q6h.     Acute kidney injury with oliguria, 12/14/2024, likely due to ATN  -urine output significantly diminished on 12/14. Cr doubled, 0.45 -->1.03  -admission CT showed no evidence of obstruction  -lasix and spironolactone held since admission.  Losartan placed on hold on 12/15   -received IV contrast 12/15 am due to acute change in mental status.  -Creatinine stable at 1 on 12/16.  Oliguria improving, 600 mL urine output overnight  -nephrology following - avoid nephrotoxins and contrast.  -monitor urine ouput      Urinary retention  -Patient has required straight cath twice since admission.  -Continue straight cath as needed.  May need Jaeger catheter      Hypokalemia   Hypomagnesemia  Hypophosphatemia  -Admission labs showed potassium 2.7, magnesium 1.6.    -Electrolytes replaced as needed. Continue to monitor and replace as needed  -Discussed with patient to avoid Lasix in future.  She can use compression stockings or leg elevation for edema      Chronic progressive shortness of breath  Acute COPD exacerbation  -Progressive shortness of breath is likely multifactorial-due to underlying COPD, lung cancer  -Continue Mucinex tablet, Anoro  -CRP elevated at 206. Started on prednisone 30 mg daily for 5 days on 12/13 for COPD exacerbation. CRP improved to 67 on 12/16. Cough and shortness of breath improved  -continue Tessalon Perles 3 times daily, prednisone 30 mg daily for 5 days      Probable community-acquired pneumonia, organism unspecified  -Patient reported increased cough with productive sputum, afebrile, no leukocytosis initial, procalcitonin borderline at 0.25, . COVID-19/influenza/RSV negative  -CXR on admission showed opacities in the right midlung, not significantly changed from before.    -Due to her symptoms and  presentation and increased concern for pneumonia, treated for pneumonia.  Received IV ceftriaxone 12/11-12/13, IV cefepime 12/15, resumed IV ceftriaxone on 12/16.  Completed 5 days of azithromycin 12/11-12/15.    -CXR 12/15 showed improved right mid and lower lobe radiation.  No focal consolidation.      Leukocytosis, 12/15/2024 -likely steroid-induced vs infection.  -WBC was normal on 12/11.  Started prednisone on 12/13.  WBC elevated since 12/15, now 27.5.  Was mildly hypothermic with temp of 95.6, mildly confused on 12/15.  This raised concern for developing sepsis. BP and HR normal. Lactate normal  -CXR showed improved aeration, no new infiltrate. Blood cultures from 12/15 negative so far.    -Continue IV ceftriaxone.  Will discontinue prednisone and monitor WBC count        Mottled right lower extremity, 12/15/2024  -arterial US 12/15 showed no high grade femoral-popliteal stenosis  -recent venous US 12/8/2024 showed no DVT  -Mottling much improved on 12/16.  Monitor        Acute metabolic encephalopathy  -on 12/15 am, patient noted to be confused, not oriented. Complained of RUE numbness and slurred speech  -head CT and CTA unremarkable. MRI pending. No evidence of stoke. Presentation likely due to encephalopathy.  Stroke neurology was consulted  -Patient improved throughout the day on 12/15.  Confused again in a.m. of 12/16.  -Continue to monitor.  Switch from IV cefepime to IV ceftriaxone.      Generalized weakness  Physical deconditioning  -Has been more weak and deconditioned since recent hospitalization 2-3 weeks ago  -PT/OT consulted-TCU recommended.   following for discharge planning      Acute normocytic anemia  -Hemoglobin was 12.6 on 11/20, now down to 9.4 on admission.  Stable during hospital stay.  No obvious bleeding.  -[place] [facility] consulted.  Labs do not indicate hemolysis-normal bilirubin, normal LDH, elevated haptoglobin  -Iron studies showed low iron saturation of 8%, low  iron binding capacity, normal vitamin B12 at 519, normal TSH, reticulocyte count 1.4  -Received IV iron on 12/9      Multiple bilateral pulmonary nodules due to adenocarcinoma, 2023  -S/p bronchoscopy with bronchial ultrasound-guided biopsy.  Pathology from right upper lobe nodule was positive for small cell lung cancer (adenocarcinoma).  Biopsy from left upper lobe nodule showed atypical cells.  This was suspicious but not definitive for adenocarcinoma.  Lymph nodes were negative.  -S/p chemoradiation (of RUL) completed 2/2024  -Started on consolidation durvalumab immunotherapy 5/2024.  Last dose was on 11/25/2024  -Most recent CT scans 10/2024 showed further increase in RUL and JULIEN nodules and indeterminate right hepatic lobe lesion.  There was plan to start stereotactic body radiotherapy (SBRT) to the JULIEN nodule on 12/9. 5 session over 2 weeks planned.  Now deferred.  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  Continue Toprol-XL at a lower dose of 25 mg daily  -Echo 12/16 showed EF >70%, hyperdynamic, no WMA, mild aortic regurgitation.  Valves were not well-visualized to exclude vegetations.      Nonobstructive coronary artery disease  PTA-aspirin 81 mg every other day  -Continue aspirin and  "atorvastatin      Alcohol use disorder  -Apparently has been sober for 2 years.  Recently had relapse of alcohol intake when she found out about worsening lung cancer.  She was assessed by chemical dependency.  She declined psychiatric consultation.  -has not been drinking any alcohol since her hospital discharge      Diabetes mellitus type 2, new diagnosis  -Previously had prediabetes.  A1c on admission 7.2  -follow-up with PCP for monitoring       Right lower extremity swelling  -Ultrasound negative for DVT.  -Recommend patient use compression stockings and leg elevation for edema.  Avoid Lasix and spironolactone             Diet: Snacks/Supplements Adult: Ensure Clear; With Meals  Snacks/Supplements Adult: Gelatein 20 (sugar-free); With Meals  NPO for Medical/Clinical Reasons Except for: Ice Chips, Meds    DVT Prophylaxis: Pneumatic Compression Devices  Jaeger Catheter: Not present  Lines: PRESENT      Port a Cath 01/09/24 Single Lumen Left Chest wall-Site Assessment: WDL      Cardiac Monitoring: ACTIVE order. Indication: Stroke, acute (48 hours)  Code Status: No CPR- Do NOT Intubate      Clinically Significant Risk Factors         # Hyponatremia: Lowest Na = 118 mmol/L in last 2 days, will monitor as appropriate  # Hypochloremia: Lowest Cl = 83 mmol/L in last 2 days, will monitor as appropriate      # Hypoalbuminemia: Lowest albumin = 2.7 g/dL at 12/9/2024  6:56 AM, will monitor as appropriate     # Hypertension: Noted on problem list    # Chronic heart failure with preserved ejection fraction: heart failure noted on problem list and last echo with EF >50%          # DMII: A1C = 7.2 % (Ref range: <5.7 %) within past 6 months   # Cachexia: Estimated body mass index is 17.31 kg/m  as calculated from the following:    Height as of 11/19/24: 1.549 m (5' 1\").    Weight as of this encounter: 41.5 kg (91 lb 9.6 oz).   # Moderate Malnutrition: based on nutrition assessment    # Financial/Environmental Concerns: none  # " COPD: noted on problem list        Social Drivers of Health    Tobacco Use: Medium Risk (9/17/2024)    Patient History     Smoking Tobacco Use: Former     Smokeless Tobacco Use: Never   Physical Activity: Insufficiently Active (2/6/2024)    Exercise Vital Sign     Days of Exercise per Week: 5 days     Minutes of Exercise per Session: 10 min   Stress: Stress Concern Present (2/6/2024)    [employer] - Occupational Stress Questionnaire     Feeling of Stress : Rather much   Social Connections: Unknown (2/6/2024)    Social Connection and Isolation Panel [NHANES]     Frequency of Social Gatherings with Friends and Family: More than three times a week          Disposition Plan         Medically Ready for Discharge: Anticipated in 2-4 Days.  Will need TCU              Elosie Fisher MD  Hospitalist Service  [facility]  Securely message with [facility] (more info)  Text page via [employer] Paging/Directory   ______________________________________________________________________    Interval History     Patient sitting in bed.  Appears confused.  Reports that she does not feel well   Sodium improved, creatinine stable.  Required straight catheter overnight.  Urine output is improving   Shortness of breath and cough are stable.    Currently NPO.  Awaiting speech therapy evaluation           Physical Exam   Vital Signs: Temp: 97.2  F (36.2  C) Temp src: Axillary BP: (!) 155/69 Pulse: 103   Resp: 16 SpO2: 96 % O2 Device: Nasal cannula Oxygen Delivery: 1.5 LPM  Weight: 91 lbs 9.6 oz    Constitutional - awake and alert, in no acute distress  Cardiovascular-regular rate and rhythm  Lungs-breath sounds diminished bilaterally, no wheezing or rhonchi  Integumentary - purple discolouration of all extremities, mottling in Right leg-improved  Neurological - awake, alert, mildly confused .  Moving all 4 extremities, normal speech, no focal deficits    Medical Decision Making       50 MINUTES  SPENT BY ME on the date of service doing chart review, history, exam, documentation & further activities per the note.      Data     I have personally reviewed the following data over the past 24 hrs:    27.5 (H)  \   9.6 (L)   / 465 (H)     131 (L) 99 35.3 (H) /  146 (H)   3.8 18 (L) 1.00 (H) \     Procal: N/A CRP: 67.81 (H) Lactic Acid: N/A       INR:  N/A PTT:  N/A   D-dimer:  N/A Fibrinogen:  N/A     Ferritin:  N/A % Retic:  N/A LDH:  N/A       Imaging results reviewed over the past 24 hrs:   No results found for this or any previous visit (from the past 24 hours).

## 2024-12-16 NOTE — PROGRESS NOTES
CLINICAL NUTRITION SERVICES - REASSESSMENT NOTE    RECOMMENDATIONS FOR MD/PROVIDER:   - pt w/ persistently poor appetite and intakes, could consider an appetite stimulant, if appropriate  - if poor po persists, can consider nutrition support   Recommendations Ordered by Registered Dietitian (RD):   Modify CLEAR to PRN   Increase SF Gel+ to BID (each flavor) @ L&D  Encouraged po intake, emphasizing the importance of protein to preserve lean muscle mass    Malnutrition:    % Weight Loss:  None noted  % Intake:  </= 50% for >/= 5 days (severe malnutrition)  Subcutaneous Fat Loss:  Orbital region mild depletion  Muscle Loss:  Temporal region moderate depletion, Anterior thigh region moderate depletion, and Posterior calf region moderate depletion  Fluid Retention:  Mild feet, ankles (12/16)     Malnutrition Diagnosis: Severe malnutrition  In Context of:  Acute illness or injury (on chronic)     EVALUATION OF PROGRESS TOWARD GOALS   Diet:  Easy to Chew diet, thin liquids,  Continue Ensure Clear w/ meals, SF Gelatein (orange flavor), and Room Service Appropriate with assist      Intake/Tolerance: Flowsheets show pt has no appetite and 1-2 intakes per day of 25-50%.     ASSESSED NUTRITION NEEDS:  Dosing Weight: 38.6 kg (admit)  Estimated Energy Needs: 5327-0748+ kcals (30-35+ Kcal/Kg)  Justification: underweight  Estimated Protein Needs: 39-58 grams protein (1.0-1.5 g pro/Kg)  Justification: preservation of lean body mass    NEW FINDINGS:   General/Plan: Kezia reported her po intake isn't going very well. Although she said she tries to get in the SF Gel+ every day and was agreeable to adding a second one. She hasn't been drinking the CLEAR supplements and didn't want them to be delivered anymore, but she was made aware there would be a PRN order to ask for them if she'd like. She was encouraged to finish them as they have the added protein. Her RN came in to help her order some sherbet for lunch.     Weight:   Date/Time  Weight Weight Method   12/14/24 0647 41.5 kg (91 lb 9.6 oz) --   12/13/24 0653 40.7 kg (89 lb 12.8 oz) Standing scale   12/12/24 0500 39.9 kg (88 lb) Standing scale   12/11/24 0510 39.5 kg (87 lb 1.3 oz) Standing scale   12/10/24 0600 39.5 kg (87 lb) Standing scale     GI: LBM 1x on 12/10     Meds: MVI/M     Labs: Na 131 (L), BUN 35.3 (H), Cr 1 (L), Phos 4.6 (H), CRP 67.81 (H), -196 (H)    Previous Goals:   PO intakes >/= 50% meals TID (or equivalent via supplements)  Evaluation: Not met    Previous Nutrition Diagnosis:   Inadequate oral intake related to poor appetite, nausea as evidenced by no po intakes for the past 4-5 days, suspect inadequate intake at baseline per report  Evaluation: No change    CURRENT NUTRITION DIAGNOSIS  Inadequate oral intake related to poor appetite, nausea as evidenced by poor po intakes of <25-50% during 8 days of admission, suspect inadequate intake at baseline PTA, and need for oral nutrition supplements to help attenuate lean muscle mass losses.     INTERVENTIONS  Recommendations / Nutrition Prescription  Modify CLEAR to PRN   Increase SF Gel+ to BID (each flavor) @ L&D  Encouraged po intake, emphasizing the importance of protein to preserve lean muscle mass   Continue Easy to Chew diet, thin liquids diet     Implementation  Medical food supplement therapy    Goals  PO - >50% meal trays TID, and >75% in nutrition supplements BID    MONITORING AND EVALUATION:  Progress towards goals will be monitored and evaluated per protocol and Practice Guideline    Tian Bynum MS, RD, LD

## 2024-12-16 NOTE — PROVIDER NOTIFICATION
MD Notification    Notified Person: MD    Notified Person Name: Eloise Fisher    Notification Date/Time: 12/16/24     Notification Interaction: [employer] messaging    Purpose of Notification: Patient's sodium came back 131    Orders Received: page nephrology     Comments:

## 2024-12-16 NOTE — PROGRESS NOTES
[facility]     Renal Progress Note       SHORTHAND KEY FOR MY NOTES:  c = with, s = without, p = after, a = before, x = except, asx = asymptomatic, tx = transplant or treatment, sx = symptoms or symptomatic, cx = canceled or culture, rxn = reaction, yday = yesterday, nl = normal, abx = antibiotics, fxn = function, dx = diagnosis, dz = disease, m/h = melena/hematochezia, c/d/l/ha = cramping/dizziness/lightheadedness/headache, d/c = discharge or diarrhea/constipation, f/c/n/v = fevers/chills/nausea/vomiting, cp/sob = chest pain/shortness of breath, tbv = total body volume, rxn = reaction, tdc = tunneled dialysis catheter, pta = prior to admission, hd = hemodialysis, pd = peritoneal dialysis, hhd = home hemodialysis, edw = estimated dry wt         Assessment/Plan:     1.  Severe hyponatremia.  Pt's Na is up to ~132 corrected today.  She is asx.  Tolerating fluids.  The etiology is a combo of depletional + SIADH.  A.  Continue IVF for now.  B.  Check Na @ 1300.  Will determine duration of fluids based on results.    2.  JESSICA.  Pt's cr is still ~1 but a bit better.  She is non-oliguric.  A.  Follow labs, uo daily.    3.  Lung CA.  Pt is due to start XRT.  A.  Per H/O.    4.  Met acidosis.  Pt's bicarb is down to 18, prob dilutional.  K is fine.  A.  Follow labs.    5.  FEN.  Electrolytes are fine x Na.    Case d/w Shanique [place] RN, pt/son.        Interval History:     Pt feels weaker today but was able to walk a bit c PT.  Denies any confusion, n/v/twitching.  She is currently NPO so isn't drinking anything.  Tolerating IVF.          Medications and Allergies:     Current Facility-Administered Medications   Medication Dose Route Frequency Provider Last Rate Last Admin    aspirin EC tablet 81 mg  81 mg Oral Every Other Day Vielka Martínez, PA-C   81 mg at 12/16/24 0815    atorvastatin (LIPITOR) tablet 40 mg  40 mg Oral Daily Vielka Martínez PA-C   40 mg at 12/16/24 0897     benzonatate (TESSALON) capsule 200 mg  200 mg Oral TID Eloise Fisher MD   200 mg at 12/16/24 0815    cefTRIAXone (ROCEPHIN) 1 g vial to attach to  mL bag for ADULTS or NS 50 mL bag for PEDS  1 g Intravenous Q24H Eloise Fisher MD   1 g at 12/16/24 1124    fluticasone (FLONASE) 50 MCG/ACT spray 2 spray  2 spray Both Nostrils Daily Linda Lang MD   2 spray at 12/16/24 0816    gabapentin (NEURONTIN) capsule 600 mg  600 mg Oral TID Mathew Caraballo MD   600 mg at 12/16/24 0815    guaiFENesin (MUCINEX) 12 hr tablet 600 mg  600 mg Oral BID Vielka Martínez PA-C   600 mg at 12/16/24 0815    heparin lock flush 10 unit/mL injection 5-10 mL  5-10 mL Intracatheter Q24H Estiven Coyne MD   5 mL at 12/14/24 0611    heparin lock flush 100 unit/mL injection 5-10 mL  5-10 mL Intracatheter Q28 Days Estiven Coyne MD        ipratropium (ATROVENT) 0.06 % spray 2 spray  2 spray Both Nostrils 4x Daily Vielka Martínez PA-C   2 spray at 12/16/24 1125    [Held by provider] losartan (COZAAR) tablet 50 mg  50 mg Oral Daily Vielka Martínez PA-C   50 mg at 12/15/24 1200    metoprolol succinate ER (TOPROL XL) 24 hr tablet 25 mg  25 mg Oral SOILAM Eloise Fisher MD   25 mg at 12/16/24 1134    multivitamin w/minerals (THERA-VIT-M) tablet 1 tablet  1 tablet Oral Daily Vielka Martínez PA-C   1 tablet at 12/16/24 0815    pantoprazole (PROTONIX) EC tablet 40 mg  40 mg Oral QAM AC Eloise Fisher MD   40 mg at 12/16/24 0815    predniSONE (DELTASONE) tablet 30 mg  30 mg Oral Daily Eloise Fisher MD   30 mg at 12/16/24 0815    senna-docusate (SENOKOT-S/PERICOLACE) 8.6-50 MG per tablet 1 tablet  1 tablet Oral BID Vielka Martínez PA-C   1 tablet at 12/16/24 0823    Or    senna-docusate (SENOKOT-S/PERICOLACE) 8.6-50 MG per tablet 2 tablet  2 tablet Oral BID Vielka Martínez PA-C   2 tablet at 12/11/24 1033    sodium chloride (PF) 0.9% PF flush 10-20 mL  10-20 mL Intracatheter  Q28 Days Estiven Coyne MD        [Held by provider] spironolactone (ALDACTONE) half-tab 12.5 mg  12.5 mg Oral QAM Vielka Martínez PA-C        umeclidinium-vilanterol (ANORO ELLIPTA) 62.5-25 MCG/ACT oral inhaler 1 puff  1 puff Inhalation Daily Vielka Martínez PA-C   1 puff at 12/16/24 0817     Allergies   Allergen Reactions    Codeine Sulfate Nausea    Simvastatin Cramps     Leg cramps    Morphine      Pt unsure - pt does not believe this is an allergy of hers    Pcn [Penicillins] Rash          Physical Exam:     Vitals were reviewed     , Blood pressure (!) 155/69, pulse 103, temperature 97.2  F (36.2  C), temperature source Axillary, resp. rate 16, weight 41.5 kg (91 lb 9.6 oz), SpO2 96%, not currently breastfeeding.  Wt Readings from Last 3 Encounters:   12/14/24 41.5 kg (91 lb 9.6 oz)   11/20/24 37.2 kg (82 lb 0.2 oz)   09/17/24 39.1 kg (86 lb 4.8 oz)     Intake/Output Summary (Last 24 hours) at 12/16/2024 1152  Last data filed at 12/16/2024 0300  Gross per 24 hour   Intake --   Output 600 ml   Net -600 ml     GENERAL APPEARANCE: pleasant, NAD, alert  RESP: CTA B c good efforts  CV: RRR, nl S1/S2   ABDOMEN: s/nt/nd  EXTREMITIES/SKIN: tr ble edema         Data:     CBC RESULTS:     Recent Labs   Lab 12/16/24  0628 12/15/24  0854 12/15/24  0545 12/11/24  1018   WBC 27.5* 21.3* 20.6* 6.1   RBC 3.15* 3.22* 3.25* 3.11*   HGB 9.6* 9.7* 9.7* 9.3*   HCT 28.0* 28.1* 28.1* 27.3*   * 453* 461* 383     Basic Metabolic Panel:  Recent Labs   Lab 12/16/24  0628 12/15/24  2201 12/15/24  1822 12/15/24  1036 12/15/24  0854 12/15/24  0832 12/15/24  0545 12/15/24  0018 12/14/24 1957 12/14/24  1124 12/13/24  1059 12/12/24  0521 12/11/24  1018   * 124* 124* 126* 125*  125*  --  123*  123*   < > 118* 119*  --   --  130*   POTASSIUM 3.8  --   --   --  3.7  --  3.6  --  3.9 4.0  3.9 3.6   < > 4.0   CHLORIDE 99  --   --   --  92*  --  89*  --  83* 85*  --   --  92*   CO2 18*  --   --   --  20*  --   21*  --  23 23  --   --  27   BUN 35.3*  --   --   --  30.9*  --  31.6*  --  29.1* 27.0*  --   --  8.9   CR 1.00*  --   --   --  1.10*  --  1.17*  --  1.18* 1.03*  --   --  0.45*   *  --   --   --  175* 196* 179*  --  263* 166*  --   --  124*   VICENTE 8.0*  --   --   --  7.7*  --  7.8*  --  7.9* 8.0*  --   --  8.7*    < > = values in this interval not displayed.     INR  Recent Labs   Lab 12/15/24  1036   INR 1.06      Attestation:   I have reviewed today's relevant vital signs, notes, medications, labs and imaging.    Ignacio Scott MD  [facility] - Nephrology  [phone]

## 2024-12-16 NOTE — PROGRESS NOTES
Sodium down to 124. Pt was surrounded by cups of fluid this morning. Currently ordered to be NPO.   When allowed to drink, plz ensure strict restriction of 1500 ml/day   With urine osm of 270 , would normally expect NS to raise serum sodium      Recheck sodium at 10 pM .   If sodium trending down ( < 124) , stop IV fluid and recheck in morning.   If 124 or more, continue IVF through the night and check in morning.     Ady Adams MD  [facility] - Nephrology   [phone]

## 2024-12-16 NOTE — PLAN OF CARE
Goal Outcome Evaluation:  Orientation: Aox2. Disoriented to situation and time.   Aggression Stop Light: green  Activity: A1GBW; not OOB this shift.    Diet/BS Checks: NPO. Except meds. Meds taken in pudding crushed. Tolerates.  Tele:  NA  IV Access/Drains: L Port infusing NS @ 100 mL/hr with intermittent ABX.    Pain Management: Tylenol given x1   Abnormal VS/Results: VSS on RA ex low body temp and HTN. Sodium 124  Bowel/Bladder: Continent of B/B; Bladder scanned and straight cath'ed this shift for 600mL    Skin/Wounds: Blanchable redness to coccyx, dry skin. bruising and swelling on left hand. Redness/irritation in the perineum likely from straight cath'ing, Mottling to BLE  Consults: hem/onc, nutrition, nephrology  D/C Disposition: Pending  Other Info:   -difficulty swallowing medications. Crushed or whole in pudding depending on size worked best    -Confusion and restlessness throughout shfit today

## 2024-12-16 NOTE — PLAN OF CARE
A/OX2,lethargic on my shift. Unable to assess pt much as pt is confused. Up with 1/walker,pt walked with PT today. IVF infusing and on IV antibiotics,left chest port. Denies pain. DTV, bladder scan for 420 ml @ 1420. Pills crushed with puddings. Thin liquid no straw, small size bites with easy chew diet. 2L nasal cannula oxygen. Tele reads ST. Mag,phos and potassium protocol. Sodium recheck anytime now. Turn and repositioned for comfort. Speech,nephrology and oncology following.Edema on hands and bilateral foot and ankle. BLE elevated on pillows. Multiple scabs ,redness blanchable coccyx.

## 2024-12-16 NOTE — PROGRESS NOTES
12/16/24 1211   Appointment Info   Signing Clinician's Name / Credentials (SLP) Vielka Yeh MS CCC SLP   General Information   Onset of Illness/Injury or Date of Surgery 12/08/24   Referring Physician Eloise Fisher   Patient/Family Therapy Goal Statement (SLP) She did not state   Pertinent History of Current Problem Per notes; Kezia Dixon is a 69 year old female with history bilateral lung adenocarcinoma, on immunotherapy with plan to start SBRT of JULIEN nodule, previous esophageal cancer s/p esophagectomy and chemoradiation (9/2016), s/p lobectomy for right lung squamous cell cancer (9/2018), COPD, heavy tobacco use in past, resolved stress-induced cardiomyopathy, nonobstructive CAD, alcohol use disorder, who presented on 12/8/2024 with generalized weakness, physical deconditioning, acute on chronic nausea, anorexia.  Workup showed dehydration, multiple electrolyte abnormalities, new anemia.   General Observations Patient alert answered questions appropriately.   Type of Evaluation   Type of Evaluation Swallow Evaluation   Oral Motor   Oral Musculature generally intact   Structural Abnormalities none present   Mucosal Quality adequate   Dentition (Oral Motor)   Dentition (Oral Motor) adequate dentition   Facial Symmetry (Oral Motor)   Facial Symmetry (Oral Motor) WNL   Lip Function (Oral Motor)   Lip Range of Motion (Oral Motor) WNL   Lip Strength (Oral Motor) WNL   Tongue Function (Oral Motor)   Tongue ROM (Oral Motor) WNL   Tongue Strength (Oral Motor) strength decreased   Tongue Coordination/Speed (Oral Motor) reduced rate   Comment, Tongue Function (Oral Motor) ROM was adequate with slow movments.   Jaw Function (Oral Motor)   Jaw Function (Oral Motor) WNL   Cough/Swallow/Gag Reflex (Oral Motor)   Soft Palate/Velum (Oral Motor) WNL   Volitional Throat Clear/Cough (Oral Motor) impaired;reduced strength   Volitional Swallow (Oral Motor) WNL   Vocal Quality/Secretion Management (Oral Motor)   Vocal  Quality (Oral Motor) WFL   General Swallowing Observations   Current Diet/Method of Nutritional Intake (General Swallowing Observations, NIS) easy to chew (level 7);thin liquids (level 0)   Respiratory Support supplemental oxygen   Past History of Dysphagia Recently seen 11/20/24; Clinical swallow evaluation completed - no appreciated oropharyngeal dysphagia deficits. Oromotor exam WFL, WFL labial seal/oral containment, mastication, oral clearance. No percievable oral holding or delay. Notable laryngeal elevation to palpation. No overt clinical signs/sx aspiration noted. Chronic pill dysphagia, likely 2/2 pt's esophageal hx. Pt and RN established medication POC already (see above). Trained pt in general esophageal clearance/anti-reflux precautions and she verbalized understanding.   Swallowing Evaluation Clinical swallow evaluation   Clinical Swallow Evaluation   Feeding Assistance set up only required   Clinical Swallow Evaluation Textures Trialed thin liquids;pureed;solid foods   Clinical Swallow Eval: Thin Liquid Texture Trial   Mode of Presentation, Thin Liquids cup;straw;self-fed   Volume of Liquid or Food Presented 4 oz of water   Oral Phase of Swallow premature pharyngeal entry   Pharyngeal Phase of Swallow impaired;repeated swallows   Diagnostic Statement Patient stated cup was easier than straw per her report. Suspect premature entry without immediate or delayed sx of aspiration.   Clinical Swallow Evaluation: Puree Solid Texture Trial   Mode of Presentation, Puree spoon;self-fed   Volume of Puree Presented 4 teaspoons of apple sauce   Oral Phase, Puree WFL   Pharyngeal Phase, Puree intact   Clinical Swallow Evaluation: Solid Food Texture Trial   Mode of Presentation self-fed   Volume Presented 1 saltine cracker   Oral Phase impaired mastication;premature pharyngeal entry   Pharyngeal Phase impaired;repeated swallows   Diagnostic Statement Mildly prolonged mastication with good oral clearance needed liquid  rinse for suspected pharyngeal/esophageal retention.   Esophageal Phase of Swallow   Patient reports or presents with symptoms of esophageal dysphagia Yes   Esophageal comments Patient with a history of esophageal cancer with esophagectomy with chemo/radiation in 2016.   Swallowing Recommendations   Diet Consistency Recommendations easy to chew (level 7);thin liquids (level 0)   Supervision Level for Intake close supervision needed   Mode of Delivery Recommendations bolus size, small;no straws;slow rate of intake;food moistened   Postural Recommendations none   Swallowing Maneuver Recommendations alternate food and liquid intake   Monitoring/Assistance Required (Eating/Swallowing) stop eating activities when fatigue is present;monitor for cough or change in vocal quality with intake   Medication Administration Recommendations, Swallowing (SLP) Crush meds and place in apple sauce.   General Therapy Interventions   Planned Therapy Interventions Dysphagia Treatment   Dysphagia treatment Modified diet education;Instruction of safe swallow strategies   Clinical Impression   Criteria for Skilled Therapeutic Interventions Met (SLP Edi) Yes, treatment indicated   SLP Diagnosis Mild oral and pharyngeal dysphagia   Risks & Benefits of therapy have been explained evaluation/treatment results reviewed;care plan/treatment goals reviewed;risks/benefits reviewed;current/potential barriers reviewed;participants voiced agreement with care plan;participants included;patient   Clinical Impression Comments Patient presents with mild oral and pharyngeal dysphagia at bedside. RRT was called yesterday for word finding difficulty and confusion. CT was negative. She demonstrated premature entry of thin liquids without immediate or delayed sx of aspiration. Mastication was mildly prolonged with good oral clearance and need for a liquid rinse after 2 bites. No vocal changes or other sx of aspiration.   Recommend: 1. IDDSI level 7 regular  easy to chew foods and thin liquids. 2. Upright and remain upright 45 minutes after meals, no straws, alternate liquids/solids at a slow pace. 3. SLP will f/u for diet tolerance and strategies.   SLP Total Evaluation Time   Eval: oral/pharyngeal swallow function, clinical swallow Minutes (20983) 15   SLP Goals   Therapy Frequency (SLP Eval) 4 times/week   SLP Predicted Duration/Target Date for Goal Attainment 12/30/24   SLP Goals Swallow   SLP: Safely tolerate diet without signs/symptoms of aspiration Regular diet;Thin liquids;With use of swallow precautions;With assistance/supervision   Interventions   Interventions Quick Adds Swallowing Dysfunction   Swallowing Intervention   Treatment of Swallowing Dysfunction &/or Oral Function for Feeding Minutes (38672) 10   Symptoms Noted During/After Treatment Fatigue   Treatment Detail/Skilled Intervention Patient seen for swallow treatment to provide education on sxof aspiration and rationale for a modified diet and swallow strategies. She verbalized understanding and in agreement with softer foods.   SLP Discharge Planning   SLP Plan Meal f/u strategies and reg textures if tolerating.   SLP Discharge Recommendation Transitional Care Facility   SLP Rationale for DC Rec Patient's swallow is below her baseline.   SLP Brief overview of current status  Important she is upright at least 30-60 minutes after meals given esophageal dysfunction esophagectomy.   SLP Time and Intention   Total Session Time (sum of timed and untimed services) 25

## 2024-12-16 NOTE — PROGRESS NOTES
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  Workup showed dehydration, multiple electrolyte abnormalities, new anemia.    Workup negative for infection.  CT A/P showed process.  Chest x-ray stable from before.  No new infiltrate.  Lower extremity ultrasound negative for DVT.    Acute worsening of chronic nausea  Anorexia  Chronic abdominal pain  Moderate malnutrition due to acute and chronic illness  -Has chronic nausea and abdominal pain for several years.  Reported significant anorexia and poor intake for 3-4 days before admission.  Abdominal pain stable  -Afebrile, no leukocytosis, COVID-19/influenza/RSV negative.  TSH normal  -CT A/P and CXR on admission negative for any new acute findings.  -Improved with supportive care.  Nausea improved.  Now tolerating some diet   -Continue Ensure, multivitamin  -Nutritional services consulted.  Continue nutrition supplements as per RD      Multiple electrolyte abnormalities    Hyponatremia - likely due to hypovolemia. May have some component of SIADH  -Took 40 mg p.o. Lasix for 3 consecutive days prior to admission which contributed to electrolyte abnormalities  -Baseline sodium 134-140.  Sodium 127 on admission, improved to 130 on 12/11  -labs 12/14 showed decline in Na to 119 along with oliguria and JESSICA. Did not improve with NS. Improved with 3% saline. Na now 126. 3%  saline discontinued on 12/15 am and  ml/h resumed.  -Urine osm 270, serum osm 266, Urine Na < 20, TSH normal  -nephrology consulted  -free water restriction of 1500 ml. Encourage solute/protein intake  -monitor Na q6h.       Hypokalemia   Hypomagnesemia  Hypophosphatemia  -Admission labs showed potassium 2.7, magnesium 1.6.    -Electrolytes replaced on admission  -Continue to monitor and replace as needed  -Discussed with patient to avoid Lasix in future.  She can use compression stockings or leg elevation for edema      Acute kidney injury with oliguria, 12/14/2024, likely due to ATN  -urine output significantly diminished on 12/14. Cr doubled, 0.45 -->1.03  -admission CT showed no evidence of obstruction  -lasix and spironolactone held since admission. Hold losartan  -received IV contrast 12/15 am due to acute change in mental status.  -nephrology following - continue NS, avoid nephrotoxins and contrast   -monitor urine ouput      Chronic progressive shortness of breath  Acute COPD exacerbation  -Progressive shortness of breath is likely multifactorial-due to underlying COPD, lung cancer  -Continue Mucinex tablet, Anoro  -CRP elevated at 206. Started on prednisone 30 mg daily for 5 days on 12/13 for COPD. CRP improved to 129 on 12/14. Cough improved  -continue Tessalon Perles 3 times daily, prednisone 30 mg daily for 5 days      Probable community-acquired pneumonia, organism unspecified  -Patient reported increased cough with productive sputum, afebrile, no leukocytosis, procalcitonin borderline at 0.25, CRP elevated 186 -->203-->206  -COVID-19/influenza/RSV negative  -Chest x-ray on admission showed opacities in the right midlung, not significantly changed from before.  Follow-up chest x-ray 12/11 showed no acute infiltrate  -Due to her symptoms and presentation, concern for pneumonia.  -Started on IV ceftriaxone and azithromycin on 12/11.  IV ceftriaxone discontinued on 12/13 as no infiltrate on CXR.  Continue azithromycin to complete course  -due to concern for infection on 12/15, started on IV cefepime    Leukocytosis, 12/15/2024  -WBC was normal on 12/11. On 12/15, WBC 21.3. mildly hypothermic with temp of 95.6, mildly confused, mottled right lower extremity. BP and HR normal. Lactate normal  -CXR showed improved aeration, no new infiltrate. Blood cultures pending  -due to concern for infection, started on IV cefepime, continue     Mottled right lower extremity, 12/15/2024  -arterial US 12/15 showed no high grade femoral-popliteal stenosis  -recent venous US 12/8/2024 showed no DVT  -monitor     Acute metabolic encephalopathy  -on 12/15 am, patient noted to be confused, no oriented. Complained of RUE numbless and slurred speech  -head CT and CTA unremarkable. MRI pending. No evidence of stoke. Presentation likely due to encephalopathy  -improved as the say progressed      Generalized weakness  Physical deconditioning  -Has been more weak and deconditioned since recent hospitalization 2-3 weeks ago  -PT/OT consulted-PT recommended home with home care PT.   following for discharge planning    Acute normocytic anemia  -Hemoglobin was 12.6 on 11/20, now down to 9.4.  Stable since admission.  Patient denied any obvious bleeding.  -[place] [facility] consulted.  Labs do not indicate hemolysis-normal bilirubin, normal LDH, elevated haptoglobin  -Iron studies showed low iron saturation of 8%, low iron binding capacity, normal vitamin B12 at 519, normal TSH, reticulocyte count 1.4  -Received IV iron on 12/9      Multiple bilateral pulmonary nodules due to adenocarcinoma, 2023  -S/p bronchoscopy with bronchial ultrasound-guided biopsy.  Pathology from right upper lobe nodule was positive for small cell lung cancer (adenocarcinoma).  Biopsy from left upper lobe nodule showed atypical cells.  This was suspicious but not definitive for adenocarcinoma.  Lymph nodes were negative.  -S/p chemoradiation (of RUL)  completed 2/2024  -Started on consolidation durvalumab immunotherapy 5/2024.  Last dose was on 11/25/2024  -Most recent CT scans 10/2024 showed further increase in RUL and JULIEN nodules and indeterminate right hepatic lobe lesion.  There was plan to start stereotactic body radiotherapy (SBRT) to the JULIEN nodule on 12/9. 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  Recently had relapse of alcohol intake when she found out about worsening lung cancer.  She was assessed by chemical dependency.  She declined psychiatric consultation.  -has not been drinking any alcohol since her hospital discharge      Diabetes mellitus type 2, new diagnosis  -Previously had prediabetes.  A1c on admission 7.2  -Could consider starting on metformin at discharge.  Else can follow-up with PCP for monitoring       Right lower extremity swelling  -Ultrasound negative for DVT.  -Recommend patient use compression stockings and leg elevation for edema.  Avoid Lasix and spironolactone             Diet: Snacks/Supplements Adult: Ensure Clear; With  "Meals  Snacks/Supplements Adult: Gelatein 20 (sugar-free); With Meals  NPO for Medical/Clinical Reasons Except for: No Exceptions    DVT Prophylaxis: Pneumatic Compression Devices  Jaeger Catheter: Not present  Lines: PRESENT      Port a Cath 01/09/24 Single Lumen Left Chest wall-Site Assessment: WDL except;Other (Comment) (tiny scab at previous access site)      Cardiac Monitoring: ACTIVE order. Indication: Stroke, acute (48 hours)  Code Status: No CPR- Do NOT Intubate      Clinically Significant Risk Factors         # Hyponatremia: Lowest Na = 118 mmol/L in last 2 days, will monitor as appropriate  # Hypochloremia: Lowest Cl = 83 mmol/L in last 2 days, will monitor as appropriate      # Hypoalbuminemia: Lowest albumin = 2.7 g/dL at 12/9/2024  6:56 AM, will monitor as appropriate     # Hypertension: Noted on problem list    # Chronic heart failure with preserved ejection fraction: heart failure noted on problem list and last echo with EF >50%          # DMII: A1C = 7.2 % (Ref range: <5.7 %) within past 6 months   # Cachexia: Estimated body mass index is 17.31 kg/m  as calculated from the following:    Height as of 11/19/24: 1.549 m (5' 1\").    Weight as of this encounter: 41.5 kg (91 lb 9.6 oz).   # Moderate Malnutrition: based on nutrition assessment    # Financial/Environmental Concerns: none  # COPD: noted on problem list        Social Drivers of Health    Tobacco Use: Medium Risk (9/17/2024)    Patient History     Smoking Tobacco Use: Former     Smokeless Tobacco Use: Never   Physical Activity: Insufficiently Active (2/6/2024)    Exercise Vital Sign     Days of Exercise per Week: 5 days     Minutes of Exercise per Session: 10 min   Stress: Stress Concern Present (2/6/2024)    [place] [employer] - Occupational Stress Questionnaire     Feeling of Stress : Rather much   Social Connections: Unknown (2/6/2024)    Social Connection and Isolation Panel [NHANES]     Frequency of Social Gatherings " with Friends and Family: More than three times a week          Disposition Plan         Medically Ready for Discharge: Anticipated in 2-4 Days.  Home with home care             Eloise Fisher MD  Hospitalist Service  [facility] [place]  Securely message with [employer] (more info)  Text page via [employer] Paging/Directory   ______________________________________________________________________    Interval History   RRT this am due to slurred speech, extremity numbness, acute confusion, hypothermia and mottling  Remains oliguric. Cr stable. Na improved with hypertonic saline  Son present at bedside. Update provided        Physical Exam   Vital Signs: Temp: 96.9  F (36.1  C) Temp src: Axillary BP: (!) 157/71 Pulse: 94   Resp: 16 SpO2: 93 % O2 Device: None (Room air)    Weight: 91 lbs 9.6 oz    Constitutional - awake and alert, in no acute distress  Cardiovascular-regular rate and rhythm  Lungs-breath sounds diminished bilaterally, no wheezing or rhonchi  Integumentary - purple discolouration of all extremities, mottling in Right leg   Neurological - awake, alert and oriented x3.  Moving all 4 extremities, normal speech, no focal deficits    Medical Decision Making       60 MINUTES SPENT BY ME on the date of service doing chart review, history, exam, documentation & further activities per the note.      Data     I have personally reviewed the following data over the past 24 hrs:    21.3 (H)  \   9.7 (L)   / 453 (H)     126 (L) 92 (L) 30.9 (H) /  175 (H)   3.7 20 (L) 1.10 (H) \     Trop: 10 BNP: N/A     TSH: 3.72 T4: N/A A1C: N/A     Procal: N/A CRP: N/A Lactic Acid: 0.9       INR:  1.06 PTT:  N/A   D-dimer:  N/A Fibrinogen:  N/A     Ferritin:  N/A % Retic:  3.0 (H) LDH:  193       Imaging results reviewed over the past 24 hrs:   Recent Results (from the past 24 hours)   CT Head w/o Contrast    Narrative    EXAM: CT HEAD W/O CONTRAST, CTA HEAD NECK W CONTRAST  LOCATION: [facility]  HOSPITAL  DATE: 12/15/2024    INDICATION: Code Stroke to evaluate for potential thrombolysis and thrombectomy. PLEASE READ IMMEDIATELY  COMPARISON: MRI of the brain with and without contrast 1/10/2024.  CONTRAST: 67 mL Isovue 370  TECHNIQUE: Head and neck CT angiogram with IV contrast. Noncontrast head CT followed by axial helical CT images of the head and neck vessels obtained during the arterial phase of intravenous contrast administration. Axial 2D reconstructed images and   multiplanar 3D MIP reconstructed images of the head and neck vessels were performed by the technologist. Dose reduction techniques were used. All stenosis measurements made according to NASCET criteria unless otherwise specified.    FINDINGS:   NONCONTRAST HEAD CT:   INTRACRANIAL CONTENTS: No acute intracranial hemorrhage. No extra-axial fluid collection. No mass effect or midline shift. Chronic infarct centrally within the mira. Mild presumed chronic small vessel ischemic changes. Mild to moderate generalized volume   loss. No hydrocephalus.     VISUALIZED ORBITS/SINUSES/MASTOIDS: No intraorbital abnormality. No paranasal sinus mucosal disease. No middle ear or mastoid effusion.    BONES/SOFT TISSUES: No acute abnormality.    HEAD CTA:  ANTERIOR CIRCULATION: No stenosis/occlusion, aneurysm, or high flow vascular malformation. Standard [place] of [place] anatomy.    POSTERIOR CIRCULATION: No stenosis/occlusion, aneurysm, or high flow vascular malformation. Balanced vertebral arteries supply a normal basilar artery.     DURAL VENOUS SINUSES: Expected enhancement of the major dural venous sinuses.    NECK CTA:  RIGHT CAROTID: Vascular calcifications involving the right carotid bifurcation and proximal right ICA with 30% stenosis.    LEFT CAROTID: Vascular calcifications involving the left carotid bifurcation proximal left ICA with 10-20% stenosis.    VERTEBRAL ARTERIES: No focal stenosis or dissection. Dominant left and smaller right vertebral  arteries.    AORTIC ARCH: Vascular calcifications involving the intrathoracic aorta. Classic aortic arch anatomy without significant stenosis of the proximal great vessels.    NONVASCULAR STRUCTURES:  The 1.3 cm irregularly marginated subsolid opacity left upper lobe anteriorly (series 7 image 54) is unchanged compared with prior CTs chest and remains indeterminate. Groundglass density opacity in the posterior aspect of the   left upper lobe has developed in the interval. Consolidation visualized right lung posteriorly appears unchanged.      Impression    IMPRESSION:   HEAD CT:  1.  No CT evidence for acute intracranial process.  2.  Brain atrophy and presumed chronic microvascular ischemic changes as above.    HEAD CTA:   1.  No significant stenosis, aneurysm, or high flow vascular malformation identified.  2.  Variant [place] of [place] anatomy as above.    NECK CTA:  1.  No significant stenosis of the bilateral internal carotid arteries.  2.  Interval development of groundglass density opacity in the posterior aspect of the left upper lung lobe, compatible with pneumonia in appropriate clinical setting.    Noncontrast CT imaging findings were discussed with Dr. Bautista of the clinical service at 9:02 AM. CTA imaging findings were discussed with Sarah Bautista of the clinical service at 9:12 AM on 12/15/2024.   CTA Head Neck with Contrast    Narrative    EXAM: CT HEAD W/O CONTRAST, CTA HEAD NECK W CONTRAST  LOCATION: [facility]  DATE: 12/15/2024    INDICATION: Code Stroke to evaluate for potential thrombolysis and thrombectomy. PLEASE READ IMMEDIATELY  COMPARISON: MRI of the brain with and without contrast 1/10/2024.  CONTRAST: 67 mL Isovue 370  TECHNIQUE: Head and neck CT angiogram with IV contrast. Noncontrast head CT followed by axial helical CT images of the head and neck vessels obtained during the arterial phase of intravenous contrast administration. Axial 2D reconstructed images  and   multiplanar 3D MIP reconstructed images of the head and neck vessels were performed by the technologist. Dose reduction techniques were used. All stenosis measurements made according to NASCET criteria unless otherwise specified.    FINDINGS:   NONCONTRAST HEAD CT:   INTRACRANIAL CONTENTS: No acute intracranial hemorrhage. No extra-axial fluid collection. No mass effect or midline shift. Chronic infarct centrally within the mira. Mild presumed chronic small vessel ischemic changes. Mild to moderate generalized volume   loss. No hydrocephalus.     VISUALIZED ORBITS/SINUSES/MASTOIDS: No intraorbital abnormality. No paranasal sinus mucosal disease. No middle ear or mastoid effusion.    BONES/SOFT TISSUES: No acute abnormality.    HEAD CTA:  ANTERIOR CIRCULATION: No stenosis/occlusion, aneurysm, or high flow vascular malformation. Standard [place] of [place] anatomy.    POSTERIOR CIRCULATION: No stenosis/occlusion, aneurysm, or high flow vascular malformation. Balanced vertebral arteries supply a normal basilar artery.     DURAL VENOUS SINUSES: Expected enhancement of the major dural venous sinuses.    NECK CTA:  RIGHT CAROTID: Vascular calcifications involving the right carotid bifurcation and proximal right ICA with 30% stenosis.    LEFT CAROTID: Vascular calcifications involving the left carotid bifurcation proximal left ICA with 10-20% stenosis.    VERTEBRAL ARTERIES: No focal stenosis or dissection. Dominant left and smaller right vertebral arteries.    AORTIC ARCH: Vascular calcifications involving the intrathoracic aorta. Classic aortic arch anatomy without significant stenosis of the proximal great vessels.    NONVASCULAR STRUCTURES:  The 1.3 cm irregularly marginated subsolid opacity left upper lobe anteriorly (series 7 image 54) is unchanged compared with prior CTs chest and remains indeterminate. Groundglass density opacity in the posterior aspect of the   left upper lobe has developed in the interval.  Consolidation visualized right lung posteriorly appears unchanged.      Impression    IMPRESSION:   HEAD CT:  1.  No CT evidence for acute intracranial process.  2.  Brain atrophy and presumed chronic microvascular ischemic changes as above.    HEAD CTA:   1.  No significant stenosis, aneurysm, or high flow vascular malformation identified.  2.  Variant [place] of [place] anatomy as above.    NECK CTA:  1.  No significant stenosis of the bilateral internal carotid arteries.  2.  Interval development of groundglass density opacity in the posterior aspect of the left upper lung lobe, compatible with pneumonia in appropriate clinical setting.    Noncontrast CT imaging findings were discussed with Dr. Bautista of the clinical service at 9:02 AM. CTA imaging findings were discussed with Sarah Bautista of the clinical service at 9:12 AM on 12/15/2024.   US Lower Extremity Arterial Duplex Bilateral    Narrative    EXAM: US LOWER EXTREMITY ARTERIAL DUPLEX BILATERAL  LOCATION: [facility]  DATE: 12/15/2024    INDICATION: mottling bilateral lower extremities, decreased DP pulse on RLE  COMPARISON: None.  TECHNIQUE: Duplex utilizing 2D gray-scale imaging, Doppler interrogation with color-flow and spectral waveform analysis.    FINDINGS:    RIGHT LOWER EXTREMITY ARTERIAL ASSESSMENT:    Common femoral artery: 190 cm/s  Profunda femoris artery: 130 cm/s  SFA (proximal): 167 cm/s  SFA (mid): 134 cm/s  SFA (distal): 87 cm/s  Popliteal artery: 69-95 cm/s  Posterior tibial artery: 81 cm/s  Peroneal artery: 52 cm/s  Anterior tibial artery: 25 cm/s  Dorsalis pedis artery: 22 cm/s    Waveforms are multiphasic from the common femoral artery through the tibial arteries. Dorsalis pedis artery waveform is monophasic suggesting anterior tibial artery stenosis.    LEFT LOWER EXTREMITY ARTERIAL ASSESSMENT:  Common femoral artery: 224 cm/s  Profunda femoris artery: 154 cm/s  SFA (proximal): 199 cm/s  SFA (mid): 177  cm/s  SFA (distal): 120 cm/s  Popliteal artery:  cm/s  Posterior tibial artery: 63 cm/s  Peroneal artery: 40 cm/s  Anterior tibial artery: 87 cm/s  Dorsalis pedis artery: 82 cm/s    Left lower extremity waveforms are multiphasic from the CFA through the tibial arteries.      Impression    IMPRESSION:  1.  Right lower extremity: No high-grade femoral-popliteal artery stenosis. Three-vessel runoff to the foot. Monophasic waveform in the dorsalis pedis artery suggestive of anterior tibial artery stenosis.  2.  Left lower extremity: No high-grade femoral-popliteal artery stenosis. Three-vessel runoff to the foot.   XR Chest Port 1 View    Narrative    EXAM: XR CHEST PORT 1 VIEW  LOCATION: [facility]  DATE: 12/15/2024    INDICATION: likely pneumonia  COMPARISON: Chest radiograph 12/11/2024 CT chest 11/19/2024      Impression    IMPRESSION: Improved right mid and lower lung aeration. No new focal consolidation. Chronic blunting of the right costophrenic angle. No discernible pneumothorax. Left chest port tip at the lower SVC. Heart size is normal. Status post esophagectomy and   gastric pull-through.

## 2024-12-17 LAB
ALBUMIN SERPL BCG-MCNC: 2.4 G/DL (ref 3.5–5.2)
ANION GAP SERPL CALCULATED.3IONS-SCNC: 12 MMOL/L (ref 7–15)
BASE EXCESS BLDV CALC-SCNC: -6.9 MMOL/L (ref -3–3)
BASOPHILS # BLD AUTO: 0.1 10E3/UL (ref 0–0.2)
BASOPHILS NFR BLD AUTO: 0 %
BUN SERPL-MCNC: 31.4 MG/DL (ref 8–23)
CALCIUM SERPL-MCNC: 8.3 MG/DL (ref 8.8–10.4)
CHLORIDE SERPL-SCNC: 102 MMOL/L (ref 98–107)
CREAT SERPL-MCNC: 0.66 MG/DL (ref 0.51–0.95)
EGFRCR SERPLBLD CKD-EPI 2021: >90 ML/MIN/1.73M2
EOSINOPHIL # BLD AUTO: 0 10E3/UL (ref 0–0.7)
EOSINOPHIL NFR BLD AUTO: 0 %
ERYTHROCYTE [DISTWIDTH] IN BLOOD BY AUTOMATED COUNT: 14.9 % (ref 10–15)
GLUCOSE BLDC GLUCOMTR-MCNC: 131 MG/DL (ref 70–99)
GLUCOSE SERPL-MCNC: 117 MG/DL (ref 70–99)
HCO3 BLDV-SCNC: 19 MMOL/L (ref 21–28)
HCO3 SERPL-SCNC: 19 MMOL/L (ref 22–29)
HCT VFR BLD AUTO: 26.9 % (ref 35–47)
HGB BLD-MCNC: 9.1 G/DL (ref 11.7–15.7)
IMM GRANULOCYTES # BLD: 1.1 10E3/UL
IMM GRANULOCYTES NFR BLD: 4 %
LACTATE SERPL-SCNC: 0.9 MMOL/L (ref 0.7–2)
LYMPHOCYTES # BLD AUTO: 0.6 10E3/UL (ref 0.8–5.3)
LYMPHOCYTES NFR BLD AUTO: 3 %
MAGNESIUM SERPL-MCNC: 2.1 MG/DL (ref 1.7–2.3)
MCH RBC QN AUTO: 30.2 PG (ref 26.5–33)
MCHC RBC AUTO-ENTMCNC: 33.8 G/DL (ref 31.5–36.5)
MCV RBC AUTO: 89 FL (ref 78–100)
MONOCYTES # BLD AUTO: 0.5 10E3/UL (ref 0–1.3)
MONOCYTES NFR BLD AUTO: 2 %
MRSA DNA SPEC QL NAA+PROBE: NEGATIVE
NEUTROPHILS # BLD AUTO: 22.5 10E3/UL (ref 1.6–8.3)
NEUTROPHILS NFR BLD AUTO: 91 %
NRBC # BLD AUTO: 0 10E3/UL
NRBC BLD AUTO-RTO: 0 /100
O2/TOTAL GAS SETTING VFR VENT: 100 %
OXYHGB MFR BLDV: 77 % (ref 70–75)
PCO2 BLDV: 37 MM HG (ref 40–50)
PH BLDV: 7.31 [PH] (ref 7.32–7.43)
PHOSPHATE SERPL-MCNC: 3.2 MG/DL (ref 2.5–4.5)
PLATELET # BLD AUTO: 407 10E3/UL (ref 150–450)
PO2 BLDV: 48 MM HG (ref 25–47)
POTASSIUM SERPL-SCNC: 3.4 MMOL/L (ref 3.4–5.3)
POTASSIUM SERPL-SCNC: 3.8 MMOL/L (ref 3.4–5.3)
RBC # BLD AUTO: 3.01 10E6/UL (ref 3.8–5.2)
SA TARGET DNA: POSITIVE
SAO2 % BLDV: 77.8 % (ref 70–75)
SODIUM SERPL-SCNC: 133 MMOL/L (ref 135–145)
WBC # BLD AUTO: 24.7 10E3/UL (ref 4–11)

## 2024-12-17 PROCEDURE — 258N000003 HC RX IP 258 OP 636

## 2024-12-17 PROCEDURE — 87641 MR-STAPH DNA AMP PROBE: CPT

## 2024-12-17 PROCEDURE — 255N000002 HC RX 255 OP 636: Performed by: NURSE PRACTITIONER

## 2024-12-17 PROCEDURE — 85004 AUTOMATED DIFF WBC COUNT: CPT | Performed by: INTERNAL MEDICINE

## 2024-12-17 PROCEDURE — 99231 SBSQ HOSP IP/OBS SF/LOW 25: CPT | Performed by: INTERNAL MEDICINE

## 2024-12-17 PROCEDURE — 250N000011 HC RX IP 250 OP 636: Performed by: PHYSICIAN ASSISTANT

## 2024-12-17 PROCEDURE — 250N000011 HC RX IP 250 OP 636: Performed by: STUDENT IN AN ORGANIZED HEALTH CARE EDUCATION/TRAINING PROGRAM

## 2024-12-17 PROCEDURE — 250N000011 HC RX IP 250 OP 636: Performed by: INTERNAL MEDICINE

## 2024-12-17 PROCEDURE — 99207 PR APP CREDIT; MD BILLING SHARED VISIT: CPT | Performed by: INTERNAL MEDICINE

## 2024-12-17 PROCEDURE — 999N000157 HC STATISTIC RCP TIME EA 10 MIN

## 2024-12-17 PROCEDURE — 94660 CPAP INITIATION&MGMT: CPT

## 2024-12-17 PROCEDURE — 83605 ASSAY OF LACTIC ACID: CPT

## 2024-12-17 PROCEDURE — 5A09457 ASSISTANCE WITH RESPIRATORY VENTILATION, 24-96 CONSECUTIVE HOURS, CONTINUOUS POSITIVE AIRWAY PRESSURE: ICD-10-PCS

## 2024-12-17 PROCEDURE — 83735 ASSAY OF MAGNESIUM: CPT | Performed by: INTERNAL MEDICINE

## 2024-12-17 PROCEDURE — 82805 BLOOD GASES W/O2 SATURATION: CPT

## 2024-12-17 PROCEDURE — 80069 RENAL FUNCTION PANEL: CPT | Performed by: INTERNAL MEDICINE

## 2024-12-17 PROCEDURE — 84132 ASSAY OF SERUM POTASSIUM: CPT | Performed by: INTERNAL MEDICINE

## 2024-12-17 PROCEDURE — 85018 HEMOGLOBIN: CPT | Performed by: INTERNAL MEDICINE

## 2024-12-17 PROCEDURE — 87640 STAPH A DNA AMP PROBE: CPT

## 2024-12-17 PROCEDURE — A9585 GADOBUTROL INJECTION: HCPCS | Performed by: NURSE PRACTITIONER

## 2024-12-17 PROCEDURE — 99291 CRITICAL CARE FIRST HOUR: CPT

## 2024-12-17 PROCEDURE — 250N000011 HC RX IP 250 OP 636

## 2024-12-17 PROCEDURE — 99207 PR NO BILLABLE SERVICE THIS VISIT: CPT | Performed by: INTERNAL MEDICINE

## 2024-12-17 PROCEDURE — 120N000013 HC R&B IMCU

## 2024-12-17 RX ORDER — CEFEPIME HYDROCHLORIDE 2 G/1
2 INJECTION, POWDER, FOR SOLUTION INTRAVENOUS EVERY 12 HOURS
Status: DISCONTINUED | OUTPATIENT
Start: 2024-12-17 | End: 2024-12-18

## 2024-12-17 RX ORDER — METHYLPREDNISOLONE SODIUM SUCCINATE 125 MG/2ML
60 INJECTION INTRAMUSCULAR; INTRAVENOUS EVERY 24 HOURS
Status: DISCONTINUED | OUTPATIENT
Start: 2024-12-17 | End: 2024-12-19

## 2024-12-17 RX ORDER — OLANZAPINE 5 MG/1
5 TABLET, ORALLY DISINTEGRATING ORAL 2 TIMES DAILY PRN
Status: DISCONTINUED | OUTPATIENT
Start: 2024-12-17 | End: 2024-12-19

## 2024-12-17 RX ORDER — POTASSIUM CHLORIDE 7.45 MG/ML
10 INJECTION INTRAVENOUS
Status: COMPLETED | OUTPATIENT
Start: 2024-12-17 | End: 2024-12-17

## 2024-12-17 RX ORDER — CARBOXYMETHYLCELLULOSE SODIUM 5 MG/ML
1 SOLUTION/ DROPS OPHTHALMIC
Status: DISCONTINUED | OUTPATIENT
Start: 2024-12-17 | End: 2024-12-19

## 2024-12-17 RX ORDER — QUETIAPINE FUMARATE 25 MG/1
25 TABLET, FILM COATED ORAL 2 TIMES DAILY PRN
Status: DISCONTINUED | OUTPATIENT
Start: 2024-12-17 | End: 2024-12-19

## 2024-12-17 RX ORDER — GADOBUTROL 604.72 MG/ML
4 INJECTION INTRAVENOUS ONCE
Status: COMPLETED | OUTPATIENT
Start: 2024-12-17 | End: 2024-12-17

## 2024-12-17 RX ADMIN — Medication 5 ML: at 00:11

## 2024-12-17 RX ADMIN — HYDRALAZINE HYDROCHLORIDE 10 MG: 20 INJECTION INTRAMUSCULAR; INTRAVENOUS at 08:37

## 2024-12-17 RX ADMIN — POTASSIUM CHLORIDE 10 MEQ: 7.46 INJECTION, SOLUTION INTRAVENOUS at 08:05

## 2024-12-17 RX ADMIN — METHYLPREDNISOLONE SODIUM SUCCINATE 62.5 MG: 125 INJECTION, POWDER, FOR SOLUTION INTRAMUSCULAR; INTRAVENOUS at 09:53

## 2024-12-17 RX ADMIN — POTASSIUM CHLORIDE 10 MEQ: 7.46 INJECTION, SOLUTION INTRAVENOUS at 10:25

## 2024-12-17 RX ADMIN — GADOBUTROL 4 ML: 604.72 INJECTION INTRAVENOUS at 00:26

## 2024-12-17 RX ADMIN — CEFEPIME 2 G: 2 INJECTION, POWDER, FOR SOLUTION INTRAVENOUS at 21:21

## 2024-12-17 RX ADMIN — VANCOMYCIN HYDROCHLORIDE 1250 MG: 10 INJECTION, POWDER, LYOPHILIZED, FOR SOLUTION INTRAVENOUS at 13:54

## 2024-12-17 RX ADMIN — CEFEPIME 2 G: 2 INJECTION, POWDER, FOR SOLUTION INTRAVENOUS at 09:50

## 2024-12-17 ASSESSMENT — ACTIVITIES OF DAILY LIVING (ADL)
ADLS_ACUITY_SCORE: 67
ADLS_ACUITY_SCORE: 66
ADLS_ACUITY_SCORE: 67
ADLS_ACUITY_SCORE: 66
ADLS_ACUITY_SCORE: 66
ADLS_ACUITY_SCORE: 67
ADLS_ACUITY_SCORE: 67
ADLS_ACUITY_SCORE: 69
ADLS_ACUITY_SCORE: 67
ADLS_ACUITY_SCORE: 66
ADLS_ACUITY_SCORE: 67

## 2024-12-17 NOTE — PROGRESS NOTES
RT called to bedside for increased oxygen needs and suspected secretions. Patient was oral suctioned with yankauer, 14 [place] catheter, as well as NT suctioned. All suctioning attempts were resulted with little to none secretions.    RT will continue to follow this patient.    12/17/2024  Mikey June, RRT

## 2024-12-17 NOTE — PHARMACY-VANCOMYCIN DOSING SERVICE
"Pharmacy Vancomycin Initial Note  Date of Service 2024  Patient's  1955  69 year old, female    Indication: Healthcare-Associated Pneumonia    Current estimated CrCl = Estimated Creatinine Clearance: 59.9 mL/min (based on SCr of 0.66 mg/dL).    Creatinine for last 3 days  2024: 11:24 AM Creatinine 1.03 mg/dL;  7:57 PM Creatinine 1.18 mg/dL  12/15/2024:  5:45 AM Creatinine 1.17 mg/dL;  8:54 AM Creatinine 1.10 mg/dL  2024:  6:28 AM Creatinine 1.00 mg/dL  2024:  5:58 AM Creatinine 0.66 mg/dL    Recent Vancomycin Level(s) for last 3 days  No results found for requested labs within last 3 days.      Vancomycin IV Administrations (past 72 hours)        No vancomycin orders with administrations in past 72 hours.                    Nephrotoxins and other renal medications (From now, onward)      None            Contrast Orders - past 72 hours (72h ago, onward)      Start     Dose/Rate Route Frequency Stop    24 0030  gadobutrol (GADAVIST) injection 4 mL         4 mL Intravenous ONCE 24 0026    24 1030  perflutren diluted in saline (DEFINITY) injection 10 mL        Note to Pharmacy: NDC# [id number]    10 mL Intravenous ONCE 24 1005    12/15/24 0900  iopamidol (ISOVUE-370) solution 117 mL        Placed in \"And\" Linked Group    117 mL Intravenous ONCE 12/15/24 0852            [employer] Prediction of Planned Initial Vancomycin Regimen  Regimen: 1250 mg IV every 24 hours.  Start time: 10:00 on 2024  Exposure target: AUC24 (range)400-600 mg/L.hr   AUC24,ss: 459 mg/L.hr  Probability of AUC24 > 400: 66 %  Ctrough,ss: 12 mg/L  Probability of Ctrough,ss > 20: 11 %  Probability of nephrotoxicity (Lodise JENNIFER ): 7 %          Plan:  Start vancomycin  1250 mg IV q24h.   Vancomycin monitoring method: AUC  Vancomycin therapeutic monitoring goal: 400-600 mg*h/L  Pharmacy will check vancomycin levels as appropriate in 1-3 Days.    Serum creatinine levels will be " ordered daily for the first week of therapy and at least twice weekly for subsequent weeks.      Tamra Duggan, [facility]

## 2024-12-17 NOTE — PLAN OF CARE
[id number]    Orientation: A&Ox2, disoriented to time, situation    Aggression Stop Light: Green    Activity: A2 w/andrew steady    Diet/BS Checks: Easy chew, thin liquids    Tele: NSR    IV Access/Drains: R port infusing NS @ 50 mL/hr    Pain Management: Denies    Abnormal VS/Results: VSS on RA    Bowel/Bladder: Jaeger in place, no BM this shift    Skin/Wounds: Blanchable redness to coccyx, scattered scabs and bruising, +2 BLE edema    Consults: Ortho, Speech, Neph    D/C Disposition: Pending improvement    Other Info:   - MRI checklist completed   - Lactic 0.8

## 2024-12-17 NOTE — CODE/RAPID RESPONSE
[facility]    House KASSIE RRT Note  12/16/2024   Time Called: 1734    RRT called for: Respiratory distress    Assessment & Plan     Acute, transient stridor suspect 2/2 upper airway secretions in setting of PMH lower esophageal SCC s/p chemoradiation, esophagectomy with reanastomosis.  Waxing waning encephalopathy, suspect metabolic.    - Upon arrival, pt lying in bed, eyes closed, HOB at 45-50 degrees, fragile appearing female with noted upper airway stridor, shallow inspiratory effort.  Pt's son present at pt's bedside notes pt developed acute stridor at ~ 1730.  Just prior to event, pt's son notes pt had been resting in bed, no recent PO intake, no recent coughing.  Nursing notes pt has had frequent cough in which she has been taking tessalon frequently.  Pt had been resumed on O2 earlier today, had been on 2L O2 via NC, now currently on 4L O2 via NC.  Pt's VS noting SBP 110s, HR 90s, RR 10s-low 20s, afebrile.  While present at pt's bedside, pt intermittently coughing, congested with noted secretion production.  With performing oropharyngeal suctioning with yanker, able to clear secretions with noted resolution of upper airway stridor.  Pt reports breathing improved after suctioning.       INTERVENTIONS:  - Stat racemic epinephrine nebulizer treatment -  in setting of pt's clinical status, did not administer as stridor resolved  - Stat 125 mg IV solumedrol - in setting of pt's respiratory status rapidly improving, will defer at this time  - Stat VBG, lactic acid, CK, ammonia  - Continuous pulse oximetry; remains on telemetry monitoring   - Pt's son present at pt's bedside throughout above; all questions answered, reviewed pt's overall clnical status  - Of note, rounding hospitalist had ordered routine MRI brain; will adjust to stat in setting of noted drooling/difficulty managing secretions, slow to respond at times and recent concern for CVA  - Continues on ceftriaxone for possible  infection  - 2008: Contacted by nursing noting pt's son states pt very claustrophobic, requesting premedication for MRI; will order one time ativan 0.25 mg PO now    At the end of the RRT pt resting back in bed, maintaining O2 sats > 92% on 4L O2 via NC    Discussed with and defer further cares to nursing and hospitalist    Interval History     Kezia Dixon is a 69 year old female who was admitted on 12/8/2024 for generalized weakness, physical deconditioning, acute on chronic nausea, anorexia.    Medical history significant for:   Past Medical History:   Diagnosis Date    Alcohol use disorder, severe, dependence (H) 10/14/2016    Alcoholism (H) 10/14/2016    Anemia     Benign essential hypertension 10/14/2016    Carotid artery disease (H)     mile plaque 5/7    Chemical dependency (H)     alcohol    COPD (chronic obstructive pulmonary disease) (H)     Coronary artery disease     Depression with anxiety     Esophageal cancer (H) 03/22/2016    SQUAMOUS CELL    Esophageal mass     GERD (gastroesophageal reflux disease)     Hx of pancreatitis 2003    Hypercholesteremia     Hyperglycemia     Hyperlipemia     Impaired fasting glucose 10/14/2016    Nocturnal leg cramps     Other chronic pain     Pancreatic pseudocyst 10/14/2016    Pancreatitis     with pseudocyst    Pap smear with atypical squamous cells, cannot exclude high grade squamous intraepithelial lesion (ASC-H) 10/14/2016    Colpo impression benign, ECC benign. Cotestin in 1 yr.    Shingles     Squamous cell carcinoma of right lung (H)     Vasomotor rhinitis      Code Status: No CPR- Do NOT Intubate    Allergies   Allergies   Allergen Reactions    Codeine Sulfate Nausea    Simvastatin Cramps     Leg cramps    Morphine      Pt unsure - pt does not believe this is an allergy of hers    Pcn [Penicillins] Rash     Physical Exam   Vital Signs with Ranges:  Temp:  [96.5  F (35.8  C)-97.3  F (36.3  C)] 97.3  F (36.3  C)  Pulse:  [] 91  Resp:  [12-20] 20  BP:  (112-175)/(57-84) 112/60  SpO2:  [90 %-100 %] 94 %  I/O last 3 completed shifts:  In: 100 [P.O.:100]  Out: 600 [Urine:600]    Constitutional: Pt lying in bed, eyes closed, HOB at 45-50 degrees, fragile appearing female with noted upper airway stridor, shallow inspiratory effort  Neck: Upper airway stridor, congestion  Pulmonary: Shallow inspiratory effort, clear to auscultation bilaterally, diminished BLL  Cardiovascular: Regular rate and rhythm, normal S1S2, no murmur, rub or gallop noted  GI: Soft, nondistended, active bowel sounds  Skin/Integumen: Warm, dry, scattered ecchymoses throughout, noted mottling BLE specifically on knees  Neuro: Awake but lethargic, PERRL, CN II-XII intact, no obvious BUE pronator drift, able to push/pull/grasp with strong equal strength, able to dorsi/plantar flex bilateral feet  Psych:  Calm  Extremities: Generalized nonpitting edema throughout    Data     IMAGING: (X-ray/CT/MRI)   Recent Results (from the past 24 hours)   Echocardiogram Complete   Result Value    LVEF  >70%    Narrative    [id number]  [id number]  [id number]  [id number]^ANN^CRISTIAN     [facility]  Echocardiography Laboratory  [address] [place] [address]     Name: MATTEO CASTAÑEDA  MRN: [id number]  : 1955  Study Date: 2024 09:27 AM  Age: 69 yrs  Gender: Female  Patient Location: [facility]  Reason For Study: Endocarditis  Ordering Physician: CRISTIAN JUAREZ  Referring Physician: Mustapha Velásquez MD  Performed By: Faye Cain     BSA: 1.4 m2  Height: 61 in  Weight: 91 lb  HR: 98  BP: 157/64 mmHg  ______________________________________________________________________________  Procedure  Complete Echo Adult. Definity (NDC #38097-555) given intravenously.  ______________________________________________________________________________  Interpretation Summary     The left ventricle is normal in size.  Hyperdynamic left ventricular function  The visual ejection fraction is  >70%.  Normal left ventricular wall motion  There is mild (1+) aortic regurgitation.  Technically difficult, suboptimal study. Valves were not seen well enough to  exclude vegetations.  Since the previous study, no obvious changes are present.  ______________________________________________________________________________  Left Ventricle  The left ventricle is normal in size. There is normal left ventricular wall  thickness. Hyperdynamic left ventricular function. The visual ejection  fraction is >70%. Diastolic Doppler findings (E/E' ratio and/or other  parameters) suggest left ventricular filling pressures are indeterminate.  Normal left ventricular wall motion.     Right Ventricle  The right ventricle is normal in structure, function and size.     Atria  Normal left atrial size. Right atrial size is normal. There is no atrial shunt  seen.     Mitral Valve  The mitral valve is not well visualized. There is trace mitral regurgitation.     Tricuspid Valve  The tricuspid valve is not well visualized. There is trace tricuspid  regurgitation. Right ventricular systolic pressure could not be approximated  due to inadequate tricuspid regurgitation. IVC diameter <2.1 cm collapsing  >50% with sniff suggests a normal RA pressure of 3 mmHg.     Aortic Valve  The aortic valve is not well visualized. There is mild (1+) aortic  regurgitation. No aortic stenosis is present.     Pulmonic Valve  The pulmonic valve is not well seen, but is grossly normal. There is trace  pulmonic valvular regurgitation.     Vessels  Normal size aorta. The inferior vena cava was normal in size with preserved  respiratory variability.     Pericardium  There is no pericardial effusion.     ______________________________________________________________________________  MMode/2D Measurements & Calculations  IVSd: 0.70 cm  LVIDd: 3.5 cm  LVIDs: 2.3 cm  LVPWd: 0.70 cm  FS: 35.6 %  LV mass(C)d: 62.8 grams  LV mass(C)dI: 46.5 grams/m2     Ao root diam: 3.5  cm  LA dimension: 2.9 cm  LA/Ao: 0.82  LVOT diam: 1.9 cm  LVOT area: 2.8 cm2  Ao root diam index Ht(cm/m): 2.3  Ao root diam index BSA (cm/m2): 2.6  RWT: 0.40     Doppler Measurements & Calculations  MV E max joseluis: 73.6 cm/sec  MV A max joseluis: 105.3 cm/sec  MV E/A: 0.70  MV dec time: 0.15 sec  PA acc time: 0.11 sec  E/E' avg: 10.9  Lateral E/e': 8.6  Medial E/e': 13.2  RV S Joseluis: 11.9 cm/sec     ______________________________________________________________________________  Report approved by: Rodriguez Soto MD on 12/16/2024 11:43 AM           VBG:  Recent Labs   Lab 12/16/24  1831   PHV 7.30*   PO2V 40   PCO2V 41   HCO3V 20*     Lactic acid:  Recent Labs   Lab 12/16/24  2147 12/15/24  1036   LACT 0.8 0.9     Recent Labs   Lab 12/16/24  1831   *      Latest Reference Range & Units 12/16/24 18:31   Ammonia 11 - 51 umol/L 24     Medical Decision Making       30 MINUTES SPENT BY ME on the date of service doing chart review, history, exam, documentation & further activities per the note.       STEVEN Mccray [facility]  Hospitalist-House KASSIE  Hospitalist Service  Securely message with [employer] (more info)  Text page via [employer] Paging/Directory

## 2024-12-17 NOTE — CARE PLAN
Pt arrived today to [facility] after an RRT. Alert and oriented to self only. VSS. On BiPAP at 100%. No signs of pain. IVF running. Abx running. Jaeger with good output. Turn and repo q2. Will continue to monitor tonight.

## 2024-12-17 NOTE — PROVIDER NOTIFICATION
MD Notification    Notified Person: MD    Notified Person Name: Albert Brown    Notification Date/Time: 12/16/24 8:08 PM    Notification Interaction: [employer] Messaging    Purpose of Notification: Pt MRI checklist completed. Son stated that pt is very claustrophobic. Can we get a PRN ordered? Thanks.    Orders Received: Ordered    Comments:

## 2024-12-17 NOTE — PROGRESS NOTES
[facility]    Hematology / Oncology     Date of Admission:  12/8/2024    Assessment & Plan     1.  Multiple lung nodules both lungs, at least 4 of them described to have increased in size or positive on PET scan, right upper lobe nodule biopsy positive for adenocarcinoma, NGS negative, PD-L1 TPS score 0%, left upper lobe nodule biopsy showing atypical cells but suspicious for adenocarcinoma.  Started on chemoradiotherapy with weekly carboplatin and Taxol on January 18, 2024, Taxol dose reduced based on pre-existing neuropathy, the dose was reduced slightly again due to borderline neutropenia with her fourth cycle.  Completed February 28, 2024 and achieved stable disease on the CT scan from April 2024.  Started on consolidation durvalumab immunotherapy on May 28, 2024.  CT scan in July shows minimal increase in micronodular densities but overall stable, CT in October showed further increase in the right upper lobe nodule and indeterminate liver lesion in the right hepatic lobe.  Left upper lung lesion consistent with carcinoma planning to start radiation this week 5 session over 2 weeks    2.  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    3.  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    4.  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  No evidence of autoimmune hemolytic anemia associated with checkpoint inhibitor.    5.  Electrolyte abnormalities hyponatremia hypokalemia and hypomagnesemia with nausea and weakness.  Management included internal medicine team.    6.  Pneumonia with acute respiratory failure, currently on BiPAP.     Plan:  Supportive therapy per hospitalist, patient is critical at this point.  She is on IV steroids as well.    I discussed  with the son that DNR/DNI is appropriate based on her metastatic cancer that is incurable and frail overall status.  Comfort care is considered by the primary team.    Hyponatremia can be associated with checkpoint inhibitors, she is seen currently by nephrology.  If overall health does not improve, we will reconsider the role of immunotherapy in her treatment plan, her disease burden is not highly and holding treatment for a while as an option especially if she is going to have stereotactic radiation to the left upper lobe lesion.    Radiation was notified to decide on starting localized radiation to the left upper lung lesion.  Discussed with Dr. Cowan on December 10, if she has improvement in her condition may start while she is in the hospital.  Otherwise she can start radiation in the next week or so as an outpatient.          Jean Garcia MD    Code Status    No CPR- Do NOT Intubate    Reason for Consult   Reason for consult: Lung cancer, anemia    Primary Care Physician   Mustapha Velásquez    Chief Complaint   Nausea and weakness    Medical records reviewed, history obtained and patient examined    History of Present Illness   Kezia Dixon is a 69 year old female who presents with non-small cell lung cancer as detailed below presented to hospital with nausea and weakness.  She denies bleeding no melena hematochezia.  She was supposed to start radiation to lung lesion today for 5 sessions over 2 weeks and to continue immunotherapy scheduled on December 10.  In the hospital she was found to have electrolyte abnormality and hemoglobin 9.5.  Iron levels consistent with chronic illness and the ferritin was 196 argues against iron deficiency.  B12 normal 590 and folic acid normal 16.7.  She has been on durvalumab maintenance therapy.  Last hemoglobin office was 10.4 on November 26     PET/CT was obtained on October 22 which showed enlargement of peripherally FDG avid cavitary lesion in the posterior  right lung inflammatory versus infectious cannot rule out recurrent neoplasm.  Suspicion for left upper lobe primary lung carcinoma.  I discussed PET scan imaging with Dr. Cowan, the left upper lung lesion appears to be new neoplasm and is easily amenable for radiation SRS therapy.  The right cavitary lesion is an area which overlaps with prior radiation to the lung and esophagus cancer and did not recommend radiation to that lesion.  One of the other possibility is posttreatment changes inflammatory versus infectious.    I reviewed the PET scan images with Dr. Riojas, the changes on the right lung by the cavitary lesions are indeterminate and not easy to biopsy due to the nature of the distorted area and the central location, she is not a candidate for wedge or segment resection on the right, if surgery is indicated she will need pneumonectomy which will not be tolerated by her.  The left upper lesion is consistent with malignancy.  His recommendation is to radiate the left upper lesion SRS and I talked with Dr. Cowan who agrees on that strategy.  In regard to the right lung changes contrast CT chest in 3 months from the PET/CT will be reasonable.    Interval history December 10, 2024.  She feels better except has increase in the cough which started several days ago.  She has great to greenish sputum.  She was diagnosed with COPD by pulmonary about a year ago and she uses inhalers regularly.  Magnesium is normal and she has low potassium 3.3 and sodium 125    December 11, 2024:  Cough improved on Tessalon and Guaifenesin.  She feels better overall but continues to feel weak.  Potassium 4.4 and last sodium 129 and magnesium 1.8.    December 12, 2024:  She was started on antibiotic IV ceftriaxone and azithromycin for probable community-acquired pneumonia, CRP was high.  Potassium 3.9 magnesium 1.7 phosphorus 3.1.  Hemoglobin 9.3 stable white count and platelets normal.      December 16, 2024:  She feels weaker  this week, she was seen by nephrology for JESSICA and hyponatremia.  Her cough improved after treated with antibiotics for pneumonia.  Dietitian service working with the patient for nutrition.  Code stroke was called on December 15 and CT head was obtained showing:  HEAD CT:  1.  No CT evidence for acute intracranial process.  2.  Brain atrophy and presumed chronic microvascular ischemic changes as above.     HEAD CTA:   1.  No significant stenosis, aneurysm, or high flow vascular malformation identified.  2.  Variant [place] anatomy as above.     NECK CTA:  1.  No significant stenosis of the bilateral internal carotid arteries.  2.  Interval development of groundglass density opacity in the posterior aspect of the left upper lung lobe, compatible with pneumonia in appropriate clinical setting.     Noncontrast CT imaging findings were discussed with Dr. Bautista of the clinical service at 9:02 AM. CTA imaging findings were discussed with Sarah Bautista of the clinical service at 9:12 AM on 12/15/2024.    December 17, 2024:  She had worsening shortness of breath and oxygen saturation and required BiPAP currently on station 33.  Son at bedside and aware of the significant development.  Patient is DNR/DNI but willing to continue with BiPAP mask.  She appears at discomfort from the mask, she is alert    Oncological history:  Office note from October 15, 2024  Kezia is a 68-year-old woman with history of esophageal cancer and squamous cell lung cancer as detailed below:    1.  Squamous cell carcinoma of the esophagus status post induction chemotherapy (carboplatin/paclitaxel) and radiation therapy, followed by esophagectomy in 2015.    2.  Squamous cell carcinoma of the lung status post thoracotomy and right lower lobectomy in October 2018 for 7 mm grade 2 squamous cell carcinoma of the right lower lobe.    She has been followed by thoracic oncology with scans and had lung nodules.  CT scan CAP November 6, 2023  shows a large irregular cavitary nodule 2.3 x 1.5 cm posterior right upper lobe previously 1.9 x 0.9 cm.  Several adjacent right upper lobe nodules again noted.  1 of these is 1.1 x 0.8 cm increased from previously 0.9 x 0.7 cm.  There are other stable adjacent right upper lobe nodules.  Left upper lobe irregular nodule 1.3 x 0.9 cm compared to 0.9 x 0.6 cm previously.  Adjacent nodularity also noted in the region.  There are other stable nodules.  Stable small right pleural fluid and right pleural thickening.  The CT chest was compared to April 10, 2023.    PET/CT November 10, 2023 shows irregular cavitary nodule in the right upper lobe 2.2 x 1.6 cm demonstrate hypermetabolic activity along its medial and inferior aspects which extends to the minor fissure SUV max 9.6.  Additional hypermetabolic 1.3 x 0.7 cm right upper lobe nodule SUV max 10.7 which is superior to the described nodule.  FDG avid 0.6 x 0.5 cm nodule in the medial left upper lobe with SUV max of 2.2.  Minimal FDG uptake associated with irregular nodule in the left lateral upper lobe 1.4 x 1.4 cm SUV max of 0.9.    Underwent EBUS December 6, 2023 with biopsies:  Right upper lobe nodule EBUS non-small cell carcinoma favor adenocarcinoma.  Left upper lobe lung EBUS atypical cells suspicious for adenocarcinoma.  Lymph nodes stations 4L, 4R, 11 R interlobar negative for malignant cells.  PD-L1 tumor proportion score 0%, NGS lung panel negative    Case was discussed in thoracic oncology tumor conference, MRI of the brain was suggested and was negative for metastatic disease, chemoradiation was recommended for her right upper lung lesion and watchful waiting for the left lung lesion based on atypical cells on biopsy.  Patient has mild sensory neuropathy without painful component or interference with daily function and weekly carboplatin and Taxol was chosen along with radiation, the initial dose of Taxol was reduced based on the neuropathy baseline.  Next  generation sequencing panel was negative.  PDL TPS score was 0%    Our team has discussed the approach regarding her left lung lesion and the decision was to hold off treatment to avoid extended radiation exposure between both lungs.  The left lung lesion will be monitored with subsequent scan, there is a possibility that the lesion may shrink with the treatment of chemotherapy and surgical resection as an option for wedge or limited resection otherwise stereotactic radiation can be considered, additional tissue diagnosis may be needed if surgery is not decided.    She was started on radiation along with weekly carboplatin and Taxol reduced dose on January 18, 2024.  Completed chemotherapy February 29, 2024.    CT scan of chest April 1, 2024 right upper lobe nodules have decreased in size slightly.  The largest cavitary nodule in the right upper lobe connects to a small right upper lobe bronchus.  Left upper lobe nodule is slightly increased currently 1.3 cm compared to 1.1 cm previously.    Based on the lack of progression she was started on durvalumab immunotherapy consolidation on May 28, 2024.    CT July 18, 2024 shows new micronodule opacities at the left upper lobe extending to the lingula could be infection or inflammation.  Cavitary lesion in the right upper lobe is stable in size.  Additional satellite nodules are stable in size.  Stable prominent indeterminate left upper lobe nodule.  Interval decrease in consolidation on an interstitial thickening surrounding the cavitary lesion.  Few nodules at the left upper lobe are slightly more prominent.  Stable small right pulmonary and fluid.  New identified but small inferior mediastinal lymph nodes are technically indeterminate.    CT chest October 10, 2024 shows increased size of the right upper lobe cavitary lesion with surrounding consolidative opacities may be due to evolving postradiation changes.  The size is 3.2 x 2.1 cm compared to 2.4 x 1.7 cm.  Few  satellite nodules stable.  Left upper lobe irregular nodule 14 x 10 mm stable since July.  But gradually increased in size compared to December concerning for neoplasm.  Indeterminate small enhancing focus in the posterior right hepatic lobe 1.7 x 1.0 cm and benign perfusional abnormality.  Interval History  She is here today for follow-up and to continue with durvalumab.  She had a CT scan last week and here to review the results.  She has been anxious about the results she read on the Internet and her blood pressure today is higher.  She has high blood pressure and has been working with her cardiologist and the plan is to regroup in 3 weeks with her cardiologist to review her readings.  She does take her blood pressure medications regularly.  No increasing shortness of breath or cough.  She requests a refill on Compazine which she uses for intermittent nausea associated with treatment.  CBC from today is normal and her chemistry panel and thyroid test have been normal.  CT was reviewed showing some increase in her right upper lung nodule as detailed above          Past Medical History   I have reviewed this patient's medical history and updated it with pertinent information if needed.   Past Medical History:   Diagnosis Date    Alcohol use disorder, severe, dependence (H) 10/14/2016    Alcoholism (H) 10/14/2016    Anemia     Benign essential hypertension 10/14/2016    Carotid artery disease (H)     mile plaque 5/7    Chemical dependency (H)     alcohol    COPD (chronic obstructive pulmonary disease) (H)     Coronary artery disease     Depression with anxiety     Esophageal cancer (H) 03/22/2016    SQUAMOUS CELL    Esophageal mass     GERD (gastroesophageal reflux disease)     Hx of pancreatitis 2003    Hypercholesteremia     Hyperglycemia     Hyperlipemia     Impaired fasting glucose 10/14/2016    Nocturnal leg cramps     Other chronic pain     Pancreatic pseudocyst 10/14/2016    Pancreatitis     with pseudocyst     Pap smear with atypical squamous cells, cannot exclude high grade squamous intraepithelial lesion (ASC-H) 10/14/2016    Colpo impression benign, ECC benign. Cotestin in 1 yr.    Shingles     Squamous cell carcinoma of right lung (H)     Vasomotor rhinitis        Past Surgical History   I have reviewed this patient's surgical history and updated it with pertinent information if needed.  Past Surgical History:   Procedure Laterality Date    AAA REPAIR      splenic artery aneurysm embolization    ABDOMEN SURGERY  2/2/2016    COLONOSCOPY  11/26/2013    Procedure: COMBINED COLONOSCOPY, SINGLE BIOPSY/POLYPECTOMY BY BIOPSY;  COLONOSCOPY (MAC);  Surgeon: Montana Rosa MD;  Location:  GI    COLONOSCOPY  11/26/2013    COLONOSCOPY N/A 10/4/2018    Procedure: COMBINED COLONOSCOPY, SINGLE OR MULTIPLE BIOPSY/POLYPECTOMY BY BIOPSY;  colonoscopy;  Surgeon: Gio Apodaca MD;  Location:  GI    ENT SURGERY      ESOPHAGOGASTRECTOMY N/A 3/22/2016    Procedure: ESOPHAGOGASTRECTOMY;  Surgeon: Alvin Riojas MD;  Location:  OR    ESOPHAGOSCOPY, GASTROSCOPY, DUODENOSCOPY (EGD), COMBINED N/A 12/22/2015    Procedure: COMBINED ENDOSCOPIC ULTRASOUND, ESOPHAGOSCOPY, GASTROSCOPY, DUODENOSCOPY (EGD), FINE NEEDLE ASPIRATE/BIOPSY;  Surgeon: Danelle Michael MD;  Location:  GI    GASTROSTOMY, INSERT TUBE, COMBINED N/A 2/2/2016    Procedure: COMBINED GASTROSTOMY, INSERT TUBE (OPEN);  Surgeon: Alvin Riojas MD;  Location:  OR    GI SURGERY  12/22/2015    HAND SURGERY      right    HERNIORRHAPHY INCISIONAL (LOCATION) N/A 3/19/2019    Procedure: LAPARSCOPIC  INCISIONAL HERNIA REPAIR WITH MESH, LAPARSCOPIC LYSIS OF ADHENSIONS;  Surgeon: Lamberto Magaña MD;  Location:  OR    INSERT PORT VASCULAR ACCESS N/A 12/28/2015    Procedure: INSERT PORT VASCULAR ACCESS;  Surgeon: Alvin Riojas MD;  Location:  OR    INSERT PORT VASCULAR ACCESS N/A 1/9/2024    Procedure: SMART PORT  PLACEMENT;  Surgeon: Alvin Riojas MD;  Location: [facility] OR    LAPAROSCOPIC CHOLECYSTECTOMY N/A 3/19/2019    Procedure: LAPAROSCOPIC CHOLECYSTECTOMY;  Surgeon: Lamberto Magaña MD;  Location: [facility] OR    LOBECTOMY LUNG Right 10/16/2018    Procedure: LOBECTOMY LUNG;  Surgeon: Alvin Riojas MD;  Location: [facility] OR    REMOVE PORT VASCULAR ACCESS Left 2016    Procedure: REMOVE PORT VASCULAR ACCESS;  Surgeon: Alvin Riojas MD;  Location: [facility] OR    THORACOTOMY Right 10/16/2018    Procedure: REDO RIGHT THORACTOMY AND RIGHT LOWER LOBECTOMY, PLEURAL LYSIS;  Surgeon: Alvin Riojas MD;  Location: [facility] OR    TONSILLECTOMY      VASCULAR SURGERY         Prior to Admission Medications   Prior to Admission Medications   Prescriptions Last Dose Informant Patient Reported? Taking?   ANORO ELLIPTA 62.5-25 MCG/ACT oral inhaler 2024 Morning  Yes Yes   Sig: Inhale 1 puff into the lungs daily.   albuterol (PROAIR HFA/PROVENTIL HFA/VENTOLIN HFA) 108 (90 Base) MCG/ACT inhaler Past Month  Yes Yes   Sig: Inhale 2 puffs into the lungs every 6 hours as needed for shortness of breath, wheezing or cough.   aspirin (ASA) 81 MG EC tablet 2024 Morning  Yes Yes   Si tablet every other day   atorvastatin (LIPITOR) 40 MG tablet 2024 Morning  No Yes   Sig: Take 1 tablet (40 mg) by mouth daily.   durvalumab 2024  Yes Yes   Sig: Inject into the vein every 14 days.   fluticasone (FLONASE) 50 MCG/ACT nasal spray 2024 Morning Self No Yes   Sig: INSTILL 2 SPRAYS INTO BOTH NOSTRILS DAILY.   furosemide (LASIX) 40 MG tablet 2024  No Yes   Sig: Take 0.5 tablets (20 mg) by mouth daily as needed (edema or shortness of breath) For worsening shortness of breath, leg swelling or weight gain.   Patient taking differently: Take 20-40 mg by mouth daily as needed (edema or shortness of breath). For worsening shortness of breath, leg swelling or weight gain.   gabapentin (NEURONTIN) 600 MG tablet  12/8/2024 Morning  No Yes   Sig: TAKE ONE (1) TABLET BY MOUTH THREE TIMES DAILY.   ipratropium (ATROVENT) 0.06 % nasal spray 12/8/2024 Morning  No Yes   Sig: Spray 2 sprays into both nostrils 4 times daily.   losartan (COZAAR) 50 MG tablet 12/8/2024 Morning  No Yes   Sig: Take 1 tablet (50 mg) by mouth daily. Appointment required for further refills   metoprolol succinate ER (TOPROL XL) 50 MG 24 hr tablet 12/8/2024 Morning  No Yes   Sig: Take 1 tablet (50 mg) by mouth every morning.   multivitamin w/minerals (MULTI-VITAMIN) tablet 12/8/2024 Morning  Yes Yes   Sig: Take 1 tablet by mouth daily.   omeprazole (PRILOSEC) 40 MG DR capsule 12/8/2024 Morning  No Yes   Sig: TAKE 1 CAPSULE BY MOUTH EVERY DAY   ondansetron (ZOFRAN) 4 MG tablet Past Week  Yes Yes   Sig: Take 8 mg by mouth every 8 hours as needed for nausea.   prochlorperazine (COMPAZINE) 10 MG tablet Past Week  Yes Yes   Sig: Take 10 mg by mouth every 6 hours as needed for nausea or vomiting.   spironolactone (ALDACTONE) 25 MG tablet 12/8/2024 Morning  No Yes   Sig: Take 0.5 tablets (12.5 mg) by mouth every morning.      Facility-Administered Medications: None     Allergies   Allergies   Allergen Reactions    Codeine Sulfate Nausea    Simvastatin Cramps     Leg cramps    Morphine      Pt unsure - pt does not believe this is an allergy of hers    Pcn [Penicillins] Rash       Social History   I have reviewed this patient's social history and updated it with pertinent information if needed. Kezia Tobar Zack  reports that she quit smoking about 9 years ago. Her smoking use included cigarettes. She started smoking about 54 years ago. She has a 11.4 pack-year smoking history. She has never used smokeless tobacco. She reports that she does not currently use alcohol. She reports that she does not use drugs.    Family History   I have reviewed this patient's family history and updated it with pertinent information if needed.   Family History   Problem Relation Age of  Onset    Depression Mother     Substance Abuse Father     Other Cancer Father         melanoma    Prostate Cancer Father     Diabetes Father     Substance Abuse Paternal Grandfather     Other Cancer Brother         melanoma    Substance Abuse Brother     Other Cancer Brother         melanoma    Other Cancer Brother         melanoma    Other Cancer Brother         melanoma       Review of Systems   The 10 point Review of Systems is negative other than noted in the HPI     Physical Exam   Temp: 96.9  F (36.1  C) Temp src: Axillary BP: 106/43 Pulse: 105   Resp: 20 SpO2: 100 % O2 Device: BiPAP/CPAP Oxygen Delivery: 10 LPM  Vital Signs with Ranges  Temp:  [96.9  F (36.1  C)-97.5  F (36.4  C)] 96.9  F (36.1  C)  Pulse:  [] 105  Resp:  [16-24] 20  BP: (105-185)/() 106/43  FiO2 (%):  [100 %] 100 %  SpO2:  [81 %-100 %] 100 %  106 lbs 14.77 oz    BiPAP mask  HEENT: Normocephalic sclera anicteric  Neck: Supple no JVD lymphadenopathy.  Lungs: Coarse breath sounds on BiPAP.  Extremities: Legs edema more on the left.  Neurological: Nonfocal, generalized weakness.  Skin: Limited exam no petechia or ecchymosis.    Data   Results for orders placed or performed during the hospital encounter of 12/08/24 (from the past 24 hours)   Sodium   Result Value Ref Range    Sodium 131 (L) 135 - 145 mmol/L   Glucose by meter   Result Value Ref Range    GLUCOSE BY METER POCT 181 (H) 70 - 99 mg/dL   Blood gas venous   Result Value Ref Range    pH Venous 7.30 (L) 7.32 - 7.43    pCO2 Venous 41 40 - 50 mm Hg    pO2 Venous 40 25 - 47 mm Hg    Bicarbonate Venous 20 (L) 21 - 28 mmol/L    Base Excess/Deficit Venous -6.1 (L) -3.0 - 3.0 mmol/L    FIO2 4     Oxyhemoglobin Venous 64 (L) 70 - 75 %    O2 Sat, Venous 65.4 (L) 70.0 - 75.0 %    Narrative    In healthy individuals, oxyhemoglobin (O2Hb) and oxygen saturation (SO2) are approximately equal. In the presence of dyshemoglobins, oxyhemoglobin can be considerably lower than oxygen saturation.    Ammonia   Result Value Ref Range    Ammonia 24 11 - 51 umol/L   CK total   Result Value Ref Range     (H) 26 - 192 U/L   Lactic acid whole blood   Result Value Ref Range    Lactic Acid 0.8 0.7 - 2.0 mmol/L   MR Brain w/o & w Contrast    Narrative    EXAM: MR BRAIN W/O and W CONTRAST  LOCATION: [facility]  DATE: 12/17/2024    INDICATION: Confusion, extremity numbness.  COMPARISON: CTA head and neck 12/15/2024.  CONTRAST: 4 mL Gadavist.  TECHNIQUE: Routine multiplanar multisequence head MRI without and with intravenous contrast.    FINDINGS: Image quality is degraded by motion.    INTRACRANIAL CONTENTS: No acute or subacute infarct. No mass, acute hemorrhage, or extra-axial fluid collections. Normal brain parenchymal signal. Mild to moderate generalized cerebral atrophy. No hydrocephalus. Normal position of the cerebellar tonsils.   No pathologic contrast enhancement.    SELLA: No abnormality accounting for technique.    OSSEOUS STRUCTURES/SOFT TISSUES: Normal marrow signal. The major intracranial vascular flow voids are maintained.     ORBITS: No abnormality accounting for technique.     SINUSES/MASTOIDS: No paranasal sinus mucosal disease. No middle ear or mastoid effusion.       Impression    IMPRESSION:  1.  Image quality is degraded by motion. Within constraints, no acute intracranial process.  2.  Mild to moderate generalized brain atrophy.   CBC with platelets differential    Narrative    The following orders were created for panel order CBC with platelets differential.  Procedure                               Abnormality         Status                     ---------                               -----------         ------                     CBC with platelets and d...[[id number]]  Abnormal            Final result                 Please view results for these tests on the individual orders.   Renal panel   Result Value Ref Range    Sodium 133 (L) 135 - 145 mmol/L    Potassium  3.4 3.4 - 5.3 mmol/L    Chloride 102 98 - 107 mmol/L    Carbon Dioxide (CO2) 19 (L) 22 - 29 mmol/L    Anion Gap 12 7 - 15 mmol/L    Glucose 117 (H) 70 - 99 mg/dL    Urea Nitrogen 31.4 (H) 8.0 - 23.0 mg/dL    Creatinine 0.66 0.51 - 0.95 mg/dL    GFR Estimate >90 >60 mL/min/1.73m2    Calcium 8.3 (L) 8.8 - 10.4 mg/dL    Albumin 2.4 (L) 3.5 - 5.2 g/dL    Phosphorus 3.2 2.5 - 4.5 mg/dL   Magnesium   Result Value Ref Range    Magnesium 2.1 1.7 - 2.3 mg/dL   CBC with platelets and differential   Result Value Ref Range    WBC Count 24.7 (H) 4.0 - 11.0 10e3/uL    RBC Count 3.01 (L) 3.80 - 5.20 10e6/uL    Hemoglobin 9.1 (L) 11.7 - 15.7 g/dL    Hematocrit 26.9 (L) 35.0 - 47.0 %    MCV 89 78 - 100 fL    MCH 30.2 26.5 - 33.0 pg    MCHC 33.8 31.5 - 36.5 g/dL    RDW 14.9 10.0 - 15.0 %    Platelet Count 407 150 - 450 10e3/uL    % Neutrophils 91 %    % Lymphocytes 3 %    % Monocytes 2 %    % Eosinophils 0 %    % Basophils 0 %    % Immature Granulocytes 4 %    NRBCs per 100 WBC 0 <1 /100    Absolute Neutrophils 22.5 (H) 1.6 - 8.3 10e3/uL    Absolute Lymphocytes 0.6 (L) 0.8 - 5.3 10e3/uL    Absolute Monocytes 0.5 0.0 - 1.3 10e3/uL    Absolute Eosinophils 0.0 0.0 - 0.7 10e3/uL    Absolute Basophils 0.1 0.0 - 0.2 10e3/uL    Absolute Immature Granulocytes 1.1 (H) <=0.4 10e3/uL    Absolute NRBCs 0.0 10e3/uL   Glucose by meter   Result Value Ref Range    GLUCOSE BY METER POCT 131 (H) 70 - 99 mg/dL   XR Chest Port 1 View    Narrative    XR CHEST PORT 1 VIEW  12/17/2024 9:15 AM       INDICATION: hypoxia; RRT  COMPARISON: 12/15/2024       Impression    IMPRESSION: Esophagectomy and gastric pull-through. Left subclavian  port.    New opacity in the right mid and lower lung consistent with pneumonia.  Interstitial infiltrate in the left lung also slightly increased.  Probable trace right pleural effusion. No pneumothorax.    DELROY LOGAN MD         SYSTEM ID:  [id number]   Lactic acid whole blood   Result Value Ref Range    Lactic Acid 0.9 0.7  - 2.0 mmol/L   Blood gas venous   Result Value Ref Range    pH Venous 7.31 (L) 7.32 - 7.43    pCO2 Venous 37 (L) 40 - 50 mm Hg    pO2 Venous 48 (H) 25 - 47 mm Hg    Bicarbonate Venous 19 (L) 21 - 28 mmol/L    Base Excess/Deficit Venous -6.9 (L) -3.0 - 3.0 mmol/L    FIO2 100     Oxyhemoglobin Venous 77 (H) 70 - 75 %    O2 Sat, Venous 77.8 (H) 70.0 - 75.0 %    Narrative    In healthy individuals, oxyhemoglobin (O2Hb) and oxygen saturation (SO2) are approximately equal. In the presence of dyshemoglobins, oxyhemoglobin can be considerably lower than oxygen saturation.   Potassium   Result Value Ref Range    Potassium 3.8 3.4 - 5.3 mmol/L

## 2024-12-17 NOTE — PLAN OF CARE
Goal Outcome Evaluation:  Orientation: A/O to self and place; confused   Aggression Stop Light: Green  Activity: A2 andrew-steady   Diet/BS Checks: Easy chew  Tele: NSR  IV Access/Drains: L port infusing NS 50ml   Pain Management: Denies oain   Abnormal VS/Results: VSS on 6L oxymask   Bowel/Bladder: Jaeger in place; No BM this shift   Skin/Wounds: scattered bruising  Consults: Ortho, Speech, Neph   D/C Disposition: pending   Other Info:  -MRI done this shift, see results  -tried triturating o2 to 3L, pt o2 dropped to 88-92, put pt back on 5L but stayed the same. Switched pt to oxy mask at 10L, o2 went up to 96, titrated to 6L and has been stable at 96.

## 2024-12-17 NOTE — PROGRESS NOTES
[id number]  Orientation: Alert and oriented x2; disoriented to time and situation. Lethargic/restless  Aggression Stop Light: green  Activity: Ax2 with lift; tried getting patient out of bed during shift but pt was too weak. Turn and repo  Diet/BS Checks: Easy to chew diet, thin liquids. No straws. Pills crushed in apple sauce  IV Access/Drains: L Port infusing NS @ 50 mL/hr   Pain Management: denies pain  Abnormal VS/Results: VSS on 2L NC. Sodium 131; nephrologist aware  Bowel/Bladder: Bladder scan 529; sanchez in place  Skin/Wounds: Blanchable redness to coccyx, dry skin. bruising scattered. Mottling to BLE  Consults: hem/onc, nutrition, nephrology  D/C Disposition: Pending  Other Info: RRT called at 1730; see notes

## 2024-12-17 NOTE — PROVIDER NOTIFICATION
MD Notification    Notified Person: MD    Notified Person Name: On Call Nephrologist    Notification Date/Time: 12/16/24 6:38 PM     Notification Interaction: [employer]     Purpose of Notification: Hospitalist wanted to relay the current Na of 131    Orders Received: Lower continuous fluids to 50mL/hr and recheck in the morning     Comments:

## 2024-12-17 NOTE — PROGRESS NOTES
Brief Progress Note    Went to see patient this afternoon. Respiratory status remains tenuous--patient is on BiPap 100% FiO2 but nurse could not get O2 sats until now due to peripheral vasoconstriction, she has ongoing cool extremities and mottling in her BLE. Her ear lobe O2 sats are 100% so they will wean down. Patient has been on BiPap all day, and sleeping, RT is about to see the patient to see if there is significant secretions, and any benefit to deep suctioning. Per respiratory nothing to suction, likely deeper secretions.    Discussed with mark Young at bedside. He wants to continue medical management. Patient is critically ill. If she can be weaned down FIO2 then patient can be taken off BiPap briefly for ongoing suctioning. However, she is currently maxed out on her medical therapy . If her secretions progress (or develops mucous plugging), if patient's vitals or respiratory status decompensates, we agreed patient remains DNR/DNI, and patient would be transitioned to comfort status at that time.    Continue current cares. Prognosis is poor.    Addendum: NS bolus for hypotension, increase rate    Domingo Ramesh MD  Hospitalist

## 2024-12-17 NOTE — PROGRESS NOTES
Notes, labs, vitals reviewed. She had an RRT this AM.    AF-VSS  Cr is down to baseline.  Na is up to 133 c NS @ 50 ml/h.    1.  JESSICA.  Resolved.  2.  Hyponatremia.  Pt has a combo of SIADH + depletional etiology from poor intake. Na continues to rise slowly.  Continue NS until Na starts to drop, then stop. She isn't on a fluid restriction right now but may need one.if she starts to eat/drink more.  3.  Code Status.  DNR/DNI.    Case d/w Dr. Ramesh.

## 2024-12-17 NOTE — CODE/RAPID RESPONSE
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    Assessment & Plan   Acute Hypoxic Respiratory Failure suspect 2/2 aspiration pneumonia  Severe sepsis 2/2 above  Sinus tachycardia suspect 2/2 severe sepsis  Primary non-anion gap metabolic acidosis  Altered Mental Status suspect due to metabolic encephalopathy  RRT called for hypoxia, and increased oxygen requirements. Patient had RRT overnight for acute stridor suspected to be due upper airway secretions in setting of esophageal SCC. Please see RRT for details.  Primary Hospitalist Dr. Ramesh present at bedside upon arrival. Upon arrival is toxic appearing, pallor, lethargic, increased sternocleidomastoid accessory muscle use. Initial VS include , /101, RR 28, SpO2 75% on 15L oxymask. Patient opens eyes her eyes to repeated stimulation. ROS limited due to acuity of patient's condition. On exam skin is cool and dry. Legs are mottled. Lungs are course, with right pleural friction rub. HR tachycardic, regular.     Patient has been drooling, and experiencing difficulty managing secretions, concerning for CVA. Brain MRI was complete, which is negative for acute intracranial pathology.    Patient was being treated for pneumonia with ceftriaxone. Suspect patient had aspiration event, given RRT for copious amounts of secretions, and new RUL  consolidation.    Differentials considered include PE, ACS, aspiration vs. Healthcare associated pneumonia    Working diagnosis: Acute hypoxic respiratory failure suspect 2/2 aspiration pneumonia    INTERVENTIONS:  -VBG  -Lactic acid  -CXR per my interpretation shows new right consolidation; no pneumothorax  -Ceftriaxone changed to Cefepime per hospitalist  -Vancomycin added  -MRSA PCR  -BiPAP  -Transfer to [facility] [place], [place]    Goals of Care   Patient's son Hector came to room during RRT. Discussed patient's rapid decline, and respiratory compromise. We discussed that patient is on maximal oxygen support on BiPAP, and that her respiratory status is quite fragile. At this time, he would like us to continue restorative cares, continue antibiotics and BiPAP. If patient is unable to maintain oxygenation on BiPAP, will need to have ongoing goals of care discussion. At this time patient's son is ok with escalating care to ICU, if patient requires vasopressor support.     At end of evaluation, patient is more alert. Answering yes/no appropriately. She remains on BiPAP 15/8, on 100% FiO2. She is being transferred to [facility] [place], [place]. Discussed with and defer further cares to primary Hospitalist Dr. Ramesh.      Manuel Paul NP  [facility]  Securely message with the Vocera Web Console (learn more here)  Text page via [employer] Paging/Directory        Physical Exam   Vital Signs with Ranges:  Temp:  [97.3  F (36.3  C)-97.5  F (36.4  C)] 97.4  F (36.3  C)  Pulse:  [] 100  Resp:  [16-20] 20  BP: (105-185)/() 138/101  SpO2:  [81 %-100 %] 81 %  I/O last 3 completed shifts:  In: 100 [P.O.:100]  Out: 600 [Urine:600]        Physical Exam   General:  toxic appearing, pallor, lethargic, increased sternocleidomastoid accessory muscle use. She opens eyes her eyes to repeated stimulation.   Skin:  Cool, dry. Mottled bilateral lower extremities  HEENT:  Normocephalic, atraumatic; EOMs grossly intact.  Neck:  Supple.  Chest:   Breath sounds course, Right pleural friction rub. Increased work of breathing on 15L oxymask.  Cardiovascular:  RRR, no rub or murmur. No peripheral edema.  Abdomen:  Soft, non-tender, non-distended.  Musculoskeletal:  Moves all four extremities.   Neurological:  No new focal neurological deficits.  Psychiatric:  Affect and mood congruent.    Data   IMAGING: (X-ray/CT/MRI)   Recent Results (from the past 24 hours)   Echocardiogram Complete   Result Value    LVEF  >70%    Narrative    [id number]  [id number]  [id number]^ANN^CRISTIAN     [facility]  Echocardiography Laboratory  [address] [place] [address]     Name: MATTEO CASTAÑEDA  MRN: [id number]  : 1955  Study Date: 2024 09:27 AM  Age: 69 yrs  Gender: Female  Patient Location: [facility]  Reason For Study: Endocarditis  Ordering Physician: CRISTIAN JUAREZ  Referring Physician: Mustapha Velásquez MD  Performed By: Faye Cain     BSA: 1.4 m2  Height: 61 in  Weight: 91 lb  HR: 98  BP: 157/64 mmHg  ______________________________________________________________________________  Procedure  Complete Echo Adult. Definity (NDC #18563-332) given intravenously.  ______________________________________________________________________________  Interpretation Summary     The left ventricle is normal in size.  Hyperdynamic left ventricular function  The visual ejection fraction is >70%.  Normal left ventricular wall motion  There is mild (1+) aortic regurgitation.  Technically difficult, suboptimal study. Valves were not seen well enough to  exclude vegetations.  Since the previous study, no obvious changes are present.  ______________________________________________________________________________  Left Ventricle  The left ventricle is normal in size. There is normal left ventricular wall  thickness. Hyperdynamic left ventricular function. The visual ejection  fraction is >70%. Diastolic Doppler findings (E/E' ratio and/or  other  parameters) suggest left ventricular filling pressures are indeterminate.  Normal left ventricular wall motion.     Right Ventricle  The right ventricle is normal in structure, function and size.     Atria  Normal left atrial size. Right atrial size is normal. There is no atrial shunt  seen.     Mitral Valve  The mitral valve is not well visualized. There is trace mitral regurgitation.     Tricuspid Valve  The tricuspid valve is not well visualized. There is trace tricuspid  regurgitation. Right ventricular systolic pressure could not be approximated  due to inadequate tricuspid regurgitation. IVC diameter <2.1 cm collapsing  >50% with sniff suggests a normal RA pressure of 3 mmHg.     Aortic Valve  The aortic valve is not well visualized. There is mild (1+) aortic  regurgitation. No aortic stenosis is present.     Pulmonic Valve  The pulmonic valve is not well seen, but is grossly normal. There is trace  pulmonic valvular regurgitation.     Vessels  Normal size aorta. The inferior vena cava was normal in size with preserved  respiratory variability.     Pericardium  There is no pericardial effusion.     ______________________________________________________________________________  MMode/2D Measurements & Calculations  IVSd: 0.70 cm  LVIDd: 3.5 cm  LVIDs: 2.3 cm  LVPWd: 0.70 cm  FS: 35.6 %  LV mass(C)d: 62.8 grams  LV mass(C)dI: 46.5 grams/m2     Ao root diam: 3.5 cm  LA dimension: 2.9 cm  LA/Ao: 0.82  LVOT diam: 1.9 cm  LVOT area: 2.8 cm2  Ao root diam index Ht(cm/m): 2.3  Ao root diam index BSA (cm/m2): 2.6  RWT: 0.40     Doppler Measurements & Calculations  MV E max joseluis: 73.6 cm/sec  MV A max joseluis: 105.3 cm/sec  MV E/A: 0.70  MV dec time: 0.15 sec  PA acc time: 0.11 sec  E/E' avg: 10.9  Lateral E/e': 8.6  Medial E/e': 13.2  RV S Joseluis: 11.9 cm/sec     ______________________________________________________________________________  Report approved by: Rodriguez Soto MD on 12/16/2024 11:43 AM          Brain w/o  & w Contrast    Narrative    EXAM: MR BRAIN W/O and W CONTRAST  LOCATION: [facility]  DATE: 12/17/2024    INDICATION: Confusion, extremity numbness.  COMPARISON: CTA head and neck 12/15/2024.  CONTRAST: 4 mL Gadavist.  TECHNIQUE: Routine multiplanar multisequence head MRI without and with intravenous contrast.    FINDINGS: Image quality is degraded by motion.    INTRACRANIAL CONTENTS: No acute or subacute infarct. No mass, acute hemorrhage, or extra-axial fluid collections. Normal brain parenchymal signal. Mild to moderate generalized cerebral atrophy. No hydrocephalus. Normal position of the cerebellar tonsils.   No pathologic contrast enhancement.    SELLA: No abnormality accounting for technique.    OSSEOUS STRUCTURES/SOFT TISSUES: Normal marrow signal. The major intracranial vascular flow voids are maintained.     ORBITS: No abnormality accounting for technique.     SINUSES/MASTOIDS: No paranasal sinus mucosal disease. No middle ear or mastoid effusion.       Impression    IMPRESSION:  1.  Image quality is degraded by motion. Within constraints, no acute intracranial process.  2.  Mild to moderate generalized brain atrophy.       CBC with Diff:  Recent Labs   Lab Test 12/17/24  0558 12/16/24  0628 12/15/24  1036   WBC 24.7*   < >  --    HGB 9.1*   < >  --    MCV 89   < >  --       < >  --    INR  --   --  1.06    < > = values in this interval not displayed.      Absolute Reticulocyte (10e6/uL)   Date Value   12/15/2024 0.096 (H)     % Reticulocyte (%)   Date Value   12/15/2024 3.0 (H)       Comprehensive Metabolic Panel:  Recent Labs   Lab 12/17/24  0848 12/17/24  0558   NA  --  133*   POTASSIUM  --  3.4   CHLORIDE  --  102   CO2  --  19*   ANIONGAP  --  12   * 117*   BUN  --  31.4*   CR  --  0.66   GFRESTIMATED  --  >90   VICENTE  --  8.3*   MAG  --  2.1   PHOS  --  3.2   ALBUMIN  --  2.4*         Time Spent on this Encounter   CRITICAL CARE TIME  I spent 60 minutes of  critical care time on the unit/floor managing the care of Kezia Dixon. Upon evaluation, this patient had a high probability of imminent or life-threatening deterioration due to acute hypoxic respiratory failure which required my direct attention, intervention, and personal management. 100% of my time was spent at the bedside counseling the patient and/or coordinating care regarding services listed in this note.

## 2024-12-17 NOTE — PROVIDER NOTIFICATION
MD Notification    Notified Person: MD    Notified Person Name:  Eloise Fisher     Notification Date/Time: 12/16/2024    Notification Interaction: [employer] messaging    Purpose of Notification: Patient is retaining urine and doesn't have the urge to pee. Bladder scan 529 and has been straight cath'ed twice already. Can I place a sanchez?     Orders Received: yes place sanchez    Comments:

## 2024-12-17 NOTE — PROGRESS NOTES
"[facility]    Hospitalist Progress Note    Interval History   - RRT called last night due to stridor, hypoxia, secretions, received steroids and epi nebulizer  - This morning nurse notes patient more tachypneic, worsening O2 sats, increasing secretions. Nurse to place sanchez. Patient desatting on 15L O2 and placed on BiPap. Also noted to have secretions and improving O2 sats with clearance of some secretions.  - Ddx worsening secretions, mucous plugging, worsening infection.  - Son coming to bedside and plan to have goals of care discussions  Addendum: Discussed with son Hector. Patient is critically ill, likely hospital vs aspiration pneumonia with significant secretions. BiPap therapy is limited by patient's secretions--nurse will work on this. Developing infection during admission and known cancer are poor prognoses, we are \"threading\" the needle here and patient may decompensate at any time. He reiterated patient being DNR/DNI. He would like to continue aggressive medical management at this time, and will update family. He is very stressed currently and tearful.    Assessment & Plan   Summary: Kezia Dixon is a 69 year old female with PMH bilateral lung adenocarcinoma, on immunotherapy with plan to start SBRT of JULIEN nodule, previous esophageal cancer s/p esophagectomy and chemoradiation (9/2016), s/p lobectomy for right lung squamous cell cancer (9/2018), COPD, heavy tobacco use in past, resolved stress-induced cardiomyopathy, nonobstructive CAD, alcohol use disorder, who was admitted on 12/8/2024 with generalized weakness, physical deconditioning, acute on chronic nausea, anorexia.      Probable community-acquired pneumonia, organism unspecified  Acute on chronic hypoxic respiratory failure 12/17, likely due to worsening pneumonia, aspiration vs hospital acquired  Leukocytosis due to above, and steroids  Acute metabolic encephalopathy due to above  Patient reported increased cough " with productive sputum, afebrile, no leukocytosis initial, procalcitonin borderline at 0.25, . COVID-19/influenza/RSV negative. CXR on admission showed opacities in the right midlung, not significantly changed from before.    Received IV ceftriaxone 12/11-12/13, IV cefepime 12/15, resumed IV ceftriaxone on 12/16.  Completed 5 days of azithromycin 12/11-12/15. CXR 12/15 showed improved right mid and lower lobe radiation.  No focal consolidation.   Note worsening respiratory status 12/16 to 12/17, increasing secretions, stridor, hypoxia. Desatting on 15L O2 on 12/17, partially due to secretions, requiring bipap on 12/17. Etiology includes worsening secretions, mucous plugging, worsening infection such as hospital acquired pneumonia, aspiration pneumonia.  - Stat CXR, lactic acid, VBG  - Switch to Cefepime  - Insert sanchez cath  - Goals of care conversation with son when he arrives  - SeroquelYESSENIAyprexa for agitation  - [facility] status  - appreciate house provider assistance with rapid and management of sepsis    Possible COPD exacerbation due to above  CRP elevated at 206. Started on prednisone 30 mg daily for 5 days on 12/13 for COPD exacerbation. CRP improved to 67 on 12/16. Cough and shortness of breath improved   Worsening respiratory status 12/17 as noted above  - Continue Mucinex tablet, Anoro  -continue Tessalon Perles 3 times daily  - Increase steroids to solumedrol 60mg IV daily     Acute on chronic nausea  Anorexia  Chronic abdominal pain  Moderate malnutrition due to acute and chronic illness  Mild oropharyngeal dysphagia  Has chronic nausea and abdominal pain for several years.  Reported significant anorexia and poor intake for 3-4 days before admission.  Abdominal pain stable. CT A/P and CXR on admission negative for any new acute findings.  Improved with supportive care.  Nausea improved.  Was tolerating some diet, but oral intake poor this admission. Consult speech therapy on 12/16 due to concern of  dysphagia--none appreciated.  - Continue Ensure, multivitamin and diet as able  - Nutritional services consulted.  Continue nutrition supplements as per RD     Hyponatremia, improved  Took 40 mg p.o. Lasix for 3 consecutive days prior to admission. Baseline sodium 134-140.  Sodium 127 on admission, improved to 130 on 12/11.  Sodium then declined to 118 on 12/14 with JESSICA, oliguria, improved with hypertonic saline and then IVF.  Urine osm 270, serum osm 266, Urine Na < 20, TSH normal  Nephrology  consulted. They suspect hypovolemia + SIADH.  - Snacks, supplements  - Daily sodium     JESSICA 12/14/2024, likely due to ATN, improved  Oliguria, improved  Urine output significantly diminished on 12/14. Cr doubled, 0.45 -->1.03. Admission CT showed no evidence of obstruction. Lasix and spironolactone held since admission.  Losartan placed on hold on 12/15. Renal infuction improved with IVF, Cr improved to 0.66 on 12/17.  - Appreciate Nephrology input  - I&O     Urinary retention  Patient has required straight cath twice since admission.  - Indwelling sanchez catheter ordered      Hypokalemia   Hypomagnesemia  Hypophosphatemia  - Replete per protocols  - Discontinue Lasix     Mottled right lower extremity, 12/15/2024  Arterial US 12/15 showed no high grade femoral-popliteal stenosis. Recent venous US 12/8/2024 showed no DVT. Mottling much improved on 12/16. Noted ongoing 12/17.  - Continue to monitor    Acute metabolic encephalopathy  Noted on 12/15 am, patient noted to be confused, not oriented. Complained of RUE numbness and slurred speech. Head CT and CTA unremarkable. Presentation likely due to encephalopathy.  Stroke neurology was consulted. Patient improved throughout the day on 12/15.  Confused again in a.m. of 12/16. MRI brain on 12/16 was done, limited by artifact but no clear CVA.  - Treat respiratory issues above     Generalized weakness  Physical deconditioning  Has been more weak and deconditioned since recent  hospitalization 2-3 weeks ago  - PT/OT consulted-TCU recommended.   following for discharge planning     Multiple bilateral pulmonary nodules due to adenocarcinoma, 2023  S/p bronchoscopy with bronchial ultrasound-guided biopsy.  Pathology from right upper lobe nodule was positive for small cell lung cancer (adenocarcinoma).  Biopsy from left upper lobe nodule showed atypical cells.  This was suspicious but not definitive for adenocarcinoma.  Lymph nodes were negative. S/p chemoradiation (of RUL) completed 2/2024. Started on consolidation durvalumab immunotherapy 5/2024.  Last dose was on 11/25/2024. Most recent CT scans 10/2024 showed further increase in RUL and JULIEN nodules and indeterminate right hepatic lobe lesion.  There was plan to start stereotactic body radiotherapy (SBRT) to the JULIEN nodule on 12/9. 5 session over 2 weeks planned.  Now deferred.  Will likely start on outpatient  - [place] [facility] follow-up        Chronic/Stable/Resolved    Acute normocytic anemia  Hemoglobin was 12.6 on 11/20, now down to 9.4 on admission.  Stable during hospital stay.  No obvious bleeding. [place] [facility] consulted.  Labs do not indicate hemolysis-normal bilirubin, normal LDH, elevated haptoglobin Iron studies showed low iron saturation of 8%, low iron binding capacity, normal vitamin B12 at 519, normal TSH, reticulocyte count 1.4. 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  Continue Toprol-XL at a lower dose of 25 mg daily  -Echo 12/16 showed EF >70%, hyperdynamic, no WMA, mild aortic regurgitation.  Valves were not well-visualized to exclude vegetations.      Nonobstructive coronary artery disease  PTA-aspirin 81 mg every other day  - Continue aspirin and atorvastatin     Alcohol use disorder  Apparently has been sober for 2 years.  Recently had relapse of alcohol intake when she found out about worsening lung cancer.  She was assessed by chemical dependency.  She declined psychiatric consultation.  -has not been drinking any alcohol since her hospital discharge     Diabetes mellitus type 2, new diagnosis  -Previously had prediabetes.  A1c on admission 7.2  - Follow-up with PCP for monitoring     RLE swelling  -Ultrasound negative for DVT.  -Recommend patient use compression stockings and leg elevation for edema.  Avoid Lasix and spironolactone    Clinically Significant Risk Factors         # Hyponatremia: Lowest Na = 124 mmol/L in last 2 days, will monitor as appropriate       # Hypoalbuminemia: Lowest albumin = 2.4 g/dL at 12/17/2024  5:58 AM, will monitor as appropriate     # Hypertension: Noted on problem list    # Chronic heart failure with preserved ejection fraction: heart failure noted on problem list and last echo with EF >50%    # Acute Hypoxic Respiratory Failure: Documented O2 saturation < 90%. Continue supplemental oxygen as needed        # DMII: A1C = 7.2 % (Ref range: <5.7 %) within past 6 months    # Severe Malnutrition: based on nutrition assessment      # Financial/Environmental Concerns: none  # COPD: noted on problem list         PT/OT: ordered  Diet: Snacks/Supplements Adult: Ensure Clear; With Meals  Snacks/Supplements Adult: Gelatein 20 (sugar-free); With Meals  Combination Diet Thin Liquids (level 0) (Upright for all  meals or in a chair and upright 30 minutes after, no straws small bites/sips and alternate liquids/solids.); Easy to Chew (level 7); Thin Liquids (level 0)  Room Service    DVT Prophylaxis: Pneumatic Compression Devices  Jaeger Catheter: Not present  Lines: PRESENT      Port a Cath 01/09/24 Single Lumen Left Chest wall-Site Assessment: WDL      Cardiac Monitoring: ACTIVE order. Indication: imc  Code Status: No CPR- Do NOT Intubate    Medically Ready for Discharge: Anticipated in 5+ Days      Domingo Ramesh MD  Hospitalist Service  [facility]  Securely message with [employer] (more info)  Text page via [email or web address] Paging/Directory     I spent 30 minutes of critical care time on the unit/floor managing the care of Kezia Dixon. Upon evaluation, this patient had a high probability of imminent or life-threatening deterioration due to acute illness, which required my direct attention, intervention, and personal management. 50% of my time was spent at the bedside counseling the patient and/or coordinating care regarding services listed in this note.    Data reviewed today: I reviewed all new labs and imaging results over the last 24 hours.    Physical Exam   Temp: 97.4  F (36.3  C) Temp src: Axillary BP: (!) 138/101 Pulse: 100   Resp: 20 SpO2: (!) 81 % O2 Device: Oxymask Oxygen Delivery: 10 LPM  Vitals:    12/13/24 0653 12/14/24 0647 12/17/24 0454   Weight: 40.7 kg (89 lb 12.8 oz) 41.5 kg (91 lb 9.6 oz) 48.5 kg (106 lb 14.8 oz)     Vital Signs with Ranges  Temp:  [97.3  F (36.3  C)-97.5  F (36.4  C)] 97.4  F (36.3  C)  Pulse:  [] 100  Resp:  [16-20] 20  BP: (105-185)/() 138/101  SpO2:  [81 %-100 %] 81 %  I/O last 3 completed shifts:  In: 100 [P.O.:100]  Out: 600 [Urine:600]  O2 requirements: yes bipap    Constitutional: Thin female appears ill, confused  HEENT: Eyes nonicteric, oral mucosa moist  Cardiovascular: RRR, normal S1/2, no m/r/g  Respiratory: Ronchorous, mild crackles, no  clear wheezing  Vascular: No LE pitting edema  GI: Normoactive bowel sounds  Skin/Integumen: No rashes  Neuro/Psych: Confused, not alert or oriented, follows basic commands, moves all extremities    Medications   Current Facility-Administered Medications   Medication Dose Route Frequency Provider Last Rate Last Admin    sodium chloride 0.9 % infusion   Intravenous Continuous Mathew Caraballo MD 50 mL/hr at 12/16/24 1822 Rate Change at 12/16/24 1822     Current Facility-Administered Medications   Medication Dose Route Frequency Provider Last Rate Last Admin    aspirin EC tablet 81 mg  81 mg Oral Every Other Day Vielka Martínez PA-C   81 mg at 12/16/24 0815    atorvastatin (LIPITOR) tablet 40 mg  40 mg Oral Daily Vielka Martínez PA-C   40 mg at 12/16/24 0815    benzonatate (TESSALON) capsule 200 mg  200 mg Oral TID Eloise Fisher MD   200 mg at 12/16/24 2221    ceFEPIme (MAXIPIME) 2 g vial to attach to  mL bag for ADULTS or NS 50 mL bag for PEDS  2 g Intravenous Q12H Domingo Ramesh MD        fluticasone (FLONASE) 50 MCG/ACT spray 2 spray  2 spray Both Nostrils Daily Linda Lang MD   2 spray at 12/16/24 0816    [Held by provider] gabapentin (NEURONTIN) capsule 600 mg  600 mg Oral TID Mathew Caraballo MD   600 mg at 12/16/24 2220    guaiFENesin (MUCINEX) 12 hr tablet 600 mg  600 mg Oral BID Vielka Martínez PA-C   600 mg at 12/16/24 2044    heparin lock flush 10 unit/mL injection 5-10 mL  5-10 mL Intracatheter Q24H Estiven Coyne MD   5 mL at 12/14/24 0611    heparin lock flush 100 unit/mL injection 5-10 mL  5-10 mL Intracatheter Q28 Days Estiven Coyne MD        ipratropium (ATROVENT) 0.06 % spray 2 spray  2 spray Both Nostrils 4x Daily Vielka Martínez PA-C   2 spray at 12/16/24 2044    [Held by provider] losartan (COZAAR) tablet 50 mg  50 mg Oral Daily Vielka Martínez PA-C   50 mg at 12/15/24 1200    methylPREDNISolone Na Suc (solu-MEDROL)  injection 62.5 mg  62.5 mg Intravenous Q24H Domingo Ramesh MD        metoprolol succinate ER (TOPROL XL) 24 hr tablet 25 mg  25 mg Oral Eloise Crane MD   25 mg at 12/16/24 1134    multivitamin w/minerals (THERA-VIT-M) tablet 1 tablet  1 tablet Oral Daily Vielka Martínez PA-C   1 tablet at 12/16/24 0815    pantoprazole (PROTONIX) EC tablet 40 mg  40 mg Oral QAPANCHO AC Eloise Fisher MD   40 mg at 12/16/24 0815    potassium chloride 10 mEq in 100 mL sterile water infusion  10 mEq Intravenous Q1H Domingo Ramesh  mL/hr at 12/17/24 0805 10 mEq at 12/17/24 0805    senna-docusate (SENOKOT-S/PERICOLACE) 8.6-50 MG per tablet 1 tablet  1 tablet Oral BID Vielka Martínez PA-C   1 tablet at 12/16/24 2219    Or    senna-docusate (SENOKOT-S/PERICOLACE) 8.6-50 MG per tablet 2 tablet  2 tablet Oral BID Vielka Martínez PA-C   2 tablet at 12/11/24 1033    sodium chloride (PF) 0.9% PF flush 10-20 mL  10-20 mL Intracatheter Q28 Days Estiven Coyne MD        [Held by provider] spironolactone (ALDACTONE) half-tab 12.5 mg  12.5 mg Oral QAM Vielka Martínez PA-C        umeclidinium-vilanterol (ANORO ELLIPTA) 62.5-25 MCG/ACT oral inhaler 1 puff  1 puff Inhalation Daily Vielka Martínez PA-C   1 puff at 12/16/24 0817       Data   Recent Labs   Lab 12/17/24  0848 12/17/24  0558 12/16/24  1741 12/16/24  1556 12/16/24  0628 12/15/24  1822 12/15/24  1036 12/15/24  0854   WBC  --  24.7*  --   --  27.5*  --   --  21.3*   HGB  --  9.1*  --   --  9.6*  --   --  9.7*   MCV  --  89  --   --  89  --   --  87   PLT  --  407  --   --  465*  --   --  453*   INR  --   --   --   --   --   --  1.06  --    NA  --  133*  --  131* 131*   < > 126* 125*  125*   POTASSIUM  --  3.4  --   --  3.8  --   --  3.7   CHLORIDE  --  102  --   --  99  --   --  92*   CO2  --  19*  --   --  18*  --   --  20*   BUN  --  31.4*  --   --  35.3*  --   --  30.9*   CR  --  0.66  --   --  1.00*  --   --   1.10*   ANIONGAP  --  12  --   --  14  --   --  13   VICENTE  --  8.3*  --   --  8.0*  --   --  7.7*   * 117* 181*  --  146*  --   --  175*   ALBUMIN  --  2.4*  --   --   --   --   --   --     < > = values in this interval not displayed.       Imaging:   Recent Results (from the past 24 hours)   Echocardiogram Complete   Result Value    LVEF  >70%    [facility]    [id number]  [id number]  [id number]^ANN^CRISTIAN     [facility]  Echocardiography Laboratory  [address] [place] [address]     Name: MATTEO CASTAÑEDA  MRN: [id number]  : 1955  Study Date: 2024 09:27 AM  Age: 69 yrs  Gender: Female  Patient Location: [facility]  Reason For Study: Endocarditis  Ordering Physician: CRISTIAN JUAREZ  Referring Physician: Mustapha Velásquez MD  Performed By: Faye Cain     BSA: 1.4 m2  Height: 61 in  Weight: 91 lb  HR: 98  BP: 157/64 mmHg  ______________________________________________________________________________  Procedure  Complete Echo Adult. Definity (NDC #84579-983) given intravenously.  ______________________________________________________________________________  Interpretation Summary     The left ventricle is normal in size.  Hyperdynamic left ventricular function  The visual ejection fraction is >70%.  Normal left ventricular wall motion  There is mild (1+) aortic regurgitation.  Technically difficult, suboptimal study. Valves were not seen well enough to  exclude vegetations.  Since the previous study, no obvious changes are present.  ______________________________________________________________________________  Left Ventricle  The left ventricle is normal in size. There is normal left ventricular wall  thickness. Hyperdynamic left ventricular function. The visual ejection  fraction is >70%. Diastolic Doppler findings (E/E' ratio and/or other  parameters) suggest left ventricular filling pressures are indeterminate.  Normal left ventricular wall motion.      Right Ventricle  The right ventricle is normal in structure, function and size.     Atria  Normal left atrial size. Right atrial size is normal. There is no atrial shunt  seen.     Mitral Valve  The mitral valve is not well visualized. There is trace mitral regurgitation.     Tricuspid Valve  The tricuspid valve is not well visualized. There is trace tricuspid  regurgitation. Right ventricular systolic pressure could not be approximated  due to inadequate tricuspid regurgitation. IVC diameter <2.1 cm collapsing  >50% with sniff suggests a normal RA pressure of 3 mmHg.     Aortic Valve  The aortic valve is not well visualized. There is mild (1+) aortic  regurgitation. No aortic stenosis is present.     Pulmonic Valve  The pulmonic valve is not well seen, but is grossly normal. There is trace  pulmonic valvular regurgitation.     Vessels  Normal size aorta. The inferior vena cava was normal in size with preserved  respiratory variability.     Pericardium  There is no pericardial effusion.     ______________________________________________________________________________  MMode/2D Measurements & Calculations  IVSd: 0.70 cm  LVIDd: 3.5 cm  LVIDs: 2.3 cm  LVPWd: 0.70 cm  FS: 35.6 %  LV mass(C)d: 62.8 grams  LV mass(C)dI: 46.5 grams/m2     Ao root diam: 3.5 cm  LA dimension: 2.9 cm  LA/Ao: 0.82  LVOT diam: 1.9 cm  LVOT area: 2.8 cm2  Ao root diam index Ht(cm/m): 2.3  Ao root diam index BSA (cm/m2): 2.6  RWT: 0.40     Doppler Measurements & Calculations  MV E max joseluis: 73.6 cm/sec  MV A max joseluis: 105.3 cm/sec  MV E/A: 0.70  MV dec time: 0.15 sec  PA acc time: 0.11 sec  E/E' avg: 10.9  Lateral E/e': 8.6  Medial E/e': 13.2  RV S Joseluis: 11.9 cm/sec     ______________________________________________________________________________  Report approved by: Rodriguez Soto MD on 12/16/2024 11:43 AM         MR Brain w/o & w Contrast    Narrative    EXAM: MR BRAIN W/O and W CONTRAST  LOCATION: [facility]  DATE:  12/17/2024    INDICATION: Confusion, extremity numbness.  COMPARISON: CTA head and neck 12/15/2024.  CONTRAST: 4 mL Gadavist.  TECHNIQUE: Routine multiplanar multisequence head MRI without and with intravenous contrast.    FINDINGS: Image quality is degraded by motion.    INTRACRANIAL CONTENTS: No acute or subacute infarct. No mass, acute hemorrhage, or extra-axial fluid collections. Normal brain parenchymal signal. Mild to moderate generalized cerebral atrophy. No hydrocephalus. Normal position of the cerebellar tonsils.   No pathologic contrast enhancement.    SELLA: No abnormality accounting for technique.    OSSEOUS STRUCTURES/SOFT TISSUES: Normal marrow signal. The major intracranial vascular flow voids are maintained.     ORBITS: No abnormality accounting for technique.     SINUSES/MASTOIDS: No paranasal sinus mucosal disease. No middle ear or mastoid effusion.       Impression    IMPRESSION:  1.  Image quality is degraded by motion. Within constraints, no acute intracranial process.  2.  Mild to moderate generalized brain atrophy.   XR Chest Port 1 View    Narrative    XR CHEST PORT 1 VIEW  12/17/2024 9:15 AM       INDICATION: hypoxia; RRT  COMPARISON: 12/15/2024       Impression    IMPRESSION: Esophagectomy and gastric pull-through. Left subclavian  port.    New opacity in the right mid and lower lung consistent with pneumonia.  Interstitial infiltrate in the left lung also slightly increased.  Probable trace right pleural effusion. No pneumothorax.    DELROY LOGAN MD         SYSTEM ID:  [id number]

## 2024-12-18 LAB
ALBUMIN SERPL BCG-MCNC: 2.4 G/DL (ref 3.5–5.2)
ANION GAP SERPL CALCULATED.3IONS-SCNC: 17 MMOL/L (ref 7–15)
B-OH-BUTYR SERPL-SCNC: 3.89 MMOL/L
BASE EXCESS BLDV CALC-SCNC: -13.3 MMOL/L (ref -3–3)
BASE EXCESS BLDV CALC-SCNC: -13.5 MMOL/L (ref -3–3)
BUN SERPL-MCNC: 31.4 MG/DL (ref 8–23)
CALCIUM SERPL-MCNC: 8.7 MG/DL (ref 8.8–10.4)
CHLORIDE SERPL-SCNC: 106 MMOL/L (ref 98–107)
CREAT SERPL-MCNC: 0.6 MG/DL (ref 0.51–0.95)
EGFRCR SERPLBLD CKD-EPI 2021: >90 ML/MIN/1.73M2
ERYTHROCYTE [DISTWIDTH] IN BLOOD BY AUTOMATED COUNT: 15.1 % (ref 10–15)
GLUCOSE SERPL-MCNC: 145 MG/DL (ref 70–99)
HCO3 BLDV-SCNC: 14 MMOL/L (ref 21–28)
HCO3 BLDV-SCNC: 14 MMOL/L (ref 21–28)
HCO3 SERPL-SCNC: 13 MMOL/L (ref 22–29)
HCT VFR BLD AUTO: 23.7 % (ref 35–47)
HGB BLD-MCNC: 7.9 G/DL (ref 11.7–15.7)
LACTATE SERPL-SCNC: 0.7 MMOL/L (ref 0.7–2)
MAGNESIUM SERPL-MCNC: 2.1 MG/DL (ref 1.7–2.3)
MCH RBC QN AUTO: 30.6 PG (ref 26.5–33)
MCHC RBC AUTO-ENTMCNC: 33.3 G/DL (ref 31.5–36.5)
MCV RBC AUTO: 92 FL (ref 78–100)
O2/TOTAL GAS SETTING VFR VENT: 60 %
O2/TOTAL GAS SETTING VFR VENT: 65 %
OXYHGB MFR BLDV: 72 % (ref 70–75)
OXYHGB MFR BLDV: 76 % (ref 70–75)
PCO2 BLDV: 35 MM HG (ref 40–50)
PCO2 BLDV: 40 MM HG (ref 40–50)
PH BLDV: 7.16 [PH] (ref 7.32–7.43)
PH BLDV: 7.2 [PH] (ref 7.32–7.43)
PHOSPHATE SERPL-MCNC: 3.5 MG/DL (ref 2.5–4.5)
PLATELET # BLD AUTO: 370 10E3/UL (ref 150–450)
PO2 BLDV: 48 MM HG (ref 25–47)
PO2 BLDV: 51 MM HG (ref 25–47)
POTASSIUM SERPL-SCNC: 3.9 MMOL/L (ref 3.4–5.3)
RBC # BLD AUTO: 2.58 10E6/UL (ref 3.8–5.2)
SAO2 % BLDV: 72.8 % (ref 70–75)
SAO2 % BLDV: 77.6 % (ref 70–75)
SODIUM SERPL-SCNC: 136 MMOL/L (ref 135–145)
WBC # BLD AUTO: 38.5 10E3/UL (ref 4–11)

## 2024-12-18 PROCEDURE — 999N000157 HC STATISTIC RCP TIME EA 10 MIN

## 2024-12-18 PROCEDURE — 258N000003 HC RX IP 258 OP 636: Performed by: INTERNAL MEDICINE

## 2024-12-18 PROCEDURE — 250N000011 HC RX IP 250 OP 636: Performed by: INTERNAL MEDICINE

## 2024-12-18 PROCEDURE — 120N000013 HC R&B IMCU

## 2024-12-18 PROCEDURE — 82310 ASSAY OF CALCIUM: CPT | Performed by: INTERNAL MEDICINE

## 2024-12-18 PROCEDURE — 85014 HEMATOCRIT: CPT | Performed by: INTERNAL MEDICINE

## 2024-12-18 PROCEDURE — 250N000009 HC RX 250: Performed by: INTERNAL MEDICINE

## 2024-12-18 PROCEDURE — 94660 CPAP INITIATION&MGMT: CPT

## 2024-12-18 PROCEDURE — 83605 ASSAY OF LACTIC ACID: CPT | Performed by: INTERNAL MEDICINE

## 2024-12-18 PROCEDURE — 82805 BLOOD GASES W/O2 SATURATION: CPT

## 2024-12-18 PROCEDURE — 82805 BLOOD GASES W/O2 SATURATION: CPT | Performed by: INTERNAL MEDICINE

## 2024-12-18 PROCEDURE — 82010 KETONE BODYS QUAN: CPT | Performed by: INTERNAL MEDICINE

## 2024-12-18 PROCEDURE — 83735 ASSAY OF MAGNESIUM: CPT | Performed by: INTERNAL MEDICINE

## 2024-12-18 PROCEDURE — 250N000011 HC RX IP 250 OP 636

## 2024-12-18 PROCEDURE — 99233 SBSQ HOSP IP/OBS HIGH 50: CPT | Performed by: INTERNAL MEDICINE

## 2024-12-18 RX ORDER — METOPROLOL TARTRATE 1 MG/ML
2.5 INJECTION, SOLUTION INTRAVENOUS EVERY 4 HOURS PRN
Status: DISCONTINUED | OUTPATIENT
Start: 2024-12-18 | End: 2024-12-19

## 2024-12-18 RX ORDER — METOPROLOL SUCCINATE 25 MG/1
25 TABLET, EXTENDED RELEASE ORAL DAILY
Status: DISCONTINUED | OUTPATIENT
Start: 2024-12-18 | End: 2024-12-19

## 2024-12-18 RX ORDER — CEFEPIME HYDROCHLORIDE 2 G/1
2 INJECTION, POWDER, FOR SOLUTION INTRAVENOUS EVERY 8 HOURS
Status: DISCONTINUED | OUTPATIENT
Start: 2024-12-18 | End: 2024-12-19

## 2024-12-18 RX ADMIN — PANTOPRAZOLE SODIUM 40 MG: 40 INJECTION, POWDER, FOR SOLUTION INTRAVENOUS at 14:53

## 2024-12-18 RX ADMIN — SODIUM BICARBONATE: 84 INJECTION, SOLUTION INTRAVENOUS at 11:47

## 2024-12-18 RX ADMIN — SODIUM BICARBONATE: 84 INJECTION, SOLUTION INTRAVENOUS at 23:54

## 2024-12-18 RX ADMIN — METHYLPREDNISOLONE SODIUM SUCCINATE 62.5 MG: 125 INJECTION, POWDER, FOR SOLUTION INTRAMUSCULAR; INTRAVENOUS at 09:35

## 2024-12-18 RX ADMIN — METOPROLOL TARTRATE 2.5 MG: 5 INJECTION INTRAVENOUS at 17:41

## 2024-12-18 RX ADMIN — CEFEPIME 2 G: 2 INJECTION, POWDER, FOR SOLUTION INTRAVENOUS at 17:48

## 2024-12-18 RX ADMIN — CEFEPIME 2 G: 2 INJECTION, POWDER, FOR SOLUTION INTRAVENOUS at 09:35

## 2024-12-18 RX ADMIN — SODIUM BICARBONATE: 84 INJECTION, SOLUTION INTRAVENOUS at 10:35

## 2024-12-18 RX ADMIN — IPRATROPIUM BROMIDE 2 SPRAY: 42 SPRAY NASAL at 21:00

## 2024-12-18 ASSESSMENT — ACTIVITIES OF DAILY LIVING (ADL)
ADLS_ACUITY_SCORE: 69
ADLS_ACUITY_SCORE: 77
ADLS_ACUITY_SCORE: 69
ADLS_ACUITY_SCORE: 80
ADLS_ACUITY_SCORE: 69
ADLS_ACUITY_SCORE: 80
ADLS_ACUITY_SCORE: 77
ADLS_ACUITY_SCORE: 73
ADLS_ACUITY_SCORE: 77
ADLS_ACUITY_SCORE: 73
ADLS_ACUITY_SCORE: 69
ADLS_ACUITY_SCORE: 77
ADLS_ACUITY_SCORE: 69

## 2024-12-18 NOTE — PROGRESS NOTES
[facility]    Hospitalist Progress Note    Interval History   - No significant improvement in respiratory failure ovrenight, requiring Bippa 60% FIO2 and satting 90%. Patient is very somnolent, essentially obtunded. Secretions are less of an issue  - Son Hector notes patient did interact with him last night, not much this morning  - Patient remains critically ill. Small improvement in CXR which is reassuring. Hector notes patient was similarly ill with PNA two years ago. He would like to continue aggressive medical management today, will reassess in the afternoon  Addendum: Patient no significant change in the afternoon, but according to son she has been more somnolent today. He is very interested in having his mom trial off of BiPap. Her O2 needs are improved this afternoon (50% FIO2 satting 91%) so I think a trial of high flow nasal cannula is reasonable, I discussed this with the nurse and charge who will prepare.   Discussed with son that if patient continues to improve slowly we will need to discuss supportive nutrition tomorrow, and in the case of any significant clinical decline, a transition to comfort cares would be warranted, he agrees with these plans.    Assessment & Plan   Summary: Kezia Dixon is a 69 year old female with PMH bilateral lung adenocarcinoma, on immunotherapy with plan to start SBRT of JULIEN nodule, previous esophageal cancer s/p esophagectomy and chemoradiation (9/2016), s/p lobectomy for right lung squamous cell cancer (9/2018), COPD, heavy tobacco use in past, resolved stress-induced cardiomyopathy, nonobstructive CAD, alcohol use disorder, who was admitted on 12/8/2024 with generalized weakness, physical deconditioning, acute on chronic nausea, anorexia.   Hospitalization complicated by severe right sided pneumonia 12/17/2024, requiring BiPap.    Acute on chronic hypoxic respiratory failure 12/17, likely due to worsening right sided pneumonia, aspiration  vs hospital acquired  Community-acquired pneumonia, improved  Leukocytosis due to above, and steroids  Acute metabolic encephalopathy due to above  Patient reported increased cough with productive sputum, afebrile, no leukocytosis initial, procalcitonin borderline at 0.25, . COVID-19/influenza/RSV negative. CXR on admission showed opacities in the right midlung, not significantly changed from before.    Received IV ceftriaxone 12/11-12/13, IV cefepime 12/15, resumed IV ceftriaxone on 12/16.  Completed 5 days of azithromycin 12/11-12/15. CXR 12/15 showed improved right mid and lower lobe radiation.  No focal consolidation.   Note worsening respiratory status 12/16 to 12/17, increasing secretions, stridor, hypoxia. Desatting on 15L O2 on 12/17, partially due to secretions, requiring bipap on 12/17. Etiology includes worsening secretions, mucous plugging, worsening infection such as hospital acquired pneumonia, aspiration pneumonia. Patient was stabilized with IVF, broad spectrum antibiotics, and bipap.   CXR 12/18 shows improved aeration of right lung. Remains on Bipap 60% fio2 and satting 90%.   - Continue Cefepime. MRSA nares negative so stop Vanco  - Continue BiPap and wean as able  - Continue Jaeger  - Switch IVF to D5 1/2 NS + bicarb   - Continue medical management per discussion with Hector 12/18  - Seroquel, Zyprexa for agitation  - Continue IMC status    Nondiabetic Ketoacidosis  Note new significant ketoacidosis 3.89, bicarb 13, likely starvation, and related to 3rd spacing, critical illness. Bicarb deficit approx 100mEq  - Give 1L bolus with 50mEq over 3 hours, followed by D5 1/2NS + 50mEq/L bicarb at 100ml/hr    Possible COPD exacerbation due to above  CRP elevated at 206. Started on prednisone 30 mg daily for 5 days on 12/13 for COPD exacerbation. CRP improved to 67 on 12/16. Cough and shortness of breath improved   Worsening respiratory status 12/17 as noted above  - Continue Mucinex tablet,  Anoro  - Continue Tessalon Perles 3 times daily  - Increase steroids to solumedrol 60mg IV daily     Acute on chronic nausea  Anorexia  Chronic abdominal pain  Moderate malnutrition due to acute and chronic illness  Mild oropharyngeal dysphagia  Has chronic nausea and abdominal pain for several years.  Reported significant anorexia and poor intake for 3-4 days before admission.  Abdominal pain stable. CT A/P and CXR on admission negative for any new acute findings.  Improved with supportive care.  Nausea improved.  Was tolerating some diet, but oral intake poor this admission. Consult speech therapy on 12/16 due to concern of dysphagia--none appreciated.  - Continue Ensure, multivitamin and diet as able  - Nutritional services consulted.  Continue nutrition supplements as per RD  - Consider tube feeding starting tomorrow if unable to wean off bipap     Hyponatremia, resolved  Took 40 mg p.o. Lasix for 3 consecutive days prior to admission. Baseline sodium 134-140.  Sodium 127 on admission, improved to 130 on 12/11.  Sodium then declined to 118 on 12/14 with JESSICA, oliguria, improved with hypertonic saline and then IVF.  Urine osm 270, serum osm 266, Urine Na < 20, TSH normal  Nephrology  consulted. They suspect hypovolemia + SIADH.     JESSICA 12/14/2024, likely due to ATN, resolved  Oliguria, resolved  Urine output significantly diminished on 12/14. Cr doubled, 0.45 -->1.03. Admission CT showed no evidence of obstruction. Lasix and spironolactone held since admission.  Losartan placed on hold on 12/15. Renal infuction improved with IVF, Cr improved to 0.66 on 12/17.  - Appreciate Nephrology input  - I&O     Urinary retention  Patient has required straight cath twice since admission.  - Indwelling sanchez catheter ordered      Mottled right lower extremity, 12/15/2024  Arterial US 12/15 showed no high grade femoral-popliteal stenosis. Recent venous US 12/8/2024 showed no DVT. Mottling improved on 12/16. Noted ongoing  12/17.  - Continue to monitor    Acute metabolic encephalopathy  Noted on 12/15 am, patient noted to be confused, not oriented. Complained of RUE numbness and slurred speech. Head CT and CTA unremarkable. Presentation likely due to encephalopathy.  Stroke neurology was consulted. Patient improved throughout the day on 12/15.  Confused again in a.m. of 12/16. MRI brain on 12/16 was done, limited by artifact but no clear CVA.  - Treat respiratory issues above     Generalized weakness  Physical deconditioning  Has been more weak and deconditioned since recent hospitalization 2-3 weeks ago  - PT/OT consulted-TCU recommended.   following for discharge planning     Multiple bilateral pulmonary nodules due to adenocarcinoma, 2023  S/p bronchoscopy with bronchial ultrasound-guided biopsy.  Pathology from right upper lobe nodule was positive for small cell lung cancer (adenocarcinoma).  Biopsy from left upper lobe nodule showed atypical cells.  This was suspicious but not definitive for adenocarcinoma.  Lymph nodes were negative. S/p chemoradiation (of RUL) completed 2/2024. Started on consolidation durvalumab immunotherapy 5/2024.  Last dose was on 11/25/2024. Most recent CT scans 10/2024 showed further increase in RUL and JULIEN nodules and indeterminate right hepatic lobe lesion.  There was plan to start stereotactic body radiotherapy (SBRT) to the JULIEN nodule on 12/9. 5 session over 2 weeks planned.  Now deferred.  Will likely start on outpatient  - [place] [facility] follow-up        Chronic/Stable/Resolved    Acute normocytic anemia  Hemoglobin was 12.6 on 11/20, now down to 9.4 on admission.  Stable during hospital stay.  No obvious bleeding. [place] [facility] consulted.  Labs do not indicate hemolysis-normal bilirubin, normal LDH, elevated haptoglobin Iron studies showed low iron saturation of 8%, low iron binding capacity, normal vitamin B12 at 519, normal TSH, reticulocyte count 1.4. 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  Continue Toprol-XL at a lower dose of 25 mg daily  -Echo 12/16 showed EF >70%, hyperdynamic, no WMA, mild aortic regurgitation.  Valves were not well-visualized to exclude vegetations.      Nonobstructive coronary artery disease  PTA-aspirin 81 mg every other day  - Continue aspirin and atorvastatin     Alcohol use disorder  Apparently has been sober for 2 years.  Recently had relapse of alcohol intake when she found out about worsening lung cancer.  She was assessed by chemical dependency.  She declined psychiatric consultation.  -has not been drinking any alcohol since her hospital discharge     Diabetes mellitus type 2, new diagnosis  -Previously had prediabetes.  A1c on admission 7.2  - Follow-up with PCP for monitoring     RLE swelling  -Ultrasound negative for DVT.  -Recommend patient use compression stockings and leg elevation for edema.  Avoid Lasix and spironolactone    Clinically Significant Risk Factors         # Hyponatremia: Lowest Na = 131 mmol/L in last 2 days, will monitor as appropriate       # Hypoalbuminemia: Lowest albumin = 2.4 g/dL at 12/18/2024  5:41 AM, will monitor as appropriate     # Hypertension: Noted on problem list    # Chronic heart failure with preserved ejection fraction: heart failure  noted on problem list and last echo with EF >50%          # DMII: A1C = 7.2 % (Ref range: <5.7 %) within past 6 months    # Severe Malnutrition: based on nutrition assessment      # Financial/Environmental Concerns: none  # COPD: noted on problem list         PT/OT: ordered  Diet: Snacks/Supplements Adult: Ensure Clear; With Meals  Snacks/Supplements Adult: Gelatein 20 (sugar-free); With Meals  Room Service  NPO for Medical/Clinical Reasons Except for: No Exceptions    DVT Prophylaxis: Pneumatic Compression Devices  Jaeger Catheter: PRESENT, indication: Acute retention or obstruction  Lines: PRESENT      Port a Cath 01/09/24 Single Lumen Left Chest wall-Site Assessment: WDL      Cardiac Monitoring: ACTIVE order. Indication: imc  Code Status: No CPR- Do NOT Intubate    Medically Ready for Discharge: Anticipated in 5+ Days      Domingo Ramesh MD  Hospitalist Service  [facility]  Securely message with [employer] (more info)  Text page via [employer] Paging/Directory     I spent 30 minutes of critical care time on the unit/floor managing the care of Kezia Dixon. Upon evaluation, this patient had a high probability of imminent or life-threatening deterioration due to acute illness, which required my direct attention, intervention, and personal management. 50% of my time was spent at the bedside counseling the patient and/or coordinating care regarding services listed in this note.    Data reviewed today: I reviewed all new labs and imaging results over the last 24 hours.    Physical Exam   Temp: 97.8  F (36.6  C) Temp src: Axillary BP: 106/50 Pulse: 112   Resp: 24 SpO2: 94 % O2 Device: BiPAP/CPAP    Vitals:    12/14/24 0647 12/17/24 0454 12/18/24 0700   Weight: 41.5 kg (91 lb 9.6 oz) 48.5 kg (106 lb 14.8 oz) 49 kg (108 lb 1.6 oz)     Vital Signs with Ranges  Temp:  [96.9  F (36.1  C)-97.8  F (36.6  C)] 97.8  F (36.6  C)  Pulse:  [101-120] 112  Resp:  [20-26] 24  BP: ()/(43-91)  106/50  FiO2 (%):  [55 %-100 %] 60 %  SpO2:  [91 %-100 %] 94 %  I/O last 3 completed shifts:  In: 377 [I.V.:377]  Out: 550 [Urine:550]  O2 requirements: yes bipap    Constitutional: Thin female appears ill, confused  HEENT: Eyes nonicteric, oral mucosa moist  Cardiovascular: RRR, normal S1/2, no m/r/g  Respiratory: Ronchorous, mild crackles, no clear wheezing  Vascular: No LE pitting edema  GI: Normoactive bowel sounds  Skin/Integumen: No rashes  Neuro/Psych: Confused, not alert or oriented, follows basic commands, moves all extremities    Medications   Current Facility-Administered Medications   Medication Dose Route Frequency Provider Last Rate Last Admin    dextrose 5% and 0.45% NaCl 1,000 mL with sodium bicarbonate 50 mEq/L infusion   Intravenous Continuous Domingo Ramesh MD        No lozenges or gum should be given while patient on BIPAP/AVAPS/AVAPS AE   Does not apply Continuous PRN Manuel Paul NP         Current Facility-Administered Medications   Medication Dose Route Frequency Provider Last Rate Last Admin    aspirin EC tablet 81 mg  81 mg Oral Every Other Day Vielka Martínez PA-C   81 mg at 12/16/24 0815    atorvastatin (LIPITOR) tablet 40 mg  40 mg Oral Daily Vielka Martínez PA-C   40 mg at 12/16/24 0815    benzonatate (TESSALON) capsule 200 mg  200 mg Oral TID Eloise Fisher MD   200 mg at 12/16/24 2221    ceFEPIme (MAXIPIME) 2 g vial to attach to  mL bag for ADULTS or NS 50 mL bag for PEDS  2 g Intravenous Q12H Domingo Ramesh MD   2 g at 12/18/24 0935    fluticasone (FLONASE) 50 MCG/ACT spray 2 spray  2 spray Both Nostrils Daily Linda Lang MD   2 spray at 12/16/24 0816    [Held by provider] gabapentin (NEURONTIN) capsule 600 mg  600 mg Oral TID Mathew Caraballo MD   600 mg at 12/16/24 2220    guaiFENesin (MUCINEX) 12 hr tablet 600 mg  600 mg Oral BID Vielka Martínez PA-C   600 mg at 12/16/24 2049    heparin lock flush 10 unit/mL injection  5-10 mL  5-10 mL Intracatheter Q24H Estiven Coyne MD   5 mL at 12/14/24 0611    heparin lock flush 100 unit/mL injection 5-10 mL  5-10 mL Intracatheter Q28 Days Estiven Coyne MD        ipratropium (ATROVENT) 0.06 % spray 2 spray  2 spray Both Nostrils 4x Daily Vielka Martínez PA-C   2 spray at 12/16/24 2044    [Held by provider] losartan (COZAAR) tablet 50 mg  50 mg Oral Daily Vielka Martínez PA-C   50 mg at 12/15/24 1200    methylPREDNISolone Na Suc (solu-MEDROL) injection 62.5 mg  62.5 mg Intravenous Q24H Domingo Ramesh MD   62.5 mg at 12/18/24 0935    [Held by provider] metoprolol succinate ER (TOPROL XL) 24 hr tablet 25 mg  25 mg Oral Eloise Crane MD   25 mg at 12/16/24 1134    multivitamin w/minerals (THERA-VIT-M) tablet 1 tablet  1 tablet Oral Daily Vielka Martínez PA-C   1 tablet at 12/16/24 0815    pantoprazole (PROTONIX) IV push injection 40 mg  40 mg Intravenous Daily with breakfast Domingo Ramesh MD        senna-docusate (SENOKOT-S/PERICOLACE) 8.6-50 MG per tablet 1 tablet  1 tablet Oral BID Vielka Martínez PA-C   1 tablet at 12/16/24 2219    Or    senna-docusate (SENOKOT-S/PERICOLACE) 8.6-50 MG per tablet 2 tablet  2 tablet Oral BID Vielka Martínez PA-C   2 tablet at 12/11/24 1033    sodium chloride (PF) 0.9% PF flush 10-20 mL  10-20 mL Intracatheter Q28 Days Estiven Coyne MD        sodium chloride 0.9% BOLUS 1,000 mL  1,000 mL Intravenous Once Domingo Ramesh .3 mL/hr at 12/17/24 1618 Restarted at 12/17/24 1618    [Held by provider] spironolactone (ALDACTONE) half-tab 12.5 mg  12.5 mg Oral QAM Vielka Martínez PA-C        umeclidinium-vilanterol (ANORO ELLIPTA) 62.5-25 MCG/ACT oral inhaler 1 puff  1 puff Inhalation Daily Vielka Martínez PA-C   1 puff at 12/16/24 0817    vancomycin (VANCOCIN) 1,250 mg in 0.9% NaCl 262.5 mL intermittent infusion  1,250 mg Intravenous Q24H [place],  [place], [facility]   1,250 mg at 12/17/24 1354       Data   Recent Labs   Lab 12/18/24  0541 12/17/24  1318 12/17/24  0848 12/17/24  0558 12/16/24  1741 12/16/24  1556 12/16/24  0628 12/15/24  1822 12/15/24  1036   WBC 38.5*  --   --  24.7*  --   --  27.5*  --   --    HGB 7.9*  --   --  9.1*  --   --  9.6*  --   --    MCV 92  --   --  89  --   --  89  --   --      --   --  407  --   --  465*  --   --    INR  --   --   --   --   --   --   --   --  1.06     --   --  133*  --  131* 131*   < > 126*   POTASSIUM 3.9 3.8  --  3.4  --   --  3.8  --   --    CHLORIDE 106  --   --  102  --   --  99  --   --    CO2 13*  --   --  19*  --   --  18*  --   --    BUN 31.4*  --   --  31.4*  --   --  35.3*  --   --    CR 0.60  --   --  0.66  --   --  1.00*  --   --    ANIONGAP 17*  --   --  12  --   --  14  --   --    VICENTE 8.7*  --   --  8.3*  --   --  8.0*  --   --    *  --  131* 117*   < >  --  146*  --   --    ALBUMIN 2.4*  --   --  2.4*  --   --   --   --   --     < > = values in this interval not displayed.       Imaging:   Recent Results (from the past 24 hours)   XR Chest Port 1 View    Narrative    EXAM: XR CHEST PORT 1 VIEW  LOCATION: [facility]  DATE: 12/18/2024    INDICATION: hypoxia  COMPARISON: None.      Impression    IMPRESSION: The implanted port is unchanged in position with the tip is in the mid SVC. The heart is unchanged in size and appearance. Volume loss and patchy airspace disease is again seen in the right lung with a small right-sided pleural effusion.   Slightly improved aeration is seen in the right midlung zone

## 2024-12-18 NOTE — PROGRESS NOTES
Patient refused to have BiPAP mask switched to under the nose for comfort. Patient is currently on full face mask, mepilex on bridge of nose in place.    RT will continue to follow.    Mikey June, RRT

## 2024-12-18 NOTE — CONSULTS
SPIRITUAL HEALTH SERVICES  SPIRITUAL ASSESSMENT Consult Note  [facility] [place]     REFERRAL SOURCE: Staff referral for support    Introduced Spiritual Health services and role to patient's son, Hector, who declined support at this time. He is waiting for a cousin to arrive and be with him. Hector is aware that [facility] is available and how to request support, if desired.    PLAN: Updated bedside nurse. Spiritual Health remains available. Please re-consult as needs arise.    Lilly Eaton  Associate      [facility] available 24/7 for emergent requests/referrals, either by paging the on-call  or by entering an ASAP/STAT consult in Epic (this will also page the on-call ).

## 2024-12-18 NOTE — PLAN OF CARE
"Goal Outcome Evaluation:      Plan of Care Reviewed With: patient    Overall Patient Progress: improvingOverall Patient Progress: improving    Patient Name: Chasidy  MRN: [id number]  Date of Admission: 2024  Reason for Admission: SOB  [place] [facility] [place]itals:   BP Readings from Last 1 Encounters:   24 (!) 109/91     Pulse Readings from Last 1 Encounters:   24 114     Wt Readings from Last 1 Encounters:   24 48.5 kg (106 lb 14.8 oz)     Ht Readings from Last 1 Encounters:   24 1.549 m (5' 1\")     Estimated body mass index is 20.2 kg/m  as calculated from the following:    Height as of 24: 1.549 m (5' 1\").    Weight as of this encounter: 48.5 kg (106 lb 14.8 oz).  Temp Readings from Last 1 Encounters:   24 97.7  F (36.5  C) (Axillary)       Pain: Pain goal: 0 Pain Ratin Effective pain medication/regimen: Denies    CV Surgery Patient: No    Assessment:    Resp: Diminished Lung sounds,On BIPAP at 55 % FIO2  Telemetry: ST  Neuro: Alert and oriented x 1,not following command,lethargic.Confused.  Diet: NPO  GI/: No BM on this shift,normoactive bowel sounds,passing gas.Jaeger catheter in placed with adequate urine output.  Skin/Wounds: Scattered bruising,mottled BLE,Edematous BLE.  Lines/Drains:L Chest port,infusing with NS x 75 ml/hr,intermittent antibiotics.  Activity: Assist x 2,T/R,Lift  Sleep: lethargic  Abnormal Labs: On Mg,Phos, K+ protocol,recheck abhijeet am.    Aggression Stop Light: Green          Patient Care Plan: Continue plan of care,if patient decompensates,would transitioned to Comfort care,discussed with Son as per MD.  "

## 2024-12-18 NOTE — PROGRESS NOTES
[facility]    Hematology / Oncology     Date of Admission:  12/8/2024    Assessment & Plan     1.  Multiple lung nodules both lungs, at least 4 of them described to have increased in size or positive on PET scan, right upper lobe nodule biopsy positive for adenocarcinoma, NGS negative, PD-L1 TPS score 0%, left upper lobe nodule biopsy showing atypical cells but suspicious for adenocarcinoma.  Started on chemoradiotherapy with weekly carboplatin and Taxol on January 18, 2024, Taxol dose reduced based on pre-existing neuropathy, the dose was reduced slightly again due to borderline neutropenia with her fourth cycle.  Completed February 28, 2024 and achieved stable disease on the CT scan from April 2024.  Started on consolidation durvalumab immunotherapy on May 28, 2024.  CT scan in July shows minimal increase in micronodular densities but overall stable, CT in October showed further increase in the right upper lobe nodule and indeterminate liver lesion in the right hepatic lobe.  Left upper lung lesion consistent with carcinoma planning to start radiation this week 5 session over 2 weeks    2.  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    3.  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    4.  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  No evidence of autoimmune hemolytic anemia associated with checkpoint inhibitor.    5.  Electrolyte abnormalities hyponatremia hypokalemia and hypomagnesemia with nausea and weakness.  Management included internal medicine team.    6.  Pneumonia with acute respiratory failure, currently on BiPAP.     Plan:  Supportive therapy per hospitalist, patient is critical at this point.  She is on IV steroids as well.    I discussed  with the son on December 17, 2024 that DNR/DNI is appropriate based on her metastatic cancer that is incurable and frail overall status.  Comfort care is considered by the primary team.    Hyponatremia can be associated with checkpoint inhibitors, she is seen currently by nephrology.  If overall health does not improve, we will reconsider the role of immunotherapy in her treatment plan, her disease burden is not highly and holding treatment for a while as an option especially if she is going to have stereotactic radiation to the left upper lobe lesion.    Radiation was notified to decide on starting localized radiation to the left upper lung lesion.  Discussed with Dr. Cowan on December 10, if she has improvement in her condition may start while she is in the hospital.  Otherwise she can start radiation as an outpatient upon improvement          Jean Garcia MD    Code Status    No CPR- Do NOT Intubate    Reason for Consult   Reason for consult: Lung cancer, anemia    Primary Care Physician   Mustapha Velásquez    Chief Complaint   Nausea and weakness    Medical records reviewed, history obtained and patient examined    History of Present Illness   Kezia Dixon is a 69 year old female who presents with non-small cell lung cancer as detailed below presented to hospital with nausea and weakness.  She denies bleeding no melena hematochezia.  She was supposed to start radiation to lung lesion today for 5 sessions over 2 weeks and to continue immunotherapy scheduled on December 10.  In the hospital she was found to have electrolyte abnormality and hemoglobin 9.5.  Iron levels consistent with chronic illness and the ferritin was 196 argues against iron deficiency.  B12 normal 590 and folic acid normal 16.7.  She has been on durvalumab maintenance therapy.  Last hemoglobin office was 10.4 on November 26     PET/CT was obtained on October 22 which showed enlargement of peripherally FDG avid cavitary lesion in  the posterior right lung inflammatory versus infectious cannot rule out recurrent neoplasm.  Suspicion for left upper lobe primary lung carcinoma.  I discussed PET scan imaging with Dr. Cowan, the left upper lung lesion appears to be new neoplasm and is easily amenable for radiation SRS therapy.  The right cavitary lesion is an area which overlaps with prior radiation to the lung and esophagus cancer and did not recommend radiation to that lesion.  One of the other possibility is posttreatment changes inflammatory versus infectious.    I reviewed the PET scan images with Dr. Riojas, the changes on the right lung by the cavitary lesions are indeterminate and not easy to biopsy due to the nature of the distorted area and the central location, she is not a candidate for wedge or segment resection on the right, if surgery is indicated she will need pneumonectomy which will not be tolerated by her.  The left upper lesion is consistent with malignancy.  His recommendation is to radiate the left upper lesion SRS and I talked with Dr. Cowan who agrees on that strategy.  In regard to the right lung changes contrast CT chest in 3 months from the PET/CT will be reasonable.    Interval history December 10, 2024.  She feels better except has increase in the cough which started several days ago.  She has great to greenish sputum.  She was diagnosed with COPD by pulmonary about a year ago and she uses inhalers regularly.  Magnesium is normal and she has low potassium 3.3 and sodium 125    December 11, 2024:  Cough improved on Tessalon and Guaifenesin.  She feels better overall but continues to feel weak.  Potassium 4.4 and last sodium 129 and magnesium 1.8.    December 12, 2024:  She was started on antibiotic IV ceftriaxone and azithromycin for probable community-acquired pneumonia, CRP was high.  Potassium 3.9 magnesium 1.7 phosphorus 3.1.  Hemoglobin 9.3 stable white count and platelets normal.      December 16, 2024:  She  feels weaker this week, she was seen by nephrology for JESSICA and hyponatremia.  Her cough improved after treated with antibiotics for pneumonia.  Dietitian service working with the patient for nutrition.  Code stroke was called on December 15 and CT head was obtained showing:  HEAD CT:  1.  No CT evidence for acute intracranial process.  2.  Brain atrophy and presumed chronic microvascular ischemic changes as above.     HEAD CTA:   1.  No significant stenosis, aneurysm, or high flow vascular malformation identified.  2.  Variant [place] anatomy as above.     NECK CTA:  1.  No significant stenosis of the bilateral internal carotid arteries.  2.  Interval development of groundglass density opacity in the posterior aspect of the left upper lung lobe, compatible with pneumonia in appropriate clinical setting.     Noncontrast CT imaging findings were discussed with Dr. Bautista of the clinical service at 9:02 AM. CTA imaging findings were discussed with Sarah Bautista of the clinical service at 9:12 AM on 12/15/2024.    December 17, 2024:  She had worsening shortness of breath and oxygen saturation and required BiPAP currently on station 33.  Son at bedside and aware of the significant development.  Patient is DNR/DNI but willing to continue with BiPAP mask.  She appears at discomfort from the mask, she is alert  December 18, 2024:  Patient continues on BiPAP, tachypnea.    Oncological history:  Office note from October 15, 2024  Kezia is a 68-year-old woman with history of esophageal cancer and squamous cell lung cancer as detailed below:    1.  Squamous cell carcinoma of the esophagus status post induction chemotherapy (carboplatin/paclitaxel) and radiation therapy, followed by esophagectomy in 2015.    2.  Squamous cell carcinoma of the lung status post thoracotomy and right lower lobectomy in October 2018 for 7 mm grade 2 squamous cell carcinoma of the right lower lobe.    She has been followed by thoracic  oncology with scans and had lung nodules.  CT scan CAP November 6, 2023 shows a large irregular cavitary nodule 2.3 x 1.5 cm posterior right upper lobe previously 1.9 x 0.9 cm.  Several adjacent right upper lobe nodules again noted.  1 of these is 1.1 x 0.8 cm increased from previously 0.9 x 0.7 cm.  There are other stable adjacent right upper lobe nodules.  Left upper lobe irregular nodule 1.3 x 0.9 cm compared to 0.9 x 0.6 cm previously.  Adjacent nodularity also noted in the region.  There are other stable nodules.  Stable small right pleural fluid and right pleural thickening.  The CT chest was compared to April 10, 2023.    PET/CT November 10, 2023 shows irregular cavitary nodule in the right upper lobe 2.2 x 1.6 cm demonstrate hypermetabolic activity along its medial and inferior aspects which extends to the minor fissure SUV max 9.6.  Additional hypermetabolic 1.3 x 0.7 cm right upper lobe nodule SUV max 10.7 which is superior to the described nodule.  FDG avid 0.6 x 0.5 cm nodule in the medial left upper lobe with SUV max of 2.2.  Minimal FDG uptake associated with irregular nodule in the left lateral upper lobe 1.4 x 1.4 cm SUV max of 0.9.    Underwent EBUS December 6, 2023 with biopsies:  Right upper lobe nodule EBUS non-small cell carcinoma favor adenocarcinoma.  Left upper lobe lung EBUS atypical cells suspicious for adenocarcinoma.  Lymph nodes stations 4L, 4R, 11 R interlobar negative for malignant cells.  PD-L1 tumor proportion score 0%, NGS lung panel negative    Case was discussed in thoracic oncology tumor conference, MRI of the brain was suggested and was negative for metastatic disease, chemoradiation was recommended for her right upper lung lesion and watchful waiting for the left lung lesion based on atypical cells on biopsy.  Patient has mild sensory neuropathy without painful component or interference with daily function and weekly carboplatin and Taxol was chosen along with radiation, the  initial dose of Taxol was reduced based on the neuropathy baseline.  Next generation sequencing panel was negative.  PDL TPS score was 0%    Our team has discussed the approach regarding her left lung lesion and the decision was to hold off treatment to avoid extended radiation exposure between both lungs.  The left lung lesion will be monitored with subsequent scan, there is a possibility that the lesion may shrink with the treatment of chemotherapy and surgical resection as an option for wedge or limited resection otherwise stereotactic radiation can be considered, additional tissue diagnosis may be needed if surgery is not decided.    She was started on radiation along with weekly carboplatin and Taxol reduced dose on January 18, 2024.  Completed chemotherapy February 29, 2024.    CT scan of chest April 1, 2024 right upper lobe nodules have decreased in size slightly.  The largest cavitary nodule in the right upper lobe connects to a small right upper lobe bronchus.  Left upper lobe nodule is slightly increased currently 1.3 cm compared to 1.1 cm previously.    Based on the lack of progression she was started on durvalumab immunotherapy consolidation on May 28, 2024.    CT July 18, 2024 shows new micronodule opacities at the left upper lobe extending to the lingula could be infection or inflammation.  Cavitary lesion in the right upper lobe is stable in size.  Additional satellite nodules are stable in size.  Stable prominent indeterminate left upper lobe nodule.  Interval decrease in consolidation on an interstitial thickening surrounding the cavitary lesion.  Few nodules at the left upper lobe are slightly more prominent.  Stable small right pulmonary and fluid.  New identified but small inferior mediastinal lymph nodes are technically indeterminate.    CT chest October 10, 2024 shows increased size of the right upper lobe cavitary lesion with surrounding consolidative opacities may be due to evolving  postradiation changes.  The size is 3.2 x 2.1 cm compared to 2.4 x 1.7 cm.  Few satellite nodules stable.  Left upper lobe irregular nodule 14 x 10 mm stable since July.  But gradually increased in size compared to December concerning for neoplasm.  Indeterminate small enhancing focus in the posterior right hepatic lobe 1.7 x 1.0 cm and benign perfusional abnormality.  Interval History  She is here today for follow-up and to continue with durvalumab.  She had a CT scan last week and here to review the results.  She has been anxious about the results she read on the Internet and her blood pressure today is higher.  She has high blood pressure and has been working with her cardiologist and the plan is to regroup in 3 weeks with her cardiologist to review her readings.  She does take her blood pressure medications regularly.  No increasing shortness of breath or cough.  She requests a refill on Compazine which she uses for intermittent nausea associated with treatment.  CBC from today is normal and her chemistry panel and thyroid test have been normal.  CT was reviewed showing some increase in her right upper lung nodule as detailed above          Past Medical History   I have reviewed this patient's medical history and updated it with pertinent information if needed.   Past Medical History:   Diagnosis Date    Alcohol use disorder, severe, dependence (H) 10/14/2016    Alcoholism (H) 10/14/2016    Anemia     Benign essential hypertension 10/14/2016    Carotid artery disease (H)     mile plaque 5/7    Chemical dependency (H)     alcohol    COPD (chronic obstructive pulmonary disease) (H)     Coronary artery disease     Depression with anxiety     Esophageal cancer (H) 03/22/2016    SQUAMOUS CELL    Esophageal mass     GERD (gastroesophageal reflux disease)     Hx of pancreatitis 2003    Hypercholesteremia     Hyperglycemia     Hyperlipemia     Impaired fasting glucose 10/14/2016    Nocturnal leg cramps     Other chronic  pain     Pancreatic pseudocyst 10/14/2016    Pancreatitis     with pseudocyst    Pap smear with atypical squamous cells, cannot exclude high grade squamous intraepithelial lesion (ASC-H) 10/14/2016    Colpo impression benign, ECC benign. Cotestin in 1 yr.    Shingles     Squamous cell carcinoma of right lung (H)     Vasomotor rhinitis        Past Surgical History   I have reviewed this patient's surgical history and updated it with pertinent information if needed.  Past Surgical History:   Procedure Laterality Date    AAA REPAIR      splenic artery aneurysm embolization    ABDOMEN SURGERY  2/2/2016    COLONOSCOPY  11/26/2013    Procedure: COMBINED COLONOSCOPY, SINGLE BIOPSY/POLYPECTOMY BY BIOPSY;  COLONOSCOPY (MAC);  Surgeon: Montana Rosa MD;  Location:  GI    COLONOSCOPY  11/26/2013    COLONOSCOPY N/A 10/4/2018    Procedure: COMBINED COLONOSCOPY, SINGLE OR MULTIPLE BIOPSY/POLYPECTOMY BY BIOPSY;  colonoscopy;  Surgeon: Gio Apodaca MD;  Location:  GI    ENT SURGERY      ESOPHAGOGASTRECTOMY N/A 3/22/2016    Procedure: ESOPHAGOGASTRECTOMY;  Surgeon: Alvin Riojas MD;  Location:  OR    ESOPHAGOSCOPY, GASTROSCOPY, DUODENOSCOPY (EGD), COMBINED N/A 12/22/2015    Procedure: COMBINED ENDOSCOPIC ULTRASOUND, ESOPHAGOSCOPY, GASTROSCOPY, DUODENOSCOPY (EGD), FINE NEEDLE ASPIRATE/BIOPSY;  Surgeon: Danelle Michael MD;  Location:  GI    GASTROSTOMY, INSERT TUBE, COMBINED N/A 2/2/2016    Procedure: COMBINED GASTROSTOMY, INSERT TUBE (OPEN);  Surgeon: Alvin Riojas MD;  Location:  OR    GI SURGERY  12/22/2015    HAND SURGERY      right    HERNIORRHAPHY INCISIONAL (LOCATION) N/A 3/19/2019    Procedure: LAPARSCOPIC  INCISIONAL HERNIA REPAIR WITH MESH, LAPARSCOPIC LYSIS OF ADHENSIONS;  Surgeon: Lamberto Magaña MD;  Location:  OR    INSERT PORT VASCULAR ACCESS N/A 12/28/2015    Procedure: INSERT PORT VASCULAR ACCESS;  Surgeon: Alvin Riojas MD;  Location:   OR    INSERT PORT VASCULAR ACCESS N/A 2024    Procedure: SMART PORT PLACEMENT;  Surgeon: Alvin Riojas MD;  Location: [facility] OR    LAPAROSCOPIC CHOLECYSTECTOMY N/A 3/19/2019    Procedure: LAPAROSCOPIC CHOLECYSTECTOMY;  Surgeon: Lamberto Magaña MD;  Location: [facility] OR    LOBECTOMY LUNG Right 10/16/2018    Procedure: LOBECTOMY LUNG;  Surgeon: Alvin Riojas MD;  Location: [facility] OR    REMOVE PORT VASCULAR ACCESS Left 2016    Procedure: REMOVE PORT VASCULAR ACCESS;  Surgeon: Alvin Riojas MD;  Location: [facility] OR    THORACOTOMY Right 10/16/2018    Procedure: REDO RIGHT THORACTOMY AND RIGHT LOWER LOBECTOMY, PLEURAL LYSIS;  Surgeon: Alvin Riojas MD;  Location: [facility] OR    TONSILLECTOMY      VASCULAR SURGERY         Prior to Admission Medications   Prior to Admission Medications   Prescriptions Last Dose Informant Patient Reported? Taking?   ANORO ELLIPTA 62.5-25 MCG/ACT oral inhaler 2024 Morning  Yes Yes   Sig: Inhale 1 puff into the lungs daily.   albuterol (PROAIR HFA/PROVENTIL HFA/VENTOLIN HFA) 108 (90 Base) MCG/ACT inhaler Past Month  Yes Yes   Sig: Inhale 2 puffs into the lungs every 6 hours as needed for shortness of breath, wheezing or cough.   aspirin (ASA) 81 MG EC tablet 2024 Morning  Yes Yes   Si tablet every other day   atorvastatin (LIPITOR) 40 MG tablet 2024 Morning  No Yes   Sig: Take 1 tablet (40 mg) by mouth daily.   durvalumab 2024  Yes Yes   Sig: Inject into the vein every 14 days.   fluticasone (FLONASE) 50 MCG/ACT nasal spray 2024 Morning Self No Yes   Sig: INSTILL 2 SPRAYS INTO BOTH NOSTRILS DAILY.   furosemide (LASIX) 40 MG tablet 2024  No Yes   Sig: Take 0.5 tablets (20 mg) by mouth daily as needed (edema or shortness of breath) For worsening shortness of breath, leg swelling or weight gain.   Patient taking differently: Take 20-40 mg by mouth daily as needed (edema or shortness of breath). For worsening shortness of  breath, leg swelling or weight gain.   gabapentin (NEURONTIN) 600 MG tablet 12/8/2024 Morning  No Yes   Sig: TAKE ONE (1) TABLET BY MOUTH THREE TIMES DAILY.   ipratropium (ATROVENT) 0.06 % nasal spray 12/8/2024 Morning  No Yes   Sig: Spray 2 sprays into both nostrils 4 times daily.   losartan (COZAAR) 50 MG tablet 12/8/2024 Morning  No Yes   Sig: Take 1 tablet (50 mg) by mouth daily. Appointment required for further refills   metoprolol succinate ER (TOPROL XL) 50 MG 24 hr tablet 12/8/2024 Morning  No Yes   Sig: Take 1 tablet (50 mg) by mouth every morning.   multivitamin w/minerals (MULTI-VITAMIN) tablet 12/8/2024 Morning  Yes Yes   Sig: Take 1 tablet by mouth daily.   omeprazole (PRILOSEC) 40 MG DR capsule 12/8/2024 Morning  No Yes   Sig: TAKE 1 CAPSULE BY MOUTH EVERY DAY   ondansetron (ZOFRAN) 4 MG tablet Past Week  Yes Yes   Sig: Take 8 mg by mouth every 8 hours as needed for nausea.   prochlorperazine (COMPAZINE) 10 MG tablet Past Week  Yes Yes   Sig: Take 10 mg by mouth every 6 hours as needed for nausea or vomiting.   spironolactone (ALDACTONE) 25 MG tablet 12/8/2024 Morning  No Yes   Sig: Take 0.5 tablets (12.5 mg) by mouth every morning.      Facility-Administered Medications: None     Allergies   Allergies   Allergen Reactions    Codeine Sulfate Nausea    Simvastatin Cramps     Leg cramps    Morphine      Pt unsure - pt does not believe this is an allergy of hers    Pcn [Penicillins] Rash       Social History   I have reviewed this patient's social history and updated it with pertinent information if needed. Kezia Dixon  reports that she quit smoking about 9 years ago. Her smoking use included cigarettes. She started smoking about 54 years ago. She has a 11.4 pack-year smoking history. She has never used smokeless tobacco. She reports that she does not currently use alcohol. She reports that she does not use drugs.    Family History   I have reviewed this patient's family history and updated it with  pertinent information if needed.   Family History   Problem Relation Age of Onset    Depression Mother     Substance Abuse Father     Other Cancer Father         melanoma    Prostate Cancer Father     Diabetes Father     Substance Abuse Paternal Grandfather     Other Cancer Brother         melanoma    Substance Abuse Brother     Other Cancer Brother         melanoma    Other Cancer Brother         melanoma    Other Cancer Brother         melanoma       Review of Systems   The 10 point Review of Systems is negative other than noted in the HPI     Physical Exam   Temp: 97.8  F (36.6  C) Temp src: Axillary BP: 106/50 Pulse: 112   Resp: 24 SpO2: 94 % O2 Device: BiPAP/CPAP    Vital Signs with Ranges  Temp:  [97.4  F (36.3  C)-97.8  F (36.6  C)] 97.8  F (36.6  C)  Pulse:  [101-120] 112  Resp:  [20-26] 24  BP: ()/(43-91) 106/50  FiO2 (%):  [55 %-100 %] 60 %  SpO2:  [91 %-100 %] 94 %  108 lbs 1.6 oz    BiPAP mask  HEENT: Normocephalic sclera anicteric  Lungs: Tachypnea.      Data   Results for orders placed or performed during the hospital encounter of 12/08/24 (from the past 24 hours)   Potassium   Result Value Ref Range    Potassium 3.8 3.4 - 5.3 mmol/L   MRSA MSSA PCR, Nasal Swab    Specimen: Nares, Bilateral; Swab   Result Value Ref Range    MRSA Target DNA Negative Negative    SA Target DNA Positive     Narrative    The [employer]  Xpert SA Nasal Complete assay performed in the [employer]  Dx System is a qualitative in vitro diagnostic test designed for rapid detection of Staphylococcus aureus (SA) and methicillin-resistant Staphylococcus aureus (MRSA) from nasal swabs in patients at risk for nasal colonization. The test utilizes automated real-time polymerase chain reaction (PCR) to detect MRSA/SA DNA. The Xpert SA Nasal Complete assay is intended to aid in the prevention and control of MRSA/SA infections in healthcare settings. The assay is not intended to diagnose, guide or monitor treatment for MRSA/SA  infections, or provide results of susceptibility to methicillin. A negative result does not preclude MRSA/SA nasal colonization.    XR Chest Port 1 View    Narrative    EXAM: XR CHEST PORT 1 VIEW  LOCATION: [facility]  DATE: 12/18/2024    INDICATION: hypoxia  COMPARISON: None.      Impression    IMPRESSION: The implanted port is unchanged in position with the tip is in the mid SVC. The heart is unchanged in size and appearance. Volume loss and patchy airspace disease is again seen in the right lung with a small right-sided pleural effusion.   Slightly improved aeration is seen in the right midlung zone   Renal panel   Result Value Ref Range    Sodium 136 135 - 145 mmol/L    Potassium 3.9 3.4 - 5.3 mmol/L    Chloride 106 98 - 107 mmol/L    Carbon Dioxide (CO2) 13 (L) 22 - 29 mmol/L    Anion Gap 17 (H) 7 - 15 mmol/L    Glucose 145 (H) 70 - 99 mg/dL    Urea Nitrogen 31.4 (H) 8.0 - 23.0 mg/dL    Creatinine 0.60 0.51 - 0.95 mg/dL    GFR Estimate >90 >60 mL/min/1.73m2    Calcium 8.7 (L) 8.8 - 10.4 mg/dL    Albumin 2.4 (L) 3.5 - 5.2 g/dL    Phosphorus 3.5 2.5 - 4.5 mg/dL   Magnesium   Result Value Ref Range    Magnesium 2.1 1.7 - 2.3 mg/dL   Blood gas venous   Result Value Ref Range    pH Venous 7.20 (L) 7.32 - 7.43    pCO2 Venous 35 (L) 40 - 50 mm Hg    pO2 Venous 51 (H) 25 - 47 mm Hg    Bicarbonate Venous 14 (L) 21 - 28 mmol/L    Base Excess/Deficit Venous -13.3 (L) -3.0 - 3.0 mmol/L    FIO2 60     Oxyhemoglobin Venous 76 (H) 70 - 75 %    O2 Sat, Venous 77.6 (H) 70.0 - 75.0 %    Narrative    In healthy individuals, oxyhemoglobin (O2Hb) and oxygen saturation (SO2) are approximately equal. In the presence of dyshemoglobins, oxyhemoglobin can be considerably lower than oxygen saturation.   CBC with platelets   Result Value Ref Range    WBC Count 38.5 (H) 4.0 - 11.0 10e3/uL    RBC Count 2.58 (L) 3.80 - 5.20 10e6/uL    Hemoglobin 7.9 (L) 11.7 - 15.7 g/dL    Hematocrit 23.7 (L) 35.0 - 47.0 %    MCV 92  78 - 100 fL    MCH 30.6 26.5 - 33.0 pg    MCHC 33.3 31.5 - 36.5 g/dL    RDW 15.1 (H) 10.0 - 15.0 %    Platelet Count 370 150 - 450 10e3/uL   Ketone Beta-Hydroxybutyrate Quantitative   Result Value Ref Range    Ketone (Beta-Hydroxybutyrate) Quantitative 3.89 (HH) <=0.30 mmol/L   Lactic acid whole blood   Result Value Ref Range    Lactic Acid 0.7 0.7 - 2.0 mmol/L

## 2024-12-18 NOTE — PLAN OF CARE
Goal Outcome Evaluation:    Alert to self, very confused. Restless in bed. Pulling at bipap and cords occasionally. Does not follow commands. Sometimes answers yes or no by nodding. Does not open eyes. Bolus currently infusing for low bp thru R port, then NS gtt will be increased. On bipap, currently weaned down to 65% FiO2, satting around 96%. Ax2 lift. NPO d/t bipap. Son left for night, but told he could come back at any time d/t patient's prognosis and current state. He would like to be called if there are any changes in the meantime. Tele is sinus tach. Jaeger for retention, AUO. Concern for throwing a mucous plug, inability to escalate cares if that happens d/t being DNR/DNI. IMC status. Poor prognosis at this time per MD.

## 2024-12-18 NOTE — PROGRESS NOTES
Care Management Follow Up    Length of Stay (days): 10    Expected Discharge Date: Pending continued GOC discussion     Concerns to be Addressed: discharge planning  worsened condition 12/17  Patient plan of care discussed at interdisciplinary rounds: Yes    Anticipated Discharge Disposition: Had rec home w/home care prior to condition change on 12/17  Disposition Comments: Comfort care is considered by the primary team.     Anticipated Discharge Services:TBD  Anticipated Discharge DME: None    Patient/family educated on Medicare website which has current facility and service quality ratings: no  Education Provided on the Discharge Plan:  pending  Patient/Family in Agreement with the Plan: ongoing conversation    Referrals Placed by CM/SW: Homecare  Private pay costs discussed: Not applicable    Discussed  Partnership in Safe Discharge Planning  document with patient/family: No     Handoff Completed: No, handoff not indicated or clinically appropriate    Additional Information:  Had been planning home with home care until condition change on 12/17 and [place] [facility] [place] [facility]  Currently on Bipap with condition noted to be critical.       Next Steps:   Continue aggressive medical management   Care Management will continue to follow    Ana Maria Hicks, RN  Inpatient Care management  [phone]

## 2024-12-19 VITALS
BODY MASS INDEX: 21.37 KG/M2 | WEIGHT: 113.1 LBS | DIASTOLIC BLOOD PRESSURE: 68 MMHG | HEART RATE: 121 BPM | TEMPERATURE: 97.6 F | RESPIRATION RATE: 22 BRPM | SYSTOLIC BLOOD PRESSURE: 172 MMHG | OXYGEN SATURATION: 92 %

## 2024-12-19 LAB
ALBUMIN SERPL BCG-MCNC: 2.5 G/DL (ref 3.5–5.2)
ANION GAP SERPL CALCULATED.3IONS-SCNC: 13 MMOL/L (ref 7–15)
B-OH-BUTYR SERPL-SCNC: 2.48 MMOL/L
BACTERIA BLD CULT: NORMAL
BACTERIA BLD CULT: NORMAL
BASE EXCESS BLDV CALC-SCNC: -6.2 MMOL/L (ref -3–3)
BUN SERPL-MCNC: 28.5 MG/DL (ref 8–23)
CALCIUM SERPL-MCNC: 8.6 MG/DL (ref 8.8–10.4)
CHLORIDE SERPL-SCNC: 111 MMOL/L (ref 98–107)
CREAT SERPL-MCNC: 0.54 MG/DL (ref 0.51–0.95)
EGFRCR SERPLBLD CKD-EPI 2021: >90 ML/MIN/1.73M2
ERYTHROCYTE [DISTWIDTH] IN BLOOD BY AUTOMATED COUNT: 16.2 % (ref 10–15)
GLUCOSE SERPL-MCNC: 275 MG/DL (ref 70–99)
HCO3 BLDV-SCNC: 19 MMOL/L (ref 21–28)
HCO3 SERPL-SCNC: 18 MMOL/L (ref 22–29)
HCT VFR BLD AUTO: 20.9 % (ref 35–47)
HGB BLD-MCNC: 7 G/DL (ref 11.7–15.7)
MAGNESIUM SERPL-MCNC: 2.1 MG/DL (ref 1.7–2.3)
MCH RBC QN AUTO: 29.7 PG (ref 26.5–33)
MCHC RBC AUTO-ENTMCNC: 33 G/DL (ref 31.5–36.5)
MCV RBC AUTO: 90 FL (ref 78–100)
O2/TOTAL GAS SETTING VFR VENT: 80 %
OXYHGB MFR BLDV: 79 % (ref 70–75)
PCO2 BLDV: 35 MM HG (ref 40–50)
PH BLDV: 7.34 [PH] (ref 7.32–7.43)
PHOSPHATE SERPL-MCNC: 2.1 MG/DL (ref 2.5–4.5)
PLATELET # BLD AUTO: 319 10E3/UL (ref 150–450)
PO2 BLDV: 46 MM HG (ref 25–47)
POTASSIUM SERPL-SCNC: 3.6 MMOL/L (ref 3.4–5.3)
RBC # BLD AUTO: 2.32 10E6/UL (ref 3.8–5.2)
SAO2 % BLDV: 79.8 % (ref 70–75)
SODIUM SERPL-SCNC: 142 MMOL/L (ref 135–145)
TRIGL SERPL-MCNC: 65 MG/DL
WBC # BLD AUTO: 31.7 10E3/UL (ref 4–11)

## 2024-12-19 PROCEDURE — 99291 CRITICAL CARE FIRST HOUR: CPT | Performed by: INTERNAL MEDICINE

## 2024-12-19 PROCEDURE — 250N000011 HC RX IP 250 OP 636

## 2024-12-19 PROCEDURE — 999N000157 HC STATISTIC RCP TIME EA 10 MIN

## 2024-12-19 PROCEDURE — 250N000011 HC RX IP 250 OP 636: Performed by: INTERNAL MEDICINE

## 2024-12-19 PROCEDURE — 250N000009 HC RX 250: Performed by: INTERNAL MEDICINE

## 2024-12-19 PROCEDURE — 80069 RENAL FUNCTION PANEL: CPT | Performed by: INTERNAL MEDICINE

## 2024-12-19 PROCEDURE — 258N000003 HC RX IP 258 OP 636: Performed by: INTERNAL MEDICINE

## 2024-12-19 PROCEDURE — 120N000013 HC R&B IMCU

## 2024-12-19 PROCEDURE — 82805 BLOOD GASES W/O2 SATURATION: CPT | Performed by: INTERNAL MEDICINE

## 2024-12-19 PROCEDURE — 99207 PR APP CREDIT; MD BILLING SHARED VISIT: CPT

## 2024-12-19 PROCEDURE — 250N000011 HC RX IP 250 OP 636: Performed by: NURSE PRACTITIONER

## 2024-12-19 PROCEDURE — G0463 HOSPITAL OUTPT CLINIC VISIT: HCPCS

## 2024-12-19 PROCEDURE — 99223 1ST HOSP IP/OBS HIGH 75: CPT | Performed by: NURSE PRACTITIONER

## 2024-12-19 PROCEDURE — 84478 ASSAY OF TRIGLYCERIDES: CPT | Performed by: INTERNAL MEDICINE

## 2024-12-19 PROCEDURE — 83735 ASSAY OF MAGNESIUM: CPT | Performed by: INTERNAL MEDICINE

## 2024-12-19 PROCEDURE — 999N000128 HC STATISTIC PERIPHERAL IV START W/O US GUIDANCE

## 2024-12-19 PROCEDURE — 99233 SBSQ HOSP IP/OBS HIGH 50: CPT | Mod: 25 | Performed by: INTERNAL MEDICINE

## 2024-12-19 PROCEDURE — 82010 KETONE BODYS QUAN: CPT | Performed by: INTERNAL MEDICINE

## 2024-12-19 PROCEDURE — 85018 HEMOGLOBIN: CPT | Performed by: INTERNAL MEDICINE

## 2024-12-19 PROCEDURE — 94660 CPAP INITIATION&MGMT: CPT

## 2024-12-19 RX ORDER — MORPHINE SULFATE 2 MG/ML
1 INJECTION, SOLUTION INTRAMUSCULAR; INTRAVENOUS ONCE
Status: COMPLETED | OUTPATIENT
Start: 2024-12-19 | End: 2024-12-19

## 2024-12-19 RX ORDER — LORAZEPAM 1 MG/1
1 TABLET ORAL
Status: DISCONTINUED | OUTPATIENT
Start: 2024-12-19 | End: 2024-12-20 | Stop reason: HOSPADM

## 2024-12-19 RX ORDER — NALOXONE HYDROCHLORIDE 0.4 MG/ML
0.2 INJECTION, SOLUTION INTRAMUSCULAR; INTRAVENOUS; SUBCUTANEOUS
Status: DISCONTINUED | OUTPATIENT
Start: 2024-12-19 | End: 2024-12-20 | Stop reason: HOSPADM

## 2024-12-19 RX ORDER — DEXTROSE MONOHYDRATE 25 G/50ML
25-50 INJECTION, SOLUTION INTRAVENOUS
Status: DISCONTINUED | OUTPATIENT
Start: 2024-12-19 | End: 2024-12-19

## 2024-12-19 RX ORDER — LORAZEPAM 2 MG/ML
1 INJECTION INTRAMUSCULAR
Status: DISCONTINUED | OUTPATIENT
Start: 2024-12-19 | End: 2024-12-20 | Stop reason: HOSPADM

## 2024-12-19 RX ORDER — SENNOSIDES 8.6 MG
1 TABLET ORAL 2 TIMES DAILY PRN
Status: DISCONTINUED | OUTPATIENT
Start: 2024-12-19 | End: 2024-12-20 | Stop reason: HOSPADM

## 2024-12-19 RX ORDER — MORPHINE SULFATE 2 MG/ML
2 INJECTION, SOLUTION INTRAMUSCULAR; INTRAVENOUS
Status: DISCONTINUED | OUTPATIENT
Start: 2024-12-19 | End: 2024-12-20 | Stop reason: HOSPADM

## 2024-12-19 RX ORDER — MORPHINE SULFATE 2 MG/ML
1 INJECTION, SOLUTION INTRAMUSCULAR; INTRAVENOUS
Status: DISCONTINUED | OUTPATIENT
Start: 2024-12-19 | End: 2024-12-20 | Stop reason: HOSPADM

## 2024-12-19 RX ORDER — LIDOCAINE 40 MG/G
CREAM TOPICAL
Status: DISCONTINUED | OUTPATIENT
Start: 2024-12-19 | End: 2024-12-19

## 2024-12-19 RX ORDER — MINERAL OIL/HYDROPHIL PETROLAT
OINTMENT (GRAM) TOPICAL
Status: DISCONTINUED | OUTPATIENT
Start: 2024-12-19 | End: 2024-12-20 | Stop reason: HOSPADM

## 2024-12-19 RX ORDER — NALOXONE HYDROCHLORIDE 0.4 MG/ML
0.1 INJECTION, SOLUTION INTRAMUSCULAR; INTRAVENOUS; SUBCUTANEOUS
Status: DISCONTINUED | OUTPATIENT
Start: 2024-12-19 | End: 2024-12-20 | Stop reason: HOSPADM

## 2024-12-19 RX ORDER — BISACODYL 10 MG
10 SUPPOSITORY, RECTAL RECTAL
Status: DISCONTINUED | OUTPATIENT
Start: 2024-12-22 | End: 2024-12-20 | Stop reason: HOSPADM

## 2024-12-19 RX ORDER — ONDANSETRON 4 MG/1
4 TABLET, ORALLY DISINTEGRATING ORAL EVERY 6 HOURS PRN
Status: DISCONTINUED | OUTPATIENT
Start: 2024-12-19 | End: 2024-12-20 | Stop reason: HOSPADM

## 2024-12-19 RX ORDER — LORAZEPAM 2 MG/ML
0.5 INJECTION INTRAMUSCULAR ONCE
Status: COMPLETED | OUTPATIENT
Start: 2024-12-19 | End: 2024-12-19

## 2024-12-19 RX ORDER — METHYLPREDNISOLONE SODIUM SUCCINATE 40 MG/ML
40 INJECTION INTRAMUSCULAR; INTRAVENOUS EVERY 24 HOURS
Status: DISCONTINUED | OUTPATIENT
Start: 2024-12-20 | End: 2024-12-19

## 2024-12-19 RX ORDER — SODIUM CHLORIDE, SODIUM LACTATE, POTASSIUM CHLORIDE, CALCIUM CHLORIDE 600; 310; 30; 20 MG/100ML; MG/100ML; MG/100ML; MG/100ML
INJECTION, SOLUTION INTRAVENOUS CONTINUOUS
Status: DISCONTINUED | OUTPATIENT
Start: 2024-12-19 | End: 2024-12-19

## 2024-12-19 RX ORDER — NICOTINE POLACRILEX 4 MG
15-30 LOZENGE BUCCAL
Status: DISCONTINUED | OUTPATIENT
Start: 2024-12-19 | End: 2024-12-19

## 2024-12-19 RX ORDER — ATROPINE SULFATE 10 MG/ML
2 SOLUTION/ DROPS OPHTHALMIC EVERY 4 HOURS PRN
Status: DISCONTINUED | OUTPATIENT
Start: 2024-12-19 | End: 2024-12-20 | Stop reason: HOSPADM

## 2024-12-19 RX ORDER — ONDANSETRON 2 MG/ML
4 INJECTION INTRAMUSCULAR; INTRAVENOUS EVERY 6 HOURS PRN
Status: DISCONTINUED | OUTPATIENT
Start: 2024-12-19 | End: 2024-12-20 | Stop reason: HOSPADM

## 2024-12-19 RX ORDER — OLANZAPINE 5 MG/1
5 TABLET, ORALLY DISINTEGRATING ORAL EVERY 6 HOURS PRN
Status: DISCONTINUED | OUTPATIENT
Start: 2024-12-19 | End: 2024-12-20 | Stop reason: HOSPADM

## 2024-12-19 RX ORDER — GLYCOPYRROLATE 0.2 MG/ML
0.2 INJECTION, SOLUTION INTRAMUSCULAR; INTRAVENOUS EVERY 4 HOURS PRN
Status: DISCONTINUED | OUTPATIENT
Start: 2024-12-19 | End: 2024-12-20 | Stop reason: HOSPADM

## 2024-12-19 RX ORDER — CARBOXYMETHYLCELLULOSE SODIUM 5 MG/ML
1-2 SOLUTION/ DROPS OPHTHALMIC
Status: DISCONTINUED | OUTPATIENT
Start: 2024-12-19 | End: 2024-12-20 | Stop reason: HOSPADM

## 2024-12-19 RX ORDER — DEXTROSE MONOHYDRATE 100 MG/ML
INJECTION, SOLUTION INTRAVENOUS CONTINUOUS PRN
Status: DISCONTINUED | OUTPATIENT
Start: 2024-12-19 | End: 2024-12-19

## 2024-12-19 RX ADMIN — METOPROLOL TARTRATE 2.5 MG: 5 INJECTION INTRAVENOUS at 12:51

## 2024-12-19 RX ADMIN — MORPHINE SULFATE 1 MG: 2 INJECTION, SOLUTION INTRAMUSCULAR; INTRAVENOUS at 17:24

## 2024-12-19 RX ADMIN — MORPHINE SULFATE 2 MG: 2 INJECTION, SOLUTION INTRAMUSCULAR; INTRAVENOUS at 21:03

## 2024-12-19 RX ADMIN — IPRATROPIUM BROMIDE 2 SPRAY: 42 SPRAY NASAL at 13:02

## 2024-12-19 RX ADMIN — SODIUM CHLORIDE, POTASSIUM CHLORIDE, SODIUM LACTATE AND CALCIUM CHLORIDE: 600; 310; 30; 20 INJECTION, SOLUTION INTRAVENOUS at 12:27

## 2024-12-19 RX ADMIN — CEFEPIME 2 G: 2 INJECTION, POWDER, FOR SOLUTION INTRAVENOUS at 10:37

## 2024-12-19 RX ADMIN — MORPHINE SULFATE 2 MG: 2 INJECTION, SOLUTION INTRAMUSCULAR; INTRAVENOUS at 18:22

## 2024-12-19 RX ADMIN — GLYCOPYRROLATE 0.2 MG: 0.2 INJECTION, SOLUTION INTRAMUSCULAR; INTRAVENOUS at 18:22

## 2024-12-19 RX ADMIN — CEFEPIME 2 G: 2 INJECTION, POWDER, FOR SOLUTION INTRAVENOUS at 01:08

## 2024-12-19 RX ADMIN — SODIUM PHOSPHATE, MONOBASIC, MONOHYDRATE AND SODIUM PHOSPHATE, DIBASIC, ANHYDROUS 9 MMOL: 142; 276 INJECTION, SOLUTION INTRAVENOUS at 08:50

## 2024-12-19 RX ADMIN — MORPHINE SULFATE 2 MG: 2 INJECTION, SOLUTION INTRAMUSCULAR; INTRAVENOUS at 20:07

## 2024-12-19 RX ADMIN — LORAZEPAM 0.5 MG: 2 INJECTION INTRAMUSCULAR; INTRAVENOUS at 16:49

## 2024-12-19 RX ADMIN — METHYLPREDNISOLONE SODIUM SUCCINATE 62.5 MG: 125 INJECTION, POWDER, FOR SOLUTION INTRAMUSCULAR; INTRAVENOUS at 08:34

## 2024-12-19 RX ADMIN — PANTOPRAZOLE SODIUM 40 MG: 40 INJECTION, POWDER, FOR SOLUTION INTRAVENOUS at 08:34

## 2024-12-19 ASSESSMENT — ACTIVITIES OF DAILY LIVING (ADL)
ADLS_ACUITY_SCORE: 77
ADLS_ACUITY_SCORE: 80
ADLS_ACUITY_SCORE: 73
ADLS_ACUITY_SCORE: 77
ADLS_ACUITY_SCORE: 73
ADLS_ACUITY_SCORE: 80
ADLS_ACUITY_SCORE: 77
ADLS_ACUITY_SCORE: 80
ADLS_ACUITY_SCORE: 80
ADLS_ACUITY_SCORE: 73
ADLS_ACUITY_SCORE: 77
ADLS_ACUITY_SCORE: 73
ADLS_ACUITY_SCORE: 77

## 2024-12-19 NOTE — PROGRESS NOTES
Brief Progress Note    See progress note from earlier today. Following this morning's visit, patient was noted to have worsening hypoxia and higher oxygenation requirements. A repeat bedside CXR showed full right lung collapse, suspected to be atelectasis/mucous plugging. She was requiring BiPap 80% FiO2 satting 90% this afternoon.    I discussed the case with the [facility] intensivist Dr. Echevarria. Given the patient's already high oxygenation requirements, bronchoscopy will need to be pursued under sedation. However any sedation will likely result in intubation, recurrent atelectasis and mucous plugging.    I discussed with son Hector at length. Given the patient and family's stated wishes of DNR/DNI, bronchoscopy is not recommended. Hector has decided to transition the patient to comfort cares this afternoon.    Discussed with nurse, will put in comfort care orders    Domingo Ramesh MD  Hospitalist    - Total time spent on encounter is 85 minutes, which includes counseling patient and family, coordination of care, managing orders, interpreting tests, and/or time spent discussing with consultants.

## 2024-12-19 NOTE — CONSULTS
Palliative Care Consultation Note  [facility]      Patient: Kezia Dixon  Date of Admission:  2024    Requesting Clinician / Team: Hospital Medicine   Reason for consult:   Goals of care  Patient and family support     Recommendations & Counseling     GOALS OF CARE:   Life-prolonging with limits   Kezia's son, Hector, is aware of his mother's significant illness and hopes for improvement. However, he did acknowledge that recovery may not be possible. He had questions regarding EOL care, signs of worsening disease, and distress. Education provided.   Hector shared that Kezia will be starting artifical nutrition. It appears he has spoken with other medical teams and he understand details of NJ placement, but wanted to know what to look for is the NJ is causing increased discomfort. Education provided on non-verbal signs of distress (pulling at the NJ, facial expression, worsening agitation with the tube. Shared common side effect since in TF and intolerance (nausea, cramping, etc.). After discussion with bedside RN, TPN is to be started today given her respiratory status.    Hector is aware that despite motivation and the want to improve, the body, both physically and mentally, may not be able to achieve the results that we hope. In this case, Hector is aware that focusing on quality of life and providing coping support would be the most appropriate  Additional family will be arriving tomorrow afternoon at 1430. Hector requested that our service come to discuss with them as well. Shared that this writer is not available at the time tomorrow, but will see if some from our team can be present (UNCLEAR IF POSSIBLE)     ADVANCE CARE PLANNING:  Patient has an advance directive dated 2019.  Primary Health Care Agent Fuentes Dixon (he  in ).  Alternate(s) Hector Zack (son) Beronica Oneal (dtr).   There is no POLST form on file, defer to patient and/or next of kin for decisions    Code status: No CPR- Do NOT Intubate    DECISION MAKING:  Patient's decision making preferences: shared with support from loved ones  Patient has decision-making capacity today for complex decisions: Unreliable    MEDICAL MANAGEMENT:   We are not actively managing symptoms at this time.    PSYCHOSOCIAL/SPIRITUAL SUPPORT:  Family   Bre community: Agnostic     Palliative Care will continue to follow. Thank you for the consult and allowing us to aid in the care of Kezia Dixon.    These recommendations have been discussed with bedside and primary team.    Barrington Hfuf NP  [facility], Palliative Care  Securely message with the Vocera Web Console (learn more here) or  Text page via [facility] Paging/Directory     Chart documentation was completed, in part, with [employer] voice-recognition software. Even though reviewed, some grammatical, spelling, and word errors may remain.    Assessment      Kezia Dixon is a 69 year old female with a past medical history of right lateral lung adenocarcinoma currently on immunotherapy with plans to start SBRT of left upper nodule, previous history of esophageal cancer status post esophagectomy and chemoradiation status post lobectomy for a right lung squamous cell cancer in 2018, COPD, stress-induced cardiomyopathy, nonobstructive coronary artery disease, history of alcohol use disorder who was admitted on 12/8 for generalized weakness, nausea, anorexia, failure to thrive.  Fortunately her hospitalization has been complicated by severe right sided pneumonia diagnosed on 12/17.  The palliative care service was consulted for goals of care and patient family support in light of her severe disease and overall very poor prognosis.       History of Present Illness   Met with Kezia and her son Hector but she is somnolent on BiPAP and unable to interact.    Introduced the role of palliative care as an interdisciplinary team that cares for patients with serious illness to help support  symptom management, communication, coping for patients and their families as well as support with medical decision making.    Prognosis, Goals, & Planning:   Functional Status just prior to this current hospitalization:  Outpatient Palliative Performance Score (PPS) 70%  Significant evidence of disease.  Full/normal self-care & LOC; normal or reduced intake, reduced ambulation and activity/work.      Prognosis, Goals, and/or Advance Care Planning:  We discussed treatment options (full/restorative, selective/conservatives, and comfort only/hospice) for Kezia Mast's son, Hector, is aware of his mother's significant illness and hopes for improvement. However, he did acknowledge that recovery may not be possible. He had questions regarding EOL care, signs of worsening disease, and distress. Education provided.   Hector shared that Kezia will be starting artifical nutrition. It appears he has spoken with other medical teams and he understand details of NJ placement, but wanted to know what to look for is the NJ is causing increased discomfort. Education provided on non-verbal signs of distress (pulling at the NJ, facial expression, worsening agitation with the tube. Shared common side effect since in TF and intolerance (nausea, cramping, etc.). After discussion with bedside RN, TPN is to be started today given her respiratory status.    Hector is aware that despite motivation and the want to improve, the body, both physically and mentally, may not be able to achieve the results that we hope. In this case, Hector is aware that focusing on quality of life and providing coping support would be the most appropriate  Additional family will be arriving tomorrow afternoon at 1430. Hector requested that our service come to discuss with them as well. Shared that this writer is not available at the time tomorrow, but will see if some from our team can be present (UNCLEAR IF POSSIBLE)   Education provided on transition to  comfort-focused goals of care would be including discontinuation of IV fluids, cardiac monitoring, labs, tube feeding, TPN and other interventions that do not directly promote comfort.  Anticipatory guidance was given regarding feeding, hunger, fluids at end of life. We discussed utilization of medications to ease air hunger, agitation and restlessness at the time that ventilator is compassionately withdrawn.  Discussed that this process is very purposeful in terms of ensuring patient is as comfortable as possible and that family wishes are honored.    Code Status was addressed today:   No previously addressed. DNR/DNI          Coping, Meaning, & Spirituality:   Mood, coping, and/or meaning in the context of serious illness were addressed today: Yes    Social:   Living situation:lives alone  Important relationships/caregivers:Son Hector, Dtr Beronica. Sister is arriving tomorrow     Medications:  Reviewed this patient's medication profile and medications from this hospitalization.   [place] Board of Pharmacy Data Base Reviewed: No    ROS:  ROULA    Physical Exam   Vital Signs with Ranges  Temp:  [97.4  F (36.3  C)-98.1  F (36.7  C)] 97.6  F (36.4  C)  Pulse:  [106-124] 123  Resp:  [22-34] 22  BP: (136-172)/(55-80) 168/70  FiO2 (%):  [60 %-85 %] 80 %  SpO2:  [88 %-96 %] 94 %  Wt Readings from Last 10 Encounters:   12/19/24 51.3 kg (113 lb 1.5 oz)   11/20/24 37.2 kg (82 lb 0.2 oz)   09/17/24 39.1 kg (86 lb 4.8 oz)   09/04/24 39 kg (86 lb)   05/14/24 38.1 kg (84 lb)   02/12/24 38.1 kg (84 lb)   01/09/24 37.6 kg (82 lb 14.4 oz)   01/04/24 37.1 kg (81 lb 11.2 oz)   05/02/23 38.3 kg (84 lb 8 oz)   08/09/22 40.7 kg (89 lb 12.8 oz)     113 lbs 1.54 oz    Physical Exam  Vitals and nursing note reviewed.   Constitutional:       Appearance: She is ill-appearing.   HENT:      Head: Normocephalic.      Mouth/Throat:      Mouth: Mucous membranes are dry.      Pharynx: Oropharynx is clear.   Cardiovascular:      Pulses: Normal  pulses.   Pulmonary:      Effort: No respiratory distress.      Comments: On bipap  Abdominal:      General: Abdomen is flat. There is no distension.      Tenderness: There is no abdominal tenderness.   Musculoskeletal:         General: No tenderness.   Skin:     General: Skin is warm and dry.      Capillary Refill: Capillary refill takes less than 2 seconds.   Neurological:      Mental Status: She is lethargic.            Data reviewed:  Results for orders placed or performed during the hospital encounter of 12/08/24 (from the past 24 hours)   Blood gas venous   Result Value Ref Range    pH Venous 7.16 (LL) 7.32 - 7.43    pCO2 Venous 40 40 - 50 mm Hg    pO2 Venous 48 (H) 25 - 47 mm Hg    Bicarbonate Venous 14 (L) 21 - 28 mmol/L    Base Excess/Deficit Venous -13.5 (L) -3.0 - 3.0 mmol/L    FIO2 65     Oxyhemoglobin Venous 72 70 - 75 %    O2 Sat, Venous 72.8 70.0 - 75.0 %    Narrative    In healthy individuals, oxyhemoglobin (O2Hb) and oxygen saturation (SO2) are approximately equal. In the presence of dyshemoglobins, oxyhemoglobin can be considerably lower than oxygen saturation.   Renal panel   Result Value Ref Range    Sodium 142 135 - 145 mmol/L    Potassium 3.6 3.4 - 5.3 mmol/L    Chloride 111 (H) 98 - 107 mmol/L    Carbon Dioxide (CO2) 18 (L) 22 - 29 mmol/L    Anion Gap 13 7 - 15 mmol/L    Glucose 275 (H) 70 - 99 mg/dL    Urea Nitrogen 28.5 (H) 8.0 - 23.0 mg/dL    Creatinine 0.54 0.51 - 0.95 mg/dL    GFR Estimate >90 >60 mL/min/1.73m2    Calcium 8.6 (L) 8.8 - 10.4 mg/dL    Albumin 2.5 (L) 3.5 - 5.2 g/dL    Phosphorus 2.1 (L) 2.5 - 4.5 mg/dL   CBC with platelets   Result Value Ref Range    WBC Count 31.7 (H) 4.0 - 11.0 10e3/uL    RBC Count 2.32 (L) 3.80 - 5.20 10e6/uL    Hemoglobin 7.0 (L) 11.7 - 15.7 g/dL    Hematocrit 20.9 (L) 35.0 - 47.0 %    MCV 90 78 - 100 fL    MCH 29.7 26.5 - 33.0 pg    MCHC 33.0 31.5 - 36.5 g/dL    RDW 16.2 (H) 10.0 - 15.0 %    Platelet Count 319 150 - 450 10e3/uL   Magnesium   Result  Value Ref Range    Magnesium 2.1 1.7 - 2.3 mg/dL   Ketone Beta-Hydroxybutyrate Quantitative   Result Value Ref Range    Ketone (Beta-Hydroxybutyrate) Quantitative 2.48 (HH) <=0.30 mmol/L   Blood gas venous   Result Value Ref Range    pH Venous 7.34 7.32 - 7.43    pCO2 Venous 35 (L) 40 - 50 mm Hg    pO2 Venous 46 25 - 47 mm Hg    Bicarbonate Venous 19 (L) 21 - 28 mmol/L    Base Excess/Deficit Venous -6.2 (L) -3.0 - 3.0 mmol/L    FIO2 80     Oxyhemoglobin Venous 79 (H) 70 - 75 %    O2 Sat, Venous 79.8 (H) 70.0 - 75.0 %    Narrative    In healthy individuals, oxyhemoglobin (O2Hb) and oxygen saturation (SO2) are approximately equal. In the presence of dyshemoglobins, oxyhemoglobin can be considerably lower than oxygen saturation.   XR Chest Port 1 View    Narrative    CHEST PORTABLE ONE VIEW December 19, 2024 9:20 AM     HISTORY: Follow up pneumonia, hypoxia.    COMPARISON: 12/18/2024.      Impression    IMPRESSION: Interval development of diffuse opacification of the right  hemithorax. Volume loss is noted evidence by tracheal shift towards  the right. This could be severe atelectasis and lobar collapse.  Diffusely increased airspace consolidation not excluded. Right pleural  fluid may also be possible. Left lung shows no acute airspace  consolidation. Persistent patchy opacities at the left upper to  midlung appear stable. Cardiac silhouette is obscured. Left chest  Port-A-Cath is stable.       Medical Decision Making       75 MINUTES SPENT BY ME on the date of service doing chart review, history, exam, documentation & further activities per the note.

## 2024-12-19 NOTE — PLAN OF CARE
Lethargic, only slightly arouses to voice flickering eyes open.     VS: Tachycardic 100's most of day, up to 124 at times. Dr. Ramesh notified when pt maintaining  and BP trending up, PRN IV metoprolol given x1.  on bipap 60% fiO2. Spent a few hours on HFNC at son's request after discussion with Dr. Ramesh. Son expressed hope that being off the bipap mask she would be able to communicate more with him, unfortunately, she remained very sleepy.  This evening RT and writer rounded and pt breathing was very shallow, lethargic and O2 sats decreasing so VBG drawn and pt placed back on bipap.    VBG with critical results pH 7.16, Dr. Peter Moon notified via page. House KASSIE also on floor, and made aware.    dextrose 5% and 0.45% NaCl 1,000 mL with sodium bicarbonate 50 mEq/L at 100ml/hr    Bolus of NS 0.9 % 1,000 mL with sodium bicarbonate 50 mEq/L infusion given over 3 hours.    LS diminished, course.  Jaeger catheter in place. No BM today.  Moderate edema on all extremities and flank. Mottled and scattered bruising on all extremities.

## 2024-12-19 NOTE — PLAN OF CARE
"Goal Outcome Evaluation:      Plan of Care Reviewed With: patient, child    Overall Patient Progress: no changeOverall Patient Progress: no change    Patient Name: Chasidy  MRN: [id number]  Date of Admission: 12/8/2024  Reason for Admission: SOB,Acute respiratory failure  [place] [facility]    Vitals:   BP Readings from Last 1 Encounters:   12/19/24 (!) 162/66     Pulse Readings from Last 1 Encounters:   12/19/24 115     Wt Readings from Last 1 Encounters:   12/19/24 51.3 kg (113 lb 1.5 oz)     Ht Readings from Last 1 Encounters:   11/19/24 1.549 m (5' 1\")     Estimated body mass index is 21.37 kg/m  as calculated from the following:    Height as of 11/19/24: 1.549 m (5' 1\").    Weight as of this encounter: 51.3 kg (113 lb 1.5 oz).  Temp Readings from Last 1 Encounters:   12/19/24 97.8  F (36.6  C) (Axillary)       Pain: Pain goal:0 Pain Rating : Denies Effective pain medication/regimen: None    CV Surgery Patient: No    Assessment:    Resp: Diminished Lung sounds,On BIPAP at 80 % FIO2  Telemetry: ST  Neuro:Respond to voice with flickering eyes open,lethargic.  Diet: NPO  GI/: No BM on this shift,normoactive bowel sounds,passing gas.Jaeger catheter in placed with adequate urine output.  Skin/Wounds: Scattered bruising,mottled BLE,Edematous BLE.  Lines/Drains:L Chest port,infusing with D 5.45 Sodium Bicarb x 100 ml/hr,intermittent antibiotics.  Activity: Assist x 2,T/R,Lift  Sleep:Lethargic  Abnormal Labs: On Mg,Phos, K+ protocol    Aggression Stop Light: Green          Patient Care Plan: Continue plan of care,if patient decompensates,would transitioned to Comfort care,discussed with Son as per MD.    "

## 2024-12-19 NOTE — PROGRESS NOTES
[facility]    Hospitalist Progress Note    Interval History   - AGMA much improved today, extremities previous severely vasoconstricted now warm today  - Significantly confused and still needing bipap today  - Needs IV nutrition, will place PICC and start TPN. Discussed that we would place a feeding tube if patient can wean off BiPap  - Hgb is 7.0, will give 1u pRBC and target Hgb 8.0 given her ongoing severe hypoxia  - Continue BiPap and wean as able  - Son Hector at bedside updated. Patient remains critically ill however with her improving AGMA and her improving warmth in her extremities, she appears slightly improved today overall. Discussed GOC with him and he would like continued aggressive medical management. 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   Hospitalization complicated by severe right sided pneumonia 12/17/2024, requiring BiPap.    Right sided pneumonia, aspiration vs hospital acquired  Acute on chronic hypoxic respiratory failure 12/17 due to above, requiring BIPap  Community-acquired pneumonia on admission, improved  Leukocytosis due to above, and steroids  Acute metabolic encephalopathy due to above  Patient reported increased cough with productive sputum, afebrile, no leukocytosis initial, procalcitonin borderline at 0.25, . COVID-19/influenza/RSV negative. CXR on admission showed opacities in the right midlung, not significantly  changed from before.    Received IV ceftriaxone 12/11-12/13, IV cefepime 12/15, resumed IV ceftriaxone on 12/16.  Completed 5 days of azithromycin 12/11-12/15. CXR 12/15 showed improved right mid and lower lobe radiation.  No focal consolidation.   Note worsening respiratory status 12/16 to 12/17, increasing secretions, stridor, hypoxia. Desatting on 15L O2 on 12/17, partially due to secretions, requiring bipap on 12/17. Etiology includes worsening secretions, mucous plugging, worsening infection such as hospital acquired pneumonia, aspiration pneumonia. Patient was stabilized with IVF, broad spectrum antibiotics, and bipap.   CXR 12/18 shows improved aeration of right lung. Remains on Bipap 60% fio2 and satting 90%.  - Continue Cefepime. MRSA nares negative so stop Vanco  - Continue BiPap and wean as able  - Continue Jaeger  - Seroquel, Zyprexa for agitation  - Continue IMC status  - Continue medical management per discussion with Hector 12/18, 12/19. No escalation of cares beyond BiPap  - Metoprolol IV PRN for tachycardia    Nondiabetic ketoacidosis, suspect septic + starvationketoacidosis, improved  Note new significant ketoacidosis 3.89, bicarb 13, likely starvation, and related to 3rd spacing, critical illness. Bicarb deficit approx 100mEq. Given bicarb replacement 12/18. Ketoacidosis improving 12/19  - Switch IVF to LR @ 100ml/hr (starting TPN today)  - Starting TPN today with nutrition, patient without PO intake for over 3 days    Possible COPD exacerbation due to above  CRP elevated at 206. Started on prednisone 30 mg daily for 5 days on 12/13 for COPD exacerbation. CRP improved to 67 on 12/16. Cough and shortness of breath improved   Worsening respiratory status 12/17 as noted above  - Continue Mucinex tablet, Anoro  - Continue Tessalon Perles 3 times daily  - Will start tapering steroids to 40mg IV today 12/19    Acute on chronic nausea  Anorexia  Chronic abdominal pain  Moderate malnutrition due to acute  and chronic illness  Mild oropharyngeal dysphagia  Has chronic nausea and abdominal pain for several years.  Reported significant anorexia and poor intake for 3-4 days before admission.  Abdominal pain stable. CT A/P and CXR on admission negative for any new acute findings.  Improved with supportive care.  Nausea improved.  Was tolerating some diet, but oral intake poor this admission. Consult speech therapy on 12/16 due to concern of dysphagia--none appreciated.  - See above starting TPN for nutrition above  - Transition to feeding tube when off BiPap     Hyponatremia, resolved  Took 40 mg p.o. Lasix for 3 consecutive days prior to admission. Baseline sodium 134-140.  Sodium 127 on admission, improved to 130 on 12/11. Sodium then declined to 118 on 12/14 with JESSICA, oliguria, improved with hypertonic saline and then IVF.  Urine osm 270, serum osm 266, Urine Na < 20, TSH normal. Nephrology  consulted. They suspect hypovolemia + SIADH. Hyponatremia resolved with sepsis 12/17.     JESSICA 12/14/2024, likely due to ATN, resolved  Oliguria, resolved  Urine output significantly diminished on 12/14. Cr doubled, 0.45 -->1.03. Admission CT showed no evidence of obstruction. Lasix and spironolactone held since admission.  Losartan placed on hold on 12/15. Renal infuction improved with IVF, Cr improved to 0.66 on 12/17. Appreciate Nephrology input, now signed off  - Monitor renal function  - I&O     Urinary retention s/p sanchez catheter  Patient has required straight cath twice since admission.  - Continue Indwelling sanchez catheter     Mottled right lower extremity, 12/15/2024  Arterial US 12/15 showed no high grade femoral-popliteal stenosis. Recent venous US 12/8/2024 showed no DVT. Mottling improved on 12/16. Noted ongoing 12/17. Improving 12/19  - Continue to monitor    Acute metabolic encephalopathy  Noted on 12/15 am, patient noted to be confused, not oriented. Complained of RUE numbness and slurred speech. Head CT and CTA  unremarkable. Presentation likely due to encephalopathy.  Stroke neurology was consulted. Patient improved throughout the day on 12/15.  Confused again in a.m. of 12/16. MRI brain on 12/16 was done, limited by artifact but no clear CVA.  - Treat respiratory issues above     Generalized weakness  Physical deconditioning  Has been more weak and deconditioned since recent hospitalization 2-3 weeks ago  - PT/OT consulted-TCU recommended.   following for discharge planning     Acute normocytic anemia  Hemoglobin was 12.6 on 11/20, now down to 9.4 on admission.  Stable during hospital stay.  No obvious bleeding. [place] [facility] consulted.  Labs do not indicate hemolysis-normal bilirubin, normal LDH, elevated haptoglobin Iron studies showed low iron saturation of 8%, low iron binding capacity, normal vitamin B12 at 519, normal TSH, reticulocyte count 1.4. Received IV iron on 12/9   Hgb dropping to 7.0 on 12/19.  - Will order 1u pRBC  - Given ongoing severe hypoxia will target Hgb of 8.0 moving forward, conditional PRBC ordered  - Hgb q12h    Bilateral upper and lower extremity purpura  These started appearing on 12/17 in the setting of severe sepsis, pneumonia. No nail involvement. Lower concern for septic emboli.  - WOC consult 12/19 as they are starting to slough off      Chronic/Stable/Resolved    Multiple bilateral pulmonary nodules due to adenocarcinoma, 2023  S/p bronchoscopy with bronchial ultrasound-guided biopsy.  Pathology from right upper lobe nodule was positive for small cell lung cancer (adenocarcinoma).  Biopsy from left upper lobe nodule showed atypical cells.  This was suspicious but not definitive for adenocarcinoma.  Lymph nodes were negative. S/p chemoradiation (of RUL) completed 2/2024. Started on consolidation durvalumab immunotherapy 5/2024.  Last dose was on 11/25/2024. Most recent CT scans 10/2024 showed further increase in RUL and JULIEN nodules and indeterminate right hepatic lobe  lesion.  There was plan to start stereotactic body radiotherapy (SBRT) to the JULIEN nodule on 12/9. 5 session over 2 weeks planned.  Now deferred.  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  Continue Toprol-XL at a lower dose of 25 mg daily  -Echo 12/16 showed EF >70%, hyperdynamic, no WMA, mild aortic regurgitation.  Valves were not well-visualized to exclude vegetations.      Nonobstructive coronary artery disease  PTA-aspirin 81 mg every other day  - Continue aspirin and atorvastatin     Alcohol use disorder  Apparently has been sober for 2 years.  Recently had relapse of alcohol intake when she found out about worsening lung cancer.  She was assessed by chemical dependency.  She declined psychiatric consultation.  -has not been drinking any alcohol since her hospital discharge     Diabetes mellitus type 2, new diagnosis  -Previously had prediabetes.  A1c on admission 7.2  - Follow-up with PCP for monitoring     RLE swelling  -Ultrasound negative for DVT.  -Recommend patient use compression stockings and leg elevation for edema.  Avoid Lasix and spironolactone    Clinically Significant Risk Factors          # Hyperchloremia: Highest Cl = 111 mmol/L in last 2 days, will monitor as appropriate          # Hypoalbuminemia:  Lowest albumin = 2.4 g/dL at 12/18/2024  5:41 AM, will monitor as appropriate     # Hypertension: Noted on problem list    # Chronic heart failure with preserved ejection fraction: heart failure noted on problem list and last echo with EF >50%    # Acute Hypoxic Respiratory Failure: Documented O2 saturation < 90%. Continue supplemental oxygen as needed        # DMII: A1C = 7.2 % (Ref range: <5.7 %) within past 6 months    # Severe Malnutrition: based on nutrition assessment      # Financial/Environmental Concerns: none  # COPD: noted on problem list         PT/OT: ordered  Diet: Snacks/Supplements Adult: Ensure Clear; With Meals  Snacks/Supplements Adult: Gelatein 20 (sugar-free); With Meals  Room Service  NPO for Medical/Clinical Reasons Except for: No Exceptions    DVT Prophylaxis: Pneumatic Compression Devices  Jaeger Catheter: PRESENT, indication: Acute retention or obstruction  Lines: PRESENT      Port a Cath 01/09/24 Single Lumen Left Chest wall-Site Assessment: WDL      Cardiac Monitoring: ACTIVE order. Indication: tachycardia  Code Status: No CPR- Do NOT Intubate    Medically Ready for Discharge: Anticipated in 5+ Days      Domingo Ramesh MD  Hospitalist Service  [facility]  Securely message with [employer] (more info)  Text page via [employer] Paging/Directory     I spent 30 minutes of critical care time on the unit/floor managing the care of Kezia Dixon. Upon evaluation, this patient had a high probability of imminent or life-threatening deterioration due to acute illness, which required my direct attention, intervention, and personal management. 50% of my time was spent at the bedside counseling the patient and/or coordinating care regarding services listed in this note.    Data reviewed today: I reviewed all new labs and imaging results over the last 24 hours.    Physical Exam   Temp: 97.6  F (36.4  C) Temp src: Axillary BP: (!) 165/69 Pulse: (!) 123   Resp: 22 SpO2: 91 % O2  Device: BiPAP/CPAP Oxygen Delivery: 50 LPM  Vitals:    12/17/24 0454 12/18/24 0700 12/19/24 0602   Weight: 48.5 kg (106 lb 14.8 oz) 49 kg (108 lb 1.6 oz) 51.3 kg (113 lb 1.5 oz)     Vital Signs with Ranges  Temp:  [97.4  F (36.3  C)-98.1  F (36.7  C)] 97.6  F (36.4  C)  Pulse:  [106-124] 123  Resp:  [22-34] 22  BP: (136-172)/(55-80) 165/69  FiO2 (%):  [60 %-80 %] 80 %  SpO2:  [88 %-96 %] 91 %  I/O last 3 completed shifts:  In: 1535 [I.V.:1535]  Out: 685 [Urine:685]  O2 requirements: yes bipap    Constitutional: Thin female appears ill, confused  HEENT: Eyes nonicteric, oral mucosa moist  Cardiovascular: RRR, normal S1/2, no m/r/g  Respiratory: Ronchorous, mild crackles, no clear wheezing  Vascular: No LE pitting edema  GI: Normoactive bowel sounds  Skin/Integumen: No rashes  Neuro/Psych: Confused, not alert or oriented, follows basic commands, moves all extremities    Medications   Current Facility-Administered Medications   Medication Dose Route Frequency Provider Last Rate Last Admin    No lozenges or gum should be given while patient on BIPAP/AVAPS/AVAPS AE   Does not apply Continuous PRN Manuel Paul NP         Current Facility-Administered Medications   Medication Dose Route Frequency Provider Last Rate Last Admin    [Held by provider] aspirin EC tablet 81 mg  81 mg Oral Every Other Day Vielka Martínez PA-C   81 mg at 12/16/24 0815    [Held by provider] atorvastatin (LIPITOR) tablet 40 mg  40 mg Oral Daily Vielka Martínez PA-C   40 mg at 12/16/24 0815    [Held by provider] benzonatate (TESSALON) capsule 200 mg  200 mg Oral TID Eloise Fisher MD   200 mg at 12/16/24 2221    ceFEPIme (MAXIPIME) 2 g vial to attach to  mL bag for ADULTS or NS 50 mL bag for PEDS  2 g Intravenous Q8H Michaelle Dent RPH   2 g at 12/19/24 0108    [Held by provider] fluticasone (FLONASE) 50 MCG/ACT spray 2 spray  2 spray Both Nostrils Daily Linda Lang MD   2 spray at 12/16/24 0816    [Held by  provider] gabapentin (NEURONTIN) capsule 600 mg  600 mg Oral TID Mathew Caraballo MD   600 mg at 12/16/24 2220    [Held by provider] guaiFENesin (MUCINEX) 12 hr tablet 600 mg  600 mg Oral BID Vielka Martínez PA-C   600 mg at 12/16/24 2044    heparin lock flush 10 unit/mL injection 5-10 mL  5-10 mL Intracatheter Q24H Estiven Coyne MD   5 mL at 12/14/24 0611    heparin lock flush 100 unit/mL injection 5-10 mL  5-10 mL Intracatheter Q28 Days Estiven Coyne MD        ipratropium (ATROVENT) 0.06 % spray 2 spray  2 spray Both Nostrils 4x Daily Vielka Martínez PA-C   2 spray at 12/18/24 2100    [Held by provider] losartan (COZAAR) tablet 50 mg  50 mg Oral Daily Domingo Ramesh MD   50 mg at 12/15/24 1200    methylPREDNISolone Na Suc (solu-MEDROL) injection 62.5 mg  62.5 mg Intravenous Q24H Domingo Ramesh MD   62.5 mg at 12/19/24 0834    [Held by provider] metoprolol succinate ER (TOPROL XL) 24 hr tablet 25 mg  25 mg Oral Daily Domingo Ramesh MD        [Held by provider] multivitamin w/minerals (THERA-VIT-M) tablet 1 tablet  1 tablet Oral Daily Vielka Martínez PA-C   1 tablet at 12/16/24 0815    pantoprazole (PROTONIX) IV push injection 40 mg  40 mg Intravenous Daily with breakfast Domingo Ramesh MD   40 mg at 12/19/24 0834    [Held by provider] senna-docusate (SENOKOT-S/PERICOLACE) 8.6-50 MG per tablet 1 tablet  1 tablet Oral BID Vielka Martínez PA-C   1 tablet at 12/16/24 2219    Or    [Held by provider] senna-docusate (SENOKOT-S/PERICOLACE) 8.6-50 MG per tablet 2 tablet  2 tablet Oral BID Vielka Martínez PA-C   2 tablet at 12/11/24 1033    sodium chloride (PF) 0.9% PF flush 10-20 mL  10-20 mL Intracatheter Q28 Days Estiven Coyne MD        sodium phosphate 9 mmol in 250 mL NS intermittent infusion  9 mmol Intravenous Once Domingo Ramesh MD   9 mmol at 12/19/24 0850    [Held by provider] spironolactone (ALDACTONE) half-tab 12.5  mg  12.5 mg Oral QAM Vielka Martínez PA-C        [Held by provider] umeclidinium-vilanterol (ANORO ELLIPTA) 62.5-25 MCG/ACT oral inhaler 1 puff  1 puff Inhalation Daily Vielka Martínez PA-C   1 puff at 12/16/24 0817       Data   Recent Labs   Lab 12/19/24  0600 12/18/24  0541 12/17/24  1318 12/17/24  0848 12/17/24  0558 12/15/24  1822 12/15/24  1036   WBC 31.7* 38.5*  --   --  24.7*   < >  --    HGB 7.0* 7.9*  --   --  9.1*   < >  --    MCV 90 92  --   --  89   < >  --     370  --   --  407   < >  --    INR  --   --   --   --   --   --  1.06    136  --   --  133*   < > 126*   POTASSIUM 3.6 3.9 3.8  --  3.4   < >  --    CHLORIDE 111* 106  --   --  102   < >  --    CO2 18* 13*  --   --  19*   < >  --    BUN 28.5* 31.4*  --   --  31.4*   < >  --    CR 0.54 0.60  --   --  0.66   < >  --    ANIONGAP 13 17*  --   --  12   < >  --    VICENTE 8.6* 8.7*  --   --  8.3*   < >  --    * 145*  --  131* 117*   < >  --    ALBUMIN 2.5* 2.4*  --   --  2.4*   < >  --     < > = values in this interval not displayed.       Imaging:   No results found for this or any previous visit (from the past 24 hours).

## 2024-12-19 NOTE — DISCHARGE INSTRUCTIONS
Wound Care Instructions:  Bilateral upper & lower extremity wound(s): Every 3 days  Cleanse any open areas with mild soap and water or normal saline. Pat dry.    Cover with an Optifoam dressing.

## 2024-12-19 NOTE — CONSULTS
[facility] Nurse Inpatient Assessment     Consulted for:  upper and lower extremities    Summary:  Purpura & petechiae to bilateral upper and lower extremities. Purple discoloration over bony prominences on the elbows, heels, feet - do not suspect pressure as primary etiology. Patient critically ill with recent severe vasoconstriction, hypoxia. Left elbow open, draining small amount of serosanguinous drainage.     Patient History (according to provider note(s):       69 year old female with PMH bilateral lung adenocarcinoma, on immunotherapy with plan to start SBRT of JULIEN nodule, previous esophageal cancer s/p esophagectomy and chemoradiation (9/2016), s/p lobectomy for right lung squamous cell cancer (9/2018), COPD, heavy tobacco use in past, resolved stress-induced cardiomyopathy, nonobstructive CAD, alcohol use disorder, who was admitted on 12/8/2024 with generalized weakness, physical deconditioning, acute on chronic nausea, anorexia.  Hospitalization complicated by severe right sided pneumonia 12/17/2024, requiring BiPap.    Areas Assessed:      Areas visualized during today's visit: Focused:, Lower extremities , Heel, Foot, Upper extremities , and Bilateral    Lower extremities warm, edematous.       Right medial foot         Right lateral foot        Left plantar foot/heel        Left heel         Left lateral foot        Right arm        Left arm/elbow    Wound location: left elbow    Last photo: 12/19/2024  Wound due to: //Mixed Etiology: Vasoconstriction/inadequate perfusion in the last 24 hrs  Wound history/plan of care: purpura developed in the setting of severe sepsis.   Wound base: 100 % Purple, intact     Palpation of the wound bed: normal      Drainage: small     Description of drainage: serosanguinous  Periwound skin: Intact and Edematous      Color: purple      Temperature: normal   Odor: none  Pain: facial expression of distress, none  Pain interventions prior to  dressing change: patient tolerated well and slow and gentle cares   Treatment goal: Maintain (prevention of deterioration) and Protection  STATUS: initial assessment  Supplies ordered: supplies stored on unit     Treatment Plan:     Bilateral upper & lower extremity wound(s): Every 3 days  Cleanse any open areas with mild soap and water or normal saline. Pat dry.    Cover with an Optifoam dressing.       Orders: Written    RECOMMEND PRIMARY TEAM ORDER: None, at this time  Education provided: wound progress  Discussed plan of care with: Family  [facility] nurse follow-up plan: weekly  Notify [facility] if wound(s) deteriorate.  Nursing to notify the Provider(s) and re-consult the [facility] Nurse if new skin concern.    DATA:     Current support surface: Standard  Standard gel mattress (Isoflex)  Containment of urine/stool: Indwelling catheter  BMI: Body mass index is 21.37 kg/m .   Active diet order: Orders Placed This Encounter      NPO for Medical/Clinical Reasons Except for: No Exceptions     Output: I/O last 3 completed shifts:  In: 1535 [I.V.:1535]  Out: 685 [Urine:685]     Labs:   Recent Labs   Lab 12/19/24  0600 12/16/24  0628 12/15/24  1036   ALBUMIN 2.5*   < >  --    HGB 7.0*   < >  --    INR  --   --  1.06   WBC 31.7*   < >  --     < > = values in this interval not displayed.     Pressure injury risk assessment:   Sensory Perception: 2-->very limited  Moisture: 4-->rarely moist  Activity: 1-->bedfast  Mobility: 2-->very limited  Nutrition: 1-->very poor  Friction and Shear: 1-->problem  Sterling Score: 11    Zoraida Vcitoria, RN CWOCN  -Securely message with [employer] ([facility] [employer])  Preferred  -[facility] Office Phone: [phone] (messages checked periodically Mon-Fri 8a-4p)

## 2024-12-19 NOTE — CONSULTS
CLINICAL NUTRITION SERVICES - REASSESSMENT NOTE + Request for Pharmacy/Nutrition to start TPN.     Recommendations Ordered by Registered Dietitian (RD):   Step 1: Clinimix D15 AA5 @ 25 ml/hr + 250 mL 20% lipids 6x weekly. Provides 30 g AA, 90 g Dex (GIR = 1.6).     Step 2: Cliniimx D15 AA5 @ 35 ml/hr + 250 mL 20% lipids 6x weekly. Provides 42 g AA, 126 g Dex (GIR = 2.3).     Step 3 (Goal): Clinimix D15 AA5 @ 45 ml/hr +250 mL 20% lipids 6x weekly. Provides 1196 kcal (31 kcal/kg), 54 g AA (1.4 g/kg), 162 g Dex (GIR = 2.9), 36% kcal from lipid.     Dosing Weight = 38.6 kg   Total Fluid = 100 ml/hr total (or per provider pending fluid status)    Malnutrition: 12/16  % Weight Loss:  None noted  % Intake:  </= 50% for >/= 5 days (severe malnutrition)  Subcutaneous Fat Loss:  Orbital region mild depletion  Muscle Loss:  Temporal region moderate depletion, Anterior thigh region moderate depletion, and Posterior calf region moderate depletion  Fluid Retention:  Mild feet, ankles (12/16)     Malnutrition Diagnosis: Severe malnutrition  In Context of:  Acute illness or injury (on chronic)     EVALUATION OF PROGRESS TOWARD GOALS   Diet: NPO since 12/17 AM - bipap reliance   Intake/Tolerance:   - pt admitted 12/8, since then has been eating 0-25% of most meals per records.   - Labs reviewed:   Na 142, K 3.6, Mg 2.1 - NL  Phos 2.1 (L) --> replaced.   - Last BM was recorded x1 on 12/10.   - LR running @ 100 mL/hr.     - Weight trends this admission - using adimt wt for dosing weight.   Date/Time Weight Weight Method   12/19/24 0602 51.3 kg (113 lb 1.5 oz) Bed scale   12/18/24 0700 49 kg (108 lb 1.6 oz) Bed scale   12/17/24 0454 48.5 kg (106 lb 14.8 oz) Bed scale   12/14/24 0647 41.5 kg (91 lb 9.6 oz) --   12/13/24 0653 40.7 kg (89 lb 12.8 oz) Standing scale   12/12/24 0500 39.9 kg (88 lb) Standing scale   12/11/24 0510 39.5 kg (87 lb 1.3 oz) Standing scale   12/10/24 0600 39.5 kg (87 lb) Standing scale   12/08/24 1600 38.6 kg (85  lb)      ASSESSED NUTRITION NEEDS:  Dosing Weight: 38.6 kg (admit)  Estimated Energy Needs: 4357-2285+ kcals (30-35+ Kcal/Kg)  Justification: underweight  Estimated Protein Needs: 39-58 grams protein (1.0-1.5 g pro/Kg)  Justification: preservation of lean body mass    NEW FINDINGS:   12/17 - RRT 0845 for hypoxia, increased O2 requirement. [place] [facility] [place] due to Bipap reliance.   12/19 - PICC ordered     Previous Goals:    >50% meal trays TID, and >75% in nutrition supplements BID   Evaluation: Not met    Previous Nutrition Diagnosis:   Inadequate oral intake related to poor appetite, nausea as evidenced by poor po intakes of <25-50% during 8 days of admission, suspect inadequate intake at baseline PTA, and need for oral nutrition supplements to help attenuate lean muscle mass losses.   Evaluation: Declining    CURRENT NUTRITION DIAGNOSIS  Inadequate protein-energy intake related to increased respiratory needs requiring NPO, poor appetite as evidenced by pt eating 0-25% of most meals over 11 day admission, now NPO x2 days with plan to start IV nutrition.     INTERVENTIONS  Recommendations / Nutrition Prescription  Step 1: Clinimix D15 AA5 @ 25 ml/hr + 250 mL 20% lipids 6x weekly. Provides 30 g AA, 90 g Dex (GIR = 1.6).     Step 2: Cliniimx D15 AA5 @ 35 ml/hr + 250 mL 20% lipids 6x weekly. Provides 42 g AA, 126 g Dex (GIR = 2.3).     Step 3 (Goal): Clinimix D15 AA5 @ 45 ml/hr +250 mL 20% lipids 6x weekly. Provides 1196 kcal (31 kcal/kg), 54 g AA (1.4 g/kg), 162 g Dex (GIR = 2.9), 36% kcal from lipid.     Dosing Weight = 38.6 kg   Total Fluid = 100 ml/hr total (or per provider pending fluid status)     Implementation  PN Composition, PN Schedule, and Collaboration and Referral of Nutrition care: orders entered, discussed with MD and PharmD.     Goals  TPN at goal to provide % estimated needs.     MONITORING AND EVALUATION:  Progress towards goals will be monitored and evaluated per protocol and  Practice Guidelines    Corine Muñiz RD, LD  Pager: [phone]  Available on [employer]

## 2024-12-19 NOTE — PROGRESS NOTES
Patient was on BiPAP 15/8 FiO2 60% throughout most of the day with SpO2 in the low to mid 90's. There is some blanchable redness on the bridge of the nose. Total face mask is in place with skin barriers in place. Pt was on HFNC 50L FiO2 65% for about 2.5 hrs this afternoon. Pt was placed back on BiPAP due to pt more sleepy and SpO2 dropping into the upper 80's. VBG was drawn by beside RN prior to pt being placed back on BiPAP.  Will cont to monitor.  12/18/2024  Vielka Thibodeaux, RT

## 2024-12-19 NOTE — PROGRESS NOTES
[place] [facility] progress note:  Events were noted, patient has right lung collapse and currently on BiPAP with low oxygen, decision was no intubation or bronchoscopy and will be switched to comfort care.  Patient has her eyes closed and has tachypnea.  Son and nephew at bedside and at peace with the decision of comfort care.

## 2024-12-19 NOTE — CONSULTS
[facility] Intensive Care Consultation    Date of Admission:  12/8/2024     Assessment & Plan   Kezia Dixon is a 69 year old female who was admitted on 12/8/2024.  She has been generally declining and had a more abrupt deterioration with right lung atelectasis.  Her clinical status is too tenuous to consider a nonintubated bronchoscopy.  The only way to safely perform a bronchoscopy would be to intubate patient and administer sedation.  Given that the etiology of her mucous plugging is at least in part due to her decreased level of consciousness intubation and sedation and bronchoscopy would further worsen the underlying problem.  I discussed this with her son who after our decision will be planning on transitioning to palliative care once family has arrived    Nathalie Echevarria MD    Time Spent on this Encounter   Billing:  I spent 35 minutes bedside and on the inpatient unit today managing the critical care of Kezia Dixon in relation to the issues listed in this note.    Reason for Consult   Reason for consult: I was asked by Dr. Ramesh to evaluate this patient for her acute worsening of shortness of breath associated with right lung atelectasis.    History of Present Illness   Kezia Dixon is a 69 year old female who presents with progressive worsening of respiratory status. Admitted on 12/8 for weakness and GI symptoms. Found to have multiple electrolyte abnormalities as well as pneumonia and severe sepsis. She has been on bipap for the last 48 hours. Today, 12/19, her oxygen needs have increased. CXR shows complete opacification of the right lung.  Patient herself is minimally responsive.  Unable to participate in interview.  Son at the bedside who had been told about the possibility of a bronchoscopy as a temporizing measure.    Past Medical History:   Diagnosis Date    Alcohol use disorder, severe, dependence (H) 10/14/2016    Alcoholism (H)  10/14/2016    Anemia     Benign essential hypertension 10/14/2016    Carotid artery disease (H)     mile plaque     Chemical dependency (H)     alcohol    COPD (chronic obstructive pulmonary disease) (H)     Coronary artery disease     Depression with anxiety     Esophageal cancer (H) 2016    SQUAMOUS CELL    Esophageal mass     GERD (gastroesophageal reflux disease)     Hx of pancreatitis     Hypercholesteremia     Hyperglycemia     Hyperlipemia     Impaired fasting glucose 10/14/2016    Nocturnal leg cramps     Other chronic pain     Pancreatic pseudocyst 10/14/2016    Pancreatitis     with pseudocyst    Pap smear with atypical squamous cells, cannot exclude high grade squamous intraepithelial lesion (ASC-H) 10/14/2016    Colpo impression benign, ECC benign. Cotestin in 1 yr.    Shingles     Squamous cell carcinoma of right lung (H)     Vasomotor rhinitis      Social History     Tobacco Use    Smoking status: Former     Current packs/day: 0.00     Average packs/day: 0.3 packs/day for 45.4 years (11.4 ttl pk-yrs)     Types: Cigarettes     Start date: 1970     Quit date: 2015     Years since quittin.0    Smokeless tobacco: Never   Vaping Use    Vaping status: Never Used   Substance Use Topics    Alcohol use: Not Currently    Drug use: No     Past Surgical History:   Procedure Laterality Date    AAA REPAIR      splenic artery aneurysm embolization    ABDOMEN SURGERY  2016    COLONOSCOPY  2013    Procedure: COMBINED COLONOSCOPY, SINGLE BIOPSY/POLYPECTOMY BY BIOPSY;  COLONOSCOPY (MAC);  Surgeon: Montana Rosa MD;  Location:  GI    COLONOSCOPY  2013    COLONOSCOPY N/A 10/4/2018    Procedure: COMBINED COLONOSCOPY, SINGLE OR MULTIPLE BIOPSY/POLYPECTOMY BY BIOPSY;  colonoscopy;  Surgeon: Gio Apodaca MD;  Location:  GI    ENT SURGERY      ESOPHAGOGASTRECTOMY N/A 3/22/2016    Procedure: ESOPHAGOGASTRECTOMY;  Surgeon: Alvin Riojas MD;  Location:   OR    ESOPHAGOSCOPY, GASTROSCOPY, DUODENOSCOPY (EGD), COMBINED N/A 12/22/2015    Procedure: COMBINED ENDOSCOPIC ULTRASOUND, ESOPHAGOSCOPY, GASTROSCOPY, DUODENOSCOPY (EGD), FINE NEEDLE ASPIRATE/BIOPSY;  Surgeon: Danelle Michael MD;  Location:  GI    GASTROSTOMY, INSERT TUBE, COMBINED N/A 2/2/2016    Procedure: COMBINED GASTROSTOMY, INSERT TUBE (OPEN);  Surgeon: Alvin Riojas MD;  Location:  OR    GI SURGERY  12/22/2015    HAND SURGERY      right    HERNIORRHAPHY INCISIONAL (LOCATION) N/A 3/19/2019    Procedure: LAPARSCOPIC  INCISIONAL HERNIA REPAIR WITH MESH, LAPARSCOPIC LYSIS OF ADHENSIONS;  Surgeon: Lamberto Magaña MD;  Location:  OR    INSERT PORT VASCULAR ACCESS N/A 12/28/2015    Procedure: INSERT PORT VASCULAR ACCESS;  Surgeon: Alvin Riojas MD;  Location:  OR    INSERT PORT VASCULAR ACCESS N/A 1/9/2024    Procedure: SMART PORT PLACEMENT;  Surgeon: Alvin Riojas MD;  Location:  OR    LAPAROSCOPIC CHOLECYSTECTOMY N/A 3/19/2019    Procedure: LAPAROSCOPIC CHOLECYSTECTOMY;  Surgeon: Lamberto Magaña MD;  Location:  OR    LOBECTOMY LUNG Right 10/16/2018    Procedure: LOBECTOMY LUNG;  Surgeon: Alvin Riojas MD;  Location:  OR    REMOVE PORT VASCULAR ACCESS Left 7/22/2016    Procedure: REMOVE PORT VASCULAR ACCESS;  Surgeon: Alvin Riojas MD;  Location:  OR    THORACOTOMY Right 10/16/2018    Procedure: REDO RIGHT THORACTOMY AND RIGHT LOWER LOBECTOMY, PLEURAL LYSIS;  Surgeon: Alvin Riojas MD;  Location:  OR    TONSILLECTOMY      VASCULAR SURGERY           Physical Exam   Temp: 97.6  F (36.4  C) Temp src: Axillary Temp  Min: 97.4  F (36.3  C)  Max: 97.8  F (36.6  C) BP: (!) 172/68 Pulse: (!) 121   Resp: 22 SpO2: 93 % O2 Device: BiPAP/CPAP Oxygen Delivery: 50 LPM  Vitals:    12/17/24 0454 12/18/24 0700 12/19/24 0602   Weight: 48.5 kg (106 lb 14.8 oz) 49 kg (108 lb 1.6 oz) 51.3 kg (113 lb 1.5 oz)     I/O last 3  completed shifts:  In: 1535 [I.V.:1535]  Out: 685 [Urine:685]    Constitutional: Cachectic, minimally responsive, chronically ill  ENT: Full facemask BiPAP  Respiratory: Tachypneic  skin: Multiple areas of ecchymoses  Neurologic: Obtunded    Medications   Current Facility-Administered Medications   Medication Dose Route Frequency Provider Last Rate Last Admin    dextrose 10% infusion   Intravenous Continuous PRN Domingo Ramesh MD        lactated ringers infusion   Intravenous Continuous Domingo Ramesh  mL/hr at 12/19/24 1227 New Bag at 12/19/24 1227    No lozenges or gum should be given while patient on BIPAP/AVAPS/AVAPS AE   Does not apply Continuous PRN Manuel Paul NP        parenteral nutrition - Clinimix E   CENTRAL LINE IV TPN CONTINUOUS Domingo Ramesh MD         Current Facility-Administered Medications   Medication Dose Route Frequency Provider Last Rate Last Admin    [Held by provider] aspirin EC tablet 81 mg  81 mg Oral Every Other Day Vielka Martínez PA-C   81 mg at 12/16/24 0815    [Held by provider] atorvastatin (LIPITOR) tablet 40 mg  40 mg Oral Daily Vielka Martínez PA-C   40 mg at 12/16/24 0815    [Held by provider] benzonatate (TESSALON) capsule 200 mg  200 mg Oral TID Eloise Fisher MD   200 mg at 12/16/24 2221    ceFEPIme (MAXIPIME) 2 g vial to attach to  mL bag for ADULTS or NS 50 mL bag for PEDS  2 g Intravenous Q8H Michaelle Dent RPH   2 g at 12/19/24 1037    [Held by provider] fluticasone (FLONASE) 50 MCG/ACT spray 2 spray  2 spray Both Nostrils Daily Linda Lang MD   2 spray at 12/16/24 0816    [Held by provider] gabapentin (NEURONTIN) capsule 600 mg  600 mg Oral TID Mathew Caraballo MD   600 mg at 12/16/24 2220    [Held by provider] guaiFENesin (MUCINEX) 12 hr tablet 600 mg  600 mg Oral BID Vielka Martínez PA-C   600 mg at 12/16/24 2044    heparin lock flush 10 unit/mL injection 5-10 mL  5-10 mL Intracatheter Q24H  Estiven Coyne MD   5 mL at 12/14/24 0611    heparin lock flush 100 unit/mL injection 5-10 mL  5-10 mL Intracatheter Q28 Days Estiven Coyne MD        ipratropium (ATROVENT) 0.06 % spray 2 spray  2 spray Both Nostrils 4x Daily Vielka Martínez PA-C   2 spray at 12/19/24 1302    lipids plant base (CLINOLIPID) 20 % infusion 250 mL  250 mL Intravenous Once per day on Monday Tuesday Wednesday Thursday Friday Saturday Domingo Ramesh MD        [Held by provider] losartan (COZAAR) tablet 50 mg  50 mg Oral Daily Domingo Ramesh MD   50 mg at 12/15/24 1200    [START ON 12/20/2024] methylPREDNISolone sodium succinate (SOLU-MEDROL) injection 40 mg  40 mg Intravenous Q24H Domingo Ramesh MD        [Held by provider] metoprolol succinate ER (TOPROL XL) 24 hr tablet 25 mg  25 mg Oral Daily Domingo Ramesh MD        [Held by provider] multivitamin w/minerals (THERA-VIT-M) tablet 1 tablet  1 tablet Oral Daily Vielka Martínez PA-C   1 tablet at 12/16/24 0815    pantoprazole (PROTONIX) IV push injection 40 mg  40 mg Intravenous Daily with breakfast Domingo Ramesh MD   40 mg at 12/19/24 0834    [Held by provider] senna-docusate (SENOKOT-S/PERICOLACE) 8.6-50 MG per tablet 1 tablet  1 tablet Oral BID Vielka Martínez PA-C   1 tablet at 12/16/24 2219    Or    [Held by provider] senna-docusate (SENOKOT-S/PERICOLACE) 8.6-50 MG per tablet 2 tablet  2 tablet Oral BID Vielka Martínez PA-C   2 tablet at 12/11/24 1033    sodium chloride (PF) 0.9% PF flush 10-20 mL  10-20 mL Intracatheter Q28 Days Estiven Coyne MD        [Held by provider] spironolactone (ALDACTONE) half-tab 12.5 mg  12.5 mg Oral QAM Vielka Martínez PA-C        [Held by provider] umeclidinium-vilanterol (ANORO ELLIPTA) 62.5-25 MCG/ACT oral inhaler 1 puff  1 puff Inhalation Daily Vielka Martínez PA-C   1 puff at 12/16/24 0817       Data   Recent Labs   Lab 12/19/24  0600  12/18/24  0541 12/17/24  1318 12/17/24  0848 12/17/24  0558 12/15/24  1822 12/15/24  1036   WBC 31.7* 38.5*  --   --  24.7*   < >  --    HGB 7.0* 7.9*  --   --  9.1*   < >  --    MCV 90 92  --   --  89   < >  --     370  --   --  407   < >  --    INR  --   --   --   --   --   --  1.06    136  --   --  133*   < > 126*   POTASSIUM 3.6 3.9 3.8  --  3.4   < >  --    CHLORIDE 111* 106  --   --  102   < >  --    CO2 18* 13*  --   --  19*   < >  --    BUN 28.5* 31.4*  --   --  31.4*   < >  --    CR 0.54 0.60  --   --  0.66   < >  --    ANIONGAP 13 17*  --   --  12   < >  --    VICENTE 8.6* 8.7*  --   --  8.3*   < >  --    * 145*  --  131* 117*   < >  --    ALBUMIN 2.5* 2.4*  --   --  2.4*   < >  --     < > = values in this interval not displayed.     Recent Results (from the past 24 hours)   XR Chest Port 1 View    Narrative    CHEST PORTABLE ONE VIEW December 19, 2024 9:20 AM     HISTORY: Follow up pneumonia, hypoxia.    COMPARISON: 12/18/2024.      Impression    IMPRESSION: Interval development of diffuse opacification of the right  hemithorax. Volume loss is noted evidence by tracheal shift towards  the right. This could be severe atelectasis and lobar collapse.  Diffusely increased airspace consolidation not excluded. Right pleural  fluid may also be possible. Left lung shows no acute airspace  consolidation. Persistent patchy opacities at the left upper to  midlung appear stable. Cardiac silhouette is obscured. Left chest  Port-A-Cath is stable.

## 2024-12-20 LAB
BACTERIA BLD CULT: NO GROWTH
BACTERIA BLD CULT: NO GROWTH

## 2024-12-20 PROCEDURE — 99207 PR NO BILLABLE SERVICE THIS VISIT: CPT | Performed by: INTERNAL MEDICINE

## 2024-12-20 ASSESSMENT — ACTIVITIES OF DAILY LIVING (ADL): ADLS_ACUITY_SCORE: 80

## 2024-12-20 NOTE — DEATH PRONOUNCEMENT
MD DEATH PRONOUNCEMENT    Called to pronounce Kezia Dixon dead.    Physical Exam: Unresponsive to noxious stimuli, Spontaneous respirations absent, Breath sounds absent, Carotid pulse absent, Heart sounds absent, Pupillary light reflex absent, and Corneal blink reflex absent    Patient was pronounced dead at 2146 PM, 2024.    Preliminary Cause of Death: Acute hypoxic respiratory failure    Active Problems:    Hypokalemia    Hypomagnesemia    Hyponatremia    Weakness    Anemia, unspecified type       Infectious disease present?: NO    Communicable disease present? (examples: HIV, chicken pox, TB, Ebola, CJD) :  NO    Multi-drug resistant organism present? (example: MRSA): NO    Please consider an autopsy if any of the following exist:  NO Unexpected or unexplained death during or following any dental, medical, or surgical diagnostic treatment procedures.   NO Death of mother at or up to seven days after delivery.     NO All  and pediatric deaths.     NO Death where the cause is sufficiently obscure to delay completion of the death certificate.   NO Deaths in which autopsy would confirm a suspected illness/condition that would affect surviving family members or recipients of transplanted organs.     The following deaths must be reported to the 's Office:  NO A death that may be due entirely or in part to any factors other than natural disease (recent surgery, recent trauma, suspected abuse/neglect).   NO A death that may be an accident, suicide, or homicide.     NO Any sudden, unexpected death in which there is no prior history of significant heart disease or any other condition associated with sudden death.   NO A death under suspicious, unusual, or unexpected circumstances.    NO Any death which is apparently due to natural causes but in which the  does not have a personal physician familiar with the patient s medical history, social, or environmental situation or the  circumstances of the terminal event.   NO Any death apparently due to Sudden Infant Death Syndrome.     NO Deaths that occur during, in association with, or as consequences of a diagnostic, therapeutic, or anesthetic procedure.   NO Any death in which a fracture of a major bone has occurred within the past (6) six months.   NO A death of persons note seen by their physician within 120 days of demise.     NO Any death in which the  was an inmate of a public institution or was in the custody of Law Enforcement personnel.   NO  All unexpected deaths of children   NO Solid organ donors   NO Unidentified bodies   NO Deaths of persons whose bodies are to be cremated or otherwise disposed of so that the bodies will later be unavailable for examination;   NO Deaths unattended by a physician outside of a licensed healthcare facility or licensed residential hospice program   NO Deaths occurring within 24 hours of arrival to a health care facility if death is unexpected.    NO Deaths associated with the decedent s employment.   NO Deaths attributed to acts of terrorism.   NO Any death in which there is uncertainty as to whether it is a medical examiner s care should be discussed with the medical investigator.        Body disposition: Body released to the morgue.    STEVEN Lopez, CNP  Hospitalist - [facility]  Text me on the [facility] for a textback

## 2024-12-20 NOTE — PROGRESS NOTES
21:46: Patient on comfort care.  at 2146.Pronounce by House NP. Record of death completed.Post mortem care done.  Security called and body send to [facility].

## 2024-12-20 NOTE — PLAN OF CARE
Transitioned to comfort cares this evening.  Pt's son called a few loved ones and allowed them to talk to pt on speaker phone while bipap was in place.  Bipap removed at 1740, 6L O2 NC placed for comfort.  Pt has since remained sleepy with eyes closed, not speaking.   RR appear a little labored, PRNs utilized for comfort, air hunger, secretions.   Pt's son Hector and his cousin at bedside with pt.  They have declined spiritual health support at this time.

## 2024-12-24 NOTE — DISCHARGE SUMMARY
[facility]    Hospitalist Discharge Summary       Date of Admission:  2024  Date of Discharge:  2024  Discharging Provider: Domingo Ramesh MD      Discharge Diagnoses   Right side pneumonia, aspiration vs hospital acquired, with right lung collapse  Acute hypoxic respiratory failure  Septic ketoacidosis  Lung adenocarcinoma    Hospital Course   Kezia Dixon is a 69 year old female with PMH bilateral lung adenocarcinoma, on immunotherapy with plan to start SBRT of JULIEN nodule, previous esophageal cancer s/p esophagectomy and chemoradiation (2016), s/p lobectomy for right lung squamous cell cancer (2018), COPD, heavy tobacco use in past, resolved stress-induced cardiomyopathy, nonobstructive CAD, alcohol use disorder, who was admitted on 2024 with generalized weakness, physical deconditioning, acute on chronic nausea, anorexia.                Hospitalization complicated by severe right sided pneumonia 2024, requiring BiPap. Further decline on  with noted right lung collapse. Bronchoscopy would have required intubation which may have been terminal. Patient was made comfort cares on  and  later that evening.    Consultations This Hospital Stay   CARE MANAGEMENT / SOCIAL WORK IP CONSULT  PHYSICAL THERAPY ADULT IP CONSULT  OCCUPATIONAL THERAPY ADULT IP CONSULT  HEMATOLOGY & ONCOLOGY IP CONSULT  NUTRITION SERVICES ADULT IP CONSULT  CARE MANAGEMENT / SOCIAL WORK IP CONSULT  NEPHROLOGY IP CONSULT  SPEECH LANGUAGE PATH ADULT IP CONSULT  PHARMACY TO DOSE [facility] SERVICES IP CONSULT  PALLIATIVE CARE ADULT IP CONSULT  NUTRITION SERVICES ADULT IP CONSULT  NUTRITION SERVICES ADULT IP CONSULT  VASCULAR ACCESS ADULT IP CONSULT  WOUND OSTOMY CONTINENCE NURSE  IP CONSULT  PHARMACY/NUTRITION TO START AND MANAGE TPN  PHARMACY IP CONSULT  INTENSIVIST IP CONSULT    Code Status   Prior    Time Spent on this Encounter   [place]o, personally  saw the patient today and spent approximately 15 minutes discharging this patient.       Domingo Ramesh MD  [facility]  ______________________________________________________________________    Physical Exam   Vital Signs:                    Weight: 113 lbs 1.54 oz    See death note       Primary Care Physician   Mustapha Velásquez    Discharge Disposition   Patient  during this admission    Significant Results and Procedures   Most Recent 3 CBC's:  Recent Labs   Lab Test 24  0600 24  0541 24  0558   WBC 31.7* 38.5* 24.7*   HGB 7.0* 7.9* 9.1*   MCV 90 92 89    370 407     Most Recent 3 BMP's:  Recent Labs   Lab Test 24  0600 24  0541 24  1318 24  0848 24  0558    136  --   --  133*   POTASSIUM 3.6 3.9 3.8  --  3.4   CHLORIDE 111* 106  --   --  102   CO2 18* 13*  --   --  19*   BUN 28.5* 31.4*  --   --  31.4*   CR 0.54 0.60  --   --  0.66   ANIONGAP 13 17*  --   --  12   VICENTE 8.6* 8.7*  --   --  8.3*   * 145*  --  131* 117*     Most Recent 2 LFT's:  Recent Labs   Lab Test 24  0656 24  1229   AST 19 18   ALT 9 9   ALKPHOS 82 90   BILITOTAL 0.6 0.7     Most Recent 3 INR's:  Recent Labs   Lab Test 12/15/24  1036 22  1028 10/16/18  0701   INR 1.06 1.00 1.01     Most Recent 3 Troponin's:No lab results found.  Most Recent 3 BNP's:  Recent Labs   Lab Test 24  1539 22  2220 22  2146   NTBNPI 627 454 435     Most Recent D-dimer:  Recent Labs   Lab Test 24  1539   DD 1.59*     Most Recent Cholesterol Panel:  Recent Labs   Lab Test 24  0600 24  0946   CHOL  --  98   LDL  --  24   HDL  --  63   TRIG 65 54       Results for orders placed or performed during the hospital encounter of 24   CT Abdomen Pelvis w Contrast    Narrative    EXAM: CT ABDOMEN AND PELVIS WITH CONTRAST  LOCATION: [facility]  DATE: 2024    INDICATION: Upper abdominal  pain, chronic nausea, concern for bowel obstruction vs metastatic disease.  COMPARISON: 10/22/2024.  TECHNIQUE: CT scan of the abdomen and pelvis was performed following injection of IV contrast. Multiplanar reformats were obtained. Dose reduction techniques were used.  CONTRAST: 41 mL Isovue 370.    FINDINGS:   LOWER CHEST: Gastric pull-through changes. Trace residual fluid on the right. Emphysema. No definite acute infiltrates.    HEPATOBILIARY: No significant mass or bile duct dilatation. No calcified gallstones.     PANCREAS: No significant mass, duct dilatation, or inflammatory change.    SPLEEN: Normal size.    ADRENAL GLANDS: No significant nodules.    KIDNEYS/BLADDER: No significant mass, stone, or hydronephrosis.    BOWEL: No obstruction or inflammatory change. Normal appendix. No diverticulitis.    LYMPH NODES: No lymphadenopathy.    VASCULATURE: There are extensive atherosclerotic changes of the visualized aorta and its branches. There is no evidence of aortic dissection or aneurysm.    PELVIC ORGANS: No pelvic masses.    MUSCULOSKELETAL: No frankly destructive bony lesions. Scoliosis.      Impression    IMPRESSION:   1.  No acute process demonstrated.       XR Chest Port 1 View    Narrative    EXAM: XR CHEST PORT 1 VIEW  LOCATION: [facility]  DATE: 12/8/2024    INDICATION: Cough, eval for PNA  COMPARISON: 1/9/2024. 11/19/2024.      Impression    IMPRESSION: Posttreatment changes in the right lung including a right lower lobectomy. Opacities in the right midlung are not significantly changed from 11/19/2024 and may be due to evolving posttreatment pneumonitis or infection. No acute infiltrates on   the left. Normal heart size and pulmonary vascularity. Infusion port tip in the SVC.   US Lower Extremity Venous Duplex Right    Narrative    EXAM: US LOWER EXTREMITY VENOUS DUPLEX RIGHT  LOCATION: [facility]  DATE: 12/8/2024    INDICATION: Right leg  swelling.  COMPARISON: None.  TECHNIQUE: Venous Duplex ultrasound of the right lower extremity with and without compression, augmentation and duplex. Color flow and spectral Doppler with waveform analysis performed.    FINDINGS: Exam includes the common femoral, femoral, popliteal, and contralateral common femoral veins as well as segmentally visualized deep calf veins and greater saphenous vein.     RIGHT: No deep vein thrombosis. No superficial thrombophlebitis. No popliteal cyst.      Impression    IMPRESSION:  1.  No deep venous thrombosis in the right lower extremity.   XR Chest 2 Views    Narrative    CHEST TWO VIEWS 12/11/2024 9:38 AM     HISTORY: Nausea, generalized weakness.    COMPARISON: December 8, 2024       Impression    IMPRESSION: Scarring and architectural distortion on the right is  similar to previous. There are no acute infiltrates. The cardiac  silhouette is not enlarged. Pulmonary vasculature is unremarkable.    ANGIE ROSE MD         SYSTEM ID:  [id number]   CT Head w/o Contrast    Narrative    EXAM: CT HEAD W/O CONTRAST, CTA HEAD NECK W CONTRAST  LOCATION: [facility]  DATE: 12/15/2024    INDICATION: Code Stroke to evaluate for potential thrombolysis and thrombectomy. PLEASE READ IMMEDIATELY  COMPARISON: MRI of the brain with and without contrast 1/10/2024.  CONTRAST: 67 mL Isovue 370  TECHNIQUE: Head and neck CT angiogram with IV contrast. Noncontrast head CT followed by axial helical CT images of the head and neck vessels obtained during the arterial phase of intravenous contrast administration. Axial 2D reconstructed images and   multiplanar 3D MIP reconstructed images of the head and neck vessels were performed by the technologist. Dose reduction techniques were used. All stenosis measurements made according to NASCET criteria unless otherwise specified.    FINDINGS:   NONCONTRAST HEAD CT:   INTRACRANIAL CONTENTS: No acute intracranial hemorrhage. No extra-axial  fluid collection. No mass effect or midline shift. Chronic infarct centrally within the mira. Mild presumed chronic small vessel ischemic changes. Mild to moderate generalized volume   loss. No hydrocephalus.     VISUALIZED ORBITS/SINUSES/MASTOIDS: No intraorbital abnormality. No paranasal sinus mucosal disease. No middle ear or mastoid effusion.    BONES/SOFT TISSUES: No acute abnormality.    HEAD CTA:  ANTERIOR CIRCULATION: No stenosis/occlusion, aneurysm, or high flow vascular malformation. Standard [place] of [place] anatomy.    POSTERIOR CIRCULATION: No stenosis/occlusion, aneurysm, or high flow vascular malformation. Balanced vertebral arteries supply a normal basilar artery.     DURAL VENOUS SINUSES: Expected enhancement of the major dural venous sinuses.    NECK CTA:  RIGHT CAROTID: Vascular calcifications involving the right carotid bifurcation and proximal right ICA with 30% stenosis.    LEFT CAROTID: Vascular calcifications involving the left carotid bifurcation proximal left ICA with 10-20% stenosis.    VERTEBRAL ARTERIES: No focal stenosis or dissection. Dominant left and smaller right vertebral arteries.    AORTIC ARCH: Vascular calcifications involving the intrathoracic aorta. Classic aortic arch anatomy without significant stenosis of the proximal great vessels.    NONVASCULAR STRUCTURES:  The 1.3 cm irregularly marginated subsolid opacity left upper lobe anteriorly (series 7 image 54) is unchanged compared with prior CTs chest and remains indeterminate. Groundglass density opacity in the posterior aspect of the   left upper lobe has developed in the interval. Consolidation visualized right lung posteriorly appears unchanged.      Impression    IMPRESSION:   HEAD CT:  1.  No CT evidence for acute intracranial process.  2.  Brain atrophy and presumed chronic microvascular ischemic changes as above.    HEAD CTA:   1.  No significant stenosis, aneurysm, or high flow vascular malformation identified.  2.   Variant [place] anatomy as above.    NECK CTA:  1.  No significant stenosis of the bilateral internal carotid arteries.  2.  Interval development of groundglass density opacity in the posterior aspect of the left upper lung lobe, compatible with pneumonia in appropriate clinical setting.    Noncontrast CT imaging findings were discussed with Dr. Bautista of the clinical service at 9:02 AM. CTA imaging findings were discussed with Sarah Bautista of the clinical service at 9:12 AM on 12/15/2024.   CTA Head Neck with Contrast    Narrative    EXAM: CT HEAD W/O CONTRAST, CTA HEAD NECK W CONTRAST  LOCATION: [facility]  DATE: 12/15/2024    INDICATION: Code Stroke to evaluate for potential thrombolysis and thrombectomy. PLEASE READ IMMEDIATELY  COMPARISON: MRI of the brain with and without contrast 1/10/2024.  CONTRAST: 67 mL Isovue 370  TECHNIQUE: Head and neck CT angiogram with IV contrast. Noncontrast head CT followed by axial helical CT images of the head and neck vessels obtained during the arterial phase of intravenous contrast administration. Axial 2D reconstructed images and   multiplanar 3D MIP reconstructed images of the head and neck vessels were performed by the technologist. Dose reduction techniques were used. All stenosis measurements made according to NASCET criteria unless otherwise specified.    FINDINGS:   NONCONTRAST HEAD CT:   INTRACRANIAL CONTENTS: No acute intracranial hemorrhage. No extra-axial fluid collection. No mass effect or midline shift. Chronic infarct centrally within the mira. Mild presumed chronic small vessel ischemic changes. Mild to moderate generalized volume   loss. No hydrocephalus.     VISUALIZED ORBITS/SINUSES/MASTOIDS: No intraorbital abnormality. No paranasal sinus mucosal disease. No middle ear or mastoid effusion.    BONES/SOFT TISSUES: No acute abnormality.    HEAD CTA:  ANTERIOR CIRCULATION: No stenosis/occlusion, aneurysm, or high flow  vascular malformation. Standard [place] of [place] anatomy.    POSTERIOR CIRCULATION: No stenosis/occlusion, aneurysm, or high flow vascular malformation. Balanced vertebral arteries supply a normal basilar artery.     DURAL VENOUS SINUSES: Expected enhancement of the major dural venous sinuses.    NECK CTA:  RIGHT CAROTID: Vascular calcifications involving the right carotid bifurcation and proximal right ICA with 30% stenosis.    LEFT CAROTID: Vascular calcifications involving the left carotid bifurcation proximal left ICA with 10-20% stenosis.    VERTEBRAL ARTERIES: No focal stenosis or dissection. Dominant left and smaller right vertebral arteries.    AORTIC ARCH: Vascular calcifications involving the intrathoracic aorta. Classic aortic arch anatomy without significant stenosis of the proximal great vessels.    NONVASCULAR STRUCTURES:  The 1.3 cm irregularly marginated subsolid opacity left upper lobe anteriorly (series 7 image 54) is unchanged compared with prior CTs chest and remains indeterminate. Groundglass density opacity in the posterior aspect of the   left upper lobe has developed in the interval. Consolidation visualized right lung posteriorly appears unchanged.      Impression    IMPRESSION:   HEAD CT:  1.  No CT evidence for acute intracranial process.  2.  Brain atrophy and presumed chronic microvascular ischemic changes as above.    HEAD CTA:   1.  No significant stenosis, aneurysm, or high flow vascular malformation identified.  2.  Variant [place] of [place] anatomy as above.    NECK CTA:  1.  No significant stenosis of the bilateral internal carotid arteries.  2.  Interval development of groundglass density opacity in the posterior aspect of the left upper lung lobe, compatible with pneumonia in appropriate clinical setting.    Noncontrast CT imaging findings were discussed with Dr. Bautista of the clinical service at 9:02 AM. CTA imaging findings were discussed with Sarah Bautista of the clinical  service at 9:12 AM on 12/15/2024.   US Lower Extremity Arterial Duplex Bilateral    Narrative    EXAM: US LOWER EXTREMITY ARTERIAL DUPLEX BILATERAL  LOCATION: [facility]  DATE: 12/15/2024    INDICATION: mottling bilateral lower extremities, decreased DP pulse on RLE  COMPARISON: None.  TECHNIQUE: Duplex utilizing 2D gray-scale imaging, Doppler interrogation with color-flow and spectral waveform analysis.    FINDINGS:    RIGHT LOWER EXTREMITY ARTERIAL ASSESSMENT:    Common femoral artery: 190 cm/s  Profunda femoris artery: 130 cm/s  SFA (proximal): 167 cm/s  SFA (mid): 134 cm/s  SFA (distal): 87 cm/s  Popliteal artery: 69-95 cm/s  Posterior tibial artery: 81 cm/s  Peroneal artery: 52 cm/s  Anterior tibial artery: 25 cm/s  Dorsalis pedis artery: 22 cm/s    Waveforms are multiphasic from the common femoral artery through the tibial arteries. Dorsalis pedis artery waveform is monophasic suggesting anterior tibial artery stenosis.    LEFT LOWER EXTREMITY ARTERIAL ASSESSMENT:  Common femoral artery: 224 cm/s  Profunda femoris artery: 154 cm/s  SFA (proximal): 199 cm/s  SFA (mid): 177 cm/s  SFA (distal): 120 cm/s  Popliteal artery:  cm/s  Posterior tibial artery: 63 cm/s  Peroneal artery: 40 cm/s  Anterior tibial artery: 87 cm/s  Dorsalis pedis artery: 82 cm/s    Left lower extremity waveforms are multiphasic from the CFA through the tibial arteries.      Impression    IMPRESSION:  1.  Right lower extremity: No high-grade femoral-popliteal artery stenosis. Three-vessel runoff to the foot. Monophasic waveform in the dorsalis pedis artery suggestive of anterior tibial artery stenosis.  2.  Left lower extremity: No high-grade femoral-popliteal artery stenosis. Three-vessel runoff to the foot.   XR Chest Port 1 View    Narrative    EXAM: XR CHEST PORT 1 VIEW  LOCATION: [facility]  DATE: 12/15/2024    INDICATION: likely pneumonia  COMPARISON: Chest radiograph 12/11/2024  CT chest 11/19/2024      Impression    IMPRESSION: Improved right mid and lower lung aeration. No new focal consolidation. Chronic blunting of the right costophrenic angle. No discernible pneumothorax. Left chest port tip at the lower SVC. Heart size is normal. Status post esophagectomy and   gastric pull-through.   MR Brain w/o & w Contrast    Narrative    EXAM: MR BRAIN W/O and W CONTRAST  LOCATION: [facility]  DATE: 12/17/2024    INDICATION: Confusion, extremity numbness.  COMPARISON: CTA head and neck 12/15/2024.  CONTRAST: 4 mL Gadavist.  TECHNIQUE: Routine multiplanar multisequence head MRI without and with intravenous contrast.    FINDINGS: Image quality is degraded by motion.    INTRACRANIAL CONTENTS: No acute or subacute infarct. No mass, acute hemorrhage, or extra-axial fluid collections. Normal brain parenchymal signal. Mild to moderate generalized cerebral atrophy. No hydrocephalus. Normal position of the cerebellar tonsils.   No pathologic contrast enhancement.    SELLA: No abnormality accounting for technique.    OSSEOUS STRUCTURES/SOFT TISSUES: Normal marrow signal. The major intracranial vascular flow voids are maintained.     ORBITS: No abnormality accounting for technique.     SINUSES/MASTOIDS: No paranasal sinus mucosal disease. No middle ear or mastoid effusion.       Impression    IMPRESSION:  1.  Image quality is degraded by motion. Within constraints, no acute intracranial process.  2.  Mild to moderate generalized brain atrophy.   XR Chest Port 1 View    Narrative    XR CHEST PORT 1 VIEW  12/17/2024 9:15 AM       INDICATION: hypoxia; RRT  COMPARISON: 12/15/2024       Impression    IMPRESSION: Esophagectomy and gastric pull-through. Left subclavian  port.    New opacity in the right mid and lower lung consistent with pneumonia.  Interstitial infiltrate in the left lung also slightly increased.  Probable trace right pleural effusion. No pneumothorax.    DELROY LOGAN  MD         SYSTEM ID:  [id number]   XR Chest Port 1 View    Narrative    EXAM: XR CHEST PORT 1 VIEW  LOCATION: [facility]  DATE: 2024    INDICATION: hypoxia  COMPARISON: None.      Impression    IMPRESSION: The implanted port is unchanged in position with the tip is in the mid SVC. The heart is unchanged in size and appearance. Volume loss and patchy airspace disease is again seen in the right lung with a small right-sided pleural effusion.   Slightly improved aeration is seen in the right midlung zone   XR Chest Port 1 View    Narrative    CHEST PORTABLE ONE VIEW 2024 9:20 AM     HISTORY: Follow up pneumonia, hypoxia.    COMPARISON: 2024.      Impression    IMPRESSION: Interval development of diffuse opacification of the right  hemithorax. Volume loss is noted evidence by tracheal shift towards  the right. This could be severe atelectasis and lobar collapse.  Diffusely increased airspace consolidation not excluded. Right pleural  fluid may also be possible. Left lung shows no acute airspace  consolidation. Persistent patchy opacities at the left upper to  midlung appear stable. Cardiac silhouette is obscured. Left chest  Port-A-Cath is stable.    ANTONETTE ALMONTE MD         SYSTEM ID:  [id number]   Echocardiogram Complete     Value    LVEF  >70%    Narrative    [id number]  [id number]  [id number]^ANN^CRISTIAN     [facility]  Echocardiography Laboratory  [address] [place] [address]     Name: MATTEO CASTAÑEDA  MRN: [id number]  : 1955  Study Date: 2024 09:27 AM  Age: 69 yrs  Gender: Female  Patient Location: [facility]  Reason For Study: Endocarditis  Ordering Physician: CRISTIAN JUAREZ  Referring Physician: Mustapha Velásquez MD  Performed By: Faye Cain     BSA: 1.4 m2  Height: 61 in  Weight: 91 lb  HR: 98  BP: 157/64 mmHg  ______________________________________________________________________________  Procedure  Complete  Echo Adult. Definity (NDC #29237-342) given intravenously.  ______________________________________________________________________________  Interpretation Summary     The left ventricle is normal in size.  Hyperdynamic left ventricular function  The visual ejection fraction is >70%.  Normal left ventricular wall motion  There is mild (1+) aortic regurgitation.  Technically difficult, suboptimal study. Valves were not seen well enough to  exclude vegetations.  Since the previous study, no obvious changes are present.  ______________________________________________________________________________  Left Ventricle  The left ventricle is normal in size. There is normal left ventricular wall  thickness. Hyperdynamic left ventricular function. The visual ejection  fraction is >70%. Diastolic Doppler findings (E/E' ratio and/or other  parameters) suggest left ventricular filling pressures are indeterminate.  Normal left ventricular wall motion.     Right Ventricle  The right ventricle is normal in structure, function and size.     Atria  Normal left atrial size. Right atrial size is normal. There is no atrial shunt  seen.     Mitral Valve  The mitral valve is not well visualized. There is trace mitral regurgitation.     Tricuspid Valve  The tricuspid valve is not well visualized. There is trace tricuspid  regurgitation. Right ventricular systolic pressure could not be approximated  due to inadequate tricuspid regurgitation. IVC diameter <2.1 cm collapsing  >50% with sniff suggests a normal RA pressure of 3 mmHg.     Aortic Valve  The aortic valve is not well visualized. There is mild (1+) aortic  regurgitation. No aortic stenosis is present.     Pulmonic Valve  The pulmonic valve is not well seen, but is grossly normal. There is trace  pulmonic valvular regurgitation.     Vessels  Normal size aorta. The inferior vena cava was normal in size with preserved  respiratory variability.     Pericardium  There is no pericardial  effusion.     ______________________________________________________________________________  MMode/2D Measurements & Calculations  IVSd: 0.70 cm  LVIDd: 3.5 cm  LVIDs: 2.3 cm  LVPWd: 0.70 cm  FS: 35.6 %  LV mass(C)d: 62.8 grams  LV mass(C)dI: 46.5 grams/m2     Ao root diam: 3.5 cm  LA dimension: 2.9 cm  LA/Ao: 0.82  LVOT diam: 1.9 cm  LVOT area: 2.8 cm2  Ao root diam index Ht(cm/m): 2.3  Ao root diam index BSA (cm/m2): 2.6  RWT: 0.40     Doppler Measurements & Calculations  MV E max joseluis: 73.6 cm/sec  MV A max joseluis: 105.3 cm/sec  MV E/A: 0.70  MV dec time: 0.15 sec  PA acc time: 0.11 sec  E/E' avg: 10.9  Lateral E/e': 8.6  Medial E/e': 13.2  RV S Joseluis: 11.9 cm/sec     ______________________________________________________________________________  Report approved by: Rodriguez Soto MD on 12/16/2024 11:43 AM             Discharge Orders     Discharge Medications   Discharge Medication List as of 12/20/2024  1:09 AM        CONTINUE these medications which have NOT CHANGED    Details   albuterol (PROAIR HFA/PROVENTIL HFA/VENTOLIN HFA) 108 (90 Base) MCG/ACT inhaler Inhale 2 puffs into the lungs every 6 hours as needed for shortness of breath, wheezing or cough., Historical      ANORO ELLIPTA 62.5-25 MCG/ACT oral inhaler Inhale 1 puff into the lungs daily., [place], Historical      aspirin (ASA) 81 MG EC tablet 1 tablet every other day, Historical      atorvastatin (LIPITOR) 40 MG tablet Take 1 tablet (40 mg) by mouth daily., Disp-90 tablet, R-3, E-Prescribe      durvalumab Inject into the vein every 14 days., Historical      fluticasone (FLONASE) 50 MCG/ACT nasal spray INSTILL 2 SPRAYS INTO BOTH NOSTRILS DAILY., Disp-48 mL, R-2, E-Prescribe      furosemide (LASIX) 40 MG tablet Take 0.5 tablets (20 mg) by mouth daily as needed (edema or shortness of breath) For worsening shortness of breath, leg swelling or weight gain., No Print Out      gabapentin (NEURONTIN) 600 MG tablet TAKE ONE (1) TABLET BY MOUTH THREE TIMES DAILY., Disp-90  tablet, R-2, E-Prescribe      ipratropium (ATROVENT) 0.06 % nasal spray Spray 2 sprays into both nostrils 4 times daily., Disp-15 mL, R-5, E-Prescribe      losartan (COZAAR) 50 MG tablet Take 1 tablet (50 mg) by mouth daily. Appointment required for further refills, Disp-90 tablet, R-3, E-Prescribe      metoprolol succinate ER (TOPROL XL) 50 MG 24 hr tablet Take 1 tablet (50 mg) by mouth every morning., Disp-90 tablet, R-3, E-Prescribe      multivitamin w/minerals (MULTI-VITAMIN) tablet Take 1 tablet by mouth daily., Historical      omeprazole (PRILOSEC) 40 MG DR capsule TAKE 1 CAPSULE BY MOUTH EVERY DAY, Disp-90 capsule, R-2, E-Prescribe      ondansetron (ZOFRAN) 4 MG tablet Take 8 mg by mouth every 8 hours as needed for nausea., Historical      prochlorperazine (COMPAZINE) 10 MG tablet Take 10 mg by mouth every 6 hours as needed for nausea or vomiting., Historical      spironolactone (ALDACTONE) 25 MG tablet Take 0.5 tablets (12.5 mg) by mouth every morning., Disp-45 tablet, R-3, E-Prescribe           Allergies   Allergies   Allergen Reactions    Codeine Sulfate Nausea    Simvastatin Cramps     Leg cramps    Morphine      Pt unsure - pt does not believe this is an allergy of hers    Pcn [Penicillins] Rash

## (undated) DEVICE — ESU PENCIL W/SMOKE EVAC NEPTUNE STRYKER 0703-046-000

## (undated) DEVICE — TAPE DRSG UNIVERSAL CLOTH 3" WHITE LATEX 881-3

## (undated) DEVICE — SU VICRYL 2-0 CT-1 27" J339H

## (undated) DEVICE — PACK MINOR SBA15MIFSE

## (undated) DEVICE — TUBING SUCTION MEDI-VAC SOFT 3/16"X20' N520A

## (undated) DEVICE — DRSG STERI STRIP 1/2X4" R1547

## (undated) DEVICE — SU SILK 2-0 TIE 24" SA75H

## (undated) DEVICE — SYR BULB IRRIG 50ML LATEX FREE 0035280

## (undated) DEVICE — PREP CHLORAPREP 26ML TINTED ORANGE  260815

## (undated) DEVICE — STPL POWERED ECHELON VASC 35MM PVE35A

## (undated) DEVICE — LINEN TOWEL PACK X5 5464

## (undated) DEVICE — SU PROLENE 3-0 V-7DA 36" 8976H

## (undated) DEVICE — ESU GROUND PAD E7506

## (undated) DEVICE — SYR 10ML SLIP TIP W/O NDL

## (undated) DEVICE — NDL 19GA 1.5"

## (undated) DEVICE — SU VICRYL 3-0 SH 27" J316H

## (undated) DEVICE — SOL WATER IRRIG 1000ML BOTTLE 2F7114

## (undated) DEVICE — ESU HOLDER LAP INST DISP PURPLE LONG 330MM H-PRO-330

## (undated) DEVICE — DRSG KERLIX 4 1/2"X4YDS ROLL 6715

## (undated) DEVICE — SU VICRYL 1 CTX CR 8X18" J765D

## (undated) DEVICE — DEVICE TACKER ENDO RELIATACK ARTIC HANDLE RELTACK4XDPT

## (undated) DEVICE — GLOVE BIOGEL PI ULTRATOUCH G SZ 6.5 42165

## (undated) DEVICE — DRAIN CHEST TUBE RIGHT ANGLED 24FR 8124

## (undated) DEVICE — SPONGE LAP 18X18" X8435

## (undated) DEVICE — SU VICRYL 0 CT-1 27" J340H

## (undated) DEVICE — NDL 22GA 1.5"

## (undated) DEVICE — DRAPE BREAST/CHEST 29420

## (undated) DEVICE — STPL SKIN SUBCUTICULAR INSORB  2030

## (undated) DEVICE — EVAC SYSTEM CLEAR FLOW SC082500

## (undated) DEVICE — ESU GROUND PAD UNIVERSAL W/O CORD

## (undated) DEVICE — ESU CORD MONOPOLAR 10'  E0510

## (undated) DEVICE — DRAIN PENROSE 0.75"X18" LATEX FREE GR205

## (undated) DEVICE — NDL 25GA 1.5" 305127

## (undated) DEVICE — ESU PENCIL W/HOLSTER E2350H

## (undated) DEVICE — SUCTION CANISTER MEDIVAC LINER 3000ML W/LID 65651-530

## (undated) DEVICE — ENDO TROCAR FIRST ENTRY KII FIOS Z-THRD 11X100MM CTF33

## (undated) DEVICE — SOL NACL 0.9% INJ 250ML BAG 2B1322Q

## (undated) DEVICE — PACK LAP CHOLE SLC15LCFSD

## (undated) DEVICE — SU SILK 1 TIE 10X30" SA87G

## (undated) DEVICE — LINEN GOWN OVERSIZE 5408

## (undated) DEVICE — DECANTER BAG 2002S

## (undated) DEVICE — SYR 10ML FINGER CONTROL W/O NDL 309695

## (undated) DEVICE — Device

## (undated) DEVICE — CATH ON-Q PAIN SILVER SKR 2.5" PM010-A

## (undated) DEVICE — CATH THORACIC 24FR STR SLICONE 14024

## (undated) DEVICE — DECANTER VIAL 2006S

## (undated) DEVICE — BLADE KNIFE SURG 15 371115

## (undated) DEVICE — ESU ELEC BLADE 2.75" COATED/INSULATED E1455

## (undated) DEVICE — DRAPE IOBAN INCISE 23X17" 6650EZ

## (undated) DEVICE — SU PROLENE 4-0 V-7DA 36" 8975H

## (undated) DEVICE — ENDO TROCAR SLEEVE KII Z-THREADED 05X100MM CTS02

## (undated) DEVICE — TUBE SMOKE EVAC 7/8"X10 (STERILE)

## (undated) DEVICE — POUCH TISSUE RETRIEVAL LAP 10MM 2.21" INTRO TRS100SB2

## (undated) DEVICE — GOWN IMPERVIOUS ZONED LG

## (undated) DEVICE — SURGICEL HEMOSTAT 3X4" NUKNIT 1943

## (undated) DEVICE — ENDO TROCAR FIRST ENTRY KII FIOS Z-THRD 05X100MM CTF03

## (undated) DEVICE — SYSTEM CLEARIFY VISUALIZATION 21-345

## (undated) DEVICE — SUCTION DRY CHEST DRAIN OASIS 3600-100

## (undated) DEVICE — SU VICRYL 4-0 PS-2 18" UND J496H

## (undated) DEVICE — DRAPE POUCH INSTRUMENT 1018

## (undated) DEVICE — SOL NACL 0.9% IRRIG 1000ML BOTTLE 2F7124

## (undated) DEVICE — BLADE KNIFE SURG 10 371110

## (undated) DEVICE — DRAPE LAP W/ARMBOARD 29410

## (undated) DEVICE — STPL ENDO ARTICULATING 45MM EC45A

## (undated) DEVICE — GLOVE PROTEXIS W/NEU-THERA 6.5  2D73TE65

## (undated) DEVICE — GLOVE PROTEXIS W/NEU-THERA 7.5  2D73TE75

## (undated) DEVICE — GOWN IMPERVIOUS SPECIALTY XLG/XLONG 32474

## (undated) DEVICE — SPONGE RAY-TEC 4X8" 7318

## (undated) DEVICE — SU VICRYL 1 CT 36" J959H

## (undated) DEVICE — DRAPE LAP TRANSVERSE 29421

## (undated) DEVICE — DRAPE SHEET REV FOLD 3/4 9349

## (undated) DEVICE — STPL RELOAD REG/THK TISSUE ECHELON 45 X 3.8MM GOLD GST45D

## (undated) DEVICE — SU DERMABOND ADVANCED .7ML DNX12

## (undated) DEVICE — SU VICRYL 2-0 TIE 12X18" J905T

## (undated) DEVICE — GLOVE PROTEXIS W/NEU-THERA 8.0  2D73TE80

## (undated) DEVICE — GLOVE BIOGEL PI ULTRATOUCH G SZ 7.5 42175

## (undated) DEVICE — SOL NACL 0.9% IRRIG 1000ML BOTTLE 07138-09

## (undated) DEVICE — NDL INSUFFLATION 13GA 120MM C2201

## (undated) DEVICE — PREP CHLORAPREP W/ORANGE TINT 10.5ML 930715

## (undated) DEVICE — DRSG GAUZE 4X4" 3033

## (undated) DEVICE — ESU ELEC BLADE 6" COATED/INSULATED E1455-6

## (undated) DEVICE — SU VICRYL 0 UR-6 27" J603H

## (undated) DEVICE — SUCTION IRR STRYKERFLOW II W/TIP 250-070-520

## (undated) DEVICE — SOL NACL 0.9% INJ 1000ML BAG 2B1324X

## (undated) RX ORDER — HYDROMORPHONE HYDROCHLORIDE 1 MG/ML
INJECTION, SOLUTION INTRAMUSCULAR; INTRAVENOUS; SUBCUTANEOUS
Status: DISPENSED
Start: 2019-03-19

## (undated) RX ORDER — CLINDAMYCIN PHOSPHATE 900 MG/50ML
INJECTION, SOLUTION INTRAVENOUS
Status: DISPENSED
Start: 2018-10-16

## (undated) RX ORDER — LIDOCAINE HYDROCHLORIDE 20 MG/ML
INJECTION, SOLUTION EPIDURAL; INFILTRATION; INTRACAUDAL; PERINEURAL
Status: DISPENSED
Start: 2018-10-16

## (undated) RX ORDER — NITROGLYCERIN 0.4 MG/1
TABLET SUBLINGUAL
Status: DISPENSED
Start: 2021-11-02

## (undated) RX ORDER — KETOROLAC TROMETHAMINE 15 MG/ML
INJECTION, SOLUTION INTRAMUSCULAR; INTRAVENOUS
Status: DISPENSED
Start: 2019-03-19

## (undated) RX ORDER — HEPARIN SODIUM (PORCINE) LOCK FLUSH IV SOLN 100 UNIT/ML 100 UNIT/ML
SOLUTION INTRAVENOUS
Status: DISPENSED
Start: 2024-01-09

## (undated) RX ORDER — GLYCOPYRROLATE 0.2 MG/ML
INJECTION, SOLUTION INTRAMUSCULAR; INTRAVENOUS
Status: DISPENSED
Start: 2018-10-16

## (undated) RX ORDER — HEPARIN SODIUM (PORCINE) LOCK FLUSH IV SOLN 100 UNIT/ML 100 UNIT/ML
SOLUTION INTRAVENOUS
Status: DISPENSED
Start: 2024-04-01

## (undated) RX ORDER — PROPOFOL 10 MG/ML
INJECTION, EMULSION INTRAVENOUS
Status: DISPENSED
Start: 2018-10-16

## (undated) RX ORDER — CLINDAMYCIN PHOSPHATE 900 MG/50ML
INJECTION, SOLUTION INTRAVENOUS
Status: DISPENSED
Start: 2019-03-19

## (undated) RX ORDER — FENTANYL CITRATE 50 UG/ML
INJECTION, SOLUTION INTRAMUSCULAR; INTRAVENOUS
Status: DISPENSED
Start: 2018-10-16

## (undated) RX ORDER — DEXAMETHASONE SODIUM PHOSPHATE 4 MG/ML
INJECTION, SOLUTION INTRA-ARTICULAR; INTRALESIONAL; INTRAMUSCULAR; INTRAVENOUS; SOFT TISSUE
Status: DISPENSED
Start: 2018-10-16

## (undated) RX ORDER — ONDANSETRON 2 MG/ML
INJECTION INTRAMUSCULAR; INTRAVENOUS
Status: DISPENSED
Start: 2018-10-16

## (undated) RX ORDER — METOPROLOL TARTRATE 1 MG/ML
INJECTION, SOLUTION INTRAVENOUS
Status: DISPENSED
Start: 2021-11-02

## (undated) RX ORDER — HYDROMORPHONE HYDROCHLORIDE 1 MG/ML
INJECTION, SOLUTION INTRAMUSCULAR; INTRAVENOUS; SUBCUTANEOUS
Status: DISPENSED
Start: 2018-10-16

## (undated) RX ORDER — FENTANYL CITRATE 50 UG/ML
INJECTION, SOLUTION INTRAMUSCULAR; INTRAVENOUS
Status: DISPENSED
Start: 2019-03-19

## (undated) RX ORDER — FENTANYL CITRATE 50 UG/ML
INJECTION, SOLUTION INTRAMUSCULAR; INTRAVENOUS
Status: DISPENSED
Start: 2024-01-09

## (undated) RX ORDER — HEPARIN SODIUM (PORCINE) LOCK FLUSH IV SOLN 100 UNIT/ML 100 UNIT/ML
SOLUTION INTRAVENOUS
Status: DISPENSED
Start: 2024-07-18

## (undated) RX ORDER — BUPIVACAINE HYDROCHLORIDE 5 MG/ML
INJECTION, SOLUTION EPIDURAL; INTRACAUDAL
Status: DISPENSED
Start: 2018-10-16

## (undated) RX ORDER — HEPARIN SODIUM 1000 [USP'U]/ML
INJECTION, SOLUTION INTRAVENOUS; SUBCUTANEOUS
Status: DISPENSED
Start: 2024-01-09

## (undated) RX ORDER — LIDOCAINE HYDROCHLORIDE 10 MG/ML
INJECTION, SOLUTION INFILTRATION; PERINEURAL
Status: DISPENSED
Start: 2024-01-09

## (undated) RX ORDER — HEPARIN SODIUM (PORCINE) LOCK FLUSH IV SOLN 100 UNIT/ML 100 UNIT/ML
SOLUTION INTRAVENOUS
Status: DISPENSED
Start: 2024-01-10

## (undated) RX ORDER — METOPROLOL TARTRATE 50 MG
TABLET ORAL
Status: DISPENSED
Start: 2021-11-02

## (undated) RX ORDER — IVABRADINE 5 MG/1
TABLET, FILM COATED ORAL
Status: DISPENSED
Start: 2021-11-02

## (undated) RX ORDER — BUPIVACAINE HYDROCHLORIDE AND EPINEPHRINE 5; 5 MG/ML; UG/ML
INJECTION, SOLUTION EPIDURAL; INTRACAUDAL; PERINEURAL
Status: DISPENSED
Start: 2019-03-19

## (undated) RX ORDER — NEOSTIGMINE METHYLSULFATE 1 MG/ML
VIAL (ML) INJECTION
Status: DISPENSED
Start: 2018-10-16

## (undated) RX ORDER — HEPARIN SODIUM (PORCINE) LOCK FLUSH IV SOLN 100 UNIT/ML 100 UNIT/ML
SOLUTION INTRAVENOUS
Status: DISPENSED
Start: 2024-10-10